# Patient Record
Sex: MALE | Race: BLACK OR AFRICAN AMERICAN | Employment: UNEMPLOYED | URBAN - METROPOLITAN AREA
[De-identification: names, ages, dates, MRNs, and addresses within clinical notes are randomized per-mention and may not be internally consistent; named-entity substitution may affect disease eponyms.]

---

## 2017-01-09 ENCOUNTER — APPOINTMENT (OUTPATIENT)
Dept: GENERAL RADIOLOGY | Age: 57
End: 2017-01-09
Attending: EMERGENCY MEDICINE
Payer: COMMERCIAL

## 2017-01-09 ENCOUNTER — HOSPITAL ENCOUNTER (EMERGENCY)
Age: 57
Discharge: HOME | End: 2017-01-09
Attending: EMERGENCY MEDICINE
Payer: COMMERCIAL

## 2017-01-09 VITALS
RESPIRATION RATE: 15 BRPM | HEART RATE: 91 BPM | HEIGHT: 64 IN | TEMPERATURE: 98.6 F | SYSTOLIC BLOOD PRESSURE: 149 MMHG | WEIGHT: 235 LBS | OXYGEN SATURATION: 95 % | BODY MASS INDEX: 40.12 KG/M2 | DIASTOLIC BLOOD PRESSURE: 93 MMHG

## 2017-01-09 DIAGNOSIS — J06.9 ACUTE UPPER RESPIRATORY INFECTION: ICD-10-CM

## 2017-01-09 DIAGNOSIS — J18.9 COMMUNITY ACQUIRED PNEUMONIA: Primary | ICD-10-CM

## 2017-01-09 LAB
FLUAV AG NPH QL IA: NEGATIVE
FLUBV AG NOSE QL IA: NEGATIVE

## 2017-01-09 PROCEDURE — 71020 XR CHEST PA LAT: CPT

## 2017-01-09 PROCEDURE — 99282 EMERGENCY DEPT VISIT SF MDM: CPT

## 2017-01-09 PROCEDURE — 87804 INFLUENZA ASSAY W/OPTIC: CPT | Performed by: EMERGENCY MEDICINE

## 2017-01-09 RX ORDER — IBUPROFEN 400 MG/1
800 TABLET ORAL
Status: DISCONTINUED | OUTPATIENT
Start: 2017-01-09 | End: 2017-01-09

## 2017-01-09 RX ORDER — ALBUTEROL SULFATE 90 UG/1
2 AEROSOL, METERED RESPIRATORY (INHALATION)
Status: DISCONTINUED | OUTPATIENT
Start: 2017-01-09 | End: 2017-01-09 | Stop reason: HOSPADM

## 2017-01-09 RX ORDER — LEVOFLOXACIN 750 MG/1
750 TABLET ORAL DAILY
Qty: 5 TAB | Refills: 0 | Status: SHIPPED | OUTPATIENT
Start: 2017-01-09 | End: 2017-01-14

## 2017-01-09 NOTE — DISCHARGE INSTRUCTIONS
Upper Respiratory Infection: After Your Visit to the Emergency Room  Your Care Instructions  You were seen in the emergency room for an upper respiratory infection (URI). This is an infection of the nose, sinuses, or throat. Viruses or bacteria can cause URIs. Colds, flu, and sinusitis are examples of URIs. These infections are spread by coughs, sneezes, and close contact with people who have a URI. Your doctor may have given you antibiotics to treat the infection if it was caused by bacteria. But antibiotics will not help a viral infection. You can treat most infections with home care. This may include drinking lots of fluids and taking over-the-counter medicine for your symptoms. Even though you have been released from the emergency room, you still need to watch for any problems. The doctor carefully checked you. But sometimes problems can develop later. If you have new symptoms, or if your symptoms do not get better, return to the emergency room or call your doctor right away. A visit to the emergency room is only one step in your treatment. Even if you feel better, you still need to do what your doctor recommends, such as going to all suggested follow-up appointments and taking medicines exactly as directed. This will help you recover and help prevent future problems. How can you care for yourself at home? · To prevent dehydration, drink plenty of fluids, enough so that your urine is light yellow or clear like water. Choose water and other caffeine-free clear liquids until you feel better. If you have kidney, heart, or liver disease and have to limit fluids, talk with your doctor before you increase the amount of fluids you drink. · Take acetaminophen (Tylenol) or ibuprofen (Advil, Motrin) for fever or pain. Read and follow all instructions on the label. · If your doctor prescribed antibiotics, take them as directed. Do not stop taking them just because you feel better.  You need to take the full course of antibiotics. · Take cough medicine and a decongestant if your doctor suggests it. · Get plenty of rest.  · Use saline (saltwater) nasal washes to help keep your nasal passages open and wash out mucus and bacteria. You can buy saline nose drops at a grocery store or drugstore. Or you can make your own at home by adding 1 teaspoon of salt and 1 teaspoon of baking soda to 2 cups of distilled water. If you make your own, fill a bulb syringe with the solution, insert the tip into your nostril, and squeeze gently. Sharilyn Ply your nose. · Use a vaporizer or humidifier to add moisture to the air in your bedroom. Follow the instructions for cleaning it. · Do not smoke or allow others to smoke around you. If you need help quitting, talk to your doctor about stop-smoking programs and medicines. These can increase your chances of quitting for good. When should you call for help? Call 911 if:  · You have severe trouble breathing. Return to the emergency room now if:  · You have a fever with stiff neck or a severe headache. · You have signs of needing more fluids. You have sunken eyes, a dry mouth, and pass only a little dark urine. · You cannot keep down fluids or medicine. Call your doctor today if:  · You have a deep cough and a lot of mucus. · You are too tired to eat or drink. · You have a new symptom, such as a sore throat, an earache, or a rash. Where can you learn more? Go to [employer]  Enter C545 in the search box to learn more about \"Upper Respiratory Infection: After Your Visit to the Emergency Room. \"   © 9138-3994 [employer]. Care instructions adapted under license by Kay Macias (which disclaims liability or warranty for this information).  This care instruction is for use with your licensed healthcare professional. If you have questions about a medical condition or this instruction, always ask your healthcare professional. [address]  any warranty or liability for your use of this information.   Content Version: 9.3.82409; Last Revised: February 13, 2012

## 2017-01-09 NOTE — ED TRIAGE NOTES
\"I am coughing and aching all over since yesterday. I stayed in bed all day yesterday\". Patient also reports falling this morning on ice but did not injure himself. He complains of chills with unknown fever.

## 2017-01-09 NOTE — ED PROVIDER NOTES
HPI Comments: 1:22 PM Maynor Najera is a 64 y.o. male presenting to the ED with URI sx x 2-3 days. Pt c/o a sore rib cage, productive cough (white mucous) x 1 day, epistaxis, generalized body aches, and feverish feelings. Pt states that he does smoke, but that he hasn't smoking over the last couple of days due to his sx. Pt denies nausea, vomiting, and diarrhea. No other complaints at this time. The history is provided by the patient. Past Medical History:   Diagnosis Date    Alcohol abuse     Arthritis     Atherosclerotic cardiovascular disease     CAD (coronary artery disease)      mild disease of coronaries (cor angio 2006),wife states he has 1 stent    Cardiac cath 01/26/2006     RCA mild. Otherwise patent coronaries. LVEDP 15.  EF 55-60%.  Cardiac echocardiogram 03/27/2009     EF 60%. No cardiac source of embolism. No significant valvular pathology.  Cardiac nuclear imaging test 12/06/2011     Small, mild inferior infarction or possibly artifact. No ischemia. EF 45%. No TID. No reg WMA.  Cardiovascular LLE venous duplex 08/14/2015     Left leg:  No DVT. Dilated lymph node in left groin.  Constipation     Diabetes mellitus type II     Gout     Hepatic steatosis     Hypercholesterolemia     Hypertension     Knee effusion, left     Left knee pain     Morbid obesity (HCC)     Noncompliance     Obesity (BMI 30-39. 9)     S/P colonoscopy 3/8/05     Dr. Brain Montaño; normal; f/u 10 years    Stomach problems     TIA (transient ischemic attack) 3/09    Tobacco abuse        Past Surgical History:   Procedure Laterality Date    Pr abdomen surgery proc unlisted       Hernia repair in 1960's or 1970's.  Hx orthopaedic       Bones spur removed from left & right foot 2013.     Hx urological  8/18/2015     [facility], Dr. Erin Caldera, Cystoscopy, left retrograde, left double-J cath         Family History:   Problem Relation Age of Onset    Diabetes Father     Heart Disease Mother  Diabetes Sister     Hypertension Sister     Arthritis-osteo Sister     Arthritis-osteo Brother     Diabetes Other     Diabetes Other      Uncle, Aunt    Hypertension Other      Uncle; Aunt       Social History     Social History    Marital status:      Spouse name: N/A    Number of children: N/A    Years of education: N/A     Occupational History    disabled      Social History Main Topics    Smoking status: Current Every Day Smoker     Packs/day: 0.25     Types: Cigarettes     Last attempt to quit: 6/28/2016    Smokeless tobacco: Never Used    Alcohol use 0.0 oz/week     0 Standard drinks or equivalent per week      Comment: 1 beers a day.  Drug use: No    Sexual activity: Yes     Other Topics Concern    Not on file     Social History Narrative         ALLERGIES: Shellfish derived    Review of Systems   Constitutional: Positive for fever. HENT: Positive for congestion and nosebleeds. Respiratory: Positive for cough (productive- white sputum). Negative for shortness of breath. Cardiovascular: Negative for chest pain and leg swelling. Gastrointestinal: Negative for abdominal pain, nausea and vomiting. Genitourinary: Negative for dysuria. Musculoskeletal: Negative. Positive- rib cage pain   Neurological: Negative for light-headedness and headaches. All other systems reviewed and are negative. Vitals:    01/09/17 1054   BP: (!) 149/93   Pulse: 91   Resp: 15   Temp: 98.6 °F (37 °C)   SpO2: 95%   Weight: 106.6 kg (235 lb)   Height: 5' 4\" (1.626 m)            Physical Exam   Constitutional: He is oriented to person, place, and time. No distress. HENT:   Head: Atraumatic. Nasal congestion. No ttp over sinuses. Posterior pharynx unremarkable. Eyes: Conjunctivae are normal.   Neck: Normal range of motion. Neck supple. No JVD present. Cardiovascular: Normal rate, regular rhythm and normal heart sounds. Pulmonary/Chest: No respiratory distress.  He has wheezes (scattered wheezes bilaterally). He exhibits no tenderness. Abdominal: Soft. Bowel sounds are normal. He exhibits no distension. There is no tenderness. There is no rebound and no guarding. Musculoskeletal: Normal range of motion. He exhibits no edema or tenderness. Lymphadenopathy:     He has no cervical adenopathy. Neurological: He is alert and oriented to person, place, and time. Skin: Skin is warm and dry. Psychiatric: He has a normal mood and affect. Nursing note and vitals reviewed. MDM  Number of Diagnoses or Management Options  Acute upper respiratory infection:   Community acquired pneumonia:   Diagnosis management comments: Summer Brandon is a 64 y.o. male presenting with URI symptoms and wheezing, productive cough and tobacco use. Flu swab negative but cxr concerning for infiltrate. I have discussed results of work up with patient. Will give inhaler and abx course. I have instructed patient to continue supportive care at home. Pt has follow up with pcp next week. BP elevated but denies any ha, blurry vision, cp or sob. I have instructed him to discuss his BP at that time and Return precautions discussed. Patient stated verbal understanding and agrees with course and plan. ED Course       Procedures    Vitals:  Patient Vitals for the past 12 hrs:   Temp Pulse Resp BP SpO2   01/09/17 1054 98.6 °F (37 °C) 91 15 (!) 149/93 95 %     95 %. Percentage is within normal limits.     Medications ordered:   Medications   ibuprofen (MOTRIN) tablet 800 mg (not administered)   albuterol (PROVENTIL HFA, VENTOLIN HFA, PROAIR HFA) inhaler 2 Puff (not administered)         Lab findings:  Recent Results (from the past 12 hour(s))   INFLUENZA A & B AG (RAPID TEST)    Collection Time: 01/09/17 10:56 AM   Result Value Ref Range    Influenza A Antigen NEGATIVE  NEG      Influenza B Antigen NEGATIVE  NEG         X-Ray, CT or other radiology findings or impressions:  XR CHEST PA LAT IMPRESSION:      Mild to moderate bronchitis. Probable minimal infiltrates in right middle lobe. Mild cardiomegaly without cardiac decompensation. 1:21 PM  Per  Radiology.                    Disposition:  Diagnosis:   1. Community acquired pneumonia    2. Acute upper respiratory infection    3. Elevated blood pressure        Disposition: Discharged home in stable condition        Patient's Medications   Start Taking    LEVOFLOXACIN (LEVAQUIN) 750 MG TABLET    Take 1 Tab by mouth daily for 5 days. Continue Taking    ACETAMINOPHEN (TYLENOL) 650 MG CR TABLET    Take 650 mg by mouth every six (6) hours as needed for Pain. AMLODIPINE (NORVASC) 10 MG TABLET    Take 1 Tab by mouth daily. ATORVASTATIN (LIPITOR) 80 MG TABLET    Take 1 Tab by mouth daily. BLOOD-GLUCOSE METER MONITORING KIT    Check twice daily. CARVEDILOL (COREG) 12.5 MG TABLET    Take 1 Tab by mouth two (2) times daily (with meals). DESONIDE (DESOWEN) 0.05 % TOPICAL OINTMENT    Apply  to affected area two (2) times a day. DOCUSATE SODIUM (COLACE) 100 MG CAPSULE    Take 1 Cap by mouth two (2) times a day. FEBUXOSTAT (ULORIC) 40 MG TAB TABLET    Take 1 Tab by mouth daily. GLUCOSE BLOOD VI TEST STRIPS (ASCENSIA CONTOUR) STRIP    contour test trips. HYDRALAZINE (APRESOLINE) 10 MG TABLET    Take 1 Tab by mouth three (3) times daily. HYDROCODONE-ACETAMINOPHEN (NORCO) 7.5-325 MG PER TABLET    Take 1-2 Tabs by mouth nightly as needed for Pain. Max Daily Amount: 2 Tabs. INSULIN NEEDLES, DISPOSABLE, 29 GAUGE X 5/16\" NDLE    40 Units by Does Not Apply route two (2) times a day. INSULIN NPH/INSULIN REGULAR (NOVOLIN 70/30) 100 UNIT/ML (70-30) INJECTION    60 units qam and 50 units qpm    LANCETS MISC    Check 3 times a day , needs according contour glucometer    OMEPRAZOLE DELAYED RELEASE (PRILOSEC D/R) 20 MG TABLET    Take 1 Tab by mouth daily. TAMSULOSIN (FLOMAX) 0.4 MG CAPSULE    Take 1 Cap by mouth daily.    These Medications have changed    No medications on file   Stop Taking    No medications on file     Scribe Attestation:     I, Drake Ledesma, scribing for and in the presence of Vilma La MD January 09, 2017 at 1:20 PM     Physician Attestation:   I personally performed the services described in this documentation, reviewed and edited the documentation which was dictated to the scribe in my presence, and it accurately records my words and actions.  Vilma La MD  January 09, 2017 at 1:20 PM

## 2017-01-13 LAB
CREATININE, EXTERNAL: 2.06
HBA1C MFR BLD HPLC: 7.3 %
MICROALBUMIN UR TEST STR-MCNC: NORMAL MG/DL

## 2017-01-20 DIAGNOSIS — M10.9 ACUTE GOUT OF RIGHT WRIST, UNSPECIFIED CAUSE: ICD-10-CM

## 2017-01-20 RX ORDER — INDOMETHACIN 50 MG/1
CAPSULE ORAL
Qty: 60 CAP | Refills: 0 | Status: SHIPPED | OUTPATIENT
Start: 2017-01-20 | End: 2017-02-10

## 2017-02-07 ENCOUNTER — APPOINTMENT (OUTPATIENT)
Dept: MRI IMAGING | Age: 57
DRG: 069 | End: 2017-02-07
Attending: INTERNAL MEDICINE
Payer: COMMERCIAL

## 2017-02-07 ENCOUNTER — APPOINTMENT (OUTPATIENT)
Dept: GENERAL RADIOLOGY | Age: 57
DRG: 069 | End: 2017-02-07
Attending: EMERGENCY MEDICINE
Payer: COMMERCIAL

## 2017-02-07 ENCOUNTER — APPOINTMENT (OUTPATIENT)
Dept: GENERAL RADIOLOGY | Age: 57
DRG: 069 | End: 2017-02-07
Attending: INTERNAL MEDICINE
Payer: COMMERCIAL

## 2017-02-07 ENCOUNTER — HOSPITAL ENCOUNTER (INPATIENT)
Age: 57
LOS: 2 days | Discharge: HOME | DRG: 069 | End: 2017-02-10
Attending: EMERGENCY MEDICINE | Admitting: INTERNAL MEDICINE
Payer: COMMERCIAL

## 2017-02-07 ENCOUNTER — APPOINTMENT (OUTPATIENT)
Dept: CT IMAGING | Age: 57
DRG: 069 | End: 2017-02-07
Attending: EMERGENCY MEDICINE
Payer: COMMERCIAL

## 2017-02-07 DIAGNOSIS — E87.6 HYPOKALEMIA: ICD-10-CM

## 2017-02-07 DIAGNOSIS — N17.9 AKI (ACUTE KIDNEY INJURY) (HCC): ICD-10-CM

## 2017-02-07 DIAGNOSIS — R20.0 LEFT SIDED NUMBNESS: Primary | ICD-10-CM

## 2017-02-07 DIAGNOSIS — I16.1 HYPERTENSIVE EMERGENCY: ICD-10-CM

## 2017-02-07 LAB
ANION GAP BLD CALC-SCNC: 9 MMOL/L (ref 3–18)
APPEARANCE UR: CLEAR
APTT PPP: 28.6 SEC (ref 23–36.4)
ATRIAL RATE: 90 BPM
BACTERIA URNS QL MICRO: NEGATIVE /HPF
BASOPHILS # BLD AUTO: 0 K/UL (ref 0–0.06)
BASOPHILS # BLD: 0 % (ref 0–2)
BILIRUB UR QL: NEGATIVE
BNP SERPL-MCNC: 444 PG/ML (ref 0–900)
BUN SERPL-MCNC: 17 MG/DL (ref 7–18)
BUN/CREAT SERPL: 8 (ref 12–20)
CALCIUM SERPL-MCNC: 8.9 MG/DL (ref 8.5–10.1)
CALCULATED P AXIS, ECG09: 50 DEGREES
CALCULATED R AXIS, ECG10: 20 DEGREES
CALCULATED T AXIS, ECG11: 179 DEGREES
CHLORIDE SERPL-SCNC: 103 MMOL/L (ref 100–108)
CK MB CFR SERPL CALC: 1.7 % (ref 0–4)
CK MB SERPL-MCNC: 5 NG/ML (ref 0.5–3.6)
CK SERPL-CCNC: 298 U/L (ref 39–308)
CO2 SERPL-SCNC: 31 MMOL/L (ref 21–32)
COLOR UR: YELLOW
CREAT SERPL-MCNC: 2.16 MG/DL (ref 0.6–1.3)
CREAT UR-MCNC: 67.9 MG/DL (ref 30–125)
CREAT UR-MCNC: 69.2 MG/DL (ref 30–125)
DIAGNOSIS, 93000: NORMAL
DIFFERENTIAL METHOD BLD: ABNORMAL
EOSINOPHIL # BLD: 0.4 K/UL (ref 0–0.4)
EOSINOPHIL NFR BLD: 4 % (ref 0–5)
EPITH CASTS URNS QL MICRO: NORMAL /LPF (ref 0–5)
ERYTHROCYTE [DISTWIDTH] IN BLOOD BY AUTOMATED COUNT: 14.5 % (ref 11.6–14.5)
EST. AVERAGE GLUCOSE BLD GHB EST-MCNC: 174 MG/DL
GLUCOSE BLD STRIP.AUTO-MCNC: 190 MG/DL (ref 70–110)
GLUCOSE BLD STRIP.AUTO-MCNC: 265 MG/DL (ref 70–110)
GLUCOSE BLD STRIP.AUTO-MCNC: 276 MG/DL (ref 70–110)
GLUCOSE SERPL-MCNC: 283 MG/DL (ref 74–99)
GLUCOSE UR STRIP.AUTO-MCNC: 250 MG/DL
HBA1C MFR BLD: 7.7 % (ref 4.2–5.6)
HCT VFR BLD AUTO: 37.7 % (ref 36–48)
HGB BLD-MCNC: 12 G/DL (ref 13–16)
HGB UR QL STRIP: ABNORMAL
INR PPP: 0.9 (ref 0.8–1.2)
KETONES UR QL STRIP.AUTO: NEGATIVE MG/DL
LEUKOCYTE ESTERASE UR QL STRIP.AUTO: NEGATIVE
LYMPHOCYTES # BLD AUTO: 22 % (ref 21–52)
LYMPHOCYTES # BLD: 2.2 K/UL (ref 0.9–3.6)
MCH RBC QN AUTO: 27.6 PG (ref 24–34)
MCHC RBC AUTO-ENTMCNC: 31.8 G/DL (ref 31–37)
MCV RBC AUTO: 86.7 FL (ref 74–97)
MONOCYTES # BLD: 0.6 K/UL (ref 0.05–1.2)
MONOCYTES NFR BLD AUTO: 6 % (ref 3–10)
NEUTS SEG # BLD: 7.1 K/UL (ref 1.8–8)
NEUTS SEG NFR BLD AUTO: 68 % (ref 40–73)
NITRITE UR QL STRIP.AUTO: NEGATIVE
P-R INTERVAL, ECG05: 166 MS
PH UR STRIP: 6.5 [PH] (ref 5–8)
PLATELET # BLD AUTO: 380 K/UL (ref 135–420)
PMV BLD AUTO: 10.3 FL (ref 9.2–11.8)
POTASSIUM SERPL-SCNC: 3.2 MMOL/L (ref 3.5–5.5)
PROT UR STRIP-MCNC: >1000 MG/DL
PROT UR-MCNC: 538 MG/DL
PROT/CREAT UR-RTO: 7.8
PROTHROMBIN TIME: 11.6 SEC (ref 11.5–15.2)
Q-T INTERVAL, ECG07: 382 MS
QRS DURATION, ECG06: 102 MS
QTC CALCULATION (BEZET), ECG08: 467 MS
RBC # BLD AUTO: 4.35 M/UL (ref 4.7–5.5)
RBC #/AREA URNS HPF: NORMAL /HPF (ref 0–5)
SODIUM SERPL-SCNC: 143 MMOL/L (ref 136–145)
SODIUM UR-SCNC: 92 MMOL/L (ref 20–110)
SP GR UR REFRACTOMETRY: 1.01 (ref 1–1.03)
TROPONIN I SERPL-MCNC: 0.03 NG/ML (ref 0–0.04)
UROBILINOGEN UR QL STRIP.AUTO: 0.2 EU/DL (ref 0.2–1)
VENTRICULAR RATE, ECG03: 90 BPM
WBC # BLD AUTO: 10.3 K/UL (ref 4.6–13.2)
WBC URNS QL MICRO: NORMAL /HPF (ref 0–4)

## 2017-02-07 PROCEDURE — 73610 X-RAY EXAM OF ANKLE: CPT

## 2017-02-07 PROCEDURE — 85025 COMPLETE CBC W/AUTO DIFF WBC: CPT | Performed by: EMERGENCY MEDICINE

## 2017-02-07 PROCEDURE — 74011250636 HC RX REV CODE- 250/636: Performed by: EMERGENCY MEDICINE

## 2017-02-07 PROCEDURE — 71020 XR CHEST PA LAT: CPT

## 2017-02-07 PROCEDURE — 85730 THROMBOPLASTIN TIME PARTIAL: CPT | Performed by: EMERGENCY MEDICINE

## 2017-02-07 PROCEDURE — 82570 ASSAY OF URINE CREATININE: CPT | Performed by: INTERNAL MEDICINE

## 2017-02-07 PROCEDURE — 74011636637 HC RX REV CODE- 636/637: Performed by: INTERNAL MEDICINE

## 2017-02-07 PROCEDURE — 83880 ASSAY OF NATRIURETIC PEPTIDE: CPT | Performed by: INTERNAL MEDICINE

## 2017-02-07 PROCEDURE — 96374 THER/PROPH/DIAG INJ IV PUSH: CPT

## 2017-02-07 PROCEDURE — 84156 ASSAY OF PROTEIN URINE: CPT | Performed by: INTERNAL MEDICINE

## 2017-02-07 PROCEDURE — 36415 COLL VENOUS BLD VENIPUNCTURE: CPT | Performed by: INTERNAL MEDICINE

## 2017-02-07 PROCEDURE — 84300 ASSAY OF URINE SODIUM: CPT | Performed by: INTERNAL MEDICINE

## 2017-02-07 PROCEDURE — 99285 EMERGENCY DEPT VISIT HI MDM: CPT

## 2017-02-07 PROCEDURE — 70450 CT HEAD/BRAIN W/O DYE: CPT

## 2017-02-07 PROCEDURE — 85610 PROTHROMBIN TIME: CPT | Performed by: EMERGENCY MEDICINE

## 2017-02-07 PROCEDURE — 99218 HC RM OBSERVATION: CPT

## 2017-02-07 PROCEDURE — 74011250637 HC RX REV CODE- 250/637: Performed by: INTERNAL MEDICINE

## 2017-02-07 PROCEDURE — 83935 ASSAY OF URINE OSMOLALITY: CPT | Performed by: INTERNAL MEDICINE

## 2017-02-07 PROCEDURE — 81001 URINALYSIS AUTO W/SCOPE: CPT | Performed by: INTERNAL MEDICINE

## 2017-02-07 PROCEDURE — 80048 BASIC METABOLIC PNL TOTAL CA: CPT | Performed by: EMERGENCY MEDICINE

## 2017-02-07 PROCEDURE — 83036 HEMOGLOBIN GLYCOSYLATED A1C: CPT | Performed by: INTERNAL MEDICINE

## 2017-02-07 PROCEDURE — 93005 ELECTROCARDIOGRAM TRACING: CPT

## 2017-02-07 PROCEDURE — 74011250636 HC RX REV CODE- 250/636: Performed by: INTERNAL MEDICINE

## 2017-02-07 PROCEDURE — 74011250637 HC RX REV CODE- 250/637: Performed by: EMERGENCY MEDICINE

## 2017-02-07 PROCEDURE — 82550 ASSAY OF CK (CPK): CPT | Performed by: EMERGENCY MEDICINE

## 2017-02-07 PROCEDURE — 82962 GLUCOSE BLOOD TEST: CPT

## 2017-02-07 RX ORDER — TAMSULOSIN HYDROCHLORIDE 0.4 MG/1
0.4 CAPSULE ORAL DAILY
Status: DISCONTINUED | OUTPATIENT
Start: 2017-02-08 | End: 2017-02-10 | Stop reason: HOSPADM

## 2017-02-07 RX ORDER — OXYCODONE AND ACETAMINOPHEN 5; 325 MG/1; MG/1
1 TABLET ORAL
Status: COMPLETED | OUTPATIENT
Start: 2017-02-07 | End: 2017-02-07

## 2017-02-07 RX ORDER — AMLODIPINE BESYLATE 10 MG/1
10 TABLET ORAL DAILY
Status: DISCONTINUED | OUTPATIENT
Start: 2017-02-08 | End: 2017-02-09

## 2017-02-07 RX ORDER — SODIUM CHLORIDE 0.9 % (FLUSH) 0.9 %
5-10 SYRINGE (ML) INJECTION AS NEEDED
Status: DISCONTINUED | OUTPATIENT
Start: 2017-02-07 | End: 2017-02-10 | Stop reason: HOSPADM

## 2017-02-07 RX ORDER — DOCUSATE SODIUM 100 MG/1
100 CAPSULE, LIQUID FILLED ORAL 2 TIMES DAILY
Status: DISCONTINUED | OUTPATIENT
Start: 2017-02-07 | End: 2017-02-10 | Stop reason: HOSPADM

## 2017-02-07 RX ORDER — FEBUXOSTAT 40 MG/1
40 TABLET, FILM COATED ORAL DAILY
Status: DISCONTINUED | OUTPATIENT
Start: 2017-02-08 | End: 2017-02-10 | Stop reason: HOSPADM

## 2017-02-07 RX ORDER — ATORVASTATIN CALCIUM 40 MG/1
80 TABLET, FILM COATED ORAL DAILY
Status: DISCONTINUED | OUTPATIENT
Start: 2017-02-08 | End: 2017-02-10 | Stop reason: HOSPADM

## 2017-02-07 RX ORDER — HYDRALAZINE HYDROCHLORIDE 20 MG/ML
10 INJECTION INTRAMUSCULAR; INTRAVENOUS
Status: DISCONTINUED | OUTPATIENT
Start: 2017-02-07 | End: 2017-02-10 | Stop reason: HOSPADM

## 2017-02-07 RX ORDER — MORPHINE SULFATE 2 MG/ML
1 INJECTION, SOLUTION INTRAMUSCULAR; INTRAVENOUS
Status: DISCONTINUED | OUTPATIENT
Start: 2017-02-07 | End: 2017-02-08

## 2017-02-07 RX ORDER — HYDRALAZINE HYDROCHLORIDE 20 MG/ML
20 INJECTION INTRAMUSCULAR; INTRAVENOUS ONCE
Status: COMPLETED | OUTPATIENT
Start: 2017-02-07 | End: 2017-02-07

## 2017-02-07 RX ORDER — GUAIFENESIN 100 MG/5ML
81 LIQUID (ML) ORAL DAILY
Status: DISCONTINUED | OUTPATIENT
Start: 2017-02-08 | End: 2017-02-10 | Stop reason: HOSPADM

## 2017-02-07 RX ORDER — ENOXAPARIN SODIUM 100 MG/ML
40 INJECTION SUBCUTANEOUS EVERY 24 HOURS
Status: DISCONTINUED | OUTPATIENT
Start: 2017-02-07 | End: 2017-02-10 | Stop reason: HOSPADM

## 2017-02-07 RX ORDER — SODIUM CHLORIDE 0.9 % (FLUSH) 0.9 %
5-10 SYRINGE (ML) INJECTION EVERY 8 HOURS
Status: DISCONTINUED | OUTPATIENT
Start: 2017-02-07 | End: 2017-02-10 | Stop reason: HOSPADM

## 2017-02-07 RX ORDER — INDOMETHACIN 25 MG/1
50 CAPSULE ORAL 2 TIMES DAILY WITH MEALS
Status: DISCONTINUED | OUTPATIENT
Start: 2017-02-07 | End: 2017-02-07

## 2017-02-07 RX ORDER — POTASSIUM CHLORIDE 20 MEQ/1
40 TABLET, EXTENDED RELEASE ORAL
Status: COMPLETED | OUTPATIENT
Start: 2017-02-07 | End: 2017-02-07

## 2017-02-07 RX ORDER — MAGNESIUM SULFATE 100 %
4 CRYSTALS MISCELLANEOUS AS NEEDED
Status: DISCONTINUED | OUTPATIENT
Start: 2017-02-07 | End: 2017-02-10 | Stop reason: HOSPADM

## 2017-02-07 RX ORDER — HYDRALAZINE HYDROCHLORIDE 10 MG/1
10 TABLET, FILM COATED ORAL 3 TIMES DAILY
Status: DISCONTINUED | OUTPATIENT
Start: 2017-02-07 | End: 2017-02-08

## 2017-02-07 RX ORDER — PANTOPRAZOLE SODIUM 40 MG/1
40 TABLET, DELAYED RELEASE ORAL
Status: DISCONTINUED | OUTPATIENT
Start: 2017-02-08 | End: 2017-02-10 | Stop reason: HOSPADM

## 2017-02-07 RX ORDER — BENZONATATE 100 MG/1
100 CAPSULE ORAL
Status: DISCONTINUED | OUTPATIENT
Start: 2017-02-07 | End: 2017-02-10 | Stop reason: HOSPADM

## 2017-02-07 RX ORDER — SODIUM CHLORIDE 9 MG/ML
75 INJECTION, SOLUTION INTRAVENOUS CONTINUOUS
Status: DISCONTINUED | OUTPATIENT
Start: 2017-02-07 | End: 2017-02-08

## 2017-02-07 RX ORDER — CARVEDILOL 12.5 MG/1
12.5 TABLET ORAL 2 TIMES DAILY WITH MEALS
Status: DISCONTINUED | OUTPATIENT
Start: 2017-02-07 | End: 2017-02-08

## 2017-02-07 RX ORDER — OXYCODONE AND ACETAMINOPHEN 5; 325 MG/1; MG/1
1-2 TABLET ORAL
Status: DISCONTINUED | OUTPATIENT
Start: 2017-02-07 | End: 2017-02-10 | Stop reason: HOSPADM

## 2017-02-07 RX ORDER — GUAIFENESIN 100 MG/5ML
324 LIQUID (ML) ORAL
Status: COMPLETED | OUTPATIENT
Start: 2017-02-07 | End: 2017-02-07

## 2017-02-07 RX ORDER — ACETAMINOPHEN 325 MG/1
650 TABLET ORAL
Status: DISCONTINUED | OUTPATIENT
Start: 2017-02-07 | End: 2017-02-10 | Stop reason: HOSPADM

## 2017-02-07 RX ORDER — INSULIN LISPRO 100 [IU]/ML
INJECTION, SOLUTION INTRAVENOUS; SUBCUTANEOUS
Status: DISCONTINUED | OUTPATIENT
Start: 2017-02-07 | End: 2017-02-10 | Stop reason: HOSPADM

## 2017-02-07 RX ORDER — DEXTROSE 50 % IN WATER (D50W) INTRAVENOUS SYRINGE
25-50 AS NEEDED
Status: DISCONTINUED | OUTPATIENT
Start: 2017-02-07 | End: 2017-02-10 | Stop reason: HOSPADM

## 2017-02-07 RX ADMIN — OXYCODONE HYDROCHLORIDE AND ACETAMINOPHEN 2 TABLET: 5; 325 TABLET ORAL at 23:57

## 2017-02-07 RX ADMIN — OXYCODONE HYDROCHLORIDE AND ACETAMINOPHEN 1 TABLET: 5; 325 TABLET ORAL at 12:04

## 2017-02-07 RX ADMIN — DOCUSATE SODIUM 100 MG: 100 CAPSULE, LIQUID FILLED ORAL at 21:57

## 2017-02-07 RX ADMIN — INSULIN HUMAN 40 UNITS: 100 INJECTION, SUSPENSION SUBCUTANEOUS at 22:36

## 2017-02-07 RX ADMIN — BENZONATATE 100 MG: 100 CAPSULE ORAL at 23:57

## 2017-02-07 RX ADMIN — HYDRALAZINE HYDROCHLORIDE 10 MG: 10 TABLET, FILM COATED ORAL at 21:57

## 2017-02-07 RX ADMIN — CARVEDILOL 12.5 MG: 12.5 TABLET, FILM COATED ORAL at 21:56

## 2017-02-07 RX ADMIN — SODIUM CHLORIDE 1000 ML: 900 INJECTION, SOLUTION INTRAVENOUS at 12:43

## 2017-02-07 RX ADMIN — HYDRALAZINE HYDROCHLORIDE 20 MG: 20 INJECTION INTRAMUSCULAR; INTRAVENOUS at 10:56

## 2017-02-07 RX ADMIN — OXYCODONE HYDROCHLORIDE AND ACETAMINOPHEN 1 TABLET: 5; 325 TABLET ORAL at 18:36

## 2017-02-07 RX ADMIN — POTASSIUM CHLORIDE 40 MEQ: 1500 TABLET, EXTENDED RELEASE ORAL at 13:51

## 2017-02-07 RX ADMIN — ASPIRIN 81 MG 324 MG: 81 TABLET ORAL at 11:17

## 2017-02-07 RX ADMIN — ENOXAPARIN SODIUM 40 MG: 40 INJECTION SUBCUTANEOUS at 23:57

## 2017-02-07 NOTE — PROGRESS NOTES
met with patient and some of his family in the Emergency Department and completed the initial Spiritual Assessment of the patient, and offered Pastoral Care, see flow sheets for interventions. Patient said he has known Sr. Chato Aguilar since he was a child. He and family spoke highly of her support to them and several other families. Pastoral support provided. Patient does not have any Sabianist/cultural needs that will affect patients preferences in health care. Family said they would like a visit from Williston Liliam and  called and left her a message giving her patient's name and request for a visit. This family has a strong Faith eduardo. Chart reviewed. Chaplains will continue to follow and will provide pastoral care on an as needed/as requested basis. Waldo Harvey MDiv.   Board Certified [employer] [phone]

## 2017-02-07 NOTE — IP AVS SNAPSHOT
Current Discharge Medication List  
  
Take these medications at their scheduled times Dose & Instructions Dispensing Information Comments Morning Noon Evening Bedtime  
 amLODIPine 5 mg tablet Commonly known as:  Maya Catalcon Your next dose is: Today, Tomorrow Other:  ____________ Dose:  5 mg Take 1 Tab by mouth daily. Quantity:  30 Tab Refills:  0  
     
   
   
   
  
 amoxicillin-clavulanate 500-125 mg per tablet Commonly known as:  AUGMENTIN Your next dose is: Today, Tomorrow Other:  ____________ Dose:  1 Tab Take 1 Tab by mouth every twelve (12) hours for 5 days. Quantity:  10 Tab Refills:  0  
     
   
   
   
  
 aspirin 81 mg chewable tablet Your next dose is: Today, Tomorrow Other:  ____________ Dose:  81 mg Take 1 Tab by mouth daily. Quantity:  30 Tab Refills:  0  
     
   
   
   
  
 atorvastatin 80 mg tablet Commonly known as:  LIPITOR Your next dose is: Today, Tomorrow Other:  ____________ Dose:  80 mg Take 1 Tab by mouth daily. Quantity:  90 Tab Refills:  3  
     
   
   
   
  
 b complex-vitamin c-folic acid 1 mg capsule Commonly known as:  Niels Lock Your next dose is: Today, Tomorrow Other:  ____________ Dose:  1 Cap Take 1 Cap by mouth daily. Quantity:  30 Cap Refills:  0  
     
   
   
   
  
 calcitRIOL 0.25 mcg capsule Commonly known as:  ROCALTROL Your next dose is: Today, Tomorrow Other:  ____________ Dose:  0.25 mcg Take 1 Cap by mouth every Monday, Wednesday, Friday. Quantity:  15 Cap Refills:  0  
     
   
   
   
  
 carvedilol 25 mg tablet Commonly known as:  Ruddy Pintos Your next dose is: Today, Tomorrow Other:  ____________ Dose:  25 mg Take 1 Tab by mouth two (2) times daily (with meals). Quantity:  60 Tab Refills:  0 cloNIDine HCl 0.1 mg tablet Commonly known as:  CATAPRES Your next dose is: Today, Tomorrow Other:  ____________ Dose:  0.1 mg Take 1 Tab by mouth two (2) times a day. Quantity:  60 Tab Refills:  0  
     
   
   
   
  
 docusate sodium 100 mg capsule Commonly known as:  Kathleen Mann Your next dose is: Today, Tomorrow Other:  ____________ Dose:  100 mg Take 1 Cap by mouth two (2) times a day. Quantity:  30 Cap Refills:  0  
     
   
   
   
  
 febuxostat 40 mg Tab tablet Commonly known as:  Ernestene Mally Your next dose is: Today, Tomorrow Other:  ____________ Dose:  40 mg Take 1 Tab by mouth daily. Quantity:  30 Tab Refills:  2  
     
   
   
   
  
 fluticasone 50 mcg/actuation nasal spray Commonly known as:  Lella Solum Your next dose is: Today, Tomorrow Other:  ____________ Dose:  2 Spray 2 Sprays by Both Nostrils route daily for 7 days. Quantity:  1 Bottle Refills:  0  
     
   
   
   
  
 hydrALAZINE 100 mg tablet Commonly known as:  APRESOLINE Your next dose is: Today, Tomorrow Other:  ____________ Dose:  100 mg Take 1 Tab by mouth three (3) times daily. Indications: hypertension Quantity:  90 Tab Refills:  0 Insulin Needles (Disposable) 29 gauge x 5/16\" Ndle Your next dose is: Today, Tomorrow Other:  ____________ Dose:  40 Units 40 Units by Does Not Apply route two (2) times a day. Quantity:  1 Container Refills:  11 Omeprazole delayed release 20 mg tablet Commonly known as:  PRILOSEC D/R Your next dose is: Today, Tomorrow Other:  ____________ Dose:  20 mg Take 1 Tab by mouth daily. Quantity:  30 Tab Refills:  1  
     
   
   
   
  
 potassium chloride 20 mEq tablet Commonly known as:  K-DUR, KLOR-CON  
   
 Your next dose is: Today, Tomorrow Other:  ____________ Dose:  20 mEq Take 1 Tab by mouth daily for 3 doses. Quantity:  3 Tab Refills:  0  
     
   
   
   
  
 tamsulosin 0.4 mg capsule Commonly known as:  FLOMAX Your next dose is: Today, Tomorrow Other:  ____________ Dose:  1 Cap Take 1 Cap by mouth daily. Refills:  3 Take these medications as needed Dose & Instructions Dispensing Information Comments Morning Noon Evening Bedtime  
 acetaminophen 650 mg CR tablet Commonly known as:  TYLENOL Your next dose is: Today, Tomorrow Other:  ____________ Dose:  650 mg Take 650 mg by mouth every six (6) hours as needed for Pain. Refills:  0  
     
   
   
   
  
 albuterol 90 mcg/actuation inhaler Commonly known as:  PROVENTIL HFA, VENTOLIN HFA, PROAIR HFA Your next dose is: Today, Tomorrow Other:  ____________ Dose:  2 Puff Take 2 Puffs by inhalation every four (4) hours as needed for Wheezing. Quantity:  1 Inhaler Refills:  0 Take these medications as directed Dose & Instructions Dispensing Information Comments Morning Noon Evening Bedtime Blood-Glucose Meter monitoring kit Your next dose is: Today, Tomorrow Other:  ____________ Check twice daily. Quantity:  1 kit Refills:  0  
     
   
   
   
  
 glucose blood VI test strips strip Commonly known as:  Ascensia CONTOUR Your next dose is: Today, Tomorrow Other:  ____________  
   
   
 contour test trips. Quantity:  1 Package Refills:  11  
     
   
   
   
  
 insulin NPH/insulin regular 100 unit/mL (70-30) injection Commonly known as:  NovoLIN 70/30 Your next dose is: Today, Tomorrow Other:  ____________  
   
   
 30 UNITS SUBCUTANEOUSLY TWICE DAILY. Do not take it blood sugar is less than 100 Quantity:  10 mL Refills:  1 Lancets Misc Your next dose is: Today, Tomorrow Other:  ____________ Check 3 times a day , needs according contour glucometer Quantity:  1 Package Refills:  11 Where to Get Your Medications Information about where to get these medications is not yet available ! Ask your nurse or doctor about these medications  
  albuterol 90 mcg/actuation inhaler  
 amLODIPine 5 mg tablet  
 amoxicillin-clavulanate 500-125 mg per tablet  
 aspirin 81 mg chewable tablet  
 b complex-vitamin c-folic acid 1 mg capsule  
 calcitRIOL 0.25 mcg capsule  
 carvedilol 25 mg tablet  
 cloNIDine HCl 0.1 mg tablet  
 fluticasone 50 mcg/actuation nasal spray  
 hydrALAZINE 100 mg tablet  
 insulin NPH/insulin regular 100 unit/mL (70-30) injection  
 potassium chloride 20 mEq tablet

## 2017-02-07 NOTE — IP AVS SNAPSHOT
Eriberto Farrar 
 
 
 [address] [address] Deedee Garcia Patient: Lemuel Grant MRN: [id number] :1960 You are allergic to the following Allergen Reactions Shellfish Derived Other (comments) Causes Gout Recent Documentation Height Weight BMI Smoking Status 1.626 m 108.2 kg 40.96 kg/m2 Current Every Day Smoker Unresulted Labs Order Current Status PROTEIN ELECTROPHORESIS + DAMON, UR, 24HR In process SPEP AND DAMON, SERUM In process Emergency Contacts Name Discharge Info Relation Home Work Mobile [address] CAREGIVER [3] Spouse [3] [phone] About your hospitalization You were admitted on:  2017 You last received care in the:  [facility] [address] You were discharged on:  February 10, 2017 Unit phone number:  [phone] Why you were hospitalized Your primary diagnosis was:  Not on File Your diagnoses also included:  Left Sided Numbness, Chronic Kidney Disease, Stage Iii (Moderate), Proteinuria, Secondary Hyperparathyroidism Of Renal Origin (Hcc), Hypoglycemia, Tia (Transient Ischemic Attack) Providers Seen During Your Hospitalizations Provider Role Specialty Primary office phone Miah Lu MD Attending Provider Emergency Medicine [phone] Minal Herrera MD Attending Provider Internal Medicine [phone] Your Primary Care Physician (PCP) Primary Care Physician Office Phone Office Fax [facility], [address] [place] [address] [phone] Follow-up Information Follow up With Details Comments Contact Info Manuel Castillo MD On 2/15/2017 @ 11:45 am [address] 
[phone] Kyleigh Gage MD On 2017 @ 10:30 am [address] [address] 
[phone] Flower Taylor MD On 3/13/2017 @ 1:00 pm [address] [facility] 
QUITA [address] [facility] [id number] [phone] Aly Armstrong MD On 4/21/2017 @ 2:30 pm [address] [place], [place] [id number] [place] [address] 
[phone] Your Appointments Wednesday February 15, 2017 11:45 AM EST  
ROUTINE CARE with Thanh Isaac MD  
[address] [facility] [address]  
[phone] Monday March 13, 2017  1:40 PM EDT Follow Up with Flower Taylor MD  
[facility] [address] [facility] [address] [phone] Current Discharge Medication List  
  
START taking these medications Dose & Instructions Dispensing Information Comments Morning Noon Evening Bedtime  
 albuterol 90 mcg/actuation inhaler Commonly known as:  PROVENTIL HFA, VENTOLIN HFA, PROAIR HFA Your next dose is: Today, Tomorrow Other:  _________ Dose:  2 Puff Take 2 Puffs by inhalation every four (4) hours as needed for Wheezing. Quantity:  1 Inhaler Refills:  0  
     
   
   
   
  
 amoxicillin-clavulanate 500-125 mg per tablet Commonly known as:  AUGMENTIN Your next dose is: Today, Tomorrow Other:  _________ Dose:  1 Tab Take 1 Tab by mouth every twelve (12) hours for 5 days. Quantity:  10 Tab Refills:  0  
     
   
   
   
  
 aspirin 81 mg chewable tablet Your next dose is: Today, Tomorrow Other:  _________ Dose:  81 mg Take 1 Tab by mouth daily. Quantity:  30 Tab Refills:  0  
     
   
   
   
  
 b complex-vitamin c-folic acid 1 mg capsule Commonly known as:  Jonny Aus Your next dose is: Today, Tomorrow Other:  _________ Dose:  1 Cap Take 1 Cap by mouth daily. Quantity:  30 Cap Refills:  0  
     
   
   
   
  
 calcitRIOL 0.25 mcg capsule Commonly known as:  ROCALTROL Your next dose is: Today, Tomorrow Other:  _________ Dose:  0.25 mcg Take 1 Cap by mouth every Monday, Wednesday, Friday. Quantity:  15 Cap Refills:  0  
     
   
   
   
  
 cloNIDine HCl 0.1 mg tablet Commonly known as:  CATAPRES Your next dose is: Today, Tomorrow Other:  _________ Dose:  0.1 mg Take 1 Tab by mouth two (2) times a day. Quantity:  60 Tab Refills:  0  
     
   
   
   
  
 fluticasone 50 mcg/actuation nasal spray Commonly known as:  Michelle Shames Your next dose is: Today, Tomorrow Other:  _________ Dose:  2 Spray 2 Sprays by Both Nostrils route daily for 7 days. Quantity:  1 Bottle Refills:  0  
     
   
   
   
  
 potassium chloride 20 mEq tablet Commonly known as:  K-DUR, KLOR-CON Your next dose is: Today, Tomorrow Other:  _________ Dose:  20 mEq Take 1 Tab by mouth daily for 3 doses. Quantity:  3 Tab Refills:  0 CONTINUE these medications which have CHANGED Dose & Instructions Dispensing Information Comments Morning Noon Evening Bedtime  
 amLODIPine 5 mg tablet Commonly known as:  Tad Abhi What changed:   
- medication strength 
- how much to take Your next dose is: Today, Tomorrow Other:  _________ Dose:  5 mg Take 1 Tab by mouth daily. Quantity:  30 Tab Refills:  0  
     
   
   
   
  
 carvedilol 25 mg tablet Commonly known as:  Sharona Antolin What changed:   
- medication strength 
- how much to take Your next dose is: Today, Tomorrow Other:  _________ Dose:  25 mg Take 1 Tab by mouth two (2) times daily (with meals). Quantity:  60 Tab Refills:  0  
     
   
   
   
  
 hydrALAZINE 100 mg tablet Commonly known as:  APRESOLINE What changed:   
- medication strength 
- how much to take Your next dose is: Today, Tomorrow Other:  _________ Dose:  100 mg Take 1 Tab by mouth three (3) times daily. Indications: hypertension Quantity:  90 Tab Refills:  0  
     
   
   
   
  
 insulin NPH/insulin regular 100 unit/mL (70-30) injection Commonly known as:  NovoLIN 70/30 What changed:  additional instructions Your next dose is: Today, Tomorrow Other:  _________  
   
   
 30 UNITS SUBCUTANEOUSLY TWICE DAILY. Do not take it blood sugar is less than 100 Quantity:  10 mL Refills:  1 CONTINUE these medications which have NOT CHANGED Dose & Instructions Dispensing Information Comments Morning Noon Evening Bedtime  
 acetaminophen 650 mg CR tablet Commonly known as:  TYLENOL Your next dose is: Today, Tomorrow Other:  _________ Dose:  650 mg Take 650 mg by mouth every six (6) hours as needed for Pain. Refills:  0  
     
   
   
   
  
 atorvastatin 80 mg tablet Commonly known as:  LIPITOR Your next dose is: Today, Tomorrow Other:  _________ Dose:  80 mg Take 1 Tab by mouth daily. Quantity:  90 Tab Refills:  3 Blood-Glucose Meter monitoring kit Your next dose is: Today, Tomorrow Other:  _________ Check twice daily. Quantity:  1 kit Refills:  0  
     
   
   
   
  
 docusate sodium 100 mg capsule Commonly known as:  Micki Eisenmenger Your next dose is: Today, Tomorrow Other:  _________ Dose:  100 mg Take 1 Cap by mouth two (2) times a day. Quantity:  30 Cap Refills:  0  
     
   
   
   
  
 febuxostat 40 mg Tab tablet Commonly known as:  Erenesanthony Dimmer Your next dose is: Today, Tomorrow Other:  _________ Dose:  40 mg Take 1 Tab by mouth daily. Quantity:  30 Tab Refills:  2  
     
   
   
   
  
 glucose blood VI test strips strip Commonly known as:  Ascensia CONTOUR  
   
 Your next dose is: Today, Tomorrow Other:  _________  
   
   
 contour test trips. Quantity:  1 Package Refills:  11 Insulin Needles (Disposable) 29 gauge x 5/16\" Ndle Your next dose is: Today, Tomorrow Other:  _________ Dose:  40 Units 40 Units by Does Not Apply route two (2) times a day. Quantity:  1 Container Refills:  11 Lancets Misc Your next dose is: Today, Tomorrow Other:  _________ Check 3 times a day , needs according contour glucometer Quantity:  1 Package Refills:  11 Omeprazole delayed release 20 mg tablet Commonly known as:  PRILOSEC D/R Your next dose is: Today, Tomorrow Other:  _________ Dose:  20 mg Take 1 Tab by mouth daily. Quantity:  30 Tab Refills:  1  
     
   
   
   
  
 tamsulosin 0.4 mg capsule Commonly known as:  FLOMAX Your next dose is: Today, Tomorrow Other:  _________ Dose:  1 Cap Take 1 Cap by mouth daily. Refills:  3 STOP taking these medications   
 desonide 0.05 % topical ointment Commonly known as:  Neil Akash HYDROcodone-acetaminophen 7.5-325 mg per tablet Commonly known as:  NORCO  
   
  
 indomethacin 50 mg capsule Commonly known as:  INDOCIN Where to Get Your Medications Information on where to get these meds will be given to you by the nurse or doctor. ! Ask your nurse or doctor about these medications  
  albuterol 90 mcg/actuation inhaler  
 amLODIPine 5 mg tablet  
 amoxicillin-clavulanate 500-125 mg per tablet  
 aspirin 81 mg chewable tablet  
 b complex-vitamin c-folic acid 1 mg capsule  
 calcitRIOL 0.25 mcg capsule  
 carvedilol 25 mg tablet  
 cloNIDine HCl 0.1 mg tablet  
 fluticasone 50 mcg/actuation nasal spray  
 hydrALAZINE 100 mg tablet insulin NPH/insulin regular 100 unit/mL (70-30) injection  
 potassium chloride 20 mEq tablet Discharge Instructions Patient armband removed and shredded [employer] Activation Thank you for requesting access to [employer]. Please follow the instructions below to securely access and download your online medical record. [employer] allows you to send messages to your doctor, view your test results, renew your prescriptions, schedule appointments, and more. How Do I Sign Up? 1. In your internet browser, go to [email or web address] 
2. Click on the First Time User? Click Here link in the Sign In box. You will be redirect to the New Member Sign Up page. 3. Enter your [employer] Access Code exactly as it appears below. You will not need to use this code after youve completed the sign-up process. If you do not sign up before the expiration date, you must request a new code. [employer] Access Code: Activation code not generated Current [employer] Status: Patient Declined (This is the date your [employer] access code will ) 4. Enter the last four digits of your Social Security Number (xxxx) and Date of Birth (mm/dd/yyyy) as indicated and click Submit. You will be taken to the next sign-up page. 5. Create a [employer] ID. This will be your [employer] login ID and cannot be changed, so think of one that is secure and easy to remember. 6. Create a [employer] password. You can change your password at any time. 7. Enter your Password Reset Question and Answer. This can be used at a later time if you forget your password. 8. Enter your e-mail address. You will receive e-mail notification when new information is available in [address] 9. Click Sign Up. You can now view and download portions of your medical record. 10. Click the Download Summary menu link to download a portable copy of your medical information. Additional Information If you have questions, please visit the Frequently Asked Questions section of the [email or web address] website at [email or web address] Remember, [employer] is NOT to be used for urgent needs. For medical emergencies, dial 911. DISCHARGE SUMMARY from Nurse The following personal items are in your possession at time of discharge: 
 
  
Visual Aid: None PATIENT INSTRUCTIONS: 
 
 
What to do at Home: 
Recommended activity: Activity as tolerated. If you experience any of the following symptoms Shortness of breath,chest pain or discomfort, weakness or numbness, dizziness or headache, please follow up with the nearest Emergency Room. *  Please give a list of your current medications to your Primary Care Provider. *  Please update this list whenever your medications are discontinued, doses are 
    changed, or new medications (including over-the-counter products) are added. *  Please carry medication information at all times in case of emergency situations. These are general instructions for a healthy lifestyle: No smoking/ No tobacco products/ Avoid exposure to second hand smoke Surgeon General's Warning:  Quitting smoking now greatly reduces serious risk to your health. Obesity, smoking, and sedentary lifestyle greatly increases your risk for illness A healthy diet, regular physical exercise & weight monitoring are important for maintaining a healthy lifestyle You may be retaining fluid if you have a history of heart failure or if you experience any of the following symptoms:  Weight gain of 3 pounds or more overnight or 5 pounds in a week, increased swelling in our hands or feet or shortness of breath while lying flat in bed. Please call your doctor as soon as you notice any of these symptoms; do not wait until your next office visit. Recognize signs and symptoms of STROKE: 
 
F-face looks uneven A-arms unable to move or move unevenly S-speech slurred or non-existent T-time-call 911 as soon as signs and symptoms begin-DO NOT go Back to bed or wait to see if you get better-TIME IS BRAIN. Warning Signs of HEART ATTACK Call 911 if you have these symptoms: 
? Chest discomfort. Most heart attacks involve discomfort in the center of the chest that lasts more than a few minutes, or that goes away and comes back. It can feel like uncomfortable pressure, squeezing, fullness, or pain. ? Discomfort in other areas of the upper body. Symptoms can include pain or discomfort in one or both arms, the back, neck, jaw, or stomach. ? Shortness of breath with or without chest discomfort. ? Other signs may include breaking out in a cold sweat, nausea, or lightheadedness. Don't wait more than five minutes to call [address]! Fast action can save your life. Calling 911 is almost always the fastest way to get lifesaving treatment. Emergency Medical Services staff can begin treatment when they arrive  up to an hour sooner than if someone gets to the hospital by car. The discharge information has been reviewed with the patient. The patient verbalized understanding. Discharge medications reviewed with the patient and appropriate educational materials and side effects teaching were provided. Discharge Instructions Attachments/References AMOXICILLIN/CLAVULANATE POTASSIUM (BY MOUTH) (ENGLISH) CALCITRIOL (BY MOUTH) (ENGLISH) VITAMIN B/VITAMIN C (BY MOUTH) (ENGLISH) POTASSIUM CHLORIDE (BY MOUTH) (ENGLISH) HYDRALAZINE (BY MOUTH) (ENGLISH) ASPIRIN (BY MOUTH) (ENGLISH) CLONIDINE (BY MOUTH) (ENGLISH) CARVEDILOL (BY MOUTH) (ENGLISH) AMLODIPINE (BY MOUTH) (ENGLISH) Discharge Orders Procedure Order Date Status Priority Quantity Spec Type Associated Dx DIET DIABETIC CONSISTENT CARB Regular; FR 1200ML; 70-70-70 (House); AHA-LOW-CHOL FAT 02/10/17 1220 Normal Routine 1 Questions: Texture:  Regular Fluid restriction:  FR 1200ML Renal (Protein/Sodium/Potassium):  70-70-70 (House) Cardiac:  AHA-LOW-CHOL FAT METABOLIC PANEL, BASIC 33/29/92 1220 Future Routine 1 Blood Comments:  On 2/14/17. Call report to PCP and dr. Bryan Bender Reason: CKD and hypokalemia MyChart Announcement We are excited to announce that we are making your provider's discharge notes available to you in [employer]. You will see these notes when they are completed and signed by the physician that discharged you from your recent hospital stay. If you have any questions or concerns about any information you see in [employer], please call the Health Information Department where you were seen or reach out to your Primary Care Provider for more information about your plan of care. General Information Please provide this summary of care documentation to your next provider. Patient Signature:  ____________________________________________________________ Date:  ____________________________________________________________  
  
[employer] Provider Signature:  ____________________________________________________________ Date:  ____________________________________________________________ More Information Amoxicillin/Clavulanate Potassium (By mouth) Amoxicillin (y-cuu-e-BRENTON-in), Clavulanate Potassium (BKVU-lm-bc-michaelle ogf-JEQ-ra-um) Treats infections. This medicine is a penicillin antibiotic. Brand Name(s):Augmentin, Augmentin ES-600, Augmentin XR There may be other brand names for this medicine. When This Medicine Should Not Be Used: This medicine is not right for everyone. Do not use it if you had an allergic reaction to amoxicillin, clavulanate, or a similar antibiotic (penicillin or cephalosporin), or if you had liver problems caused by Augmentin®. How to Use This Medicine:  
Liquid, Tablet, Chewable Tablet, Long Acting Tablet · Your doctor will tell you how much medicine to use. Do not use more than directed. · Take this medicine with a snack or at the beginning of a meal to help prevent nausea. · Chewable tablets: Chew the tablet completely before you swallow it. · Measure the oral liquid medicine with a marked measuring spoon, oral syringe, or medicine cup. Shake the medicine well just before you measure each dose. Rinse the spoon or dropper after each use. · Swallow the extended-release tablet whole. Do not crush, break, or chew it. · Take all of the medicine in your prescription to clear up your infection, even if you feel better after the first few doses. · Missed dose: Take a dose as soon as you remember. If it is almost time for your next dose, wait until then and take a regular dose. Do not take extra medicine to make up for a missed dose. · Tablet, extended-release tablet, chewable tablet: Store at room temperature, away from heat, moisture, and direct light. · Oral liquid: Store in the refrigerator. Do not freeze. · Throw away any unused oral liquid after 10 days. Drugs and Foods to Avoid: Ask your doctor or pharmacist before using any other medicine, including over-the-counter medicines, vitamins, and herbal products. · Some medicines can affect how this medicine works. Tell your doctor if you are taking a blood thinner (such as warfarin), allopurinol, or probenecid. Warnings While Using This Medicine: · Tell your doctor if you are pregnant or breastfeeding, or if you have kidney disease, liver disease, or mononucleosis (mono). · Birth control pills may not work as well while you are taking this medicine. Use another form of birth control to prevent pregnancy. · This medicine can cause diarrhea. Call your doctor if the diarrhea becomes severe, does not stop, or is bloody. Do not take any medicine to stop diarrhea until you have talked to your doctor.  Diarrhea can occur 2 months or more after you stop taking this medicine. · Tell any doctor or dentist who treats you that you are using this medicine. This medicine may affect certain medical test results. · Call your doctor if your symptoms do not improve or if they get worse. · The chewable tablet and oral liquid contain phenylalanine. Talk to your doctor before you use this medicine if you have phenylketonuria (PKU). · Keep all medicine out of the reach of children. Never share your medicine with anyone. Possible Side Effects While Using This Medicine:  
Call your doctor right away if you notice any of these side effects: · Allergic reaction: Itching or hives, swelling in your face or hands, swelling or tingling in your mouth or throat, chest tightness, trouble breathing · Blistering, peeling, red skin rash · Change in how much or how often you urinate · Dark urine or pale stools, nausea, vomiting, loss of appetite, stomach pain, yellow eyes or skin · Diarrhea that may contain blood, stomach cramps If you notice these less serious side effects, talk with your doctor: · Diaper rash · Mild diarrhea, nausea, vomiting · Tooth discoloration (in children) If you notice other side effects that you think are caused by this medicine, tell your doctor. Call your doctor for medical advice about side effects. You may report side effects to FDA at 2-643-FDA-4467 © 2016 [address] is for End User's use only and may not be sold, redistributed or otherwise used for commercial purposes. The above information is an  only. It is not intended as medical advice for individual conditions or treatments. Talk to your doctor, nurse or pharmacist before following any medical regimen to see if it is safe and effective for you. Calcitriol (By mouth) Calcitriol (hmn-ks-FEVL-ol) Treats low calcium. This medicine is a form of vitamin D. Brand Name(s):Rocaltrol There may be other brand names for this medicine. When This Medicine Should Not Be Used: This medicine is not right for everyone. Do not use it if you had an allergic reaction to calcitriol or to vitamin D. How to Use This Medicine:  
Liquid Filled Capsule, Liquid · Take your medicine as directed. Your dose may need to be changed several times to find what works best for you. · Measure the oral liquid medicine with a marked measuring spoon, oral syringe, or medicine cup. · Missed dose: Take a dose as soon as you remember. If it is almost time for your next dose, wait until then and take a regular dose. Do not take extra medicine to make up for a missed dose. · Store the medicine in a closed container at room temperature, away from heat, moisture, and direct light. Drugs and Foods to Avoid: Ask your doctor or pharmacist before using any other medicine, including over-the-counter medicines, vitamins, and herbal products. · Do not take vitamins, calcium supplements, or other forms of vitamin D while you are taking calcitriol. If you are on dialysis, do not take antacids that contain magnesium while you are taking calcitriol. · Some medicines can affect how calcitriol works. Tell your doctor if you are taking any of the following: ¨ Cholestyramine ¨ Digitalis ¨ Diuretics (water pills), such as hydrochlorothiazide (HCTZ) ¨ Ketoconazole ¨ Phenytoin or phenobarbital 
¨ Steroids, such as hydrocortisone, methylprednisolone, prednisone Warnings While Using This Medicine: · Tell your doctor if you are pregnant, breastfeeding, or have any medical problems. · Do not use more calcitriol than your doctor ordered. Too much of this medicine may cause a dangerous amount of calcium to build up in your body. · If you are not on dialysis, drink extra liquids to prevent dehydration. Ask your doctor how much liquid to drink each day. · Keep all medicine out of the reach of children.  Never share your medicine with anyone. Possible Side Effects While Using This Medicine:  
Call your doctor right away if you notice any of these side effects: · Allergic reaction: Itching or hives, swelling in your face or hands, swelling or tingling in your mouth or throat, chest tightness, trouble breathing · Bone or muscle pain · Cloudy or foaming urine · Irregular heartbeat · Nausea and vomiting, long-lasting headache, constipation, sleepiness, weakness · Severe stomach pain, metallic taste in your mouth · Seizures If you notice other side effects that you think are caused by this medicine, tell your doctor. Call your doctor for medical advice about side effects. You may report side effects to FDA at 1-353-NDW-9848 © 2016 [address] is for End User's use only and may not be sold, redistributed or otherwise used for commercial purposes. The above information is an  only. It is not intended as medical advice for individual conditions or treatments. Talk to your doctor, nurse or pharmacist before following any medical regimen to see if it is safe and effective for you. Vitamin B/Vitamin C (By mouth) Supplies your body with vitamin B and vitamin C. You might need extra vitamins because of an illness or other medicines that you are using. You might not be getting enough vitamins from the food you eat, or for other reasons. Brand Name(s):Allbee w/C, B-Compleet, Dialyvite, Folbee Plus, Full Spectrum B, Leader Natural B Complex with Vitamin C, Nephro-Werner, Nephro-Werner Rx, Nephro-Werner Vitamin B and C Complex, Nephrocaps, PharmAssure Balanced B Complex, Marci-Werner Rx, Renal Vitamin, [employer] Natural B-Complex with Vitamin C, Super B Complex with C There may be other brand names for this medicine. When This Medicine Should Not Be Used:   
You should not use this medicine if you have had an allergic reaction to vitamin C or any kind of vitamin B.  
 How to Use This Medicine:  
Tablet, Long Acting Tablet, Capsule, Liquid Filled Capsule, Liquid · Your doctor will tell you how much medicine to use. Do not use more than directed. · Carefully follow your doctor's instructions about any special diet. · Follow the instructions on the medicine label if you are using this medicine without a prescription. · Swallow the extended-release tablet or liquid-filled capsule whole. Do not crush, break, or chew the tablet or capsule. · Measure the sublingual liquid with the dropper that comes with the bottle. Squeeze the dropper to place the liquid under your tongue. Hold the liquid under your tongue for about 30 seconds, then swallow it. If a dose is missed: · Missing a dose of a vitamin supplement is usually not a cause for concern. · Take a dose as soon as you remember. If it is almost time for your next dose, wait until then and take a regular dose. Do not take extra medicine to make up for a missed dose. How to Store and Dispose of This Medicine: · Store the medicine in a closed container at room temperature, away from heat, moisture, and direct light. · Ask your pharmacist, doctor, or health caregiver about the best way to dispose of any outdated medicine or medicine no longer needed. · Keep all medicine out of the reach of children. Never share your medicine with anyone. Drugs and Foods to Avoid: Ask your doctor or pharmacist before using any other medicine, including over-the-counter medicines, vitamins, and herbal products. Warnings While Using This Medicine: · Make sure your doctor knows if you are pregnant or breast feeding. Tell your doctor if you have liver disease or kidney disease. Possible Side Effects While Using This Medicine:  
Call your doctor right away if you notice any of these side effects: · Allergic reaction: Itching or hives, swelling in your face or hands, swelling or tingling in your mouth or throat, chest tightness, trouble breathing If you notice these less serious side effects, talk with your doctor: · Nausea, diarrhea, gas. If you notice other side effects that you think are caused by this medicine, tell your doctor. Call your doctor for medical advice about side effects. You may report side effects to FDA at 4-497-FDA-3477 © 2016 [address] is for End User's use only and may not be sold, redistributed or otherwise used for commercial purposes. The above information is an  only. It is not intended as medical advice for individual conditions or treatments. Talk to your doctor, nurse or pharmacist before following any medical regimen to see if it is safe and effective for you. Potassium Chloride (By mouth) Potassium Chloride (rbe-IIT-jm-um KLOR-charlotte) Prevents and treats low potassium levels in the blood. Brand Name(s):K-Sol, K-Tab, Wayna Ou Mur, Klor-Con, Klor-Con 10, Klor-Con 8, Klor-Con M10, Klor-Con M15, Klor-Con M20, Klor-Con Sprinkle There may be other brand names for this medicine. When This Medicine Should Not Be Used: This medicine is not right for everyone. Do not use if you had an allergic reaction to potassium. How to Use This Medicine:  
Tablet, Long Acting Capsule, Powder, Liquid, Long Acting Tablet, Granule · Your doctor will tell you how much medicine to use. Do not use more than directed. · Take this medicine with food or right after eating, to avoid stomach upset. · Powder or oral liquid:  You must mix with at least one-half cup (4 ounces) of water or juice. You could damage your stomach if you take the medicine without mixing it with water or juice. · Tablet or capsule: Do not chew, crush, or break. Swallow it whole with full glass of water. · Extended-release capsule: Swallow whole with a full glass of water.  If you cannot swallow the extended-release capsule, you may open it and pour the medicine into a small amount of soft food such as pudding, yogurt, or applesauce. Stir this mixture well and swallow it without chewing. · Extended-release tablet:  Swallow whole with a full glass of water. If you have trouble swallowing, ask your doctor or pharmacist if you may crush the tablet. Some brands of this medicine must be swallowed whole, but other brands may be crushed. · If you mix the medicine in water or soft food, do not mix until you are ready to take your dose. Do not save mixed medicine for later use. · Carefully follow your doctor's instructions about any special diet. · Missed dose: Take a dose as soon as you remember. If it is almost time for your next dose, wait until then and take a regular dose. Do not take extra medicine to make up for a missed dose. · Store the medicine in a closed container at room temperature, away from heat, moisture, and direct light. Drugs and Foods to Avoid: Ask your doctor or pharmacist before using any other medicine, including over-the-counter medicines, vitamins, and herbal products. · Some foods and medicines can affect how potassium chloride works. Tell you doctor if you are using any of the following: ¨ Potassium-sparing diuretic (water pill) ¨ ACE inhibitor blood pressure medicine ¨ Salt substitute ¨ Digoxin Warnings While Using This Medicine: · Tell your doctor if you are pregnant or breastfeeding or if you have kidney disease, heart disease, or problems with your digestive system. · This medicine may cause the following problems: ¨ Bleeding or ulcers in the digestive system ¨ Potassium levels that are too high · Your doctor will do lab tests at regular visits to check on the effects of this medicine. Keep all appointments. · Keep all medicine out of the reach of children. Never share your medicine with anyone. Possible Side Effects While Using This Medicine:  
Call your doctor right away if you notice any of these side effects: · Allergic reaction: Itching or hives, swelling in your face or hands, swelling or tingling in your mouth or throat, chest tightness, trouble breathing · Bloody or black, tarry stools · Confusion, weakness, uneven heartbeat, trouble breathing, numbness in your hands, feet, or lips · Severe stomach pain or vomiting · Throat pain, feeling as if pill is stuck in the throat If you notice these less serious side effects, talk with your doctor: · Mild nausea, diarrhea, gas If you notice other side effects that you think are caused by this medicine, tell your doctor. Call your doctor for medical advice about side effects. You may report side effects to FDA at 4-549-FDA-0918 © [address] is for End User's use only and may not be sold, redistributed or otherwise used for commercial purposes. The above information is an  only. It is not intended as medical advice for individual conditions or treatments. Talk to your doctor, nurse or pharmacist before following any medical regimen to see if it is safe and effective for you. Hydralazine (By mouth) Hydralazine Hydrochloride (tmz-RQYA-p-zeen cesilia-droe-KLOR-[place]) Treats high blood pressure. Brand Name(s):Apresoline There may be other brand names for this medicine. When This Medicine Should Not Be Used: This medicine is not right for everyone. Do not use it if you had an allergic reaction to hydralazine, or if you have coronary artery disease or rheumatic heart disease. How to Use This Medicine:  
Tablet · Take your medicine as directed. Your dose may need to be changed several times to find what works best for you. · Missed dose: Take a dose as soon as you remember. If it is almost time for your next dose, wait until then and take a regular dose.  Do not take extra medicine to make up for a missed dose. · Store the medicine in a closed container at room temperature, away from heat, moisture, and direct light. Drugs and Foods to Avoid: Ask your doctor or pharmacist before using any other medicine, including over-the-counter medicines, vitamins, and herbal products. · Some foods and medicines can affect how hydralazine works. Tell your doctor if you are using diazoxide or an MAO inhibitor. Warnings While Using This Medicine: · Tell your doctor if you are pregnant or breastfeeding, or if you have kidney disease, heart or blood vessel disease, heart rhythm problems, lupus, or if you had a heart attack or stroke. · This medicine may cause the following problems: 
¨ Lupus-like syndrome ¨ Changes in heart rhythm ¨ Nerve problems · This medicine may lower your blood pressure too much and cause you to feel dizzy. Do not drive or do anything else that could be dangerous until you know how this medicine affects you. · Your doctor will do lab tests at regular visits to check on the effects of this medicine. Keep all appointments. · Keep all medicine out of the reach of children. Never share your medicine with anyone. Possible Side Effects While Using This Medicine:  
Call your doctor right away if you notice any of these side effects: · Allergic reaction: Itching or hives, swelling in your face or hands, swelling or tingling in your mouth or throat, chest tightness, trouble breathing · Change in how much or how often you urinate · Chest pain that may spread to your arms, jaw, back, or neck, trouble breathing, unusual sweating, faintness · Fast, pounding, or uneven heartbeat · Fever, chills, cough, sore throat, and body aches · Lightheadedness, dizziness, or fainting · Numbness, tingling, or burning pain in your hands, arms, legs, or feet · Unusual bleeding, bruising, or weakness If you notice these less serious side effects, talk with your doctor: · Diarrhea, nausea, vomiting, loss of appetite · Headache · Stuffy nose or watery eyes If you notice other side effects that you think are caused by this medicine, tell your doctor. Call your doctor for medical advice about side effects. You may report side effects to FDA at 4-733-TSR-7384 © 2016 [address] is for End User's use only and may not be sold, redistributed or otherwise used for commercial purposes. The above information is an  only. It is not intended as medical advice for individual conditions or treatments. Talk to your doctor, nurse or pharmacist before following any medical regimen to see if it is safe and effective for you. Aspirin (By mouth) Aspirin (AS-pir-in) Treats pain, fever, and inflammation. May lower risk of heart attack and stroke. Brand Name(s):Ascriptin Regular Strength, AsperDrink, Aspergum, Aspir Low, Aspir-Terri, Aspirin Adult Low Dose, Jacinta Aspirin Children's, Jacinta Aspirin Regimen, Jacinta Extra Strength, Jacinta Genuine Aspirin, Bufferin, Bufferin Low Dose, Durlaza, Ecotrin, Ecpirin There may be other brand names for this medicine. When This Medicine Should Not Be Used: This medicine is not right for everyone. Do not use it if you had an allergic reaction to aspirin or other NSAIDs, or if you have a history of asthma with nasal polyps and rhinitis. How to Use This Medicine:  
Delayed Release Capsule, Long Acting Capsule, Gum, Tablet, Chewable Tablet, Fizzy Tablet, Coated Tablet, Long Acting Tablet, 24 Hour Capsule · Your doctor will tell you how much medicine to use. Do not use more than directed. · It is best to take this medicine with food or milk. · Capsule, tablet, or coated tablet: Swallow whole. Do not crush, break, or chew it. · Chewable tablet: You may chew it completely or swallow it whole. · Gum: Chew completely to make sure you get as much medicine as possible. Drink a full glass (8 ounces) of water after chewing the gum. · Swallow the extended-release capsule whole. Do not crush, break, or chew it. Take the capsule with a full glass of water at the same time each day. · Follow the instructions on the medicine label if you are using this medicine without a prescription. · Missed dose: If you miss a dose of Durlaza, skip the missed dose and go back to your regular dosing schedule. Do not take extra medicine to make up for a missed dose. · Store the medicine in a closed container at room temperature, away from heat, moisture, and direct light. Drugs and Foods to Avoid: Ask your doctor or pharmacist before using any other medicine, including over-the-counter medicines, vitamins, and herbal products. · Some foods and medicines can affect how aspirin works. Tell your doctor if you are using any of the following: ¨ Dipyridamole, methotrexate, probenecid, sulfinpyrazone, ticlopidine ¨ Blood thinner (including clopidogrel, prasugrel, ticagrelor, warfarin) ¨ Blood pressure medicine ¨ Medicine to treat seizures (including phenytoin, valproic acid) ¨ NSAID pain or arthritis medicine (including celecoxib, diclofenac, ibuprofen, naproxen) ¨ Steroid medicine (including dexamethasone, hydrocortisone, methylprednisolone, prednisolone, prednisone) · Do not take Durlaza 2 hours before or 1 hour after you drink alcohol or take medicines that contain alcohol. Warnings While Using This Medicine: · Tell your doctor if you are pregnant or breastfeeding. Do not use this medicine during the later part of a pregnancy unless your doctor tells you to. · Tell your doctor if you have kidney disease, liver disease, high blood pressure, heart disease, or a history of stomach bleeding or ulcers. · This medicine may increase your risk for bleeding, including stomach ulcers.  
· Do not give aspirin to a child or teenager who has chickenpox or flu symptoms, unless the doctor says it is okay. Aspirin can cause a life-threatening reaction called Reye syndrome. · Tell any doctor or dentist who treats you that you are using this medicine. This medicine may affect certain medical test results. · Keep all medicine out of the reach of children. Never share your medicine with anyone. Possible Side Effects While Using This Medicine:  
Call your doctor right away if you notice any of these side effects: · Allergic reaction: Itching or hives, swelling in your face or hands, swelling or tingling in your mouth or throat, chest tightness, trouble breathing · Bloody or black stools, bloody vomit or vomit that looks like coffee grounds · Chest tightness, wheezing · Ringing in the ears · Severe stomach pain · Unusual bleeding, bruising, or weakness If you notice other side effects that you think are caused by this medicine, tell your doctor. Call your doctor for medical advice about side effects. You may report side effects to FDA at 8-217-FDA-8601 © 2016 [address] is for End User's use only and may not be sold, redistributed or otherwise used for commercial purposes. The above information is an  only. It is not intended as medical advice for individual conditions or treatments. Talk to your doctor, nurse or pharmacist before following any medical regimen to see if it is safe and effective for you. Clonidine (By mouth) Clonidine (Javi Maldonado) Treats high blood pressure. Also treats ADHD. Brand Name(s):Catapres, Kapvay, Kapvay Dose Pack There may be other brand names for this medicine. When This Medicine Should Not Be Used: This medicine is not right for everyone. Do not use it if you had an allergic reaction to clonidine. How to Use This Medicine:  
Long Acting Suspension, Tablet, Long Acting Tablet · Take your medicine as directed.  Your dose may need to be changed several times to find what works best for you. · Swallow the extended-release tablet whole. Do not crush, break, or chew it. · Clonidine extended-release tablets work differently than clonidine immediate-release tablets. Do not switch from the extended-release tablets to the immediate-release tablets unless your doctor tells you to. · Measure the oral liquid medicine with a marked measuring spoon, oral syringe, or medicine cup. Shake the bottle well before each use. · Read and follow the patient instructions that come with this medicine. Talk to your doctor or pharmacist if you have any questions. · Missed dose: Take a dose as soon as you remember. If it is almost time for your next dose, wait until then and take a regular dose. Do not take extra medicine to make up for a missed dose. If you miss more than 1 dose of clonidine tablets, check with your doctor right away. · Store the medicine in a closed container at room temperature, away from heat, moisture, and direct light. Drugs and Foods to Avoid: Ask your doctor or pharmacist before using any other medicine, including over-the-counter medicines, vitamins, and herbal products. · Do not use this medicine together with other products that contain clonidine. · Some medicines can affect how clonidine works. Tell your doctor if you are taking depression medicine. · Tell your doctor if you use anything else that makes you sleepy. Some examples are allergy medicine, narcotic pain medicine, and alcohol. Warnings While Using This Medicine: · Tell your doctor if you are pregnant, breastfeeding, or have kidney disease, heart or blood vessel disease, heart rhythm problems, stomach or bowel problems, or a history of heart attack or stroke. · This medicine may make you drowsy, dizzy, or lightheaded. Do not drive or do anything that could be dangerous until you know how this medicine affects you. · This medicine may cause dryness of the eyes.  Talk to your doctor about how to treat the dryness. · Do not stop using this medicine suddenly. Your doctor will need to slowly decrease your dose before you stop it completely. · Tell any doctor or dentist who treats you that you are using this medicine. You may need to stop using this medicine several days before you have surgery or medical tests. · Even if you feel well, do not stop using the medicine without asking your doctor first. This medicine will not cure your high blood pressure, but it will help keep it in the normal range. You may have to take blood pressure medicine for the rest of your life. · Your doctor will check your progress and the effects of this medicine at regular visits. Keep all appointments. · Keep all medicine out of the reach of children. Never share your medicine with anyone. Possible Side Effects While Using This Medicine:  
Call your doctor right away if you notice any of these side effects: · Allergic reaction: Itching or hives, swelling in your face or hands, swelling or tingling in your mouth or throat, chest tightness, trouble breathing · Fast, slow, pounding, or uneven heartbeat · Lightheadedness, dizziness, fainting · Swelling in your hands, ankles, or feet · Unusual tiredness or weakness If you notice these less serious side effects, talk with your doctor: · Constipation, stomach pain · Dry eyes, mouth, or throat · Mild skin rash · New or worsening headache If you notice other side effects that you think are caused by this medicine, tell your doctor. Call your doctor for medical advice about side effects. You may report side effects to FDA at 0-067-FDA-3286 © 2016 [address] is for End User's use only and may not be sold, redistributed or otherwise used for commercial purposes. The above information is an  only. It is not intended as medical advice for individual conditions or treatments.  Talk to your doctor, nurse or pharmacist before following any medical regimen to see if it is safe and effective for you. Carvedilol (By mouth) Carvedilol (qpi-YF-nnn-ol) Treats high blood pressure and heart failure. Also reduces the risk of death after a heart attack. This medicine is a beta-blocker. Brand Name(s):Coreg, Coreg CR There may be other brand names for this medicine. When This Medicine Should Not Be Used: This medicine is not right for everyone. Do not use it if you had an allergic reaction to carvedilol, or if you have asthma, severe liver disease, or certain heart problems. Ask your doctor about these heart problems. How to Use This Medicine:  
Long Acting Capsule, Tablet · Take your medicine as directed. Your dose may need to be changed several times to find what works best for you. · It is best to take this medicine with food or milk. · Extended-release capsule instructions: ¨ Take the capsule in the morning with food. ¨ Swallow the capsule whole. Do not crush or chew it. ¨ If you cannot swallow the capsule, you may open it and sprinkle the medicine over a spoonful of applesauce. Swallow the applesauce right away. · Read and follow the patient instructions that come with this medicine. Talk to your doctor or pharmacist if you have any questions. · Store the medicine in a closed container at room temperature, away from heat, moisture, and direct light. · Missed dose: Take a dose as soon as you remember. If it is almost time for your next dose, wait until then and take a regular dose. Do not take extra medicine to make up for a missed dose. Drugs and Foods to Avoid: Ask your doctor or pharmacist before using any other medicine, including over-the-counter medicines, vitamins, and herbal products. · Some medicines can affect how carvedilol works.  Tell your doctor if you are using amiodarone, clonidine, diltiazem, cyclosporine, digoxin, fluconazole, reserpine, rifampin, verapamil, or an MAO inhibitor (MAOI). Warnings While Using This Medicine: · Tell your doctor if you are pregnant or breastfeeding, or if you have kidney disease, liver disease, bradycardia (slow heartbeat), coronary artery disease, circulation problems, edema (fluid retention or swelling), heart or blood vessel problems, low blood pressure, lung problems (such as bronchitis or emphysema), an overactive thyroid, pheochromocytoma, or frequent chest pains. Tell your doctor if you have a history of severe allergic reactions or if you are scheduled to have surgery. · This medicine may cause the following problems:  
¨ Changes to your blood sugar level (if you have diabetes, report any blood sugar level changes to your doctor) ¨ Fewer tears than usual in contact lens wearers ¨ An eye problem called Intraoperative Floppy Iris Syndrome during cataract surgery · This medicine may make you dizzy or drowsy. Do not drive, use machines, or do anything else that could be dangerous if you are not alert. · Do not stop using this medicine suddenly. Your doctor will need to slowly decrease your dose before you stop it completely. · Keep all medicine out of the reach of children. Never share your medicine with anyone. Possible Side Effects While Using This Medicine:  
Call your doctor right away if you notice any of these side effects: · Allergic reaction: Itching or hives, swelling in your face or hands, swelling or tingling in your mouth or throat, chest tightness, trouble breathing · Change in how much or how often you urinate · Chest pain that may spread to your arms, jaw, back, or neck, trouble breathing, nausea, unusual sweating, faintness · Leg pain when you walk, legs and feet that feel cold or numb · Lightheadedness, dizziness, or fainting · Rapid weight gain, swelling in your hands, ankles, or feet · Shaking, trembling, sweating, hunger, confusion · Slow, fast, or uneven heartbeat · Unusual bleeding or bruising · Wheezing or trouble breathing If you notice these less serious side effects, talk with your doctor: · Diarrhea · Trouble having sex · Unusual tiredness or weakness If you notice other side effects that you think are caused by this medicine, tell your doctor. Call your doctor for medical advice about side effects. You may report side effects to FDA at 0-808-YSL-7660 © 2016 [address] is for End User's use only and may not be sold, redistributed or otherwise used for commercial purposes. The above information is an  only. It is not intended as medical advice for individual conditions or treatments. Talk to your doctor, nurse or pharmacist before following any medical regimen to see if it is safe and effective for you. Amlodipine (By mouth) Amlodipine (op-LLH-xh-peen) Treats high blood pressure and angina (chest pain). This medicine is a calcium channel blocker. Brand Name(s):Norvasc There may be other brand names for this medicine. When This Medicine Should Not Be Used: This medicine is not right for everyone. Do not use it if you had an allergic reaction to amlodipine. How to Use This Medicine:  
Tablet, Dissolving Tablet · Take your medicine as directed. Your dose may need to be changed several times to find what works best for you. Take this medicine at the same time each day. · Read and follow the patient instructions that come with this medicine. Talk to your doctor or pharmacist if you have any questions. · Missed dose: Take a dose as soon as you remember. If it has been more than 12 hours since you were supposed to take your dose, skip the missed dose and take your next regular dose at the regular time. · Store the medicine in a closed container at room temperature, away from heat, moisture, and direct light. Drugs and Foods to Avoid: Ask your doctor or pharmacist before using any other medicine, including over-the-counter medicines, vitamins, and herbal products. · Some medicines can affect how amlodipine works. Tell your doctor if you are also using any of the following: ¨ Clarithromycin, cyclosporine, diltiazem, itraconazole, ritonavir, sildenafil, simvastatin, tacrolimus Warnings While Using This Medicine: · Tell your doctor if you are pregnant or breastfeeding, or if you have liver disease, heart disease, coronary artery disease, or aortic stenosis. · This medicine could lower your blood pressure too much, especially when you first use it or if you are dehydrated. Stand or sit up slowly if you feel lightheaded or dizzy. · Your doctor will check your progress and the effects of this medicine at regular visits. Keep all appointments. · Do not stop using this medicine without asking your doctor, even if you feel well. This medicine will not cure high blood pressure, but it will help keep it in normal range. You may have to take blood pressure medicine for the rest of your life. · Keep all medicine out of the reach of children. Never share your medicine with anyone. Possible Side Effects While Using This Medicine:  
Call your doctor right away if you notice any of these side effects: · Allergic reaction: Itching or hives, swelling in your face or hands, swelling or tingling in your mouth or throat, chest tightness, trouble breathing · Lightheadedness, dizziness · New or worsening chest pain · Swelling in your hands, ankles, or legs · Trouble breathing, nausea, unusual sweating, fainting If you notice other side effects that you think are caused by this medicine, tell your doctor. Call your doctor for medical advice about side effects. You may report side effects to FDA at 8-936-FDA-5291 © 2016 [address] is for End User's use only and may not be sold, redistributed or otherwise used for commercial purposes. The above information is an  only. It is not intended as medical advice for individual conditions or treatments. Talk to your doctor, nurse or pharmacist before following any medical regimen to see if it is safe and effective for you.

## 2017-02-07 NOTE — ED TRIAGE NOTES
Pt ambulated into triage alert & oriented times 4. Pt states he has had numbness on left side for \"a couple\" of days. Pt states this morning he fell in the bathroom because his right side went limp. Dr. Martha Downs in triage evaluating pt. Pt did not take his blood pressure medicine today.

## 2017-02-07 NOTE — ED PROVIDER NOTES
HPI Comments: Patient with a history of hypertension, diabetes, gout, obesity, tobacco and alcohol abuse, CAD presents with left-sided numbness and pain. He had the symptoms a few days ago, they were acutely worsened since last night. He woke this morning and had difficulty ambulating so he came for further evaluation. He reports generalized pain over LEFT side of his body but seems more concerned about the numbness. He did not take his blood pressure medication this morning. Patient is a 64 y.o. male presenting with numbness and fall. Numbness   Pertinent negatives include no shortness of breath and no chest pain. Fall   Associated symptoms include numbness. Pertinent negatives include no fever and no abdominal pain. Past Medical History:   Diagnosis Date    Alcohol abuse     Arthritis     Atherosclerotic cardiovascular disease     CAD (coronary artery disease)      mild disease of coronaries (cor angio 2006),wife states he has 1 stent    Cardiac cath 01/26/2006     RCA mild. Otherwise patent coronaries. LVEDP 15.  EF 55-60%.  Cardiac echocardiogram 03/27/2009     EF 60%. No cardiac source of embolism. No significant valvular pathology.  Cardiac nuclear imaging test 12/06/2011     Small, mild inferior infarction or possibly artifact. No ischemia. EF 45%. No TID. No reg WMA.  Cardiovascular LLE venous duplex 08/14/2015     Left leg:  No DVT. Dilated lymph node in left groin.  Constipation     Diabetes mellitus type II     Gout     Hepatic steatosis     Hypercholesterolemia     Hypertension     Knee effusion, left     Left knee pain     Morbid obesity (HCC)     Noncompliance     Obesity (BMI 30-39. 9)     S/P colonoscopy 3/8/05     Dr. Laure Zamarripa; normal; f/u 10 years    Stomach problems     TIA (transient ischemic attack) 3/09    Tobacco abuse        Past Surgical History:   Procedure Laterality Date    Pr abdomen surgery proc unlisted       Hernia repair in 1960's or 1970's.  Hx orthopaedic       Bones spur removed from left & right foot 2013.  Hx urological  8/18/2015     [facility], Dr. Dipak Murdock, Cystoscopy, left retrograde, left double-J cath         Family History:   Problem Relation Age of Onset    Diabetes Father     Heart Disease Mother     Diabetes Sister     Hypertension Sister     Arthritis-osteo Sister     Arthritis-osteo Brother     Diabetes Other     Diabetes Other      Uncle, Aunt    Hypertension Other      Uncle; Aunt       Social History     Social History    Marital status:      Spouse name: N/A    Number of children: N/A    Years of education: N/A     Occupational History    disabled      Social History Main Topics    Smoking status: Current Every Day Smoker     Packs/day: 0.25     Types: Cigarettes     Last attempt to quit: 6/28/2016    Smokeless tobacco: Never Used    Alcohol use 0.0 oz/week     0 Standard drinks or equivalent per week      Comment: 1 beers a day.  Drug use: No    Sexual activity: Yes     Other Topics Concern    Not on file     Social History Narrative         ALLERGIES: Shellfish derived    Review of Systems   Constitutional: Negative for fever. HENT: Negative for nosebleeds. Eyes: Negative for visual disturbance. Respiratory: Negative for shortness of breath. Cardiovascular: Negative for chest pain. Gastrointestinal: Negative for abdominal pain. Genitourinary: Negative for dysuria. Musculoskeletal: Positive for arthralgias. Negative for myalgias. Skin: Negative for rash. Neurological: Positive for numbness. Negative for weakness. All other systems reviewed and are negative. Vitals:    02/07/17 1245 02/07/17 1300 02/07/17 1315 02/07/17 1330   BP:  190/88 (!) 171/91    Pulse: 95      Resp: 21      Temp:       SpO2: 98% 98% 97% 97%   Weight:       Height:                Physical Exam   Constitutional:   General:  Well-developed, well-nourished, no apparent distress.   Slow to follow commands  Head:  Normocephalic atraumatic. Eyes:  Pupils 4 mm extraocular movements intact. No pallor or conjunctival injection. Nose:  No rhinorrhea, inspection grossly normal.    Ears:  Grossly normal to inspection, no discharge. Mouth:  Mucous membranes moist, no appreciable intraoral lesion. Neck:  Trachea midline, no asymmetry. Chest:  Grossly normal inspection, symmetric chest rise. Pulmonary:  Clear to auscultation bilaterally no wheezes rhonchi or rales. Cardiovascular:  S1-S2 no murmurs rubs or gallops. Abdomen: Soft, nontender, nondistended no guarding rebound or peritoneal signs. Extremities:  Grossly normal to inspection, peripheral pulses intact. Neurologic:  Alert and oriented  Able to answer name and date correctly  Performs eye closing and hand squeeze tasks  No dysarthria or aphasia  Symmetric smile, uvula midline, no blunting of nasolabial fold  Facial sensation grossly intact to light touch on RIGHT but decreased subjectively on LEFT  UPPER extremity:  Bilateral arm raised off bed and held against gravity for 5 seconds   strong and equal  No past pointing  Sensation grossly intact to light touch on RIGHT but decreased subjectively on LEFT  LOWER extremity:  Bilateral leg raises off bed and held against gravity for 5 seconds  Limping gait on LEFT  Sensation grossly intact to light touch. on RIGHT but decreased subjectively on LEFT  Psychiatric:  Grossly normal mood and affect. Nursing note reviewed, vital signs reviewed. MDM  Number of Diagnoses or Management Options  CYNDI (acute kidney injury) ([facility] [address]): Hypertensive emergency:   Hypokalemia:   Left sided numbness:   Diagnosis management comments: ED course:  Patient presents with left-sided numbness, suspect CVA, well outside the stroke window since his symptoms started 2 days ago. They're worsened last night but still outside the window.   He is afebrile markedly hypertensive saturation normal on room air. He does have left-sided pain but it is slow onset aching type pain and vascularly intact so unlikely dissection    EKG done at 1049 hrs.: Normal sinus rhythm heart rate 90 diffuse ST-T wave changes that are consistent with LVH repolarization abnormality that were present on prior EKG 8/13/16 and morphology largely unchanged     Consult:  Discussed care with Gabriel Armendariz. . Standard discussion; including history of patients chief complaint, available diagnostic results, and treatment course. Give aspirin, control pressure admit hospitalist for further management    CT head per radiology no acute intracranial process    1056 hrs. Consult:  Discussed care with Dr. Tammy Bustillos. Standard discussion; including history of patients chief complaint, available diagnostic results, and treatment course. Chemistry potassium 3.23 sodium 143 chloride 103 CO2 30 BUN 17 glucose 283 creatinine 2.16      Patient's presentation, history, physical exam and laboratory evaluations were reviewed. I felt the patient would benefit from inpatient management and treatment. Consult:  Discussed care with Dr. Justo Sheppard. Standard discussion; including history of patients chief complaint, available diagnostic results, and treatment course. Patient was accepted to their service. Disposition:    Admitted to medicine service      Portions of this chart were created with Dragon medical speech to text program.   Unrecognized errors may be present.     ED Course       Procedures

## 2017-02-07 NOTE — ED NOTES
Patient stating that he is more numb on his left side, is becoming anxious. MD at bedside to assess patient.

## 2017-02-07 NOTE — ED NOTES
TRANSFER - OUT REPORT:    Verbal report given to Germaine Santos RN(name) on Tommy Peña  being transferred to [employer] room 403(unit) for routine progression of care       Report consisted of patients Situation, Background, Assessment and   Recommendations(SBAR). Information from the following report(s) SBAR, ED Summary and MAR was reviewed with the receiving nurse. Lines:   Peripheral IV 02/07/17 Left Antecubital (Active)   Site Assessment Clean, dry, & intact 2/7/2017 10:13 AM   Phlebitis Assessment 0 2/7/2017 10:13 AM   Infiltration Assessment 0 2/7/2017 10:13 AM   Dressing Status Clean, dry, & intact 2/7/2017 10:13 AM   Dressing Type Transparent 2/7/2017 10:13 AM   Hub Color/Line Status Pink 2/7/2017 10:13 AM        Opportunity for questions and clarification was provided.       Patient transported with:   Monitor  Registered Nurse

## 2017-02-08 ENCOUNTER — APPOINTMENT (OUTPATIENT)
Dept: GENERAL RADIOLOGY | Age: 57
DRG: 069 | End: 2017-02-08
Attending: INTERNAL MEDICINE
Payer: COMMERCIAL

## 2017-02-08 ENCOUNTER — APPOINTMENT (OUTPATIENT)
Dept: ULTRASOUND IMAGING | Age: 57
DRG: 069 | End: 2017-02-08
Attending: INTERNAL MEDICINE
Payer: COMMERCIAL

## 2017-02-08 ENCOUNTER — APPOINTMENT (OUTPATIENT)
Dept: MRI IMAGING | Age: 57
DRG: 069 | End: 2017-02-08
Attending: INTERNAL MEDICINE
Payer: COMMERCIAL

## 2017-02-08 LAB
ANION GAP BLD CALC-SCNC: 8 MMOL/L (ref 3–18)
BUN SERPL-MCNC: 17 MG/DL (ref 7–18)
BUN/CREAT SERPL: 9 (ref 12–20)
CALCIUM SERPL-MCNC: 8.4 MG/DL (ref 8.5–10.1)
CHLORIDE SERPL-SCNC: 105 MMOL/L (ref 100–108)
CHOLEST SERPL-MCNC: 163 MG/DL
CO2 SERPL-SCNC: 30 MMOL/L (ref 21–32)
CREAT SERPL-MCNC: 1.94 MG/DL (ref 0.6–1.3)
EST. AVERAGE GLUCOSE BLD GHB EST-MCNC: 171 MG/DL
GLUCOSE BLD STRIP.AUTO-MCNC: 116 MG/DL (ref 70–110)
GLUCOSE BLD STRIP.AUTO-MCNC: 125 MG/DL (ref 70–110)
GLUCOSE BLD STRIP.AUTO-MCNC: 93 MG/DL (ref 70–110)
GLUCOSE BLD STRIP.AUTO-MCNC: 99 MG/DL (ref 70–110)
GLUCOSE SERPL-MCNC: 117 MG/DL (ref 74–99)
HBA1C MFR BLD: 7.6 % (ref 4.2–5.6)
HDLC SERPL-MCNC: 41 MG/DL (ref 40–60)
HDLC SERPL: 4 {RATIO} (ref 0–5)
LDLC SERPL CALC-MCNC: 64.4 MG/DL (ref 0–100)
LIPID PROFILE,FLP: ABNORMAL
OSMOLALITY UR: 361 MOSM/KG H2O (ref 300–900)
POTASSIUM SERPL-SCNC: 3.3 MMOL/L (ref 3.5–5.5)
SODIUM SERPL-SCNC: 143 MMOL/L (ref 136–145)
TRIGL SERPL-MCNC: 288 MG/DL (ref ?–150)
VLDLC SERPL CALC-MCNC: 57.6 MG/DL

## 2017-02-08 PROCEDURE — 99218 HC RM OBSERVATION: CPT

## 2017-02-08 PROCEDURE — 92611 MOTION FLUOROSCOPY/SWALLOW: CPT

## 2017-02-08 PROCEDURE — 36415 COLL VENOUS BLD VENIPUNCTURE: CPT | Performed by: INTERNAL MEDICINE

## 2017-02-08 PROCEDURE — 80048 BASIC METABOLIC PNL TOTAL CA: CPT | Performed by: INTERNAL MEDICINE

## 2017-02-08 PROCEDURE — 74011250637 HC RX REV CODE- 250/637: Performed by: HOSPITALIST

## 2017-02-08 PROCEDURE — 74011636637 HC RX REV CODE- 636/637: Performed by: INTERNAL MEDICINE

## 2017-02-08 PROCEDURE — 74011000255 HC RX REV CODE- 255: Performed by: INTERNAL MEDICINE

## 2017-02-08 PROCEDURE — 83036 HEMOGLOBIN GLYCOSYLATED A1C: CPT | Performed by: INTERNAL MEDICINE

## 2017-02-08 PROCEDURE — 97110 THERAPEUTIC EXERCISES: CPT

## 2017-02-08 PROCEDURE — 74011250636 HC RX REV CODE- 250/636: Performed by: INTERNAL MEDICINE

## 2017-02-08 PROCEDURE — 97165 OT EVAL LOW COMPLEX 30 MIN: CPT

## 2017-02-08 PROCEDURE — 82962 GLUCOSE BLOOD TEST: CPT

## 2017-02-08 PROCEDURE — 70544 MR ANGIOGRAPHY HEAD W/O DYE: CPT

## 2017-02-08 PROCEDURE — 92610 EVALUATE SWALLOWING FUNCTION: CPT

## 2017-02-08 PROCEDURE — 74230 X-RAY XM SWLNG FUNCJ C+: CPT

## 2017-02-08 PROCEDURE — 97161 PT EVAL LOW COMPLEX 20 MIN: CPT

## 2017-02-08 PROCEDURE — 70547 MR ANGIOGRAPHY NECK W/O DYE: CPT

## 2017-02-08 PROCEDURE — 70551 MRI BRAIN STEM W/O DYE: CPT

## 2017-02-08 PROCEDURE — 80061 LIPID PANEL: CPT | Performed by: INTERNAL MEDICINE

## 2017-02-08 PROCEDURE — 74011250637 HC RX REV CODE- 250/637: Performed by: INTERNAL MEDICINE

## 2017-02-08 PROCEDURE — 74011000250 HC RX REV CODE- 250: Performed by: INTERNAL MEDICINE

## 2017-02-08 PROCEDURE — 76770 US EXAM ABDO BACK WALL COMP: CPT

## 2017-02-08 PROCEDURE — 74011000258 HC RX REV CODE- 258: Performed by: INTERNAL MEDICINE

## 2017-02-08 RX ORDER — CARVEDILOL 25 MG/1
25 TABLET ORAL 2 TIMES DAILY WITH MEALS
Status: DISCONTINUED | OUTPATIENT
Start: 2017-02-09 | End: 2017-02-10 | Stop reason: HOSPADM

## 2017-02-08 RX ORDER — IPRATROPIUM BROMIDE AND ALBUTEROL SULFATE 2.5; .5 MG/3ML; MG/3ML
3 SOLUTION RESPIRATORY (INHALATION)
Status: DISCONTINUED | OUTPATIENT
Start: 2017-02-08 | End: 2017-02-09

## 2017-02-08 RX ORDER — LORAZEPAM 2 MG/ML
1 INJECTION INTRAMUSCULAR ONCE
Status: COMPLETED | OUTPATIENT
Start: 2017-02-08 | End: 2017-02-08

## 2017-02-08 RX ORDER — HYDRALAZINE HYDROCHLORIDE 50 MG/1
50 TABLET, FILM COATED ORAL 3 TIMES DAILY
Status: DISCONTINUED | OUTPATIENT
Start: 2017-02-08 | End: 2017-02-08

## 2017-02-08 RX ADMIN — Medication 1 MG: at 10:17

## 2017-02-08 RX ADMIN — AMLODIPINE BESYLATE 10 MG: 10 TABLET ORAL at 10:20

## 2017-02-08 RX ADMIN — INSULIN HUMAN 40 UNITS: 100 INJECTION, SUSPENSION SUBCUTANEOUS at 18:23

## 2017-02-08 RX ADMIN — INSULIN HUMAN 60 UNITS: 100 INJECTION, SUSPENSION SUBCUTANEOUS at 10:20

## 2017-02-08 RX ADMIN — Medication 10 ML: at 05:06

## 2017-02-08 RX ADMIN — HYDRALAZINE HYDROCHLORIDE 50 MG: 50 TABLET, FILM COATED ORAL at 10:19

## 2017-02-08 RX ADMIN — DOCUSATE SODIUM 100 MG: 100 CAPSULE, LIQUID FILLED ORAL at 18:24

## 2017-02-08 RX ADMIN — ATORVASTATIN CALCIUM 80 MG: 40 TABLET, FILM COATED ORAL at 10:19

## 2017-02-08 RX ADMIN — SODIUM CHLORIDE 75 ML/HR: 900 INJECTION, SOLUTION INTRAVENOUS at 16:11

## 2017-02-08 RX ADMIN — CARVEDILOL 12.5 MG: 12.5 TABLET, FILM COATED ORAL at 16:15

## 2017-02-08 RX ADMIN — HYDRALAZINE HYDROCHLORIDE 75 MG: 50 TABLET, FILM COATED ORAL at 22:48

## 2017-02-08 RX ADMIN — ASPIRIN 81 MG 81 MG: 81 TABLET ORAL at 10:19

## 2017-02-08 RX ADMIN — DOCUSATE SODIUM 100 MG: 100 CAPSULE, LIQUID FILLED ORAL at 10:20

## 2017-02-08 RX ADMIN — CARVEDILOL 12.5 MG: 12.5 TABLET, FILM COATED ORAL at 10:19

## 2017-02-08 RX ADMIN — OXYCODONE HYDROCHLORIDE AND ACETAMINOPHEN 2 TABLET: 5; 325 TABLET ORAL at 18:24

## 2017-02-08 RX ADMIN — BARIUM SULFATE 700 MG: 700 TABLET ORAL at 15:00

## 2017-02-08 RX ADMIN — PANTOPRAZOLE SODIUM 40 MG: 40 TABLET, DELAYED RELEASE ORAL at 10:19

## 2017-02-08 RX ADMIN — Medication 10 ML: at 22:48

## 2017-02-08 RX ADMIN — ENOXAPARIN SODIUM 40 MG: 40 INJECTION SUBCUTANEOUS at 22:48

## 2017-02-08 RX ADMIN — Medication 10 ML: at 14:44

## 2017-02-08 RX ADMIN — HYDRALAZINE HYDROCHLORIDE 50 MG: 50 TABLET, FILM COATED ORAL at 16:14

## 2017-02-08 RX ADMIN — TAMSULOSIN HYDROCHLORIDE 0.4 MG: 0.4 CAPSULE ORAL at 10:19

## 2017-02-08 RX ADMIN — HYDRALAZINE HYDROCHLORIDE 50 MG: 50 TABLET, FILM COATED ORAL at 01:59

## 2017-02-08 RX ADMIN — BENZONATATE 100 MG: 100 CAPSULE ORAL at 18:44

## 2017-02-08 RX ADMIN — LORAZEPAM 1 MG: 2 INJECTION INTRAMUSCULAR; INTRAVENOUS at 19:17

## 2017-02-08 RX ADMIN — SODIUM CHLORIDE 75 ML/HR: 900 INJECTION, SOLUTION INTRAVENOUS at 00:11

## 2017-02-08 RX ADMIN — BARIUM SULFATE 30 ML: 400 PASTE ORAL at 15:00

## 2017-02-08 RX ADMIN — OXYCODONE HYDROCHLORIDE AND ACETAMINOPHEN 2 TABLET: 5; 325 TABLET ORAL at 05:00

## 2017-02-08 RX ADMIN — BENZONATATE 100 MG: 100 CAPSULE ORAL at 10:17

## 2017-02-08 RX ADMIN — Medication 10 ML: at 00:12

## 2017-02-08 RX ADMIN — LIDOCAINE HYDROCHLORIDE: 10 INJECTION, SOLUTION EPIDURAL; INFILTRATION; INTRACAUDAL; PERINEURAL at 22:52

## 2017-02-08 RX ADMIN — BARIUM SULFATE 30 G: 960 POWDER, FOR SUSPENSION ORAL at 15:00

## 2017-02-08 RX ADMIN — FEBUXOSTAT 40 MG: 40 TABLET ORAL at 10:19

## 2017-02-08 NOTE — PROGRESS NOTES
10:05pm  2/07/17   Spoke with transporter [facility] [employer]; Said that pt was unable to leave room due to rapid response and other chaos on the floor including family members in the room. Will try again in about an hour and call the floor to let them know we tried to get him.

## 2017-02-08 NOTE — PROGRESS NOTES
SUBJECTIVE:    Patient is lying in bed in NAD. No chest pain. No SOB but states he has had cough. Left ankle is painful per him. No abdominal pain or nausea or vomiting. OBJECTIVE:    Visit Vitals    BP (!) 199/110 (BP 1 Location: Right arm, BP Patient Position: At rest)    Pulse 92    Temp 98 °F (36.7 °C)    Resp 25    Ht 5' 4\" (1.626 m)    Wt 108 kg (238 lb 1.6 oz)    SpO2 95%    BMI 40.87 kg/m2     HEENT:  No pallor or icterus. Neck: no JVD  RS: no wheezes  CVS: RRR  GI: NT, BS +  Extremities: no pedal edema. No erythema of left ankle  CNS: Moves all extremities well. Good hand   General: NAD, awake    ASSESSMENT:    1. Left sided paresthesia  2. Left ankle pain  3. Dry cough  4. Stage 3 CKD due to DM and HTN  5. DM  6. HTN  7. Dyslipidemia  8.  H/o Medical non-compliance  9. hypokalemia    PLAN:    D/c ivf and monitor renal function  Nephrology input noted  Called dr. Nilo Flores - neurology  Cont insulin and current anti-HTN medications  Nebs and IS  Replete K  Xray ankle is normal.  Check uric acid  Talked to MRI tech - they have 4 stat MRI but will be done before midnight    --> total time greater than 35 minutes    CMP:   Lab Results   Component Value Date/Time     02/08/2017 05:18 AM    K 3.3 (L) 02/08/2017 05:18 AM     02/08/2017 05:18 AM    CO2 30 02/08/2017 05:18 AM    AGAP 8 02/08/2017 05:18 AM     (H) 02/08/2017 05:18 AM    BUN 17 02/08/2017 05:18 AM    CREA 1.94 (H) 02/08/2017 05:18 AM    GFRAA 44 (L) 02/08/2017 05:18 AM    GFRNA 36 (L) 02/08/2017 05:18 AM    CA 8.4 (L) 02/08/2017 05:18 AM     CBC: No results found for: WBC, HGB, HGBEXT, HCT, HCTEXT, PLT, PLTEXT, HGBEXT, HCTEXT, PLTEXT

## 2017-02-08 NOTE — PROGRESS NOTES
Acute Stroke. Hypertension. Uncontrolled type 2 DM. Stage 3 to stage 4 crf. Proteinuria , probably Daibetic Nephropathy, other causes needs to R/O. .  Gout. Possible Sleep apnea. DC : Indocin, avoid nephrotoxins, follow renal function, need SPEP,UPEP. Willfollow.

## 2017-02-08 NOTE — ROUTINE PROCESS
Primary Nurse Rome Campo, RN and Tali LI performed a dual skin assessment on this patient No impairment noted  Christian score is 19

## 2017-02-08 NOTE — PROGRESS NOTES
Problem: Mobility Impaired (Adult and Pediatric)  Goal: *Acute Goals and Plan of Care (Insert Text)  Physical Therapy Goals  Initiated 2/8/2017 and to be accomplished within 7 day(s)  1. Patient will move from supine to sit and sit to supine , scoot up and down and roll side to side in bed with modified independence. 2. Patient will transfer from bed to chair and chair to bed with modified independence using the least restrictive device. 3. Patient will perform sit to stand with modified independence. 4. Patient will ambulate with modified independence for 150 feet with the least restrictive device. 5. Patient will ascend/descend 10 stairs with 1 handrail(s) with modified independence. PHYSICAL THERAPY EVALUATION     Patient: Georgette Soares (54 y.o. male)  Date: 2/8/2017  Primary Diagnosis: Left sided numbness  Left sided numbness        Precautions: None         ASSESSMENT :  Pt is 64yo M admitted to hospital for L sided numbness. Pt reports that he feels pain in his L ankle still, though imaging revealed intact ankle with no acute injury/fx. Pt presents today alert and agreeable to therapy and has NC O2 donned which he does not normally wear at home. SaO2 at rest ranged from 94-98% with deep breathing improving readings. Pt transferred sup to sit with modified independence and sit to stand with supervision and VC's. Pt then amb 60ft in room with RW at supervision assist and reports minimal SOB which abated within 30s of sitting. Pt reports pain remained the same with ambulation as it does at rest and that the walker helps take pressure of his L foot. Pt then sat in bed with supervision and sit to sup with modified independence. Pt was left resting with call bell by his side. Pt demonstrated decreased mobility, and endurance and will benefit from skilled intervention to address the above impairments.   Patients rehabilitation potential is considered to be Fair  Factors which may influence rehabilitation potential include:   [ ]         None noted  [ ]         Mental ability/status  [X]         Medical condition  [X]         Home/family situation and support systems  [X]         Safety awareness  [X]         Pain tolerance/management  [ ]         Other:        PLAN :  Recommendations and Planned Interventions:  [X]           Bed Mobility Training             [X]    Neuromuscular Re-Education  [X]           Transfer Training                   [ ]    Orthotic/Prosthetic Training  [X]           Gait Training                          [ ]    Modalities  [X]           Therapeutic Exercises          [ ]    Edema Management/Control  [X]           Therapeutic Activities            [X]    Patient and Family Training/Education  [ ]           Other (comment):     Frequency/Duration: Patient will be followed by physical therapy 1-2 times per day/4-7 days per week to address goals. Discharge Recommendations: Home Health (pt must navigate 10 steps in order to access bed & bath at home)  Further Equipment Recommendations for Discharge: rolling walker vs quad cane       G-CODES      Mobility  Current  CI= 1-19%   Goal  CI= 1-19%. The severity rating is based on the Level of Assistance required for Functional Mobility and ADLs.            G-CODES      Eval Complexity: History: MEDIUM  Complexity : 1-2 comorbidities / personal factors will impact the outcome/ POC Exam:LOW Complexity : 1-2 Standardized tests and measures addressing body structure, function, activity limitation and / or participation in recreation  Presentation: LOW Complexity : Stable, uncomplicated  Clinical Decision Making:Low Complexity   Overall Complexity:LOW       SUBJECTIVE:   Patient stated I'm doing alright. I need to take a nap though.       OBJECTIVE DATA SUMMARY:       Past Medical History   Diagnosis Date    Alcohol abuse      Arthritis      Atherosclerotic cardiovascular disease      CAD (coronary artery disease)         mild disease of coronaries (cor angio 2006),wife states he has 1 stent    Cardiac cath 01/26/2006       RCA mild. Otherwise patent coronaries. LVEDP 15.  EF 55-60%.  Cardiac echocardiogram 03/27/2009       EF 60%. No cardiac source of embolism. No significant valvular pathology.  Cardiac nuclear imaging test 12/06/2011       Small, mild inferior infarction or possibly artifact. No ischemia. EF 45%. No TID. No reg WMA.  Cardiovascular LLE venous duplex 08/14/2015       Left leg:  No DVT. Dilated lymph node in left groin.  Constipation      Diabetes mellitus type II      Gout      Hepatic steatosis      Hypercholesterolemia      Hypertension      Knee effusion, left      Left knee pain      Morbid obesity (HCC)      Noncompliance      Obesity (BMI 30-39. 9)      S/P colonoscopy 3/8/05       Dr. Mariya Huizar; normal; f/u 10 years    Stomach problems      TIA (transient ischemic attack) 3/09    Tobacco abuse       Past Surgical History   Procedure Laterality Date    Pr abdomen surgery proc unlisted           Hernia repair in 1960's or 1970's.  Hx orthopaedic           Bones spur removed from left & right foot 2013.  Hx urological   8/18/2015       [facility], Dr. Michael Richard, Cystoscopy, left retrograde, left double-J cath     Prior Level of Function/Home Situation: Pt lives with family in 2 story home with several QUITA and bathroom & bedroom on 2nd floor. Pt was ambulating and performing IADL's independently and has SPC at home.    Home Situation  Home Environment: Apartment  One/Two Story Residence: Two story  Living Alone: No  Support Systems: Child(vanesa), Family member(s), Spouse/Significant Other/Partner  Patient Expects to be Discharged to[de-identified] Apartment  Current DME Used/Available at Home: Cane, straight (home glucometer is broken)  Critical Behavior:  Neurologic State: Alert  Orientation Level: Oriented X4  Cognition: Follows commands   Strength:    Strength: Generally decreased, functional (BLE 4/5, DNT L ankle with overpressure 2/2 pain at least3/5)   Tone & Sensation:   Tone: Normal (BLE)   Sensation: Intact (BLE to LT)   Range Of Motion:  AROM: Within functional limits (L ankle limited 2/2 to pain, WFL BLE otherwise)   Functional Mobility:  Bed Mobility:   Supine to Sit: Modified independent  Sit to Supine: Modified independent   Transfers:  Sit to Stand: Supervision  Stand to Sit: Supervision      Balance:   Sitting: Intact  Standing: Intact; With support  Ambulation/Gait Training:  Distance (ft): 60 Feet (ft)   Ambulation - Level of Assistance: Contact guard assistance   Gait Abnormalities: Antalgic;Decreased step clearance;Trunk sway increased   Base of Support: Widened  Stance: Right increased  Speed/Patti: Slow   Interventions: Verbal cues; Visual/Demos     Pain:  Pt reports 7/10 pain or discomfort prior to treatment.  (L ankle)  Pt reports 7/10 pain or discomfort post treatment. Activity Tolerance:   Pt tolerated activity well and was left resting in bed with call bell by his side; no c/o chest pain, pt did endorse minimal SOB with NC O2 donned. Please refer to the flowsheet for vital signs taken during this treatment. After treatment:   [ ]         Patient left in no apparent distress sitting up in chair  [X]         Patient left in no apparent distress in bed  [X]         Call bell left within reach  [ ]         Nursing notified  [ ]         Caregiver present  [ ]         Bed alarm activated      COMMUNICATION/EDUCATION:   [X]         Fall prevention education was provided and the patient/caregiver indicated understanding. [X]         Patient/family have participated as able in goal setting and plan of care. [X]         Patient/family agree to work toward stated goals and plan of care. [ ]         Patient understands intent and goals of therapy, but is neutral about his/her participation. [ ]         Patient is unable to participate in goal setting and plan of care.      Thank you for this referral.  Haylee Barker, PT   Time Calculation: 17 mins

## 2017-02-08 NOTE — PROGRESS NOTES
Problem: Self Care Deficits Care Plan (Adult)  Goal: *Acute Goals and Plan of Care (Insert Text)  Occupational Therapy Goals  Initiated 2/8/2017 within 7 day(s). 1. Patient will perform dressing routine with modified independence using LUE as an efficient independent functioning extremity  2. Patient will perform grooming with modified independence using LUE as an efficient independent functioning extremity  3. Patient will independent complete LUE sensory stim/functional activity cycle in preparation for his efficient use of his LUE during his self care routine  4. Patient will perform commode transfers with modified independence  5. His wife independent assisting patient with his program independently  Outcome: Progressing Towards Goal  OCCUPATIONAL THERAPY EVALUATION     Patient: Jonathan Brar (26 y.o. male)  Date: 2/8/2017  Primary Diagnosis: Left sided numbness  Left sided numbness  Precautions: fall         ASSESSMENT :  Based on the objective data described below, the patient presents with moderately impaired sensation in his LUE. This improves to minimally impaired following sensory stim/functional activity cycle. Following training, he and his wife are independent with this program and they report they will follow through. He is hyper-sensative to touch on his left foot and he is expressing 10/10 pain when donning his sock. This improved to 2/10 pain pain following sensory desensitization and he increased to tolerating sock donning with 2/10 pain. His wife reports an understanding and that she will follow through. Patient will benefit from skilled intervention to address the above impairments.   Patients rehabilitation potential is considered to be Excellent  Factors which may influence rehabilitation potential include:   [ ]             None noted  [ ]             Mental ability/status  [ ]             Medical condition  [ ]             Home/family situation and support systems  [ ] Safety awareness  [X]             Pain tolerance/management  [ ]             Other:        PLAN :  Recommendations and Planned Interventions:  [X]               Self Care Training                  [X]        Therapeutic Activities  [ ]               Functional Mobility Training    [ ]        Cognitive Retraining  [X]               Therapeutic Exercises           [ ]        Endurance Activities  [ ]               Balance Training                   [X]        Neuromuscular Re-Education  [ ]               Visual/Perceptual Training     [ ]   Home Safety Training  [X]               Patient Education                 [X]        Family Training/Education  [ ]               Other (comment):     Frequency/Duration: Patient will be followed by occupational therapy 1-2 times per day/4-7 days per week to address goals. Discharge Recommendations: Inpatient Rehab  Further Equipment Recommendations for Discharge: N/A       PATIENT COMPLEXITY      Eval Complexity: History: LOW Complexity : Brief history review ; Examination: MEDIUM Complexity : 3-5 performance deficits relating to physical, cognitive , or psychosocial skils that result in activity limitations and / or participation restrictions; Decision Making:MEDIUM Complexity : Patient may present with comorbidities that affect occupational performnce. Miniml to moderate modification of tasks or assistance (eg, physical or verbal ) with assesment(s) is necessary to enable patient to complete evaluation  Assessment: LOW Complexity       G-CODES:      Self Care  Current  CK= 40-59%   Goal  CI= 1-19%. The severity rating is based on the Level of Assistance required for Functional Mobility and ADLs. SUBJECTIVE:   Patient stated OK that feels better.  he is referring to sensory stim/functional activity cycle      OBJECTIVE DATA SUMMARY:       Past Medical History   Diagnosis Date    Alcohol abuse      Arthritis      Atherosclerotic cardiovascular disease      CAD (coronary artery disease)         mild disease of coronaries (cor angio 2006),wife states he has 1 stent    Cardiac cath 01/26/2006       RCA mild. Otherwise patent coronaries. LVEDP 15.  EF 55-60%.  Cardiac echocardiogram 03/27/2009       EF 60%. No cardiac source of embolism. No significant valvular pathology.  Cardiac nuclear imaging test 12/06/2011       Small, mild inferior infarction or possibly artifact. No ischemia. EF 45%. No TID. No reg WMA.  Cardiovascular LLE venous duplex 08/14/2015       Left leg:  No DVT. Dilated lymph node in left groin.  Constipation      Diabetes mellitus type II      Gout      Hepatic steatosis      Hypercholesterolemia      Hypertension      Knee effusion, left      Left knee pain      Morbid obesity (HCC)      Noncompliance      Obesity (BMI 30-39. 9)      S/P colonoscopy 3/8/05       Dr. Luis Angel Castorena; normal; f/u 10 years    Stomach problems      TIA (transient ischemic attack) 3/09    Tobacco abuse       Past Surgical History   Procedure Laterality Date    Pr abdomen surgery proc unlisted           Hernia repair in 1960's or 1970's.  Hx orthopaedic           Bones spur removed from left & right foot 2013.     Hx urological   8/18/2015       [facility], Dr. Agustín Vásquez, Cystoscopy, left retrograde, left double-J cath     Prior Level of Function/Home Situation:   Home Situation  Home Environment: Apartment  One/Two Story Residence: Two story  Living Alone: No  Support Systems: Child(vanesa), Family member(s), Spouse/Significant Other/Partner  Patient Expects to be Discharged to[de-identified] Apartment  Current DME Used/Available at Home: Cane, straight (home glucometer is broken)  [X]  Right hand dominant          [ ]  Left hand dominant  Cognitive/Behavioral Status:  Neurologic State: Alert  Oriented X 3 (very distracted by his somatic c/o which limits his orientation to situation)  Skin: no skin integrity issues noted during OT evaluation  Edema: he reports LE edema and his wife reports he has facial edema, no UE edema noted; she is discussing with his nurse  Vision/Perceptual:      tracking is [facility]     Coordination:   RUE: WFL  LUE: mod impaired secondary to decreased sensation    Balance:   he declined secondary to his left foot pain  Strength:  RUE: 4 to 4+/5  LUE: 4/5   Tone & Sensation:  RUE: WFL  LUE: tone is WFL; sensation is mod impaired and improved to minimally impaired following sensory stim/functional activity cycle   Range of Motion:  BUE's AROM is [facility]   Functional Mobility and Transfers for ADLs:  Bed Mobility:   rolling requires mod assist secondary to his left foot pain   Transfers:   unable secondary to his foot pain   ADL Assessment:   self feeding: set up secondary to decreased functional use of his LUE when his vision is occluded  Grooming: set up and min assist (simulated)  UB bathing/dressing: min assist (simulated)  LB bathing/dressing: max assist secondary to his Left foot pain   Therapeutic Exercise:  Sensory stim/inhibition as noted above  Pain:  10/10 pain left foot prior to OT session  2/10 pain left foot following OT session  Activity Tolerance:   No SOB noted; he is limited primarily secondary to pain and anticipation of pain  Please refer to the flowsheet for vital signs taken during this treatment. After treatment:   [ ] Patient left in no apparent distress sitting up in chair  [X] Patient left in no apparent distress in bed  [X] Call bell left within reach  [ ] Nursing notified  [X] wife is present  [ ] Bed alarm activated      COMMUNICATION/EDUCATION:   [ ] Home safety education was provided and the patient/caregiver indicated understanding. [ ] Patient/family have participated as able in goal setting and plan of care. [X] Patient/family agree to work toward stated goals and plan of care. [ ] Patient understands intent and goals of therapy, but is neutral about his/her participation.   [ ] Patient is unable to participate in goal setting and plan of care.      Thank you for this referral.  Angelina Lopez OTR/L  Time Calculation: 23 mins

## 2017-02-08 NOTE — CONSULTS
[facility] [address]    Name:  Lisa Sevilla  MR#:  [id number]  :  1960  Account #:  [de-identified]  Date of Adm:  2017  Date of Consultation:  2017      RENAL CONSULTATION    REQUESTING PHYSICIAN: Dr. Willy Schaeffer. REASON FOR CONSULTATION: Abnormal renal function. HISTORY OF PRESENT ILLNESS: This 63-year-old -American  male with multiple medical problems including long-term history of  hypertension, type 2 diabetes mellitus, hyperlipidemia, obesity,  possible gout as well as snoring during sleep and renal failure, was  admitted through the emergency room yesterday. The patient  complaining that he is feeling some weakness on the left side of the  body, particularly in the left arm and the leg and is not improving for the  last several days and also feeling dizzy. For this reason, he came to  the emergency room for further evaluation and treatment. His  numbness includes left side of the face, left arm all the way to his  fingers and the left leg. Sometimes he feels that he cannot move the  left side of the body at all, but sometimes and feels that he can without  any problem. No history of any trauma. He has history of gout and  some kind of surgery for the gout related problem with podiatrist  and he takes indomethacin on a regular basis that has helped his gout  pain. Beside that he is on Uloric. He claims that he has history of  coronary artery disease and even knowing that his primary care  physician had advised not to take any aspirin and advised to take  Tylenol instead of the aspirin. low. He denies any hematuria, any flank  pain, any urgency, any nocturia. No history of any kidney stones. Does  have history of taking NSAID for gout related problem. REVIEW OF SYSTEMS  NEUROLOGICAL: Having numbness, tingling sensation and weakness  on the left arm and the left leg, also having some slurring of the  speech, mild dizzy spell and no headache.   GI SYMPTOMS: No nausea, no vomiting. No abdominal discomfort. CARDIOVASCULAR: Occasional shortness of breath, without any  chest pain. RESPIRATORY: No coughing, no wheezing. No hemoptysis. GASTROINTESTINAL: No nausea, no vomiting. No abdominal  discomfort. PAST MEDICAL HISTORY: As I mentioned, history of hypertension,  type 2 diabetes mellitus, hyperlipidemia, coronary artery disease,  dyslipidemia, chronic renal failure and gout. PAST SURGICAL HISTORY: Includes cholecystectomy. ALLERGIES: NO KNOWN DRUG ALLERGIES. SOCIAL HISTORY: He reports positive for tobacco use. He also drinks  beer about 2-3 times per week and denies recreational drugs. He is   and not employed. FAMILY HISTORY: Mother is , had heart disease. Father   also, has history of diabetes. Sister with history of diabetes  and hypertension. LIST OF MEDICATIONS:  1. Indomethacin 50 mg 2-3 times daily. 2. Novolin 70/30 six units in the morning and 14 units at night. 3. Prilosec 20 mg daily. 4. Novolin 70/30, 30 units in the morning and 14 units at night. 5. Hydralazine 10 mg 3 times daily. 6. Norvasc 10 mg daily. 7. Coreg 12.5 mg p.o. daily. 9. Uloric 40 mg daily. 10. Colace 100 mg p.o. at bedtime p.r.n. for constipation. 11. Flomax 0.4 mg daily. 12. Lipitor 80 mg p.o. daily. PHYSICAL EXAMINATION  VITAL SIGNS: Temperature 98.3, heart rate varying between 82 to  105/minute, regular rhythm. Blood pressure the last one was 198-110. Before that, it was 160/82. HEENT: Oral mucosa moist.  NECK: Jugular venous pressure not distended. Carotid, no audible  bruit. LUNGS: Good air entry in both sides. HEART: S1, S2, without any gallop or murmur. No S4.  ABDOMEN: Soft, obese, could not appreciate any organomegaly. Bladder not distended. EXTREMITIES: Ankle negative edema. No calf muscle tenderness.     LABORATORY DATA: Relevant labs: As of today, sodium 143,  potassium 3.3, chloride 105, CO2 of 13, glucose of 117, blood urea  nitrogen of 17, creatinine of 1.94, calcium of 8.4. Cholesterol 163,  cholesterol HDL ratio 41 and today is 64.6, LDL is high . Her last  hemoglobin A1c 7.6, uric acid is not detectible. Glucose is 171. Ultrasound of the kidneys were done. Increased renal echogenecity*like picture  suggestive of  Renal medical disease, No hydronephrosis . impression of the renal ultrasound  increased renal echogenicity suggesting medical renal disease. No  cortical masses or hydronephrosis. Bladder was normal.    NEURO EXAMINATION:  Alert, awake, and oriented, but having some  mild slurring of his speech. Memory is quite good. Motor 5/5 all over  the body except 4/5 on the left leg. Also, mild dizziness. CT of the head without contrast was done last night, shows there is no  evidence of any intracranial hemorrhage or any other definitive acute  intracranial process. No abnormality in cerebrum or brain stem. IMPRESSION AND PLAN:  1. Possible new ischemic stroke. 2. Uncontrolled hypertension. 3. Uncontrolled type 2 diabetes mellitus. 4. Proteinuria. 5. Stage III to stage IV chronic renal failure. 6. History of gout. 7. Obesity. PLAN: The patient will be monitored. Will do the appropriate labs  including the CBC, CMP, intact PTH as well as renal biopsy if needed. Reports come normal then will do further workup. In the meanwhile, we  will also do the 24-hour urine collection for multiple myeloma and  workup for the proteinuria.         Jaguar Babcock MD    [facility] / [facility]  D:  02/08/2017   13:50  T:  02/08/2017   17:40  Job #:  [id number]

## 2017-02-08 NOTE — DIABETES MGMT
Glycemic Control Plan of Care    Assessment/Recommendations:  Patient is 64year old with past medical history including type 2 diabetes mellitus, hypertension, gout, obesity, coronary artery disease, dyslipidemia, and tobacco/alcohol abuse - was admitted on 02/07/2017 with c/o numbness on left of his body and pain. Patient also reported that he woke on 02/07/2017 and had difficulty walking. Noted:  Acute stroke vs hypertensive emergency. Pending result of MRA and MRI, noted report that patient claustrophobic therefore unable to do the procedure. Type 2 diabetes mellitus with current A1C of 7.6% (02/08/2017). POC BG range on 02/07/2017: 190-276 mg/dL. POC BG report on 02/08/2017 at time of review: 125 mg/dL. Seen patient this AM for assessment of home diabetes management and education (see separate notes). Patient stated that he is still experiencing numbness tip of fingers on left hand and numbness of left lower extremities. Patient showed that despite numbness that he is able to move left extremities. Recommendatioin: Continue monitoring and current insulin orders. Modified the correctional lispro insulin to very resistant dose. Most recent blood glucose values:    Results for Cloretta Zhen (MRN [id number]) as of 2/8/2017 11:18   Ref. Range 2/7/2017 09:57 2/7/2017 10:57 2/7/2017 20:44   GLUCOSE,FAST - POC Latest Ref Range: 70 - 110 mg/dL 265 (H) 276 (H) 190 (H)     Results for Cloretta Zhne (MRN [id number]) as of 2/8/2017 11:18   Ref. Range 2/8/2017 07:29 2/8/2017 10:58   GLUCOSE,FAST - POC Latest Ref Range: 70 - 110 mg/dL 125 (H) 116 (H)     Current A1C: 7.6% (02/08/2017) is equivalent to average blood glucose of 171 mg/dL during the past 2-3 months. Current hospital diabetes medications:  NPH 40 units daily every evening since 02/07/2017. Correctional lispro insulin 4x daily (before meals and bedtime). Very resistant dose.     Total daily dose insulin requirement previous day: 02/07/2017  NPH: 40 units    Home diabetes medications: As stated by patient on 02/08/2017:  Novolin 70/30 mix insulin:60 units daily every morning before breakfast and 40 units daily before dinner.     Diet: Cardiac regular; 1 nectar; consistent carb 1800kcal.    Goals:  Blood glucose will be within target range of  mg/dL by 02/11/2017     Education:  _X__  Refer to Diabetes Education Record: 02/08/2017             ___  Education not indicated at this time    Nataliya Saleh RN

## 2017-02-08 NOTE — PROGRESS NOTES
PT eval order received and chart reviewed. PT attempted eval at 10:12a and pt just returned to floor from procedure and requesting pain meds prior to eval. PT attempted eval again at 14:00, however pt off floor for XR according to family in room. Will attempt to follow up as pt schedule allows. Thank you for this referral. Ayde Sultana, PT, DPT.

## 2017-02-08 NOTE — PROGRESS NOTES
Occupational Therapy Note: Orders received, chart reviewed and OT evaluation attempted. This patient is in process of being transferred to radiology for MRI. Will f/u as appropriate for this patient.  Ayse Ferrara, OTR/L

## 2017-02-08 NOTE — PROGRESS NOTES
Problem: Dysphagia (Adult)  Goal: *Acute Goals and Plan of Care (Insert Text)  Patient will:  1. Tolerate PO trials with 0 s/s overt distress in 4/5 trials  2. Utilize compensatory swallow strategies/maneuvers (decrease bite/sip, size/rate, alt. liq/sol) with min cues in 4/5 trials  3. Complete an objective swallow study (i.e., MBSS) to assess swallow integrity, r/o aspiration, and determine of safest LRD, min A as indicated/ordered by MD -- met 2/8/17    Recommend:   Soft solids with thin liquids  Meds as tolerated  Aspiration precautions  Oral care post meals   Outcome: Progressing Towards Goal  SPEECH PATHOLOGY MODIFIED BARIUM SWALLOW STUDY     Patient: Angelica Roy (85 y.o. male)  Date: 2/8/2017  Primary Diagnosis: Left sided numbness  Left sided numbness        Precautions: aspiration         ASSESSMENT :  Based on the objective data described below, the patient presents with mild oropharyngeal dysphagia. Pt tolerated reg solid, puree, nectar-thick, thin liquid, and 13 mm Ba pill trials with thin liquid wash without aspiration/penetration events. Mildly decreased tongue base retraction resulted in mild premature spillage was noted across all presentations. Premature spillage did not put pt at risk for airway compromise at this time. Mildly increased mastication and delayed a-p transit appreciated with reg solid consistency. Pt with dry cough throughout all PO trials; however, no stasis noted in airway. Rec soft solid diet with thin liquids, aspiration precautions, oral care TID, and meds as tolerated. ST to f/u x 1-2 more visits to ensure safety of PO. Patient will benefit from skilled intervention to address the above impairments.   Patients rehabilitation potential is considered to be Good  Factors which may influence rehabilitation potential include:   [X]              None noted       PLAN :  Recommendations and Planned Interventions: See above  Frequency/Duration: Patient will be followed by speech-language pathology x 1-2 more visits to address goals. Discharge Recommendations: Home Health, Outpatient and To Be Determined       SUBJECTIVE:   Patient stated It goes down just like a tadpole. OBJECTIVE:       Past Medical History   Diagnosis Date    Alcohol abuse      Arthritis      Atherosclerotic cardiovascular disease      CAD (coronary artery disease)         mild disease of coronaries (cor angio 2006),wife states he has 1 stent    Cardiac cath 01/26/2006       RCA mild. Otherwise patent coronaries. LVEDP 15.  EF 55-60%.  Cardiac echocardiogram 03/27/2009       EF 60%. No cardiac source of embolism. No significant valvular pathology.  Cardiac nuclear imaging test 12/06/2011       Small, mild inferior infarction or possibly artifact. No ischemia. EF 45%. No TID. No reg WMA.  Cardiovascular LLE venous duplex 08/14/2015       Left leg:  No DVT. Dilated lymph node in left groin.  Constipation      Diabetes mellitus type II      Gout      Hepatic steatosis      Hypercholesterolemia      Hypertension      Knee effusion, left      Left knee pain      Morbid obesity (HCC)      Noncompliance      Obesity (BMI 30-39. 9)      S/P colonoscopy 3/8/05       Dr. Pooja Hewitt; normal; f/u 10 years    Stomach problems      TIA (transient ischemic attack) 3/09    Tobacco abuse       Past Surgical History   Procedure Laterality Date    Pr abdomen surgery proc unlisted           Hernia repair in 1960's or 1970's.  Hx orthopaedic           Bones spur removed from left & right foot 2013.     Hx urological   8/18/2015       [facility], Dr. Naty Uribe, Cystoscopy, left retrograde, left double-J cath     Prior Level of Function/Home Situation: apartment  Home Situation  Home Environment: Apartment  One/Two Story Residence: Two story  Living Alone: No  Support Systems: Child(vanesa), Family member(s), Spouse/Significant Other/Partner  Patient Expects to be Discharged to[de-identified] Apartment  Current DME Used/Available at Home: Cane, straight (home glucometer is broken)  Diet prior to admission: reg with thin  Current Diet:  Soft solid with thin   Radiologist:    Film Views: Fluoro;Lateral  Patient Position: 90 in fluoro chair      Trial 1:   Consistency Presented: Thin liquid;Puree; Solid; Nectar thick liquid;Pill/Tablet   How Presented: Self-fed/presented;Cup/sip;Spoon;Straw       Bolus Acceptance: No impairment   Bolus Formation/Control: Impaired: Delayed;Mastication;Premature spillage   Propulsion: No impairment   Oral Residue: None   Initiation of Swallow: Triggered at base of tongue   Timing: No impairment   Penetration: None   Aspiration/Timing: No evidence of aspiration   Pharyngeal Clearance: No residue       Effective Modifications: None   Cues for Modifications: None       Decreased Tongue Base Retraction?: Yes  Laryngeal Elevation: WFL (within functional limits)  Aspiration/Penetration Score: 1 (No penetration or aspiration-Contrast does not enter the airway)  Pharyngeal Symmetry: Not assessed  Pharyngeal-Esophageal Segment: No impairment  Pharyngeal Dysfunction: Decreased tongue base retraction     Oral Phase Severity: Mild  Pharyngeal Phase Severity: Mild     GCODESwallowing:  Swallow Current Status CI= 1-19%   Swallow Goal Status CH= 0%     The severity rating is based on the following outcomes:             8-point Penetration-Aspiration Scale: Score 1  Professional Judgment     PAIN:  Pt reports 0/10 pain or discomfort prior to MBS. Pt reports 0/10 pain or discomfort post MBS. COMMUNICATION/EDUCATION:   [X]  Patient educated regarding MBS results and diet recommendations. [X]  Patient/family have participated as able in goal setting and plan of care.      Thank you for this referral.     Cassy Morgan M.S. CCC-SLP/L  Speech-Language Pathologist

## 2017-02-08 NOTE — H&P
[address] [facility]  ROUTINE H AND PS    Name:  Staci Sosa  MR#:  [id number]  :  1960  Account #:  [de-identified]  Date of Adm:  2017      PRIMARY CARE PHYSICIAN: Nidia Bingham. Ronny Paige MD    CHIEF COMPLAINT: Left-sided numbness. HISTORY OF PRESENT ILLNESS: A 30-year-old   male with multiple medical problems including diabetes, hypertension,  coronary artery disease, dyslipidemia, who comes with the above-  stated complaints. He reports he has had left-sided numbness for  several days now, however, because of nonimprovement  and additional symptoms of falls he attributes to the numbness and  weakness, he came in today. He reports that the numbness includes  the left side of his face, left arm all the way to his fingers and left leg. He also reports that at times his leg feels like he cannot move it. He  also complains of pain now on the left ankle, although he denies  trauma to the left ankle. He has never had similar symptoms in the  past. Even though he has a history of coronary artery disease, he says  he was told to stop taking aspirin and was told to take Tylenol by his  primary care physician. REVIEW OF SYSTEMS: Please see above. Additionally, he reports  that he has been having shortness of breath over the last few days, he  feels like his whole body is swollen. The rest of his 10-point review of  systems checked and negative. PAST MEDICAL HISTORY  1. Diabetes mellitus type 2.  2. Hypertension. 3. Coronary artery disease. 4. Dyslipidemia. PAST SURGICAL HISTORY: Includes cholecystectomy. ALLERGIES: NO KNOWN. DRUG ALLERGIES: NO KNOWN DRUG ALLERGIES. SHELLFISH  DERIVED PRODUCTS ARE LISTED IN HIS CHART. SOCIAL HISTORY: Reports positive current tobacco use. He reports  he drinks beer about 2-3 times per week. Denies recreational drugs. He is  and unemployed. FAMILY HISTORY: Mother , heart disease. Father  , diabetes.  Sister with history of diabetes and hypertension. MEDICATIONS  1. Indocin 50 mg 2-3 times a day. 2. Novolin 70/30, 60 units in the morning, 40 units at night. 3. Prilosec daily. 4. Hydralazine 10 mg 3 times a day. 5. Norvasc 10 mg daily. 6. Coreg 12.5 mg twice a day. 7. Colace. 8. Uloric. 9. Flomax 0.4 mg daily. 9. Lipitor 80 mg daily. PHYSICAL EXAMINATION  VITAL SIGNS: He is afebrile with stable vital signs with the exception  of very elevated blood pressure, systolic highest 756, diastolic highest  600, most current is 170/90. His heart rate has been high at times at  105, respiratory rate as high as 31, most current 20. GENERAL: He is an obese, pleasant male who appears stated age,  resting comfortably, in no acute distress. PSYCHIATRIC: He is alert, awake, oriented. He has appropriate affect. Recent and remote memory appear intact. HEENT: Head is atraumatic, normocephalic. Sclerae anicteric. Oropharynx without lesions. He has moist mucous membranes. NECK: Supple. LYMPHATICS: No neck or axillary lymphadenopathy. CARDIOVASCULAR: Regular rate and rhythm. No murmurs. No  jugular venous distention. No peripheral edema. PULMONARY: Clear to auscultation bilaterally. GASTROINTESTINAL: Abdomen is soft, nontender, nondistended. SKIN: Warm, dry, without lesions. NEUROLOGIC: He has 5/5 strength everywhere. Cranial nerves are  intact. No focal abnormalities noted otherwise. MUSCULOSKELETAL: His left ankle, limited range of motion and  tenderness to palpation. Otherwise, the rest of his musculoskeletal  exam unremarkable. LABORATORY DATA: His potassium is 3.2. His creatinine is 2.16. In  the past, it has run between 1.1 as far back as 05/2016, as high as  1.65 in 08/2016. The last time it was checked was 09/2016, it was  1.32. Unable to get more recent results for his creatinine. His first set  cardiac enzymes are unremarkable.  CBC is remarkable only for  hemoglobin of 12.0, which is not much different from 12.9 in August 2016, normal white count, normal platelet count. No bandemia. IMAGING STUDIES: Include CT of the head without contrast that  shows no evidence of intracranial hemorrhage or any other definable  acute intracranial process. EKG today showed that he has normal sinus rhythm, no clear ST  elevations, rate of 90. He has T-wave abnormalities in the inferior  leads or diffusely. IMPRESSION  1. Acute stroke versus hypertensive emergency/end-organ  involvement in this patient who comes in very hypertensive with  complaints of left-sided numbness. He has been seen by the remote  neurologist with recommendations made to treat his blood pressure  and for further imaging studies as listed below. Plan:  A. Check MRI of the brain, MRA of the brain and neck vasculature. B. Start daily aspirin. C. Continue to check neurological status regularly. 2. Hypertensive emergency, suspected in this patient who comes in  with the above-stated complaints. Plan is to continue his outpatient  medications for now without additional measures, given his blood  pressure has significantly decreased compared to a blood pressure of  106, his systolic last checked is 265. Plan is to therefore continue his  outpatient regimen for now. 3. Complaints of shortness of breath and edema. Suspect that some  type of heart failure based on symptoms. His last documented echo is  from March 2009 and at that time he was noted to have normal  ejection fraction. Plan at this time is to check chest x-ray, BNP as initial  workup for his complaints. 4. Left ankle pain, unclear cause. The patient denies trauma. Plan is to  treat with narcotics. X-ray of the ankle for now. 5. History of type 2 diabetes mellitus with elevated blood sugars in the  200s. Plan is to continue his outpatient regimen of insulin for now and  also cover with corrective insulin. 6. Deep venous thrombosis prophylaxis, Lovenox.   7. Advanced directives discussed with the patient. He would like to  have a FULL CODE STATUS. 8.Kideny disease of unclear acuity, but of relatively recent onset. Pt on Indomethacin. Case discussed with renal, who recommends holding indomethacin and to use steroids for any acute flair.         MD María Trevino  D:  02/07/2017   19:05  T:  02/07/2017   20:19  Job #:  [id number]

## 2017-02-08 NOTE — PROGRESS NOTES
10:54 pm 2-07-17 Pt is too clausterphobic. Spoke with the nurse and mentioned semaj to the pt. Pt said he would be willing to try again with adivan tomorrow, but not tonight do to his chest feeling very tight due to nervousness.

## 2017-02-08 NOTE — ROUTINE PROCESS
[facility] Stroke Education Booklet and Stroke Education provided to patient and the following topics were discussed    1. Patients personal risk factors for stroke are hypertension, smoking, diabetes mellitus, HgA1C, obesity, CHF and prior stroke    2. Warning signs of Stroke:        * Sudden numbness or weakness of the face, arm or leg, especially on one side of          The body            * Sudden confusion, trouble speaking or understanding        * Sudden trouble seeing in one or both eyes        * Sudden trouble walking, dizziness, loss of balance or coordination        * Sudden severe headache with no known cause      3. Importance of activation Emergency Medical Services ( 9-1-1 ) immediately if  you experience any warning signs of stroke. 4. Be sure and schedule a follow-up appointment with your primary care doctor or any specialists as instructed. 5. You must take medicine every day to treat your risk factors for stroke. Be sure to take your medicines exactly as your doctor tells you: no more, no less. Know what your medicines are for , what they do. Anti-thrombotics /anticoagulants can help prevent strokes. You are taking the following medicine(s)  Please see Mar     6. Smoking and second-hand smoke greatly increase your risk of stroke, cardiovascular disease and death.  Smoking history cigarettes, 1 per day

## 2017-02-08 NOTE — ROUTINE PROCESS
Patient: Georgette Soares Age: 64 y.o. Sex: male     Bedside and Verbal shift change report given to Luis Yu RN (oncoming nurse) by Harvey Kuhn RN (offgoing nurse). Report included the following information SBAR, Kardex, MAR and Recent Results. PATIENT GOALS FOR TODAY PATIENT PRIORITIES FOR TODAY   Goals are: stroke protocol  Updated on Whiteboard in Patients Room: no   Patient states his/her priorities are: to get MRI  Updated on Whiteboard in Patients Room: no     SITUATION:   Code Status: Full Code Reason for Admission: Left sided numbness  Left sided numbness   Hospital day: 1 Problem List: Active Problems:    Left sided numbness (2/7/2017)       Attending Provider:   Samra Wade MD Allergies: Allergies   Allergen Reactions    Shellfish Derived Other (comments)     Causes Gout      BACKGROUND:   Past Medical History:   Past Medical History   Diagnosis Date    Alcohol abuse     Arthritis     Atherosclerotic cardiovascular disease     CAD (coronary artery disease)      mild disease of coronaries (cor angio 2006),wife states he has 1 stent    Cardiac cath 01/26/2006     RCA mild. Otherwise patent coronaries. LVEDP 15.  EF 55-60%.  Cardiac echocardiogram 03/27/2009     EF 60%. No cardiac source of embolism. No significant valvular pathology.  Cardiac nuclear imaging test 12/06/2011     Small, mild inferior infarction or possibly artifact. No ischemia. EF 45%. No TID. No reg WMA.  Cardiovascular LLE venous duplex 08/14/2015     Left leg:  No DVT. Dilated lymph node in left groin.  Constipation     Diabetes mellitus type II     Gout     Hepatic steatosis     Hypercholesterolemia     Hypertension     Knee effusion, left     Left knee pain     Morbid obesity (HCC)     Noncompliance     Obesity (BMI 30-39. 9)     S/P colonoscopy 3/8/05     Dr. Liz Kirby; normal; f/u 10 years    Stomach problems     TIA (transient ischemic attack) 3/09    Tobacco abuse ASSESSMENT:   Neuro:  Neurologic State: Alert, Orientation Level: Oriented X4 CV:  Patient on Telemetry: Cardiac/Telemetry Monitor On: Yes    Box Number: [id number]   Cardiac Rhythm: Normal sinus rhythm  Patient has a pace maker:   Endocrine   Recent Glucose Results:   Lab Results   Component Value Date/Time     (H) 02/08/2017 05:18 AM     (H) 02/07/2017 10:09 AM    GLUCPOC 125 (H) 02/08/2017 07:29 AM    GLUCPOC 190 (H) 02/07/2017 08:44 PM    GLUCPOC 276 (H) 02/07/2017 10:57 AM        Respiratory:  O2 Device: Nasal cannula       Supplemental O2 O2 Flow Rate (L/min): 2 l/min       Incentive Spirometery          GI  Current diet: DIET CARDIAC Regular                            Bowel Sounds: Active            Reasons if patient has a souza: none   Patient Safety  Restraints:    Family notified:    Other Alternatives:     Fall Risk   Total Score: 1   Safety Measures: Bed/Chair-Wheels locked, Bed in low position, Call light within reach, Emergency bedside equipment, Fall prevention (comment), Family at bedside, Side rails X 3 VTE Prophylaxis                WOUND (if present)  Wound Type:  none  Dressing present Dressing Present : No  Wound Concerns/Notes: none IV ACCESS     Reasons if patient has a central line: none     PAIN  Pain Assessment  Pain Intensity 1: 0 (02/08/17 0605)  Pain Location 1: Head  Pain Intervention(s) 1: Medication (see MAR)  Patient Stated Pain Goal: 0  Time of last intervention: please see MAR   Reassessment Completed: no Last Vitals:  Vitals:    02/08/17 0000 02/08/17 0200 02/08/17 0400 02/08/17 0640   BP: (!) 194/98 156/87 (!) 172/97    Pulse: 92 87 93    Resp: 21 21 20    Temp: 98.3 °F (36.8 °C) 98 °F (36.7 °C) 98.1 °F (36.7 °C)    SpO2: 97% 93% 97%    Weight:    108 kg (238 lb 1.6 oz)   Height:    5' 4\" (1.626 m)      Last 3 Weights:  Last 3 Recorded Weights in this Encounter    02/07/17 0949 02/08/17 0640   Weight: 108.9 kg (240 lb 1 oz) 108 kg (238 lb 1.6 oz)     Weight change: LAB RESULTS  Recent Labs      02/07/17   1009   WBC  10.3   HGB  12.0*   HCT  37.7   PLT  380     Recent Labs      02/08/17   0518  02/07/17   1009   NA  143  143   K  3.3*  3.2*   GLU  117*  283*   BUN  17 17   CREA  1.94*  2.16*   CA  8.4*  8.9   INR   --   0.9      RECOMMENDATIONS AND DISCHARGE PLANNING   1. Discharge plan for patient // Needs or barriers to disharge: stroke protocol . 2. Estimated Discharge Date: unknown Posted on Whiteboard in Patients Room: no    \"HEALS\" SAFETY CHECK   A safety check occurred in the patient's room between off going nurse and oncoming nurse listed above. The safety check included the below items  Area Items   H  High Alert Meds  - Verify all high alert medication drips (heparin, PCA, etc.)   E  Equipment - Suction is set up for ALL patients (with stefano)  - Red plugs utilized for all equipment (IV pumps, etc.)  - WOWs wiped down at end of shift.  - Room stocked with oxygen, suction, and other unit-specific supplies   A  Alarms - Bed alarm is set for fall risk patients  - Ensure chair alarm is in place and activated if patient is up in a chair   L  Lines - Check IV for any infiltration  - Montaño bag is empty if patient has a Montaño   - Tubing and IV bags are labeled   S  Safety   - Room is clean, patient is clean, and equipment is clean. - Ensure room is clear and free of clutter  - Hallways are clear from equipment besides carts.    - Fall bracelet on for fall risk patients  - Suction is set up for ALL patients (with stefano)  - Isolation precautions followed, supplies available outside room, sign posted   Jacki Sherwood RN

## 2017-02-08 NOTE — PROGRESS NOTES
Problem: Dysphagia (Adult)  Goal: *Acute Goals and Plan of Care (Insert Text)  Patient will:  1. Tolerate PO trials with 0 s/s overt distress in 4/5 trials  2. Utilize compensatory swallow strategies/maneuvers (decrease bite/sip, size/rate, alt. liq/sol) with min cues in 4/5 trials  3. Perform oral-motor/laryngeal exercises to increase oropharyngeal swallow function with min cues  4. Complete an objective swallow study (i.e., MBSS) to assess swallow integrity, r/o aspiration, and determine of safest LRD, min A as indicated/ordered by MD     Recommend:   Soft solids with Nectar- thick liquids  Meds as tolerated  Aspiration precautions  HOB >45 degrees during all intake and for at least 30 min after po   Small bites/sips, slow rate of intake, alternating bites/sips  Oral care post meals   [address] EVALUATION     Patient: [place] (89 y.o. male)  Date: 2/8/2017  Primary Diagnosis: Left sided numbness  Left sided numbness        Precautions: aspiration        ASSESSMENT :  Based on the objective data described below, the patient presents with mild-moderate oropharyngeal dysphagia. Pt was seen at bedside for swallow eval per MD orders. Per RN report, cleared to be seen by SLP. Cursory OME and informal observations revealed oral motor structures adequate for po intake. Pt complains of left sided weakness; however labial/lingual/mandible structures appear to be adequate. Laryngeal elevation palpated revealing decreased strength and rate. Pt presented with thin + straw, NTL +straw and solid consistencies. Pt demo overt s/sx of aspiration during thin liquid trials c/b altered vocal quality, throat clearing and strong cough. Pt tolerating NTL liquids with no s/sx of aspiration. Pt demo increased mastication time and delayed a-p transit during solid consistencies.  Rec pt on soft solids to aid in decreased mastication time, NTL to reduce risk of aspiration/penetration, comp strategies i.e., (alternating solids/liquids, small bites/sips, and HOB ~45 during and post meals by 30 minutes). Pt will benefit from MBS to r/o aspiration/penetrartion across all consistencies. St will continue to follow. Patient will benefit from skilled intervention to address the above impairments. Patients rehabilitation potential is considered to be Good  Factors which may influence rehabilitation potential include:   [X]            Medical condition       PLAN :  Recommendations and Planned Interventions: Soft solids and NTL  Frequency/Duration: Patient will be followed by speech-language pathology 1-2 times per day/4-7 days per week to address goals. Discharge Recommendations: To Be Determined       SUBJECTIVE:   Patient stated Do you cook? . OBJECTIVE:       Past Medical History   Diagnosis Date    Alcohol abuse      Arthritis      Atherosclerotic cardiovascular disease      CAD (coronary artery disease)         mild disease of coronaries (cor angio 2006),wife states he has 1 stent    Cardiac cath 01/26/2006       RCA mild. Otherwise patent coronaries. LVEDP 15.  EF 55-60%.  Cardiac echocardiogram 03/27/2009       EF 60%. No cardiac source of embolism. No significant valvular pathology.  Cardiac nuclear imaging test 12/06/2011       Small, mild inferior infarction or possibly artifact. No ischemia. EF 45%. No TID. No reg WMA.  Cardiovascular LLE venous duplex 08/14/2015       Left leg:  No DVT. Dilated lymph node in left groin.  Constipation      Diabetes mellitus type II      Gout      Hepatic steatosis      Hypercholesterolemia      Hypertension      Knee effusion, left      Left knee pain      Morbid obesity (HCC)      Noncompliance      Obesity (BMI 30-39. 9)      S/P colonoscopy 3/8/05       Dr. Ingris Pickard; normal; f/u 10 years    Stomach problems      TIA (transient ischemic attack) 3/09    Tobacco abuse       Past Surgical History   Procedure Laterality Date    Pr abdomen surgery proc unlisted           Hernia repair in 1960's or 1970's.  Hx orthopaedic           Bones spur removed from left & right foot 2013.  Hx urological   8/18/2015       [facility], Dr. Iliana Phillips, Cystoscopy, left retrograde, left double-J cath     Prior Level of Function/Home Situation: Home   Home Situation  Home Environment: Apartment  One/Two Story Residence: Two story  Living Alone: No  Support Systems: Child(vanesa), Family member(s), Spouse/Significant Other/Partner  Patient Expects to be Discharged to[de-identified] Apartment  Current DME Used/Available at Home: Cane, straight (home glucometer is broken)  Diet prior to admission: regular and thin  Current Diet:  Soft solids and NTL  Cognitive and Communication Status:  Neurologic State: Alert  Orientation Level: Oriented to person, Oriented to place, Oriented to time  Cognition: Appropriate decision making, Follows commands  Oral Assessment:  Oral Assessment  Labial: No impairment  Dentition: Intact  Oral Hygiene: good  Lingual: No impairment  Velum: No impairment  Mandible: Restricted  P.O. Trials:  Patient Position: [facility] at [facility]  Vocal quality prior to P.O.: Hoarse  Consistency Presented: Thin liquid; Solid; Nectar thick liquid  How Presented: SLP-fed/presented;Straw  Bolus Acceptance: No impairment  Bolus Formation/Control: No impairment  Propulsion: Delayed (# of seconds)  Oral Residue: None  Initiation of Swallow: Delayed (# of seconds)  Laryngeal Elevation: Decreased  Aspiration Signs/Symptoms: Change vocal quality;Delayed cough/throat clear;Strong cough  Pharyngeal Phase Characteristics: Altered vocal quality  Effective Modifications: None  Cues for Modifications: Minimal-moderate  Oral Phase Severity: Mild  Pharyngeal Phase Severity : Moderate  GCODESwallowing:  Swallow Current Status CJ= 20-39%   Swallow Goal Status CI= 1-19%     The severity rating is based on the following outcomes:             Clinical Judgment     Pain:  Pt c/o 0/10 pain prior to evaluation. Pt c/o 0/10 pain post evaluation. After treatment:   [ ]            Patient left in no apparent distress sitting up in chair  [X]            Patient left in no apparent distress in bed  [X]            Call bell left within reach  [ ]            Nursing notified  [ ]            Caregiver present  [ ]            Bed alarm activated      COMMUNICATION/EDUCATION:   [X]            SLP educated pt with regard to compensatory swallow strategies and                  aspiration/reflux precautions including: small bites/sips,                  alternate liquids/solids, decrease feeding rate, HOB > 45 with all po, and                 upright in bed at 30 degrees after po for at least 45 minutes. [X]            Patient/family have participated as able in goal setting and plan of care. [ ]            Patient/family agree to work toward stated goals and plan of care. [ ]            Patient understands intent and goals of therapy; neutral about participation. [ ]            Patient is unable to participate in goal setting and plan of care. Thank you for this referral.  Rick Vuong.  Leora Schlatter,  Graduate SLP Student

## 2017-02-08 NOTE — DIABETES MGMT
Diabetes Patient/Family Education Record    Factors That  May Influence Patients Ability  to Learn or  Comply With  Recommendations:    []   Language barrier    []   Cultural needs   []   Motivation    []   Cognitive limitation    []   Physical   [x]   Education    []   Physiological factors   []   Hearing/vision/speaking impairment   []   Jain beliefs    []   Financial factors   []  Other:   []  No factors identified at this time. Person Instructed:   [x]   Patient   []   Family   []  Other     Preference for Learning:   [x]   Verbal   [x]   Written   []  Demonstration     Level of Comprehension & Competence:    [x]  Good                                      [] Fair                                     []  Poor                             []  Needs Reinforcement   [x]  Teachback completed    Education Component:   [x]  Medication management, including how to administer insulin (if appropriate) and potential medication interactions: Patient stated that he is taking Novolin 70/30 mix insulin: 60 units daily every morning before breakfast and 40 units daily before dinner. [x]  Nutritional management: Serving size/portion control of carbs (starches, fruits, dairy), and limit intake of concentrated sweets.    []  Exercise   [x]  Signs, symptoms, and treatment of hyperglycemia and hypoglycemia   [x]  Prevention, recognition and treatment of hyperglycemia and hypoglycemia   [x]  Importance of blood glucose monitoring and how to obtain a blood glucose meter: Patient is using AscGRAVIDI blood glucose monitoring device at home.    []  Instruction on use of blood glucose meter   [x]  Discuss the importance of HbA1C monitoring and provide patient with results:  7.6% (02/08/2017) is equivalent to average blood glucose of 171 mg/dL during the past 2-3 months.   []  Sick day guidelines   []  Proper use and disposal of lancets, needles, syringes or insulin pens (if appropriate)   [x]  Potential long-term complications (retinopathy, kidney disease, neuropathy, heart disease, stroke, vascular disease, foot care)   [x] Provide emergency contact number and contact number for more information    [x]  Goal:  Patient/family will demonstrate understanding of Diabetes Self Management Skills by:  02/15/2017  Plan for post-discharge education or self-management support:    [x] Outpatient class schedule provided            [] Patient Declined    [] Scheduled for outpatient classes (date) _______         Kelsie Flynn RN

## 2017-02-08 NOTE — PROGRESS NOTES
Care Management Interventions  Transition of Care Consult (CM Consult): Discharge Planning  Physical Therapy Consult: Yes  Occupational Therapy Consult: Yes  Speech Therapy Consult: Yes  Current Support Network: Lives with Spouse  Confirm Follow Up Transport: Family  Plan discussed with Pt/Family/Caregiver: Yes     Pt is a 64year old male admitted for left sided numbness. Pt is alert and oriented in room with his spouse Raisa Willis ([phone]). Pt states that he resides at home with spouse. Pt's plan is to return home upon discharge. Family will assist pt with transportation. Pt indicates being independent with self care and ambulation.

## 2017-02-08 NOTE — ROUTINE PROCESS
Patient: Xiao Weiss Age: 64 y.o. Sex: male     Bedside and Verbal shift change report given to GALLO Langford RN (oncoming nurse) by Mitch Brennan RN (offgoing nurse). Report included the following information SBAR, Kardex, MAR and Recent Results. PATIENT GOALS FOR TODAY PATIENT PRIORITIES FOR TODAY   Goals are: To go home  Updated on Whiteboard in [place]: no   Patient states his/her priorities are: to get test results  Updated on Whiteboard in Patients Room: no     SITUATION:   Code Status: Full Code Reason for Admission: Left sided numbness  Left sided numbness   Hospital day: 1 Problem List: Active Problems:    Left sided numbness (2/7/2017)       Attending Provider:   Darrin Gentile MD Allergies: Allergies   Allergen Reactions    Shellfish Derived Other (comments)     Causes Gout      BACKGROUND:   Past Medical History:   Past Medical History   Diagnosis Date    Alcohol abuse     Arthritis     Atherosclerotic cardiovascular disease     CAD (coronary artery disease)      mild disease of coronaries (cor angio 2006),wife states he has 1 stent    Cardiac cath 01/26/2006     RCA mild. Otherwise patent coronaries. LVEDP 15.  EF 55-60%.  Cardiac echocardiogram 03/27/2009     EF 60%. No cardiac source of embolism. No significant valvular pathology.  Cardiac nuclear imaging test 12/06/2011     Small, mild inferior infarction or possibly artifact. No ischemia. EF 45%. No TID. No reg WMA.  Cardiovascular LLE venous duplex 08/14/2015     Left leg:  No DVT. Dilated lymph node in left groin.  Constipation     Diabetes mellitus type II     Gout     Hepatic steatosis     Hypercholesterolemia     Hypertension     Knee effusion, left     Left knee pain     Morbid obesity (HCC)     Noncompliance     Obesity (BMI 30-39. 9)     S/P colonoscopy 3/8/05     Dr. Savage Barrera; normal; f/u 10 years    Stomach problems     TIA (transient ischemic attack) 3/09    Tobacco abuse ASSESSMENT:   Neuro:  Neurologic State: Alert, Orientation Level: Oriented X4 CV:  Patient on Telemetry:          Cardiac Rhythm: Normal sinus rhythm  Patient has a pace maker:   Endocrine   Recent Glucose Results:   Lab Results   Component Value Date/Time     (H) 02/07/2017 10:09 AM    GLUCPOC 276 (H) 02/07/2017 10:57 AM    GLUCPOC 265 (H) 02/07/2017 09:57 AM        Respiratory:  O2 Device: Room air       Supplemental O2         Incentive Spirometery          GI  Current diet: DIET CARDIAC Regular                                        Reasons if patient has a souza: no   Patient Safety  Restraints:    Family notified:    Other Alternatives: Fall Risk   Total Score: 1   Safety Measures: Bed/Chair-Wheels locked, Bed in low position, Call light within reach, Fall prevention (comment), Side rails X 3 VTE Prophylaxis                WOUND (if present)  Wound Type:  no  Dressing present Dressing Present : No  Wound Concerns/Notes: no IV ACCESS     Reasons if patient has a central line: no     PAIN  Pain Assessment  Pain Intensity 1: 0 (02/07/17 1952)  Pain Location 1: Leg  Pain Intervention(s) 1: Medication (see MAR), Repositioned, Rest  Patient Stated Pain Goal: 0  Time of last intervention: 1836   Reassessment Completed: no Last Vitals:  Vitals:    02/07/17 1400 02/07/17 1415 02/07/17 1430 02/07/17 1600   BP: 162/90 (!) 200/96  170/90   Pulse:    95   Resp:    20   Temp:    98.2 °F (36.8 °C)   SpO2: 97% 97% 96% 94%   Weight:       Height:          Last 3 Weights:  Last 3 Recorded Weights in this Encounter    02/07/17 0949   Weight: 108.9 kg (240 lb 1 oz)     Weight change:   LAB RESULTS  Recent Labs      02/07/17   1009   WBC  10.3   HGB  12.0*   HCT  37.7   PLT  380     Recent Labs      02/07/17   1009   NA  143   K  3.2*   GLU  283*   BUN  17   CREA  2.16*   CA  8.9   INR  0.9      RECOMMENDATIONS AND DISCHARGE PLANNING   1. Discharge plan for patient // Needs or barriers to disharge: clinical .  2. Estimated Discharge Date: 2/10/17 Posted on Whiteboard in Patients Room: no    \"HEALS\" SAFETY CHECK   A safety check occurred in the patient's room between off going nurse and oncoming nurse listed above. The safety check included the below items  Area Items   H  High Alert Meds  - Verify all high alert medication drips (heparin, PCA, etc.)   E  Equipment - Suction is set up for ALL patients (with stefano)  - Red plugs utilized for all equipment (IV pumps, etc.)  - WOWs wiped down at end of shift.  - Room stocked with oxygen, suction, and other unit-specific supplies   A  Alarms - Bed alarm is set for fall risk patients  - Ensure chair alarm is in place and activated if patient is up in a chair   L  Lines - Check IV for any infiltration  - Montaño bag is empty if patient has a Montaño   - Tubing and IV bags are labeled   S  Safety   - Room is clean, patient is clean, and equipment is clean. - Ensure room is clear and free of clutter  - Hallways are clear from equipment besides carts.    - Fall bracelet on for fall risk patients  - Suction is set up for ALL patients (with stefano)  - Isolation precautions followed, supplies available outside room, sign posted   Jessee Quiroz RN

## 2017-02-08 NOTE — PROGRESS NOTES
2253 received report from Joy that pt unable to tolerate MRI due to his claustrophobia, pt wants to try again in the morning with medication

## 2017-02-09 ENCOUNTER — APPOINTMENT (OUTPATIENT)
Dept: CT IMAGING | Age: 57
DRG: 069 | End: 2017-02-09
Attending: INTERNAL MEDICINE
Payer: COMMERCIAL

## 2017-02-09 PROBLEM — E16.2 HYPOGLYCEMIA: Status: ACTIVE | Noted: 2017-02-09

## 2017-02-09 PROBLEM — N18.30 CHRONIC KIDNEY DISEASE, STAGE III (MODERATE) (HCC): Status: ACTIVE | Noted: 2017-02-09

## 2017-02-09 PROBLEM — N25.81 SECONDARY HYPERPARATHYROIDISM OF RENAL ORIGIN (HCC): Status: ACTIVE | Noted: 2017-02-09

## 2017-02-09 PROBLEM — R80.9 PROTEINURIA: Status: ACTIVE | Noted: 2017-02-09

## 2017-02-09 LAB
ALBUMIN SERPL BCP-MCNC: 2.1 G/DL (ref 3.4–5)
ALBUMIN/GLOB SERPL: 0.5 {RATIO} (ref 0.8–1.7)
ALP SERPL-CCNC: 153 U/L (ref 45–117)
ALT SERPL-CCNC: 18 U/L (ref 16–61)
ANION GAP BLD CALC-SCNC: 10 MMOL/L (ref 3–18)
AST SERPL W P-5'-P-CCNC: 15 U/L (ref 15–37)
BASOPHILS # BLD AUTO: 0 K/UL (ref 0–0.1)
BASOPHILS # BLD: 0 % (ref 0–2)
BILIRUB SERPL-MCNC: 0.2 MG/DL (ref 0.2–1)
BUN SERPL-MCNC: 16 MG/DL (ref 7–18)
BUN/CREAT SERPL: 8 (ref 12–20)
CALCIUM SERPL-MCNC: 8.3 MG/DL (ref 8.5–10.1)
CALCIUM SERPL-MCNC: 8.4 MG/DL (ref 8.5–10.1)
CHLORIDE SERPL-SCNC: 104 MMOL/L (ref 100–108)
CO2 SERPL-SCNC: 28 MMOL/L (ref 21–32)
CREAT SERPL-MCNC: 2.01 MG/DL (ref 0.6–1.3)
DIFFERENTIAL METHOD BLD: ABNORMAL
EOSINOPHIL # BLD: 0.1 K/UL (ref 0–0.4)
EOSINOPHIL NFR BLD: 1 % (ref 0–5)
ERYTHROCYTE [DISTWIDTH] IN BLOOD BY AUTOMATED COUNT: 15.4 % (ref 11.6–14.5)
GLOBULIN SER CALC-MCNC: 4.3 G/DL (ref 2–4)
GLUCOSE BLD STRIP.AUTO-MCNC: 105 MG/DL (ref 70–110)
GLUCOSE BLD STRIP.AUTO-MCNC: 109 MG/DL (ref 70–110)
GLUCOSE BLD STRIP.AUTO-MCNC: 128 MG/DL (ref 70–110)
GLUCOSE BLD STRIP.AUTO-MCNC: 46 MG/DL (ref 70–110)
GLUCOSE BLD STRIP.AUTO-MCNC: 75 MG/DL (ref 70–110)
GLUCOSE BLD STRIP.AUTO-MCNC: 93 MG/DL (ref 70–110)
GLUCOSE SERPL-MCNC: 99 MG/DL (ref 74–99)
HBA1C MFR BLD: 7.4 % (ref 4.2–5.6)
HCT VFR BLD AUTO: 33.3 % (ref 36–48)
HGB BLD-MCNC: 10.3 G/DL (ref 13–16)
LYMPHOCYTES # BLD AUTO: 22 % (ref 21–52)
LYMPHOCYTES # BLD: 2.2 K/UL (ref 0.9–3.6)
MAGNESIUM SERPL-MCNC: 2 MG/DL (ref 1.8–2.4)
MCH RBC QN AUTO: 27.1 PG (ref 24–34)
MCHC RBC AUTO-ENTMCNC: 30.9 G/DL (ref 31–37)
MCV RBC AUTO: 87.6 FL (ref 74–97)
MONOCYTES # BLD: 0.8 K/UL (ref 0.05–1.2)
MONOCYTES NFR BLD AUTO: 8 % (ref 3–10)
NEUTS SEG # BLD: 6.8 K/UL (ref 1.8–8)
NEUTS SEG NFR BLD AUTO: 69 % (ref 40–73)
PHOSPHATE SERPL-MCNC: 3.2 MG/DL (ref 2.5–4.9)
PLATELET # BLD AUTO: 359 K/UL (ref 135–420)
PMV BLD AUTO: 10.3 FL (ref 9.2–11.8)
POTASSIUM SERPL-SCNC: 3.5 MMOL/L (ref 3.5–5.5)
PROT SERPL-MCNC: 6.4 G/DL (ref 6.4–8.2)
PTH-INTACT SERPL-MCNC: 130.1 PG/ML (ref 14–72)
RBC # BLD AUTO: 3.8 M/UL (ref 4.7–5.5)
SODIUM SERPL-SCNC: 142 MMOL/L (ref 136–145)
T4 FREE SERPL-MCNC: 0.7 NG/DL (ref 0.7–1.5)
TSH SERPL DL<=0.05 MIU/L-ACNC: 0.55 UIU/ML (ref 0.36–3.74)
URATE SERPL-MCNC: 5.4 MG/DL (ref 2.6–7.2)
WBC # BLD AUTO: 9.9 K/UL (ref 4.6–13.2)

## 2017-02-09 PROCEDURE — 83970 ASSAY OF PARATHORMONE: CPT | Performed by: INTERNAL MEDICINE

## 2017-02-09 PROCEDURE — 84550 ASSAY OF BLOOD/URIC ACID: CPT | Performed by: INTERNAL MEDICINE

## 2017-02-09 PROCEDURE — 84443 ASSAY THYROID STIM HORMONE: CPT | Performed by: INTERNAL MEDICINE

## 2017-02-09 PROCEDURE — 86335 IMMUNFIX E-PHORSIS/URINE/CSF: CPT | Performed by: INTERNAL MEDICINE

## 2017-02-09 PROCEDURE — 94640 AIRWAY INHALATION TREATMENT: CPT

## 2017-02-09 PROCEDURE — 84100 ASSAY OF PHOSPHORUS: CPT | Performed by: INTERNAL MEDICINE

## 2017-02-09 PROCEDURE — 99218 HC RM OBSERVATION: CPT

## 2017-02-09 PROCEDURE — 65660000000 HC RM CCU STEPDOWN

## 2017-02-09 PROCEDURE — 74011636637 HC RX REV CODE- 636/637: Performed by: INTERNAL MEDICINE

## 2017-02-09 PROCEDURE — 77010033678 HC OXYGEN DAILY

## 2017-02-09 PROCEDURE — 82784 ASSAY IGA/IGD/IGG/IGM EACH: CPT | Performed by: INTERNAL MEDICINE

## 2017-02-09 PROCEDURE — 83735 ASSAY OF MAGNESIUM: CPT | Performed by: INTERNAL MEDICINE

## 2017-02-09 PROCEDURE — 97116 GAIT TRAINING THERAPY: CPT

## 2017-02-09 PROCEDURE — 83036 HEMOGLOBIN GLYCOSYLATED A1C: CPT | Performed by: INTERNAL MEDICINE

## 2017-02-09 PROCEDURE — 92526 ORAL FUNCTION THERAPY: CPT

## 2017-02-09 PROCEDURE — 85025 COMPLETE CBC W/AUTO DIFF WBC: CPT | Performed by: INTERNAL MEDICINE

## 2017-02-09 PROCEDURE — 84439 ASSAY OF FREE THYROXINE: CPT | Performed by: INTERNAL MEDICINE

## 2017-02-09 PROCEDURE — 74011000250 HC RX REV CODE- 250: Performed by: INTERNAL MEDICINE

## 2017-02-09 PROCEDURE — 97110 THERAPEUTIC EXERCISES: CPT

## 2017-02-09 PROCEDURE — 72125 CT NECK SPINE W/O DYE: CPT

## 2017-02-09 PROCEDURE — 74011250637 HC RX REV CODE- 250/637: Performed by: INTERNAL MEDICINE

## 2017-02-09 PROCEDURE — 74011250636 HC RX REV CODE- 250/636: Performed by: INTERNAL MEDICINE

## 2017-02-09 PROCEDURE — 80053 COMPREHEN METABOLIC PANEL: CPT | Performed by: INTERNAL MEDICINE

## 2017-02-09 PROCEDURE — 93306 TTE W/DOPPLER COMPLETE: CPT

## 2017-02-09 PROCEDURE — 36415 COLL VENOUS BLD VENIPUNCTURE: CPT | Performed by: INTERNAL MEDICINE

## 2017-02-09 PROCEDURE — 82962 GLUCOSE BLOOD TEST: CPT

## 2017-02-09 RX ORDER — IPRATROPIUM BROMIDE AND ALBUTEROL SULFATE 2.5; .5 MG/3ML; MG/3ML
3 SOLUTION RESPIRATORY (INHALATION)
Status: DISCONTINUED | OUTPATIENT
Start: 2017-02-10 | End: 2017-02-10 | Stop reason: HOSPADM

## 2017-02-09 RX ORDER — CLONIDINE HYDROCHLORIDE 0.1 MG/1
0.1 TABLET ORAL 3 TIMES DAILY
Status: DISCONTINUED | OUTPATIENT
Start: 2017-02-09 | End: 2017-02-10 | Stop reason: HOSPADM

## 2017-02-09 RX ORDER — FLUTICASONE PROPIONATE 50 MCG
2 SPRAY, SUSPENSION (ML) NASAL DAILY
Status: DISCONTINUED | OUTPATIENT
Start: 2017-02-09 | End: 2017-02-10 | Stop reason: HOSPADM

## 2017-02-09 RX ORDER — AMOXICILLIN AND CLAVULANATE POTASSIUM 500; 125 MG/1; MG/1
1 TABLET, FILM COATED ORAL EVERY 12 HOURS
Status: DISCONTINUED | OUTPATIENT
Start: 2017-02-09 | End: 2017-02-10 | Stop reason: HOSPADM

## 2017-02-09 RX ORDER — CALCITRIOL 0.25 UG/1
0.25 CAPSULE ORAL
Status: DISCONTINUED | OUTPATIENT
Start: 2017-02-10 | End: 2017-02-10 | Stop reason: HOSPADM

## 2017-02-09 RX ORDER — HYDRALAZINE HYDROCHLORIDE 50 MG/1
100 TABLET, FILM COATED ORAL 3 TIMES DAILY
Status: DISCONTINUED | OUTPATIENT
Start: 2017-02-09 | End: 2017-02-10 | Stop reason: HOSPADM

## 2017-02-09 RX ORDER — POTASSIUM CHLORIDE 20 MEQ/1
20 TABLET, EXTENDED RELEASE ORAL
Status: COMPLETED | OUTPATIENT
Start: 2017-02-09 | End: 2017-02-09

## 2017-02-09 RX ORDER — SODIUM CHLORIDE 9 MG/ML
10 INJECTION INTRAMUSCULAR; INTRAVENOUS; SUBCUTANEOUS
Status: COMPLETED | OUTPATIENT
Start: 2017-02-09 | End: 2017-02-09

## 2017-02-09 RX ORDER — AMLODIPINE BESYLATE 5 MG/1
5 TABLET ORAL DAILY
Status: DISCONTINUED | OUTPATIENT
Start: 2017-02-10 | End: 2017-02-10 | Stop reason: HOSPADM

## 2017-02-09 RX ADMIN — INSULIN HUMAN 40 UNITS: 100 INJECTION, SUSPENSION SUBCUTANEOUS at 17:01

## 2017-02-09 RX ADMIN — BENZONATATE 100 MG: 100 CAPSULE ORAL at 11:57

## 2017-02-09 RX ADMIN — DOCUSATE SODIUM 100 MG: 100 CAPSULE, LIQUID FILLED ORAL at 10:39

## 2017-02-09 RX ADMIN — HYDRALAZINE HYDROCHLORIDE 100 MG: 50 TABLET, FILM COATED ORAL at 23:13

## 2017-02-09 RX ADMIN — Medication 10 ML: at 23:14

## 2017-02-09 RX ADMIN — CARVEDILOL 25 MG: 25 TABLET, FILM COATED ORAL at 16:18

## 2017-02-09 RX ADMIN — IPRATROPIUM BROMIDE AND ALBUTEROL SULFATE 3 ML: .5; 3 SOLUTION RESPIRATORY (INHALATION) at 17:01

## 2017-02-09 RX ADMIN — AMLODIPINE BESYLATE 10 MG: 10 TABLET ORAL at 10:39

## 2017-02-09 RX ADMIN — CLONIDINE HYDROCHLORIDE 0.1 MG: 0.1 TABLET ORAL at 15:00

## 2017-02-09 RX ADMIN — TAMSULOSIN HYDROCHLORIDE 0.4 MG: 0.4 CAPSULE ORAL at 10:39

## 2017-02-09 RX ADMIN — CLONIDINE HYDROCHLORIDE 0.1 MG: 0.1 TABLET ORAL at 23:13

## 2017-02-09 RX ADMIN — PANTOPRAZOLE SODIUM 40 MG: 40 TABLET, DELAYED RELEASE ORAL at 07:43

## 2017-02-09 RX ADMIN — IPRATROPIUM BROMIDE AND ALBUTEROL SULFATE 3 ML: .5; 3 SOLUTION RESPIRATORY (INHALATION) at 07:21

## 2017-02-09 RX ADMIN — OXYCODONE HYDROCHLORIDE AND ACETAMINOPHEN 2 TABLET: 5; 325 TABLET ORAL at 05:21

## 2017-02-09 RX ADMIN — ASPIRIN 81 MG 81 MG: 81 TABLET ORAL at 10:39

## 2017-02-09 RX ADMIN — SODIUM CHLORIDE 10 ML: 9 INJECTION INTRAMUSCULAR; INTRAVENOUS; SUBCUTANEOUS at 09:40

## 2017-02-09 RX ADMIN — HYDRALAZINE HYDROCHLORIDE 10 MG: 20 INJECTION INTRAMUSCULAR; INTRAVENOUS at 12:01

## 2017-02-09 RX ADMIN — AMOXICILLIN AND CLAVULANATE POTASSIUM 1 TABLET: 500; 125 TABLET, FILM COATED ORAL at 20:08

## 2017-02-09 RX ADMIN — FLUTICASONE PROPIONATE 2 SPRAY: 50 SPRAY, METERED NASAL at 20:35

## 2017-02-09 RX ADMIN — POTASSIUM CHLORIDE 20 MEQ: 1500 TABLET, EXTENDED RELEASE ORAL at 16:18

## 2017-02-09 RX ADMIN — CARVEDILOL 25 MG: 25 TABLET, FILM COATED ORAL at 10:39

## 2017-02-09 RX ADMIN — Medication 10 ML: at 05:34

## 2017-02-09 RX ADMIN — Medication 10 ML: at 16:18

## 2017-02-09 RX ADMIN — IPRATROPIUM BROMIDE AND ALBUTEROL SULFATE 3 ML: .5; 3 SOLUTION RESPIRATORY (INHALATION) at 12:25

## 2017-02-09 RX ADMIN — INSULIN HUMAN 60 UNITS: 100 INJECTION, SUSPENSION SUBCUTANEOUS at 10:40

## 2017-02-09 RX ADMIN — ATORVASTATIN CALCIUM 80 MG: 40 TABLET, FILM COATED ORAL at 10:39

## 2017-02-09 RX ADMIN — HYDRALAZINE HYDROCHLORIDE 100 MG: 50 TABLET, FILM COATED ORAL at 16:00

## 2017-02-09 RX ADMIN — ENOXAPARIN SODIUM 40 MG: 40 INJECTION SUBCUTANEOUS at 20:08

## 2017-02-09 RX ADMIN — HYDRALAZINE HYDROCHLORIDE 75 MG: 50 TABLET, FILM COATED ORAL at 10:39

## 2017-02-09 RX ADMIN — OXYCODONE HYDROCHLORIDE AND ACETAMINOPHEN 2 TABLET: 5; 325 TABLET ORAL at 10:39

## 2017-02-09 RX ADMIN — IPRATROPIUM BROMIDE AND ALBUTEROL SULFATE 3 ML: .5; 3 SOLUTION RESPIRATORY (INHALATION) at 04:52

## 2017-02-09 RX ADMIN — BENZONATATE 100 MG: 100 CAPSULE ORAL at 05:21

## 2017-02-09 RX ADMIN — FEBUXOSTAT 40 MG: 40 TABLET ORAL at 10:39

## 2017-02-09 RX ADMIN — DOCUSATE SODIUM 100 MG: 100 CAPSULE, LIQUID FILLED ORAL at 18:10

## 2017-02-09 RX ADMIN — OXYCODONE HYDROCHLORIDE AND ACETAMINOPHEN 2 TABLET: 5; 325 TABLET ORAL at 20:15

## 2017-02-09 RX ADMIN — IPRATROPIUM BROMIDE AND ALBUTEROL SULFATE 3 ML: .5; 3 SOLUTION RESPIRATORY (INHALATION) at 20:49

## 2017-02-09 NOTE — ROUTINE PROCESS
Patient: Summer Brandon Age: 64 y.o. Sex: male     Bedside and Verbal shift change report given to [facility], RN (oncoming nurse) by Brody Mckeon RN (offgoing nurse). Report included the following information SBAR, Kardex, MAR and Recent Results. PATIENT GOALS FOR TODAY PATIENT PRIORITIES FOR TODAY   Goals are: stroke protocol  Updated on Whiteboard in Patients Room: no   Patient states his/her priorities are: to get MRI  Updated on Whiteboard in Patients Room: no     SITUATION:   Code Status: Full Code Reason for Admission: Left sided numbness  Left sided numbness   Hospital day: 1 Problem List: Active Problems:    Left sided numbness (2/7/2017)       Attending Provider:   Chelle Bell MD Allergies: Allergies   Allergen Reactions    Shellfish Derived Other (comments)     Causes Gout      BACKGROUND:   Past Medical History:   Past Medical History   Diagnosis Date    Alcohol abuse     Arthritis     Atherosclerotic cardiovascular disease     CAD (coronary artery disease)      mild disease of coronaries (cor angio 2006),wife states he has 1 stent    Cardiac cath 01/26/2006     RCA mild. Otherwise patent coronaries. LVEDP 15.  EF 55-60%.  Cardiac echocardiogram 03/27/2009     EF 60%. No cardiac source of embolism. No significant valvular pathology.  Cardiac nuclear imaging test 12/06/2011     Small, mild inferior infarction or possibly artifact. No ischemia. EF 45%. No TID. No reg WMA.  Cardiovascular LLE venous duplex 08/14/2015     Left leg:  No DVT. Dilated lymph node in left groin.  Constipation     Diabetes mellitus type II     Gout     Hepatic steatosis     Hypercholesterolemia     Hypertension     Knee effusion, left     Left knee pain     Morbid obesity (HCC)     Noncompliance     Obesity (BMI 30-39. 9)     S/P colonoscopy 3/8/05     Dr. Aubrey Gillette; normal; f/u 10 years    Stomach problems     TIA (transient ischemic attack) 3/09    Tobacco abuse ASSESSMENT:   Neuro:  Neurologic State: Alert, Orientation Level: Oriented X4 CV:  Patient on Telemetry: Cardiac/Telemetry Monitor On: Yes    Box Number: [id number]   Cardiac Rhythm: Sinus arrhythmia  Patient has a pace maker:   Endocrine   Recent Glucose Results:   Lab Results   Component Value Date/Time     (H) 02/08/2017 05:18 AM    GLUCPOC 99 02/08/2017 04:01 PM    GLUCPOC 116 (H) 02/08/2017 10:58 AM    GLUCPOC 125 (H) 02/08/2017 07:29 AM        Respiratory:  O2 Device: Nasal cannula       Supplemental O2 O2 Flow Rate (L/min): 2 l/min       Incentive Spirometery          GI  Current diet: DIET CARDIAC Soft Solids; Consistent Carb 1800kcal                            Bowel Sounds: Active            Reasons if patient has a souza: none   Patient Safety  Restraints:    Family notified:    Other Alternatives:     Fall Risk   Total Score: 2   Safety Measures: Bed/Chair-Wheels locked, Bed in low position, Call light within reach, Emergency bedside equipment, Fall prevention (comment), Family at bedside, Side rails X 3 VTE Prophylaxis                WOUND (if present)  Wound Type:  none  Dressing present Dressing Present : No  Wound Concerns/Notes: none IV ACCESS     Reasons if patient has a central line: none     PAIN  Pain Assessment  Pain Intensity 1: 6 (02/08/17 1920)  Pain Location 1: Foot  Pain Intervention(s) 1: Medication (see MAR)  Patient Stated Pain Goal: 0  Time of last intervention: please see MAR   Reassessment Completed: no Last Vitals:  Vitals:    02/08/17 0640 02/08/17 0800 02/08/17 1200 02/08/17 1600   BP:  162/88 (!) 199/110 157/84   Pulse:  90 92 92   Resp:  25 25 24   Temp:  97.9 °F (36.6 °C) 98 °F (36.7 °C) 98.3 °F (36.8 °C)   SpO2:  96% 95% 91%   Weight: 108 kg (238 lb 1.6 oz)      Height: 5' 4\" (1.626 m)         Last 3 Weights:  Last 3 Recorded Weights in this Encounter    02/07/17 0949 02/08/17 0640   Weight: 108.9 kg (240 lb 1 oz) 108 kg (238 lb 1.6 oz)     Weight change:   LAB RESULTS  Recent Labs      02/07/17   1009   WBC  10.3   HGB  12.0*   HCT  37.7   PLT  380     Recent Labs      02/08/17   0518  02/07/17   1009   NA  143  143   K  3.3*  3.2*   GLU  117*  283*   BUN  17 17   CREA  1.94*  2.16*   CA  8.4*  8.9   INR   --   0.9      RECOMMENDATIONS AND DISCHARGE PLANNING   1. Discharge plan for patient // Needs or barriers to disharge: stroke protocol . 2. Estimated Discharge Date: unknown Posted on Whiteboard in Patients Room: no    \"HEALS\" SAFETY CHECK   A safety check occurred in the patient's room between off going nurse and oncoming nurse listed above. The safety check included the below items  Area Items   H  High Alert Meds  - Verify all high alert medication drips (heparin, PCA, etc.)   E  Equipment - Suction is set up for ALL patients (with stefano)  - Red plugs utilized for all equipment (IV pumps, etc.)  - WOWs wiped down at end of shift.  - Room stocked with oxygen, suction, and other unit-specific supplies   A  Alarms - Bed alarm is set for fall risk patients  - Ensure chair alarm is in place and activated if patient is up in a chair   L  Lines - Check IV for any infiltration  - Montaño bag is empty if patient has a Montaño   - Tubing and IV bags are labeled   S  Safety   - Room is clean, patient is clean, and equipment is clean. - Ensure room is clear and free of clutter  - Hallways are clear from equipment besides carts.    - Fall bracelet on for fall risk patients  - Suction is set up for ALL patients (with stefano)  - Isolation precautions followed, supplies available outside room, sign posted   Maggie Mccauley RN

## 2017-02-09 NOTE — PROGRESS NOTES
SUBJECTIVE:    Feeling dizzy - blood sugar is 46. No chest and abdominal pan. No SOB or cough. No N/V/D. OBJECTIVE:    Visit Vitals    /83 (BP 1 Location: Right arm, BP Patient Position: Supine)    Pulse 98    Temp 98.1 °F (36.7 °C)    Resp 20    Ht 5' 4\" (1.626 m)    Wt 107 kg (236 lb)    SpO2 94%    BMI 40.51 kg/m2     HEENT:  No pallor or icterus. TENDERNESS OVER SINUS present  Neck: no JVD  RS: no wheezes  CVS: RRR  GI: NT, BS +  Extremities: non pitting pedal edema. CNS: Moves all extremities well. General: NAD, awake    ASSESSMENT:    1. Left sided paresthesia  2. Left ankle pain  3. Dry cough  4. Stage 3 CKD due to DM and HTN  5. Labile DM  6. HTN  7. Dyslipidemia  8. H/o Medical non-compliance  9. Hypokalemia  10.  Partial opacification of the right mastoid air cells which can be associated  with otomastoiditis     PLAN:    Add Flonase and Augmentin  Reduce home dose of insulin  Talked to dr. Rere Lozano - reduce Norvasc, increase hydralazine and add clonidine  Talked to neurology - nothing to add on   Discharge tomorrow if remains stable overnight    --> total time greater than 35 minutes    CMP:   Lab Results   Component Value Date/Time     02/09/2017 04:00 AM    K 3.5 02/09/2017 04:00 AM     02/09/2017 04:00 AM    CO2 28 02/09/2017 04:00 AM    AGAP 10 02/09/2017 04:00 AM    GLU 99 02/09/2017 04:00 AM    BUN 16 02/09/2017 04:00 AM    CREA 2.01 (H) 02/09/2017 04:00 AM    GFRAA 42 (L) 02/09/2017 04:00 AM    GFRNA 35 (L) 02/09/2017 04:00 AM    CA 8.3 (L) 02/09/2017 04:00 AM    CA 8.4 (L) 02/09/2017 04:00 AM    MG 2.0 02/09/2017 04:00 AM    PHOS 3.2 02/09/2017 04:00 AM    ALB 2.1 (L) 02/09/2017 04:00 AM    TP 6.4 02/09/2017 04:00 AM    GLOB 4.3 (H) 02/09/2017 04:00 AM    AGRAT 0.5 (L) 02/09/2017 04:00 AM    SGOT 15 02/09/2017 04:00 AM    ALT 18 02/09/2017 04:00 AM     CBC:   Lab Results   Component Value Date/Time    WBC 9.9 02/09/2017 04:00 AM    HGB 10.3 (L) 02/09/2017 04:00 AM    HCT 33.3 (L) 02/09/2017 04:00 AM     02/09/2017 04:00 AM

## 2017-02-09 NOTE — PROGRESS NOTES
Progress Note    Lacey Dow  64 y.o. Admit Date: 2/7/2017  Active Problems:    Left sided numbness (2/7/2017) POA: Unknown      Chronic kidney disease, stage III (moderate) (2/9/2017) POA: Unknown      Proteinuria (2/9/2017) POA: Unknown      Secondary hyperparathyroidism of renal origin ([address] (2/9/2017) POA: Unknown      Hypoglycemia (2/9/2017) POA: No            Subjective:     Patient complains of warm, dizzy, sweaty, drinking ice, checked Blood sugar & expected is low & is 48. A comprehensive review of systems was negative except for that written in the History of Present Illness.     Objective:     Visit Vitals    /83 (BP 1 Location: Right arm, BP Patient Position: Supine)    Pulse 98    Temp 98.1 °F (36.7 °C)    Resp 20    Ht 5' 4\" (1.626 m)    Wt 107 kg (236 lb)    SpO2 94%    BMI 40.51 kg/m2         Intake/Output Summary (Last 24 hours) at 02/09/17 1509  Last data filed at 02/09/17 1300   Gross per 24 hour   Intake              820 ml   Output              350 ml   Net              470 ml       Current Facility-Administered Medications   Medication Dose Route Frequency Provider Last Rate Last Dose    hydrALAZINE (APRESOLINE) tablet 100 mg  100 mg Oral TID Wili Sparrow MD        cloNIDine HCl (CATAPRES) tablet 0.1 mg  0.1 mg Oral TID Wili Sparrow MD        potassium chloride (K-DUR, KLOR-CON) SR tablet 20 mEq  20 mEq Oral NOW Gustavo Napier MD        fluticasone (FLONASE) 50 mcg/actuation nasal spray 2 Spray  2 Spray Both Nostrils DAILY Gustavo Napier MD        amoxicillin-clavulanate (AUGMENTIN) 500-125 mg per tablet 1 Tab  1 Tab Oral Q12H Gustavo Napier MD       [facility] ON 2/10/2017] B complex-vitaminC-folic acid (NEPHROCAP) cap  1 Cap Oral DAILY Farzaneh Oviedo MD        [START ON 2/10/2017] calcitRIOL (ROCALTROL) capsule 0.25 mcg  0.25 mcg Oral Q MON, WED & FRI Farzaneh Oviedo MD        albuterol-ipratropium (DUO-NEB) 2.5 MG-0.5 MG/3 ML  3 mL Nebulization Q4H RT Jorge Good MD   3 mL at 02/09/17 1225    carvedilol (COREG) tablet 25 mg  25 mg Oral BID WITH MEALS Jorge Good MD   25 mg at 02/09/17 1039    oxyCODONE-acetaminophen (PERCOCET) 5-325 mg per tablet 1-2 Tab  1-2 Tab Oral Q4H PRN Lexi Steven MD   2 Tab at 02/09/17 1039    acetaminophen (TYLENOL) tablet 650 mg  650 mg Oral Q6H PRN Lexi Steven MD        atorvastatin (LIPITOR) tablet 80 mg  80 mg Oral DAILY Lexi Steven MD   80 mg at 02/09/17 1039    docusate sodium (COLACE) capsule 100 mg  100 mg Oral BID Lexi Steven MD   100 mg at 02/09/17 1039    febuxostat (ULORIC) tablet 40 mg  40 mg Oral DAILY Lexi Steven MD   40 mg at 02/09/17 1039    insulin NPH/insulin regular (NOVOLIN 70/30, HUMULIN 70/30) injection 60 Units  60 Units SubCUTAneous DAILY Lexi Steven MD   60 Units at 02/09/17 1040    pantoprazole (PROTONIX) tablet 40 mg  40 mg Oral ACB Lexi Steven MD   40 mg at 02/09/17 0743    tamsulosin (FLOMAX) capsule 0.4 mg  0.4 mg Oral DAILY Lexi Steven MD   0.4 mg at 02/09/17 1039    sodium chloride (NS) flush 5-10 mL  5-10 mL IntraVENous Q8H Lexi Steven MD   10 mL at 02/09/17 0534    sodium chloride (NS) flush 5-10 mL  5-10 mL IntraVENous PRN Lexi Steven MD        aspirin chewable tablet 81 mg  81 mg Oral DAILY Lexi Steven MD   81 mg at 02/09/17 1039    enoxaparin (LOVENOX) injection 40 mg  40 mg SubCUTAneous Q24H Lexi Steven MD   40 mg at 02/08/17 2248    insulin lispro (HUMALOG) injection   SubCUTAneous AC&HS Jorge Good MD   Stopped at 02/08/17 1130    glucose chewable tablet 16 g  4 Tab Oral PRN Lexi Steven MD        glucagon (GLUCAGEN) injection 1 mg  1 mg IntraMUSCular PRN Lexi Steven MD        dextrose (D50W) injection syrg 12.5-25 g  25-50 mL IntraVENous PRN Lexi Steven MD        amLODIPine (NORVASC) tablet 10 mg  10 mg Oral DAILY Lexi Steven MD   10 mg at 02/09/17 1039    insulin NPH (NOVOLIN N, HUMULIN N) injection 40 Units  40 Units SubCUTAneous QPM Cami Freeman MD   40 Units at 02/08/17 1823    hydrALAZINE (APRESOLINE) 20 mg/mL injection 10 mg  10 mg IntraVENous Q6H PRN Cami Freeman MD   10 mg at 02/09/17 1201    benzonatate (TESSALON) capsule 100 mg  100 mg Oral TID PRN Cami Freeman MD   100 mg at 02/09/17 1157        Physical Exam:     Physical Exam:   General:  Alert, cooperative, no distress, appears stated age. Neck: Supple, symmetrical, trachea midline, no adenopathy, thyroid: no enlargement/tenderness/nodules, no carotid bruit and no JVD. Lungs:   Clear to auscultation bilaterally. Heart:  Regular rate and rhythm, S1, S2 normal, no murmur, click, rub or gallop. Abdomen:   Soft, non-tender. Bowel sounds normal. No masses,  No organomegaly. Extremities: Extremities normal, atraumatic, no cyanosis or edema   Skin: Skin color, texture, turgor normal. No rashes or lesions         Data Review:    CBC w/Diff    Recent Labs      02/09/17   0400  02/07/17   1009   WBC  9.9  10.3   RBC  3.80*  4.35*   HGB  10.3*  12.0*   HCT  33.3*  37.7   MCV  87.6  86.7   MCH  27.1  27.6   MCHC  30.9*  31.8   RDW  15.4*  14.5    Recent Labs      02/09/17   0400  02/07/17   1009   MONOS  8  6   EOS  1  4   BASOS  0  0   RDW  15.4*  14.5        Comprehensive Metabolic Profile    Recent Labs      02/09/17   0400  02/08/17   0518  02/07/17   1009   NA  142  143  143   K  3.5  3.3*  3.2*   CL  104  105  103   CO2  28  30  31   BUN  16  17  17   CREA  2.01*  1.94*  2.16*    Recent Labs      02/09/17   0400  02/08/17   0518  02/07/17   1009   CA  8.4*  8.3*  8.4*  8.9   PHOS  3.2   --    --    ALB  2.1*   --    --    TP  6.4   --    --    SGOT  15   --    --    TBILI  0.2   --    --       MRI:   IMPRESSION:  1. No acute intracranial process.      2. Diffuse mucosal sinus thickening     3. Partial opacification of the right mastoid air cells which can be associated  with otomastoiditis.                 Impression:       Active Hospital Problems    Diagnosis Date Noted    Chronic kidney disease, stage III (moderate) 02/09/2017    Proteinuria 02/09/2017    Secondary hyperparathyroidism of renal origin ([facility] [address] 02/09/2017    Hypoglycemia 02/09/2017    Left sided numbness 02/07/2017            Plan:     Recommend to decrease Insulin dose further. Given massive Proteinuria avoid Norvasc, like to start Losartan/ARB when his compliance improves, start on Rocaltrol.     Cuca Lockhart MD

## 2017-02-09 NOTE — ROUTINE PROCESS
Accu chek blood sugar 46. ASymptomatic. 2 containers of grape juice given by JEN Stanley. Will recheck in approx 15- 20 mins.

## 2017-02-09 NOTE — PROGRESS NOTES
Problem: Mobility Impaired (Adult and Pediatric)  Goal: *Acute Goals and Plan of Care (Insert Text)  Physical Therapy Goals  Initiated 2/8/2017 and to be accomplished within 7 day(s)  1. Patient will move from supine to sit and sit to supine , scoot up and down and roll side to side in bed with modified independence. 2. Patient will transfer from bed to chair and chair to bed with modified independence using the least restrictive device. 3. Patient will perform sit to stand with modified independence. 4. Patient will ambulate with modified independence for 150 feet with the least restrictive device. 5. Patient will ascend/descend 10 stairs with 1 handrail(s) with modified independence. Time: 200                 Pt is currently eating his lunch and respectfully requests that PT be momentarily postponed. Request obliged and will follow up later in the afternoon.      Anatoly Cesar PTA  2/9/2017

## 2017-02-09 NOTE — ROUTINE PROCESS
Patient: Alyson Martinez Age: 64 y.o. Sex: male     Bedside and Verbal shift change report given to Bautista Michelle RN (oncoming nurse) by Carlitos Rushing RN (offgoing nurse). Report included the following information SBAR, Kardex, MAR and Recent Results. PATIENT GOALS FOR TODAY PATIENT PRIORITIES FOR TODAY   Goals are: stroke protocol, 24 hour urine  Updated on Whiteboard in Patients Room: no   Patient states his/her priorities are: pain management  Updated on Whiteboard in Patients Room: no     SITUATION:   Code Status: Full Code Reason for Admission: Left sided numbness  Left sided numbness   Hospital day: 1 Problem List: Active Problems:    Left sided numbness (2/7/2017)       Attending Provider:   Maynor Samano MD Allergies: Allergies   Allergen Reactions    Shellfish Derived Other (comments)     Causes Gout      BACKGROUND:   Past Medical History:   Past Medical History   Diagnosis Date    Alcohol abuse     Arthritis     Atherosclerotic cardiovascular disease     CAD (coronary artery disease)      mild disease of coronaries (cor angio 2006),wife states he has 1 stent    Cardiac cath 01/26/2006     RCA mild. Otherwise patent coronaries. LVEDP 15.  EF 55-60%.  Cardiac echocardiogram 03/27/2009     EF 60%. No cardiac source of embolism. No significant valvular pathology.  Cardiac nuclear imaging test 12/06/2011     Small, mild inferior infarction or possibly artifact. No ischemia. EF 45%. No TID. No reg WMA.  Cardiovascular LLE venous duplex 08/14/2015     Left leg:  No DVT. Dilated lymph node in left groin.  Constipation     Diabetes mellitus type II     Gout     Hepatic steatosis     Hypercholesterolemia     Hypertension     Knee effusion, left     Left knee pain     Morbid obesity (HCC)     Noncompliance     Obesity (BMI 30-39. 9)     S/P colonoscopy 3/8/05     Dr. Lopez Jeronimo; normal; f/u 10 years    Stomach problems     TIA (transient ischemic attack) 3/09    Tobacco abuse      ASSESSMENT:   Neuro:  Neurologic State: Alert, Orientation Level: Oriented X4 CV:  Patient on Telemetry: Cardiac/Telemetry Monitor On: Yes    Box Number: [id number]   Cardiac Rhythm: Normal sinus rhythm  Patient has a pace maker:   Endocrine   Recent Glucose Results:   Lab Results   Component Value Date/Time    GLU 99 02/09/2017 04:00 AM    GLUCPOC 93 02/09/2017 06:50 AM    GLUCPOC 93 02/08/2017 10:19 PM    GLUCPOC 99 02/08/2017 04:01 PM        Respiratory:  O2 Device: Nasal cannula       Supplemental O2 O2 Flow Rate (L/min): 2 l/min       Incentive Spirometery          GI  Current diet: DIET CARDIAC Soft Solids; Consistent Carb 1800kcal                            Bowel Sounds: Active            Reasons if patient has a souza: none   Patient Safety  Restraints:    Family notified:    Other Alternatives:     Fall Risk   Total Score: 2   Safety Measures: Bed/Chair-Wheels locked, Bed in low position, Call light within reach, Emergency bedside equipment, Fall prevention (comment), Side rails X 3 VTE Prophylaxis                WOUND (if present)  Wound Type:  none  Dressing present Dressing Present : No  Wound Concerns/Notes: none IV ACCESS     Reasons if patient has a central line: none     PAIN  Pain Assessment  Pain Intensity 1: 0 (02/09/17 0637)  Pain Location 1: Head  Pain Intervention(s) 1: Medication (see MAR)  Patient Stated Pain Goal: 0  Time of last intervention: please see MAR   Reassessment Completed: no Last Vitals:  Vitals:    02/09/17 0400 02/09/17 0457 02/09/17 0637 02/09/17 0721   BP: 172/80      Pulse: 82      Resp: 20      Temp: 98.1 °F (36.7 °C)      SpO2: 98% 95%  99%   Weight:   107 kg (236 lb)    Height:   5' 4\" (1.626 m)       Last 3 Weights:  Last 3 Recorded Weights in this Encounter    02/07/17 0949 02/08/17 0640 02/09/17 0637   Weight: 108.9 kg (240 lb 1 oz) 108 kg (238 lb 1.6 oz) 107 kg (236 lb)     Weight change: -1.843 kg (-4 lb 1 oz)  LAB RESULTS  Recent Labs      02/09/17 0400  02/07/17   1009   WBC  9.9  10.3   HGB  10.3*  12.0*   HCT  33.3*  37.7   PLT  359  380     Recent Labs      02/09/17   0400  02/08/17   0518  02/07/17   1009   NA  142  143  143   K  3.5  3.3*  3.2*   GLU  99  117*  283*   BUN  16  17  17   CREA  2.01*  1.94*  2.16*   CA  8.4*  8.3*  8.4*  8.9   MG  2.0   --    --    INR   --    --   0.9      RECOMMENDATIONS AND DISCHARGE PLANNING   1. Discharge plan for patient // Needs or barriers to disharge: stroke protocol . 2. Estimated Discharge Date: unknown Posted on Whiteboard in Patients Room: no    \"HEALS\" SAFETY CHECK   A safety check occurred in the patient's room between off going nurse and oncoming nurse listed above. The safety check included the below items  Area Items   H  High Alert Meds  - Verify all high alert medication drips (heparin, PCA, etc.)   E  Equipment - Suction is set up for ALL patients (with stefano)  - Red plugs utilized for all equipment (IV pumps, etc.)  - WOWs wiped down at end of shift.  - Room stocked with oxygen, suction, and other unit-specific supplies   A  Alarms - Bed alarm is set for fall risk patients  - Ensure chair alarm is in place and activated if patient is up in a chair   L  Lines - Check IV for any infiltration  - Montaño bag is empty if patient has a Montaño   - Tubing and IV bags are labeled   S  Safety   - Room is clean, patient is clean, and equipment is clean. - Ensure room is clear and free of clutter  - Hallways are clear from equipment besides carts.    - Fall bracelet on for fall risk patients  - Suction is set up for ALL patients (with stefano)  - Isolation precautions followed, supplies available outside room, sign posted   Bobby Goodwin RN

## 2017-02-09 NOTE — PROGRESS NOTES
Patient is 64years old with complaints of numbness and paresthesia of left face, arm and leg over the past 1-2 weeks. CT scan of brain, MRI of brain and MRA of head and neck are normal.    Patient is alert and oriented with normal speech and mentation. Visual fields and eye movements are full. Facial movements and sensation are normal. Palatal and pharyngeal movements and reflexes are intact. Tongue is midline. There is no pronator drift. Strength is 5/5 throughout and sensation is intact to all modalities. Gait and coordination are unremarkable. DTRs symmetric. Despite the subjective sensory complaints the neurological exam is unremarkable and neuroimaging, as well.

## 2017-02-09 NOTE — PROGRESS NOTES
Problem: Dysphagia (Adult)  Goal: *Acute Goals and Plan of Care (Insert Text)  Recommend:   Soft solids with thin liquids  Meds as tolerated  Aspiration precautions  Oral care post meals   Outcome: Resolved/Met Date Met:  02/09/17  SPEECH LANGUAGE PATHOLOGY DYSPHAGIA TREATMENT/DISCHARGE  Patient: Surekha Melissa (77 y.o. male)  Date: 2/9/2017  Diagnosis: Left sided numbness  Left sided numbness <principal problem not specified>       Precautions: aspiration        ASSESSMENT:  Pt was seen at bedside for f/u dysphagia management. Pt educated on results of MBS including: \"mild oropharyngeal dysphagia. Pt tolerated reg solid, puree, nectar-thick, thin liquid, and 13 mm Ba pill trials with thin liquid wash without aspiration/penetration events. Mildly decreased tongue base retraction resulted in mild premature spillage was noted across all presentations. Premature spillage did not put pt at risk for airway compromise at this time. Mildly increased mastication and delayed a-p transit appreciated with reg solid consistency. Pt with dry cough throughout all PO trials; however, no stasis noted in airway. \" Pt further educated regarding safe swallowing strategies, aspiration precautions, and oropharyngeal anatomy/physiology with verbalized understanding. Pt refused PO trials at this time stating, \"I am full and I'm just really really tired. \" Rec soft solid diet with thin liquids, aspiration precautions, oral care TID, and meds as tolerated. No further skilled SLP services are indicated at this time. Please re-consult if needed. PLAN:  Recommendations and Planned Interventions: See above  Discharge Recommendations:  Home Health, Outpatient and To Be Determined       SUBJECTIVE:   Patient stated I'm glad we figured that out.       OBJECTIVE:   Cognitive and Communication Status:  Neurologic State: Alert  Orientation Level: Oriented X4  Cognition: Appropriate decision making, Appropriate for age attention/concentration, Appropriate safety awareness, Follows commands           Dysphagia Treatment: see above     PAIN:  Pt reports 0/10 pain or discomfort prior to tx. Pt reports 0/10 pain or discomfort post tx. After treatment:   [ ]              Patient left in no apparent distress sitting up in chair  [X]              Patient left in no apparent distress in bed  [X]              Call bell left within reach  [X]              Nursing notified  [ ]              Caregiver present  [ ]              Bed alarm activated         COMMUNICATION/EDUCATION:   [X]              SLP educated pt with regard to compensatory swallow strategies and                   aspiration/reflux precautions including: small bites/sips,                   alternate liquids/solids, decrease feeding rate, HOB > 45 with all po, and                   upright in bed at 30 degrees after po for at least 45 minutes. Also, see above.      Thank you for this referral.     Charlie Saldaña M.S. CCC-SLP/L  Speech-Language Pathologist

## 2017-02-09 NOTE — ROUTINE PROCESS
Accu chek Blood sugar rechecked. Insulin given instructed to eat food from his dinner tray. Stated he would.   Blood sugar 109

## 2017-02-10 VITALS
SYSTOLIC BLOOD PRESSURE: 128 MMHG | HEIGHT: 64 IN | RESPIRATION RATE: 18 BRPM | OXYGEN SATURATION: 100 % | HEART RATE: 71 BPM | WEIGHT: 238.6 LBS | TEMPERATURE: 98.2 F | DIASTOLIC BLOOD PRESSURE: 82 MMHG | BODY MASS INDEX: 40.74 KG/M2

## 2017-02-10 LAB
ANION GAP BLD CALC-SCNC: 7 MMOL/L (ref 3–18)
BUN SERPL-MCNC: 14 MG/DL (ref 7–18)
BUN/CREAT SERPL: 7 (ref 12–20)
CALCIUM SERPL-MCNC: 8.6 MG/DL (ref 8.5–10.1)
CHLORIDE SERPL-SCNC: 104 MMOL/L (ref 100–108)
CO2 SERPL-SCNC: 31 MMOL/L (ref 21–32)
CREAT SERPL-MCNC: 1.95 MG/DL (ref 0.6–1.3)
GLUCOSE BLD STRIP.AUTO-MCNC: 142 MG/DL (ref 70–110)
GLUCOSE BLD STRIP.AUTO-MCNC: 70 MG/DL (ref 70–110)
GLUCOSE SERPL-MCNC: 58 MG/DL (ref 74–99)
PHOSPHATE SERPL-MCNC: 3.6 MG/DL (ref 2.5–4.9)
POTASSIUM SERPL-SCNC: 3.1 MMOL/L (ref 3.5–5.5)
SODIUM SERPL-SCNC: 142 MMOL/L (ref 136–145)

## 2017-02-10 PROCEDURE — 80048 BASIC METABOLIC PNL TOTAL CA: CPT | Performed by: INTERNAL MEDICINE

## 2017-02-10 PROCEDURE — 36415 COLL VENOUS BLD VENIPUNCTURE: CPT | Performed by: INTERNAL MEDICINE

## 2017-02-10 PROCEDURE — 94640 AIRWAY INHALATION TREATMENT: CPT

## 2017-02-10 PROCEDURE — 74011250637 HC RX REV CODE- 250/637: Performed by: INTERNAL MEDICINE

## 2017-02-10 PROCEDURE — 74011636637 HC RX REV CODE- 636/637: Performed by: INTERNAL MEDICINE

## 2017-02-10 PROCEDURE — 74011000250 HC RX REV CODE- 250: Performed by: INTERNAL MEDICINE

## 2017-02-10 PROCEDURE — 84100 ASSAY OF PHOSPHORUS: CPT | Performed by: INTERNAL MEDICINE

## 2017-02-10 PROCEDURE — 82962 GLUCOSE BLOOD TEST: CPT

## 2017-02-10 RX ORDER — CALCITRIOL 0.25 UG/1
0.25 CAPSULE ORAL
Qty: 15 CAP | Refills: 0 | Status: SHIPPED | OUTPATIENT
Start: 2017-02-10

## 2017-02-10 RX ORDER — FLUTICASONE PROPIONATE 50 MCG
2 SPRAY, SUSPENSION (ML) NASAL DAILY
Qty: 1 BOTTLE | Refills: 0 | Status: SHIPPED | OUTPATIENT
Start: 2017-02-10 | End: 2017-02-17

## 2017-02-10 RX ORDER — AMLODIPINE BESYLATE 5 MG/1
5 TABLET ORAL DAILY
Qty: 30 TAB | Refills: 0 | Status: SHIPPED | OUTPATIENT
Start: 2017-02-10 | End: 2017-02-22 | Stop reason: SINTOL

## 2017-02-10 RX ORDER — ALBUTEROL SULFATE 90 UG/1
2 AEROSOL, METERED RESPIRATORY (INHALATION)
Qty: 1 INHALER | Refills: 0 | Status: SHIPPED | OUTPATIENT
Start: 2017-02-10 | End: 2017-03-21 | Stop reason: SDUPTHER

## 2017-02-10 RX ORDER — GUAIFENESIN 100 MG/5ML
81 LIQUID (ML) ORAL DAILY
Qty: 30 TAB | Refills: 0 | Status: SHIPPED | OUTPATIENT
Start: 2017-02-10 | End: 2017-03-21 | Stop reason: SDUPTHER

## 2017-02-10 RX ORDER — CLONIDINE HYDROCHLORIDE 0.1 MG/1
0.1 TABLET ORAL 2 TIMES DAILY
Qty: 60 TAB | Refills: 0 | Status: SHIPPED | OUTPATIENT
Start: 2017-02-10 | End: 2017-07-17

## 2017-02-10 RX ORDER — POTASSIUM CHLORIDE 20 MEQ/1
40 TABLET, EXTENDED RELEASE ORAL
Status: DISCONTINUED | OUTPATIENT
Start: 2017-02-10 | End: 2017-02-10 | Stop reason: HOSPADM

## 2017-02-10 RX ORDER — HYDRALAZINE HYDROCHLORIDE 100 MG/1
100 TABLET, FILM COATED ORAL 3 TIMES DAILY
Qty: 90 TAB | Refills: 0 | Status: SHIPPED | OUTPATIENT
Start: 2017-02-10 | End: 2017-03-13 | Stop reason: SDUPTHER

## 2017-02-10 RX ORDER — CARVEDILOL 25 MG/1
25 TABLET ORAL 2 TIMES DAILY WITH MEALS
Qty: 60 TAB | Refills: 0 | Status: SHIPPED | OUTPATIENT
Start: 2017-02-10 | End: 2017-03-21 | Stop reason: SDUPTHER

## 2017-02-10 RX ORDER — POTASSIUM CHLORIDE 20 MEQ/1
20 TABLET, EXTENDED RELEASE ORAL DAILY
Qty: 3 TAB | Refills: 0 | Status: SHIPPED | OUTPATIENT
Start: 2017-02-10 | End: 2017-02-13

## 2017-02-10 RX ORDER — AMOXICILLIN AND CLAVULANATE POTASSIUM 500; 125 MG/1; MG/1
1 TABLET, FILM COATED ORAL EVERY 12 HOURS
Qty: 10 TAB | Refills: 0 | Status: SHIPPED | OUTPATIENT
Start: 2017-02-10 | End: 2017-02-15

## 2017-02-10 RX ADMIN — HYDRALAZINE HYDROCHLORIDE 100 MG: 50 TABLET, FILM COATED ORAL at 09:57

## 2017-02-10 RX ADMIN — AMLODIPINE BESYLATE 5 MG: 5 TABLET ORAL at 09:58

## 2017-02-10 RX ADMIN — ATORVASTATIN CALCIUM 80 MG: 40 TABLET, FILM COATED ORAL at 09:57

## 2017-02-10 RX ADMIN — ASPIRIN 81 MG 81 MG: 81 TABLET ORAL at 09:58

## 2017-02-10 RX ADMIN — DOCUSATE SODIUM 100 MG: 100 CAPSULE, LIQUID FILLED ORAL at 09:58

## 2017-02-10 RX ADMIN — CARVEDILOL 25 MG: 25 TABLET, FILM COATED ORAL at 09:58

## 2017-02-10 RX ADMIN — FEBUXOSTAT 40 MG: 40 TABLET ORAL at 09:57

## 2017-02-10 RX ADMIN — AMOXICILLIN AND CLAVULANATE POTASSIUM 1 TABLET: 500; 125 TABLET, FILM COATED ORAL at 09:57

## 2017-02-10 RX ADMIN — IPRATROPIUM BROMIDE AND ALBUTEROL SULFATE 3 ML: .5; 3 SOLUTION RESPIRATORY (INHALATION) at 09:30

## 2017-02-10 RX ADMIN — CLONIDINE HYDROCHLORIDE 0.1 MG: 0.1 TABLET ORAL at 09:57

## 2017-02-10 RX ADMIN — INSULIN HUMAN 40 UNITS: 100 INJECTION, SUSPENSION SUBCUTANEOUS at 11:45

## 2017-02-10 RX ADMIN — ASCORBIC ACID, THIAMINE MONONITRATE,RIBOFLAVIN, NIACINAMIDE, PYRIDOXINE HYDROCHLORIDE, FOLIC ACID, CYANOCOBALAMIN, BIOTIN, CALCIUM PANTOTHENATE, 1 CAPSULE: 100; 1.5; 1.7; 20; 10; 1; 6000; 150000; 5 CAPSULE, LIQUID FILLED ORAL at 09:57

## 2017-02-10 RX ADMIN — FLUTICASONE PROPIONATE 2 SPRAY: 50 SPRAY, METERED NASAL at 08:00

## 2017-02-10 RX ADMIN — PANTOPRAZOLE SODIUM 40 MG: 40 TABLET, DELAYED RELEASE ORAL at 09:57

## 2017-02-10 RX ADMIN — OXYCODONE HYDROCHLORIDE AND ACETAMINOPHEN 2 TABLET: 5; 325 TABLET ORAL at 10:47

## 2017-02-10 NOTE — DISCHARGE INSTRUCTIONS
Patient armband removed and shredded  MyChart Activation    Thank you for requesting access to [employer]. Please follow the instructions below to securely access and download your online medical record. [employer] allows you to send messages to your doctor, view your test results, renew your prescriptions, schedule appointments, and more. How Do I Sign Up? 1. In your internet browser, go to [email or web address]  2. Click on the First Time User? Click Here link in the Sign In box. You will be redirect to the New Member Sign Up page. 3. Enter your [employer] Access Code exactly as it appears below. You will not need to use this code after youve completed the sign-up process. If you do not sign up before the expiration date, you must request a new code. [employer] Access Code: Activation code not generated  Current [employer] Status: Patient Declined (This is the date your [employer] access code will )    4. Enter the last four digits of your Social Security Number (xxxx) and Date of Birth (mm/dd/yyyy) as indicated and click Submit. You will be taken to the next sign-up page. 5. Create a [employer] ID. This will be your [employer] login ID and cannot be changed, so think of one that is secure and easy to remember. 6. Create a [employer] password. You can change your password at any time. 7. Enter your Password Reset Question and Answer. This can be used at a later time if you forget your password. 8. Enter your e-mail address. You will receive e-mail notification when new information is available in [address] 9. Click Sign Up. You can now view and download portions of your medical record. 10. Click the Download Summary menu link to download a portable copy of your medical information. Additional Information    If you have questions, please visit the Frequently Asked Questions section of the [employer] website at [email or web address] Remember, [employer] is NOT to be used for urgent needs.  For medical emergencies, dial 911. DISCHARGE SUMMARY from Nurse    The following personal items are in your possession at time of discharge:       Visual Aid: None                        PATIENT INSTRUCTIONS:      What to do at Home:  Recommended activity: Activity as tolerated. If you experience any of the following symptoms Shortness of breath,chest pain or discomfort, weakness or numbness, dizziness or headache, please follow up with the nearest Emergency Room. *  Please give a list of your current medications to your Primary Care Provider. *  Please update this list whenever your medications are discontinued, doses are      changed, or new medications (including over-the-counter products) are added. *  Please carry medication information at all times in case of emergency situations. These are general instructions for a healthy lifestyle:    No smoking/ No tobacco products/ Avoid exposure to second hand smoke    Surgeon General's Warning:  Quitting smoking now greatly reduces serious risk to your health. Obesity, smoking, and sedentary lifestyle greatly increases your risk for illness    A healthy diet, regular physical exercise & weight monitoring are important for maintaining a healthy lifestyle    You may be retaining fluid if you have a history of heart failure or if you experience any of the following symptoms:  Weight gain of 3 pounds or more overnight or 5 pounds in a week, increased swelling in our hands or feet or shortness of breath while lying flat in bed. Please call your doctor as soon as you notice any of these symptoms; do not wait until your next office visit. Recognize signs and symptoms of STROKE:    F-face looks uneven    A-arms unable to move or move unevenly    S-speech slurred or non-existent    T-time-call 911 as soon as signs and symptoms begin-DO NOT go       Back to bed or wait to see if you get better-TIME IS BRAIN.     Warning Signs of HEART ATTACK     Call 911 if you have these symptoms:   Chest discomfort. Most heart attacks involve discomfort in the center of the chest that lasts more than a few minutes, or that goes away and comes back. It can feel like uncomfortable pressure, squeezing, fullness, or pain.  Discomfort in other areas of the upper body. Symptoms can include pain or discomfort in one or both arms, the back, neck, jaw, or stomach.  Shortness of breath with or without chest discomfort.  Other signs may include breaking out in a cold sweat, nausea, or lightheadedness. Don't wait more than five minutes to call 911 - MINUTES MATTER! Fast action can save your life. Calling 911 is almost always the fastest way to get lifesaving treatment. Emergency Medical Services staff can begin treatment when they arrive -- up to an hour sooner than if someone gets to the hospital by car. The discharge information has been reviewed with the patient. The patient verbalized understanding. Discharge medications reviewed with the patient and appropriate educational materials and side effects teaching were provided.

## 2017-02-10 NOTE — PROGRESS NOTES
SUBJECTIVE:    No chest and abdominal pan. No SOB or cough. No N/V/D. Leg cramps off and on. OBJECTIVE:    Visit Vitals    /82 (BP 1 Location: Right arm, BP Patient Position: At rest)    Pulse 71    Temp 98.2 °F (36.8 °C)    Resp 18    Ht 5' 4\" (1.626 m)    Wt 108.2 kg (238 lb 9.6 oz)    SpO2 100%    BMI 40.96 kg/m2       RS: no wheezes  CVS: RRR  GI: NT, BS +  Extremities: non pitting pedal edema. CNS: Moves all extremities well. General: NAD, awake    ASSESSMENT:    1. Left sided paresthesia  2. Left ankle pain, better  3. Dry cough, resolved  4. Stage 3 CKD due to DM and HTN  5. Labile DM with A1c of 7.4  6. HTN  7. Dyslipidemia  8. H/o Medical non-compliance  9. Hypokalemia  10. Partial opacification of the right mastoid air cells which can be associated  with otomastoiditis     PLAN:    Discharge patient home today  D/c patient and patient's family member - all questions were answered appropriately  Patient sees dr. Royer Jenkins for DM    --> total time greater than 30 minutes    CMP:   Lab Results   Component Value Date/Time     02/10/2017 04:38 AM    K 3.1 (L) 02/10/2017 04:38 AM     02/10/2017 04:38 AM    CO2 31 02/10/2017 04:38 AM    AGAP 7 02/10/2017 04:38 AM    GLU 58 (L) 02/10/2017 04:38 AM    BUN 14 02/10/2017 04:38 AM    CREA 1.95 (H) 02/10/2017 04:38 AM    GFRAA 43 (L) 02/10/2017 04:38 AM    GFRNA 36 (L) 02/10/2017 04:38 AM    CA 8.6 02/10/2017 04:38 AM    PHOS 3.6 02/10/2017 04:38 AM     CBC:   No results found for: WBC, HGB, HGBEXT, HCT, HCTEXT, PLT, PLTEXT, HGBEXT, HCTEXT, PLTEXT      Current Discharge Medication List      START taking these medications    Details   cloNIDine HCl (CATAPRES) 0.1 mg tablet Take 1 Tab by mouth two (2) times a day. Qty: 60 Tab, Refills: 0      fluticasone (FLONASE) 50 mcg/actuation nasal spray 2 Sprays by Both Nostrils route daily for 7 days.   Qty: 1 Bottle, Refills: 0      calcitRIOL (ROCALTROL) 0.25 mcg capsule Take 1 Cap by mouth every Monday, Wednesday, Friday. Qty: 15 Cap, Refills: 0      potassium chloride (K-DUR, KLOR-CON) 20 mEq tablet Take 1 Tab by mouth daily for 3 doses. Qty: 3 Tab, Refills: 0      b complex-vitamin c-folic acid (NEPHROCAPS) 1 mg capsule Take 1 Cap by mouth daily. Qty: 30 Cap, Refills: 0      aspirin 81 mg chewable tablet Take 1 Tab by mouth daily. Qty: 30 Tab, Refills: 0      amoxicillin-clavulanate (AUGMENTIN) 500-125 mg per tablet Take 1 Tab by mouth every twelve (12) hours for 5 days. Qty: 10 Tab, Refills: 0      albuterol (PROVENTIL HFA, VENTOLIN HFA, PROAIR HFA) 90 mcg/actuation inhaler Take 2 Puffs by inhalation every four (4) hours as needed for Wheezing. Qty: 1 Inhaler, Refills: 0         CONTINUE these medications which have CHANGED    Details   amLODIPine (NORVASC) 5 mg tablet Take 1 Tab by mouth daily. Qty: 30 Tab, Refills: 0      carvedilol (COREG) 25 mg tablet Take 1 Tab by mouth two (2) times daily (with meals). Qty: 60 Tab, Refills: 0      hydrALAZINE (APRESOLINE) 100 mg tablet Take 1 Tab by mouth three (3) times daily. Indications: hypertension  Qty: 90 Tab, Refills: 0      insulin NPH/insulin regular (NOVOLIN 70/30) 100 unit/mL (70-30) injection 30 UNITS SUBCUTANEOUSLY TWICE DAILY. Do not take it blood sugar is less than 100  Qty: 10 mL, Refills: 1         CONTINUE these medications which have NOT CHANGED    Details   Omeprazole delayed release (PRILOSEC D/R) 20 mg tablet Take 1 Tab by mouth daily. Qty: 30 Tab, Refills: 1    Associated Diagnoses: Gastroesophageal reflux disease without esophagitis      docusate sodium (COLACE) 100 mg capsule Take 1 Cap by mouth two (2) times a day. Qty: 30 Cap, Refills: 0    Associated Diagnoses: Other constipation      febuxostat (ULORIC) 40 mg tab tablet Take 1 Tab by mouth daily.   Qty: 30 Tab, Refills: 2    Associated Diagnoses: Chronic gout without tophus, unspecified cause, unspecified site      tamsulosin (FLOMAX) 0.4 mg capsule Take 1 Cap by mouth daily. Refills: 3      atorvastatin (LIPITOR) 80 mg tablet Take 1 Tab by mouth daily. Qty: 90 Tab, Refills: 3    Associated Diagnoses: Dyslipidemia      Insulin Needles, Disposable, 29 gauge x 5/16\" ndle 40 Units by Does Not Apply route two (2) times a day. Qty: 1 Container, Refills: 11      acetaminophen (TYLENOL) 650 mg CR tablet Take 650 mg by mouth every six (6) hours as needed for Pain.      glucose blood VI test strips (ASCENSIA CONTOUR) strip contour test trips. Qty: 1 Package, Refills: 11      Lancets misc Check 3 times a day , needs according contour glucometer  Qty: 1 Package, Refills: 11    Associated Diagnoses: Diabetes ([facility])      Blood-Glucose Meter monitoring kit Check twice daily.   Qty: 1 kit, Refills: 0    Associated Diagnoses: Diabetes mellitus, type 2 ([address]         STOP taking these medications       indomethacin (INDOCIN) 50 mg capsule Comments:   Reason for Stopping:         desonide (DESOWEN) 0.05 % topical ointment Comments:   Reason for Stopping:         HYDROcodone-acetaminophen (NORCO) 7.5-325 mg per tablet Comments:   Reason for Stopping:

## 2017-02-10 NOTE — DISCHARGE SUMMARY
[address]  [address] Melissa Miranda SUMMARY    Name:  Mookie Doran  MR#:  [id number]  :  1960  Account #:  [de-identified]  Date of Adm:  2017  Date of Discharge:      DISPOSITION: Discharged to home. DISCHARGE CONDITION: Stable. DISCHARGE DIAGNOSES  1. Left-sided paresthesia, resolved, most likely transient ischemic  attack. 2. Left ankle pain, better. 3. History of gout. 4. Acute on stage 3 chronic kidney disease, now resolved. 5. Stage 3 chronic kidney disease due to diabetes and hypertension. 6. Labile diabetes with A1c of 7.4.  7. Hypertension. 8. Dyslipidemia. 9. History of medical noncompliance. 10. Hypokalemia. 11. Right mastoid sinusitis. DISCHARGE MEDICATIONS  1. Clonidine 0.1 mg b.i.d.  2. Flonase 2 sprays each nostril daily for 7 days. 3. Augmentin 500 mg b.i.d. for 5 days. 4. Rocaltrol 0.25 mcg on Monday, Wednesday, Friday. 5. Potassium chloride 20 mEq p.o. daily for 3 doses. 6. Nephrocaps 1 capsule daily. 7. Aspirin 81 mg daily. 8. Albuterol inhaler 2 puffs q.4 hours p.r.n. for shortness of breath. 9. Norvasc 5 mg daily. 10. Hydralazine 100 mg 3 times a day. 11. Coreg 25 mg b.i.d.  12. Insulin 70/30, 30 units subcutaneous b.i.d. Do not take it if blood  sugar less than 100.  13. Colace 100 mg b.i.d.  14. Uloric 40 mg daily. 15. Prilosec 20 mg daily. 16. Lipitor 80 mg daily. 17. Flomax 0.4 mg daily. IMAGING AND PROCEDURES  1. CT scan of the head was done, reported negative for any  intracranial hemorrhage. 2. X-ray of the left ankle was reported normal.  3. Chest x-ray was reported no acute finding. 4. Bilateral renal ultrasound showed finding consistent with medical  renal disease. 5. MRA of the brain reported no evidence of significant stenosis. 6. MRA of the neck was done and showed no evidence of significant  stenosis in carotid or vertebral artery system.   7. MRI of the brain negative for acute finding, other than some mastoid  sinusitis. 8. Modified barium swallow was done, showed premature spillage with  all tested consistencies. 9. CT scan of the C-spine was done, reported negative for any acute  finding other than mild facet disease. 10. Echocardiogram was done, showed ejection fraction 65% to 70%  with grade 2 diastolic dysfunction. CONSULTANT  1. Neurology with Dr. Wu Barnes. 2. Nephrology with Dr. Hiren Mariee. HOSPITAL COURSE: The patient was admitted to the hospital with a  complaint of left-sided numbness. Please refer to hospital admission H  and P done by Dr. Cosmo Rivers for further details. The patient had a CT  scan of the head which was negative. He was admitted here with a  diagnosis of CVA versus TIA. He also had hypertension which was  uncontrolled. He was on medication outpatient. With regard to left-sided numbness, the patient was seen by the  Neurology service, had an MRI and MRA studies done which were  unremarkable. CT scan of the C-spine was also not showing any  findings suggestive of his left-sided numbness. His numbness got  better. He was, by PT/OT, recommended home health care, which will  be set up for. With regard to his uncontrolled blood pressure, he was restarted back  on his medication. We used Norvasc to minimize the risk of leg  swelling and increased hydralazine as well as Coreg. Clonidine was  also added and his blood pressure was much better. The patient had a  normal uric acid level and will be maintained on Uloric. Initially, the  patient was on Indocin which was stopped because of acute on stage  3 chronic kidney disease. He was seen by Dr. Hiren Mariee, had a renal  ultrasound done which showed the above-mentioned finding. His renal  function is currently at 1.9 and he is making a good amount of pale  urine. He can be followed by Dr. Hiren Mariee as an outpatient. Because MRI  showed the patient has right-sided mastoid sinusitis, he was started on  Flonase and 1 week of antibiotic.  He was tolerating antibiotic very well. The patient has been told to stay compliant with the medication, which  he verbalized understanding. His diabetes was labile, as he had some  hypoglycemia episodes, so his home dose of NPH has been  decreased to 30 units twice a day and has been informed to check his  blood sugar prior to taking it. He verbalized understanding. He told me  he follows with Dr. Lexi Blandon. DISCHARGE INSTRUCTIONS  1. Diet: ADA, cardiac, renal diet. 2. Activity as tolerated. PT, OT to follow at home. 3. Follow with PCP in a week. 4. Follow with Dr. Judit Mendoza in 7-10 days. 5. Follow with Dr. Carlos Kang in 6-8 weeks. 6. BMP after 3-4 days from now. TOTAL TIME: Greater than 35 minutes.         MD ANU Soliman / Luis Judd  D:  02/10/2017   13:35  T:  02/10/2017   14:35  Job #:  [id number]

## 2017-02-10 NOTE — ROUTINE PROCESS
Patient: Citlalli Valderrama Age: 64 y.o. Sex: male     Bedside and Verbal shift change report given to Ulises Phillips (oncoming nurse) by Yariel Villagran RN (offgoing nurse). Report included the following information SBAR, Kardex, MAR and Recent Results. PATIENT GOALS FOR TODAY PATIENT PRIORITIES FOR TODAY   Goals are: htn under control  Updated on Whiteboard in Patients Room: no   Patient states his/her priorities are: to go home  Updated on Whiteboard in Patients Room: no     SITUATION:   Code Status: Full Code Reason for Admission: Left sided numbness  Left sided numbness  TIA (transient ischemic attack)   Hospital day: 2 Problem List: Active Problems:    TIA (transient ischemic attack) ()      Left sided numbness (2/7/2017)      Chronic kidney disease, stage III (moderate) (2/9/2017)      Proteinuria (2/9/2017)      Secondary hyperparathyroidism of renal origin ([facility] (2/9/2017)      Hypoglycemia (2/9/2017)       Attending Provider:   Rio Del Rio MD Allergies: Allergies   Allergen Reactions    Shellfish Derived Other (comments)     Causes Gout      BACKGROUND:   Past Medical History:   Past Medical History   Diagnosis Date    Alcohol abuse     Arthritis     Atherosclerotic cardiovascular disease     CAD (coronary artery disease)      mild disease of coronaries (cor angio 2006),wife states he has 1 stent    Cardiac cath 01/26/2006     RCA mild. Otherwise patent coronaries. LVEDP 15.  EF 55-60%.  Cardiac echocardiogram 03/27/2009     EF 60%. No cardiac source of embolism. No significant valvular pathology.  Cardiac nuclear imaging test 12/06/2011     Small, mild inferior infarction or possibly artifact. No ischemia. EF 45%. No TID. No reg WMA.  Cardiovascular LLE venous duplex 08/14/2015     Left leg:  No DVT. Dilated lymph node in left groin.     Constipation     Diabetes mellitus type II     Gout     Hepatic steatosis     Hypercholesterolemia     Hypertension     Knee effusion, left     Left knee pain     Morbid obesity ([address]     Noncompliance     Obesity (BMI 30-39. 9)     S/P colonoscopy 3/8/05     Dr. José Valdivia; normal; f/u 10 years    Stomach problems     TIA (transient ischemic attack) 3/09    Tobacco abuse      ASSESSMENT:   Neuro:  Neurologic State: Alert, Orientation Level: Oriented X4 CV:  Patient on Telemetry: Cardiac/Telemetry Monitor On: Yes    Box Number: [id number]   Cardiac Rhythm: Normal sinus rhythm  Patient has a pace maker:   Endocrine   Recent Glucose Results:   Lab Results   Component Value Date/Time    GLU 58 (L) 02/10/2017 04:38 AM    GLUCPOC 70 02/10/2017 06:33 AM    GLUCPOC 75 02/09/2017 08:38 PM    GLUCPOC 109 02/09/2017 04:59 PM        Respiratory:  O2 Device: Room air       Supplemental O2 O2 Flow Rate (L/min): 2 l/min       Incentive Spirometery          GI  Current diet: DIET CARDIAC Soft Solids; Consistent Carb 1800kcal                            Bowel Sounds: Active            Reasons if patient has a souza:    Patient Safety  Restraints:    Family notified:    Other Alternatives:     Fall Risk   Total Score: 1   Safety Measures: Bed/Chair alarm on, Bed/Chair-Wheels locked, Bed in low position, Call light within reach VTE Prophylaxis                WOUND (if present)  Wound Type:    Dressing present Dressing Present : No  Wound Concerns/Notes:  IV ACCESS     Reasons if patient has a central line:      PAIN  Pain Assessment  Pain Intensity 1: 0 (02/10/17 0353)  Pain Location 1: Head  Pain Intervention(s) 1: Medication (see MAR)  Patient Stated Pain Goal: 0  Time of last intervention:    Reassessment Completed: no Last Vitals:  Vitals:    02/09/17 2000 02/10/17 0000 02/10/17 0400 02/10/17 0608   BP: 150/84 148/79 (!) 171/97    Pulse: 75 64 87    Resp: 18 16 14    Temp: 98 °F (36.7 °C) 98.4 °F (36.9 °C) 97.8 °F (36.6 °C)    SpO2: 94% 99% 99%    Weight:    108.2 kg (238 lb 9.6 oz)   Height:    5' 4\" (1.626 m)      Last 3 Weights:  Last 3 Recorded Weights in this Encounter    02/08/17 0640 02/09/17 0637 02/10/17 0608   Weight: 108 kg (238 lb 1.6 oz) 107 kg (236 lb) 108.2 kg (238 lb 9.6 oz)     Weight change: 1.179 kg (2 lb 9.6 oz)  LAB RESULTS  Recent Labs      02/09/17   0400  02/07/17   1009   WBC  9.9  10.3   HGB  10.3*  12.0*   HCT  33.3*  37.7   PLT  359  380     Recent Labs      02/10/17   0438  02/09/17   0400  02/08/17   0518  02/07/17   1009   NA  142  142  143  143   K  3.1*  3.5  3.3*  3.2*   GLU  58*  99  117*  283*   BUN  14  16 17 17   CREA  1.95*  2.01*  1.94*  2.16*   CA  8.6  8.4*  8.3*  8.4*  8.9   MG   --   2.0   --    --    INR   --    --    --   0.9      RECOMMENDATIONS AND DISCHARGE PLANNING   1. Discharge plan for patient // Needs or barriers to disharge: home . 2. Estimated Discharge Date: 2-10 Posted on Whiteboard in Patients Room: no    \"HEALS\" SAFETY CHECK   A safety check occurred in the patient's room between off going nurse and oncoming nurse listed above. The safety check included the below items  Area Items   H  High Alert Meds  - Verify all high alert medication drips (heparin, PCA, etc.)   E  Equipment - Suction is set up for ALL patients (with stefano)  - Red plugs utilized for all equipment (IV pumps, etc.)  - WOWs wiped down at end of shift.  - Room stocked with oxygen, suction, and other unit-specific supplies   A  Alarms - Bed alarm is set for fall risk patients  - Ensure chair alarm is in place and activated if patient is up in a chair   L  Lines - Check IV for any infiltration  - Montaño bag is empty if patient has a Montaño   - Tubing and IV bags are labeled   S  Safety   - Room is clean, patient is clean, and equipment is clean. - Ensure room is clear and free of clutter  - Hallways are clear from equipment besides carts.    - Fall bracelet on for fall risk patients  - Suction is set up for ALL patients (with stefano)  - Isolation precautions followed, supplies available outside room, sign posted   Delmy Ding RN

## 2017-02-10 NOTE — PROGRESS NOTES
ADULT PROTOCOL: JET AEROSOL ASSESSMENT    Patient  Ann Quiles     64 y.o.   male     2/9/2017  8:53 PM    Breath Sounds Pre Procedure:  Breath Sounds Bilateral: Diminished                                            Breath Sounds Post Procedure: Breath Sounds Bilateral: Diminished                                               Breathing pattern: Pre procedure  Breathing Pattern: Regular          Post procedure  Breathing Pattern: Regular    Cough: Pre procedure  Cough: Non-productive               Post procedure Cough: Non-productive    Heart Rate: Pre procedure Pulse: 90           Post procedure Pulse: 92    Resp Rate: Pre procedure  Respirations: 16           Post procedure          Nebulizer Therapy: Current medications Aerosolized Medications: DuoNeb       Problem List:   Patient Active Problem List   Diagnosis Code    Allergic rhinitis J30.9    Neuropathy G62.9    Right knee pain M25.561    Dyslipidemia E78.5    Gout M10.9    CAD (coronary artery disease) I25.10    Hypertensive heart disease I11.9    Obesity (BMI 30-39. 9) E66.9    Constipation K59.00    Tobacco abuse Z72.0    TIA (transient ischemic attack) G45.9    Hepatic steatosis K76.0    Noncompliance Z91.19    Testicular/scrotal pain [id number]    UTI (urinary tract infection) N39.0    Contusion of knee S80.00XA    Knee strain S86.919A    Effusion of patella M25.469    Hemoptysis R04.2    Rash R21    Laceration of gum S01.512A    Fractured tooth S02. 5XXA    Renal stones N20.0    Microalbuminuria R80.9    Hypertriglyceridemia E78.1    Type 2 diabetes mellitus without complication (HCC) B43.7    Acute unilateral obstructive uropathy N13.9    ARF (acute renal failure) (HCC) N17.9    Essential hypertension I10    Advance directive discussed with patient Z70.80    Acute chest pain R07.9    Left sided numbness R20.0    Chronic kidney disease, stage III (moderate) N18.3    Proteinuria R80.9    Secondary hyperparathyroidism of renal origin ([facility] N25.81    Hypoglycemia E16.2       Patient alert and cooperative to use MDI: Yes    Home Respiratory Therapy Regimen/Frequency:  NO  Medication   Device  Frequency     SEVERITY INDEX:    ITEM 0 1 2 3 4 Score   Respiratory Pattern and or Rate Regular  10-19 Regular  20-24   24-30    30-34 Severe SOB or   Greater than 35 0   Breath Sounds Clear Occasional Wheeze Mild Wheezing Moderate Wheezing  wheezing/Absent breath sounds 0   Shortness of Breath None Dyspnea on Exertion Dyspnea at Rest Moderate Shortness of Breath at Rest Severe Shortness of Breath - Limited Speech 0       Total Score:  0    * Scoring Guidelines  0-4 pts:  PRN-BID   5-7 pts:  BID, TID, QID  8-9 pts:  TID, QID, Q6  10-12 pts:  Q4-Q6  * - Guidelines used with clinical judgement. PRN Treatments can be ordered to supplement scheduled treatments. Regardless of score, frequency should not be less than normal home regimen.     Recommended Order/Frequency:  TID    Comments:          Respiratory Therapist: Octavio Sorensen RT

## 2017-02-10 NOTE — PROGRESS NOTES
Progress Note    Ray Mercado  64 y.o. Admit Date: 2/7/2017  Active Problems:    TIA (transient ischemic attack) () POA: Unknown      Left sided numbness (2/7/2017) POA: Unknown      Chronic kidney disease, stage III (moderate) (2/9/2017) POA: Unknown      Proteinuria (2/9/2017) POA: Unknown      Secondary hyperparathyroidism of renal origin ([address] (2/9/2017) POA: Unknown      Hypoglycemia (2/9/2017) POA: No            Subjective:     Patient feels good ,no SOB, may go home tomorrow. .       A comprehensive review of systems was negative except for that written in the History of Present Illness.     Objective:     Visit Vitals    /82 (BP 1 Location: Right arm, BP Patient Position: At rest)    Pulse 71    Temp 98.2 °F (36.8 °C)    Resp 18    Ht 5' 4\" (1.626 m)    Wt 108.2 kg (238 lb 9.6 oz)    SpO2 100%    BMI 40.96 kg/m2         Intake/Output Summary (Last 24 hours) at 02/10/17 1417  Last data filed at 02/10/17 0601   Gross per 24 hour   Intake              600 ml   Output             1300 ml   Net             -700 ml       Current Facility-Administered Medications   Medication Dose Route Frequency Provider Last Rate Last Dose    potassium chloride (K-DUR, KLOR-CON) SR tablet 40 mEq  40 mEq Oral NOW Gustavo Helton MD        insulin NPH (NOVOLIN N, HUMULIN N) injection 30 Units  30 Units SubCUTAneous ACB&D Phani Best MD        hydrALAZINE (APRESOLINE) tablet 100 mg  100 mg Oral TID Phani Best MD   100 mg at 02/10/17 0957    cloNIDine HCl (CATAPRES) tablet 0.1 mg  0.1 mg Oral TID Phani Best MD   0.1 mg at 02/10/17 0957    fluticasone (FLONASE) 50 mcg/actuation nasal spray 2 Spray  2 Spray Both Nostrils DAILY Phani Best MD   2 [place] at 02/10/17 0800    amoxicillin-clavulanate (AUGMENTIN) 500-125 mg per tablet 1 Tab  1 Tab Oral Q12H Phani Best MD   1 Tab at 02/10/17 0957    B complex-vitaminC-folic acid (NEPHROCAP) cap  1 Cap Oral DAILY Pete Rios, MD   1 Cap at 02/10/17 0957    calcitRIOL (ROCALTROL) capsule 0.25 mcg  0.25 mcg Oral Q MON, WED & Lida Araya MD        amLODIPine (NORVASC) tablet 5 mg  5 mg Oral DAILY Robbin Figueroa MD   5 mg at 02/10/17 0958    albuterol-ipratropium (DUO-NEB) 2.5 MG-0.5 MG/3 ML  3 mL Nebulization TID RT Genet Isaac MD   3 mL at 02/10/17 0930    carvedilol (COREG) tablet 25 mg  25 mg Oral BID WITH MEALS Robbin Figueroa MD   25 mg at 02/10/17 0958    oxyCODONE-acetaminophen (PERCOCET) 5-325 mg per tablet 1-2 Tab  1-2 Tab Oral Q4H PRN Genet Isaac MD   2 Tab at 02/10/17 1047    acetaminophen (TYLENOL) tablet 650 mg  650 mg Oral Q6H PRN Genet Isaac MD        atorvastatin (LIPITOR) tablet 80 mg  80 mg Oral DAILY Genet Isaac MD   80 mg at 02/10/17 0957    docusate sodium (COLACE) capsule 100 mg  100 mg Oral BID Genet Isaac MD   100 mg at 02/10/17 0958    febuxostat (ULORIC) tablet 40 mg  40 mg Oral DAILY Genet Isaac MD   40 mg at 02/10/17 0957    pantoprazole (PROTONIX) tablet 40 mg  40 mg Oral ACB Genet Isaac MD   40 mg at 02/10/17 0957    tamsulosin (FLOMAX) capsule 0.4 mg  0.4 mg Oral DAILY Genet Isaac MD   0.4 mg at 02/09/17 1039    sodium chloride (NS) flush 5-10 mL  5-10 mL IntraVENous Q8H Genet Isaac MD   10 mL at 02/09/17 2314    sodium chloride (NS) flush 5-10 mL  5-10 mL IntraVENous PRN Genet Isaac MD        aspirin chewable tablet 81 mg  81 mg Oral DAILY Genet Isaac MD   81 mg at 02/10/17 0958    enoxaparin (LOVENOX) injection 40 mg  40 mg SubCUTAneous Q24H Genet Isaac MD   40 mg at 02/09/17 2008    insulin lispro (HUMALOG) injection   SubCUTAneous AC&HS Robbin Figueroa MD   Stopped at 02/08/17 1130    glucose chewable tablet 16 g  4 Tab Oral PRN Genet Isaac MD        glucagon (GLUCAGEN) injection 1 mg  1 mg IntraMUSCular PRN Genet Isaac MD        dextrose (D50W) injection syrg 12.5-25 g  25-50 mL IntraVENous PRN Genet Isaac MD        hydrALAZINE (APRESOLINE) 20 mg/mL injection 10 mg  10 mg IntraVENous Q6H PRN Minal Herrera MD   10 mg at 02/09/17 1201    benzonatate (TESSALON) capsule 100 mg  100 mg Oral TID PRN Minal Herrera MD   100 mg at 02/09/17 1157        Physical Exam:     Physical Exam:   General:  Alert, cooperative, no distress, appears stated age. Neck: Supple, symmetrical, trachea midline, no adenopathy, thyroid: no enlargement/tenderness/nodules, no carotid bruit and no JVD. Lungs:   Clear to auscultation bilaterally. Heart:  Regular rate and rhythm, S1, S2 normal, no murmur, click, rub or gallop. Abdomen:   Soft, non-tender. Bowel sounds normal. No masses,  No organomegaly. Extremities: Extremities normal, atraumatic, no cyanosis or edema. Pulses: 2+ and symmetric all extremities. Skin: Skin color, texture, turgor normal. No rashes or lesions         Data Review:    CBC w/Diff    Recent Labs      02/09/17   0400   WBC  9.9   RBC  3.80*   HGB  10.3*   HCT  33.3*   MCV  87.6   MCH  27.1   MCHC  30.9*   RDW  15.4*    Recent Labs      02/09/17   0400   MONOS  8   EOS  1   BASOS  0   RDW  15.4*        Comprehensive Metabolic Profile    Recent Labs      02/10/17   0438  02/09/17   0400  02/08/17   0518   NA  142  142  143   K  3.1*  3.5  3.3*   CL  104  104  105   CO2  31  28  30   BUN  14  16  17   CREA  1.95*  2.01*  1.94*    Recent Labs      02/10/17   0438  02/09/17   0400  02/08/17   0518   CA  8.6  8.4*  8.3*  8.4*   PHOS  3.6  3.2   --    ALB   --   2.1*   --    TP   --   6.4   --    SGOT   --   15   --    TBILI   --   0.2   --           Results for Magda Courts (MRN [id number]) as of 2/10/2017 14:32   Ref. Range 2/7/2017 19:51   Creatinine, urine Latest Ref Range: 30 - 125 mg/dL 69.20   Protein, urine random Latest Ref Range: <11.9 mg/dL 538 (H)   Protein/Creat. urine Ratio Latest Units:   7.8     SPEP & UPEP results pending.             Impression:       Active Hospital Problems    Diagnosis Date Noted    Chronic kidney disease, stage III (moderate) 02/09/2017    Proteinuria 02/09/2017    Secondary hyperparathyroidism of renal origin ([facility] [address] 02/09/2017    Hypoglycemia 02/09/2017    Left sided numbness 02/07/2017    TIA (transient ischemic attack)             Plan:     O k to DC, no NSAIDS,discssed wit him & family, may start ARB as OP, will see in 2 weeks.       Iram Weber MD

## 2017-02-10 NOTE — INTERDISCIPLINARY ROUNDS
Interdisciplinary STROKE Rounds       Recommendations:     Recommendations are as follows:   1. Possible discharge set for today  2. Discontinue NIHSS. Orders discontinued by Dr. Chele Covington. Discharge Plan: home     Core Measure Review:     Antithrombotic by Day 2:  ASA 81 mg   Statin:  Atorvastatin 80 mg   VTE Prophylaxis:  Lovenox   Anticoagulant for a-fib (if indicated):  n/a   Dysphagia Screen:  done   Therapies:       1. Physical Therapy consult:  No outside therapy recommended        2. Occupational Therapy consult:  Rehab recommended        3. Speech Therapy consult:   Soft solids, regular liquids   Stroke Care Plan: On chart   Stroke Education Book Given: yes   Current NIHSS 1, sensory   Advanced Imaging Results MRI 2/8 negative for acute CVA or any CVA in the past     Situation     Summer Brandon is a 64 y.o. male admitted for evaluation of and treatment of left sided numbness. Hospital day: 2         Background     Past Medical History   Diagnosis Date    Alcohol abuse     Arthritis     Atherosclerotic cardiovascular disease     CAD (coronary artery disease)      mild disease of coronaries (cor angio 2006),wife states he has 1 stent    Cardiac cath 01/26/2006     RCA mild. Otherwise patent coronaries. LVEDP 15.  EF 55-60%.  Cardiac echocardiogram 03/27/2009     EF 60%. No cardiac source of embolism. No significant valvular pathology.  Cardiac nuclear imaging test 12/06/2011     Small, mild inferior infarction or possibly artifact. No ischemia. EF 45%. No TID. No reg WMA.  Cardiovascular LLE venous duplex 08/14/2015     Left leg:  No DVT. Dilated lymph node in left groin.  Constipation     Diabetes mellitus type II     Gout     Hepatic steatosis     Hypercholesterolemia     Hypertension     Knee effusion, left     Left knee pain     Morbid obesity (HCC)     Noncompliance     Obesity (BMI 30-39. 9)     S/P colonoscopy 3/8/05     Dr. Aubrey Gillette; normal; f/u 10 years    Stomach problems     TIA (transient ischemic attack) 3/09    Tobacco abuse        Social History     Social History    Marital status:      Spouse name: N/A    Number of children: N/A    Years of education: N/A     Occupational History    disabled      Social History Main Topics    Smoking status: Current Every Day Smoker     Packs/day: 0.25     Types: Cigarettes     Last attempt to quit: 6/28/2016    Smokeless tobacco: Never Used    Alcohol use 0.0 oz/week     0 Standard drinks or equivalent per week      Comment: 1 beers a day.      Drug use: No    Sexual activity: Yes     Other Topics Concern    Not on file     Social History Narrative       Assessment:     Active Problems:    TIA (transient ischemic attack) ()      Left sided numbness (2/7/2017)      Chronic kidney disease, stage III (moderate) (2/9/2017)      Proteinuria (2/9/2017)      Secondary hyperparathyroidism of renal origin ([facility] [address] (2/9/2017)      Hypoglycemia (2/9/2017)        Patient Vitals for the past 12 hrs:   Temp Pulse Resp BP SpO2   02/10/17 0932 - - - - 93 %   02/10/17 0838 98.1 °F (36.7 °C) 98 20 (!) 174/96 95 %   02/10/17 0400 97.8 °F (36.6 °C) 87 14 (!) 171/97 99 %   02/10/17 0000 98.4 °F (36.9 °C) 64 16 148/79 99 %       Labs  :   Lab Results   Component Value Date/Time    Hemoglobin A1c 7.4 02/09/2017 04:00 AM    Hemoglobin A1c (POC) 6.7 04/16/2015 08:48 AM    Hemoglobin A1c, External 6.5 11/12/2015      Lab Results   Component Value Date/Time    HDL 41 02/08/2017 05:18 AM    HDL 43 10/27/2016 11:35 AM      aPTT   Date Value Ref Range Status   02/07/2017 28.6 23.0 - 36.4 SEC Final     INR   Date Value Ref Range Status   02/07/2017 0.9 0.8 - 1.2   Final     Comment:                INR Therapeutic Ranges         (on stable oral anticoagulant):     INDICATION                INR  DVT/PE/Atrial Fib          2.0-3.0  MI/Mechanical Heart Valve  2.5-3.5       Lab Results   Component Value Date     02/10/2017    CO2 31 02/10/2017 BUN 14 02/10/2017         Discipline Participation:   Interdisciplinary rounds were conducted by:  Dr. Bartolo Sigala, Neuroscience Medical director,   Graciela Mccormick RN, stroke coordinator  Jamin Esparza, ARU cyndie Chatterjee PT, and   Maciel Chavez, the patient's nurses.

## 2017-02-10 NOTE — DISCHARGE SUMMARY
PATIENT DISCHARGE INSTRUCTIONS      PATIENT DISCHARGE INSTRUCTIONS    Tommy Peña / [id number] : 1960    Admitted 2017 Discharged: 2/10/2017     DICTATED # [id number]    · It is important that you take the medication exactly as they are prescribed. · Keep your medication in the bottles provided by the pharmacist and keep a list of the medication names, dosages, and times to be taken in your wallet. · Do not take other medications without consulting your doctor. What to do at Home    Recommended Diet: Cardiac Diet, Diabetic Diet and Renal Diet    Recommended Activity: PT/OT per Home Health    Current Discharge Medication List      START taking these medications    Details   cloNIDine HCl (CATAPRES) 0.1 mg tablet Take 1 Tab by mouth two (2) times a day. Qty: 60 Tab, Refills: 0      fluticasone (FLONASE) 50 mcg/actuation nasal spray 2 Sprays by Both Nostrils route daily for 7 days. Qty: 1 Bottle, Refills: 0      calcitRIOL (ROCALTROL) 0.25 mcg capsule Take 1 Cap by mouth every Monday, Wednesday, Friday. Qty: 15 Cap, Refills: 0      potassium chloride (K-DUR, KLOR-CON) 20 mEq tablet Take 1 Tab by mouth daily for 3 doses. Qty: 3 Tab, Refills: 0      b complex-vitamin c-folic acid (NEPHROCAPS) 1 mg capsule Take 1 Cap by mouth daily. Qty: 30 Cap, Refills: 0      aspirin 81 mg chewable tablet Take 1 Tab by mouth daily. Qty: 30 Tab, Refills: 0      amoxicillin-clavulanate (AUGMENTIN) 500-125 mg per tablet Take 1 Tab by mouth every twelve (12) hours for 5 days. Qty: 10 Tab, Refills: 0      albuterol (PROVENTIL HFA, VENTOLIN HFA, PROAIR HFA) 90 mcg/actuation inhaler Take 2 Puffs by inhalation every four (4) hours as needed for Wheezing. Qty: 1 Inhaler, Refills: 0         CONTINUE these medications which have CHANGED    Details   amLODIPine (NORVASC) 5 mg tablet Take 1 Tab by mouth daily.   Qty: 30 Tab, Refills: 0      carvedilol (COREG) 25 mg tablet Take 1 Tab by mouth two (2) times daily (with meals). Qty: 60 Tab, Refills: 0      hydrALAZINE (APRESOLINE) 100 mg tablet Take 1 Tab by mouth three (3) times daily. Indications: hypertension  Qty: 90 Tab, Refills: 0      insulin NPH/insulin regular (NOVOLIN 70/30) 100 unit/mL (70-30) injection 30 UNITS SUBCUTANEOUSLY TWICE DAILY. Do not take it blood sugar is less than 100  Qty: 10 mL, Refills: 1         CONTINUE these medications which have NOT CHANGED    Details   Omeprazole delayed release (PRILOSEC D/R) 20 mg tablet Take 1 Tab by mouth daily. Qty: 30 Tab, Refills: 1    Associated Diagnoses: Gastroesophageal reflux disease without esophagitis      docusate sodium (COLACE) 100 mg capsule Take 1 Cap by mouth two (2) times a day. Qty: 30 Cap, Refills: 0    Associated Diagnoses: Other constipation      febuxostat (ULORIC) 40 mg tab tablet Take 1 Tab by mouth daily. Qty: 30 Tab, Refills: 2    Associated Diagnoses: Chronic gout without tophus, unspecified cause, unspecified site      tamsulosin (FLOMAX) 0.4 mg capsule Take 1 Cap by mouth daily. Refills: 3      atorvastatin (LIPITOR) 80 mg tablet Take 1 Tab by mouth daily. Qty: 90 Tab, Refills: 3    Associated Diagnoses: Dyslipidemia      Insulin Needles, Disposable, 29 gauge x 5/16\" ndle 40 Units by Does Not Apply route two (2) times a day. Qty: 1 Container, Refills: 11      acetaminophen (TYLENOL) 650 mg CR tablet Take 650 mg by mouth every six (6) hours as needed for Pain.      glucose blood VI test strips (ASCENSIA CONTOUR) strip contour test trips. Qty: 1 Package, Refills: 11      Lancets misc Check 3 times a day , needs according contour glucometer  Qty: 1 Package, Refills: 11    Associated Diagnoses: Diabetes ([facility])      Blood-Glucose Meter monitoring kit Check twice daily.   Qty: 1 kit, Refills: 0    Associated Diagnoses: Diabetes mellitus, type 2 ([address]         STOP taking these medications       indomethacin (INDOCIN) 50 mg capsule Comments:   Reason for Stopping:         desonide (DESOWEN) 0.05 % topical ointment Comments:   Reason for Stopping:         HYDROcodone-acetaminophen (NORCO) 7.5-325 mg per tablet Comments:   Reason for Stopping:                 Signed By: Smitha Manrique MD     February 10, 2017

## 2017-02-10 NOTE — PROGRESS NOTES
Care Management Interventions  Transition of Care Consult (CM Consult): [address]: No  Reason [place]: Patient already serviced by other home care/hospice agency  Physical Therapy Consult: Yes  Occupational Therapy Consult: Yes  Speech Therapy Consult: Yes  Current Support Network: Lives with Spouse  Confirm Follow Up Transport: Family  Plan discussed with Pt/Family/Caregiver: Yes  Freedom of Choice Offered: Yes (For home health)  Discharge Location  Discharge Placement: Home with home health    CM met with pt and his spouse in room to follow up on observation care. CM noted that pt's status had been changed to inpatient. Pt is discharged home with home health. FOC presented and pt chooses [facility] health care. Agency's admission liaison (Lashonda) has been informed. Pt's spouse will be assisting pt with transportation home.

## 2017-02-13 LAB
ALBUMIN 24H MFR UR ELPH: 61.1 %
ALBUMIN SERPL ELPH-MCNC: 2.2 G/DL (ref 2.9–4.4)
ALBUMIN/GLOB SERPL: 0.8 {RATIO} (ref 0.7–1.7)
ALPHA1 GLOB 24H MFR UR ELPH: 7.7 %
ALPHA1 GLOB SERPL ELPH-MCNC: 0.3 G/DL (ref 0–0.4)
ALPHA2 GLOB 24H MFR UR ELPH: 7.5 %
ALPHA2 GLOB SERPL ELPH-MCNC: 1.2 G/DL (ref 0.4–1)
B-GLOBULIN MFR UR ELPH: 12.8 %
B-GLOBULIN SERPL ELPH-MCNC: 1.1 G/DL (ref 0.7–1.3)
COLLECT DURATION TIME UR: 24 HR
GAMMA GLOB 24H MFR UR ELPH: 10.9 %
GAMMA GLOB SERPL ELPH-MCNC: 0.5 G/DL (ref 0.4–1.8)
GLOBULIN SER-MCNC: 3.1 G/DL (ref 2.2–3.9)
IGA SERPL-MCNC: 196 MG/DL (ref 90–386)
IGG SERPL-MCNC: 683 MG/DL (ref 700–1600)
IGM SERPL-MCNC: 45 MG/DL (ref 20–172)
INTERPRETATION SERPL IEP-IMP: ABNORMAL
INTERPRETATION UR IFE-IMP: ABNORMAL
M PROTEIN 24H MFR UR ELPH: ABNORMAL %
M PROTEIN SERPL ELPH-MCNC: ABNORMAL G/DL
NOTE, 149533: ABNORMAL
PROT 24H UR-MRATE: ABNORMAL MG/24 HR (ref 30–150)
PROT SERPL-MCNC: 5.3 G/DL (ref 6–8.5)
PROT UR-MCNC: 627.5 MG/DL
SPECIMEN VOL ?TM UR: 2300 ML

## 2017-02-20 ENCOUNTER — HOSPITAL ENCOUNTER (OUTPATIENT)
Dept: LAB | Age: 57
Discharge: HOME | End: 2017-02-20

## 2017-02-20 PROCEDURE — 99001 SPECIMEN HANDLING PT-LAB: CPT | Performed by: INTERNAL MEDICINE

## 2017-02-22 ENCOUNTER — OFFICE VISIT (OUTPATIENT)
Dept: FAMILY MEDICINE CLINIC | Age: 57
End: 2017-02-22

## 2017-02-22 VITALS
BODY MASS INDEX: 42.37 KG/M2 | OXYGEN SATURATION: 95 % | TEMPERATURE: 97.7 F | SYSTOLIC BLOOD PRESSURE: 130 MMHG | RESPIRATION RATE: 18 BRPM | WEIGHT: 248.2 LBS | HEART RATE: 80 BPM | DIASTOLIC BLOOD PRESSURE: 78 MMHG | HEIGHT: 64 IN

## 2017-02-22 DIAGNOSIS — N28.9 RENAL INSUFFICIENCY: ICD-10-CM

## 2017-02-22 DIAGNOSIS — F17.200 SMOKING: ICD-10-CM

## 2017-02-22 DIAGNOSIS — Z87.39 H/O: GOUT: ICD-10-CM

## 2017-02-22 DIAGNOSIS — G45.9 TRANSIENT CEREBRAL ISCHEMIA, UNSPECIFIED TYPE: Primary | ICD-10-CM

## 2017-02-22 DIAGNOSIS — M79.89 LEG SWELLING: ICD-10-CM

## 2017-02-22 DIAGNOSIS — K59.00 CONSTIPATION, UNSPECIFIED CONSTIPATION TYPE: ICD-10-CM

## 2017-02-22 DIAGNOSIS — R06.02 SOB (SHORTNESS OF BREATH) ON EXERTION: ICD-10-CM

## 2017-02-22 DIAGNOSIS — R06.2 WHEEZING: ICD-10-CM

## 2017-02-22 DIAGNOSIS — G47.30 SLEEP APNEA, UNSPECIFIED TYPE: ICD-10-CM

## 2017-02-22 DIAGNOSIS — I10 ESSENTIAL HYPERTENSION: ICD-10-CM

## 2017-02-22 DIAGNOSIS — E11.9 TYPE 2 DIABETES MELLITUS WITHOUT COMPLICATION, WITHOUT LONG-TERM CURRENT USE OF INSULIN (HCC): ICD-10-CM

## 2017-02-22 RX ORDER — BUDESONIDE AND FORMOTEROL FUMARATE DIHYDRATE 160; 4.5 UG/1; UG/1
2 AEROSOL RESPIRATORY (INHALATION) 2 TIMES DAILY
Qty: 1 INHALER | Refills: 0 | Status: SHIPPED | OUTPATIENT
Start: 2017-02-22

## 2017-02-22 RX ORDER — FUROSEMIDE 20 MG/1
TABLET ORAL
Qty: 30 TAB | Refills: 0 | Status: SHIPPED | OUTPATIENT
Start: 2017-02-22 | End: 2017-03-13 | Stop reason: SDUPTHER

## 2017-02-22 NOTE — PROGRESS NOTES
HISTORY OF PRESENT ILLNESS  Lalita Leo is a 64 y.o. male. HPI: Here for follow up on hospital discharge. Had feeling of numbness over left side. Was diagnosed with TIA. Review hospital discharge summery. Had MRI of brain, neck and MRA of brain. No acute finding. Also had echo done which showed no acute changes as well. Chest x-ray no acute changes. Noted on labs elevated cr. Currently having leg swelling bilaterally and per wife he is having exertional sob. No cough or cold. Said still smoking. Wheezing on and off. Type 2 diabetes and being non compliant with making an appt with endocrinologist. Now showed a paper indicating appt with endocrinologist in April. Currently taking NPH 30 units in am and 30 units in pm. Not much compliant with diet modification. H/o hypertension. On medication. Currently vitals stable. H/o gout. Uric acid wnl. Also was on uloric and was told to hold due to renal insufficiency. Currently no redness or swelling of joint. Lab Results   Component Value Date/Time    Sodium 142 02/10/2017 04:38 AM    Potassium 3.1 02/10/2017 04:38 AM    Chloride 104 02/10/2017 04:38 AM    CO2 31 02/10/2017 04:38 AM    Anion gap 7 02/10/2017 04:38 AM    Glucose 58 02/10/2017 04:38 AM    BUN 14 02/10/2017 04:38 AM    Creatinine 1.95 02/10/2017 04:38 AM    BUN/Creatinine ratio 7 02/10/2017 04:38 AM    GFR est AA 43 02/10/2017 04:38 AM    GFR est non-AA 36 02/10/2017 04:38 AM    Calcium 8.6 02/10/2017 04:38 AM    Bilirubin, total 0.2 02/09/2017 04:00 AM    AST (SGOT) 15 02/09/2017 04:00 AM    Alk.  phosphatase 153 02/09/2017 04:00 AM    Protein, total 6.4 02/09/2017 04:00 AM    Protein, total 5.3 02/09/2017 04:00 AM    Albumin 2.1 02/09/2017 04:00 AM    Globulin 4.3 02/09/2017 04:00 AM    A-G Ratio 0.5 02/09/2017 04:00 AM    ALT (SGPT) 18 02/09/2017 04:00 AM     Lab Results   Component Value Date/Time    Hemoglobin A1c 7.4 02/09/2017 04:00 AM    Hemoglobin A1c (POC) 6.7 04/16/2015 08:48 AM Hemoglobin A1c, External 6.5 11/12/2015     Lab Results   Component Value Date/Time    Cholesterol, total 163 02/08/2017 05:18 AM    HDL Cholesterol 41 02/08/2017 05:18 AM    LDL, calculated 64.4 02/08/2017 05:18 AM    VLDL, calculated 57.6 02/08/2017 05:18 AM    Triglyceride 288 02/08/2017 05:18 AM    CHOL/HDL Ratio 4.0 02/08/2017 05:18 AM     Lab Results   Component Value Date/Time    WBC 9.9 02/09/2017 04:00 AM    Hemoglobin (POC) 13.9 03/26/2014 08:42 AM    HGB 10.3 02/09/2017 04:00 AM    Hematocrit (POC) 41 03/26/2014 08:42 AM    HCT 33.3 02/09/2017 04:00 AM    PLATELET 367 75/57/6556 04:00 AM    MCV 87.6 02/09/2017 04:00 AM     Lab Results   Component Value Date/Time    TSH 0.55 02/09/2017 04:00 AM     Lab Results   Component Value Date/Time    Uric acid 5.4 02/09/2017 04:00 AM       ROS : see HPI   Physical Exam   Constitutional: He is oriented to person, place, and time. No distress. Neck: No thyromegaly present. Cardiovascular: Normal rate, regular rhythm and normal heart sounds. Pulmonary/Chest:   CTA   Abdominal: Soft. Bowel sounds are normal. There is no tenderness. Musculoskeletal: He exhibits edema. Neurological: He is oriented to person, place, and time. Psychiatric: His behavior is normal.       ASSESSMENT and PLAN    ICD-10-CM ICD-9-CM    1. Transient cerebral ischemia, unspecified type/ left side numbness : work up at the hospital negative. On aspirin, statin and antihypertensive. Vitals stable. LDL 64.  G45.9 435.9    2. Essential hypertension; stable. Continue current management. Due to leg swelling will d/c norvasc for now. I10 401.9    3. Renal insufficiency: will repeat labs in 2 wks before next visit. For now starting lasix due to leg swelling and sob. If no improvement symptomatically will consider checking BNP. For now has coming up appt with cardiology on 28th feb. N28.9 593.9    4. H/O: gout: back on uloric.  Will consider rheumatology referral due to recurrent episode on medication. Z87.39 V12.29 REFERRAL TO RHEUMATOLOGY   5. Type 2 diabetes mellitus without complication, without long-term current use of insulin ([address]): elevated HBA1C. Adjusted insulin dose. 32 units in am and 36 units at pm. Also discussed diet modification. E11.9 250.00    6. Smoking: cessation counseling done but not ready to quit. F17.200 305.1 REFERRAL TO PULMONARY DISEASE      budesonide-formoterol (SYMBICORT) 160-4.5 mcg/actuation HFA inhaler      PULMONARY FUNCTION TEST   7. Wheezing: for now adding symbicort. Long term history of smoking. Will consider PFT  R06.2 786.07 REFERRAL TO PULMONARY DISEASE      budesonide-formoterol (SYMBICORT) 160-4.5 mcg/actuation HFA inhaler      PULMONARY FUNCTION TEST   8. SOB (shortness of breath) on exertion: recent chest x-ray negative. Recent echo no changed from prior. Leg swelling. Coming up appt with cardiology. For now will consider pulmonary evaluation. R06.02 786.05 REFERRAL TO PULMONARY DISEASE      budesonide-formoterol (SYMBICORT) 160-4.5 mcg/actuation HFA inhaler      PULMONARY FUNCTION TEST   9. Leg swelling: given lasix and repeat BMP in 2 wks. Discussed with pt that it can affect kidney function but will observe closely. He has coming up appt with nephrologist and will call back with appt details. M79.89 729.81 furosemide (LASIX) 20 mg tablet      METABOLIC PANEL, BASIC   10. Constipation, unspecified constipation type: from pain medication. Will observe with symptomatic treatment. Advised metamucil daily  K59.00 564.00    11. Sleep apnea, unspecified type: sending for evaluation. G47.30 780.57 REFERRAL TO SLEEP STUDIES   Pt understood and agrees with above plan. Review    Follow-up Disposition:  Return in about 2 weeks (around 3/8/2017).

## 2017-02-22 NOTE — PATIENT INSTRUCTIONS
Take lasix one pill daily as needed for leg swelling. Stop amlodipine for now. Give us call back with time an appt date for nephrology/ kidney doctor  Need to keep appt with endocrinologist.   For now take insulin 32 units in am and 36 units at pm.   Keep an appt with cardiologist on 28th with Dr. Nishant Alonso . Bring all your home medication along with you on follow up visit for 2 wks. Shortness of Breath: Care Instructions  Your Care Instructions  Shortness of breath has many causes. Sometimes conditions such as anxiety can lead to shortness of breath. Some people get mild shortness of breath when they exercise. Trouble breathing also can be a symptom of a serious problem, such as asthma, lung disease, emphysema, heart problems, and pneumonia. If your shortness of breath continues, you may need tests and treatment. Watch for any changes in your breathing and other symptoms. Follow-up care is a key part of your treatment and safety. Be sure to make and go to all appointments, and call your doctor if you are having problems. Its also a good idea to know your test results and keep a list of the medicines you take. How can you care for yourself at home? · Do not smoke or allow others to smoke around you. If you need help quitting, talk to your doctor about stop-smoking programs and medicines. These can increase your chances of quitting for good. · Get plenty of rest and sleep. · Take your medicines exactly as prescribed. Call your doctor if you think you are having a problem with your medicine. · Find healthy ways to deal with stress. ¨ Exercise daily. ¨ Get plenty of sleep. ¨ Eat regularly and well. When should you call for help? Call 911 anytime you think you may need emergency care. For example, call if:  · You have severe shortness of breath. · You have symptoms of a heart attack. These may include:  ¨ Chest pain or pressure, or a strange feeling in the chest.  ¨ Sweating.   ¨ Shortness of breath. ¨ Nausea or vomiting. ¨ Pain, pressure, or a strange feeling in the back, neck, jaw, or upper belly or in one or both shoulders or arms. ¨ Lightheadedness or sudden weakness. ¨ A fast or irregular heartbeat. After you call 911, the  may tell you to chew 1 adult-strength or 2 to 4 low-dose aspirin. Wait for an ambulance. Do not try to drive yourself. Call your doctor now or seek immediate medical care if:  · Your shortness of breath gets worse or you start to wheeze. Wheezing is a high-pitched sound when you breathe. · You wake up at night out of breath or have to prop your head up on several pillows to breathe. · You are short of breath after only light activity or while at rest.  Watch closely for changes in your health, and be sure to contact your doctor if:  · You do not get better over the next 1 to 2 days. Where can you learn more? Go to http://leslie-graciela.info/. Enter S780 in the search box to learn more about \"Shortness of Breath: Care Instructions. \"  Current as of: May 23, 2016  Content Version: 11.1  © 5824-0155 [employer]. Care instructions adapted under license by [employer] (which disclaims liability or warranty for this information). If you have questions about a medical condition or this instruction, always ask your healthcare professional. [place] [address] any warranty or liability for your use of this information. Low Sodium Diet (2,000 Milligram): Care Instructions  Your Care Instructions  Too much sodium causes your body to hold on to extra water. This can raise your blood pressure and force your heart and kidneys to work harder. In very serious cases, this could cause you to be put in the hospital. It might even be life-threatening. By limiting sodium, you will feel better and lower your risk of serious problems. The most common source of sodium is salt.  People get most of the salt in their diet from canned, prepared, and packaged foods. Fast food and restaurant meals also are very high in sodium. Your doctor will probably limit your sodium to less than 2,000 milligrams (mg) a day. This limit counts all the sodium in prepared and packaged foods and any salt you add to your food. Follow-up care is a key part of your treatment and safety. Be sure to make and go to all appointments, and call your doctor if you are having problems. It's also a good idea to know your test results and keep a list of the medicines you take. How can you care for yourself at home? Read food labels  · Read labels on cans and food packages. The labels tell you how much sodium is in each serving. Make sure that you look at the serving size. If you eat more than the serving size, you have eaten more sodium. · Food labels also tell you the Percent Daily Value for sodium. Choose products with low Percent Daily Values for sodium. · Be aware that sodium can come in forms other than salt, including monosodium glutamate (MSG), sodium citrate, and sodium bicarbonate (baking soda). MSG is often added to Asian food. When you eat out, you can sometimes ask for food without MSG or added salt. Buy low-sodium foods  · Buy foods that are labeled \"unsalted\" (no salt added), \"sodium-free\" (less than 5 mg of sodium per serving), or \"low-sodium\" (less than 140 mg of sodium per serving). Foods labeled \"reduced-sodium\" and \"light sodium\" may still have too much sodium. Be sure to read the label to see how much sodium you are getting. · Buy fresh vegetables, or frozen vegetables without added sauces. Buy low-sodium versions of canned vegetables, soups, and other canned goods. Prepare low-sodium meals  · Cut back on the amount of salt you use in cooking. This will help you adjust to the taste. Do not add salt after cooking. One teaspoon of salt has about 2,300 mg of sodium. · Take the salt shaker off the table.   · Flavor your food with garlic, lemon juice, onion, vinegar, herbs, and spices. Do not use soy sauce, lite soy sauce, steak sauce, onion salt, garlic salt, celery salt, mustard, or ketchup on your food. · Use low-sodium salad dressings, sauces, and ketchup. Or make your own salad dressings and sauces without adding salt. · Use less salt (or none) when recipes call for it. You can often use half the salt a recipe calls for without losing flavor. Other foods such as rice, pasta, and grains do not need added salt. · Rinse canned vegetables, and cook them in fresh water. This removes some--but not all--of the salt. · Avoid water that is naturally high in sodium or that has been treated with water softeners, which add sodium. Call your local water company to find out the sodium content of your water supply. If you buy bottled water, read the label and choose a sodium-free brand. Avoid high-sodium foods  · Avoid eating:  ¨ Smoked, cured, salted, and canned meat, fish, and poultry. ¨ Ham, day, hot dogs, and luncheon meats. ¨ Regular, hard, and processed cheese and regular peanut butter. ¨ Crackers with salted tops, and other salted snack foods such as pretzels, chips, and salted popcorn. ¨ Frozen prepared meals, unless labeled low-sodium. ¨ Canned and dried soups, broths, and bouillon, unless labeled sodium-free or low-sodium. ¨ Canned vegetables, unless labeled sodium-free or low-sodium. ¨ Western [place] fries, pizza, tacos, and other fast foods. ¨ Pickles, olives, ketchup, and other condiments, especially soy sauce, unless labeled sodium-free or low-sodium. Where can you learn more? Go to http://leslie-graciela.info/. Enter S020 in the search box to learn more about \"Low Sodium Diet (2,000 Milligram): Care Instructions. \"  Current as of: July 26, 2016  Content Version: 11.1  © 0376-7651 [employer].  Care instructions adapted under license by [employer] (which disclaims liability or warranty for this information). If you have questions about a medical condition or this instruction, always ask your healthcare professional. [address] any warranty or liability for your use of this information. Diabetic Nephropathy: Care Instructions  Your Care Instructions  Finding out that your kidneys have been damaged can be very distressing. It may have taken you by surprise, since damage to kidneys usually does not cause symptoms early on. It is normal to feel upset and afraid. Having diabetic nephropathy means that for some time you have had high blood sugar, which damages the kidneys. Healthy kidneys keep protein in your blood, where it belongs. Damaged kidneys do not work the way they should. Your kidneys are letting protein pass into your urine. Sometimes diabetic kidney disease can lead to kidney failure. Your doctor will tell you how you might be able to slow damage to your kidneys. In many cases, prompt and regular treatment can prevent kidney failure. You will need to take medicine and may need to make a number of changes in your normal routines. If you can keep your blood sugar and blood pressure under control and take certain medicines, you may reduce your chance of kidney failure. Follow-up care is a key part of your treatment and safety. Be sure to make and go to all appointments, and call your doctor if you are having problems. It's also a good idea to know your test results and keep a list of the medicines you take. How can you care for yourself at home? · Take your medicines exactly as prescribed. It is very important that you take your insulin or other diabetes medicine as your doctor tells you. Call your doctor if you think you are having a problem with your medicine. · Try to keep blood sugar in your target range. ¨ Eat a variety of foods, with carbohydrate spread out in your meals. Your doctor may restrict your protein. A dietitian can help you plan meals.   ¨ If your doctor recommends it, get more exercise. Walking is a good choice. Bit by bit, increase the amount you walk every day. Try for at least 30 minutes on most days of the week. ¨ Check your blood sugar as often as your doctor recommends. · Take and record your blood pressure at home if your doctor tells you to. Learn the importance of the two measures of blood pressure (such as 130 over 80, or 130/80). To take your blood pressure at home:  ¨ Ask your doctor to check your blood pressure monitor. He or she can make sure that it is accurate and that the cuff fits you. Also ask your doctor to watch you to make sure that you are using it right. ¨ Do not eat, use tobacco products, or use medicine known to raise blood pressure (such as some nasal decongestant sprays) before taking your blood pressure. ¨ Avoid taking your blood pressure if you have just exercised or are nervous or upset. Rest at least 15 minutes before taking a reading. · Eat a low-salt diet to help keep your blood pressure in your target range. · Do not smoke. Smoking raises your risk of many health problems, including diabetic nephropathy. If you need help quitting, talk to your doctor about stop-smoking programs and medicines. These can increase your chances of quitting for good. · Do not take ibuprofen, naproxen, or similar medicines, unless your doctor tells you to. These medicines may make kidney problems worse. When should you call for help? Call 911 anytime you think you may need emergency care. For example, call if:  · You passed out (lost consciousness). Call your doctor now or seek immediate medical care if:  · You have not urinated at all in the last 24 hours. · You have trouble urinating or can urinate only very small amounts. · You are confused or have trouble thinking clearly. · You are very thirsty, lightheaded, or dizzy, or you feel like you may pass out (lose consciousness). · You have a weak, fast heartbeat.   · You have swelling in your hands or feet. · You have blood in your urine. · You are extremely tired or weak. Watch closely for changes in your health, and be sure to contact your doctor if you have any problems. Where can you learn more? Go to http://leslie-graciela.info/. Enter P361 in the search box to learn more about \"Diabetic Nephropathy: Care Instructions. \"  Current as of: April 5, 2016  Content Version: 11.1  © 2006-2016 [employer]. Care instructions adapted under license by [employer] (which disclaims liability or warranty for this information). If you have questions about a medical condition or this instruction, always ask your healthcare professional. [address] any warranty or liability for your use of this information. Kidney Disease and High Blood Pressure: Care Instructions  Your Care Instructions  Long-term (chronic) kidney disease happens when the kidneys cannot remove waste and keep your body's fluids and chemicals in balance. Usually, the kidneys remove waste from the blood through the urine. When the kidneys are not working well, waste can build up so much that it poisons the body. Kidney disease can make you very tired. It also can cause swelling, or edema, in your legs or other areas of your body. High blood pressure is one of the major causes of chronic kidney disease. And kidney disease can also cause high blood pressure. No matter which came first, having high blood pressure damages the tiny blood vessels in the kidneys. If you have high blood pressure, it is important to lower it. There are many things you can do to lower your blood pressure, which may help slow or stop the damage to your kidneys. Follow-up care is a key part of your treatment and safety. Be sure to make and go to all appointments, and call your doctor if you are having problems.  It's also a good idea to know your test results and keep a list of the medicines you take.  How can you care for yourself at home? · Be safe with medicines. Take your medicines exactly as prescribed. Call your doctor if you have any problems with your medicine. You will probably need more than one medicine to lower your blood pressure. You will get more details on the specific medicines your doctor prescribes. · Work with your doctor and a dietitian to plan meals that have the right amount of nutrients for you. You will probably have to limit salt, fluids, and protein. · Stay at a healthy weight. This is very important if you put on weight around the waist. Losing even 10 pounds can help you lower your blood pressure. · Manage other health problems such as diabetes and high cholesterol. You can help lower your risk for heart disease and blood vessel problems with a healthy lifestyle along with medicines. · Do not take ibuprofen (Advil, Motrin) or naproxen (Aleve), or similar medicines, unless your doctor tells you to. They may make chronic kidney disease worse. It is okay to take acetaminophen (Tylenol). · If your doctor recommends it, get more exercise. Walking is a good choice. Bit by bit, increase the amount you walk every day. Try for at least 30 minutes on most days of the week. You also may want to swim, bike, or do other activities. · Limit or avoid alcohol. Talk to your doctor about whether you can drink any alcohol. · Do not smoke or allow others to smoke around you. If you need help quitting, talk to your doctor about stop-smoking programs and medicines. These can increase your chances of quitting for good. When should you call for help? Call 911 anytime you think you may need emergency care. For example, call if:  · You passed out (lost consciousness). · You have symptoms of a heart attack, such as:  ¨ Chest pain or pressure. ¨ Sweating. ¨ Shortness of breath. ¨ Nausea or vomiting.   ¨ Pain that spreads from the chest to the neck, jaw, or one or both shoulders or arms.  ¨ Dizziness or lightheadedness. ¨ A fast or uneven pulse. After calling 911, chew 1 adult-strength aspirin. Wait for an ambulance. Do not try to drive yourself. Call your doctor now or seek immediate medical care if:  · You are confused or are more tired or weak than usual.  · You bleed or have bruises. Watch closely for changes in your health, and be sure to contact your doctor if:  · You do not want to eat. · You have new swelling of your arms or feet, or swelling that you already have gets worse. · You do not get better as expected. Where can you learn more? Go to http://leslieMattscloset.comgraciela.info/. Enter K704 in the search box to learn more about \"Kidney Disease and High Blood Pressure: Care Instructions. \"  Current as of: November 20, 2015  Content Version: 11.1  © 8683-8478 [employer]. Care instructions adapted under license by [employer] (which disclaims liability or warranty for this information). If you have questions about a medical condition or this instruction, always ask your healthcare professional. [address] any warranty or liability for your use of this information. Learning About Diabetes Food Guidelines  Your Care Instructions  Meal planning is important to manage diabetes. It helps keep your blood sugar at a target level (which you set with your doctor). You don't have to eat special foods. You can eat what your family eats, including sweets once in a while. But you do have to pay attention to how often you eat and how much you eat of certain foods. You may want to work with a dietitian or a certified diabetes educator (CDE) to help you plan meals and snacks. A dietitian or CDE can also help you lose weight if that is one of your goals. What should you know about eating carbs? Managing the amount of carbohydrate (carbs) you eat is an important part of healthy meals when you have diabetes.  Carbohydrate is found in many foods. · Learn which foods have carbs. And learn the amounts of carbs in different foods. ¨ Bread, cereal, pasta, and rice have about 15 grams of carbs in a serving. A serving is 1 slice of bread (1 ounce), ½ cup of cooked cereal, or 1/3 cup of cooked pasta or rice. ¨ Fruits have 15 grams of carbs in a serving. A serving is 1 small fresh fruit, such as an apple or orange; ½ of a banana; ½ cup of cooked or canned fruit; ½ cup of fruit juice; 1 cup of melon or raspberries; or 2 tablespoons of dried fruit. ¨ Milk and no-sugar-added yogurt have 15 grams of carbs in a serving. A serving is 1 cup of milk or 2/3 cup of no-sugar-added yogurt. ¨ Starchy vegetables have 15 grams of carbs in a serving. A serving is ½ cup of mashed potatoes or sweet potato; 1 cup winter squash; ½ of a small baked potato; ½ cup of cooked beans; or ½ cup cooked corn or green peas. · Learn how much carbs to eat each day and at each meal. A dietitian or CDE can teach you how to keep track of the amount of carbs you eat. This is called carbohydrate counting. · If you are not sure how to count carbohydrate grams, use the Plate Method to plan meals. It is a good, quick way to make sure that you have a balanced meal. It also helps you spread carbs throughout the day. ¨ Divide your plate by types of foods. Put non-starchy vegetables on half the plate, meat or other protein food on one-quarter of the plate, and a grain or starchy vegetable in the final quarter of the plate. To this you can add a small piece of fruit and 1 cup of milk or yogurt, depending on how many carbs you are supposed to eat at a meal.  · Try to eat about the same amount of carbs at each meal. Do not \"save up\" your daily allowance of carbs to eat at one meal.  · Proteins have very little or no carbs per serving. Examples of proteins are beef, chicken, turkey, fish, eggs, tofu, cheese, cottage cheese, and peanut butter.  A serving size of meat is 3 ounces, which is about the size of a deck of cards. Examples of meat substitute serving sizes (equal to 1 ounce of meat) are 1/4 cup of cottage cheese, 1 egg, 1 tablespoon of peanut butter, and ½ cup of tofu. How can you eat out and still eat healthy? · Learn to estimate the serving sizes of foods that have carbohydrate. If you measure food at home, it will be easier to estimate the amount in a serving of restaurant food. · If the meal you order has too much carbohydrate (such as potatoes, corn, or baked beans), ask to have a low-carbohydrate food instead. Ask for a salad or green vegetables. · If you use insulin, check your blood sugar before and after eating out to help you plan how much to eat in the future. · If you eat more carbohydrate at a meal than you had planned, take a walk or do other exercise. This will help lower your blood sugar. What else should you know? · Limit saturated fat, such as the fat from meat and dairy products. This is a healthy choice because people who have diabetes are at higher risk of heart disease. So choose lean cuts of meat and nonfat or low-fat dairy products. Use olive or canola oil instead of butter or shortening when cooking. · Don't skip meals. Your blood sugar may drop too low if you skip meals and take insulin or certain medicines for diabetes. · Check with your doctor before you drink alcohol. Alcohol can cause your blood sugar to drop too low. Alcohol can also cause a bad reaction if you take certain diabetes medicines. Follow-up care is a key part of your treatment and safety. Be sure to make and go to all appointments, and call your doctor if you are having problems. It's also a good idea to know your test results and keep a list of the medicines you take. Where can you learn more? Go to http://leslie-graciela.info/. Enter E323 in the search box to learn more about \"Learning About Diabetes Food Guidelines. \"  Current as of: May 23, 2016  Content Version: 11.1  © 9462-4902 [employer]. Care instructions adapted under license by [employer] (which disclaims liability or warranty for this information). If you have questions about a medical condition or this instruction, always ask your healthcare professional. [address] any warranty or liability for your use of this information. Chronic Obstructive Pulmonary Disease (COPD): Care Instructions  Your Care Instructions    Chronic obstructive pulmonary disease (COPD) is a general term for a group of lung diseases, including emphysema and chronic bronchitis. People with COPD have decreased airflow in and out of the lungs, which makes it hard to breathe. The airways also can get clogged with thick mucus. Cigarette smoking is a major cause of COPD. Although there is no cure for COPD, you can slow its progress. Following your treatment plan and taking care of yourself can help you feel better and live longer. Follow-up care is a key part of your treatment and safety. Be sure to make and go to all appointments, and call your doctor if you are having problems. It's also a good idea to know your test results and keep a list of the medicines you take. How can you care for yourself at home? Staying healthy  · Do not smoke. This is the most important step you can take to prevent more damage to your lungs. If you need help quitting, talk to your doctor about stop-smoking programs and medicines. These can increase your chances of quitting for good. · Avoid colds and flu. Get a pneumococcal vaccine shot. If you have had one before, ask your doctor whether you need a second dose. Get the flu vaccine every fall. If you must be around people with colds or the flu, wash your hands often. · Avoid secondhand smoke, air pollution, and high altitudes. Also avoid cold, dry air and hot, humid air. Stay at home with your windows closed when air pollution is bad.   Medicines and oxygen therapy  · Take your medicines exactly as prescribed. Call your doctor if you think you are having a problem with your medicine. · You may be taking medicines such as:  ¨ Bronchodilators. These help open your airways and make breathing easier. Bronchodilators are either short-acting (work for 6 to 9 hours) or long-acting (work for 24 hours). You inhale most bronchodilators, so they start to act quickly. Always carry your quick-relief inhaler with you in case you need it while you are away from home. ¨ Corticosteroids (prednisone, budesonide). These reduce airway inflammation. They come in pill or inhaled form. You must take these medicines every day for them to work well. · A spacer may help you get more inhaled medicine to your lungs. Ask your doctor or pharmacist if a spacer is right for you. If it is, ask how to use it properly. · Do not take any vitamins, over-the-counter medicine, or herbal products without talking to your doctor first.  · If your doctor prescribed antibiotics, take them as directed. Do not stop taking them just because you feel better. You need to take the full course of antibiotics. · Oxygen therapy boosts the amount of oxygen in your blood and helps you breathe easier. Use the flow rate your doctor has recommended, and do not change it without talking to your doctor first.  Activity  · Get regular exercise. Walking is an easy way to get exercise. Start out slowly, and walk a little more each day. · Pay attention to your breathing. You are exercising too hard if you cannot talk while you are exercising. · Take short rest breaks when doing household chores and other activities. · Learn breathing methods--such as breathing through pursed lips--to help you become less short of breath. · If your doctor has not set you up with a pulmonary rehabilitation program, talk to him or her about whether rehab is right for you.  Rehab includes exercise programs, education about your disease and how to manage it, help with diet and other changes, and emotional support. Diet  · Eat regular, healthy meals. Use bronchodilators about 1 hour before you eat to make it easier to eat. Eat several small meals instead of three large ones. Drink beverages at the end of the meal. Avoid foods that are hard to chew. · Eat foods that contain protein so that you do not lose muscle mass. Mental health  · Talk to your family, friends, or a therapist about your feelings. It is normal to feel frightened, angry, hopeless, helpless, and even guilty. Talking openly about bad feelings can help you cope. If these feelings last, talk to your doctor. When should you call for help? Call 911 anytime you think you may need emergency care. For example, call if:  · You have severe trouble breathing. Call your doctor now or seek immediate medical care if:  · You have new or worse trouble breathing. · You cough up blood. · You have a fever. Watch closely for changes in your health, and be sure to contact your doctor if:  · You cough more deeply or more often, especially if you notice more mucus or a change in the color of your mucus. · You have new or worse swelling in your legs or belly. · You are not getting better as expected. Where can you learn more? Go to http://leslie-graciela.info/. Raymon Castro in the search box to learn more about \"Chronic Obstructive Pulmonary Disease (COPD): Care Instructions. \"  Current as of: May 23, 2016  Content Version: 11.1  © 8379-3732 [employer]. Care instructions adapted under license by [employer] (which disclaims liability or warranty for this information). If you have questions about a medical condition or this instruction, always ask your healthcare professional. [address] any warranty or liability for your use of this information.

## 2017-02-22 NOTE — MR AVS SNAPSHOT
Visit Information Date & Time Provider Department Dept. Phone Encounter #  
 2/22/2017 10:00 AM Tiera Uriarte, [address] [phone] [id number] Follow-up Instructions Return in about 2 weeks (around 3/8/2017). Your Appointments 2/28/2017  2:45 PM  
ESTABLISHED PATIENT with Zaria Kumar MD  
[facility]) Appt Note: past due [address], [address] [facility] [address]  
[address], [address] [facility] [address] [place] [address] 3/13/2017  1:40 PM  
Follow Up with Shayy Crain MD  
[facility] [address]) Appt Note: [facility] f/u; lower extremity weakness; Dr. Meg Velázquez; notes in cc; new pt pkt mld. ...ywp  
 [address] [id number] [phone]  
  
   
 [facility] [id number] 5/16/2017 10:40 AM  
Follow Up with Anny Uribe DO [facility] ([address]) Appt Note: Return in about 6 months; .  
 [address] [facility] [address] [address] [address] [address] [phone] [address] [place] [address] [address] Upcoming Health Maintenance Date Due COLONOSCOPY 3/9/2015 EYE EXAM RETINAL OR DILATED Q1 3/1/2017 FOOT EXAM Q1 3/8/2017 Pneumococcal 19-64 Highest Risk (2 of 3 - PCV13) 7/12/2017 HEMOGLOBIN A1C Q6M 8/9/2017 MICROALBUMIN Q1 10/27/2017 LIPID PANEL Q1 2/8/2018 DTaP/Tdap/Td series (2 - Td) 7/12/2026 Allergies as of 2/22/2017  Review Complete On: 2/22/2017 By: Jae Eng LPN Severity Noted Reaction Type Reactions Shellfish Derived  05/25/2016    Other (comments) Causes Gout Current Immunizations  Reviewed on 2/7/2017 No immunizations on file. Not reviewed this visit You Were Diagnosed With   
  
 Codes Comments Transient cerebral ischemia, unspecified type    -  Primary ICD-10-CM: G45.9 ICD-9-CM: 435.9 Essential hypertension     ICD-10-CM: I10 
ICD-9-CM: 401.9 Renal insufficiency     ICD-10-CM: N28.9 ICD-9-CM: 593.9 H/O: gout     ICD-10-CM: Z87.39 
ICD-9-CM: V12.29 Type 2 diabetes mellitus without complication, without long-term current use of insulin (HCC)     ICD-10-CM: E11.9 ICD-9-CM: 250.00 Smoking     ICD-10-CM: F17.200 ICD-9-CM: 305.1 Wheezing     ICD-10-CM: R06.2 ICD-9-CM: 786.07   
 SOB (shortness of breath) on exertion     ICD-10-CM: R06.02 
ICD-9-CM: 786.05 Leg swelling     ICD-10-CM: M79.89 ICD-9-CM: 729.81 Constipation, unspecified constipation type     ICD-10-CM: K59.00 ICD-9-CM: 564.00 Sleep apnea, unspecified type     ICD-10-CM: G47.30 ICD-9-CM: 780.57 Vitals BP  
  
  
  
  
  
 130/78 (BP 1 Location: Right arm, BP Patient Position: Sitting) BMI and BSA Data Body Mass Index Body Surface Area  
 42.6 kg/m 2 2.25 m 2 Preferred Pharmacy Pharmacy Name Phone [facility] [address] [place] [phone] Your Updated Medication List  
  
   
This list is accurate as of: 2/22/17 10:49 AM.  Always use your most recent med list.  
  
  
  
  
 acetaminophen 650 mg CR tablet Commonly known as:  TYLENOL Take 650 mg by mouth every six (6) hours as needed for Pain. albuterol 90 mcg/actuation inhaler Commonly known as:  PROVENTIL HFA, VENTOLIN HFA, PROAIR HFA Take 2 Puffs by inhalation every four (4) hours as needed for Wheezing. aspirin 81 mg chewable tablet Take 1 Tab by mouth daily. Blood-Glucose Meter monitoring kit Check twice daily. budesonide-formoterol 160-4.5 mcg/actuation HFA inhaler Commonly known as:  SYMBICORT Take 2 Puffs by inhalation two (2) times a day. calcitRIOL 0.25 mcg capsule Commonly known as:  ROCALTROL Take 1 Cap by mouth every Monday, Wednesday, Friday. carvedilol 25 mg tablet Commonly known as:  Brain Marisa Take 1 Tab by mouth two (2) times daily (with meals). cloNIDine HCl 0.1 mg tablet Commonly known as:  CATAPRES Take 1 Tab by mouth two (2) times a day. febuxostat 40 mg Tab tablet Commonly known as:  [place] Take 1 Tab by mouth daily. furosemide 20 mg tablet Commonly known as:  LASIX  
1 tab daily as needed for leg swelling. glucose blood VI test strips strip Commonly known as:  Ascensia CONTOUR  
contour test trips. hydrALAZINE 100 mg tablet Commonly known as:  APRESOLINE Take 1 Tab by mouth three (3) times daily. Indications: hypertension Insulin Needles (Disposable) 29 gauge x 5/16\" Ndle 40 Units by Does Not Apply route two (2) times a day. insulin NPH/insulin regular 100 unit/mL (70-30) injection Commonly known as:  NovoLIN 70/30  
30 UNITS SUBCUTANEOUSLY TWICE DAILY. Do not take it blood sugar is less than 100 Lancets Misc Check 3 times a day , needs according contour glucometer Prescriptions Sent to Pharmacy Refills  
 furosemide (LASIX) 20 mg tablet 0 Si tab daily as needed for leg swelling. Class: Normal  
 Pharmacy: [facility] [address] [place] [address], [address] [place] [address] Ph #: [phone]  
 budesonide-formoterol (SYMBICORT) 160-4.5 mcg/actuation HFA inhaler 0 Sig: Take 2 Puffs by inhalation two (2) times a day. Class: Normal  
 Pharmacy: [facility] [address] [place], [place] [address]osh [address] [place] [address] Ph #: [phone] Route: Inhalation We Performed the Following REFERRAL TO PULMONARY DISEASE [PNO77 Custom] Comments:  
 Please evaluate patient for sob on exertion REFERRAL TO RHEUMATOLOGY [MIP04 Custom] Comments:  
 Please evaluate patient for gout treatment REFERRAL TO SLEEP STUDIES [REF99 Custom] Comments:  
 Please evaluate patient for sleep apnea evaluation Follow-up Instructions Return in about 2 weeks (around 3/8/2017). To-Do List   
 02/22/2017 Lab:  METABOLIC PANEL, BASIC   
  
 02/22/2017 PFT:  PULMONARY FUNCTION TEST Referral Information Referral ID Referred By Referred To  
  
 [id number] [facility] R Not Available Visits Status Start Date End Date 1 New Request 2/22/17 2/22/18 If your referral has a status of pending review or denied, additional information will be sent to support the outcome of this decision. Referral ID Referred By Referred To  
 [id number] [facility] R Not Available Visits Status Start Date End Date 1 New Request 2/22/17 2/22/18 If your referral has a status of pending review or denied, additional information will be sent to support the outcome of this decision. Referral ID Referred By Referred To  
 [id number] [facility] R Not Available Visits Status Start Date End Date 1 New Request 2/22/17 2/22/18 If your referral has a status of pending review or denied, additional information will be sent to support the outcome of this decision. Patient Instructions Take lasix one pill daily as needed for leg swelling. Stop amlodipine for now. Give us call back with time an appt date for nephrology/ kidney doctor Need to keep appt with endocrinologist.  
For now take insulin 32 units in am and 36 units at pm.  
Keep an appt with cardiologist on 28th with Dr. Susie Paidlla . Bring all your home medication along with you on follow up visit for 2 wks. Shortness of Breath: Care Instructions Your Care Instructions Shortness of breath has many causes. Sometimes conditions such as anxiety can lead to shortness of breath. Some people get mild shortness of breath when they exercise.  Trouble breathing also can be a symptom of a serious problem, such as asthma, lung disease, emphysema, heart problems, and pneumonia. If your shortness of breath continues, you may need tests and treatment. Watch for any changes in your breathing and other symptoms. Follow-up care is a key part of your treatment and safety. Be sure to make and go to all appointments, and call your doctor if you are having problems. Its also a good idea to know your test results and keep a list of the medicines you take. How can you care for yourself at home? · Do not smoke or allow others to smoke around you. If you need help quitting, talk to your doctor about stop-smoking programs and medicines. These can increase your chances of quitting for good. · Get plenty of rest and sleep. · Take your medicines exactly as prescribed. Call your doctor if you think you are having a problem with your medicine. · Find healthy ways to deal with stress. ¨ Exercise daily. ¨ Get plenty of sleep. ¨ Eat regularly and well. When should you call for help? Call 911 anytime you think you may need emergency care. For example, call if: 
· You have severe shortness of breath. · You have symptoms of a heart attack. These may include: ¨ Chest pain or pressure, or a strange feeling in the chest. 
¨ Sweating. ¨ Shortness of breath. ¨ Nausea or vomiting. ¨ Pain, pressure, or a strange feeling in the back, neck, jaw, or upper belly or in one or both shoulders or arms. ¨ Lightheadedness or sudden weakness. ¨ A fast or irregular heartbeat. After you call 911, the  may tell you to chew 1 adult-strength or 2 to 4 low-dose aspirin. Wait for an ambulance. Do not try to drive yourself. Call your doctor now or seek immediate medical care if: 
· Your shortness of breath gets worse or you start to wheeze. Wheezing is a high-pitched sound when you breathe. · You wake up at night out of breath or have to prop your head up on several pillows to breathe. · You are short of breath after only light activity or while at rest. 
Watch closely for changes in your health, and be sure to contact your doctor if: 
· You do not get better over the next 1 to 2 days. Where can you learn more? Go to http://leslie-graciela.info/. Enter S780 in the search box to learn more about \"Shortness of Breath: Care Instructions. \" Current as of: May 23, 2016 Content Version: 11.1 © 8108-3757 [employer]Arc Inc[employer] Care instructions adapted under license by Super[employer] (which disclaims liability or warranty for this information). If you have questions about a medical condition or this instruction, always ask your healthcare professional. [place] [address] any warranty or liability for your use of this information. Low Sodium Diet (2,000 Milligram): Care Instructions Your Care Instructions Too much sodium causes your body to hold on to extra water. This can raise your blood pressure and force your heart and kidneys to work harder. In very serious cases, this could cause you to be put in the hospital. It might even be life-threatening. By limiting sodium, you will feel better and lower your risk of serious problems. The most common source of sodium is salt. People get most of the salt in their diet from canned, prepared, and packaged foods. Fast food and restaurant meals also are very high in sodium. Your doctor will probably limit your sodium to less than 2,000 milligrams (mg) a day. This limit counts all the sodium in prepared and packaged foods and any salt you add to your food. Follow-up care is a key part of your treatment and safety. Be sure to make and go to all appointments, and call your doctor if you are having problems. It's also a good idea to know your test results and keep a list of the medicines you take. How can you care for yourself at home? Read food labels · Read labels on cans and food packages. The labels tell you how much sodium is in each serving. Make sure that you look at the serving size. If you eat more than the serving size, you have eaten more sodium. · Food labels also tell you the Percent Daily Value for sodium. Choose products with low Percent Daily Values for sodium. · Be aware that sodium can come in forms other than salt, including monosodium glutamate (MSG), sodium citrate, and sodium bicarbonate (baking soda). MSG is often added to Asian food. When you eat out, you can sometimes ask for food without MSG or added salt. Buy low-sodium foods · Buy foods that are labeled \"unsalted\" (no salt added), \"sodium-free\" (less than 5 mg of sodium per serving), or \"low-sodium\" (less than 140 mg of sodium per serving). Foods labeled \"reduced-sodium\" and \"light sodium\" may still have too much sodium. Be sure to read the label to see how much sodium you are getting. · Buy fresh vegetables, or frozen vegetables without added sauces. Buy low-sodium versions of canned vegetables, soups, and other canned goods. Prepare low-sodium meals · Cut back on the amount of salt you use in cooking. This will help you adjust to the taste. Do not add salt after cooking. One teaspoon of salt has about 2,300 mg of sodium. · Take the salt shaker off the table. · Flavor your food with garlic, lemon juice, onion, vinegar, herbs, and spices. Do not use soy sauce, lite soy sauce, steak sauce, onion salt, garlic salt, celery salt, mustard, or ketchup on your food. · Use low-sodium salad dressings, sauces, and ketchup. Or make your own salad dressings and sauces without adding salt. · Use less salt (or none) when recipes call for it. You can often use half the salt a recipe calls for without losing flavor. Other foods such as rice, pasta, and grains do not need added salt. · Rinse canned vegetables, and cook them in fresh water. This removes somebut not allof the salt. · Avoid water that is naturally high in sodium or that has been treated with water softeners, which add sodium. Call your local water company to find out the sodium content of your water supply. If you buy bottled water, read the label and choose a sodium-free brand. Avoid high-sodium foods · Avoid eating: ¨ Smoked, cured, salted, and canned meat, fish, and poultry. ¨ Ham, day, hot dogs, and luncheon meats. ¨ Regular, hard, and processed cheese and regular peanut butter. ¨ Crackers with salted tops, and other salted snack foods such as pretzels, chips, and salted popcorn. ¨ Frozen prepared meals, unless labeled low-sodium. ¨ Canned and dried soups, broths, and bouillon, unless labeled sodium-free or low-sodium. ¨ Canned vegetables, unless labeled sodium-free or low-sodium. ¨ Western Clarissa fries, pizza, tacos, and other fast foods. ¨ Pickles, olives, ketchup, and other condiments, especially soy sauce, unless labeled sodium-free or low-sodium. Where can you learn more? Go to http://leslie-graciela.info/. Enter V345 in the search box to learn more about \"Low Sodium Diet (2,000 Milligram): Care Instructions. \" Current as of: July 26, 2016 Content Version: 11.1 © 7078-7463 [employer]. Care instructions adapted under license by DataMotion (which disclaims liability or warranty for this information). If you have questions about a medical condition or this instruction, always ask your healthcare professional. [place] [address] any warranty or liability for your use of this information. Diabetic Nephropathy: Care Instructions Your Care Instructions Finding out that your kidneys have been damaged can be very distressing. It may have taken you by surprise, since damage to kidneys usually does not cause symptoms early on. It is normal to feel upset and afraid.  
Having diabetic nephropathy means that for some time you have had high blood sugar, which damages the kidneys. Healthy kidneys keep protein in your blood, where it belongs. Damaged kidneys do not work the way they should. Your kidneys are letting protein pass into your urine. Sometimes diabetic kidney disease can lead to kidney failure. Your doctor will tell you how you might be able to slow damage to your kidneys. In many cases, prompt and regular treatment can prevent kidney failure. You will need to take medicine and may need to make a number of changes in your normal routines. If you can keep your blood sugar and blood pressure under control and take certain medicines, you may reduce your chance of kidney failure. Follow-up care is a key part of your treatment and safety. Be sure to make and go to all appointments, and call your doctor if you are having problems. It's also a good idea to know your test results and keep a list of the medicines you take. How can you care for yourself at home? · Take your medicines exactly as prescribed. It is very important that you take your insulin or other diabetes medicine as your doctor tells you. Call your doctor if you think you are having a problem with your medicine. · Try to keep blood sugar in your target range. ¨ Eat a variety of foods, with carbohydrate spread out in your meals. Your doctor may restrict your protein. A dietitian can help you plan meals. ¨ If your doctor recommends it, get more exercise. Walking is a good choice. Bit by bit, increase the amount you walk every day. Try for at least 30 minutes on most days of the week. ¨ Check your blood sugar as often as your doctor recommends. · Take and record your blood pressure at home if your doctor tells you to. Learn the importance of the two measures of blood pressure (such as 130 over 80, or 130/80). To take your blood pressure at home: ¨ Ask your doctor to check your blood pressure monitor.  He or she can make sure that it is accurate and that the cuff fits you. Also ask your doctor to watch you to make sure that you are using it right. ¨ Do not eat, use tobacco products, or use medicine known to raise blood pressure (such as some nasal decongestant sprays) before taking your blood pressure. ¨ Avoid taking your blood pressure if you have just exercised or are nervous or upset. Rest at least 15 minutes before taking a reading. · Eat a low-salt diet to help keep your blood pressure in your target range. · Do not smoke. Smoking raises your risk of many health problems, including diabetic nephropathy. If you need help quitting, talk to your doctor about stop-smoking programs and medicines. These can increase your chances of quitting for good. · Do not take ibuprofen, naproxen, or similar medicines, unless your doctor tells you to. These medicines may make kidney problems worse. When should you call for help? Call 911 anytime you think you may need emergency care. For example, call if: 
· You passed out (lost consciousness). Call your doctor now or seek immediate medical care if: 
· You have not urinated at all in the last 24 hours. · You have trouble urinating or can urinate only very small amounts. · You are confused or have trouble thinking clearly. · You are very thirsty, lightheaded, or dizzy, or you feel like you may pass out (lose consciousness). · You have a weak, fast heartbeat. · You have swelling in your hands or feet. · You have blood in your urine. · You are extremely tired or weak. Watch closely for changes in your health, and be sure to contact your doctor if you have any problems. Where can you learn more? Go to http://leslie-graciela.info/. Enter P361 in the search box to learn more about \"Diabetic Nephropathy: Care Instructions. \" Current as of: April 5, 2016 Content Version: 11.1 © 5483-8221 Novatris, Incorporated.  Care instructions adapted under license by [address] (which disclaims liability or warranty for this information). If you have questions about a medical condition or this instruction, always ask your healthcare professional. [address] any warranty or liability for your use of this information. Kidney Disease and High Blood Pressure: Care Instructions Your Care Instructions Long-term (chronic) kidney disease happens when the kidneys cannot remove waste and keep your body's fluids and chemicals in balance. Usually, the kidneys remove waste from the blood through the urine. When the kidneys are not working well, waste can build up so much that it poisons the body. Kidney disease can make you very tired. It also can cause swelling, or edema, in your legs or other areas of your body. High blood pressure is one of the major causes of chronic kidney disease. And kidney disease can also cause high blood pressure. No matter which came first, having high blood pressure damages the tiny blood vessels in the kidneys. If you have high blood pressure, it is important to lower it. There are many things you can do to lower your blood pressure, which may help slow or stop the damage to your kidneys. Follow-up care is a key part of your treatment and safety. Be sure to make and go to all appointments, and call your doctor if you are having problems. It's also a good idea to know your test results and keep a list of the medicines you take. How can you care for yourself at home? · Be safe with medicines. Take your medicines exactly as prescribed. Call your doctor if you have any problems with your medicine. You will probably need more than one medicine to lower your blood pressure. You will get more details on the specific medicines your doctor prescribes. · Work with your doctor and a dietitian to plan meals that have the right amount of nutrients for you. You will probably have to limit salt, fluids, and protein. · Stay at a healthy weight. This is very important if you put on weight around the waist. Losing even 10 pounds can help you lower your blood pressure. · Manage other health problems such as diabetes and high cholesterol. You can help lower your risk for heart disease and blood vessel problems with a healthy lifestyle along with medicines. · Do not take ibuprofen (Advil, Motrin) or naproxen (Aleve), or similar medicines, unless your doctor tells you to. They may make chronic kidney disease worse. It is okay to take acetaminophen (Tylenol). · If your doctor recommends it, get more exercise. Walking is a good choice. Bit by bit, increase the amount you walk every day. Try for at least 30 minutes on most days of the week. You also may want to swim, bike, or do other activities. · Limit or avoid alcohol. Talk to your doctor about whether you can drink any alcohol. · Do not smoke or allow others to smoke around you. If you need help quitting, talk to your doctor about stop-smoking programs and medicines. These can increase your chances of quitting for good. When should you call for help? Call 911 anytime you think you may need emergency care. For example, call if: 
· You passed out (lost consciousness). · You have symptoms of a heart attack, such as: ¨ Chest pain or pressure. ¨ Sweating. ¨ Shortness of breath. ¨ Nausea or vomiting. ¨ Pain that spreads from the chest to the neck, jaw, or one or both shoulders or arms. ¨ Dizziness or lightheadedness. ¨ A fast or uneven pulse. After calling 911, chew 1 adult-strength aspirin. Wait for an ambulance. Do not try to drive yourself. Call your doctor now or seek immediate medical care if: 
· You are confused or are more tired or weak than usual. 
· You bleed or have bruises. Watch closely for changes in your health, and be sure to contact your doctor if: 
· You do not want to eat.  
· You have new swelling of your arms or feet, or swelling that you already have gets worse. · You do not get better as expected. Where can you learn more? Go to http://leslie-graciela.info/. Enter I949 in the search box to learn more about \"Kidney Disease and High Blood Pressure: Care Instructions. \" Current as of: November 20, 2015 Content Version: 11.1 © 3625-4444 Kappa Prime. Care instructions adapted under license by EmergentDetection (which disclaims liability or warranty for this information). If you have questions about a medical condition or this instruction, always ask your healthcare professional. [address] any warranty or liability for your use of this information. Learning About Diabetes Food Guidelines Your Care Instructions Meal planning is important to manage diabetes. It helps keep your blood sugar at a target level (which you set with your doctor). You don't have to eat special foods. You can eat what your family eats, including sweets once in a while. But you do have to pay attention to how often you eat and how much you eat of certain foods. You may want to work with a dietitian or a certified diabetes educator (CDE) to help you plan meals and snacks. A dietitian or CDE can also help you lose weight if that is one of your goals. What should you know about eating carbs? Managing the amount of carbohydrate (carbs) you eat is an important part of healthy meals when you have diabetes. Carbohydrate is found in many foods. · Learn which foods have carbs. And learn the amounts of carbs in different foods. ¨ Bread, cereal, pasta, and rice have about 15 grams of carbs in a serving. A serving is 1 slice of bread (1 ounce), ½ cup of cooked cereal, or 1/3 cup of cooked pasta or rice. ¨ Fruits have 15 grams of carbs in a serving.  A serving is 1 small fresh fruit, such as an apple or orange; ½ of a banana; ½ cup of cooked or canned fruit; ½ cup of fruit juice; 1 cup of melon or raspberries; or 2 tablespoons of dried fruit. ¨ Milk and no-sugar-added yogurt have 15 grams of carbs in a serving. A serving is 1 cup of milk or 2/3 cup of no-sugar-added yogurt. ¨ Starchy vegetables have 15 grams of carbs in a serving. A serving is ½ cup of mashed potatoes or sweet potato; 1 cup winter squash; ½ of a small baked potato; ½ cup of cooked beans; or ½ cup cooked corn or green peas. · Learn how much carbs to eat each day and at each meal. A dietitian or CDE can teach you how to keep track of the amount of carbs you eat. This is called carbohydrate counting. · If you are not sure how to count carbohydrate grams, use the Plate Method to plan meals. It is a good, quick way to make sure that you have a balanced meal. It also helps you spread carbs throughout the day. ¨ Divide your plate by types of foods. Put non-starchy vegetables on half the plate, meat or other protein food on one-quarter of the plate, and a grain or starchy vegetable in the final quarter of the plate. To this you can add a small piece of fruit and 1 cup of milk or yogurt, depending on how many carbs you are supposed to eat at a meal. 
· Try to eat about the same amount of carbs at each meal. Do not \"save up\" your daily allowance of carbs to eat at one meal. 
· Proteins have very little or no carbs per serving. Examples of proteins are beef, chicken, turkey, fish, eggs, tofu, cheese, cottage cheese, and peanut butter. A serving size of meat is 3 ounces, which is about the size of a deck of cards. Examples of meat substitute serving sizes (equal to 1 ounce of meat) are 1/4 cup of cottage cheese, 1 egg, 1 tablespoon of peanut butter, and ½ cup of tofu. How can you eat out and still eat healthy? · Learn to estimate the serving sizes of foods that have carbohydrate. If you measure food at home, it will be easier to estimate the amount in a serving of restaurant food.  
· If the meal you order has too much carbohydrate (such as potatoes, corn, or baked beans), ask to have a low-carbohydrate food instead. Ask for a salad or green vegetables. · If you use insulin, check your blood sugar before and after eating out to help you plan how much to eat in the future. · If you eat more carbohydrate at a meal than you had planned, take a walk or do other exercise. This will help lower your blood sugar. What else should you know? · Limit saturated fat, such as the fat from meat and dairy products. This is a healthy choice because people who have diabetes are at higher risk of heart disease. So choose lean cuts of meat and nonfat or low-fat dairy products. Use olive or canola oil instead of butter or shortening when cooking. · Don't skip meals. Your blood sugar may drop too low if you skip meals and take insulin or certain medicines for diabetes. · Check with your doctor before you drink alcohol. Alcohol can cause your blood sugar to drop too low. Alcohol can also cause a bad reaction if you take certain diabetes medicines. Follow-up care is a key part of your treatment and safety. Be sure to make and go to all appointments, and call your doctor if you are having problems. It's also a good idea to know your test results and keep a list of the medicines you take. Where can you learn more? Go to http://leslie-graciela.info/. Enter U882 in the search box to learn more about \"Learning About Diabetes Food Guidelines. \" Current as of: May 23, 2016 Content Version: 11.1 © 0544-5067 [employer]. Care instructions adapted under license by [employer] (which disclaims liability or warranty for this information). If you have questions about a medical condition or this instruction, always ask your healthcare professional. [place] [address] any warranty or liability for your use of this information. Chronic Obstructive Pulmonary Disease (COPD): Care Instructions Your Care Instructions Chronic obstructive pulmonary disease (COPD) is a general term for a group of lung diseases, including emphysema and chronic bronchitis. People with COPD have decreased airflow in and out of the lungs, which makes it hard to breathe. The airways also can get clogged with thick mucus. Cigarette smoking is a major cause of COPD. Although there is no cure for COPD, you can slow its progress. Following your treatment plan and taking care of yourself can help you feel better and live longer. Follow-up care is a key part of your treatment and safety. Be sure to make and go to all appointments, and call your doctor if you are having problems. It's also a good idea to know your test results and keep a list of the medicines you take. How can you care for yourself at home? Staying healthy · Do not smoke. This is the most important step you can take to prevent more damage to your lungs. If you need help quitting, talk to your doctor about stop-smoking programs and medicines. These can increase your chances of quitting for good. · Avoid colds and flu. Get a pneumococcal vaccine shot. If you have had one before, ask your doctor whether you need a second dose. Get the flu vaccine every fall. If you must be around people with colds or the flu, wash your hands often. · Avoid secondhand smoke, air pollution, and high altitudes. Also avoid cold, dry air and hot, humid air. Stay at home with your windows closed when air pollution is bad. Medicines and oxygen therapy · Take your medicines exactly as prescribed. Call your doctor if you think you are having a problem with your medicine. · You may be taking medicines such as: ¨ Bronchodilators. These help open your airways and make breathing easier. Bronchodilators are either short-acting (work for 6 to 9 hours) or long-acting (work for 24 hours). You inhale most bronchodilators, so they start to act quickly.  Always carry your quick-relief inhaler with you in case you need it while you are away from home. ¨ Corticosteroids (prednisone, budesonide). These reduce airway inflammation. They come in pill or inhaled form. You must take these medicines every day for them to work well. · A spacer may help you get more inhaled medicine to your lungs. Ask your doctor or pharmacist if a spacer is right for you. If it is, ask how to use it properly. · Do not take any vitamins, over-the-counter medicine, or herbal products without talking to your doctor first. 
· If your doctor prescribed antibiotics, take them as directed. Do not stop taking them just because you feel better. You need to take the full course of antibiotics. · Oxygen therapy boosts the amount of oxygen in your blood and helps you breathe easier. Use the flow rate your doctor has recommended, and do not change it without talking to your doctor first. 
Activity · Get regular exercise. Walking is an easy way to get exercise. Start out slowly, and walk a little more each day. · Pay attention to your breathing. You are exercising too hard if you cannot talk while you are exercising. · Take short rest breaks when doing household chores and other activities. · Learn breathing methodssuch as breathing through pursed lipsto help you become less short of breath. · If your doctor has not set you up with a pulmonary rehabilitation program, talk to him or her about whether rehab is right for you. Rehab includes exercise programs, education about your disease and how to manage it, help with diet and other changes, and emotional support. Diet · Eat regular, healthy meals. Use bronchodilators about 1 hour before you eat to make it easier to eat. Eat several small meals instead of three large ones. Drink beverages at the end of the meal. Avoid foods that are hard to chew. · Eat foods that contain protein so that you do not lose muscle mass. Mental health · Talk to your family, friends, or a therapist about your feelings. It is normal to feel frightened, angry, hopeless, helpless, and even guilty. Talking openly about bad feelings can help you cope. If these feelings last, talk to your doctor. When should you call for help? Call 911 anytime you think you may need emergency care. For example, call if: 
· You have severe trouble breathing. Call your doctor now or seek immediate medical care if: 
· You have new or worse trouble breathing. · You cough up blood. · You have a fever. Watch closely for changes in your health, and be sure to contact your doctor if: 
· You cough more deeply or more often, especially if you notice more mucus or a change in the color of your mucus. · You have new or worse swelling in your legs or belly. · You are not getting better as expected. Where can you learn more? Go to http://leslie-graciela.info/. Amy Blank in the search box to learn more about \"Chronic Obstructive Pulmonary Disease (COPD): Care Instructions. \" Current as of: May 23, 2016 Content Version: 11.1 © 4814-7421 [employer]. Care instructions adapted under license by [employer] (which disclaims liability or warranty for this information). If you have questions about a medical condition or this instruction, always ask your healthcare professional. [address] any warranty or liability for your use of this information. Please provide this summary of care documentation to your next provider. Your primary care clinician is listed as Javi Vásquez. If you have any questions after today's visit, please call [phone].

## 2017-02-22 NOTE — PROGRESS NOTES
Chief Complaint   Patient presents with   [facility] Follow Up    Leg Swelling     bilateral    Other     needs something to treat gout cannot take Uloric     Knee Pain     right     Patient states he is here for hospital follow up and for bilateral leg swelling. Patient also stated he cannot take the Uloric for gout as he was told by the provider while he was inpatient.

## 2017-03-13 ENCOUNTER — OFFICE VISIT (OUTPATIENT)
Dept: CARDIOLOGY CLINIC | Age: 57
End: 2017-03-13

## 2017-03-13 VITALS
WEIGHT: 235 LBS | BODY MASS INDEX: 40.12 KG/M2 | HEIGHT: 64 IN | DIASTOLIC BLOOD PRESSURE: 82 MMHG | OXYGEN SATURATION: 95 % | SYSTOLIC BLOOD PRESSURE: 130 MMHG | HEART RATE: 73 BPM

## 2017-03-13 DIAGNOSIS — I10 ESSENTIAL HYPERTENSION: Primary | ICD-10-CM

## 2017-03-13 DIAGNOSIS — M79.89 LEG SWELLING: ICD-10-CM

## 2017-03-13 RX ORDER — HYDRALAZINE HYDROCHLORIDE 100 MG/1
100 TABLET, FILM COATED ORAL 3 TIMES DAILY
Qty: 90 TAB | Refills: 6 | Status: SHIPPED | OUTPATIENT
Start: 2017-03-13 | End: 2017-06-02 | Stop reason: SDUPTHER

## 2017-03-13 RX ORDER — LISINOPRIL 20 MG/1
TABLET ORAL DAILY
COMMUNITY
End: 2017-04-25 | Stop reason: SDUPTHER

## 2017-03-13 RX ORDER — FUROSEMIDE 20 MG/1
20 TABLET ORAL DAILY
Qty: 1 TAB | Refills: 0
Start: 2017-03-13 | End: 2017-03-21 | Stop reason: SDUPTHER

## 2017-03-13 NOTE — PROGRESS NOTES
1. Have you been to the ER, urgent care clinic since your last visit? Hospitalized since your last visit? Yes, at [facility] from 2/7/2017- 2/10/2017 for left side numbness    2. Have you seen or consulted any other health care providers outside of the [address] [place] [address] since your last visit? Include any pap smears or colon screening.  No

## 2017-03-13 NOTE — MR AVS SNAPSHOT
Visit Information Date & Time Provider Department Dept. Phone Encounter #  
 3/13/2017  1:30 PM [facility]ld[facility] Juan, [facility] [address] [id number] Your Appointments 3/17/2017 12:15 PM  
ESTABLISHED PATIENT with Bella Nickerson MD  
[facility]) Appt Note: past due; r/s  
 [address] [employer] [facility], [address] [place] [address]  
[facility], [address] [place] [address] 5/16/2017 10:40 AM  
Follow Up with Adalgisa Duran DO [facility] ([address]) Appt Note: Return in about 6 months; .  
 [address] [facility] [address] [id number] [address] [address] [phone] [address] Upcoming Health Maintenance Date Due COLONOSCOPY 3/9/2015 EYE EXAM RETINAL OR DILATED Q1 3/1/2017 FOOT EXAM Q1 3/8/2017 Pneumococcal 19-64 Highest Risk (2 of 3 - PCV13) 7/12/2017 HEMOGLOBIN A1C Q6M 8/9/2017 MICROALBUMIN Q1 1/13/2018 LIPID PANEL Q1 2/8/2018 DTaP/Tdap/Td series (2 - Td) 7/12/2026 Allergies as of 3/13/2017  Review Complete On: 2/22/2017 By: Eitan Park LPN Severity Noted Reaction Type Reactions Shellfish Derived  05/25/2016    Other (comments) Causes Gout Current Immunizations  Reviewed on 2/7/2017 No immunizations on file. Not reviewed this visit You Were Diagnosed With   
  
 Codes Comments Essential hypertension    -  Primary ICD-10-CM: I10 
ICD-9-CM: 401.9 Vitals BP Pulse Height(growth percentile) Weight(growth percentile) SpO2 BMI  
 130/82 (BP 1 Location: Left arm, BP Patient Position: Sitting) 73 5' 4\" (1.626 m) 235 lb (106.6 kg) 95% 40.34 kg/m2 Smoking Status Current Every Day Smoker Vitals History BMI and BSA Data  Body Mass Index Body Surface Area  
 40.34 kg/m 2 2.19 m 2  
  
 Preferred Pharmacy Pharmacy Name Phone [facility] Ashutosh Duvall 16 214 [facility] [phone] Your Updated Medication List  
  
   
This list is accurate as of: 3/13/17  2:17 PM.  Always use your most recent med list.  
  
  
  
  
 acetaminophen 650 mg CR tablet Commonly known as:  TYLENOL Take 650 mg by mouth every six (6) hours as needed for Pain. albuterol 90 mcg/actuation inhaler Commonly known as:  PROVENTIL HFA, VENTOLIN HFA, PROAIR HFA Take 2 Puffs by inhalation every four (4) hours as needed for Wheezing. aspirin 81 mg chewable tablet Take 1 Tab by mouth daily. B COMPLEX & C NO.20-FOLIC ACID PO Take  by mouth. Blood-Glucose Meter monitoring kit Check twice daily. budesonide-formoterol 160-4.5 mcg/actuation HFA inhaler Commonly known as:  SYMBICORT Take 2 Puffs by inhalation two (2) times a day. calcitRIOL 0.25 mcg capsule Commonly known as:  ROCALTROL Take 1 Cap by mouth every Monday, Wednesday, Friday. carvedilol 25 mg tablet Commonly known as:  Bean Liter Take 1 Tab by mouth two (2) times daily (with meals). cloNIDine HCl 0.1 mg tablet Commonly known as:  CATAPRES Take 1 Tab by mouth two (2) times a day. febuxostat 40 mg Tab tablet Commonly known as:  William Naper Take 1 Tab by mouth daily. furosemide 20 mg tablet Commonly known as:  LASIX  
1 tab daily as needed for leg swelling. glucose blood VI test strips strip Commonly known as:  Ascensia CONTOUR  
contour test trips. hydrALAZINE 100 mg tablet Commonly known as:  APRESOLINE Take 1 Tab by mouth three (3) times daily. Indications: hypertension Insulin Needles (Disposable) 29 gauge x 5/16\" Ndle 40 Units by Does Not Apply route two (2) times a day. insulin NPH/insulin regular 100 unit/mL (70-30) injection Commonly known as:  NovoLIN 70/30 30 UNITS SUBCUTANEOUSLY TWICE DAILY. Do not take it blood sugar is less than 100 Lancets Misc Check 3 times a day , needs according contour glucometer  
  
 lisinopril 20 mg tablet Commonly known as:  Lindsey Mcgregor Take  by mouth daily. POTASSIUM CHLORIDE SR 10 MEQ TAB  
two (2) times a day. We Performed the Following AMB POC EKG ROUTINE W/ 12 LEADS, INTER & REP [59775 CPT(R)] Patient Instructions Take lasix (furosemide) 40mg once a day for 3 days only and then back to 20mg once a day All other medications to remain the same BMP to be done in 1 week Follow-up with Dr. Jorje Paul as scheduled and as needed Weigh daily and record Limit sodium intake to 2000mg per day Limit fluid intake to no more than  6, eight ounce glasses of any type of fluids per day Call if you notice sudden or progressive weight gain (3-5 pounds in 2-3 days), increasing shortness of breath, abdominal bloating, increasing lower extremity edema, inability to lie flat or on your normal number of pillows, having to sit up to catch your breath, fatigue, increased somnolence (sleeping more), poor appetite Heart Failure: Care Instructions Your Care Instructions Heart failure occurs when your heart does not pump as much blood as the body needs. Failure does not mean that the heart has stopped pumping but rather that it is not pumping as well as it should. Over time, this causes fluid buildup in your lungs and other parts of your body. Fluid buildup can cause shortness of breath, fatigue, swollen ankles, and other problems. By taking medicines regularly, reducing sodium (salt) in your diet, checking your weight every day, and making lifestyle changes, you can feel better and live longer. Follow-up care is a key part of your treatment and safety.  Be sure to make and go to all appointments, and call your doctor if you are having problems. It's also a good idea to know your test results and keep a list of the medicines you take. How can you care for yourself at home? Medicines · Be safe with medicines. Take your medicines exactly as prescribed. Call your doctor if you think you are having a problem with your medicine. · Do not take any vitamins, over-the-counter medicine, or herbal products without talking to your doctor first. Eugena Coop not take ibuprofen (Advil or Motrin) and naproxen (Aleve) without talking to your doctor first. They could make your heart failure worse. · You may be taking some of the following medicine. ¨ Beta-blockers can slow heart rate, decrease blood pressure, and improve your condition. Taking a beta-blocker may lower your chance of needing to be hospitalized. ¨ Angiotensin-converting enzyme inhibitors (ACEIs) reduce the heart's workload, lower blood pressure, and reduce swelling. Taking an ACEI may lower your chance of needing to be hospitalized again. ¨ Angiotensin II receptor blockers (ARBs) work like ACEIs. Your doctor may prescribe them instead of ACEIs. ¨ Diuretics, also called water pills, reduce swelling. ¨ Potassium supplements replace this important mineral, which is sometimes lost with diuretics. ¨ Aspirin and other blood thinners prevent blood clots, which can cause a stroke or heart attack. You will get more details on the specific medicines your doctor prescribes. Diet · Your doctor may suggest that you limit sodium to 2,000 milligrams (mg) a day or less. That is less than 1 teaspoon of salt a day, including all the salt you eat in cooking or in packaged foods. People get most of their sodium from processed foods. Fast food and restaurant meals also tend to be very high in sodium. · Ask your doctor how much liquid you can drink each day. You may have to limit liquids. Weight · Weigh yourself without clothing at the same time each day.  Record your weight. Call your doctor if you gain more than 3 pounds in 2 to 3 days. A sudden weight gain may mean that your heart failure is getting worse. Activity level · Start light exercise (if your doctor says it is okay). Even if you can only do a small amount, exercise will help you get stronger, have more energy, and manage your weight and your stress. Walking is an easy way to get exercise. Start out by walking a little more than you did before. Bit by bit, increase the amount you walk. · When you exercise, watch for signs that your heart is working too hard. You are pushing yourself too hard if you cannot talk while you are exercising. If you become short of breath or dizzy or have chest pain, stop, sit down, and rest. 
· If you feel \"wiped out\" the day after you exercise, walk slower or for a shorter distance until you can work up to a better pace. · Get enough rest at night. Sleeping with 1 or 2 pillows under your upper body and head may help you breathe easier. Lifestyle changes · Do not smoke. Smoking can make a heart condition worse. If you need help quitting, talk to your doctor about stop-smoking programs and medicines. These can increase your chances of quitting for good. Quitting smoking may be the most important step you can take to protect your heart. · Limit alcohol to 2 drinks a day for men and 1 drink a day for women. Too much alcohol can cause health problems. · Avoid getting sick from colds and the flu. Get a pneumococcal vaccine shot. If you have had one before, ask your doctor whether you need another dose. Get a flu shot each year. If you must be around people with colds or the flu, wash your hands often. When should you call for help? Call 911 if you have symptoms of sudden heart failure such as: 
· You have severe trouble breathing. · You cough up pink, foamy mucus. · You have a new irregular or rapid heartbeat. Call your doctor now or seek immediate medical care if: · You have new or increased shortness of breath. · You are dizzy or lightheaded, or you feel like you may faint. · You have sudden weight gain, such as 3 pounds or more in 2 to 3 days. · You have increased swelling in your legs, ankles, or feet. · You are suddenly so tired or weak that you cannot do your usual activities. Watch closely for changes in your health, and be sure to contact your doctor if: 
· You develop new symptoms. Where can you learn more? Go to http://leslie-graciela.info/. Enter R487 in the search box to learn more about \"Heart Failure: Care Instructions. \" Current as of: January 27, 2016 Content Version: 11.1 © 2667-1473 [employer]. Care instructions adapted under license by [employer] (which disclaims liability or warranty for this information). If you have questions about a medical condition or this instruction, always ask your healthcare professional. [place] [address] any warranty or liability for your use of this information. Limiting Sodium and Fluids With Heart Failure: Care Instructions Your Care Instructions Sodium causes your body to keep extra water, making it harder for your heart to pump. By limiting sodium, you will feel better and lower your risk of having to go to the hospital. 
People get most of their sodium from processed foods. Fast food and restaurant meals also tend to be very high in sodium. Your doctor may suggest that you limit sodium to 2,000 milligrams (mg) a day or less. That is less than 1 teaspoon of salt a day, including all the salt you eat in cooked or packaged foods. Usually, you have to limit the amount of liquids you drink only if your heart failure is severe. Limiting sodium alone often is enough to help your body get rid of extra fluids. However, your doctor may tell you to limit your fluid intake to a set amount each day. Follow-up care is a key part of your treatment and safety. Be sure to make and go to all appointments, and call your doctor if you are having problems. It's also a good idea to know your test results and keep a list of the medicines you take. How can you care for yourself at home? Read food labels · Read food labels on cans and food packages. The labels tell you how much sodium is in each serving. Make sure that you look at the serving size. If you eat more than the serving size, you have eaten more sodium than is listed for one serving. · Food labels also tell you the Percent Daily Value. If the Percent Daily Value says 50%, it means that you will get at least 50% of all the sodium you need for the entire day in one serving. Choose products with low Percent Daily Values for sodium. · Be aware that sodium can come in forms other than salt, including monosodium glutamate (MSG), sodium citrate, and sodium bicarbonate (baking soda). MSG is often added to Asian food. You can sometimes ask for food without MSG or salt. Buy low-sodium foods · Buy foods that are labeled \"unsalted\" (no salt added), \"sodium-free\" (less than 5 mg of sodium per serving), or \"low-sodium\" (less than 140 mg of sodium per serving). A food labeled \"light sodium\" has less than half of the full-sodium version of that food. Foods labeled \"reduced-sodium\" may still have too much sodium. · Buy fresh vegetables or plain, frozen vegetables. Buy low-sodium versions of canned vegetables, soups, and other canned goods. Prepare low-sodium meals · Use less salt each day when cooking. Reducing salt in this way will help you adjust to the taste. Do not add salt after cooking. Take the salt shaker off the table. · Flavor your food with garlic, lemon juice, onion, vinegar, herbs, and spices instead of salt. Do not use soy sauce, steak sauce, onion salt, garlic salt, mustard, or ketchup on your food. · Make your own salad dressings, sauces, and ketchup without adding salt. · Use less salt (or none) when recipes call for it. You can often use half the salt a recipe calls for without losing flavor. Other dishes like rice, pasta, and grains do not need added salt. · Rinse canned vegetables. This removes somebut not allof the salt. · Avoid water that has a naturally high sodium content or that has been treated with water softeners, which add sodium. Call your local water company to find out the sodium content of your water supply. If you buy bottled water, read the label and choose a sodium-free brand. Avoid high-sodium foods, such as: 
· Smoked, cured, salted, and canned meat, fish, and poultry. · Ham, day, hot dogs, and luncheon meats. · Regular, hard, and processed cheese and regular peanut butter. · Crackers with salted tops. · Frozen prepared meals. · Canned and dried soups, broths, and bouillon, unless labeled sodium-free or low-sodium. · Canned vegetables, unless labeled sodium-free or low-sodium. · Salted snack foods such as chips and pretzels. · Western Clarissa fries, pizza, tacos, and other fast foods. · Pickles, olives, ketchup, and other condiments, especially soy sauce, unless labeled sodium-free or low-sodium. If you cannot cook for yourself · Have family members or friends help you, or have someone cook low-sodium meals. · Check with your local senior nutrition program to find out where meals are served and whether they offer a low-sodium option. You can often find these programs through your local health department or hospital. 
· Have meals delivered to your home. Most [facility] have a Meals on [employer]. These programs provide one hot meal a day for older adults, delivered to their homes. Ask whether these meals are low-sodium. Let them know that you are on a low-sodium diet. Limiting fluid intake · Find a method that works for you.  You might simply write down how much you drink every time you do. Some people keep a container filled with the amount of fluid allowed for that day. If they drink from a source other than the container, then they pour out that amount. · Measure your regular drinking glasses to find out how much fluid each one holds. Once you know this, you will not have to measure every time. · Besides water, milk, juices, and other drinks, some foods have a lot of fluid. Count any foods that will melt (such as ice cream or gelatin dessert) or liquid foods (such as soup) as part of your fluid intake for the day. Where can you learn more? Go to http://leslie-graciela.info/. Enter A166 in the search box to learn more about \"Limiting Sodium and Fluids With Heart Failure: Care Instructions. \" Current as of: January 27, 2016 Content Version: 11.1 © 1553-9103 [employer]. Care instructions adapted under license by [employer] (which disclaims liability or warranty for this information). If you have questions about a medical condition or this instruction, always ask your healthcare professional. [place] [address] any warranty or liability for your use of this information. Low Sodium Diet (2,000 Milligram): Care Instructions Your Care Instructions Too much sodium causes your body to hold on to extra water. This can raise your blood pressure and force your heart and kidneys to work harder. In very serious cases, this could cause you to be put in the hospital. It might even be life-threatening. By limiting sodium, you will feel better and lower your risk of serious problems. The most common source of sodium is salt. People get most of the salt in their diet from canned, prepared, and packaged foods. Fast food and restaurant meals also are very high in sodium. Your doctor will probably limit your sodium to less than 2,000 milligrams (mg) a day.  This limit counts all the sodium in prepared and packaged foods and any salt you add to your food. Follow-up care is a key part of your treatment and safety. Be sure to make and go to all appointments, and call your doctor if you are having problems. It's also a good idea to know your test results and keep a list of the medicines you take. How can you care for yourself at home? Read food labels · Read labels on cans and food packages. The labels tell you how much sodium is in each serving. Make sure that you look at the serving size. If you eat more than the serving size, you have eaten more sodium. · Food labels also tell you the Percent Daily Value for sodium. Choose products with low Percent Daily Values for sodium. · Be aware that sodium can come in forms other than salt, including monosodium glutamate (MSG), sodium citrate, and sodium bicarbonate (baking soda). MSG is often added to Asian food. When you eat out, you can sometimes ask for food without MSG or added salt. Buy low-sodium foods · Buy foods that are labeled \"unsalted\" (no salt added), \"sodium-free\" (less than 5 mg of sodium per serving), or \"low-sodium\" (less than 140 mg of sodium per serving). Foods labeled \"reduced-sodium\" and \"light sodium\" may still have too much sodium. Be sure to read the label to see how much sodium you are getting. · Buy fresh vegetables, or frozen vegetables without added sauces. Buy low-sodium versions of canned vegetables, soups, and other canned goods. Prepare low-sodium meals · Cut back on the amount of salt you use in cooking. This will help you adjust to the taste. Do not add salt after cooking. One teaspoon of salt has about 2,300 mg of sodium. · Take the salt shaker off the table. · Flavor your food with garlic, lemon juice, onion, vinegar, herbs, and spices. Do not use soy sauce, lite soy sauce, steak sauce, onion salt, garlic salt, celery salt, mustard, or ketchup on your food. · Use low-sodium salad dressings, sauces, and ketchup. Or make your own salad dressings and sauces without adding salt. · Use less salt (or none) when recipes call for it. You can often use half the salt a recipe calls for without losing flavor. Other foods such as rice, pasta, and grains do not need added salt. · Rinse canned vegetables, and cook them in fresh water. This removes somebut not allof the salt. · Avoid water that is naturally high in sodium or that has been treated with water softeners, which add sodium. Call your local water company to find out the sodium content of your water supply. If you buy bottled water, read the label and choose a sodium-free brand. Avoid high-sodium foods · Avoid eating: ¨ Smoked, cured, salted, and canned meat, fish, and poultry. ¨ Ham, day, hot dogs, and luncheon meats. ¨ Regular, hard, and processed cheese and regular peanut butter. ¨ Crackers with salted tops, and other salted snack foods such as pretzels, chips, and salted popcorn. ¨ Frozen prepared meals, unless labeled low-sodium. ¨ Canned and dried soups, broths, and bouillon, unless labeled sodium-free or low-sodium. ¨ Canned vegetables, unless labeled sodium-free or low-sodium. ¨ Western Clarissa fries, pizza, tacos, and other fast foods. ¨ Pickles, olives, ketchup, and other condiments, especially soy sauce, unless labeled sodium-free or low-sodium. Where can you learn more? Go to http://leslie-graciela.info/. Enter C978 in the search box to learn more about \"Low Sodium Diet (2,000 Milligram): Care Instructions. \" Current as of: July 26, 2016 Content Version: 11.1 © 7021-6250 "[employer]". Care instructions adapted under license by [employer] (which disclaims liability or warranty for this information).  If you have questions about a medical condition or this instruction, always ask your healthcare professional. [employer] disclaims any warranty or liability for your use of this information. How to Read a Food Label to Limit Sodium: Care Instructions Your Care Instructions Sodium causes your body to hold on to extra water. This can raise your blood pressure and force your heart and kidneys to work harder. In very serious cases, this could cause you to be put in the hospital. It might even be life-threatening. By limiting sodium, you will feel better and lower your risk of serious problems. Processed foods, fast food, and restaurant foods are the major sources of dietary sodium. The most common name for sodium is salt. Try to limit how much sodium you eat to less than 2,300 milligrams (mg) a day. If you limit your sodium to 1,500 mg a day, you can lower your blood pressure even more. This limit counts all the salt that you eat in foods you cook or in packaged foods. Keep a list of everything you eat and drink. Follow-up care is a key part of your treatment and safety. Be sure to make and go to all appointments, and call your doctor if you are having problems. It's also a good idea to know your test results and keep a list of the medicines you take. How can you care for yourself at home? Read ingredient lists on food labels · Read the list of ingredients on food labels to help you find how much sodium is in a food. The label lists the ingredients in a food in descending order (from the most to the least). If salt or sodium is high on the list, there may be a lot of sodium in the food. · Know that sodium has different names. Sodium is also called monosodium glutamate (MSG, common in [facility] food), sodium citrate, sodium alginate, sodium hydroxide, and sodium phosphate. Read Nutrition Facts labels · On most foods, there is a Nutrition Facts label. This will tell you how much sodium is in one serving of food.  Look at both the serving size and the sodium amount. The serving size is located at the top of the label, usually right under the \"Nutrition Facts\" title. The amount of sodium is given in the list under the title. It is given in milligrams (mg). ¨ Check the serving size carefully. A single serving is often very small, and you may eat more than one serving. If this is the case, you will eat more sodium than listed on the label. For example, if the serving size for a canned soup is 1 cup and the sodium amount is 470 mg, if you have 2 cups you will eat 940 mg of sodium. · The nutrition facts for fresh fruits and vegetables are not listed on the food. They may be listed somewhere in the store. These foods usually have no sodium or low sodium. · The Nutrition Facts label also gives you the Percent Daily Value for sodium. This is how much of the recommended amount of sodium a serving contains. The daily value for sodium is less than 2,300 mg. So if the Percent Daily Value says 50%, this means one serving is giving you half of this, or 1,150 mg. Buy low-sodium foods · Look for foods that are made with less sodium. Watch for the following words on the label. ¨ \"Unsalted\" means there is no sodium added to the food. But there may be sodium already in the food naturally. ¨ \"Sodium-free\" means a serving has less than 5 mg of sodium. ¨ \"Very low sodium\" means a serving has 35 mg or less of sodium. ¨ \"Low-sodium\" means a serving has 140 mg or less of sodium. · \"Reduced-sodium\" means that there is 25% less sodium than what the food normally has. This is still usually too much sodium. Try not to buy foods with this on the label. · Buy fresh vegetables, or frozen vegetables without added sauces. Buy low-sodium versions of canned vegetables, soups, and other canned goods. Where can you learn more? Go to http://radha.info/.  
Enter [id number] in the search box to learn more about \"How to Read a Food Label to Limit Sodium: Care Instructions. \" Current as of: July 26, 2016 Content Version: 11.1 © 5420-2246 [employer]. Care instructions adapted under license by [employer] (which disclaims liability or warranty for this information). If you have questions about a medical condition or this instruction, always ask your healthcare professional. [address] any warranty or liability for your use of this information. Please provide this summary of care documentation to your next provider. Your primary care clinician is listed as Theodora Hardy. If you have any questions after today's visit, please call [phone].

## 2017-03-13 NOTE — PROGRESS NOTES
Dima Oliveira presents today for evaluation of complaints of shortness of breath with activity and with bending over to tie his shoes. He was hospitalized on February 10, 2017 for complaints of left-sided numbness and diagnosed with likely a TIA. During his hospitalization, a CT scan of the head, MRI, MRA and CT scan of the spine were all negative. During his recent hospitalization, NT proBNP was checked and it was only 444 (normal range 0-900). He is a 27-year-old -American male with history of hypertension, gout, chronic kidney disease stage III, diabetes mellitus (not well controlled with a hemoglobin A1c of 7.4 during his recent hospitalization), dyslipidemia, tobacco use, and medical noncompliance. He was accompanied by his wife who states that he also has some abdominal bloating and he complains of swelling in his right hand and wrist area which has been occurring for the past 1-2 weeks. He states that he was previously taking medication for gout (Uloric) but he states that he was instructed to discontinue the medication due to his kidney issues. Based on the dictated discharge summary, he was supposed to continue taking the Uloric. He does not remember who told him to stop taking the Uloric. He denies chest pain, tightness, heaviness, and palpitations. He denies shortness of breath at rest, admits to dyspnea on exertion, has 2 pillow orthopnea (normal) and denies PND. He admits to abdominal bloating. He denies lightheadedness, dizziness, and syncope. He admits to lower extremity edema but denies claudication. Denies nausea, vomiting, diarrhea, fever, chills. He states that he has not physically active and does a lot of sitting during the day. He also tells me that he has compression stockings at home which he does not use but he does try to keep his legs elevated on the couch. He continues to smoke and apparently he also drinks alcohol.     While reviewing his dietary habits, he and his wife admit to eating out often and he does not limit his sodium or fluid intake. While reviewing his medications, he states that he is taking Lasix 20mg once a day. He reports that he was recently seen by his primary care physician and that he is supposed to follow-up with her in about 2 weeks. He also states that he ran out of 1 of his antihypertensive medications. PMH:  Past Medical History:   Diagnosis Date    Alcohol abuse     Arthritis     Atherosclerotic cardiovascular disease     CAD (coronary artery disease)     mild disease of coronaries (cor angio 2006),wife states he has 1 stent    Cardiac cath 01/26/2006    RCA mild. Otherwise patent coronaries. LVEDP 15.  EF 55-60%.  Cardiac echocardiogram 03/27/2009    EF 60%. No cardiac source of embolism. No significant valvular pathology.  Cardiac nuclear imaging test 12/06/2011    Small, mild inferior infarction or possibly artifact. No ischemia. EF 45%. No TID. No reg WMA.  Cardiovascular LLE venous duplex 08/14/2015    Left leg:  No DVT. Dilated lymph node in left groin.  Constipation     Diabetes mellitus type II     Gout     Hepatic steatosis     Hypercholesterolemia     Hypertension     Knee effusion, left     Left knee pain     Morbid obesity (HCC)     Noncompliance     Obesity (BMI 30-39. 9)     S/P colonoscopy 3/8/05    Dr. Dereck Alva; normal; f/u 10 years    Stomach problems     TIA (transient ischemic attack) 3/09    Tobacco abuse        PSH:  Past Surgical History:   Procedure Laterality Date    ABDOMEN SURGERY PROC UNLISTED      Hernia repair in 1960's or 1970's.  HX ORTHOPAEDIC      Bones spur removed from left & right foot 2013.  HX UROLOGICAL  8/18/2015    [facility], Dr. Shasha Bill, Cystoscopy, left retrograde, left double-J cath       MEDS:  Current Outpatient Prescriptions   Medication Sig    B COMPLEX W-C NO.20/FOLIC ACID (B COMPLEX & C NO.20-FOLIC ACID PO) Take  by mouth.     lisinopril (PRINIVIL, ZESTRIL) 20 mg tablet Take  by mouth daily.  POTASSIUM CHLORIDE SR 10 MEQ TAB two (2) times a day.  hydrALAZINE (APRESOLINE) 100 mg tablet Take 1 Tab by mouth three (3) times daily. Indications: hypertension    furosemide (LASIX) 20 mg tablet Take 1 Tab by mouth daily.  budesonide-formoterol (SYMBICORT) 160-4.5 mcg/actuation HFA inhaler Take 2 Puffs by inhalation two (2) times a day.  carvedilol (COREG) 25 mg tablet Take 1 Tab by mouth two (2) times daily (with meals).  cloNIDine HCl (CATAPRES) 0.1 mg tablet Take 1 Tab by mouth two (2) times a day.  calcitRIOL (ROCALTROL) 0.25 mcg capsule Take 1 Cap by mouth every Monday, Wednesday, Friday.  aspirin 81 mg chewable tablet Take 1 Tab by mouth daily.  albuterol (PROVENTIL HFA, VENTOLIN HFA, PROAIR HFA) 90 mcg/actuation inhaler Take 2 Puffs by inhalation every four (4) hours as needed for Wheezing.  insulin NPH/insulin regular (NOVOLIN 70/30) 100 unit/mL (70-30) injection 30 UNITS SUBCUTANEOUSLY TWICE DAILY. Do not take it blood sugar is less than 100    Insulin Needles, Disposable, 29 gauge x 5/16\" ndle 40 Units by Does Not Apply route two (2) times a day.  acetaminophen (TYLENOL) 650 mg CR tablet Take 650 mg by mouth every six (6) hours as needed for Pain.  glucose blood VI test strips (ASCENSIA CONTOUR) strip contour test trips.  Lancets misc Check 3 times a day , needs according contour glucometer    Blood-Glucose Meter monitoring kit Check twice daily. No current facility-administered medications for this visit.         Allergies and Sensitivities:  Allergies   Allergen Reactions    Shellfish Derived Other (comments)     Causes Gout       Family History:  Family History   Problem Relation Age of Onset    Diabetes Father     Heart Disease Mother     Diabetes Sister     Hypertension Sister     Arthritis-osteo Sister     Arthritis-osteo Brother     Diabetes Other     Diabetes Other      Uncle, [facility] Hypertension Other      Uncle; Aunt       Social History:  He  reports that he has been smoking Cigarettes. He has been smoking about 0.25 packs per day. He has never used smokeless tobacco.  He  reports that he drinks alcohol. Physical:  Visit Vitals    /82 (BP 1 Location: Left arm, BP Patient Position: Sitting)    Pulse 73    Ht 5' 4\" (1.626 m)    Wt 106.6 kg (235 lb)    SpO2 95%    BMI 40.34 kg/m2       His weight is unchanged since his last visit      Exam:  Neck:  Supple, no JVD, no carotid bruits  CV:  Normal S1 and  S2, no murmurs, rubs, or gallops noted  Lungs:  Clear to ausculation throughout, no wheezes or rales  Abd:  Soft, non-tender, obese, distended with good bowel sounds. No hepatosplenomegaly  Extremities:  2+ pitting lower extremity edema, right greater than left      Data:  EKG:  Normal sinus rhythm, rate 73.  LAE.  Non-specific anterolateral and high lateral ST-T changes. LABS:  Lab Results   Component Value Date/Time    Sodium 142 02/10/2017 04:38 AM    Potassium 3.1 02/10/2017 04:38 AM    Chloride 104 02/10/2017 04:38 AM    CO2 31 02/10/2017 04:38 AM    Glucose 58 02/10/2017 04:38 AM    BUN 14 02/10/2017 04:38 AM    Creatinine 1.95 02/10/2017 04:38 AM     Lab Results   Component Value Date/Time    Cholesterol, total 163 02/08/2017 05:18 AM    HDL Cholesterol 41 02/08/2017 05:18 AM    LDL, calculated 64.4 02/08/2017 05:18 AM    Triglyceride 288 02/08/2017 05:18 AM    CHOL/HDL Ratio 4.0 02/08/2017 05:18 AM     Lab Results   Component Value Date/Time    ALT (SGPT) 18 02/09/2017 04:00 AM       Impression / Plan:  1. Shortness of breath, multifactorial   2. Noncompliance with dietary sodium and fluid restrictions  3. Hypertension, blood pressure stable  4. Diabetes mellitus, recommend Hgb A1c less than 7% from cardiac standpoint  5. Dyslipidemia  6. Lower extremity edema    Mr. Micky Car was seen today for post hospital follow-up and complaints of shortness of breath.   He was recently hospitalized for left-sided paresthesias which resolved and it was believed that he may have had a TIA. During his hospitalization, a CT scan of the head, MRI, MRA and CT scan of the spine were all negative. During his recent hospitalization, NT proBNP was checked and it was only 444 (normal range 0-900). Today, he complains of shortness of breath which has been occurring for some time and it is likely multifactorial (obesity, smoking, noncompliance with sodium limitation, and currently no fluid restriction). Based on his medication list, he is on several metered-dose inhalers but it is unclear whether or not he is using them. He complains of dyspnea on exertion and when trying to bend over to tie his shoes. His wife states that because his abdomen is large, she has to tie his shoes for him. She states that his abdomen is more distended than it normally is. His breath sounds are clear and he does exhibit 2+ lower extremity edema, right greater than left. He tells me that he is taking his furosemide 20 mg daily (instead of as needed based on his most recent visit with his primary care physician). He also tells me that he was previously taking Uloric but that it was discontinued according to his discharge instructions from the hospital, he was supposed to continue taking this medication. He does have history medical noncompliance. He had an echocardiogram done during his recent hospital admission and it showed a ejection fraction of 65-70% and grade 2 diastolic dysfunction. These findings were discussed with him and his wife. He was instructed to take Lasix 40 mg for 3 days only and then back to 20 mg daily. He was given a lab slip to have a BMP done in 1 week. He was also instructed to make sure that he has follow-up with Dr. Leidy Calderon, who is his primary care physician.   According to instructions given during his last visit with her just recently, he is to follow-up with her in 2 weeks but no appointment is noted in the system. While reviewing his diet, he admits that he does not limit his sodium intake nor does he limit his fluid intake. Both he and his wife admit to eating out often. He also continues to smoke and drink alcohol. We had a long discussion regarding the importance of abstaining from smoking and alcohol consumption and also limiting his sodium to 2000 mg per day and limiting his fluid intake. Congestive heart failure teaching done today. Advised to limit sodium intake to no more than 2000mg per day and to also limit his fluid intake to no more than 48 ounces per day. Advised to weigh daily every morning and record weights. Instructed to call our office if progressive weight gain is noted over a 2 to 3 day period of time, shortness of breath increases, or if abdominal bloating, nausea, fatigue, or increased lower extremity edema is noted. Patient education material regarding CHF, low-sodium diet, and sodium and fluid restrictions were attached his after visit summary. Greater than 50% of a 40 minute visit was spent counseling and answering questions. When asked whether or not he sees a nephrologist, he states that he does but he currently does not have an appointment. He was encouraged to schedule an appointment with nephrology for continued follow-up based on his history of chronic kidney disease, stage III. He will follow-up with Dr. Pura Cruz as scheduled and as needed.       Laura Murdock MSN, FNP-BC

## 2017-03-13 NOTE — PATIENT INSTRUCTIONS
Take lasix (furosemide) 40mg once a day for 3 days only and then back to 20mg once a day  All other medications to remain the same  BMP to be done in 1 week  Follow-up with Dr. Bonnita Kussmaul as scheduled and as needed  Weigh daily and record  Limit sodium intake to 2000mg per day  Limit fluid intake to no more than  6, eight ounce glasses of any type of fluids per day  Call if you notice sudden or progressive weight gain (3-5 pounds in 2-3 days), increasing shortness of breath, abdominal bloating, increasing lower extremity edema, inability to lie flat or on your normal number of pillows, having to sit up to catch your breath, fatigue, increased somnolence (sleeping more), poor appetite       Heart Failure: Care Instructions  Your Care Instructions    Heart failure occurs when your heart does not pump as much blood as the body needs. Failure does not mean that the heart has stopped pumping but rather that it is not pumping as well as it should. Over time, this causes fluid buildup in your lungs and other parts of your body. Fluid buildup can cause shortness of breath, fatigue, swollen ankles, and other problems. By taking medicines regularly, reducing sodium (salt) in your diet, checking your weight every day, and making lifestyle changes, you can feel better and live longer. Follow-up care is a key part of your treatment and safety. Be sure to make and go to all appointments, and call your doctor if you are having problems. It's also a good idea to know your test results and keep a list of the medicines you take. How can you care for yourself at home? Medicines  · Be safe with medicines. Take your medicines exactly as prescribed. Call your doctor if you think you are having a problem with your medicine.   · Do not take any vitamins, over-the-counter medicine, or herbal products without talking to your doctor first. Racheal Blanka not take ibuprofen (Advil or Motrin) and naproxen (Aleve) without talking to your doctor first. They could make your heart failure worse. · You may be taking some of the following medicine. ¨ Beta-blockers can slow heart rate, decrease blood pressure, and improve your condition. Taking a beta-blocker may lower your chance of needing to be hospitalized. ¨ Angiotensin-converting enzyme inhibitors (ACEIs) reduce the heart's workload, lower blood pressure, and reduce swelling. Taking an ACEI may lower your chance of needing to be hospitalized again. ¨ Angiotensin II receptor blockers (ARBs) work like ACEIs. Your doctor may prescribe them instead of ACEIs. ¨ Diuretics, also called water pills, reduce swelling. ¨ Potassium supplements replace this important mineral, which is sometimes lost with diuretics. ¨ Aspirin and other blood thinners prevent blood clots, which can cause a stroke or heart attack. You will get more details on the specific medicines your doctor prescribes. Diet  · Your doctor may suggest that you limit sodium to 2,000 milligrams (mg) a day or less. That is less than 1 teaspoon of salt a day, including all the salt you eat in cooking or in packaged foods. People get most of their sodium from processed foods. Fast food and restaurant meals also tend to be very high in sodium. · Ask your doctor how much liquid you can drink each day. You may have to limit liquids. Weight  · Weigh yourself without clothing at the same time each day. Record your weight. Call your doctor if you gain more than 3 pounds in 2 to 3 days. A sudden weight gain may mean that your heart failure is getting worse. Activity level  · Start light exercise (if your doctor says it is okay). Even if you can only do a small amount, exercise will help you get stronger, have more energy, and manage your weight and your stress. Walking is an easy way to get exercise. Start out by walking a little more than you did before. Bit by bit, increase the amount you walk.   · When you exercise, watch for signs that your heart is working too hard. You are pushing yourself too hard if you cannot talk while you are exercising. If you become short of breath or dizzy or have chest pain, stop, sit down, and rest.  · If you feel \"wiped out\" the day after you exercise, walk slower or for a shorter distance until you can work up to a better pace. · Get enough rest at night. Sleeping with 1 or 2 pillows under your upper body and head may help you breathe easier. Lifestyle changes  · Do not smoke. Smoking can make a heart condition worse. If you need help quitting, talk to your doctor about stop-smoking programs and medicines. These can increase your chances of quitting for good. Quitting smoking may be the most important step you can take to protect your heart. · Limit alcohol to 2 drinks a day for men and 1 drink a day for women. Too much alcohol can cause health problems. · Avoid getting sick from colds and the flu. Get a pneumococcal vaccine shot. If you have had one before, ask your doctor whether you need another dose. Get a flu shot each year. If you must be around people with colds or the flu, wash your hands often. When should you call for help? Call 911 if you have symptoms of sudden heart failure such as:  · You have severe trouble breathing. · You cough up pink, foamy mucus. · You have a new irregular or rapid heartbeat. Call your doctor now or seek immediate medical care if:  · You have new or increased shortness of breath. · You are dizzy or lightheaded, or you feel like you may faint. · You have sudden weight gain, such as 3 pounds or more in 2 to 3 days. · You have increased swelling in your legs, ankles, or feet. · You are suddenly so tired or weak that you cannot do your usual activities. Watch closely for changes in your health, and be sure to contact your doctor if:  · You develop new symptoms. Where can you learn more? Go to http://leslie-graciela.info/.   Enter J127 in the search box to learn more about \"Heart Failure: Care Instructions. \"  Current as of: January 27, 2016  Content Version: 11.1  © 7413-2713 [employer]. Care instructions adapted under license by [employer] (which disclaims liability or warranty for this information). If you have questions about a medical condition or this instruction, always ask your healthcare professional. [address] any warranty or liability for your use of this information. Limiting Sodium and Fluids With Heart Failure: Care Instructions  Your Care Instructions  Sodium causes your body to keep extra water, making it harder for your heart to pump. By limiting sodium, you will feel better and lower your risk of having to go to the hospital.  People get most of their sodium from processed foods. Fast food and restaurant meals also tend to be very high in sodium. Your doctor may suggest that you limit sodium to 2,000 milligrams (mg) a day or less. That is less than 1 teaspoon of salt a day, including all the salt you eat in cooked or packaged foods. Usually, you have to limit the amount of liquids you drink only if your heart failure is severe. Limiting sodium alone often is enough to help your body get rid of extra fluids. However, your doctor may tell you to limit your fluid intake to a set amount each day. Follow-up care is a key part of your treatment and safety. Be sure to make and go to all appointments, and call your doctor if you are having problems. It's also a good idea to know your test results and keep a list of the medicines you take. How can you care for yourself at home? Read food labels  · Read food labels on cans and food packages. The labels tell you how much sodium is in each serving. Make sure that you look at the serving size. If you eat more than the serving size, you have eaten more sodium than is listed for one serving. · Food labels also tell you the Percent Daily Value.  If the Percent Daily Value says 50%, it means that you will get at least 50% of all the sodium you need for the entire day in one serving. Choose products with low Percent Daily Values for sodium. · Be aware that sodium can come in forms other than salt, including monosodium glutamate (MSG), sodium citrate, and sodium bicarbonate (baking soda). MSG is often added to Asian food. You can sometimes ask for food without MSG or salt. Buy low-sodium foods  · Buy foods that are labeled \"unsalted\" (no salt added), \"sodium-free\" (less than 5 mg of sodium per serving), or \"low-sodium\" (less than 140 mg of sodium per serving). A food labeled \"light sodium\" has less than half of the full-sodium version of that food. Foods labeled \"reduced-sodium\" may still have too much sodium. · Buy fresh vegetables or plain, frozen vegetables. Buy low-sodium versions of canned vegetables, soups, and other canned goods. Prepare low-sodium meals  · Use less salt each day when cooking. Reducing salt in this way will help you adjust to the taste. Do not add salt after cooking. Take the salt shaker off the table. · Flavor your food with garlic, lemon juice, onion, vinegar, herbs, and spices instead of salt. Do not use soy sauce, steak sauce, onion salt, garlic salt, mustard, or ketchup on your food. · Make your own salad dressings, sauces, and ketchup without adding salt. · Use less salt (or none) when recipes call for it. You can often use half the salt a recipe calls for without losing flavor. Other dishes like rice, pasta, and grains do not need added salt. · Rinse canned vegetables. This removes some--but not all--of the salt. · Avoid water that has a naturally high sodium content or that has been treated with water softeners, which add sodium. Call your local water company to find out the sodium content of your water supply. If you buy bottled water, read the label and choose a sodium-free brand.   Avoid high-sodium foods, such as:  · Smoked, cured, salted, and canned meat, fish, and poultry. · Ham, day, hot dogs, and luncheon meats. · Regular, hard, and processed cheese and regular peanut butter. · Crackers with salted tops. · Frozen prepared meals. · Canned and dried soups, broths, and bouillon, unless labeled sodium-free or low-sodium. · Canned vegetables, unless labeled sodium-free or low-sodium. · Salted snack foods such as chips and pretzels. · Western Clarissa fries, pizza, tacos, and other fast foods. · Pickles, olives, ketchup, and other condiments, especially soy sauce, unless labeled sodium-free or low-sodium. If you cannot cook for yourself  · Have family members or friends help you, or have someone cook low-sodium meals. · Check with your local senior nutrition program to find out where meals are served and whether they offer a low-sodium option. You can often find these programs through your local health department or hospital.  · Have meals delivered to your home. Most [facility] have a [employer]. These programs provide one hot meal a day for older adults, delivered to their homes. Ask whether these meals are low-sodium. Let them know that you are on a low-sodium diet. Limiting fluid intake  · Find a method that works for you. You might simply write down how much you drink every time you do. Some people keep a container filled with the amount of fluid allowed for that day. If they drink from a source other than the container, then they pour out that amount. · Measure your regular drinking glasses to find out how much fluid each one holds. Once you know this, you will not have to measure every time. · Besides water, milk, juices, and other drinks, some foods have a lot of fluid. Count any foods that will melt (such as ice cream or gelatin dessert) or liquid foods (such as soup) as part of your fluid intake for the day. Where can you learn more? Go to http://leslie-graciela.info/.   Enter A166 in the search box to learn more about \"Limiting Sodium and Fluids With Heart Failure: Care Instructions. \"  Current as of: January 27, 2016  Content Version: 11.1  © 3578-2987 [email or web address]. Care instructions adapted under license by [employer] (which disclaims liability or warranty for this information). If you have questions about a medical condition or this instruction, always ask your healthcare professional. [address] any warranty or liability for your use of this information. Low Sodium Diet (2,000 Milligram): Care Instructions  Your Care Instructions  Too much sodium causes your body to hold on to extra water. This can raise your blood pressure and force your heart and kidneys to work harder. In very serious cases, this could cause you to be put in the hospital. It might even be life-threatening. By limiting sodium, you will feel better and lower your risk of serious problems. The most common source of sodium is salt. People get most of the salt in their diet from canned, prepared, and packaged foods. Fast food and restaurant meals also are very high in sodium. Your doctor will probably limit your sodium to less than 2,000 milligrams (mg) a day. This limit counts all the sodium in prepared and packaged foods and any salt you add to your food. Follow-up care is a key part of your treatment and safety. Be sure to make and go to all appointments, and call your doctor if you are having problems. It's also a good idea to know your test results and keep a list of the medicines you take. How can you care for yourself at home? Read food labels  · Read labels on cans and food packages. The labels tell you how much sodium is in each serving. Make sure that you look at the serving size. If you eat more than the serving size, you have eaten more sodium. · Food labels also tell you the Percent Daily Value for sodium. Choose products with low Percent Daily Values for sodium.   · Be aware that sodium can come in forms other than salt, including monosodium glutamate (MSG), sodium citrate, and sodium bicarbonate (baking soda). MSG is often added to Asian food. When you eat out, you can sometimes ask for food without MSG or added salt. Buy low-sodium foods  · Buy foods that are labeled \"unsalted\" (no salt added), \"sodium-free\" (less than 5 mg of sodium per serving), or \"low-sodium\" (less than 140 mg of sodium per serving). Foods labeled \"reduced-sodium\" and \"light sodium\" may still have too much sodium. Be sure to read the label to see how much sodium you are getting. · Buy fresh vegetables, or frozen vegetables without added sauces. Buy low-sodium versions of canned vegetables, soups, and other canned goods. Prepare low-sodium meals  · Cut back on the amount of salt you use in cooking. This will help you adjust to the taste. Do not add salt after cooking. One teaspoon of salt has about 2,300 mg of sodium. · Take the salt shaker off the table. · Flavor your food with garlic, lemon juice, onion, vinegar, herbs, and spices. Do not use soy sauce, lite soy sauce, steak sauce, onion salt, garlic salt, celery salt, mustard, or ketchup on your food. · Use low-sodium salad dressings, sauces, and ketchup. Or make your own salad dressings and sauces without adding salt. · Use less salt (or none) when recipes call for it. You can often use half the salt a recipe calls for without losing flavor. Other foods such as rice, pasta, and grains do not need added salt. · Rinse canned vegetables, and cook them in fresh water. This removes some--but not all--of the salt. · Avoid water that is naturally high in sodium or that has been treated with water softeners, which add sodium. Call your local water company to find out the sodium content of your water supply. If you buy bottled water, read the label and choose a sodium-free brand.   Avoid high-sodium foods  · Avoid eating:  ¨ Smoked, cured, salted, and canned meat, fish, and poultry. ¨ Ham, day, hot dogs, and luncheon meats. ¨ Regular, hard, and processed cheese and regular peanut butter. ¨ Crackers with salted tops, and other salted snack foods such as pretzels, chips, and salted popcorn. ¨ Frozen prepared meals, unless labeled low-sodium. ¨ Canned and dried soups, broths, and bouillon, unless labeled sodium-free or low-sodium. ¨ Canned vegetables, unless labeled sodium-free or low-sodium. ¨ Western Clarissa fries, pizza, tacos, and other fast foods. ¨ Pickles, olives, ketchup, and other condiments, especially soy sauce, unless labeled sodium-free or low-sodium. Where can you learn more? Go to http://leslie-graciela.info/. Enter H806 in the search box to learn more about \"Low Sodium Diet (2,000 Milligram): Care Instructions. \"  Current as of: July 26, 2016  Content Version: 11.1  © 2427-6239 [employer]. Care instructions adapted under license by [employer] (which disclaims liability or warranty for this information). If you have questions about a medical condition or this instruction, always ask your healthcare professional. [place] [address] any warranty or liability for your use of this information. How to Read a Food Label to Limit Sodium: Care Instructions  Your Care Instructions  Sodium causes your body to hold on to extra water. This can raise your blood pressure and force your heart and kidneys to work harder. In very serious cases, this could cause you to be put in the hospital. It might even be life-threatening. By limiting sodium, you will feel better and lower your risk of serious problems. Processed foods, fast food, and restaurant foods are the major sources of dietary sodium. The most common name for sodium is salt. Try to limit how much sodium you eat to less than 2,300 milligrams (mg) a day. If you limit your sodium to 1,500 mg a day, you can lower your blood pressure even more.  This limit counts all the salt that you eat in foods you cook or in packaged foods. Keep a list of everything you eat and drink. Follow-up care is a key part of your treatment and safety. Be sure to make and go to all appointments, and call your doctor if you are having problems. It's also a good idea to know your test results and keep a list of the medicines you take. How can you care for yourself at home? Read ingredient lists on food labels  · Read the list of ingredients on food labels to help you find how much sodium is in a food. The label lists the ingredients in a food in descending order (from the most to the least). If salt or sodium is high on the list, there may be a lot of sodium in the food. · Know that sodium has different names. Sodium is also called monosodium glutamate (MSG, common in [facility] food), sodium citrate, sodium alginate, sodium hydroxide, and sodium phosphate. Read Nutrition Facts labels  · On most foods, there is a Nutrition Facts label. This will tell you how much sodium is in one serving of food. Look at both the serving size and the sodium amount. The serving size is located at the top of the label, usually right under the \"Nutrition Facts\" title. The amount of sodium is given in the list under the title. It is given in milligrams (mg). ¨ Check the serving size carefully. A single serving is often very small, and you may eat more than one serving. If this is the case, you will eat more sodium than listed on the label. For example, if the serving size for a canned soup is 1 cup and the sodium amount is 470 mg, if you have 2 cups you will eat 940 mg of sodium. · The nutrition facts for fresh fruits and vegetables are not listed on the food. They may be listed somewhere in the store. These foods usually have no sodium or low sodium. · The Nutrition Facts label also gives you the Percent Daily Value for sodium. This is how much of the recommended amount of sodium a serving contains.  The daily value for sodium is less than 2,300 mg. So if the Percent Daily Value says 50%, this means one serving is giving you half of this, or 1,150 mg. Buy low-sodium foods  · Look for foods that are made with less sodium. Watch for the following words on the label. ¨ \"Unsalted\" means there is no sodium added to the food. But there may be sodium already in the food naturally. ¨ \"Sodium-free\" means a serving has less than 5 mg of sodium. ¨ \"Very low sodium\" means a serving has 35 mg or less of sodium. ¨ \"Low-sodium\" means a serving has 140 mg or less of sodium. · \"Reduced-sodium\" means that there is 25% less sodium than what the food normally has. This is still usually too much sodium. Try not to buy foods with this on the label. · Buy fresh vegetables, or frozen vegetables without added sauces. Buy low-sodium versions of canned vegetables, soups, and other canned goods. Where can you learn more? Go to http://leslie-graciela.info/. Enter 26 [id number] in the search box to learn more about \"How to Read a Food Label to Limit Sodium: Care Instructions. \"  Current as of: July 26, 2016  Content Version: 11.1  © 0440-0480 [employer]. Care instructions adapted under license by [employer] (which disclaims liability or warranty for this information). If you have questions about a medical condition or this instruction, always ask your healthcare professional. [place] [address] any warranty or liability for your use of this information.

## 2017-03-21 ENCOUNTER — OFFICE VISIT (OUTPATIENT)
Dept: FAMILY MEDICINE CLINIC | Age: 57
End: 2017-03-21

## 2017-03-21 ENCOUNTER — TELEPHONE (OUTPATIENT)
Dept: FAMILY MEDICINE CLINIC | Age: 57
End: 2017-03-21

## 2017-03-21 VITALS
BODY MASS INDEX: 40.56 KG/M2 | OXYGEN SATURATION: 97 % | RESPIRATION RATE: 16 BRPM | SYSTOLIC BLOOD PRESSURE: 110 MMHG | DIASTOLIC BLOOD PRESSURE: 60 MMHG | HEART RATE: 81 BPM | TEMPERATURE: 98.7 F | WEIGHT: 237.6 LBS | HEIGHT: 64 IN

## 2017-03-21 DIAGNOSIS — M79.89 LEG SWELLING: ICD-10-CM

## 2017-03-21 DIAGNOSIS — Z12.11 SCREENING FOR COLON CANCER: ICD-10-CM

## 2017-03-21 DIAGNOSIS — R06.2 WHEEZING: ICD-10-CM

## 2017-03-21 DIAGNOSIS — R06.02 SOB (SHORTNESS OF BREATH) ON EXERTION: Primary | ICD-10-CM

## 2017-03-21 DIAGNOSIS — F17.200 SMOKING: ICD-10-CM

## 2017-03-21 DIAGNOSIS — E11.65 POORLY CONTROLLED DIABETES MELLITUS (HCC): ICD-10-CM

## 2017-03-21 DIAGNOSIS — M25.441 SWELLING OF HAND JOINT, RIGHT: ICD-10-CM

## 2017-03-21 RX ORDER — GUAIFENESIN 100 MG/5ML
81 LIQUID (ML) ORAL DAILY
Qty: 30 TAB | Refills: 0 | Status: ON HOLD | OUTPATIENT
Start: 2017-03-21 | End: 2020-07-06

## 2017-03-21 RX ORDER — ALBUTEROL SULFATE 90 UG/1
2 AEROSOL, METERED RESPIRATORY (INHALATION)
Qty: 1 INHALER | Refills: 0 | Status: SHIPPED | OUTPATIENT
Start: 2017-03-21 | End: 2017-05-10 | Stop reason: SDUPTHER

## 2017-03-21 RX ORDER — CARVEDILOL 25 MG/1
25 TABLET ORAL 2 TIMES DAILY WITH MEALS
Qty: 60 TAB | Refills: 0 | Status: SHIPPED | OUTPATIENT
Start: 2017-03-21 | End: 2017-06-02 | Stop reason: SDUPTHER

## 2017-03-21 RX ORDER — FUROSEMIDE 20 MG/1
20 TABLET ORAL DAILY
Qty: 1 TAB | Refills: 0 | Status: SHIPPED | OUTPATIENT
Start: 2017-03-21 | End: 2017-06-02 | Stop reason: SDUPTHER

## 2017-03-21 RX ORDER — PREDNISONE 20 MG/1
20 TABLET ORAL
Qty: 7 TAB | Refills: 0 | Status: SHIPPED | OUTPATIENT
Start: 2017-03-21 | End: 2017-03-28 | Stop reason: ALTCHOICE

## 2017-03-21 NOTE — MR AVS SNAPSHOT
Visit Information Date & Time Provider Department Dept. Phone Encounter #  
 3/21/2017  1:30 PM Adriane Diane, [address] [id number] Follow-up Instructions Return in about 1 week (around 3/28/2017). Your Appointments 5/16/2017 10:40 AM  
Follow Up with Allan Kiser DO [facility] ([facility]) Appt Note: Return in about 6 months; .  
 [address] [place] [address] [id number] [address] [address] [phone] [address] Upcoming Health Maintenance Date Due COLONOSCOPY 3/9/2015 EYE EXAM RETINAL OR DILATED Q1 3/1/2017 FOOT EXAM Q1 3/8/2017 Pneumococcal 19-64 Highest Risk (2 of 3 - PCV13) 7/12/2017 HEMOGLOBIN A1C Q6M 8/9/2017 MICROALBUMIN Q1 1/13/2018 LIPID PANEL Q1 2/8/2018 DTaP/Tdap/Td series (2 - Td) 7/12/2026 Allergies as of 3/21/2017  Review Complete On: 3/21/2017 By: Adriane Diane MD  
  
 Severity Noted Reaction Type Reactions Shellfish Derived  05/25/2016    Other (comments) Causes Gout Current Immunizations  Reviewed on 2/7/2017 No immunizations on file. Not reviewed this visit You Were Diagnosed With   
  
 Codes Comments SOB (shortness of breath) on exertion    -  Primary ICD-10-CM: R06.02 
ICD-9-CM: 786.05 Leg swelling     ICD-10-CM: M79.89 ICD-9-CM: 729.81 Swelling of hand joint, right     ICD-10-CM: M25.441 ICD-9-CM: 719.04 Poorly controlled diabetes mellitus ([address]     ICD-10-CM: E11.65 ICD-9-CM: 250.00 Wheezing     ICD-10-CM: R06.2 ICD-9-CM: 786.07 Smoking     ICD-10-CM: F17.200 ICD-9-CM: 305.1 Screening for colon cancer     ICD-10-CM: Z12.11 ICD-9-CM: V76.51 Vitals BP Pulse Temp Resp Height(growth percentile) Weight(growth percentile)  110/60 (BP 1 Location: Left arm, BP Patient Position: Sitting) 81 98.7 °F (37.1 °C) (Oral) 16 5' 4\" (1.626 m) 237 lb 9.6 oz (107.8 kg) SpO2 BMI Smoking Status 97% 40.78 kg/m2 Current Every Day Smoker Vitals History BMI and BSA Data Body Mass Index Body Surface Area 40.78 kg/m 2 2.21 m 2 Preferred Pharmacy Pharmacy Name Phone [facility] Ashutosh Duvall [address] [place] [phone] Your Updated Medication List  
  
   
This list is accurate as of: 3/21/17  2:50 PM.  Always use your most recent med list.  
  
  
  
  
 acetaminophen 650 mg CR tablet Commonly known as:  TYLENOL Take 650 mg by mouth every six (6) hours as needed for Pain. albuterol 90 mcg/actuation inhaler Commonly known as:  PROVENTIL HFA, VENTOLIN HFA, PROAIR HFA Take 2 Puffs by inhalation every four (4) hours as needed for Wheezing. aspirin 81 mg chewable tablet Take 1 Tab by mouth daily. B COMPLEX & C NO.20-FOLIC ACID PO Take  by mouth. Blood-Glucose Meter monitoring kit Check twice daily. budesonide-formoterol 160-4.5 mcg/actuation HFA inhaler Commonly known as:  SYMBICORT Take 2 Puffs by inhalation two (2) times a day. calcitRIOL 0.25 mcg capsule Commonly known as:  ROCALTROL Take 1 Cap by mouth every Monday, Wednesday, Friday. carvedilol 25 mg tablet Commonly known as:  Dane Peat Take 1 Tab by mouth two (2) times daily (with meals). cloNIDine HCl 0.1 mg tablet Commonly known as:  CATAPRES Take 1 Tab by mouth two (2) times a day. furosemide 20 mg tablet Commonly known as:  LASIX Take 1 Tab by mouth daily. glucose blood VI test strips strip Commonly known as:  Ascensia CONTOUR  
contour test trips. hydrALAZINE 100 mg tablet Commonly known as:  APRESOLINE Take 1 Tab by mouth three (3) times daily. Indications: hypertension Insulin Needles (Disposable) 29 gauge x 5/16\" Ndle 40 Units by Does Not Apply route two (2) times a day. insulin NPH/insulin regular 100 unit/mL (70-30) injection Commonly known as:  NovoLIN 70/30  
30 UNITS SUBCUTANEOUSLY TWICE DAILY. Do not take it blood sugar is less than 100 Lancets Misc Check 3 times a day , needs according contour glucometer  
  
 lisinopril 20 mg tablet Commonly known as:  Aisha Shames Take  by mouth daily. POTASSIUM CHLORIDE SR 10 MEQ TAB  
two (2) times a day. predniSONE 20 mg tablet Commonly known as:  Nohemi Chill Take 1 Tab by mouth daily (with breakfast) for 7 days. Prescriptions Sent to Pharmacy Refills  
 albuterol (PROVENTIL HFA, VENTOLIN HFA, PROAIR HFA) 90 mcg/actuation inhaler 0 Sig: Take 2 Puffs by inhalation every four (4) hours as needed for Wheezing. Class: Normal  
 Pharmacy: [address] Ph #: [phone] Route: Inhalation  
 furosemide (LASIX) 20 mg tablet 0 Sig: Take 1 Tab by mouth daily. Class: Normal  
 Pharmacy: [address] Ph #: [phone] Route: Oral  
 carvedilol (COREG) 25 mg tablet 0 Sig: Take 1 Tab by mouth two (2) times daily (with meals). Class: Normal  
 Pharmacy: [address] Ph #: [phone] Route: Oral  
 aspirin 81 mg chewable tablet 0 Sig: Take 1 Tab by mouth daily. Class: Normal  
 Pharmacy: [address] Ph #: [phone] Route: Oral  
 predniSONE (DELTASONE) 20 mg tablet 0 Sig: Take 1 Tab by mouth daily (with breakfast) for 7 days. Class: Normal  
 Pharmacy: [address] Ph #: [phone]  Route: Oral  
  
 Follow-up Instructions Return in about 1 week (around 3/28/2017). To-Do List   
 03/21/2017 Lab:  OCCULT BLOOD, IMMUNOASSAY (FIT) Patient Instructions Bring blood sugar log. Do your lab work before coming next week. Low salt diet. Follow low carbohydrate diet. Keep an appt with rheumatologist.  
  
Learning About Diabetes Food Guidelines Your Care Instructions Meal planning is important to manage diabetes. It helps keep your blood sugar at a target level (which you set with your doctor). You don't have to eat special foods. You can eat what your family eats, including sweets once in a while. But you do have to pay attention to how often you eat and how much you eat of certain foods. You may want to work with a dietitian or a certified diabetes educator (CDE) to help you plan meals and snacks. A dietitian or CDE can also help you lose weight if that is one of your goals. What should you know about eating carbs? Managing the amount of carbohydrate (carbs) you eat is an important part of healthy meals when you have diabetes. Carbohydrate is found in many foods. · Learn which foods have carbs. And learn the amounts of carbs in different foods. ¨ Bread, cereal, pasta, and rice have about 15 grams of carbs in a serving. A serving is 1 slice of bread (1 ounce), ½ cup of cooked cereal, or 1/3 cup of cooked pasta or rice. ¨ Fruits have 15 grams of carbs in a serving. A serving is 1 small fresh fruit, such as an apple or orange; ½ of a banana; ½ cup of cooked or canned fruit; ½ cup of fruit juice; 1 cup of melon or raspberries; or 2 tablespoons of dried fruit. ¨ Milk and no-sugar-added yogurt have 15 grams of carbs in a serving. A serving is 1 cup of milk or 2/3 cup of no-sugar-added yogurt. ¨ Starchy vegetables have 15 grams of carbs in a serving.  A serving is ½ cup of mashed potatoes or sweet potato; 1 cup winter squash; ½ of a small baked potato; ½ cup of cooked beans; or ½ cup cooked corn or green peas. · Learn how much carbs to eat each day and at each meal. A dietitian or CDE can teach you how to keep track of the amount of carbs you eat. This is called carbohydrate counting. · If you are not sure how to count carbohydrate grams, use the Plate Method to plan meals. It is a good, quick way to make sure that you have a balanced meal. It also helps you spread carbs throughout the day. ¨ Divide your plate by types of foods. Put non-starchy vegetables on half the plate, meat or other protein food on one-quarter of the plate, and a grain or starchy vegetable in the final quarter of the plate. To this you can add a small piece of fruit and 1 cup of milk or yogurt, depending on how many carbs you are supposed to eat at a meal. 
· Try to eat about the same amount of carbs at each meal. Do not \"save up\" your daily allowance of carbs to eat at one meal. 
· Proteins have very little or no carbs per serving. Examples of proteins are beef, chicken, turkey, fish, eggs, tofu, cheese, cottage cheese, and peanut butter. A serving size of meat is 3 ounces, which is about the size of a deck of cards. Examples of meat substitute serving sizes (equal to 1 ounce of meat) are 1/4 cup of cottage cheese, 1 egg, 1 tablespoon of peanut butter, and ½ cup of tofu. How can you eat out and still eat healthy? · Learn to estimate the serving sizes of foods that have carbohydrate. If you measure food at home, it will be easier to estimate the amount in a serving of restaurant food. · If the meal you order has too much carbohydrate (such as potatoes, corn, or baked beans), ask to have a low-carbohydrate food instead. Ask for a salad or green vegetables. · If you use insulin, check your blood sugar before and after eating out to help you plan how much to eat in the future.  
· If you eat more carbohydrate at a meal than you had planned, take a walk or do other exercise. This will help lower your blood sugar. What else should you know? · Limit saturated fat, such as the fat from meat and dairy products. This is a healthy choice because people who have diabetes are at higher risk of heart disease. So choose lean cuts of meat and nonfat or low-fat dairy products. Use olive or canola oil instead of butter or shortening when cooking. · Don't skip meals. Your blood sugar may drop too low if you skip meals and take insulin or certain medicines for diabetes. · Check with your doctor before you drink alcohol. Alcohol can cause your blood sugar to drop too low. Alcohol can also cause a bad reaction if you take certain diabetes medicines. Follow-up care is a key part of your treatment and safety. Be sure to make and go to all appointments, and call your doctor if you are having problems. It's also a good idea to know your test results and keep a list of the medicines you take. Where can you learn more? Go to http://leslie-graciela.info/. Enter Y034 in the search box to learn more about \"Learning About Diabetes Food Guidelines. \" Current as of: May 23, 2016 Content Version: 11.1 © 4443-6352 [employer]. Care instructions adapted under license by [employer] (which disclaims liability or warranty for this information). If you have questions about a medical condition or this instruction, always ask your healthcare professional. [place] [address] any warranty or liability for your use of this information. Low Sodium Diet (2,000 Milligram): Care Instructions Your Care Instructions Too much sodium causes your body to hold on to extra water. This can raise your blood pressure and force your heart and kidneys to work harder. In very serious cases, this could cause you to be put in the hospital. It might even be life-threatening.  By limiting sodium, you will feel better and lower your risk of serious problems. The most common source of sodium is salt. People get most of the salt in their diet from canned, prepared, and packaged foods. Fast food and restaurant meals also are very high in sodium. Your doctor will probably limit your sodium to less than 2,000 milligrams (mg) a day. This limit counts all the sodium in prepared and packaged foods and any salt you add to your food. Follow-up care is a key part of your treatment and safety. Be sure to make and go to all appointments, and call your doctor if you are having problems. It's also a good idea to know your test results and keep a list of the medicines you take. How can you care for yourself at home? Read food labels · Read labels on cans and food packages. The labels tell you how much sodium is in each serving. Make sure that you look at the serving size. If you eat more than the serving size, you have eaten more sodium. · Food labels also tell you the Percent Daily Value for sodium. Choose products with low Percent Daily Values for sodium. · Be aware that sodium can come in forms other than salt, including monosodium glutamate (MSG), sodium citrate, and sodium bicarbonate (baking soda). MSG is often added to Asian food. When you eat out, you can sometimes ask for food without MSG or added salt. Buy low-sodium foods · Buy foods that are labeled \"unsalted\" (no salt added), \"sodium-free\" (less than 5 mg of sodium per serving), or \"low-sodium\" (less than 140 mg of sodium per serving). Foods labeled \"reduced-sodium\" and \"light sodium\" may still have too much sodium. Be sure to read the label to see how much sodium you are getting. · Buy fresh vegetables, or frozen vegetables without added sauces. Buy low-sodium versions of canned vegetables, soups, and other canned goods. Prepare low-sodium meals · Cut back on the amount of salt you use in cooking.  This will help you adjust to the taste. Do not add salt after cooking. One teaspoon of salt has about 2,300 mg of sodium. · Take the salt shaker off the table. · Flavor your food with garlic, lemon juice, onion, vinegar, herbs, and spices. Do not use soy sauce, lite soy sauce, steak sauce, onion salt, garlic salt, celery salt, mustard, or ketchup on your food. · Use low-sodium salad dressings, sauces, and ketchup. Or make your own salad dressings and sauces without adding salt. · Use less salt (or none) when recipes call for it. You can often use half the salt a recipe calls for without losing flavor. Other foods such as rice, pasta, and grains do not need added salt. · Rinse canned vegetables, and cook them in fresh water. This removes somebut not allof the salt. · Avoid water that is naturally high in sodium or that has been treated with water softeners, which add sodium. Call your local water company to find out the sodium content of your water supply. If you buy bottled water, read the label and choose a sodium-free brand. Avoid high-sodium foods · Avoid eating: ¨ Smoked, cured, salted, and canned meat, fish, and poultry. ¨ Ham, day, hot dogs, and luncheon meats. ¨ Regular, hard, and processed cheese and regular peanut butter. ¨ Crackers with salted tops, and other salted snack foods such as pretzels, chips, and salted popcorn. ¨ Frozen prepared meals, unless labeled low-sodium. ¨ Canned and dried soups, broths, and bouillon, unless labeled sodium-free or low-sodium. ¨ Canned vegetables, unless labeled sodium-free or low-sodium. ¨ Western Clarissa fries, pizza, tacos, and other fast foods. ¨ Pickles, olives, ketchup, and other condiments, especially soy sauce, unless labeled sodium-free or low-sodium. Where can you learn more? Go to http://leslie-graciela.info/. Enter J329 in the search box to learn more about \"Low Sodium Diet (2,000 Milligram): Care Instructions. \" Current as of: July 26, 2016 Content Version: 11.1 © 0302-3892 [employer]. Care instructions adapted under license by [employer] (which disclaims liability or warranty for this information). If you have questions about a medical condition or this instruction, always ask your healthcare professional. [address] any warranty or liability for your use of this information. Gout: Care Instructions Your Care Instructions Gout is a form of arthritis caused by a buildup of uric acid crystals in a joint. It causes sudden attacks of pain, swelling, redness, and stiffness, usually in one joint, especially the big toe. Gout usually comes on without a cause. But it can be brought on by drinking alcohol (especially beer) or eating seafood and red meat. Taking certain medicines, such as diuretics or aspirin, also can bring on an attack of gout. Taking your medicines as prescribed and following up with your doctor regularly can help you avoid gout attacks in the future. Follow-up care is a key part of your treatment and safety. Be sure to make and go to all appointments, and call your doctor if you are having problems. Its also a good idea to know your test results and keep a list of the medicines you take. How can you care for yourself at home? · If the joint is swollen, put ice or a cold pack on the area for 10 to 20 minutes at a time. Put a thin cloth between the ice and your skin. · Prop up the sore limb on a pillow when you ice it or anytime you sit or lie down during the next 3 days. Try to keep it above the level of your heart. This will help reduce swelling. · Rest sore joints. Avoid activities that put weight or strain on the joints for a few days. Take short rest breaks from your regular activities during the day. · Take your medicines exactly as prescribed. Call your doctor if you think you are having a problem with your medicine. · Take pain medicines exactly as directed. ¨ If the doctor gave you a prescription medicine for pain, take it as prescribed. ¨ If you are not taking a prescription pain medicine, ask your doctor if you can take an over-the-counter medicine. · Eat less seafood and red meat. · Check with your doctor before drinking alcohol. · Losing weight, if you are overweight, may help reduce attacks of gout. But do not go on a [employer]. \" Losing a lot of weight in a short amount of time can cause a gout attack. When should you call for help? Call your doctor now or seek immediate medical care if: 
· You have a fever. · The joint is so painful you cannot use it. · You have sudden, unexplained swelling, redness, warmth, or severe pain in one or more joints. Watch closely for changes in your health, and be sure to contact your doctor if: 
· You have joint pain. · Your symptoms get worse or are not improving after 2 or 3 days. Where can you learn more? Go to http://leslie-graciela.info/. Enter L442 in the search box to learn more about \"Gout: Care Instructions. \" Current as of: February 24, 2016 Content Version: 11.1 © 5883-3116 [employer]. Care instructions adapted under license by [employer] (which disclaims liability or warranty for this information). If you have questions about a medical condition or this instruction, always ask your healthcare professional. [address] any warranty or liability for your use of this information. Purine-Restricted Diet: Care Instructions Your Care Instructions Purines are substances that are found in some foods. Your body turns purines into uric acid. High levels of uric acid can cause gout, which is a form of arthritis that causes pain and inflammation in joints. You may be able to help control the amount of uric acid in your body by limiting high-purine foods in your diet. Follow-up care is a key part of your treatment and safety.  Be sure to make and go to all appointments, and call your doctor if you are having problems. It's also a good idea to know your test results and keep a list of the medicines you take. How can you care for yourself at home? · Plan your meals and snacks around foods that are low in purines and are safe for you to eat. These foods include: ¨ Green vegetables and tomatoes. ¨ Fruits. ¨ Whole-grain breads, rice, and cereals. ¨ Eggs, peanut butter, and nuts. ¨ Low-fat milk, cheese, and other milk products. ¨ Popcorn. ¨ Gelatin desserts, chocolate, cocoa, and cakes and sweets, in small amounts. · You can eat certain foods that are medium-high in purines, but eat them only once in a while. These foods include: ¨ Legumes, such as dried beans and dried peas. You can have 1 cup cooked legumes each day. ¨ Asparagus, cauliflower, spinach, mushrooms, and green peas. ¨ Fish and seafood (other than very high-purine seafood). ¨ Oatmeal, wheat bran, and wheat germ. · Limit very high-purine foods, including: ¨ Organ meats, such as liver, kidneys, sweetbreads, and brains. ¨ Meats, including day, beef, pork, and lamb. ¨ Game meats and any other meats in large amounts. ¨ Anchovies, sardines, herring, mackerel, and scallops. ¨ Gravy. ¨ Beer. Where can you learn more? Go to http://leslie-graciela.info/. Enter F448 in the search box to learn more about \"Purine-Restricted Diet: Care Instructions. \" Current as of: July 26, 2016 Content Version: 11.1 © 0867-6649 [employer]. Care instructions adapted under license by [employer] (which disclaims liability or warranty for this information). If you have questions about a medical condition or this instruction, always ask your healthcare professional. [address] any warranty or liability for your use of this information. Please provide this summary of care documentation to your next provider. Your primary care clinician is listed as Ceasar Plush. If you have any questions after today's visit, please call [phone].

## 2017-03-21 NOTE — PATIENT INSTRUCTIONS
Bring blood sugar log. Do your lab work before coming next week. Low salt diet. Follow low carbohydrate diet. Keep an appt with rheumatologist.      Learning About Diabetes Food Guidelines  Your Care Instructions  Meal planning is important to manage diabetes. It helps keep your blood sugar at a target level (which you set with your doctor). You don't have to eat special foods. You can eat what your family eats, including sweets once in a while. But you do have to pay attention to how often you eat and how much you eat of certain foods. You may want to work with a dietitian or a certified diabetes educator (CDE) to help you plan meals and snacks. A dietitian or CDE can also help you lose weight if that is one of your goals. What should you know about eating carbs? Managing the amount of carbohydrate (carbs) you eat is an important part of healthy meals when you have diabetes. Carbohydrate is found in many foods. · Learn which foods have carbs. And learn the amounts of carbs in different foods. ¨ Bread, cereal, pasta, and rice have about 15 grams of carbs in a serving. A serving is 1 slice of bread (1 ounce), ½ cup of cooked cereal, or 1/3 cup of cooked pasta or rice. ¨ Fruits have 15 grams of carbs in a serving. A serving is 1 small fresh fruit, such as an apple or orange; ½ of a banana; ½ cup of cooked or canned fruit; ½ cup of fruit juice; 1 cup of melon or raspberries; or 2 tablespoons of dried fruit. ¨ Milk and no-sugar-added yogurt have 15 grams of carbs in a serving. A serving is 1 cup of milk or 2/3 cup of no-sugar-added yogurt. ¨ Starchy vegetables have 15 grams of carbs in a serving. A serving is ½ cup of mashed potatoes or sweet potato; 1 cup winter squash; ½ of a small baked potato; ½ cup of cooked beans; or ½ cup cooked corn or green peas. · Learn how much carbs to eat each day and at each meal. A dietitian or CDE can teach you how to keep track of the amount of carbs you eat.  This is called carbohydrate counting. · If you are not sure how to count carbohydrate grams, use the Plate Method to plan meals. It is a good, quick way to make sure that you have a balanced meal. It also helps you spread carbs throughout the day. ¨ Divide your plate by types of foods. Put non-starchy vegetables on half the plate, meat or other protein food on one-quarter of the plate, and a grain or starchy vegetable in the final quarter of the plate. To this you can add a small piece of fruit and 1 cup of milk or yogurt, depending on how many carbs you are supposed to eat at a meal.  · Try to eat about the same amount of carbs at each meal. Do not \"save up\" your daily allowance of carbs to eat at one meal.  · Proteins have very little or no carbs per serving. Examples of proteins are beef, chicken, turkey, fish, eggs, tofu, cheese, cottage cheese, and peanut butter. A serving size of meat is 3 ounces, which is about the size of a deck of cards. Examples of meat substitute serving sizes (equal to 1 ounce of meat) are 1/4 cup of cottage cheese, 1 egg, 1 tablespoon of peanut butter, and ½ cup of tofu. How can you eat out and still eat healthy? · Learn to estimate the serving sizes of foods that have carbohydrate. If you measure food at home, it will be easier to estimate the amount in a serving of restaurant food. · If the meal you order has too much carbohydrate (such as potatoes, corn, or baked beans), ask to have a low-carbohydrate food instead. Ask for a salad or green vegetables. · If you use insulin, check your blood sugar before and after eating out to help you plan how much to eat in the future. · If you eat more carbohydrate at a meal than you had planned, take a walk or do other exercise. This will help lower your blood sugar. What else should you know? · Limit saturated fat, such as the fat from meat and dairy products.  This is a healthy choice because people who have diabetes are at higher risk of heart disease. So choose lean cuts of meat and nonfat or low-fat dairy products. Use olive or canola oil instead of butter or shortening when cooking. · Don't skip meals. Your blood sugar may drop too low if you skip meals and take insulin or certain medicines for diabetes. · Check with your doctor before you drink alcohol. Alcohol can cause your blood sugar to drop too low. Alcohol can also cause a bad reaction if you take certain diabetes medicines. Follow-up care is a key part of your treatment and safety. Be sure to make and go to all appointments, and call your doctor if you are having problems. It's also a good idea to know your test results and keep a list of the medicines you take. Where can you learn more? Go to http://leslie-graciela.info/. Enter N114 in the search box to learn more about \"Learning About Diabetes Food Guidelines. \"  Current as of: May 23, 2016  Content Version: 11.1  © 5659-2460 [employer]. Care instructions adapted under license by [employer] (which disclaims liability or warranty for this information). If you have questions about a medical condition or this instruction, always ask your healthcare professional. [place] [address] any warranty or liability for your use of this information. Low Sodium Diet (2,000 Milligram): Care Instructions  Your Care Instructions  Too much sodium causes your body to hold on to extra water. This can raise your blood pressure and force your heart and kidneys to work harder. In very serious cases, this could cause you to be put in the hospital. It might even be life-threatening. By limiting sodium, you will feel better and lower your risk of serious problems. The most common source of sodium is salt. People get most of the salt in their diet from canned, prepared, and packaged foods. Fast food and restaurant meals also are very high in sodium.  Your doctor will probably limit your sodium to less than 2,000 milligrams (mg) a day. This limit counts all the sodium in prepared and packaged foods and any salt you add to your food. Follow-up care is a key part of your treatment and safety. Be sure to make and go to all appointments, and call your doctor if you are having problems. It's also a good idea to know your test results and keep a list of the medicines you take. How can you care for yourself at home? Read food labels  · Read labels on cans and food packages. The labels tell you how much sodium is in each serving. Make sure that you look at the serving size. If you eat more than the serving size, you have eaten more sodium. · Food labels also tell you the Percent Daily Value for sodium. Choose products with low Percent Daily Values for sodium. · Be aware that sodium can come in forms other than salt, including monosodium glutamate (MSG), sodium citrate, and sodium bicarbonate (baking soda). MSG is often added to Asian food. When you eat out, you can sometimes ask for food without MSG or added salt. Buy low-sodium foods  · Buy foods that are labeled \"unsalted\" (no salt added), \"sodium-free\" (less than 5 mg of sodium per serving), or \"low-sodium\" (less than 140 mg of sodium per serving). Foods labeled \"reduced-sodium\" and \"light sodium\" may still have too much sodium. Be sure to read the label to see how much sodium you are getting. · Buy fresh vegetables, or frozen vegetables without added sauces. Buy low-sodium versions of canned vegetables, soups, and other canned goods. Prepare low-sodium meals  · Cut back on the amount of salt you use in cooking. This will help you adjust to the taste. Do not add salt after cooking. One teaspoon of salt has about 2,300 mg of sodium. · Take the salt shaker off the table. · Flavor your food with garlic, lemon juice, onion, vinegar, herbs, and spices.  Do not use soy sauce, lite soy sauce, steak sauce, onion salt, garlic salt, celery salt, mustard, or ketchup on your food. · Use low-sodium salad dressings, sauces, and ketchup. Or make your own salad dressings and sauces without adding salt. · Use less salt (or none) when recipes call for it. You can often use half the salt a recipe calls for without losing flavor. Other foods such as rice, pasta, and grains do not need added salt. · Rinse canned vegetables, and cook them in fresh water. This removes somebut not allof the salt. · Avoid water that is naturally high in sodium or that has been treated with water softeners, which add sodium. Call your local water company to find out the sodium content of your water supply. If you buy bottled water, read the label and choose a sodium-free brand. Avoid high-sodium foods  · Avoid eating:  ¨ Smoked, cured, salted, and canned meat, fish, and poultry. ¨ Ham, day, hot dogs, and luncheon meats. ¨ Regular, hard, and processed cheese and regular peanut butter. ¨ Crackers with salted tops, and other salted snack foods such as pretzels, chips, and salted popcorn. ¨ Frozen prepared meals, unless labeled low-sodium. ¨ Canned and dried soups, broths, and bouillon, unless labeled sodium-free or low-sodium. ¨ Canned vegetables, unless labeled sodium-free or low-sodium. ¨ Western Clarissa fries, pizza, tacos, and other fast foods. ¨ Pickles, olives, ketchup, and other condiments, especially soy sauce, unless labeled sodium-free or low-sodium. Where can you learn more? Go to http://leslie-graciela.info/. Enter J992 in the search box to learn more about \"Low Sodium Diet (2,000 Milligram): Care Instructions. \"  Current as of: July 26, 2016  Content Version: 11.1  © 8109-4877 [employer]. Care instructions adapted under license by [employer] (which disclaims liability or warranty for this information).  If you have questions about a medical condition or this instruction, always ask your healthcare professional. [place] [address] any warranty or liability for your use of this information. Gout: Care Instructions  Your Care Instructions  Gout is a form of arthritis caused by a buildup of uric acid crystals in a joint. It causes sudden attacks of pain, swelling, redness, and stiffness, usually in one joint, especially the big toe. Gout usually comes on without a cause. But it can be brought on by drinking alcohol (especially beer) or eating seafood and red meat. Taking certain medicines, such as diuretics or aspirin, also can bring on an attack of gout. Taking your medicines as prescribed and following up with your doctor regularly can help you avoid gout attacks in the future. Follow-up care is a key part of your treatment and safety. Be sure to make and go to all appointments, and call your doctor if you are having problems. Its also a good idea to know your test results and keep a list of the medicines you take. How can you care for yourself at home? · If the joint is swollen, put ice or a cold pack on the area for 10 to 20 minutes at a time. Put a thin cloth between the ice and your skin. · Prop up the sore limb on a pillow when you ice it or anytime you sit or lie down during the next 3 days. Try to keep it above the level of your heart. This will help reduce swelling. · Rest sore joints. Avoid activities that put weight or strain on the joints for a few days. Take short rest breaks from your regular activities during the day. · Take your medicines exactly as prescribed. Call your doctor if you think you are having a problem with your medicine. · Take pain medicines exactly as directed. ¨ If the doctor gave you a prescription medicine for pain, take it as prescribed. ¨ If you are not taking a prescription pain medicine, ask your doctor if you can take an over-the-counter medicine. · Eat less seafood and red meat. · Check with your doctor before drinking alcohol.   · Losing weight, if you are overweight, may help reduce attacks of gout. But do not go on a [employer]. \" Losing a lot of weight in a short amount of time can cause a gout attack. When should you call for help? Call your doctor now or seek immediate medical care if:  · You have a fever. · The joint is so painful you cannot use it. · You have sudden, unexplained swelling, redness, warmth, or severe pain in one or more joints. Watch closely for changes in your health, and be sure to contact your doctor if:  · You have joint pain. · Your symptoms get worse or are not improving after 2 or 3 days. Where can you learn more? Go to http://leslie-graciela.info/. Enter K103 in the search box to learn more about \"Gout: Care Instructions. \"  Current as of: February 24, 2016  Content Version: 11.1  © 2208-4655 [email or web address]. Care instructions adapted under license by [employer] (which disclaims liability or warranty for this information). If you have questions about a medical condition or this instruction, always ask your healthcare professional. [address] any warranty or liability for your use of this information. Purine-Restricted Diet: Care Instructions  Your Care Instructions  Purines are substances that are found in some foods. Your body turns purines into uric acid. High levels of uric acid can cause gout, which is a form of arthritis that causes pain and inflammation in joints. You may be able to help control the amount of uric acid in your body by limiting high-purine foods in your diet. Follow-up care is a key part of your treatment and safety. Be sure to make and go to all appointments, and call your doctor if you are having problems. It's also a good idea to know your test results and keep a list of the medicines you take. How can you care for yourself at home? · Plan your meals and snacks around foods that are low in purines and are safe for you to eat.  These foods include:  Billye Ort vegetables and tomatoes. ¨ Fruits. ¨ Whole-grain breads, rice, and cereals. ¨ Eggs, peanut butter, and nuts. ¨ Low-fat milk, cheese, and other milk products. ¨ Popcorn. ¨ Gelatin desserts, chocolate, cocoa, and cakes and sweets, in small amounts. · You can eat certain foods that are medium-high in purines, but eat them only once in a while. These foods include:  ¨ Legumes, such as dried beans and dried peas. You can have 1 cup cooked legumes each day. ¨ Asparagus, cauliflower, spinach, mushrooms, and green peas. ¨ Fish and seafood (other than very high-purine seafood). ¨ Oatmeal, wheat bran, and wheat germ. · Limit very high-purine foods, including:  ¨ Organ meats, such as liver, kidneys, sweetbreads, and brains. ¨ Meats, including day, beef, pork, and lamb. ¨ Game meats and any other meats in large amounts. ¨ Anchovies, sardines, herring, mackerel, and scallops. ¨ Gravy. ¨ Beer. Where can you learn more? Go to http://leslie-graciela.info/. Enter F448 in the search box to learn more about \"Purine-Restricted Diet: Care Instructions. \"  Current as of: July 26, 2016  Content Version: 11.1  © 2320-3030 [employer]. Care instructions adapted under license by [employer] (which disclaims liability or warranty for this information). If you have questions about a medical condition or this instruction, always ask your healthcare professional. [place] [address] any warranty or liability for your use of this information.

## 2017-03-21 NOTE — PROGRESS NOTES
1. Have you been to the ER, urgent care clinic since your last visit? Hospitalized since your last visit? No    2. Have you seen or consulted any other health care providers outside of the [facility] since your last visit? Include any pap smears or colon screening. No    Patient has not had dm eye exam or dm foot exam. Also, patient needs refill on test strips.

## 2017-03-21 NOTE — PROGRESS NOTES
HISTORY OF PRESENT ILLNESS  Goldy Greenberg is a 64 y.o. male. HPI: here for follow up on blood pressure and diabetes. Did not bring blood sugar log. Chiki Benson it is fine. Last visit was advised to go up on insulin 32 units in am and 36 in pm. He is taking still 30 units bid. Denies any hypoglycemia. Denies any headache, dizziness, no chest pain or sob. No abdominal pain. Appetite fair. Today c/o rt hand swelling and pain. Has h/o gout. Mild warm on touch. Said started suddenly since few days and it has been not helping. Last visit had leg swelling. Was given lasix and it has helped leg swelling but said not helping hand swelling. Was on uloric but has stopped as was told due to kidney disease he can not take. Has coming up appt with rheumatology. Has recent labs elevated uric acid. Also HBA1C went up compare to prior labs. Lab Results   Component Value Date/Time    Uric acid 5.4 02/09/2017 04:00 AM     Lab Results   Component Value Date/Time    Sodium 142 02/10/2017 04:38 AM    Potassium 3.1 02/10/2017 04:38 AM    Chloride 104 02/10/2017 04:38 AM    CO2 31 02/10/2017 04:38 AM    Anion gap 7 02/10/2017 04:38 AM    Glucose 58 02/10/2017 04:38 AM    BUN 14 02/10/2017 04:38 AM    Creatinine 1.95 02/10/2017 04:38 AM    BUN/Creatinine ratio 7 02/10/2017 04:38 AM    GFR est AA 43 02/10/2017 04:38 AM    GFR est non-AA 36 02/10/2017 04:38 AM    Calcium 8.6 02/10/2017 04:38 AM    Bilirubin, total 0.2 02/09/2017 04:00 AM    AST (SGOT) 15 02/09/2017 04:00 AM    Alk.  phosphatase 153 02/09/2017 04:00 AM    Protein, total 6.4 02/09/2017 04:00 AM    Protein, total 5.3 02/09/2017 04:00 AM    Albumin 2.1 02/09/2017 04:00 AM    Globulin 4.3 02/09/2017 04:00 AM    A-G Ratio 0.5 02/09/2017 04:00 AM    ALT (SGPT) 18 02/09/2017 04:00 AM     Lab Results   Component Value Date/Time    Cholesterol, total 163 02/08/2017 05:18 AM    HDL Cholesterol 41 02/08/2017 05:18 AM    LDL, calculated 64.4 02/08/2017 05:18 AM    VLDL, calculated 57.6 02/08/2017 05:18 AM    Triglyceride 288 02/08/2017 05:18 AM    CHOL/HDL Ratio 4.0 02/08/2017 05:18 AM     Lab Results   Component Value Date/Time    Hemoglobin A1c 7.4 02/09/2017 04:00 AM    Hemoglobin A1c (POC) 6.7 04/16/2015 08:48 AM    Hemoglobin A1c, External 7.3 01/13/2017     Also review that he has seen cardiology recently. Review their notes. He suppose to have BMP done before this visit but has not done yet. Have reminded him. Visit Vitals    /60 (BP 1 Location: Left arm, BP Patient Position: Sitting)    Pulse 81    Temp 98.7 °F (37.1 °C) (Oral)    Resp 16    Ht 5' 4\" (1.626 m)    Wt 237 lb 9.6 oz (107.8 kg)    SpO2 97%    BMI 40.78 kg/m2         ROS: see HPI     Physical Exam   Constitutional: He is oriented to person, place, and time. No distress. Cardiovascular: Normal rate, regular rhythm and normal heart sounds. Pulmonary/Chest:   CTA   Abdominal: Soft. Bowel sounds are normal. There is no tenderness. Musculoskeletal: He exhibits no edema. Rt hand: generalize swelling over rt hand. No point tenderness. Generalize tenderness. Sensations intact over fingers. ROM over wrist wnl but discomfort. Neurological: He is oriented to person, place, and time. Psychiatric: His behavior is normal.       ASSESSMENT and PLAN    ICD-10-CM ICD-9-CM    1. SOB (shortness of breath) on exertion: seen cardiology. For now no new recommendations. Had echo showed diastolic dysfunction. For now will observe. Today said it has been improved since on lasix. Recent labs BNP was wnl. Also recent hospitalization with left side weakness. Diagnosed with TIA. See prior note from last visit. R06.02 786.05    2. Leg swelling: improved with lasix. M79.89 729.81 furosemide (LASIX) 20 mg tablet   3. Swelling of hand joint, right: could be gout flair. For now given prednisone. Advised him to closely monitor glucose and bring blood sugar log next visit.   M25.441 719.04 predniSONE (DELTASONE) 20 mg tablet 4. Poorly controlled diabetes mellitus ([facility] [address]): advised to take insulin 32 units in am and 36 at pm.  E11.65 250.00    5. Wheezing: improved with symptomatic treatment. R06.2 786.07    6. Smoking: still smoking. Hard time getting quit. Will f/u next visit. F17.200 305.1    7. Screening for colon cancer Z12.11 V76.51 OCCULT BLOOD, IMMUNOASSAY (FIT)   Pt understood and agrees with above plan. Review HM    Follow-up Disposition:  Return in about 1 week (around 3/28/2017).

## 2017-03-21 NOTE — TELEPHONE ENCOUNTER
Derrick Chaney from [address] called this afternoon. He needs quantity clarification for patients lasix medication. Please call him back at your earliest convenience.

## 2017-03-21 NOTE — TELEPHONE ENCOUNTER
Called Sebastian (pharmacist) regarding Lasix RX. Per Dr Cope Said patient is to receive 30 tabs and take one tab daily with no refill. Goldy Corbin verbalized understanding.

## 2017-03-27 ENCOUNTER — HOSPITAL ENCOUNTER (OUTPATIENT)
Dept: LAB | Age: 57
Discharge: HOME | End: 2017-03-27

## 2017-03-27 PROCEDURE — 99001 SPECIMEN HANDLING PT-LAB: CPT | Performed by: FAMILY MEDICINE

## 2017-03-28 ENCOUNTER — TELEPHONE (OUTPATIENT)
Dept: FAMILY MEDICINE CLINIC | Age: 57
End: 2017-03-28

## 2017-03-28 ENCOUNTER — OFFICE VISIT (OUTPATIENT)
Dept: FAMILY MEDICINE CLINIC | Age: 57
End: 2017-03-28

## 2017-03-28 VITALS
BODY MASS INDEX: 40.63 KG/M2 | HEIGHT: 64 IN | WEIGHT: 238 LBS | HEART RATE: 82 BPM | DIASTOLIC BLOOD PRESSURE: 80 MMHG | OXYGEN SATURATION: 95 % | SYSTOLIC BLOOD PRESSURE: 140 MMHG | RESPIRATION RATE: 16 BRPM

## 2017-03-28 DIAGNOSIS — I10 ESSENTIAL HYPERTENSION: ICD-10-CM

## 2017-03-28 DIAGNOSIS — N28.9 RENAL INSUFFICIENCY: ICD-10-CM

## 2017-03-28 DIAGNOSIS — E11.65 POORLY CONTROLLED DIABETES MELLITUS (HCC): ICD-10-CM

## 2017-03-28 DIAGNOSIS — M79.89 LEG SWELLING: ICD-10-CM

## 2017-03-28 DIAGNOSIS — M10.9 ACUTE GOUT OF RIGHT WRIST, UNSPECIFIED CAUSE: Primary | ICD-10-CM

## 2017-03-28 LAB
BUN SERPL-MCNC: 19 MG/DL (ref 6–24)
BUN/CREAT SERPL: 10 (ref 9–20)
CALCIUM SERPL-MCNC: 8.8 MG/DL (ref 8.7–10.2)
CHLORIDE SERPL-SCNC: 102 MMOL/L (ref 96–106)
CO2 SERPL-SCNC: 29 MMOL/L (ref 18–29)
CREAT SERPL-MCNC: 1.89 MG/DL (ref 0.76–1.27)
GLUCOSE SERPL-MCNC: 165 MG/DL (ref 65–99)
INTERPRETATION: NORMAL
POTASSIUM SERPL-SCNC: 3.8 MMOL/L (ref 3.5–5.2)
SODIUM SERPL-SCNC: 145 MMOL/L (ref 134–144)

## 2017-03-28 RX ORDER — FEBUXOSTAT 40 MG/1
40 TABLET, FILM COATED ORAL DAILY
Qty: 30 TAB | Refills: 1 | Status: SHIPPED | OUTPATIENT
Start: 2017-03-28 | End: 2017-06-02 | Stop reason: SDUPTHER

## 2017-03-28 NOTE — MR AVS SNAPSHOT
Visit Information Date & Time Provider Department Dept. Phone Encounter #  
 3/28/2017  1:15 PM Etelvina Roman, [address] [id number] Follow-up Instructions Return in about 2 months (around 5/28/2017). Your Appointments 5/16/2017 10:40 AM  
Follow Up with Byron Esparza DO [facility] [employer] ([facility]) Appt Note: Return in about 6 months; .  
 [address] [facility] [address] [phone] [address] [place] [address] Upcoming Health Maintenance Date Due COLONOSCOPY 3/9/2015 EYE EXAM RETINAL OR DILATED Q1 3/1/2017 Pneumococcal 19-64 Highest Risk (2 of 3 - PCV13) 7/12/2017 HEMOGLOBIN A1C Q6M 8/9/2017 MICROALBUMIN Q1 1/13/2018 LIPID PANEL Q1 2/8/2018 FOOT EXAM Q1 3/28/2018 DTaP/Tdap/Td series (2 - Td) 7/12/2026 Allergies as of 3/28/2017  Review Complete On: 3/28/2017 By: Etelvina Roman MD  
  
 Severity Noted Reaction Type Reactions Shellfish Derived  05/25/2016    Other (comments) Causes Gout Current Immunizations  Reviewed on 2/7/2017 No immunizations on file. Not reviewed this visit You Were Diagnosed With   
  
 Codes Comments Acute gout of right wrist, unspecified cause    -  Primary ICD-10-CM: M10.9 ICD-9-CM: 274.01 Poorly controlled diabetes mellitus ([facility]     ICD-10-CM: E11.65 ICD-9-CM: 250.00 Essential hypertension     ICD-10-CM: I10 
ICD-9-CM: 401.9 Vitals BP Pulse Resp Height(growth percentile) Weight(growth percentile) SpO2  
 140/80 (BP 1 Location: Left arm, BP Patient Position: Sitting) 82 16 5' 4\" (1.626 m) 238 lb (108 kg) 95% BMI Smoking Status 40.85 kg/m2 Current Every Day Smoker BMI and BSA Data Body Mass Index Body Surface Area  
 40.85 kg/m 2 2.21 m 2 Preferred Pharmacy Pharmacy Name Phone [facility] Ashutosh Duvall [address] [place] [address] [phone] Your Updated Medication List  
  
   
This list is accurate as of: 3/28/17  1:47 PM.  Always use your most recent med list.  
  
  
  
  
 acetaminophen 650 mg CR tablet Commonly known as:  TYLENOL Take 650 mg by mouth every six (6) hours as needed for Pain. albuterol 90 mcg/actuation inhaler Commonly known as:  PROVENTIL HFA, VENTOLIN HFA, PROAIR HFA Take 2 Puffs by inhalation every four (4) hours as needed for Wheezing. aspirin 81 mg chewable tablet Take 1 Tab by mouth daily. B COMPLEX & C NO.20-FOLIC ACID PO Take  by mouth. Blood-Glucose Meter monitoring kit Check twice daily. budesonide-formoterol 160-4.5 mcg/actuation HFA inhaler Commonly known as:  SYMBICORT Take 2 Puffs by inhalation two (2) times a day. calcitRIOL 0.25 mcg capsule Commonly known as:  ROCALTROL Take 1 Cap by mouth every Monday, Wednesday, Friday. carvedilol 25 mg tablet Commonly known as:  Malorie Dome Take 1 Tab by mouth two (2) times daily (with meals). cloNIDine HCl 0.1 mg tablet Commonly known as:  CATAPRES Take 1 Tab by mouth two (2) times a day. febuxostat 40 mg Tab tablet Commonly known as:  Sheryl Pouch Take 1 Tab by mouth daily. furosemide 20 mg tablet Commonly known as:  LASIX Take 1 Tab by mouth daily. glucose blood VI test strips strip Commonly known as:  FREESTYLE TEST  
by Does Not Apply route See Admin Instructions. Check twice a day  
  
 hydrALAZINE 100 mg tablet Commonly known as:  APRESOLINE Take 1 Tab by mouth three (3) times daily. Indications: hypertension Insulin Needles (Disposable) 29 gauge x 5/16\" Ndle 40 Units by Does Not Apply route two (2) times a day. insulin NPH/insulin regular 100 unit/mL (70-30) injection Commonly known as:  NovoLIN 70/30 30 UNITS SUBCUTANEOUSLY TWICE DAILY. Do not take it blood sugar is less than 100 Lancets Misc Check 3 times a day , needs according contour glucometer  
  
 lisinopril 20 mg tablet Commonly known as:  Oma Saucedo Take  by mouth daily. POTASSIUM CHLORIDE SR 10 MEQ TAB  
two (2) times a day. Prescriptions Sent to Pharmacy Refills  
 glucose blood VI test strips (FREESTYLE TEST) strip 2 Sig: by Does Not Apply route See Admin Instructions. Check twice a day Class: Normal  
 Pharmacy: [address] Ph #: [phone] Route: Does Not Apply  
 febuxostat (ULORIC) 40 mg tab tablet 1 Sig: Take 1 Tab by mouth daily. Class: Normal  
 Pharmacy: [address] Ph #: [phone] Route: Oral  
  
Follow-up Instructions Return in about 2 months (around 5/28/2017). Patient Instructions 32 units in am and 38 units in pm  
  
Learning About Diabetes Food Guidelines Your Care Instructions Meal planning is important to manage diabetes. It helps keep your blood sugar at a target level (which you set with your doctor). You don't have to eat special foods. You can eat what your family eats, including sweets once in a while. But you do have to pay attention to how often you eat and how much you eat of certain foods. You may want to work with a dietitian or a certified diabetes educator (CDE) to help you plan meals and snacks. A dietitian or CDE can also help you lose weight if that is one of your goals. What should you know about eating carbs? Managing the amount of carbohydrate (carbs) you eat is an important part of healthy meals when you have diabetes. Carbohydrate is found in many foods. · Learn which foods have carbs. And learn the amounts of carbs in different foods. ¨ Bread, cereal, pasta, and rice have about 15 grams of carbs in a serving. A serving is 1 slice of bread (1 ounce), ½ cup of cooked cereal, or 1/3 cup of cooked pasta or rice. ¨ Fruits have 15 grams of carbs in a serving. A serving is 1 small fresh fruit, such as an apple or orange; ½ of a banana; ½ cup of cooked or canned fruit; ½ cup of fruit juice; 1 cup of melon or raspberries; or 2 tablespoons of dried fruit. ¨ Milk and no-sugar-added yogurt have 15 grams of carbs in a serving. A serving is 1 cup of milk or 2/3 cup of no-sugar-added yogurt. ¨ Starchy vegetables have 15 grams of carbs in a serving. A serving is ½ cup of mashed potatoes or sweet potato; 1 cup winter squash; ½ of a small baked potato; ½ cup of cooked beans; or ½ cup cooked corn or green peas. · Learn how much carbs to eat each day and at each meal. A dietitian or CDE can teach you how to keep track of the amount of carbs you eat. This is called carbohydrate counting. · If you are not sure how to count carbohydrate grams, use the Plate Method to plan meals. It is a good, quick way to make sure that you have a balanced meal. It also helps you spread carbs throughout the day. ¨ Divide your plate by types of foods. Put non-starchy vegetables on half the plate, meat or other protein food on one-quarter of the plate, and a grain or starchy vegetable in the final quarter of the plate. To this you can add a small piece of fruit and 1 cup of milk or yogurt, depending on how many carbs you are supposed to eat at a meal. 
· Try to eat about the same amount of carbs at each meal. Do not \"save up\" your daily allowance of carbs to eat at one meal. 
· Proteins have very little or no carbs per serving. Examples of proteins are beef, chicken, turkey, fish, eggs, tofu, cheese, cottage cheese, and peanut butter. A serving size of meat is 3 ounces, which is about the size of a deck of cards.  Examples of meat substitute serving sizes (equal to 1 ounce of meat) are 1/4 cup of cottage cheese, 1 egg, 1 tablespoon of peanut butter, and ½ cup of tofu. How can you eat out and still eat healthy? · Learn to estimate the serving sizes of foods that have carbohydrate. If you measure food at home, it will be easier to estimate the amount in a serving of restaurant food. · If the meal you order has too much carbohydrate (such as potatoes, corn, or baked beans), ask to have a low-carbohydrate food instead. Ask for a salad or green vegetables. · If you use insulin, check your blood sugar before and after eating out to help you plan how much to eat in the future. · If you eat more carbohydrate at a meal than you had planned, take a walk or do other exercise. This will help lower your blood sugar. What else should you know? · Limit saturated fat, such as the fat from meat and dairy products. This is a healthy choice because people who have diabetes are at higher risk of heart disease. So choose lean cuts of meat and nonfat or low-fat dairy products. Use olive or canola oil instead of butter or shortening when cooking. · Don't skip meals. Your blood sugar may drop too low if you skip meals and take insulin or certain medicines for diabetes. · Check with your doctor before you drink alcohol. Alcohol can cause your blood sugar to drop too low. Alcohol can also cause a bad reaction if you take certain diabetes medicines. Follow-up care is a key part of your treatment and safety. Be sure to make and go to all appointments, and call your doctor if you are having problems. It's also a good idea to know your test results and keep a list of the medicines you take. Where can you learn more? Go to http://leslie-graciela.info/. Enter Z839 in the search box to learn more about \"Learning About Diabetes Food Guidelines. \" Current as of: May 23, 2016 Content Version: 11.1 © 4809-8545 [employer].  Care instructions adapted under license by [facility] [address] (which disclaims liability or warranty for this information). If you have questions about a medical condition or this instruction, always ask your healthcare professional. [address] any warranty or liability for your use of this information. Purine-Restricted Diet: Care Instructions Your Care Instructions Purines are substances that are found in some foods. Your body turns purines into uric acid. High levels of uric acid can cause gout, which is a form of arthritis that causes pain and inflammation in joints. You may be able to help control the amount of uric acid in your body by limiting high-purine foods in your diet. Follow-up care is a key part of your treatment and safety. Be sure to make and go to all appointments, and call your doctor if you are having problems. It's also a good idea to know your test results and keep a list of the medicines you take. How can you care for yourself at home? · Plan your meals and snacks around foods that are low in purines and are safe for you to eat. These foods include: ¨ Green vegetables and tomatoes. ¨ Fruits. ¨ Whole-grain breads, rice, and cereals. ¨ Eggs, peanut butter, and nuts. ¨ Low-fat milk, cheese, and other milk products. ¨ Popcorn. ¨ Gelatin desserts, chocolate, cocoa, and cakes and sweets, in small amounts. · You can eat certain foods that are medium-high in purines, but eat them only once in a while. These foods include: ¨ Legumes, such as dried beans and dried peas. You can have 1 cup cooked legumes each day. ¨ Asparagus, cauliflower, spinach, mushrooms, and green peas. ¨ Fish and seafood (other than very high-purine seafood). ¨ Oatmeal, wheat bran, and wheat germ. · Limit very high-purine foods, including: ¨ Organ meats, such as liver, kidneys, sweetbreads, and brains. ¨ Meats, including day, beef, pork, and lamb. ¨ Game meats and any other meats in large amounts. ¨ Anchovies, sardines, herring, mackerel, and scallops. ¨ Gravy. ¨ Beer. Where can you learn more? Go to http://leslie-graciela.info/. Enter F448 in the search box to learn more about \"Purine-Restricted Diet: Care Instructions. \" Current as of: July 26, 2016 Content Version: 11.1 © 3283-4530 [employer]. Care instructions adapted under license by [employer] (which disclaims liability or warranty for this information). If you have questions about a medical condition or this instruction, always ask your healthcare professional. [address] any warranty or liability for your use of this information. Please provide this summary of care documentation to your next provider. Your primary care clinician is listed as Ginette Elena. If you have any questions after today's visit, please call [phone].

## 2017-03-28 NOTE — PROGRESS NOTES
Chief Complaint   Patient presents with    Diabetes    Medication Refill    Wrist Pain     right     Patient states he is here for follow up on DM, medication refill and right wrist pain.

## 2017-03-28 NOTE — PROGRESS NOTES
HISTORY OF PRESENT ILLNESS  Warden Wells is a 64 y.o. male. HPI: Here for follow up on rt wrist pain and swelling. Was probably having gout flair. Given prednisone as has h/o renal insufficiency. Improvement in pain and swelling. No redness. Has mild discomfort on and off. He was off uloric as he was told to stop by someone. Not sure what happened during Er discharge or hospital discharge. Now starting back on it. Lately HBA1C gone up. Per him his fasting blood sugars were elevated more than 120. He was instructed to change his insulin dose to 32 units in am and 38 units at pm. He has started change in insulin dose. Again did not bring blood sugar log and i have advised him to bring the log next visit. Today vitals fairly ok. He denies any headache or dizziness. No chest pain or sob. No abdominal pain. No urinary or bowel changes. Foot exam done today. Visit Vitals    /80 (BP 1 Location: Left arm, BP Patient Position: Sitting)    Pulse 82    Resp 16    Ht 5' 4\" (1.626 m)    Wt 238 lb (108 kg)    SpO2 95%    BMI 40.85 kg/m2     Lab Results   Component Value Date/Time    Hemoglobin A1c 7.4 02/09/2017 04:00 AM    Hemoglobin A1c (POC) 6.7 04/16/2015 08:48 AM    Hemoglobin A1c, External 7.3 01/13/2017     Lab Results   Component Value Date/Time    Sodium 145 03/27/2017 12:00 AM    Potassium 3.8 03/27/2017 12:00 AM    Chloride 102 03/27/2017 12:00 AM    CO2 29 03/27/2017 12:00 AM    Anion gap 7 02/10/2017 04:38 AM    Glucose 165 03/27/2017 12:00 AM    BUN 19 03/27/2017 12:00 AM    Creatinine 1.89 03/27/2017 12:00 AM    BUN/Creatinine ratio 10 03/27/2017 12:00 AM    GFR est AA 45 03/27/2017 12:00 AM    GFR est non-AA 39 03/27/2017 12:00 AM    Calcium 8.8 03/27/2017 12:00 AM    Bilirubin, total 0.2 02/09/2017 04:00 AM    AST (SGOT) 15 02/09/2017 04:00 AM    Alk.  phosphatase 153 02/09/2017 04:00 AM    Protein, total 6.4 02/09/2017 04:00 AM    Protein, total 5.3 02/09/2017 04:00 AM    Albumin 2.1 02/09/2017 04:00 AM    Globulin 4.3 02/09/2017 04:00 AM    A-G Ratio 0.5 02/09/2017 04:00 AM    ALT (SGPT) 18 02/09/2017 04:00 AM     Last visit had leg swelling which was improved with lasix. Seen cardiologist for sob and it has been stable at this time. No concern. ROS    Physical Exam   Constitutional: He is oriented to person, place, and time. No distress. Cardiovascular: Normal heart sounds. Pulmonary/Chest: No respiratory distress. He has no wheezes. Abdominal: Soft. There is no tenderness. Musculoskeletal: He exhibits no edema. Foot exam: no callus or open skin area,  Monofilament test normal.  Peripheral pulsations of dorsalis pedis palpable both lower ext. Rt hand: no swelling. No point tenderness. Able to make a fist. ROM over rt wrist wnl      Neurological: He is oriented to person, place, and time. Psychiatric: His behavior is normal.       ASSESSMENT and PLAN    ICD-10-CM ICD-9-CM    1. Acute gout of right wrist, unspecified cause: for now improvement in pain and swelling with prednisone. Will restart uloric. F/u next visit. M10.9 274.01 febuxostat (ULORIC) 40 mg tab tablet   2. Poorly controlled diabetes mellitus ([address]): increased his insulin dose. Advised to bring blood sugar log next visit. Diet modification. Foot exam done today. Remind him to schedule an eye exam. F/u next visit. E11.65 250.00 glucose blood VI test strips (FREESTYLE TEST) strip   3. Essential hypertension: stable. I10 401.9    4. Leg swelling: improved with lasix. BMP stable cr. Will observe. Pt understood and agrees with above plan. Review HM   Advised him to schedule an eye exam.   He was given FIT test and he will return it   Follow-up Disposition:  Return in about 2 months (around 5/28/2017).

## 2017-03-28 NOTE — PATIENT INSTRUCTIONS
32 units in am and 38 units in pm      Learning About Diabetes Food Guidelines  Your Care Instructions  Meal planning is important to manage diabetes. It helps keep your blood sugar at a target level (which you set with your doctor). You don't have to eat special foods. You can eat what your family eats, including sweets once in a while. But you do have to pay attention to how often you eat and how much you eat of certain foods. You may want to work with a dietitian or a certified diabetes educator (CDE) to help you plan meals and snacks. A dietitian or CDE can also help you lose weight if that is one of your goals. What should you know about eating carbs? Managing the amount of carbohydrate (carbs) you eat is an important part of healthy meals when you have diabetes. Carbohydrate is found in many foods. · Learn which foods have carbs. And learn the amounts of carbs in different foods. ¨ Bread, cereal, pasta, and rice have about 15 grams of carbs in a serving. A serving is 1 slice of bread (1 ounce), ½ cup of cooked cereal, or 1/3 cup of cooked pasta or rice. ¨ Fruits have 15 grams of carbs in a serving. A serving is 1 small fresh fruit, such as an apple or orange; ½ of a banana; ½ cup of cooked or canned fruit; ½ cup of fruit juice; 1 cup of melon or raspberries; or 2 tablespoons of dried fruit. ¨ Milk and no-sugar-added yogurt have 15 grams of carbs in a serving. A serving is 1 cup of milk or 2/3 cup of no-sugar-added yogurt. ¨ Starchy vegetables have 15 grams of carbs in a serving. A serving is ½ cup of mashed potatoes or sweet potato; 1 cup winter squash; ½ of a small baked potato; ½ cup of cooked beans; or ½ cup cooked corn or green peas. · Learn how much carbs to eat each day and at each meal. A dietitian or CDE can teach you how to keep track of the amount of carbs you eat. This is called carbohydrate counting.   · If you are not sure how to count carbohydrate grams, use the Plate Method to plan meals. It is a good, quick way to make sure that you have a balanced meal. It also helps you spread carbs throughout the day. ¨ Divide your plate by types of foods. Put non-starchy vegetables on half the plate, meat or other protein food on one-quarter of the plate, and a grain or starchy vegetable in the final quarter of the plate. To this you can add a small piece of fruit and 1 cup of milk or yogurt, depending on how many carbs you are supposed to eat at a meal.  · Try to eat about the same amount of carbs at each meal. Do not \"save up\" your daily allowance of carbs to eat at one meal.  · Proteins have very little or no carbs per serving. Examples of proteins are beef, chicken, turkey, fish, eggs, tofu, cheese, cottage cheese, and peanut butter. A serving size of meat is 3 ounces, which is about the size of a deck of cards. Examples of meat substitute serving sizes (equal to 1 ounce of meat) are 1/4 cup of cottage cheese, 1 egg, 1 tablespoon of peanut butter, and ½ cup of tofu. How can you eat out and still eat healthy? · Learn to estimate the serving sizes of foods that have carbohydrate. If you measure food at home, it will be easier to estimate the amount in a serving of restaurant food. · If the meal you order has too much carbohydrate (such as potatoes, corn, or baked beans), ask to have a low-carbohydrate food instead. Ask for a salad or green vegetables. · If you use insulin, check your blood sugar before and after eating out to help you plan how much to eat in the future. · If you eat more carbohydrate at a meal than you had planned, take a walk or do other exercise. This will help lower your blood sugar. What else should you know? · Limit saturated fat, such as the fat from meat and dairy products. This is a healthy choice because people who have diabetes are at higher risk of heart disease. So choose lean cuts of meat and nonfat or low-fat dairy products.  Use olive or canola oil instead of butter or shortening when cooking. · Don't skip meals. Your blood sugar may drop too low if you skip meals and take insulin or certain medicines for diabetes. · Check with your doctor before you drink alcohol. Alcohol can cause your blood sugar to drop too low. Alcohol can also cause a bad reaction if you take certain diabetes medicines. Follow-up care is a key part of your treatment and safety. Be sure to make and go to all appointments, and call your doctor if you are having problems. It's also a good idea to know your test results and keep a list of the medicines you take. Where can you learn more? Go to http://leslie-graciela.info/. Enter U302 in the search box to learn more about \"Learning About Diabetes Food Guidelines. \"  Current as of: May 23, 2016  Content Version: 11.1  © 6530-2286 [employer]. Care instructions adapted under license by [employer] (which disclaims liability or warranty for this information). If you have questions about a medical condition or this instruction, always ask your healthcare professional. [place] [address] any warranty or liability for your use of this information. Purine-Restricted Diet: Care Instructions  Your Care Instructions  Purines are substances that are found in some foods. Your body turns purines into uric acid. High levels of uric acid can cause gout, which is a form of arthritis that causes pain and inflammation in joints. You may be able to help control the amount of uric acid in your body by limiting high-purine foods in your diet. Follow-up care is a key part of your treatment and safety. Be sure to make and go to all appointments, and call your doctor if you are having problems. It's also a good idea to know your test results and keep a list of the medicines you take. How can you care for yourself at home?   · Plan your meals and snacks around foods that are low in purines and are safe for you to eat. These foods include:  ¨ Green vegetables and tomatoes. ¨ Fruits. ¨ Whole-grain breads, rice, and cereals. ¨ Eggs, peanut butter, and nuts. ¨ Low-fat milk, cheese, and other milk products. ¨ Popcorn. ¨ Gelatin desserts, chocolate, cocoa, and cakes and sweets, in small amounts. · You can eat certain foods that are medium-high in purines, but eat them only once in a while. These foods include:  ¨ Legumes, such as dried beans and dried peas. You can have 1 cup cooked legumes each day. ¨ Asparagus, cauliflower, spinach, mushrooms, and green peas. ¨ Fish and seafood (other than very high-purine seafood). ¨ Oatmeal, wheat bran, and wheat germ. · Limit very high-purine foods, including:  ¨ Organ meats, such as liver, kidneys, sweetbreads, and brains. ¨ Meats, including day, beef, pork, and lamb. ¨ Game meats and any other meats in large amounts. ¨ Anchovies, sardines, herring, mackerel, and scallops. ¨ Gravy. ¨ Beer. Where can you learn more? Go to http://leslie-graciela.info/. Enter F448 in the search box to learn more about \"Purine-Restricted Diet: Care Instructions. \"  Current as of: July 26, 2016  Content Version: 11.1  © 4099-3018 [employer]. Care instructions adapted under license by [employer] (which disclaims liability or warranty for this information). If you have questions about a medical condition or this instruction, always ask your healthcare professional. [place] [address] any warranty or liability for your use of this information.

## 2017-03-28 NOTE — TELEPHONE ENCOUNTER
[facility] is requesting a Prior Auth for Pichardo American . Please go to eigital. [employer]. Sent prior auth request to nurses.

## 2017-04-25 DIAGNOSIS — I10 ESSENTIAL HYPERTENSION: ICD-10-CM

## 2017-04-25 RX ORDER — LISINOPRIL 20 MG/1
20 TABLET ORAL DAILY
Qty: 30 TAB | Refills: 1 | Status: SHIPPED | OUTPATIENT
Start: 2017-04-25 | End: 2017-06-02 | Stop reason: SDUPTHER

## 2017-04-25 NOTE — TELEPHONE ENCOUNTER
This patient contacted office for the following prescriptions to be filled:    Medication requested :   Requested Prescriptions     Pending Prescriptions Disp Refills    lisinopril (PRINIVIL, ZESTRIL) 20 mg tablet       Sig: Take  by mouth daily.      PCP: Zita Kruse or Print: [facility]  Mail order or Local pharmacy [address]     Scheduled appointment if not seen by current providers in office: LOV 3/28/2017 f/u 6/2/2017

## 2017-05-10 RX ORDER — ALBUTEROL SULFATE 90 UG/1
AEROSOL, METERED RESPIRATORY (INHALATION)
Qty: 1 INHALER | Refills: 1 | Status: SHIPPED | COMMUNITY
Start: 2017-05-10 | End: 2017-06-02 | Stop reason: SDUPTHER

## 2017-06-02 ENCOUNTER — PATIENT OUTREACH (OUTPATIENT)
Dept: FAMILY MEDICINE CLINIC | Age: 57
End: 2017-06-02

## 2017-06-02 ENCOUNTER — OFFICE VISIT (OUTPATIENT)
Dept: FAMILY MEDICINE CLINIC | Age: 57
End: 2017-06-02

## 2017-06-02 VITALS
WEIGHT: 226 LBS | TEMPERATURE: 98.2 F | BODY MASS INDEX: 38.58 KG/M2 | HEART RATE: 97 BPM | RESPIRATION RATE: 16 BRPM | DIASTOLIC BLOOD PRESSURE: 92 MMHG | HEIGHT: 64 IN | OXYGEN SATURATION: 96 % | SYSTOLIC BLOOD PRESSURE: 138 MMHG

## 2017-06-02 DIAGNOSIS — E78.5 HYPERLIPIDEMIA, UNSPECIFIED HYPERLIPIDEMIA TYPE: ICD-10-CM

## 2017-06-02 DIAGNOSIS — F17.200 SMOKING: ICD-10-CM

## 2017-06-02 DIAGNOSIS — N18.30 CHRONIC KIDNEY DISEASE, STAGE III (MODERATE) (HCC): ICD-10-CM

## 2017-06-02 DIAGNOSIS — R14.3 EXCESSIVE GAS: ICD-10-CM

## 2017-06-02 DIAGNOSIS — M79.89 LEG SWELLING: ICD-10-CM

## 2017-06-02 DIAGNOSIS — M10.9 ACUTE GOUT OF RIGHT WRIST, UNSPECIFIED CAUSE: ICD-10-CM

## 2017-06-02 DIAGNOSIS — E11.9 WELL CONTROLLED DIABETES MELLITUS (HCC): ICD-10-CM

## 2017-06-02 DIAGNOSIS — R06.83 SNORING: ICD-10-CM

## 2017-06-02 DIAGNOSIS — I10 ESSENTIAL HYPERTENSION: Primary | ICD-10-CM

## 2017-06-02 LAB — HBA1C MFR BLD HPLC: 6.1 %

## 2017-06-02 RX ORDER — CARVEDILOL 25 MG/1
25 TABLET ORAL 2 TIMES DAILY WITH MEALS
Qty: 180 TAB | Refills: 1 | Status: SHIPPED | OUTPATIENT
Start: 2017-06-02 | End: 2017-07-17

## 2017-06-02 RX ORDER — PANTOPRAZOLE SODIUM 20 MG/1
20 TABLET, DELAYED RELEASE ORAL DAILY
Qty: 30 TAB | Refills: 1 | Status: SHIPPED | OUTPATIENT
Start: 2017-06-02 | End: 2017-07-31

## 2017-06-02 RX ORDER — FEBUXOSTAT 40 MG/1
40 TABLET, FILM COATED ORAL DAILY
Qty: 30 TAB | Refills: 1 | Status: SHIPPED | OUTPATIENT
Start: 2017-06-02 | End: 2018-05-12

## 2017-06-02 RX ORDER — LISINOPRIL 20 MG/1
20 TABLET ORAL DAILY
Qty: 90 TAB | Refills: 1 | Status: SHIPPED | OUTPATIENT
Start: 2017-06-02 | End: 2017-07-17

## 2017-06-02 RX ORDER — HYDRALAZINE HYDROCHLORIDE 50 MG/1
50 TABLET, FILM COATED ORAL 3 TIMES DAILY
Qty: 90 TAB | Refills: 2 | Status: SHIPPED | OUTPATIENT
Start: 2017-06-02 | End: 2017-07-17

## 2017-06-02 RX ORDER — FUROSEMIDE 20 MG/1
20 TABLET ORAL
Qty: 30 TAB | Refills: 0 | Status: SHIPPED | OUTPATIENT
Start: 2017-06-02 | End: 2017-07-17

## 2017-06-02 RX ORDER — ALBUTEROL SULFATE 90 UG/1
2 AEROSOL, METERED RESPIRATORY (INHALATION)
Qty: 1 INHALER | Refills: 1 | Status: SHIPPED | OUTPATIENT
Start: 2017-06-02 | End: 2017-08-31

## 2017-06-02 NOTE — MR AVS SNAPSHOT
Visit Information Date & Time Provider Department Dept. Phone Encounter #  
 6/2/2017  1:30 PM Alexandra Mcgee, [address] [place] [address] [phone] [id number] Follow-up Instructions Return in about 2 months (around 8/2/2017). Upcoming Health Maintenance Date Due COLONOSCOPY 3/9/2015 EYE EXAM RETINAL OR DILATED Q1 3/1/2017 Pneumococcal 19-64 Highest Risk (2 of 3 - PCV13) 7/12/2017 INFLUENZA AGE 9 TO ADULT 8/1/2017 HEMOGLOBIN A1C Q6M 8/9/2017 MICROALBUMIN Q1 1/13/2018 LIPID PANEL Q1 2/8/2018 FOOT EXAM Q1 3/28/2018 DTaP/Tdap/Td series (2 - Td) 7/12/2026 Allergies as of 6/2/2017  Review Complete On: 6/2/2017 By: Alexandra Mcgee MD  
  
 Severity Noted Reaction Type Reactions Shellfish Derived  05/25/2016    Other (comments) Causes Gout Current Immunizations  Reviewed on 2/7/2017 No immunizations on file. Not reviewed this visit You Were Diagnosed With   
  
 Codes Comments Essential hypertension    -  Primary ICD-10-CM: I10 
ICD-9-CM: 401.9 Leg swelling     ICD-10-CM: M79.89 ICD-9-CM: 729.81 Acute gout of right wrist, unspecified cause     ICD-10-CM: M10.9 ICD-9-CM: 274.01 Smoking     ICD-10-CM: F17.200 ICD-9-CM: 305.1 Well controlled diabetes mellitus ([facility]     ICD-10-CM: E11.9 ICD-9-CM: 250.00 Chronic kidney disease, stage III (moderate)     ICD-10-CM: N18.3 ICD-9-CM: 474. 3 Hyperlipidemia, unspecified hyperlipidemia type     ICD-10-CM: E78.5 ICD-9-CM: 272.4 Snoring     ICD-10-CM: R06.83 
ICD-9-CM: 786.09 Excessive gas     ICD-10-CM: R14.3 ICD-9-CM: 799. 3 Vitals BP Pulse Temp Resp Height(growth percentile) Weight(growth percentile) (!) 138/92 (BP 1 Location: Left arm, BP Patient Position: Sitting) 97 98.2 °F (36.8 °C) (Oral) 16 5' 4\" (1.626 m) 226 lb (102.5 kg) SpO2 BMI Smoking Status 96% 38.79 kg/m2 Current Every Day Smoker BMI and BSA Data Body Mass Index Body Surface Area 38.79 kg/m 2 2.15 m 2 Preferred Pharmacy Pharmacy Name Phone [facility] Duvall [address] [place] [address] [phone] Your Updated Medication List  
  
   
This list is accurate as of: 6/2/17  2:10 PM.  Always use your most recent med list.  
  
  
  
  
 acetaminophen 650 mg CR tablet Commonly known as:  TYLENOL Take 650 mg by mouth every six (6) hours as needed for Pain. albuterol 90 mcg/actuation inhaler Commonly known as:  VENTOLIN HFA Take 2 Puffs by inhalation every four (4) hours as needed for Wheezing for up to 90 days. aspirin 81 mg chewable tablet Take 1 Tab by mouth daily. B COMPLEX & C NO.20-FOLIC ACID PO Take  by mouth. Blood-Glucose Meter monitoring kit Check twice daily. budesonide-formoterol 160-4.5 mcg/actuation HFA inhaler Commonly known as:  SYMBICORT Take 2 Puffs by inhalation two (2) times a day. calcitRIOL 0.25 mcg capsule Commonly known as:  ROCALTROL Take 1 Cap by mouth every Monday, Wednesday, Friday. carvedilol 25 mg tablet Commonly known as:  Richardson Cocker Take 1 Tab by mouth two (2) times daily (with meals). cloNIDine HCl 0.1 mg tablet Commonly known as:  CATAPRES Take 1 Tab by mouth two (2) times a day. febuxostat 40 mg Tab tablet Commonly known as:  Raymon Bowling Take 1 Tab by mouth daily. furosemide 20 mg tablet Commonly known as:  LASIX Take 1 Tab by mouth daily as needed. glucose blood VI test strips strip Commonly known as:  FREESTYLE TEST  
by Does Not Apply route See Admin Instructions. Check twice a day  
  
 hydrALAZINE 50 mg tablet Commonly known as:  APRESOLINE Take 1 Tab by mouth three (3) times daily. Indications: hypertension Insulin Needles (Disposable) 29 gauge x 5/16\" Ndle 40 Units by Does Not Apply route two (2) times a day. insulin NPH/insulin regular 100 unit/mL (70-30) injection Commonly known as:  NovoLIN 70/30  
30 UNITS SUBCUTANEOUSLY TWICE DAILY. Do not take it blood sugar is less than 100 Lancets Misc Check 3 times a day , needs according contour glucometer  
  
 lisinopril 20 mg tablet Commonly known as:  Suzette Moulder Take 1 Tab by mouth daily. pantoprazole 20 mg tablet Commonly known as:  PROTONIX Take 1 Tab by mouth daily. POTASSIUM CHLORIDE SR 10 MEQ TAB  
two (2) times a day. Prescriptions Sent to Pharmacy Refills  
 lisinopril (PRINIVIL, ZESTRIL) 20 mg tablet 1 Sig: Take 1 Tab by mouth daily. Class: Normal  
 Pharmacy: [address] Ph #: [phone] Route: Oral  
 carvedilol (COREG) 25 mg tablet 1 Sig: Take 1 Tab by mouth two (2) times daily (with meals). Class: Normal  
 Pharmacy: [address] Ph #: [phone] Route: Oral  
 furosemide (LASIX) 20 mg tablet 0 Sig: Take 1 Tab by mouth daily as needed. Class: Normal  
 Pharmacy: [address] Ph #: [phone] Route: Oral  
 albuterol (VENTOLIN HFA) 90 mcg/actuation inhaler 1 Sig: Take 2 Puffs by inhalation every four (4) hours as needed for Wheezing for up to 90 days. Class: Normal  
 Pharmacy: [address] Ph #: [phone] Route: Inhalation  
 febuxostat (ULORIC) 40 mg tab tablet 1 Sig: Take 1 Tab by mouth daily. Class: Normal  
 Pharmacy: [address] Ph #: [phone]  Route: Oral  
 hydrALAZINE (APRESOLINE) 50 mg tablet 2  
 Sig: Take 1 Tab by mouth three (3) times daily. Indications: hypertension Class: Normal  
 [address] [facility] Ph #: [phone] Route: Oral  
 pantoprazole (PROTONIX) 20 mg tablet 1 Sig: Take 1 Tab by mouth daily. Class: Normal  
 [address] [facility] Ph #: [phone] Route: Oral  
  
We Performed the Following AMB POC HEMOGLOBIN A1C [46072 CPT(R)] Follow-up Instructions Return in about 2 months (around 8/2/2017). Patient Instructions Need to schedule an appt for colonoscopy. Need to schedule an appt for endocrinologist. Your diabetes doctor Need to make sure you follow up with your kidney doctor. Please bring written blood sugar log next visit and bring all medication bottle with you. Follow diabetic diet and schedule your eye exam.  
Start taking uloric. Learning About Diabetes Food Guidelines Your Care Instructions Meal planning is important to manage diabetes. It helps keep your blood sugar at a target level (which you set with your doctor). You don't have to eat special foods. You can eat what your family eats, including sweets once in a while. But you do have to pay attention to how often you eat and how much you eat of certain foods. You may want to work with a dietitian or a certified diabetes educator (CDE) to help you plan meals and snacks. A dietitian or CDE can also help you lose weight if that is one of your goals. What should you know about eating carbs? Managing the amount of carbohydrate (carbs) you eat is an important part of healthy meals when you have diabetes. Carbohydrate is found in many foods. · Learn which foods have carbs. And learn the amounts of carbs in different foods.  
¨ Bread, cereal, pasta, and rice have about 15 grams of carbs in a serving. A serving is 1 slice of bread (1 ounce), ½ cup of cooked cereal, or 1/3 cup of cooked pasta or rice. ¨ Fruits have 15 grams of carbs in a serving. A serving is 1 small fresh fruit, such as an apple or orange; ½ of a banana; ½ cup of cooked or canned fruit; ½ cup of fruit juice; 1 cup of melon or raspberries; or 2 tablespoons of dried fruit. ¨ Milk and no-sugar-added yogurt have 15 grams of carbs in a serving. A serving is 1 cup of milk or 2/3 cup of no-sugar-added yogurt. ¨ Starchy vegetables have 15 grams of carbs in a serving. A serving is ½ cup of mashed potatoes or sweet potato; 1 cup winter squash; ½ of a small baked potato; ½ cup of cooked beans; or ½ cup cooked corn or green peas. · Learn how much carbs to eat each day and at each meal. A dietitian or CDE can teach you how to keep track of the amount of carbs you eat. This is called carbohydrate counting. · If you are not sure how to count carbohydrate grams, use the Plate Method to plan meals. It is a good, quick way to make sure that you have a balanced meal. It also helps you spread carbs throughout the day. ¨ Divide your plate by types of foods. Put non-starchy vegetables on half the plate, meat or other protein food on one-quarter of the plate, and a grain or starchy vegetable in the final quarter of the plate. To this you can add a small piece of fruit and 1 cup of milk or yogurt, depending on how many carbs you are supposed to eat at a meal. 
· Try to eat about the same amount of carbs at each meal. Do not \"save up\" your daily allowance of carbs to eat at one meal. 
· Proteins have very little or no carbs per serving. Examples of proteins are beef, chicken, turkey, fish, eggs, tofu, cheese, cottage cheese, and peanut butter. A serving size of meat is 3 ounces, which is about the size of a deck of cards.  Examples of meat substitute serving sizes (equal to 1 ounce of meat) are 1/4 cup of cottage cheese, 1 egg, 1 tablespoon of peanut butter, and ½ cup of tofu. How can you eat out and still eat healthy? · Learn to estimate the serving sizes of foods that have carbohydrate. If you measure food at home, it will be easier to estimate the amount in a serving of restaurant food. · If the meal you order has too much carbohydrate (such as potatoes, corn, or baked beans), ask to have a low-carbohydrate food instead. Ask for a salad or green vegetables. · If you use insulin, check your blood sugar before and after eating out to help you plan how much to eat in the future. · If you eat more carbohydrate at a meal than you had planned, take a walk or do other exercise. This will help lower your blood sugar. What else should you know? · Limit saturated fat, such as the fat from meat and dairy products. This is a healthy choice because people who have diabetes are at higher risk of heart disease. So choose lean cuts of meat and nonfat or low-fat dairy products. Use olive or canola oil instead of butter or shortening when cooking. · Don't skip meals. Your blood sugar may drop too low if you skip meals and take insulin or certain medicines for diabetes. · Check with your doctor before you drink alcohol. Alcohol can cause your blood sugar to drop too low. Alcohol can also cause a bad reaction if you take certain diabetes medicines. Follow-up care is a key part of your treatment and safety. Be sure to make and go to all appointments, and call your doctor if you are having problems. It's also a good idea to know your test results and keep a list of the medicines you take. Where can you learn more? Go to http://leslie-graciela.info/. Enter V593 in the search box to learn more about \"Learning About Diabetes Food Guidelines. \" Current as of: May 23, 2016 Content Version: 11.2 © 3688-8100 [employer].  Care instructions adapted under license by [employer] (which disclaims liability or warranty for this information). If you have questions about a medical condition or this instruction, always ask your healthcare professional. [address] any warranty or liability for your use of this information. Sleep Apnea: Care Instructions Your Care Instructions Sleep apnea means that you frequently stop breathing for 10 seconds or longer during sleep. It can be mild to severe, based on the number of times an hour that you stop breathing or have slowed breathing. Blocked or narrowed airways in your nose, mouth, or throat can cause sleep apnea. Your airway can become blocked when your throat muscles and tongue relax during sleep. You can treat sleep apnea at home by making lifestyle changes. You also can use a CPAP breathing machine that keeps tissues in the throat from blocking your airway. Or your doctor may suggest that you use a breathing device while you sleep. It helps keep your airway open. This could be a device that you put in your mouth. Other examples include strips or disks that you use on your nose. In some cases, surgery may be needed to remove enlarged tissues in the throat. Follow-up care is a key part of your treatment and safety. Be sure to make and go to all appointments, and call your doctor if you are having problems. It's also a good idea to know your test results and keep a list of the medicines you take. How can you care for yourself at home? · Lose weight, if needed. It may reduce the number of times you stop breathing or have slowed breathing. · Sleep on your side. It may stop mild apnea. If you tend to roll onto your back, sew a pocket in the back of your paSeeMore Interactive top. Put a tennis ball into the pocket, and stitch the pocket shut. This will help keep you from sleeping on your back. · Avoid alcohol and medicines such as sleeping pills and sedatives before bed. · Do not smoke. Smoking can make sleep apnea worse.  If you need help quitting, talk to your doctor about stop-smoking programs and medicines. These can increase your chances of quitting for good. · Prop up the head of your bed 4 to 6 inches by putting bricks under the legs of the bed. · Treat breathing problems, such as a stuffy nose, caused by a cold or allergies. · Try a continuous positive airway pressure (CPAP) breathing machine if your doctor recommends it. The machine keeps your airway open when you sleep. · If CPAP does not work for you, ask your doctor if you can try other breathing machines. A bilevel positive airway pressure machine uses one type of air pressure for breathing in and another type for breathing out. Another device raises or lowers air pressure as needed while you breathe. · Talk to your doctor if: 
¨ Your nose feels dry or bleeds when you use one of these machines. You may need to increase moisture in the air. A humidifier may help. ¨ Your nose is runny or stuffy from using a breathing machine. Decongestants or a corticosteroid nasal spray may help. ¨ You are sleepy during the day and it gets in the way of the normal things you do. Do not drive when you are drowsy. When should you call for help? Watch closely for changes in your health, and be sure to contact your doctor if: 
· You still have sleep apnea even though you have made lifestyle changes. · You are thinking of trying a device such as CPAP. · You are having problems using a CPAP or similar machine. Where can you learn more? Go to http://leslie-graciela.info/. Enter G417 in the search box to learn more about \"Sleep Apnea: Care Instructions. \" Current as of: May 23, 2016 Content Version: 11.2 © 0770-0153 [employer]. Care instructions adapted under license by [employer] (which disclaims liability or warranty for this information).  If you have questions about a medical condition or this instruction, always ask your healthcare professional. [address] any warranty or liability for your use of this information. High Blood Pressure: Care Instructions Your Care Instructions If your blood pressure is usually above 140/90, you have high blood pressure, or hypertension. That means the top number is 140 or higher or the bottom number is 90 or higher, or both. Despite what a lot of people think, high blood pressure usually doesn't cause headaches or make you feel dizzy or lightheaded. It usually has no symptoms. But it does increase your risk for heart attack, stroke, and kidney or eye damage. The higher your blood pressure, the more your risk increases. Your doctor will give you a goal for your blood pressure. Your goal will be based on your health and your age. An example of a goal is to keep your blood pressure below 140/90. Lifestyle changes, such as eating healthy and being active, are always important to help lower blood pressure. You might also take medicine to reach your blood pressure goal. 
Follow-up care is a key part of your treatment and safety. Be sure to make and go to all appointments, and call your doctor if you are having problems. It's also a good idea to know your test results and keep a list of the medicines you take. How can you care for yourself at home? Medical treatment · If you stop taking your medicine, your blood pressure will go back up. You may take one or more types of medicine to lower your blood pressure. Be safe with medicines. Take your medicine exactly as prescribed. Call your doctor if you think you are having a problem with your medicine. · Talk to your doctor before you start taking aspirin every day. Aspirin can help certain people lower their risk of a heart attack or stroke. But taking aspirin isn't right for everyone, because it can cause serious bleeding. · See your doctor regularly. You may need to see the doctor more often at first or until your blood pressure comes down. · If you are taking blood pressure medicine, talk to your doctor before you take decongestants or anti-inflammatory medicine, such as ibuprofen. Some of these medicines can raise blood pressure. · Learn how to check your blood pressure at home. Lifestyle changes · Stay at a healthy weight. This is especially important if you put on weight around the waist. Losing even 10 pounds can help you lower your blood pressure. · If your doctor recommends it, get more exercise. Walking is a good choice. Bit by bit, increase the amount you walk every day. Try for at least 30 minutes on most days of the week. You also may want to swim, bike, or do other activities. · Avoid or limit alcohol. Talk to your doctor about whether you can drink any alcohol. · Try to limit how much sodium you eat to less than 2,300 milligrams (mg) a day. Your doctor may ask you to try to eat less than 1,500 mg a day. · Eat plenty of fruits (such as bananas and oranges), vegetables, legumes, whole grains, and low-fat dairy products. · Lower the amount of saturated fat in your diet. Saturated fat is found in animal products such as milk, cheese, and meat. Limiting these foods may help you lose weight and also lower your risk for heart disease. · Do not smoke. Smoking increases your risk for heart attack and stroke. If you need help quitting, talk to your doctor about stop-smoking programs and medicines. These can increase your chances of quitting for good. When should you call for help? Call 911 anytime you think you may need emergency care. This may mean having symptoms that suggest that your blood pressure is causing a serious heart or blood vessel problem. Your blood pressure may be over 180/110. For example, call 911 if: 
· You have symptoms of a heart attack. These may include: ¨ Chest pain or pressure, or a strange feeling in the chest. 
¨ Sweating. ¨ Shortness of breath. ¨ Nausea or vomiting. ¨ Pain, pressure, or a strange feeling in the back, neck, jaw, or upper belly or in one or both shoulders or arms. ¨ Lightheadedness or sudden weakness. ¨ A fast or irregular heartbeat. · You have symptoms of a stroke. These may include: 
¨ Sudden numbness, tingling, weakness, or loss of movement in your face, arm, or leg, especially on only one side of your body. ¨ Sudden vision changes. ¨ Sudden trouble speaking. ¨ Sudden confusion or trouble understanding simple statements. ¨ Sudden problems with walking or balance. ¨ A sudden, severe headache that is different from past headaches. · You have severe back or belly pain. Do not wait until your blood pressure comes down on its own. Get help right away. Call your doctor now or seek immediate care if: 
· Your blood pressure is much higher than normal (such as 180/110 or higher), but you don't have symptoms. · You think high blood pressure is causing symptoms, such as: ¨ Severe headache. ¨ Blurry vision. Watch closely for changes in your health, and be sure to contact your doctor if: 
· Your blood pressure measures 140/90 or higher at least 2 times. That means the top number is 140 or higher or the bottom number is 90 or higher, or both. · You think you may be having side effects from your blood pressure medicine. · Your blood pressure is usually normal, but it goes above normal at least 2 times. Where can you learn more? Go to http://leslie-graciela.info/. Enter C364 in the search box to learn more about \"High Blood Pressure: Care Instructions. \" Current as of: August 8, 2016 Content Version: 11.2 © 6585-5396 [employer]. Care instructions adapted under license by [employer] (which disclaims liability or warranty for this information).  If you have questions about a medical condition or this instruction, always ask your healthcare professional. [address] any warranty or liability for your use of this information. Purine-Restricted Diet: Care Instructions Your Care Instructions Purines are substances that are found in some foods. Your body turns purines into uric acid. High levels of uric acid can cause gout, which is a form of arthritis that causes pain and inflammation in joints. You may be able to help control the amount of uric acid in your body by limiting high-purine foods in your diet. Follow-up care is a key part of your treatment and safety. Be sure to make and go to all appointments, and call your doctor if you are having problems. It's also a good idea to know your test results and keep a list of the medicines you take. How can you care for yourself at home? · Plan your meals and snacks around foods that are low in purines and are safe for you to eat. These foods include: ¨ Green vegetables and tomatoes. ¨ Fruits. ¨ Whole-grain breads, rice, and cereals. ¨ Eggs, peanut butter, and nuts. ¨ Low-fat milk, cheese, and other milk products. ¨ Popcorn. ¨ Gelatin desserts, chocolate, cocoa, and cakes and sweets, in small amounts. · You can eat certain foods that are medium-high in purines, but eat them only once in a while. These foods include: ¨ Legumes, such as dried beans and dried peas. You can have 1 cup cooked legumes each day. ¨ Asparagus, cauliflower, spinach, mushrooms, and green peas. ¨ Fish and seafood (other than very high-purine seafood). ¨ Oatmeal, wheat bran, and wheat germ. · Limit very high-purine foods, including: ¨ Organ meats, such as liver, kidneys, sweetbreads, and brains. ¨ Meats, including day, beef, pork, and lamb. ¨ Game meats and any other meats in large amounts. ¨ Anchovies, sardines, herring, mackerel, and scallops. ¨ Gravy. ¨ Beer. Where can you learn more? Go to http://leslie-graciela.info/. Enter F448 in the search box to learn more about \"Purine-Restricted Diet: Care Instructions. \" Current as of: July 26, 2016 Content Version: 11.2 © 0041-5313 [employer]. Care instructions adapted under license by [employer] (which disclaims liability or warranty for this information). If you have questions about a medical condition or this instruction, always ask your healthcare professional. [address] any warranty or liability for your use of this information. Please provide this summary of care documentation to your next provider. Your primary care clinician is listed as Sindy Cross. If you have any questions after today's visit, please call [phone].

## 2017-06-02 NOTE — PROGRESS NOTES
HISTORY OF PRESENT ILLNESS  Obed Phillips is a 64 y.o. male. HPI: here for routine follow up. Poor historian. Has multiple medical problem. H/o hypertension. Mild elevated blood pressure. Shakira Mu he has ran out of hydralazine and has not taken them since 2-3 days. Taking other medications. No side effects. Denies any headache, dizziness, no chest pain or sob. No abdominal pain. No urinary or bowel complains. Concern with lots of gas in abdomen. No pain. No constipation or diarrhea at this time. Wants something for gas. H/o diabetes. Today poc HBA1C is 6.1. Not sure how much insulin he is on. First said 60 in am and 30 in pm. Then said 30 in am and 60 at PM. i have advised him to continue as he said confirmed 30 in am and 60 at pm. Will also advise him to keep endocrinology follow up appt. Review his glucometer for reading and all sugar was charted for PM time. Highest was around 140. Average around 110-120. Per him it is all day time reading. Advised him to do paper chart. Also h/o gout. Also has h/o renal insufficiency. Was given uloric. Has not started it yet. Advise him to restart it Also, pending rheumatology appt. Hard time getting appt as due to his insurance not much option with specialist in the area. Will help him to find a specialist.   Giving history of snoring and stopping breathing at night. Pending appt with neurology for further evaluation.    Lab Results   Component Value Date/Time    Hemoglobin A1c 7.4 02/09/2017 04:00 AM    Hemoglobin A1c (POC) 6.1 06/02/2017 01:58 PM    Hemoglobin A1c, External 7.3 01/13/2017     Lab Results   Component Value Date/Time    Sodium 145 03/27/2017 12:00 AM    Potassium 3.8 03/27/2017 12:00 AM    Chloride 102 03/27/2017 12:00 AM    CO2 29 03/27/2017 12:00 AM    Anion gap 7 02/10/2017 04:38 AM    Glucose 165 03/27/2017 12:00 AM    BUN 19 03/27/2017 12:00 AM    Creatinine 1.89 03/27/2017 12:00 AM    BUN/Creatinine ratio 10 03/27/2017 12:00 AM    GFR est AA 45 03/27/2017 12:00 AM    GFR est non-AA 39 03/27/2017 12:00 AM    Calcium 8.8 03/27/2017 12:00 AM    Bilirubin, total 0.2 02/09/2017 04:00 AM    AST (SGOT) 15 02/09/2017 04:00 AM    Alk. phosphatase 153 02/09/2017 04:00 AM    Protein, total 6.4 02/09/2017 04:00 AM    Protein, total 5.3 02/09/2017 04:00 AM    Albumin 2.1 02/09/2017 04:00 AM    Globulin 4.3 02/09/2017 04:00 AM    A-G Ratio 0.5 02/09/2017 04:00 AM    ALT (SGPT) 18 02/09/2017 04:00 AM     Lab Results   Component Value Date/Time    WBC 9.9 02/09/2017 04:00 AM    Hemoglobin (POC) 13.9 03/26/2014 08:42 AM    HGB 10.3 02/09/2017 04:00 AM    Hematocrit (POC) 41 03/26/2014 08:42 AM    HCT 33.3 02/09/2017 04:00 AM    PLATELET 420 94/92/8680 04:00 AM    MCV 87.6 02/09/2017 04:00 AM     Lab Results   Component Value Date/Time    Cholesterol, total 163 02/08/2017 05:18 AM    HDL Cholesterol 41 02/08/2017 05:18 AM    LDL, calculated 64.4 02/08/2017 05:18 AM    VLDL, calculated 57.6 02/08/2017 05:18 AM    Triglyceride 288 02/08/2017 05:18 AM    CHOL/HDL Ratio 4.0 02/08/2017 05:18 AM     Lab Results   Component Value Date/Time    TSH 0.55 02/09/2017 04:00 AM     Lab Results   Component Value Date/Time    Microalb/Creat ratio (ug/mg creat.) 5310.0 10/27/2016 11:35 AM    Microalbumin, urine 68788.0 10/27/2016 11:35 AM       ROS: see HPI     Physical Exam   Constitutional: He is oriented to person, place, and time. No distress. Neck: No thyromegaly present. Cardiovascular: Normal rate, regular rhythm and normal heart sounds. Pulmonary/Chest:   CTA   Abdominal: Soft. Bowel sounds are normal. There is no tenderness. Musculoskeletal: He exhibits no edema. Neurological: He is oriented to person, place, and time. Psychiatric: His behavior is normal.       ASSESSMENT and PLAN    ICD-10-CM ICD-9-CM    1. Essential hypertension; fairly stable. Was not taking hydralazine since few days. Will give refill with reduce dose . F/u next visit. Low salt diet.   I10 401.9 lisinopril (PRINIVIL, ZESTRIL) 20 mg tablet      hydrALAZINE (APRESOLINE) 50 mg tablet   2. Leg swelling: stable at this time. For now lasix as needed. M79.89 729.81 furosemide (LASIX) 20 mg tablet   3. Acute gout of right wrist, unspecified cause: has not started uloric at this time. Discuss to start it. Also reassure that it will not affect kidney function. M10.9 274.01 febuxostat (ULORIC) 40 mg tab tablet   4. Smoking: said quit since last 3 wks. Encourage him to stay off of it. Will f/u next visit. F17.200 305.1    5. Well controlled diabetes mellitus ([address]): HBA1C at goal. Confusion from his side about insulin dose. Discuss him to continue 30 in am and 60 at pm as he is doing now. Keep endo appt and also made him an appt with nurse navigator regarding to help him for compliance with medication and keep up with specialist appt. E11.9 250.00 AMB POC HEMOGLOBIN A1C   6. Chronic kidney disease, stage III (moderate): for now following nephrology. Will obtain recent records. N18.3 585.3    7. Hyperlipidemia, unspecified hyperlipidemia type: LDL at goal.  E78.5 272.4    8. Snoring: done neurology referral for further sleep apnea evaluation. R06.83 786.09    9. Excessive gas: given protonix and also advised to take simethicone otc.  R14.3 787.3 pantoprazole (PROTONIX) 20 mg tablet   Pt understood and agrees with above plan. Review   Nurse navigator to help him regarding compliance and chronic case management. He was hospitalized on February 10, 2017 for complaints of left-sided numbness and diagnosed with likely a TIA. During his hospitalization, a CT scan of the head, MRI, MRA and CT scan of the spine were all negative. Currently not weakness of ext. No speech concern. Able to ambulate without support. Follow-up Disposition:  Return in about 2 months (around 8/2/2017).

## 2017-06-02 NOTE — PROGRESS NOTES
Appointment made with patient to meet with Nurse Navigator to set up appointments for  Colonoscopy, Endo, Eye doctor. Patient asked to bring all medications with him to that appointment and patient took medication box with large writing so he could see it. Patient also asked to write down blood sugars and foods he ate for the weekend.

## 2017-06-02 NOTE — PATIENT INSTRUCTIONS
Need to schedule an appt for colonoscopy. Need to schedule an appt for endocrinologist. Your diabetes doctor  Need to make sure you follow up with your kidney doctor. Please bring written blood sugar log next visit and bring all medication bottle with you. Follow diabetic diet and schedule your eye exam.   Start taking uloric. Learning About Diabetes Food Guidelines  Your Care Instructions  Meal planning is important to manage diabetes. It helps keep your blood sugar at a target level (which you set with your doctor). You don't have to eat special foods. You can eat what your family eats, including sweets once in a while. But you do have to pay attention to how often you eat and how much you eat of certain foods. You may want to work with a dietitian or a certified diabetes educator (CDE) to help you plan meals and snacks. A dietitian or CDE can also help you lose weight if that is one of your goals. What should you know about eating carbs? Managing the amount of carbohydrate (carbs) you eat is an important part of healthy meals when you have diabetes. Carbohydrate is found in many foods. · Learn which foods have carbs. And learn the amounts of carbs in different foods. ¨ Bread, cereal, pasta, and rice have about 15 grams of carbs in a serving. A serving is 1 slice of bread (1 ounce), ½ cup of cooked cereal, or 1/3 cup of cooked pasta or rice. ¨ Fruits have 15 grams of carbs in a serving. A serving is 1 small fresh fruit, such as an apple or orange; ½ of a banana; ½ cup of cooked or canned fruit; ½ cup of fruit juice; 1 cup of melon or raspberries; or 2 tablespoons of dried fruit. ¨ Milk and no-sugar-added yogurt have 15 grams of carbs in a serving. A serving is 1 cup of milk or 2/3 cup of no-sugar-added yogurt. ¨ Starchy vegetables have 15 grams of carbs in a serving.  A serving is ½ cup of mashed potatoes or sweet potato; 1 cup winter squash; ½ of a small baked potato; ½ cup of cooked beans; or ½ cup cooked corn or green peas. · Learn how much carbs to eat each day and at each meal. A dietitian or CDE can teach you how to keep track of the amount of carbs you eat. This is called carbohydrate counting. · If you are not sure how to count carbohydrate grams, use the Plate Method to plan meals. It is a good, quick way to make sure that you have a balanced meal. It also helps you spread carbs throughout the day. ¨ Divide your plate by types of foods. Put non-starchy vegetables on half the plate, meat or other protein food on one-quarter of the plate, and a grain or starchy vegetable in the final quarter of the plate. To this you can add a small piece of fruit and 1 cup of milk or yogurt, depending on how many carbs you are supposed to eat at a meal.  · Try to eat about the same amount of carbs at each meal. Do not \"save up\" your daily allowance of carbs to eat at one meal.  · Proteins have very little or no carbs per serving. Examples of proteins are beef, chicken, turkey, fish, eggs, tofu, cheese, cottage cheese, and peanut butter. A serving size of meat is 3 ounces, which is about the size of a deck of cards. Examples of meat substitute serving sizes (equal to 1 ounce of meat) are 1/4 cup of cottage cheese, 1 egg, 1 tablespoon of peanut butter, and ½ cup of tofu. How can you eat out and still eat healthy? · Learn to estimate the serving sizes of foods that have carbohydrate. If you measure food at home, it will be easier to estimate the amount in a serving of restaurant food. · If the meal you order has too much carbohydrate (such as potatoes, corn, or baked beans), ask to have a low-carbohydrate food instead. Ask for a salad or green vegetables. · If you use insulin, check your blood sugar before and after eating out to help you plan how much to eat in the future. · If you eat more carbohydrate at a meal than you had planned, take a walk or do other exercise.  This will help lower your blood sugar.  What else should you know? · Limit saturated fat, such as the fat from meat and dairy products. This is a healthy choice because people who have diabetes are at higher risk of heart disease. So choose lean cuts of meat and nonfat or low-fat dairy products. Use olive or canola oil instead of butter or shortening when cooking. · Don't skip meals. Your blood sugar may drop too low if you skip meals and take insulin or certain medicines for diabetes. · Check with your doctor before you drink alcohol. Alcohol can cause your blood sugar to drop too low. Alcohol can also cause a bad reaction if you take certain diabetes medicines. Follow-up care is a key part of your treatment and safety. Be sure to make and go to all appointments, and call your doctor if you are having problems. It's also a good idea to know your test results and keep a list of the medicines you take. Where can you learn more? Go to http://lselieGoing My Waygraciela.info/. Enter Q785 in the search box to learn more about \"Learning About Diabetes Food Guidelines. \"  Current as of: May 23, 2016  Content Version: 11.2  © 0003-3289 [employer]. Care instructions adapted under license by "[employer] (which disclaims liability or warranty for this information). If you have questions about a medical condition or this instruction, always ask your healthcare professional. [place] [address] any warranty or liability for your use of this information. Sleep Apnea: Care Instructions  Your Care Instructions  Sleep apnea means that you frequently stop breathing for 10 seconds or longer during sleep. It can be mild to severe, based on the number of times an hour that you stop breathing or have slowed breathing. Blocked or narrowed airways in your nose, mouth, or throat can cause sleep apnea. Your airway can become blocked when your throat muscles and tongue relax during sleep.   You can treat sleep apnea at home by making lifestyle changes. You also can use a CPAP breathing machine that keeps tissues in the throat from blocking your airway. Or your doctor may suggest that you use a breathing device while you sleep. It helps keep your airway open. This could be a device that you put in your mouth. Other examples include strips or disks that you use on your nose. In some cases, surgery may be needed to remove enlarged tissues in the throat. Follow-up care is a key part of your treatment and safety. Be sure to make and go to all appointments, and call your doctor if you are having problems. It's also a good idea to know your test results and keep a list of the medicines you take. How can you care for yourself at home? · Lose weight, if needed. It may reduce the number of times you stop breathing or have slowed breathing. · Sleep on your side. It may stop mild apnea. If you tend to roll onto your back, sew a pocket in the back of your pajama top. Put a tennis ball into the pocket, and stitch the pocket shut. This will help keep you from sleeping on your back. · Avoid alcohol and medicines such as sleeping pills and sedatives before bed. · Do not smoke. Smoking can make sleep apnea worse. If you need help quitting, talk to your doctor about stop-smoking programs and medicines. These can increase your chances of quitting for good. · Prop up the head of your bed 4 to 6 inches by putting bricks under the legs of the bed. · Treat breathing problems, such as a stuffy nose, caused by a cold or allergies. · Try a continuous positive airway pressure (CPAP) breathing machine if your doctor recommends it. The machine keeps your airway open when you sleep. · If CPAP does not work for you, ask your doctor if you can try other breathing machines. A bilevel positive airway pressure machine uses one type of air pressure for breathing in and another type for breathing out.  Another device raises or lowers air pressure as needed while you breathe. · Talk to your doctor if:  ¨ Your nose feels dry or bleeds when you use one of these machines. You may need to increase moisture in the air. A humidifier may help. ¨ Your nose is runny or stuffy from using a breathing machine. Decongestants or a corticosteroid nasal spray may help. ¨ You are sleepy during the day and it gets in the way of the normal things you do. Do not drive when you are drowsy. When should you call for help? Watch closely for changes in your health, and be sure to contact your doctor if:  · You still have sleep apnea even though you have made lifestyle changes. · You are thinking of trying a device such as CPAP. · You are having problems using a CPAP or similar machine. Where can you learn more? Go to http://leslie-graciela.info/. Enter I602 in the search box to learn more about \"Sleep Apnea: Care Instructions. \"  Current as of: May 23, 2016  Content Version: 11.2  © 1477-3193 [employer] Care instructions adapted under license by [employer] (which disclaims liability or warranty for this information). If you have questions about a medical condition or this instruction, always ask your healthcare professional. [place] [address] any warranty or liability for your use of this information. High Blood Pressure: Care Instructions  Your Care Instructions  If your blood pressure is usually above 140/90, you have high blood pressure, or hypertension. That means the top number is 140 or higher or the bottom number is 90 or higher, or both. Despite what a lot of people think, high blood pressure usually doesn't cause headaches or make you feel dizzy or lightheaded. It usually has no symptoms. But it does increase your risk for heart attack, stroke, and kidney or eye damage. The higher your blood pressure, the more your risk increases. Your doctor will give you a goal for your blood pressure.  Your goal will be based on your health and your age. An example of a goal is to keep your blood pressure below 140/90. Lifestyle changes, such as eating healthy and being active, are always important to help lower blood pressure. You might also take medicine to reach your blood pressure goal.  Follow-up care is a key part of your treatment and safety. Be sure to make and go to all appointments, and call your doctor if you are having problems. It's also a good idea to know your test results and keep a list of the medicines you take. How can you care for yourself at home? Medical treatment  · If you stop taking your medicine, your blood pressure will go back up. You may take one or more types of medicine to lower your blood pressure. Be safe with medicines. Take your medicine exactly as prescribed. Call your doctor if you think you are having a problem with your medicine. · Talk to your doctor before you start taking aspirin every day. Aspirin can help certain people lower their risk of a heart attack or stroke. But taking aspirin isn't right for everyone, because it can cause serious bleeding. · See your doctor regularly. You may need to see the doctor more often at first or until your blood pressure comes down. · If you are taking blood pressure medicine, talk to your doctor before you take decongestants or anti-inflammatory medicine, such as ibuprofen. Some of these medicines can raise blood pressure. · Learn how to check your blood pressure at home. Lifestyle changes  · Stay at a healthy weight. This is especially important if you put on weight around the waist. Losing even 10 pounds can help you lower your blood pressure. · If your doctor recommends it, get more exercise. Walking is a good choice. Bit by bit, increase the amount you walk every day. Try for at least 30 minutes on most days of the week. You also may want to swim, bike, or do other activities. · Avoid or limit alcohol.  Talk to your doctor about whether you can drink any alcohol. · Try to limit how much sodium you eat to less than 2,300 milligrams (mg) a day. Your doctor may ask you to try to eat less than 1,500 mg a day. · Eat plenty of fruits (such as bananas and oranges), vegetables, legumes, whole grains, and low-fat dairy products. · Lower the amount of saturated fat in your diet. Saturated fat is found in animal products such as milk, cheese, and meat. Limiting these foods may help you lose weight and also lower your risk for heart disease. · Do not smoke. Smoking increases your risk for heart attack and stroke. If you need help quitting, talk to your doctor about stop-smoking programs and medicines. These can increase your chances of quitting for good. When should you call for help? Call 911 anytime you think you may need emergency care. This may mean having symptoms that suggest that your blood pressure is causing a serious heart or blood vessel problem. Your blood pressure may be over 180/110. For example, call 911 if:  · You have symptoms of a heart attack. These may include:  ¨ Chest pain or pressure, or a strange feeling in the chest.  ¨ Sweating. ¨ Shortness of breath. ¨ Nausea or vomiting. ¨ Pain, pressure, or a strange feeling in the back, neck, jaw, or upper belly or in one or both shoulders or arms. ¨ Lightheadedness or sudden weakness. ¨ A fast or irregular heartbeat. · You have symptoms of a stroke. These may include:  ¨ Sudden numbness, tingling, weakness, or loss of movement in your face, arm, or leg, especially on only one side of your body. ¨ Sudden vision changes. ¨ Sudden trouble speaking. ¨ Sudden confusion or trouble understanding simple statements. ¨ Sudden problems with walking or balance. ¨ A sudden, severe headache that is different from past headaches. · You have severe back or belly pain. Do not wait until your blood pressure comes down on its own. Get help right away.   Call your doctor now or seek immediate care if:  · Your blood pressure is much higher than normal (such as 180/110 or higher), but you don't have symptoms. · You think high blood pressure is causing symptoms, such as:  ¨ Severe headache. ¨ Blurry vision. Watch closely for changes in your health, and be sure to contact your doctor if:  · Your blood pressure measures 140/90 or higher at least 2 times. That means the top number is 140 or higher or the bottom number is 90 or higher, or both. · You think you may be having side effects from your blood pressure medicine. · Your blood pressure is usually normal, but it goes above normal at least 2 times. Where can you learn more? Go to http://leslie-graciela.info/. Enter U523 in the search box to learn more about \"High Blood Pressure: Care Instructions. \"  Current as of: August 8, 2016  Content Version: 11.2  © 3067-3810 [employer]. Care instructions adapted under license by [employer] (which disclaims liability or warranty for this information). If you have questions about a medical condition or this instruction, always ask your healthcare professional. [place] [address] any warranty or liability for your use of this information. Purine-Restricted Diet: Care Instructions  Your Care Instructions  Purines are substances that are found in some foods. Your body turns purines into uric acid. High levels of uric acid can cause gout, which is a form of arthritis that causes pain and inflammation in joints. You may be able to help control the amount of uric acid in your body by limiting high-purine foods in your diet. Follow-up care is a key part of your treatment and safety. Be sure to make and go to all appointments, and call your doctor if you are having problems. It's also a good idea to know your test results and keep a list of the medicines you take. How can you care for yourself at home?   · Plan your meals and snacks around foods that are low in purines and are safe for you to eat. These foods include:  ¨ Green vegetables and tomatoes. ¨ Fruits. ¨ Whole-grain breads, rice, and cereals. ¨ Eggs, peanut butter, and nuts. ¨ Low-fat milk, cheese, and other milk products. ¨ Popcorn. ¨ Gelatin desserts, chocolate, cocoa, and cakes and sweets, in small amounts. · You can eat certain foods that are medium-high in purines, but eat them only once in a while. These foods include:  ¨ Legumes, such as dried beans and dried peas. You can have 1 cup cooked legumes each day. ¨ Asparagus, cauliflower, spinach, mushrooms, and green peas. ¨ Fish and seafood (other than very high-purine seafood). ¨ Oatmeal, wheat bran, and wheat germ. · Limit very high-purine foods, including:  ¨ Organ meats, such as liver, kidneys, sweetbreads, and brains. ¨ Meats, including day, beef, pork, and lamb. ¨ Game meats and any other meats in large amounts. ¨ Anchovies, sardines, herring, mackerel, and scallops. ¨ Gravy. ¨ Beer. Where can you learn more? Go to http://leslie-graciela.info/. Enter F448 in the search box to learn more about \"Purine-Restricted Diet: Care Instructions. \"  Current as of: July 26, 2016  Content Version: 11.2  © 0926-2710 [employer]. Care instructions adapted under license by [employer] (which disclaims liability or warranty for this information). If you have questions about a medical condition or this instruction, always ask your healthcare professional. [place] [address] any warranty or liability for your use of this information.

## 2017-06-02 NOTE — PROGRESS NOTES
1. Have you been to the ER, urgent care clinic since your last visit? Hospitalized since your last visit? No    2. Have you seen or consulted any other health care providers outside of the [address] [place] [address] since your last visit? Include any pap smears or colon screening.  No    Patient has not had a dm eye exam.

## 2017-06-08 ENCOUNTER — PATIENT OUTREACH (OUTPATIENT)
Dept: FAMILY MEDICINE CLINIC | Age: 57
End: 2017-06-08

## 2017-06-08 NOTE — PROGRESS NOTES
called patient and rescheduled missed appointment. Nurse Navigator will meet with patient on Monday June 12, 2017 at 1100.

## 2017-06-12 ENCOUNTER — PATIENT OUTREACH (OUTPATIENT)
Dept: FAMILY MEDICINE CLINIC | Age: 57
End: 2017-06-12

## 2017-07-13 ENCOUNTER — APPOINTMENT (OUTPATIENT)
Dept: GENERAL RADIOLOGY | Age: 57
DRG: 304 | End: 2017-07-13
Attending: EMERGENCY MEDICINE
Payer: COMMERCIAL

## 2017-07-13 ENCOUNTER — HOSPITAL ENCOUNTER (INPATIENT)
Age: 57
LOS: 4 days | Discharge: HOME | DRG: 304 | End: 2017-07-17
Attending: EMERGENCY MEDICINE | Admitting: HOSPITALIST
Payer: COMMERCIAL

## 2017-07-13 DIAGNOSIS — N17.9 ACUTE ON CHRONIC RENAL FAILURE (HCC): Primary | ICD-10-CM

## 2017-07-13 DIAGNOSIS — R03.0 ELEVATED BLOOD PRESSURE READING: ICD-10-CM

## 2017-07-13 DIAGNOSIS — R07.9 ACUTE CHEST PAIN: ICD-10-CM

## 2017-07-13 DIAGNOSIS — I50.9 ACUTE CONGESTIVE HEART FAILURE, UNSPECIFIED CONGESTIVE HEART FAILURE TYPE: ICD-10-CM

## 2017-07-13 DIAGNOSIS — N18.9 ACUTE ON CHRONIC RENAL FAILURE (HCC): Primary | ICD-10-CM

## 2017-07-13 LAB
AMPHET UR QL SCN: NEGATIVE
ANION GAP BLD CALC-SCNC: 6 MMOL/L (ref 3–18)
APPEARANCE UR: CLEAR
APTT PPP: 26.4 SEC (ref 23–36.4)
ATRIAL RATE: 85 BPM
BARBITURATES UR QL SCN: NEGATIVE
BASOPHILS # BLD AUTO: 0 K/UL (ref 0–0.06)
BASOPHILS # BLD: 0 % (ref 0–2)
BENZODIAZ UR QL: NEGATIVE
BILIRUB UR QL: NEGATIVE
BNP SERPL-MCNC: 4460 PG/ML (ref 0–900)
BUN SERPL-MCNC: 23 MG/DL (ref 7–18)
BUN/CREAT SERPL: 9 (ref 12–20)
CALCIUM SERPL-MCNC: 8.6 MG/DL (ref 8.5–10.1)
CALCULATED P AXIS, ECG09: 62 DEGREES
CALCULATED R AXIS, ECG10: 29 DEGREES
CALCULATED T AXIS, ECG11: 173 DEGREES
CANNABINOIDS UR QL SCN: NEGATIVE
CHLORIDE SERPL-SCNC: 109 MMOL/L (ref 100–108)
CO2 SERPL-SCNC: 28 MMOL/L (ref 21–32)
COCAINE UR QL SCN: POSITIVE
COLOR UR: YELLOW
CREAT SERPL-MCNC: 2.62 MG/DL (ref 0.6–1.3)
DIAGNOSIS, 93000: NORMAL
DIFFERENTIAL METHOD BLD: ABNORMAL
EOSINOPHIL # BLD: 0 K/UL (ref 0–0.4)
EOSINOPHIL NFR BLD: 0 % (ref 0–5)
EPITH CASTS URNS QL MICRO: NORMAL /LPF (ref 0–5)
ERYTHROCYTE [DISTWIDTH] IN BLOOD BY AUTOMATED COUNT: 15.5 % (ref 11.6–14.5)
ETHANOL SERPL-MCNC: <3 MG/DL (ref 0–3)
GLUCOSE BLD STRIP.AUTO-MCNC: 102 MG/DL (ref 70–110)
GLUCOSE BLD STRIP.AUTO-MCNC: 128 MG/DL (ref 70–110)
GLUCOSE BLD STRIP.AUTO-MCNC: 46 MG/DL (ref 70–110)
GLUCOSE BLD STRIP.AUTO-MCNC: 58 MG/DL (ref 70–110)
GLUCOSE SERPL-MCNC: 98 MG/DL (ref 74–99)
GLUCOSE UR STRIP.AUTO-MCNC: NEGATIVE MG/DL
GRAN CASTS URNS QL MICRO: NORMAL /LPF
HCT VFR BLD AUTO: 38.1 % (ref 36–48)
HDSCOM,HDSCOM: ABNORMAL
HGB BLD-MCNC: 11.9 G/DL (ref 13–16)
HGB UR QL STRIP: NEGATIVE
INR PPP: 0.9 (ref 0.8–1.2)
KETONES UR QL STRIP.AUTO: NEGATIVE MG/DL
LEUKOCYTE ESTERASE UR QL STRIP.AUTO: NEGATIVE
LYMPHOCYTES # BLD AUTO: 29 % (ref 21–52)
LYMPHOCYTES # BLD: 2 K/UL (ref 0.9–3.6)
MAGNESIUM SERPL-MCNC: 2 MG/DL (ref 1.6–2.6)
MCH RBC QN AUTO: 27.5 PG (ref 24–34)
MCHC RBC AUTO-ENTMCNC: 31.2 G/DL (ref 31–37)
MCV RBC AUTO: 88.2 FL (ref 74–97)
METHADONE UR QL: NEGATIVE
MONOCYTES # BLD: 0.5 K/UL (ref 0.05–1.2)
MONOCYTES NFR BLD AUTO: 8 % (ref 3–10)
NEUTS SEG # BLD: 4.3 K/UL (ref 1.8–8)
NEUTS SEG NFR BLD AUTO: 63 % (ref 40–73)
NITRITE UR QL STRIP.AUTO: NEGATIVE
OPIATES UR QL: NEGATIVE
P-R INTERVAL, ECG05: 158 MS
PCP UR QL: NEGATIVE
PH UR STRIP: 5.5 [PH] (ref 5–8)
PLATELET # BLD AUTO: 312 K/UL (ref 135–420)
PMV BLD AUTO: 10.6 FL (ref 9.2–11.8)
POTASSIUM SERPL-SCNC: 3.8 MMOL/L (ref 3.5–5.5)
PROT UR STRIP-MCNC: >1000 MG/DL
PROTHROMBIN TIME: 11.5 SEC (ref 11.5–15.2)
Q-T INTERVAL, ECG07: 386 MS
QRS DURATION, ECG06: 92 MS
QTC CALCULATION (BEZET), ECG08: 459 MS
RBC # BLD AUTO: 4.32 M/UL (ref 4.7–5.5)
RBC #/AREA URNS HPF: NORMAL /HPF (ref 0–5)
SODIUM SERPL-SCNC: 143 MMOL/L (ref 136–145)
SP GR UR REFRACTOMETRY: 1.02 (ref 1–1.03)
TROPONIN I SERPL-MCNC: 0.04 NG/ML (ref 0–0.04)
UROBILINOGEN UR QL STRIP.AUTO: 0.2 EU/DL (ref 0.2–1)
VENTRICULAR RATE, ECG03: 85 BPM
WBC # BLD AUTO: 6.9 K/UL (ref 4.6–13.2)
WBC URNS QL MICRO: NORMAL /HPF (ref 0–4)

## 2017-07-13 PROCEDURE — 74011250636 HC RX REV CODE- 250/636: Performed by: PHYSICIAN ASSISTANT

## 2017-07-13 PROCEDURE — 71010 XR CHEST PORT: CPT

## 2017-07-13 PROCEDURE — 74011000250 HC RX REV CODE- 250: Performed by: EMERGENCY MEDICINE

## 2017-07-13 PROCEDURE — 99285 EMERGENCY DEPT VISIT HI MDM: CPT

## 2017-07-13 PROCEDURE — 80307 DRUG TEST PRSMV CHEM ANLYZR: CPT | Performed by: HOSPITALIST

## 2017-07-13 PROCEDURE — 80048 BASIC METABOLIC PNL TOTAL CA: CPT | Performed by: EMERGENCY MEDICINE

## 2017-07-13 PROCEDURE — 93005 ELECTROCARDIOGRAM TRACING: CPT

## 2017-07-13 PROCEDURE — 74011000250 HC RX REV CODE- 250: Performed by: HOSPITALIST

## 2017-07-13 PROCEDURE — 74011250637 HC RX REV CODE- 250/637: Performed by: EMERGENCY MEDICINE

## 2017-07-13 PROCEDURE — 74011250636 HC RX REV CODE- 250/636: Performed by: INTERNAL MEDICINE

## 2017-07-13 PROCEDURE — 84484 ASSAY OF TROPONIN QUANT: CPT | Performed by: EMERGENCY MEDICINE

## 2017-07-13 PROCEDURE — 82962 GLUCOSE BLOOD TEST: CPT

## 2017-07-13 PROCEDURE — 81001 URINALYSIS AUTO W/SCOPE: CPT | Performed by: EMERGENCY MEDICINE

## 2017-07-13 PROCEDURE — 94640 AIRWAY INHALATION TREATMENT: CPT

## 2017-07-13 PROCEDURE — 74011250636 HC RX REV CODE- 250/636: Performed by: HOSPITALIST

## 2017-07-13 PROCEDURE — 77030013140 HC MSK NEB VYRM -A

## 2017-07-13 PROCEDURE — 80307 DRUG TEST PRSMV CHEM ANLYZR: CPT | Performed by: EMERGENCY MEDICINE

## 2017-07-13 PROCEDURE — 85025 COMPLETE CBC W/AUTO DIFF WBC: CPT | Performed by: EMERGENCY MEDICINE

## 2017-07-13 PROCEDURE — 84156 ASSAY OF PROTEIN URINE: CPT | Performed by: INTERNAL MEDICINE

## 2017-07-13 PROCEDURE — 74011250637 HC RX REV CODE- 250/637: Performed by: HOSPITALIST

## 2017-07-13 PROCEDURE — 65660000000 HC RM CCU STEPDOWN

## 2017-07-13 PROCEDURE — 85730 THROMBOPLASTIN TIME PARTIAL: CPT | Performed by: EMERGENCY MEDICINE

## 2017-07-13 PROCEDURE — 83735 ASSAY OF MAGNESIUM: CPT | Performed by: EMERGENCY MEDICINE

## 2017-07-13 PROCEDURE — 85610 PROTHROMBIN TIME: CPT | Performed by: EMERGENCY MEDICINE

## 2017-07-13 PROCEDURE — C8924 2D TTE W OR W/O FOL W/CON,FU: HCPCS

## 2017-07-13 PROCEDURE — 83880 ASSAY OF NATRIURETIC PEPTIDE: CPT | Performed by: EMERGENCY MEDICINE

## 2017-07-13 RX ORDER — ONDANSETRON 2 MG/ML
4 INJECTION INTRAMUSCULAR; INTRAVENOUS
Status: DISCONTINUED | OUTPATIENT
Start: 2017-07-13 | End: 2017-07-17 | Stop reason: HOSPADM

## 2017-07-13 RX ORDER — NITROGLYCERIN 0.4 MG/1
0.4 TABLET SUBLINGUAL AS NEEDED
Status: DISCONTINUED | OUTPATIENT
Start: 2017-07-13 | End: 2017-07-17 | Stop reason: HOSPADM

## 2017-07-13 RX ORDER — FEBUXOSTAT 40 MG/1
40 TABLET, FILM COATED ORAL DAILY
Status: DISCONTINUED | OUTPATIENT
Start: 2017-07-14 | End: 2017-07-17 | Stop reason: HOSPADM

## 2017-07-13 RX ORDER — DEXTROSE MONOHYDRATE 100 MG/ML
40 INJECTION, SOLUTION INTRAVENOUS CONTINUOUS
Status: DISCONTINUED | OUTPATIENT
Start: 2017-07-13 | End: 2017-07-13

## 2017-07-13 RX ORDER — DOCUSATE SODIUM 100 MG/1
100 CAPSULE, LIQUID FILLED ORAL 2 TIMES DAILY
Status: DISCONTINUED | OUTPATIENT
Start: 2017-07-13 | End: 2017-07-17 | Stop reason: HOSPADM

## 2017-07-13 RX ORDER — BUDESONIDE AND FORMOTEROL FUMARATE DIHYDRATE 160; 4.5 UG/1; UG/1
2 AEROSOL RESPIRATORY (INHALATION)
Status: DISCONTINUED | OUTPATIENT
Start: 2017-07-13 | End: 2017-07-17 | Stop reason: HOSPADM

## 2017-07-13 RX ORDER — NITROGLYCERIN 40 MG/100ML
5 INJECTION INTRAVENOUS CONTINUOUS
Status: DISCONTINUED | OUTPATIENT
Start: 2017-07-13 | End: 2017-07-14

## 2017-07-13 RX ORDER — ACETAMINOPHEN 500 MG
500 TABLET ORAL
Status: DISCONTINUED | OUTPATIENT
Start: 2017-07-13 | End: 2017-07-17 | Stop reason: HOSPADM

## 2017-07-13 RX ORDER — IPRATROPIUM BROMIDE AND ALBUTEROL SULFATE 2.5; .5 MG/3ML; MG/3ML
3 SOLUTION RESPIRATORY (INHALATION)
Status: COMPLETED | OUTPATIENT
Start: 2017-07-13 | End: 2017-07-13

## 2017-07-13 RX ORDER — DEXTROSE 50 % IN WATER (D50W) INTRAVENOUS SYRINGE
25-50 AS NEEDED
Status: DISCONTINUED | OUTPATIENT
Start: 2017-07-13 | End: 2017-07-17 | Stop reason: HOSPADM

## 2017-07-13 RX ORDER — MAGNESIUM SULFATE 100 %
16 CRYSTALS MISCELLANEOUS AS NEEDED
Status: DISCONTINUED | OUTPATIENT
Start: 2017-07-13 | End: 2017-07-17 | Stop reason: HOSPADM

## 2017-07-13 RX ORDER — CALCITRIOL 0.25 UG/1
0.25 CAPSULE ORAL
Status: DISCONTINUED | OUTPATIENT
Start: 2017-07-14 | End: 2017-07-17 | Stop reason: HOSPADM

## 2017-07-13 RX ORDER — INSULIN LISPRO 100 [IU]/ML
INJECTION, SOLUTION INTRAVENOUS; SUBCUTANEOUS
Status: DISCONTINUED | OUTPATIENT
Start: 2017-07-13 | End: 2017-07-17 | Stop reason: HOSPADM

## 2017-07-13 RX ORDER — GUAIFENESIN 100 MG/5ML
324 LIQUID (ML) ORAL
Status: COMPLETED | OUTPATIENT
Start: 2017-07-13 | End: 2017-07-13

## 2017-07-13 RX ORDER — CARVEDILOL 25 MG/1
25 TABLET ORAL
Status: COMPLETED | OUTPATIENT
Start: 2017-07-13 | End: 2017-07-13

## 2017-07-13 RX ORDER — DEXTROSE MONOHYDRATE 50 MG/ML
40 INJECTION, SOLUTION INTRAVENOUS CONTINUOUS
Status: DISCONTINUED | OUTPATIENT
Start: 2017-07-13 | End: 2017-07-14

## 2017-07-13 RX ORDER — HYDRALAZINE HYDROCHLORIDE 20 MG/ML
20 INJECTION INTRAMUSCULAR; INTRAVENOUS
Status: DISCONTINUED | OUTPATIENT
Start: 2017-07-13 | End: 2017-07-14

## 2017-07-13 RX ORDER — LANOLIN ALCOHOL/MO/W.PET/CERES
100 CREAM (GRAM) TOPICAL DAILY
Status: DISCONTINUED | OUTPATIENT
Start: 2017-07-14 | End: 2017-07-17 | Stop reason: HOSPADM

## 2017-07-13 RX ORDER — HEPARIN SODIUM 5000 [USP'U]/ML
5000 INJECTION, SOLUTION INTRAVENOUS; SUBCUTANEOUS EVERY 8 HOURS
Status: DISCONTINUED | OUTPATIENT
Start: 2017-07-13 | End: 2017-07-17 | Stop reason: HOSPADM

## 2017-07-13 RX ORDER — FUROSEMIDE 10 MG/ML
40 INJECTION INTRAMUSCULAR; INTRAVENOUS DAILY
Status: DISCONTINUED | OUTPATIENT
Start: 2017-07-13 | End: 2017-07-14

## 2017-07-13 RX ORDER — GUAIFENESIN 100 MG/5ML
81 LIQUID (ML) ORAL DAILY
Status: DISCONTINUED | OUTPATIENT
Start: 2017-07-14 | End: 2017-07-15

## 2017-07-13 RX ORDER — FUROSEMIDE 10 MG/ML
40 INJECTION INTRAMUSCULAR; INTRAVENOUS ONCE
Status: COMPLETED | OUTPATIENT
Start: 2017-07-13 | End: 2017-07-13

## 2017-07-13 RX ADMIN — HYDRALAZINE HYDROCHLORIDE 20 MG: 20 INJECTION INTRAMUSCULAR; INTRAVENOUS at 22:47

## 2017-07-13 RX ADMIN — CARVEDILOL 25 MG: 25 TABLET, FILM COATED ORAL at 10:55

## 2017-07-13 RX ADMIN — HEPARIN SODIUM 5000 UNITS: 5000 INJECTION, SOLUTION INTRAVENOUS; SUBCUTANEOUS at 15:32

## 2017-07-13 RX ADMIN — ACETAMINOPHEN 500 MG: 500 TABLET ORAL at 19:57

## 2017-07-13 RX ADMIN — PERFLUTREN 2 ML: 6.52 INJECTION, SUSPENSION INTRAVENOUS at 14:18

## 2017-07-13 RX ADMIN — FUROSEMIDE 40 MG: 10 INJECTION, SOLUTION INTRAMUSCULAR; INTRAVENOUS at 15:32

## 2017-07-13 RX ADMIN — HEPARIN SODIUM 5000 UNITS: 5000 INJECTION, SOLUTION INTRAVENOUS; SUBCUTANEOUS at 22:43

## 2017-07-13 RX ADMIN — ASPIRIN 81 MG 324 MG: 81 TABLET ORAL at 09:33

## 2017-07-13 RX ADMIN — DEXTROSE MONOHYDRATE 40 ML/HR: 5 INJECTION, SOLUTION INTRAVENOUS at 18:41

## 2017-07-13 RX ADMIN — IPRATROPIUM BROMIDE AND ALBUTEROL SULFATE 3 ML: .5; 3 SOLUTION RESPIRATORY (INHALATION) at 09:33

## 2017-07-13 RX ADMIN — NITROGLYCERIN 5 MCG/MIN: 40 INJECTION INTRAVENOUS at 15:33

## 2017-07-13 RX ADMIN — NITROGLYCERIN 0.4 MG: 0.4 TABLET SUBLINGUAL at 09:37

## 2017-07-13 NOTE — ED TRIAGE NOTES
Patient C/O shortness of breath X 2 week, bilateral lower extremity edema and pausing between sentences. Patient also reports severe chest pain starting two weeks ago worse at night.

## 2017-07-13 NOTE — IP AVS SNAPSHOT
[address] [place] Patient: Roseann Mckeon MRN: [id number] :1960 You are allergic to the following Allergen Reactions Shellfish Derived Other (comments) Causes Gout Recent Documentation Height Weight BMI Smoking Status 1.626 m 104.8 kg 39.65 kg/m2 Current Every Day Smoker Unresulted Labs Order Current Status ANCA PANEL In process GLOMERULAR BASEMENT MEMBRANE AB In process Emergency Contacts Name Discharge Info Relation Home Work Mobile [address] CAREGIVER [3] Spouse [3] [phone] About your hospitalization You were admitted on:  2017 You last received care in the:  [address] [place] [address] You were discharged on:  2017 Unit phone number:  [phone] Why you were hospitalized Your primary diagnosis was:  Not on File Your diagnoses also included:  Acute Chest Pain, Secondary Hyperparathyroidism Of Renal Origin (Hcc), Hypertensive Heart Disease, Cad (Coronary Artery Disease), Cocaine Abuse, Chronic Renal Disease, Stage Iv (Hcc), Noncompliance, Persistent Proteinuria Providers Seen During Your Hospitalizations Provider Role Specialty Primary office phone William Rowe MD Attending Provider Emergency Medicine [phone] Janessa Haider MD Attending Provider Internal Medicine [phone] Your Primary Care Physician (PCP) Primary Care Physician Office Phone Office Fax 308 [facility], [address] [place] [address] [phone] Follow-up Information Follow up With Details Comments Contact Info Jun Hill MD On 2017 Per office/sahil Waynet. Monday, 2017; @ 12:45pm. [address] 
[phone] Madeleine Andrade MD On 7/31/2017 Per office/Juanita, appt. with Low Maxwell NP; July 31, 2017; 1:00pm. [address] [facility] [address] 
[phone] Charlene Nobles MD On 8/2/2017 Per office/Lashonda, appt. Wednesday , August 2, 2017; @ 2:30pm. Hortencia Wray [address] 
[phone] Hanane Dallas MD On 8/14/2017 Per office/Anahy, appt. August 14, 2017, @ 9:35 am. Dr. Elena Bertrand. 235 [facility] [address] [place] [address] 
[phone] Cynthia Burns MD On 7/17/2017 Per office/Zelda (ENT) , July 19, 2017, @ 1:00pm. Per office , $ 100.00 , chg.for visit ( Off. no not accept [facility]   Randall. [address] 200 [facility] [address] 
[phone] Your Appointments Monday July 24, 2017 12:45 PM EDT  
[facility] with Naomi Clinton MD  
[address] [place] [address] ([facility]) [address] [facility] [address]  
[phone] Monday July 31, 2017  1:00 PM EDT  
POST HOSPITAL with Gary Davis NP [facility] ([facility]) [facility] [address] [address] [address] [phone] Wednesday August 02, 2017 10:30 AM EDT  
COLON SCREEN with TSS HBV NURSE VISIT ABDIEL MOUSTAPHA [facility] ([facility]) [address] [facility] [address]  
[phone] Tuesday August 08, 2017 10:45 AM EDT ROUTINE CARE with Naomi Clinton MD  
[address] [place] [address] ([facility]) [address] [facility] [address]  
[phone] Monday August 14, 2017  9:30 AM EDT Follow Up with Ryan Douglass DO [address] ([facility]) [address] [place] [address], [address] [id number] [phone] Current Discharge Medication List  
  
START taking these medications Dose & Instructions Dispensing Information Comments Morning Noon Evening Bedtime  
 atorvastatin 40 mg tablet Commonly known as:  LIPITOR Your last dose was: Your next dose is:    
   
   
 Dose:  40 mg Take 1 Tab by mouth nightly. Quantity:  30 Tab Refills:  0  
     
   
   
   
  
 docusate sodium 100 mg capsule Commonly known as:  Gwendel Laser Your last dose was: Your next dose is:    
   
   
 Dose:  100 mg Take 1 Cap by mouth two (2) times a day for 90 days. Quantity:  60 Cap Refills:  0  
     
   
   
   
  
 isosorbide mononitrate ER 60 mg CR tablet Commonly known as:  IMDUR Your last dose was: Your next dose is:    
   
   
 Dose:  60 mg Take 1 Tab by mouth daily. Quantity:  30 Tab Refills:  0  
     
   
   
   
  
 losartan 25 mg tablet Commonly known as:  COZAAR Your last dose was: Your next dose is:    
   
   
 Dose:  25 mg Take 1 Tab by mouth daily. Quantity:  30 Tab Refills:  0  
     
   
   
   
  
 nitroglycerin 0.4 mg SL tablet Commonly known as:  NITROSTAT Your last dose was: Your next dose is:    
   
   
 Dose:  0.4 mg  
1 Tab by SubLINGual route as needed for Chest Pain. Quantity:  20 Tab Refills:  0  
     
   
   
   
  
 * OTHER Your last dose was: Your next dose is:    
   
   
 Check CBC, CMP, Mg in 3 days, results to PCP immediately, Diagnosis- CKD4 Quantity:  1 Each Refills:  0  
     
   
   
   
  
 * OTHER Your last dose was: Your next dose is:    
   
   
 Incentive Spirometry- Use as directed Quantity:  1 Each Refills:  0  
     
   
   
   
  
 * OTHER Your last dose was: Your next dose is:    
   
   
 Diet- Cardiac, Renal, Diabetic, No concentrated sweets Quantity:  1 Each Refills:  0  
     
   
   
   
  
 * Notice:   This list has 3 medication(s) that are the same as other medications prescribed for you. Read the directions carefully, and ask your doctor or other care provider to review them with you. CONTINUE these medications which have CHANGED Dose & Instructions Dispensing Information Comments Morning Noon Evening Bedtime  
 furosemide 40 mg tablet Commonly known as:  LASIX What changed:   
- medication strength 
- how much to take - when to take this 
- reasons to take this Your last dose was: Your next dose is:    
   
   
 Dose:  40 mg Take 1 Tab by mouth daily. Quantity:  30 Tab Refills:  0 CONTINUE these medications which have NOT CHANGED Dose & Instructions Dispensing Information Comments Morning Noon Evening Bedtime  
 albuterol 90 mcg/actuation inhaler Commonly known as:  VENTOLIN HFA Your last dose was: Your next dose is:    
   
   
 Dose:  2 Puff Take 2 Puffs by inhalation every four (4) hours as needed for Wheezing for up to 90 days. Quantity:  1 Inhaler Refills:  1  
     
   
   
   
  
 aspirin 81 mg chewable tablet Your last dose was: Your next dose is:    
   
   
 Dose:  81 mg Take 1 Tab by mouth daily. Quantity:  30 Tab Refills:  0  
     
   
   
   
  
 B COMPLEX & C NO.20-FOLIC ACID PO Your last dose was: Your next dose is: Take  by mouth. Refills:  0 Blood-Glucose Meter monitoring kit Your last dose was: Your next dose is:    
   
   
 Check twice daily. Quantity:  1 kit Refills:  0  
     
   
   
   
  
 budesonide-formoterol 160-4.5 mcg/actuation HFA inhaler Commonly known as:  SYMBICORT Your last dose was: Your next dose is:    
   
   
 Dose:  2 Puff Take 2 Puffs by inhalation two (2) times a day. Quantity:  1 Inhaler Refills:  0  
     
   
   
   
  
 calcitRIOL 0.25 mcg capsule Commonly known as:  ROCALTROL Your last dose was: Your next dose is:    
   
   
 Dose:  0.25 mcg Take 1 Cap by mouth every Monday, Wednesday, Friday. Quantity:  15 Cap Refills:  0  
     
   
   
   
  
 carvedilol 25 mg tablet Commonly known as:  Dana Vasquez Your last dose was: Your next dose is:    
   
   
 Dose:  25 mg Take 1 Tab by mouth two (2) times daily (with meals). Quantity:  60 Tab Refills:  0  
     
   
   
   
  
 febuxostat 40 mg Tab tablet Commonly known as:  Delmy Led Your last dose was: Your next dose is:    
   
   
 Dose:  40 mg Take 1 Tab by mouth daily. Quantity:  30 Tab Refills:  1  
     
   
   
   
  
 glucose blood VI test strips strip Commonly known as:  FREESTYLE TEST Your last dose was: Your next dose is:    
   
   
 by Does Not Apply route See Admin Instructions. Check twice a day Quantity:  100 Strip Refills:  2  
     
   
   
   
  
 hydrALAZINE 50 mg tablet Commonly known as:  APRESOLINE Your last dose was: Your next dose is:    
   
   
 Dose:  50 mg Take 1 Tab by mouth three (3) times daily. Quantity:  90 Tab Refills:  0 Lancets Misc Your last dose was: Your next dose is:    
   
   
 Check 3 times a day , needs according contour glucometer Quantity:  1 Package Refills:  11  
     
   
   
   
  
 pantoprazole 20 mg tablet Commonly known as:  PROTONIX Your last dose was: Your next dose is:    
   
   
 Dose:  20 mg Take 1 Tab by mouth daily. Quantity:  30 Tab Refills:  1 STOP taking these medications   
 acetaminophen 650 mg CR tablet Commonly known as:  TYLENOL  
   
  
 cloNIDine HCl 0.1 mg tablet Commonly known as:  CATAPRES  
   
  
 FLONASE 50 mcg/actuation nasal spray Generic drug:  fluticasone  
   
  
 indomethacin 50 mg capsule Commonly known as:  INDOCIN  
   
  
 Insulin Needles (Disposable) 29 gauge x 5/16\" Ndle  
   
  
 insulin NPH/insulin regular 100 unit/mL (70-30) injection Commonly known as:  NovoLIN 70/30  
   
  
 lisinopril 20 mg tablet Commonly known as:  PRINIVIL, ZESTRIL  
   
  
 POTASSIUM CHLORIDE SR 10 MEQ TAB Where to Get Your Medications Information on where to get these meds will be given to you by the nurse or doctor. ! Ask your nurse or doctor about these medications  
  atorvastatin 40 mg tablet  
 carvedilol 25 mg tablet  
 docusate sodium 100 mg capsule  
 furosemide 40 mg tablet  
 hydrALAZINE 50 mg tablet  
 isosorbide mononitrate ER 60 mg CR tablet  
 losartan 25 mg tablet  
 nitroglycerin 0.4 mg SL tablet OTHER  
 OTHER  
 OTHER Discharge Instructions DISCHARGE SUMMARY from Nurse The following personal items are in your possession at time of discharge: 
 
Dental Appliances: None Visual Aid: None Home Medications: None Jewelry: None Clothing: Footwear, Pants, Shirt, Socks, Undergarments Other Valuables: Cell Phone PATIENT INSTRUCTIONS: 
 
 
F-face looks uneven A-arms unable to move or move unevenly S-speech slurred or non-existent T-time-call 911 as soon as signs and symptoms begin-DO NOT go Back to bed or wait to see if you get better-TIME IS BRAIN. Warning Signs of HEART ATTACK Call 911 if you have these symptoms: 
? Chest discomfort.  Most heart attacks involve discomfort in the center of the chest that lasts more than a few minutes, or that goes away and comes back. It can feel like uncomfortable pressure, squeezing, fullness, or pain. ? Discomfort in other areas of the upper body. Symptoms can include pain or discomfort in one or both arms, the back, neck, jaw, or stomach. ? Shortness of breath with or without chest discomfort. ? Other signs may include breaking out in a cold sweat, nausea, or lightheadedness. Don't wait more than five minutes to call [address]! Fast action can save your life. Calling 911 is almost always the fastest way to get lifesaving treatment. Emergency Medical Services staff can begin treatment when they arrive  up to an hour sooner than if someone gets to the hospital by car. The discharge information has been reviewed with the patient. The patient verbalized understanding. Discharge medications reviewed with the patient and appropriate educational materials and side effects teaching were provided. [employer] Activation Thank you for requesting access to [employer]. Please follow the instructions below to securely access and download your online medical record. [employer] allows you to send messages to your doctor, view your test results, renew your prescriptions, schedule appointments, and more. How Do I Sign Up? 1. In your internet browser, go to [email or web address]. 2. Click on the First Time User? Click Here link in the Sign In box. You will see the New Member Sign Up page. 3. Enter your [employer] Access Code exactly as it appears below. You will not need to use this code after youve completed the sign-up process. If you do not sign up before the expiration date, you must request a new code. [employer] Access Code: Activation code not generated Current [employer] Status: Patient Declined (This is the date your [employer] access code will ) 4. Enter the last four digits of your Social Security Number (xxxx) and Date of Birth (mm/dd/yyyy) as indicated and click Submit.  You will be taken to the next sign-up page. 5. Create a Seakeepert ID. This will be your [employer] login ID and cannot be changed, so think of one that is secure and easy to remember. 6. Create a [employer] password. You can change your password at any time. 7. Enter your Password Reset Question and Answer. This can be used at a later time if you forget your password. 8. Enter your e-mail address. You will receive e-mail notification when new information is available in [address] 9. Click Sign Up. You can now view and download portions of your medical record. 10. Click the Download Summary menu link to download a portable copy of your medical information. Additional Information If you have questions, please visit the Frequently Asked Questions section of the [employer] website at [email or web address] Remember, [employer] is NOT to be used for urgent needs. For medical emergencies, dial 911. Patient armband removed and shredded Angina: Care Instructions Your Care Instructions You have a problem called angina. Angina happens when there is not enough blood flow to your heart muscle. Angina is a sign of coronary artery disease (CAD). CAD occurs when blood vessels that supply the heart become narrowed. Having CAD increases your risk of a heart attack. Chest pain or pressure is the most common symptom of angina. But some people have other symptoms, like: 
· Pain, pressure, or a strange feeling in the back, neck, jaw, or upper belly, or in one or both shoulders or arms. · Shortness of breath. · Nausea or vomiting. · Lightheadedness or sudden weakness. · Fast or irregular heartbeat. Women are somewhat more likely than men to have angina symptoms like shortness of breath, nausea, and back or jaw pain. Angina can be dangerous. That's why it is important to pay attention to your symptoms.  Know what is typical for you, learn how to control your symptoms, and understand when you need to get treatment. A change in your usual pattern of symptoms is an emergency. It may mean that you are having a heart attack. The doctor has checked you carefully, but problems can develop later. If you notice any problems or new symptoms, get medical treatment right away. Follow-up care is a key part of your treatment and safety. Be sure to make and go to all appointments, and call your doctor if you are having problems. It's also a good idea to know your test results and keep a list of the medicines you take. How can you care for yourself at home? Medicines · If your doctor has given you nitroglycerin for angina symptoms, keep it with you at all times. If you have symptoms, sit down and rest, and take the first dose of nitroglycerin as directed. If your symptoms get worse or are not getting better within 5 minutes, call 911 right away. Stay on the phone. The emergency  will give you further instructions. · If your doctor advises it, take 1 low-dose aspirin a day to prevent heart attack. · Be safe with medicines. Take your medicines exactly as prescribed. Call your doctor if you think you are having a problem with your medicine. You will get more details on the specific medicines your doctor prescribes. Lifestyle changes · Do not smoke. If you need help quitting, talk to your doctor about stop-smoking programs and medicines. These can increase your chances of quitting for good. · Eat a heart-healthy diet that is low in saturated fat and salt, and is high in fiber. Talk to your doctor or a dietitian about healthy eating. · Stay at a healthy weight. Or lose weight if you need to. Activity · Talk to your doctor about a level of activity that is safe for you. · If an activity causes angina symptoms, stop and rest. 
When should you call for help? Call 911 anytime you think you may need emergency care. For example, call if: · You passed out (lost consciousness). · You have symptoms of a heart attack. These may include: ¨ Chest pain or pressure, or a strange feeling in the chest. 
¨ Sweating. ¨ Shortness of breath. ¨ Nausea or vomiting. ¨ Pain, pressure, or a strange feeling in the back, neck, jaw, or upper belly or in one or both shoulders or arms. ¨ Lightheadedness or sudden weakness. ¨ A fast or irregular heartbeat. After you call 911, the  may tell you to chew 1 adult-strength or 2 to 4 low-dose aspirin. Wait for an ambulance. Do not try to drive yourself. · You have angina symptoms that do not go away with rest or are not getting better within 5 minutes after you take a dose of nitroglycerin. Call your doctor now or seek immediate medical care if: 
· You are having angina symptoms more often than usual, or they are different or worse than usual. 
· You feel dizzy or lightheaded, or you feel like you may faint. Watch closely for changes in your health, and be sure to contact your doctor if you have any problems. Where can you learn more? Go to http://leslieiCreate Softwaregraciela.info/. Enter H129 in the search box to learn more about \"Angina: Care Instructions. \" Current as of: April 3, 2017 Content Version: 11.3 © 2158-7413 [employer]. Care instructions adapted under license by [employer] (which disclaims liability or warranty for this information). If you have questions about a medical condition or this instruction, always ask your healthcare professional. [place] [address] any warranty or liability for your use of this information. Discharge Instructions Attachments/References HEART FAILURE (ENGLISH) HEART FAILURE ZONES: GENERAL INFO (ENGLISH) HEART FAILURE: AVOIDING TRIGGERS (ENGLISH) HEART FAILURE: LIMITING SODIUM AND FLUIDS (ENGLISH) Discharge Orders None General Information Please provide this summary of care documentation to your next provider. Patient Signature:  ____________________________________________________________ Date:  ____________________________________________________________  
  
Rahel Bright Provider Signature:  ____________________________________________________________ Date:  ____________________________________________________________ More Information Heart Failure: Care Instructions Your Care Instructions Heart failure occurs when your heart does not pump as much blood as the body needs. Failure does not mean that the heart has stopped pumping but rather that it is not pumping as well as it should. Over time, this causes fluid buildup in your lungs and other parts of your body. Fluid buildup can cause shortness of breath, fatigue, swollen ankles, and other problems. By taking medicines regularly, reducing sodium (salt) in your diet, checking your weight every day, and making lifestyle changes, you can feel better and live longer. Follow-up care is a key part of your treatment and safety. Be sure to make and go to all appointments, and call your doctor if you are having problems. It's also a good idea to know your test results and keep a list of the medicines you take. How can you care for yourself at home? Medicines · Be safe with medicines. Take your medicines exactly as prescribed. Call your doctor if you think you are having a problem with your medicine. · Do not take any vitamins, over-the-counter medicine, or herbal products without talking to your doctor first. Dory Susan not take ibuprofen (Advil or Motrin) and naproxen (Aleve) without talking to your doctor first. They could make your heart failure worse. · You may be taking some of the following medicine. ¨ Beta-blockers can slow heart rate, decrease blood pressure, and improve your condition.  Taking a beta-blocker may lower your chance of needing to be hospitalized. ¨ Angiotensin-converting enzyme inhibitors (ACEIs) reduce the heart's workload, lower blood pressure, and reduce swelling. Taking an ACEI may lower your chance of needing to be hospitalized again. ¨ Angiotensin II receptor blockers (ARBs) work like ACEIs. Your doctor may prescribe them instead of ACEIs. ¨ Diuretics, also called water pills, reduce swelling. ¨ Potassium supplements replace this important mineral, which is sometimes lost with diuretics. ¨ Aspirin and other blood thinners prevent blood clots, which can cause a stroke or heart attack. You will get more details on the specific medicines your doctor prescribes. Diet · Your doctor may suggest that you limit sodium to 2,000 milligrams (mg) a day or less. That is less than 1 teaspoon of salt a day, including all the salt you eat in cooking or in packaged foods. People get most of their sodium from processed foods. Fast food and restaurant meals also tend to be very high in sodium. · Ask your doctor how much liquid you can drink each day. You may have to limit liquids. Weight · Weigh yourself without clothing at the same time each day. Record your weight. Call your doctor if you have a sudden weight gain, such as more than 2 to 3 pounds in a day or 5 pounds in a week. (Your doctor may suggest a different range of weight gain.) A sudden weight gain may mean that your heart failure is getting worse. Activity level · Start light exercise (if your doctor says it is okay). Even if you can only do a small amount, exercise will help you get stronger, have more energy, and manage your weight and your stress. Walking is an easy way to get exercise. Start out by walking a little more than you did before. Bit by bit, increase the amount you walk. · When you exercise, watch for signs that your heart is working too hard.  You are pushing yourself too hard if you cannot talk while you are exercising. If you become short of breath or dizzy or have chest pain, stop, sit down, and rest. 
· If you feel \"wiped out\" the day after you exercise, walk slower or for a shorter distance until you can work up to a better pace. · Get enough rest at night. Sleeping with 1 or 2 pillows under your upper body and head may help you breathe easier. Lifestyle changes · Do not smoke. Smoking can make a heart condition worse. If you need help quitting, talk to your doctor about stop-smoking programs and medicines. These can increase your chances of quitting for good. Quitting smoking may be the most important step you can take to protect your heart. · Limit alcohol to 2 drinks a day for men and 1 drink a day for women. Too much alcohol can cause health problems. · Avoid getting sick from colds and the flu. Get a pneumococcal vaccine shot. If you have had one before, ask your doctor whether you need another dose. Get a flu shot each year. If you must be around people with colds or the flu, wash your hands often. When should you call for help? Call 911 if you have symptoms of sudden heart failure such as: 
· You have severe trouble breathing. · You cough up pink, foamy mucus. · You have a new irregular or rapid heartbeat. Call your doctor now or seek immediate medical care if: 
· You have new or increased shortness of breath. · You are dizzy or lightheaded, or you feel like you may faint. · You have sudden weight gain, such as more than 2 to 3 pounds in a day or 5 pounds in a week. (Your doctor may suggest a different range of weight gain.) · You have increased swelling in your legs, ankles, or feet. · You are suddenly so tired or weak that you cannot do your usual activities. Watch closely for changes in your health, and be sure to contact your doctor if: 
· You develop new symptoms. Where can you learn more? Go to http://leslie-graciela.info/. Enter M646 in the search box to learn more about \"Heart Failure: Care Instructions. \" Current as of: April 3, 2017 Content Version: 11.3 © 2006-2017 [employer]. Care instructions adapted under license by [employer] (which disclaims liability or warranty for this information). If you have questions about a medical condition or this instruction, always ask your healthcare professional. [address] any warranty or liability for your use of this information. Learning About Heart Failure Zones What are heart failure zones? Heart failure zones give you an easy way to see changes in your heart failure symptoms. They also tell you when you need to get help. Check every day to see which zone you are in. Green zone. You are doing well. This is where you want to be. · Your weight is stable. This means it is not going up or down. · You breathe easily. · You are sleeping well. You are able to lie flat without shortness of breath. · You can do your usual activities. Yellow zone. Be careful. Your symptoms are changing. Call your doctor. · You have new or increased shortness of breath. · You are dizzy or lightheaded, or you feel like you may faint. · You have sudden weight gain, such as more than 2 to 3 pounds in a day or 5 pounds in a week. (Your doctor may suggest a different range of weight gain.) · You have increased swelling in your legs, ankles, or feet. · You are so tired or weak that you cannot do your usual activities. · You are not sleeping well. Shortness of breath wakes you up at night. You need extra pillows. Your doctor's name: ____________________________________________________________ Your doctor's contact information: _________________________________________________ Red zone. This is an emergency. Call 911. You have symptoms of sudden heart failure, such as: 
· You have severe trouble breathing. · You cough up pink, foamy mucus. · You have a new irregular or fast heartbeat. You have symptoms of a heart attack. These may include: · Chest pain or pressure, or a strange feeling in the chest. 
· Sweating. · Shortness of breath. · Nausea or vomiting. · Pain, pressure, or a strange feeling in the back, neck, jaw, or upper belly or in one or both shoulders or arms. · Lightheadedness or sudden weakness. · A fast or irregular heartbeat. If you have symptoms of a heart attack: After you call 911, the  may tell you to chew 1 adult-strength or 2 to 4 low-dose aspirin. Wait for an ambulance. Do not try to drive yourself. Follow-up care is a key part of your treatment and safety. Be sure to make and go to all appointments, and call your doctor if you are having problems. It's also a good idea to know your test results and keep a list of the medicines you take. Where can you learn more? Go to http://leslie-graciela.info/. Enter T174 in the search box to learn more about \"Learning About Heart Failure Zones. \" Current as of: February 23, 2017 Content Version: 11.3 © 1645-1273 [employer]. Care instructions adapted under license by [employer] (which disclaims liability or warranty for this information). If you have questions about a medical condition or this instruction, always ask your healthcare professional. [place] [address] any warranty or liability for your use of this information. Avoiding Triggers With Heart Failure: Care Instructions Your Care Instructions Triggers are anything that make your heart failure flare up. A flare-up is also called \"sudden heart failure\" or \"acute heart failure. \" When you have a flare-up, fluid builds up in your lungs, and you have problems breathing. You might need to go to the hospital. By watching for changes in your condition and avoiding triggers, you can prevent heart failure flare-ups. Follow-up care is a key part of your treatment and safety. Be sure to make and go to all appointments, and call your doctor if you are having problems. It's also a good idea to know your test results and keep a list of the medicines you take. How can you care for yourself at home? Watch for changes in your weight and condition · Weigh yourself without clothing at the same time each day. Record your weight. Call your doctor if you have sudden weight gain, such as more than 2 to 3 pounds in a day or 5 pounds in a week. (Your doctor may suggest a different range of weight gain.) A sudden weight gain may mean that your heart failure is getting worse. · Keep a daily record of your symptoms. Write down any changes in how you feel, such as new shortness of breath, cough, or problems eating. Also record if your ankles are more swollen than usual and if you feel more tired than usual. Note anything that you ate or did that could have triggered these changes. Limit sodium Sodium causes your body to hold on to extra water. This may cause your heart failure symptoms to get worse. People get most of their sodium from processed foods. Fast food and restaurant meals also tend to be very high in sodium. · Your doctor may suggest that you limit sodium to 2,000 milligrams (mg) a day or less. That is less than 1 teaspoon of salt a day, including all the salt you eat in cooking or in packaged foods. · Read food labels on cans and food packages. They tell you how much sodium you get in one serving. Check the serving size. If you eat more than one serving, you are getting more sodium. · Be aware that sodium can come in forms other than salt, including monosodium glutamate (MSG), sodium citrate, and sodium bicarbonate (baking soda). MSG is often added to Asian food. You can sometimes ask for food without MSG or salt. · Slowly reducing salt will help you adjust to the taste. Take the salt shaker off the table. · Flavor your food with garlic, lemon juice, onion, vinegar, herbs, and spices instead of salt. Do not use soy sauce, steak sauce, onion salt, garlic salt, mustard, or ketchup on your food, unless it is labeled \"low-sodium\" or \"low-salt. \" 
· Make your own salad dressings, sauces, and ketchup without adding salt. · Use fresh or frozen ingredients, instead of canned ones, whenever you can. Choose low-sodium canned goods. · Eat less processed food and food from restaurants, including fast food. Exercise as directed Moderate, regular exercise is very good for your heart. It improves your blood flow and helps control your weight. But too much exercise can stress your heart and cause a heart failure flare-up. · Check with your doctor before you start an exercise program. 
· Walking is an easy way to get exercise. Start out slowly. Gradually increase the length and pace of your walk. Swimming, riding a bike, and using a treadmill are also good forms of exercise. · When you exercise, watch for signs that your heart is working too hard. You are pushing yourself too hard if you cannot talk while you are exercising. If you become short of breath or dizzy or have chest pain, stop, sit down, and rest. 
· Do not exercise when you do not feel well. Take medicines correctly · Take your medicines exactly as prescribed. Call your doctor if you think you are having a problem with your medicine. · Make a list of all the medicines you take. Include those prescribed to you by other doctors and any over-the-counter medicines, vitamins, or supplements you take. Take this list with you when you go to any doctor. · Take your medicines at the same time every day. It may help you to post a list of all the medicines you take every day and what time of day you take them. · Make taking your medicine as simple as you can.  Plan times to take your medicines when you are doing other things, such as eating a meal or getting ready for bed. This will make it easier to remember to take your medicines. · Get organized. Use helpful tools, such as daily or weekly pill containers. When should you call for help? Call 911 if you have symptoms of sudden heart failure such as: 
· You have severe trouble breathing. · You cough up pink, foamy mucus. · You have a new irregular or rapid heartbeat. Call your doctor now or seek immediate medical care if: 
· You have new or increased shortness of breath. · You are dizzy or lightheaded, or you feel like you may faint. · You have sudden weight gain, such as more than 2 to 3 pounds in a day or 5 pounds in a week. (Your doctor may suggest a different range of weight gain.) · You have increased swelling in your legs, ankles, or feet. · You are suddenly so tired or weak that you cannot do your usual activities. Watch closely for changes in your health, and be sure to contact your doctor if you develop new symptoms. Where can you learn more? Go to http://leslie-graciela.info/. Enter J170 in the search box to learn more about \"Avoiding Triggers With Heart Failure: Care Instructions. \" Current as of: February 23, 2017 Content Version: 11.3 © 0305-8801 [employer]. Care instructions adapted under license by [employer] (which disclaims liability or warranty for this information). If you have questions about a medical condition or this instruction, always ask your healthcare professional. [place] [address] any warranty or liability for your use of this information. Limiting Sodium and Fluids With Heart Failure: Care Instructions Your Care Instructions Sodium causes your body to hold on to extra water. This may cause your heart failure symptoms to get worse. Limiting sodium may help you feel better and lower your risk of having to go to the hospital. 
People get most of their sodium from processed foods.  Fast food and restaurant meals also tend to be very high in sodium. Your doctor may suggest that you limit sodium to 2,000 milligrams (mg) a day or less. That is less than 1 teaspoon of salt a day, including all the salt you eat in cooked or packaged foods. Usually, you have to limit the amount of liquids you drink only if your heart failure is severe. Limiting sodium alone often is enough to help your body get rid of extra fluids. However, your doctor may tell you to limit your fluid intake to a set amount each day. Follow-up care is a key part of your treatment and safety. Be sure to make and go to all appointments, and call your doctor if you are having problems. It's also a good idea to know your test results and keep a list of the medicines you take. How can you care for yourself at home? Read food labels · Read food labels on cans and food packages. The labels tell you how much sodium is in each serving. Make sure that you look at the serving size. If you eat more than the serving size, you have eaten more sodium than is listed for one serving. · Food labels also tell you the Percent Daily Value. If the Percent Daily Value says 50%, it means that you will get at least 50% of all the sodium you need for the entire day in one serving. Choose products with low Percent Daily Values for sodium. · Be aware that sodium can come in forms other than salt, including monosodium glutamate (MSG), sodium citrate, and sodium bicarbonate (baking soda). MSG is often added to Asian food. You can sometimes ask for food without MSG or salt. Buy low-sodium foods · Buy foods that are labeled \"unsalted\" (no salt added), \"sodium-free\" (less than 5 mg of sodium per serving), or \"low-sodium\" (less than 140 mg of sodium per serving). A food labeled \"light sodium\" has less than half of the full-sodium version of that food. Foods labeled \"reduced-sodium\" may still have too much sodium. · Buy fresh vegetables or plain, frozen vegetables. Buy low-sodium versions of canned vegetables, soups, and other canned goods. Prepare low-sodium meals · Use less salt each day when cooking. Reducing salt in this way will help you adjust to the taste. Do not add salt after cooking. Take the salt shaker off the table. · Flavor your food with garlic, lemon juice, onion, vinegar, herbs, and spices instead of salt. Do not use soy sauce, steak sauce, onion salt, garlic salt, mustard, or ketchup on your food. · Make your own salad dressings, sauces, and ketchup without adding salt. · Use less salt (or none) when recipes call for it. You can often use half the salt a recipe calls for without losing flavor. Other dishes like rice, pasta, and grains do not need added salt. · Rinse canned vegetables. This removes somebut not allof the salt. · Avoid water that has a naturally high sodium content or that has been treated with water softeners, which add sodium. Call your local water company to find out the sodium content of your water supply. If you buy bottled water, read the label and choose a sodium-free brand. Avoid high-sodium foods, such as: 
· Smoked, cured, salted, and canned meat, fish, and poultry. · Ham, day, hot dogs, and luncheon meats. · Regular, hard, and processed cheese and regular peanut butter. · Crackers with salted tops. · Frozen prepared meals. · Canned and dried soups, broths, and bouillon, unless labeled sodium-free or low-sodium. · Canned vegetables, unless labeled sodium-free or low-sodium. · Salted snack foods such as chips and pretzels. · Western Clarissa fries, pizza, tacos, and other fast foods. · Pickles, olives, ketchup, and other condiments, especially soy sauce, unless labeled sodium-free or low-sodium. If you cannot cook for yourself · Have family members or friends help you, or have someone cook low-sodium meals. · Check with your local senior nutrition program to find out where meals are served and whether they offer a low-sodium option. You can often find these programs through your local health department or hospital. 
· Have meals delivered to your home. Most [facility] have a Meals on GALL[employer] Denise. These programs provide one hot meal a day for older adults, delivered to their homes. Ask whether these meals are low-sodium. Let them know that you are on a low-sodium diet. Limiting fluid intake · Find a method that works for you. You might simply write down how much you drink every time you do. Some people keep a container filled with the amount of fluid allowed for that day. If they drink from a source other than the container, then they pour out that amount. · Measure your regular drinking glasses to find out how much fluid each one holds. Once you know this, you will not have to measure every time. · Besides water, milk, juices, and other drinks, some foods have a lot of fluid. Count any foods that will melt (such as ice cream or gelatin dessert) or liquid foods (such as soup) as part of your fluid intake for the day. Where can you learn more? Go to http://leslie-graciela.info/. Enter A166 in the search box to learn more about \"Limiting Sodium and Fluids With Heart Failure: Care Instructions. \" Current as of: November 15, 2016 Content Version: 11.3 © 8144-5358 [employer] Care instructions adapted under license by [employer] (which disclaims liability or warranty for this information). If you have questions about a medical condition or this instruction, always ask your healthcare professional. [place] [address] any warranty or liability for your use of this information.

## 2017-07-13 NOTE — ED NOTES
Pt transported to floor and left in care of receiving nurse who was at bedside. Pt was awake, alert and in NAD.

## 2017-07-13 NOTE — IP AVS SNAPSHOT
[address] [place] [address] [place] Patient: Smitha Fishman MRN: [id number] :1960 Current Discharge Medication List  
  
START taking these medications Dose & Instructions Dispensing Information Comments Morning Noon Evening Bedtime  
 atorvastatin 40 mg tablet Commonly known as:  LIPITOR Your last dose was: Your next dose is:    
   
   
 Dose:  40 mg Take 1 Tab by mouth nightly. Quantity:  30 Tab Refills:  0  
     
   
   
   
  
 docusate sodium 100 mg capsule Commonly known as:  Dennys Jimenez Your last dose was: Your next dose is:    
   
   
 Dose:  100 mg Take 1 Cap by mouth two (2) times a day for 90 days. Quantity:  60 Cap Refills:  0  
     
   
   
   
  
 isosorbide mononitrate ER 60 mg CR tablet Commonly known as:  IMDUR Your last dose was: Your next dose is:    
   
   
 Dose:  60 mg Take 1 Tab by mouth daily. Quantity:  30 Tab Refills:  0  
     
   
   
   
  
 losartan 25 mg tablet Commonly known as:  COZAAR Your last dose was: Your next dose is:    
   
   
 Dose:  25 mg Take 1 Tab by mouth daily. Quantity:  30 Tab Refills:  0  
     
   
   
   
  
 nitroglycerin 0.4 mg SL tablet Commonly known as:  NITROSTAT Your last dose was: Your next dose is:    
   
   
 Dose:  0.4 mg  
1 Tab by SubLINGual route as needed for Chest Pain. Quantity:  20 Tab Refills:  0  
     
   
   
   
  
 * OTHER Your last dose was: Your next dose is:    
   
   
 Check CBC, CMP, Mg in 3 days, results to PCP immediately, Diagnosis- CKD4 Quantity:  1 Each Refills:  0  
     
   
   
   
  
 * OTHER Your last dose was: Your next dose is:    
   
   
 Incentive Spirometry- Use as directed Quantity:  1 Each Refills:  0  
     
   
   
   
  
 * OTHER Your last dose was: Your next dose is:    
   
   
 Diet- Cardiac, Renal, Diabetic, No concentrated sweets Quantity:  1 Each Refills:  0  
     
   
   
   
  
 * Notice: This list has 3 medication(s) that are the same as other medications prescribed for you. Read the directions carefully, and ask your doctor or other care provider to review them with you. CONTINUE these medications which have CHANGED Dose & Instructions Dispensing Information Comments Morning Noon Evening Bedtime  
 furosemide 40 mg tablet Commonly known as:  LASIX What changed:   
- medication strength 
- how much to take - when to take this 
- reasons to take this Your last dose was: Your next dose is:    
   
   
 Dose:  40 mg Take 1 Tab by mouth daily. Quantity:  30 Tab Refills:  0 CONTINUE these medications which have NOT CHANGED Dose & Instructions Dispensing Information Comments Morning Noon Evening Bedtime  
 albuterol 90 mcg/actuation inhaler Commonly known as:  VENTOLIN HFA Your last dose was: Your next dose is:    
   
   
 Dose:  2 Puff Take 2 Puffs by inhalation every four (4) hours as needed for Wheezing for up to 90 days. Quantity:  1 Inhaler Refills:  1  
     
   
   
   
  
 aspirin 81 mg chewable tablet Your last dose was: Your next dose is:    
   
   
 Dose:  81 mg Take 1 Tab by mouth daily. Quantity:  30 Tab Refills:  0  
     
   
   
   
  
 B COMPLEX & C NO.20-FOLIC ACID PO Your last dose was: Your next dose is: Take  by mouth. Refills:  0 Blood-Glucose Meter monitoring kit Your last dose was: Your next dose is:    
   
   
 Check twice daily. Quantity:  1 kit Refills:  0  
     
   
   
   
  
 budesonide-formoterol 160-4.5 mcg/actuation HFA inhaler Commonly known as:  SYMBICORT Your last dose was: Your next dose is: Dose:  2 Puff Take 2 Puffs by inhalation two (2) times a day. Quantity:  1 Inhaler Refills:  0  
     
   
   
   
  
 calcitRIOL 0.25 mcg capsule Commonly known as:  ROCALTROL Your last dose was: Your next dose is:    
   
   
 Dose:  0.25 mcg Take 1 Cap by mouth every Monday, Wednesday, Friday. Quantity:  15 Cap Refills:  0  
     
   
   
   
  
 carvedilol 25 mg tablet Commonly known as:  Maybelle Pallas Your last dose was: Your next dose is:    
   
   
 Dose:  25 mg Take 1 Tab by mouth two (2) times daily (with meals). Quantity:  60 Tab Refills:  0  
     
   
   
   
  
 febuxostat 40 mg Tab tablet Commonly known as:  IMedExchange Your last dose was: Your next dose is:    
   
   
 Dose:  40 mg Take 1 Tab by mouth daily. Quantity:  30 Tab Refills:  1  
     
   
   
   
  
 glucose blood VI test strips strip Commonly known as:  FREESTYLE TEST Your last dose was: Your next dose is:    
   
   
 by Does Not Apply route See Admin Instructions. Check twice a day Quantity:  100 Strip Refills:  2  
     
   
   
   
  
 hydrALAZINE 50 mg tablet Commonly known as:  APRESOLINE Your last dose was: Your next dose is:    
   
   
 Dose:  50 mg Take 1 Tab by mouth three (3) times daily. Quantity:  90 Tab Refills:  0 Lancets Misc Your last dose was: Your next dose is:    
   
   
 Check 3 times a day , needs according contour glucometer Quantity:  1 Package Refills:  11  
     
   
   
   
  
 pantoprazole 20 mg tablet Commonly known as:  PROTONIX Your last dose was: Your next dose is:    
   
   
 Dose:  20 mg Take 1 Tab by mouth daily. Quantity:  30 Tab Refills:  1 STOP taking these medications   
 acetaminophen 650 mg CR tablet Commonly known as:  TYLENOL  
   
  
 cloNIDine HCl 0.1 mg tablet Commonly known as:  CATAPRES  
   
  
 FLONASE 50 mcg/actuation nasal spray Generic drug:  fluticasone  
   
  
 indomethacin 50 mg capsule Commonly known as:  INDOCIN Insulin Needles (Disposable) 29 gauge x 5/16\" Ndle  
   
  
 insulin NPH/insulin regular 100 unit/mL (70-30) injection Commonly known as:  NovoLIN 70/30  
   
  
 lisinopril 20 mg tablet Commonly known as:  PRINIVIL, ZESTRIL  
   
  
 POTASSIUM CHLORIDE SR 10 MEQ TAB Where to Get Your Medications Information on where to get these meds will be given to you by the nurse or doctor. ! Ask your nurse or doctor about these medications  
  atorvastatin 40 mg tablet  
 carvedilol 25 mg tablet  
 docusate sodium 100 mg capsule  
 furosemide 40 mg tablet  
 hydrALAZINE 50 mg tablet  
 isosorbide mononitrate ER 60 mg CR tablet  
 losartan 25 mg tablet  
 nitroglycerin 0.4 mg SL tablet  OTHER  
 OTHER  
 OTHER

## 2017-07-13 NOTE — ED NOTES
Pt resting comfortably in bed, hooked up to the monitor. Right side rail is raised and call button is within reach. No questions at this time. Pt requested a blanket.

## 2017-07-13 NOTE — PROGRESS NOTES
Acute on CRF , stage 3. Hypertensive Emergency. Volume overload. Diabetic Nephropathy. Secondary hyperparathyroism. Hypoglycemia. .   Plan : careful D10W. IV Lasix. .   Follows renal function, If renal function  does not worse then needs to start  ACEI.

## 2017-07-13 NOTE — ED NOTES
TRANSFER - OUT REPORT:    Verbal report given to JEN Roe(name) on Robert Haque  being transferred to [address] [place] [address](unit) for routine progression of care       Report consisted of patients Situation, Background, Assessment and   Recommendations(SBAR). Information from the following report(s) SBAR, ED Summary, [facility] and Recent Results was reviewed with the receiving nurse. Lines:   Peripheral IV 07/13/17 Right Antecubital (Active)        Opportunity for questions and clarification was provided.       Patient transported with:   Registered Nurse

## 2017-07-13 NOTE — PROGRESS NOTES
conducted an initial consultation and Spiritual Assessment for Patito Adler, who is a 64 y.o.,male. Patients Primary Language is: [place]. According to the patients EMR Uatsdin Affiliation is: Scientologist.     The reason the Patient came to the hospital is:   Patient Active Problem List    Diagnosis Date Noted    Chronic kidney disease, stage III (moderate) 02/09/2017    Proteinuria 02/09/2017    Secondary hyperparathyroidism of renal origin ([facility] 02/09/2017    Hypoglycemia 02/09/2017    Left sided numbness 02/07/2017    Acute chest pain 08/13/2016    Advance directive discussed with patient 03/11/2016    Essential hypertension 12/11/2015    Acute unilateral obstructive uropathy 08/17/2015    ARF (acute renal failure) ([facility] 08/17/2015    Type 2 diabetes mellitus without complication (HCC) 12/77/5767    Hypertriglyceridemia 12/12/2014    Microalbuminuria 12/10/2014    Renal stones 11/24/2014    Laceration of gum 02/23/2014    Fractured tooth 02/23/2014    Hemoptysis 02/21/2014    Rash 02/21/2014    Testicular/scrotal pain 07/14/2013    UTI (urinary tract infection) 07/14/2013    Contusion of knee 07/14/2013    Knee strain 07/14/2013    Effusion of patella 07/14/2013    Noncompliance     Constipation 05/25/2012    Tobacco abuse     TIA (transient ischemic attack)     Hepatic steatosis     Dyslipidemia     Gout     CAD (coronary artery disease)     Hypertensive heart disease     Obesity (BMI 30-39. 9)     Right knee pain     Allergic rhinitis 04/28/2010    Neuropathy ([facility] 04/28/2010        The  provided the following Interventions:  Initiated a relationship of care and support. Explored issues of eduardo, belief, spirituality and Worship/ritual needs while hospitalized. Provided information about Spiritual Care Services. Offered prayer and assurance on patient's behalf.     The following outcomes where achieved:  Patient shared limited information about their spiritual journey/beliefs.  confirmed Patient's Druze Affiliation. Patient expressed gratitude for 's visit. Assessment:  Patient does not have any Hoahaoism/cultural needs that will affect patients preferences in health care. There are no spiritual or Hoahaoism issues which require intervention at this time. Plan:  Chaplains will continue to follow and will provide pastoral care on an as needed/requested basis.  recommends bedside caregivers page  on duty if patient shows signs of acute spiritual or emotional distress. Willeen Skiff.  [facility] [phone]

## 2017-07-13 NOTE — ED PROVIDER NOTES
HPI Comments: 8:53 AM Shakira Chung is a 64 y.o. male with a history of HTN, DM, CAD, TIA, CAD, and Alcohol Abuse who presents to ED for the evaluation of SOB which began 1 week ago. Pt also complains of chest pain, described as cramps, worse with exertion. He also mentions fever, leg swelling, and decreased hearing in his right ear. Pt denies abdominal pain, nausea, vomiting, and diarrhea. He explains that he takes his blood pressure medications as prescribed, but ran out and hasn't had them today. He denies a history of PE or DVT. Pt admits to smoking daily and EtOH. No other complaints, associated symptoms or modifying factors at this time. PCP: Roxana Flower MD    The history is provided by the patient. Past Medical History:   Diagnosis Date    Alcohol abuse     Arthritis     Atherosclerotic cardiovascular disease     CAD (coronary artery disease)     mild disease of coronaries (cor angio 2006),wife states he has 1 stent    Cardiac cath 01/26/2006    RCA mild. Otherwise patent coronaries. LVEDP 15.  EF 55-60%.  Cardiac echocardiogram 03/27/2009    EF 60%. No cardiac source of embolism. No significant valvular pathology.  Cardiac nuclear imaging test 12/06/2011    Small, mild inferior infarction or possibly artifact. No ischemia. EF 45%. No TID. No reg WMA.  Cardiovascular LLE venous duplex 08/14/2015    Left leg:  No DVT. Dilated lymph node in left groin.  Constipation     Diabetes mellitus type II     Gout     Hepatic steatosis     Hypercholesterolemia     Hypertension     Knee effusion, left     Left knee pain     Morbid obesity (HCC)     Noncompliance     Obesity (BMI 30-39. 9)     S/P colonoscopy 3/8/05    Dr. Huma Cesar; normal; f/u 10 years    Stomach problems     TIA (transient ischemic attack) 3/09    Tobacco abuse        Past Surgical History:   Procedure Laterality Date    ABDOMEN SURGERY PROC UNLISTED      Hernia repair in 1960's or 1970's.     HX ORTHOPAEDIC      Bones spur removed from left & right foot 2013.  HX UROLOGICAL  8/18/2015    [facility], Dr. Huy Goodrich, Cystoscopy, left retrograde, left double-J cath         Family History:   Problem Relation Age of Onset    Diabetes Father     Heart Disease Mother     Diabetes Sister     Hypertension Sister     Arthritis-osteo Sister     Arthritis-osteo Brother     Diabetes Other     Diabetes Other      Uncle, Aunt    Hypertension Other      Uncle; Aunt       Social History     Social History    Marital status:      Spouse name: N/A    Number of children: N/A    Years of education: N/A     Occupational History    disabled      Social History Main Topics    Smoking status: Current Every Day Smoker     Packs/day: 0.25     Types: Cigarettes     Last attempt to quit: 6/28/2016    Smokeless tobacco: Never Used    Alcohol use 0.0 oz/week     0 Standard drinks or equivalent per week      Comment: 1 beers a day.  Drug use: No    Sexual activity: Yes     Other Topics Concern    Not on file     Social History Narrative         ALLERGIES: Shellfish derived    Review of Systems    Vitals:    07/13/17 0846 07/13/17 0922   BP: (!) 222/121    Pulse: 91    Resp: 15    Temp: 98.5 °F (36.9 °C)    SpO2: 96% 98%   Weight: 99.8 kg (220 lb)             Physical Exam   Constitutional:   General:  Well-developed, well-nourished, not warm to touch nondiaphoretic. Head:  Normocephalic atraumatic. Eyes:  Pupils midrange extraocular movements intact. No pallor or conjunctival injection. Nose:  No rhinorrhea, inspection grossly normal.    Ears:  Grossly normal to inspection, no discharge. Mouth:  Mucous membranes moist, no appreciable intraoral lesion. Neck/Back:  Trachea midline, no asymmetry. Chest:  Grossly normal inspection, symmetric chest rise. Pulmonary: Scant scattered wheeze bilaterally. Cardiovascular:  S1-S2 no murmurs rubs or gallops.     Abdomen: Soft, nontender, nondistended no guarding rebound or peritoneal signs. Extremities:  Grossly normal to inspection, peripheral pulses intact  2+ pitting edema symmetric no overlying skin changes, easily palpable dorsalis pedis pulses  Neurologic:  Alert and oriented no appreciable focal neurologic deficit. Skin:  Warm and dry  Psychiatric:  Grossly normal mood and affect. Nursing note reviewed, vital signs reviewed. MDM  Number of Diagnoses or Management Options  Acute chest pain:   Acute congestive heart failure, unspecified congestive heart failure type [facility]):   Acute on chronic renal failure [facility]):   Elevated blood pressure reading:   Diagnosis management comments: ED course:  Patient presents with shortness of breath chest cramping no current cramping, primary concern for ACS decompensated heart failure, asthma/COPD exacerbation. He is markedly hypertensive, did not take his blood pressure medication this morning, we'll continue to monitor    EKG done at duration 3 hours: Normal sinus rhythm heart rate 85 intervals within normal limits, there are T wave inversions in the high lateral leads as well as 6, ST elevations V2 V3 V4 V5 seem to be more high J point rather than ischemia, will repeat in 15 minutes given his lack of chest pain or chest cramping. Morphology is largely unchanged compared to prior EKG done on 3/13/2017    Lab: Creatinine elevated, currently 2.62, previously 1.89 this was proximally 4 months ago, BNP elevated at 4460 troponin negative. Chest x-ray per radiology cardiomegaly    Given his worsening creatinine, chronic kidney disease, and uncontrolled blood pressure, feel patient would benefit from inpatient management he is willing to be admitted     Consult:  Discussed care with Dr. Virgil Mackenzie. Standard discussion; including history of patient's chief complaint, available diagnostic results, and treatment course.     Last cardiac catheter done 1/2006 mild luminal changes about 3 coronary arteries, RIGHT coronary dominant    Last echo with preserved ejection fraction, grade 2 diastolic dysfunction      Patient's presentation, history, physical exam and laboratory evaluations were reviewed. I felt the patient would benefit from inpatient management and treatment. 1000 Consult:  Discussed care with Dr. Carlo Mike. Standard discussion; including history of patient's chief complaint, available diagnostic results, and treatment course. Patient was accepted to their service. 1006 Consult:  Discussed care with AUBREY Jacobsen. Standard discussion; including history of patient's chief complaint, available diagnostic results, and treatment course. Disposition:    Admitted to medicine service      Portions of this chart were created with Dragon medical speech to text program.   Unrecognized errors may be present. ED Course       Procedures      Scribe Attestation:   Susanna Villegas acting as a scribe for and in the presence of Cecilia Ricardo MD July 13, 2017 at 8:57 AM     Signed by: Shyam Vance, July 13, 2017 at 8:57 AM     Provider Attestation:   I personally performed the services described in the documentation, reviewed the documentation, as recorded by the scribe in my presence, and it accurately and completely records my words and actions.      Reviewed and signed by:  Cecilia Ricardo MD

## 2017-07-13 NOTE — PROGRESS NOTES
Completed limited echocardiogram with [facility]. Report to follow. Patient to be transported back to room.     UGO Estrada, RDCS

## 2017-07-14 PROBLEM — F14.10 COCAINE ABUSE (HCC): Status: ACTIVE | Noted: 2017-07-14

## 2017-07-14 LAB
ALBUMIN SERPL BCP-MCNC: 2.2 G/DL (ref 3.4–5)
ALBUMIN/GLOB SERPL: 0.6 {RATIO} (ref 0.8–1.7)
ALP SERPL-CCNC: 60 U/L (ref 45–117)
ALT SERPL-CCNC: 21 U/L (ref 16–61)
ANION GAP BLD CALC-SCNC: 7 MMOL/L (ref 3–18)
AST SERPL W P-5'-P-CCNC: 16 U/L (ref 15–37)
BASOPHILS # BLD AUTO: 0 K/UL (ref 0–0.1)
BASOPHILS # BLD: 0 % (ref 0–2)
BILIRUB SERPL-MCNC: 0.2 MG/DL (ref 0.2–1)
BUN SERPL-MCNC: 25 MG/DL (ref 7–18)
BUN/CREAT SERPL: 8 (ref 12–20)
CALCIUM SERPL-MCNC: 8.2 MG/DL (ref 8.5–10.1)
CALCIUM SERPL-MCNC: 8.5 MG/DL (ref 8.5–10.1)
CHLORIDE SERPL-SCNC: 107 MMOL/L (ref 100–108)
CHOLEST SERPL-MCNC: 294 MG/DL
CO2 SERPL-SCNC: 30 MMOL/L (ref 21–32)
CREAT SERPL-MCNC: 3.02 MG/DL (ref 0.6–1.3)
DIFFERENTIAL METHOD BLD: ABNORMAL
EOSINOPHIL # BLD: 0.2 K/UL (ref 0–0.4)
EOSINOPHIL NFR BLD: 2 % (ref 0–5)
ERYTHROCYTE [DISTWIDTH] IN BLOOD BY AUTOMATED COUNT: 15.7 % (ref 11.6–14.5)
EST. AVERAGE GLUCOSE BLD GHB EST-MCNC: 128 MG/DL
GLOBULIN SER CALC-MCNC: 3.8 G/DL (ref 2–4)
GLUCOSE BLD STRIP.AUTO-MCNC: 102 MG/DL (ref 70–110)
GLUCOSE BLD STRIP.AUTO-MCNC: 135 MG/DL (ref 70–110)
GLUCOSE BLD STRIP.AUTO-MCNC: 142 MG/DL (ref 70–110)
GLUCOSE BLD STRIP.AUTO-MCNC: 150 MG/DL (ref 70–110)
GLUCOSE SERPL-MCNC: 88 MG/DL (ref 74–99)
HBA1C MFR BLD: 6.1 % (ref 4.2–5.6)
HCT VFR BLD AUTO: 35.5 % (ref 36–48)
HDLC SERPL-MCNC: 47 MG/DL (ref 40–60)
HDLC SERPL: 6.3 {RATIO} (ref 0–5)
HGB BLD-MCNC: 11.3 G/DL (ref 13–16)
LDLC SERPL CALC-MCNC: 196.4 MG/DL (ref 0–100)
LIPID PROFILE,FLP: ABNORMAL
LYMPHOCYTES # BLD AUTO: 31 % (ref 21–52)
LYMPHOCYTES # BLD: 2.2 K/UL (ref 0.9–3.6)
MAGNESIUM SERPL-MCNC: 1.8 MG/DL (ref 1.6–2.6)
MCH RBC QN AUTO: 28.1 PG (ref 24–34)
MCHC RBC AUTO-ENTMCNC: 31.8 G/DL (ref 31–37)
MCV RBC AUTO: 88.3 FL (ref 74–97)
MONOCYTES # BLD: 0.6 K/UL (ref 0.05–1.2)
MONOCYTES NFR BLD AUTO: 8 % (ref 3–10)
NEUTS SEG # BLD: 4.2 K/UL (ref 1.8–8)
NEUTS SEG NFR BLD AUTO: 59 % (ref 40–73)
PHOSPHATE SERPL-MCNC: 3.9 MG/DL (ref 2.5–4.9)
PLATELET # BLD AUTO: 343 K/UL (ref 135–420)
PMV BLD AUTO: 11.3 FL (ref 9.2–11.8)
POTASSIUM SERPL-SCNC: 3.6 MMOL/L (ref 3.5–5.5)
PROT SERPL-MCNC: 6 G/DL (ref 6.4–8.2)
PROT UR-MCNC: 1061 MG/DL
PTH-INTACT SERPL-MCNC: 229.6 PG/ML (ref 14–72)
RBC # BLD AUTO: 4.02 M/UL (ref 4.7–5.5)
SODIUM SERPL-SCNC: 144 MMOL/L (ref 136–145)
TRIGL SERPL-MCNC: 253 MG/DL (ref ?–150)
VLDLC SERPL CALC-MCNC: 50.6 MG/DL
WBC # BLD AUTO: 7.1 K/UL (ref 4.6–13.2)

## 2017-07-14 PROCEDURE — 84100 ASSAY OF PHOSPHORUS: CPT | Performed by: INTERNAL MEDICINE

## 2017-07-14 PROCEDURE — 65660000000 HC RM CCU STEPDOWN

## 2017-07-14 PROCEDURE — 83735 ASSAY OF MAGNESIUM: CPT | Performed by: INTERNAL MEDICINE

## 2017-07-14 PROCEDURE — 80061 LIPID PANEL: CPT | Performed by: INTERNAL MEDICINE

## 2017-07-14 PROCEDURE — 85025 COMPLETE CBC W/AUTO DIFF WBC: CPT | Performed by: INTERNAL MEDICINE

## 2017-07-14 PROCEDURE — 74011250637 HC RX REV CODE- 250/637: Performed by: HOSPITALIST

## 2017-07-14 PROCEDURE — 94640 AIRWAY INHALATION TREATMENT: CPT

## 2017-07-14 PROCEDURE — 74011250637 HC RX REV CODE- 250/637: Performed by: PHYSICIAN ASSISTANT

## 2017-07-14 PROCEDURE — 36415 COLL VENOUS BLD VENIPUNCTURE: CPT | Performed by: INTERNAL MEDICINE

## 2017-07-14 PROCEDURE — 74011250636 HC RX REV CODE- 250/636: Performed by: INTERNAL MEDICINE

## 2017-07-14 PROCEDURE — 83036 HEMOGLOBIN GLYCOSYLATED A1C: CPT | Performed by: HOSPITALIST

## 2017-07-14 PROCEDURE — 80053 COMPREHEN METABOLIC PANEL: CPT | Performed by: INTERNAL MEDICINE

## 2017-07-14 PROCEDURE — 74011250637 HC RX REV CODE- 250/637: Performed by: INTERNAL MEDICINE

## 2017-07-14 PROCEDURE — 83970 ASSAY OF PARATHORMONE: CPT | Performed by: INTERNAL MEDICINE

## 2017-07-14 PROCEDURE — 82962 GLUCOSE BLOOD TEST: CPT

## 2017-07-14 PROCEDURE — 74011636637 HC RX REV CODE- 636/637: Performed by: HOSPITALIST

## 2017-07-14 PROCEDURE — 74011250636 HC RX REV CODE- 250/636: Performed by: HOSPITALIST

## 2017-07-14 RX ORDER — CLONIDINE HYDROCHLORIDE 0.1 MG/1
0.1 TABLET ORAL 2 TIMES DAILY
Status: DISCONTINUED | OUTPATIENT
Start: 2017-07-14 | End: 2017-07-14

## 2017-07-14 RX ORDER — ATORVASTATIN CALCIUM 40 MG/1
40 TABLET, FILM COATED ORAL
Status: DISCONTINUED | OUTPATIENT
Start: 2017-07-14 | End: 2017-07-17 | Stop reason: HOSPADM

## 2017-07-14 RX ORDER — FUROSEMIDE 40 MG/1
80 TABLET ORAL DAILY
Status: DISCONTINUED | OUTPATIENT
Start: 2017-07-15 | End: 2017-07-16

## 2017-07-14 RX ORDER — HYDRALAZINE HYDROCHLORIDE 50 MG/1
50 TABLET, FILM COATED ORAL 3 TIMES DAILY
Status: DISCONTINUED | OUTPATIENT
Start: 2017-07-14 | End: 2017-07-17 | Stop reason: HOSPADM

## 2017-07-14 RX ORDER — ISOSORBIDE MONONITRATE 60 MG/1
60 TABLET, EXTENDED RELEASE ORAL DAILY
Status: DISCONTINUED | OUTPATIENT
Start: 2017-07-14 | End: 2017-07-17 | Stop reason: HOSPADM

## 2017-07-14 RX ORDER — CARVEDILOL 12.5 MG/1
12.5 TABLET ORAL 2 TIMES DAILY WITH MEALS
Status: DISCONTINUED | OUTPATIENT
Start: 2017-07-14 | End: 2017-07-16

## 2017-07-14 RX ADMIN — CALCITRIOL 0.25 MCG: 0.25 CAPSULE, LIQUID FILLED ORAL at 16:55

## 2017-07-14 RX ADMIN — HEPARIN SODIUM 5000 UNITS: 5000 INJECTION, SOLUTION INTRAVENOUS; SUBCUTANEOUS at 21:25

## 2017-07-14 RX ADMIN — HYDRALAZINE HYDROCHLORIDE 50 MG: 50 TABLET, FILM COATED ORAL at 21:25

## 2017-07-14 RX ADMIN — HYDRALAZINE HYDROCHLORIDE 50 MG: 50 TABLET, FILM COATED ORAL at 16:55

## 2017-07-14 RX ADMIN — CLONIDINE HYDROCHLORIDE 0.1 MG: 0.1 TABLET ORAL at 12:16

## 2017-07-14 RX ADMIN — Medication 100 MG: at 08:54

## 2017-07-14 RX ADMIN — FEBUXOSTAT 40 MG: 40 TABLET ORAL at 08:54

## 2017-07-14 RX ADMIN — ATORVASTATIN CALCIUM 40 MG: 40 TABLET, FILM COATED ORAL at 21:25

## 2017-07-14 RX ADMIN — DOCUSATE SODIUM 100 MG: 100 CAPSULE, LIQUID FILLED ORAL at 08:54

## 2017-07-14 RX ADMIN — NEPHROCAP 1 CAPSULE: 1 CAP ORAL at 08:54

## 2017-07-14 RX ADMIN — HEPARIN SODIUM 5000 UNITS: 5000 INJECTION, SOLUTION INTRAVENOUS; SUBCUTANEOUS at 05:43

## 2017-07-14 RX ADMIN — ASPIRIN 81 MG 81 MG: 81 TABLET ORAL at 08:54

## 2017-07-14 RX ADMIN — ACETAMINOPHEN 500 MG: 500 TABLET ORAL at 08:54

## 2017-07-14 RX ADMIN — ACETAMINOPHEN 500 MG: 500 TABLET ORAL at 05:43

## 2017-07-14 RX ADMIN — BUDESONIDE AND FORMOTEROL FUMARATE DIHYDRATE 2 PUFF: 160; 4.5 AEROSOL RESPIRATORY (INHALATION) at 19:57

## 2017-07-14 RX ADMIN — DOCUSATE SODIUM 100 MG: 100 CAPSULE, LIQUID FILLED ORAL at 16:58

## 2017-07-14 RX ADMIN — INSULIN LISPRO 2 UNITS: 100 INJECTION, SOLUTION INTRAVENOUS; SUBCUTANEOUS at 21:26

## 2017-07-14 RX ADMIN — CARVEDILOL 12.5 MG: 12.5 TABLET, FILM COATED ORAL at 16:55

## 2017-07-14 RX ADMIN — ISOSORBIDE MONONITRATE 60 MG: 60 TABLET, EXTENDED RELEASE ORAL at 13:15

## 2017-07-14 RX ADMIN — FUROSEMIDE 40 MG: 10 INJECTION, SOLUTION INTRAMUSCULAR; INTRAVENOUS at 08:54

## 2017-07-14 RX ADMIN — HEPARIN SODIUM 5000 UNITS: 5000 INJECTION, SOLUTION INTRAVENOUS; SUBCUTANEOUS at 13:15

## 2017-07-14 RX ADMIN — HYDRALAZINE HYDROCHLORIDE 50 MG: 50 TABLET, FILM COATED ORAL at 12:16

## 2017-07-14 NOTE — PROGRESS NOTES
07/14/17 1251   Vital Signs   BP (!) 157/93   BP rechecked with significant decrease. Will continue to monitor.

## 2017-07-14 NOTE — PROGRESS NOTES
[facility]  -  Progress Note      Patient: Luzma Ratliff MRN: [id number]  SSN: [id number]    YOB: 1960  Age: 64 y.o. Sex: male      Admit Date: 7/13/2017    Hospital Problem List:     Hospital Problems  Date Reviewed: 7/14/2017          Codes Class Noted POA    Cocaine abuse ICD-10-CM: F14.10  ICD-9-CM: 305.60  7/14/2017 Unknown        Chronic kidney disease, stage III (moderate) ICD-10-CM: N18.3  ICD-9-CM: 585.3  2/9/2017 Yes        Secondary hyperparathyroidism of renal origin [facility]) ICD-10-CM: N25.81  ICD-9-CM: 588.81  2/9/2017 Yes        Acute chest pain ICD-10-CM: R07.9  ICD-9-CM: 786.50  8/13/2016 Unknown        CAD (coronary artery disease) ICD-10-CM: I25.10  ICD-9-CM: 414.00  Unknown Yes    Overview Signed 2/23/2012 12:19 AM by Blaise Flores V     mild disease of coronaries (cor angio 2006)             Hypertensive heart disease ICD-10-CM: I11.9  ICD-9-CM: 402.90  Unknown Yes            -Volume overload. Although there may be a component of diastolic heart failure concerned about worsening renal function with possible nephrotic syndrome. EF 65-70% with grade 2 DD 2/2017. EF now 50-55% with possible hypokinesis of inferolateral wall. -Hypertension out of control. Reports taking his medications until running out yesterday, reports trying to decrease salt intake. Started on nitro gtt yesterday. BP improving.   -Chronic kidney disease with worsening this admission.  -Diabetes mellitus. Reports frequent hypoglycemic episodes. Hgb A1c 6.1.  -Dyslipidemia. -H/o TIA. -Reactive airway disease on inhalers.  -Tobacco abuse, advised cessation.  -Cocaine use, positive drug screen. -H/o ETOH abuse. -H/o noncompliance, advised compliance. Assessment & Plan:     -Resuming home BP meds: catapres, hydralazine. Holding ACEi d/t renal dysfunction.   -Wean nitro gtt as hemodynamics continue to improve.  OK to stop when SBP < 130.   -Continue to avoid BB considering positive for cocaine on tox screen.   -Continue aspirin 81mg daily.   -Lipids elevated. Will start atorva 40mg daily.    -Considering echo with possible wall motion abnormalities and change in EF, as well as risk factors, pt may benefit from nuclear stress test sometime next week. This can be done in the outpatient setting as well.  -Encouraged lifestyle changes including low salt/low fat diet and adequate exercise. Subjective:     Feeling better. No issues with urination. Feels breathing is improving.      Objective:      Patient Vitals for the past 8 hrs:   Temp Pulse Resp BP SpO2   07/14/17 0800 98.1 °F (36.7 °C) 87 29 149/86 95 %   07/14/17 0500 98.6 °F (37 °C) 84 20 154/86 91 %         Patient Vitals for the past 96 hrs:   Weight   07/14/17 0529 111.2 kg (245 lb 1.6 oz)   07/13/17 0846 99.8 kg (220 lb)         Intake/Output Summary (Last 24 hours) at 07/14/17 1203  Last data filed at 07/14/17 0859   Gross per 24 hour   Intake             1119 ml   Output             1950 ml   Net             -831 ml       Physical Exam:  General: awake, alert, oriented x 3, family at bedside  Neck:  Supple, no jvd  Lungs:  Clear to auscultation bilaterally, on room air with normal effort  Heart: reg rate and rhythm, no murmur  Abdomen: obese, soft  Extremities:  No edema, atraumatic, no cyanosis    Data Review:     Labs: Results:       Chemistry Recent Labs      07/14/17 0211 07/13/17   0910   GLU  88  98   NA  144  143   K  3.6  3.8   CL  107  109*   CO2  30  28   BUN  25*  23*   CREA  3.02*  2.62*   CA  8.2*  8.5  8.6   MG  1.8  2.0   PHOS  3.9   --    AGAP  7  6   BUCR  8*  9*   AP  60   --    TP  6.0*   --    ALB  2.2*   --    GLOB  3.8   --    AGRAT  0.6*   --       CBC w/Diff Recent Labs      07/14/17 0211 07/13/17   0910   WBC  7.1  6.9   RBC  4.02*  4.32*   HGB  11.3*  11.9*   HCT  35.5*  38.1   PLT  343  312   GRANS  59  63   LYMPH  31  29   EOS  2  0      Cardiac Enzymes No results found for: CPK, CKMMB, CKMB, RCK3, CKMBT, CKNDX, CKND1, SID, TROPT, TROIQ, REGINALDO, TROPT, TNIPOC, BNP, BNPP   Coagulation Recent Labs      07/13/17   0910   PTP  11.5   INR  0.9   APTT  26.4       Lipid Panel Lab Results   Component Value Date/Time    Cholesterol, total 294 07/14/2017 02:11 AM    HDL Cholesterol 47 07/14/2017 02:11 AM    LDL, calculated 196.4 07/14/2017 02:11 AM    VLDL, calculated 50.6 07/14/2017 02:11 AM    Triglyceride 253 07/14/2017 02:11 AM    CHOL/HDL Ratio 6.3 07/14/2017 02:11 AM      BNP No results found for: BNP, BNPP, XBNPT   Liver Enzymes Recent Labs      07/14/17   0211   TP  6.0*   ALB  2.2*   AP  60   SGOT  16      Digoxin    Thyroid Studies Lab Results   Component Value Date/Time    TSH 0.55 02/09/2017 04:00 AM            Signed By: AUBREY Madison     July 14, 2017

## 2017-07-14 NOTE — ROUTINE PROCESS
Patient blood pressure remained elevated at the beginning of the shift. MD notified and hydralazine prn ordered and given. BP trending down tremendously. Please see flow sheet. Continuous Nitro drip ongoing. Tylenol given for complain of headache. Assisted patient with ADL as needed. No other concerns noted. Will continue with plan of care.

## 2017-07-14 NOTE — PROGRESS NOTES
[facility] Hospitalist Group  Progress Note    Patient: Robert Haque Age: 64 y.o. : 1960 MR#: [id number] SSN: [id number]  Date: 2017     Subjective:     Patient lying in bed in NAD, awake, follows commands, denies CP, sob.  Wife at bedside    Assessment/Plan:     1- Acute diastolic chf exacerbation  2- Hypertensive urgency at admission  3- CYNDI on CKD3  4- DM2  5- dyslipidemia  6- Polysubstance abuse    PLAN  Lasix, I/O, on asa  Monitor BB, on coreg, hydralazine, imdur  Monitor sugars  Counseled compliance  Counseled cessation of illicit drug use  Counseled smoking cessation    Case discussed with:  []Patient  []Family  []Nursing  []Case Management  DVT Prophylaxis:  []Lovenox  []Hep SQ  []SCDs  []Coumadin   []On Heparin gtt    Objective:   VS:   Visit Vitals    BP (!) 157/93    Pulse 81    Temp 97.8 °F (36.6 °C)    Resp 20    Ht 5' 4\" (1.626 m)    Wt 111.2 kg (245 lb 1.6 oz)    SpO2 99%    BMI 42.07 kg/m2      Tmax/24hrs: Temp (24hrs), Av.1 °F (36.7 °C), Min:97.5 °F (36.4 °C), Max:98.7 °F (37.1 °C)    Intake/Output Summary (Last 24 hours) at 17 1305  Last data filed at 17 1251   Gross per 24 hour   Intake             1119 ml   Output             2600 ml   Net            -1481 ml       General:  Awake, alert  Cardiovascular:  S1S2+, RRR  Pulmonary:  Decreased BS b/l Bases  GI:  Soft, BS+, NT, ND  Extremities:  trace edema      Labs:    Recent Results (from the past 24 hour(s))   GLUCOSE, POC    Collection Time: 17  4:40 PM   Result Value Ref Range    Glucose (POC) 128 (H) 70 - 110 mg/dL   GLUCOSE, POC    Collection Time: 17  9:24 PM   Result Value Ref Range    Glucose (POC) 102 70 - 110 mg/dL   LIPID PANEL    Collection Time: 17  2:11 AM   Result Value Ref Range    LIPID PROFILE          Cholesterol, total 294 (H) <200 MG/DL    Triglyceride 253 (H) <150 MG/DL    HDL Cholesterol 47 40 - 60 MG/DL    LDL, calculated 196.4 (H) 0 - 100 MG/DL VLDL, calculated 50.6 MG/DL    CHOL/HDL Ratio 6.3 (H) 0 - 5.0     HEMOGLOBIN A1C WITH EAG    Collection Time: 07/14/17  2:11 AM   Result Value Ref Range    Hemoglobin A1c 6.1 (H) 4.2 - 5.6 %    Est. average glucose 128 mg/dL   CBC WITH AUTOMATED DIFF    Collection Time: 07/14/17  2:11 AM   Result Value Ref Range    WBC 7.1 4.6 - 13.2 K/uL    RBC 4.02 (L) 4.70 - 5.50 M/uL    HGB 11.3 (L) 13.0 - 16.0 g/dL    HCT 35.5 (L) 36.0 - 48.0 %    MCV 88.3 74.0 - 97.0 FL    MCH 28.1 24.0 - 34.0 PG    MCHC 31.8 31.0 - 37.0 g/dL    RDW 15.7 (H) 11.6 - 14.5 %    PLATELET 475 091 - 988 K/uL    MPV 11.3 9.2 - 11.8 FL    NEUTROPHILS 59 40 - 73 %    LYMPHOCYTES 31 21 - 52 %    MONOCYTES 8 3 - 10 %    EOSINOPHILS 2 0 - 5 %    BASOPHILS 0 0 - 2 %    ABS. NEUTROPHILS 4.2 1.8 - 8.0 K/UL    ABS. LYMPHOCYTES 2.2 0.9 - 3.6 K/UL    ABS. MONOCYTES 0.6 0.05 - 1.2 K/UL    ABS. EOSINOPHILS 0.2 0.0 - 0.4 K/UL    ABS. BASOPHILS 0.0 0.0 - 0.1 K/UL    DF AUTOMATED     MAGNESIUM    Collection Time: 07/14/17  2:11 AM   Result Value Ref Range    Magnesium 1.8 1.6 - 2.6 mg/dL   PHOSPHORUS    Collection Time: 07/14/17  2:11 AM   Result Value Ref Range    Phosphorus 3.9 2.5 - 4.9 MG/DL   METABOLIC PANEL, COMPREHENSIVE    Collection Time: 07/14/17  2:11 AM   Result Value Ref Range    Sodium 144 136 - 145 mmol/L    Potassium 3.6 3.5 - 5.5 mmol/L    Chloride 107 100 - 108 mmol/L    CO2 30 21 - 32 mmol/L    Anion gap 7 3.0 - 18 mmol/L    Glucose 88 74 - 99 mg/dL    BUN 25 (H) 7.0 - 18 MG/DL    Creatinine 3.02 (H) 0.6 - 1.3 MG/DL    BUN/Creatinine ratio 8 (L) 12 - 20      GFR est AA 26 (L) >60 ml/min/1.73m2    GFR est non-AA 22 (L) >60 ml/min/1.73m2    Calcium 8.2 (L) 8.5 - 10.1 MG/DL    Bilirubin, total 0.2 0.2 - 1.0 MG/DL    ALT (SGPT) 21 16 - 61 U/L    AST (SGOT) 16 15 - 37 U/L    Alk.  phosphatase 60 45 - 117 U/L    Protein, total 6.0 (L) 6.4 - 8.2 g/dL    Albumin 2.2 (L) 3.4 - 5.0 g/dL    Globulin 3.8 2.0 - 4.0 g/dL    A-G Ratio 0.6 (L) 0.8 - 1.7     PTH INTACT    Collection Time: 07/14/17  2:11 AM   Result Value Ref Range    Calcium 8.5 8.5 - 10.1 MG/DL    PTH, Intact 229.6 (H) 14.0 - 72.0 pg/mL   GLUCOSE, POC    Collection Time: 07/14/17  7:55 AM   Result Value Ref Range    Glucose (POC) 102 70 - 110 mg/dL   GLUCOSE, POC    Collection Time: 07/14/17 11:38 AM   Result Value Ref Range    Glucose (POC) 142 (H) 70 - 110 mg/dL       Signed By: Griffin Balderas MD     July 14, 2017 1:05 PM

## 2017-07-14 NOTE — CONSULTS
[address]    Name:  Bert Castellon  MR#:  [id number]  :  1960  Account #:  [de-identified]  Date of Adm:  2017  Date of Consultation:  2017      RENAL CONSULTATION    REQUESTING PHYSICIAN: Emergency room physician, Dr. Sirena Vogel. REASON FOR CONSULTATION: Worsening renal failure with high  blood pressure and signs of volume overload. HISTORY OF PRESENT ILLNESS: This is a 30-year-old [facility]  American male and noncompliant patient who presented to the  emergency room with shortness of breath and atypical chest pain. This  patient has a long-term history of hypertension, type 2 diabetes  mellitus, diabetic nephropathy, coronary artery disease, TIA, alcohol  abuse, as well as obesity and also had a history of substance abuse. He does not follow, recently seen in the hospital, occasionally comes to  the office. He is a noncompliant patient. When he came this time, he  had the high blood pressure and the shortness of breath and chest  pain, he was also found to be hypoglycemic. He follows with a family  doctor in [place], Dr. Gaudencio Paul. When he is seen by her is not  clear. PAST MEDICAL HISTORY: As I mentioned, alcohol abuse,  arthritis, arteriosclerotic heart disease and known coronary artery  disease with diastolic dysfunction, ejection fraction around 55% to  60%. Hyperlipidemia, osteoarthritis and TIA-like symptoms. PAST SURGICAL HISTORY: Colonoscopy, bone spur surgery on the  left and the right foot and a cystoscope was done by Dr. Maria Victoria Garcia. Left retrograde and left double J catheter placement in the  past.    FAMILY HISTORY: Significant for hypertension, diabetes. Diabetes in  father, heart disease in mother, diabetes in sister, hypertension in sister,  arthritis in sister and other siblings. No definite history of kidney  disease. SOCIAL HISTORY: .  Smoking history: Smokes every day at  least a 1/4 pack per day since a long period of time. Tried to quit in  June of last year. Denies any drinking, but once in a while one beer per  day, which is his extent of drinking, and also has history of substance  abuse including cocaine. ALLERGIES: SHELLFISH. LIST OF MEDICATIONS  1. Tylenol on as needed basis. 2. Ventolin HFA 2 puffs every 6 hours. 3. B-complex daily. 4. Symbicort 2 puffs 2 times daily. 5. Rocaltrol 0.25 mcg Monday, Wednesday, Friday. 6. Coreg 25 mg p.o. daily. 7.   8. Clonidine patch 0.1 mg q  week. 9. Uloric 40 mg p.o. daily. 10. Lasix 20 mg p.o. daily. 11. Hydralazine 25  mg 3 times daily. 12. NPH insulin 70/30, 30 units subcu twice daily. 13. Lisinopril 20 mg p.o. daily. Not sure whether he is taking or not. 14. Protonix 20 mg daily. 15. Potassium chloride 10 mEq p.o. twice daily. PHYSICAL EXAMINATION ON ARRIVAL  VITAL SIGNS: Temperature of 98.5, heart rate 91, blood pressure of  221/121, respirations of 15, oxygen saturation 96% at room air. Weight  is 96.8. HEENT: Oral mucosa is moist.  NECK: Jugular venous distention is not distended. LUNGS: Decreased breath sounds with very faint crackle. HEART: S1, S2 without any S3 or S4. ABDOMEN: Obese, could not appreciate any organomegaly. EXTREMITIES: Ankle 1-2+ edema. RELEVANT LABORATORIES: Labs are being done are as  follows: WBC of 6.9 with a hemoglobin of 11.9, hematocrit of 38.1,  platelets of 487,348. Chemistry: Sodium 143, potassium 3.8, chloride  109, CO2 28, glucose of 98. On arrival, blood sugar was only 40. Blood  urea nitrogen of 23, creatinine of 2.62. In the past when he was in  the hospital, his serum creatinine was 1.95. Calcium of 8.6 today. egfr 31  mL per minute. Troponin 0.04. BNP of 4460. Last hemoglobin A1c in  June was 6.21. Chest x-ray: Cardiomegaly, signs of volume overload. EKG was done today shows normal sinus rhythm, possible left atrial  enlargement, left ventricular hypertrophy.  Urine for drug screen today is  positive for cocaine. IMPRESSION AND PLAN  1. Stage III chronic renal failure. 2. Advanced renal failure with diabetic nephropathy, massive proteinuria  in the urine, more than 100 mg by dipstick. 3. Hypertensive emergency. 4. Volume overload. 5. Hypoglycemia. 6. Substance abuse. 7. Secondary hyperparathyroidism. PLAN: The patient with the low sugar, will have to give carefully D10 to  bring the blood sugar to a safe level. The cause of his low sugar I think  high dose of insulin and uncontrolled worsening renal function. The  patient also will be monitored for his renal function. I am going to give  IV Lasix to bring some volume out and that may improve  his blood pressure  also , continue  blood pressure medications, but not the lisinopril. If the renal  function does not get any worsening, then possibly restart  ARB  or isinopril  because he has massive proteinuria of the urine. Will also check his  PTH and depending on that, will give Rocaltrol. Certainly with the use  of cocaine, Coreg or the beta blockers will be discontinued and if the  renal functions worsen, it will be difficult to control the blood pressure  because we will not be able to give any beta blocker. We have to rely  on the alpha blocker as well as hydralazine and some other blood  pressure medication. He has to improve his compliance, fall  precautions will be implemented and further recommendations will be  given based on the hospital course.         Zena Acuña MD    [address] / [facility]  D:  07/13/2017   17:17  T:  07/13/2017   19:04  Job #:  [id number]

## 2017-07-14 NOTE — PROGRESS NOTES
Progress Note    Oly Bruce  64 y.o. Admit Date: 7/13/2017  Active Problems:    CAD (coronary artery disease) () POA: Yes      Overview: mild disease of coronaries (cor angio 2006)      Hypertensive heart disease () POA: Yes      Acute chest pain (8/13/2016) POA: Unknown      Chronic kidney disease, stage III (moderate) (2/9/2017) POA: Yes      Secondary hyperparathyroidism of renal origin ([address] (2/9/2017) POA: Yes      Cocaine abuse (7/14/2017) POA: Unknown    Hypoglycemia. Subjective:     Patient feels good, still D!0. A comprehensive review of systems was negative except for that written in the History of Present Illness.     Objective:     Visit Vitals    /86 (BP 1 Location: Left arm, BP Patient Position: At rest)    Pulse 87    Temp 98.1 °F (36.7 °C)    Resp 29    Ht 5' 4\" (1.626 m)    Wt 111.2 kg (245 lb 1.6 oz)    SpO2 95%    BMI 42.07 kg/m2         Intake/Output Summary (Last 24 hours) at 07/14/17 1152  Last data filed at 07/14/17 0859   Gross per 24 hour   Intake             1119 ml   Output             1950 ml   Net             -831 ml       Current Facility-Administered Medications   Medication Dose Route Frequency Provider Last Rate Last Dose    nitroglycerin (NITROSTAT) tablet 0.4 mg  0.4 mg SubLINGual PRN Anastasia Osuna MD   0.4 mg at 07/13/17 0937    nitroglycerin (TRIDIL) 400 mcg/ml infusion  5 mcg/min IntraVENous CONTINUOUS Asif Berry MD 0.8 mL/hr at 07/14/17 0737 5 mcg/min at 07/14/17 0737    aspirin chewable tablet 81 mg  81 mg Oral DAILY Asif Berry MD   81 mg at 07/14/17 0854    B complex-vitaminC-folic acid (NEPHROCAP) cap  1 Cap Oral DAILY Asif Berry MD   1 Cap at 07/14/17 0854    budesonide-formoterol (SYMBICORT) 160-4.5 mcg/actuation HFA inhaler 2 Puff  2 Puff Inhalation BID RT Asif Berry MD   Stopped at 07/13/17 2000    calcitRIOL (ROCALTROL) capsule 0.25 mcg  0.25 mcg Oral Q MON, WED & FRI Asif Berry MD        febuxostat (ULORIC) tablet 40 mg  40 mg Oral DAILY Baljit Morton MD   40 mg at 07/14/17 0854    acetaminophen (TYLENOL) tablet 500 mg  500 mg Oral Q6H PRN Baljit Morton MD   500 mg at 07/14/17 0854    ondansetron (ZOFRAN) injection 4 mg  4 mg IntraVENous Q8H PRN Baljit Morton MD        heparin (porcine) injection 5,000 Units  5,000 Units SubCUTAneous Puneet Fountain MD   5,000 Units at 07/14/17 0543    docusate sodium (COLACE) capsule 100 mg  100 mg Oral BID Baljit Morton MD   100 mg at 07/14/17 0854    insulin lispro (HUMALOG) injection   SubCUTAneous AC&HS Baljit Morton MD   Stopped at 07/13/17 1630    glucose chewable tablet 16 g  16 g Oral PRN Baljit Morton MD        glucagon (GLUCAGEN) injection 1 mg  1 mg IntraMUSCular PRN Baljit Morton MD        dextrose (D50W) injection syrg 12.5-25 g  25-50 mL IntraVENous PRN Baljit Morton MD        thiamine (B-1) tablet 100 mg  100 mg Oral DAILY Baljit Morton MD   100 mg at 07/14/17 0854    furosemide (LASIX) injection 40 mg  40 mg IntraVENous DAILY Susan Valdez MD   40 mg at 07/14/17 0854    dextrose 5% infusion  40 mL/hr IntraVENous CONTINUOUS Baljit Morton MD 40 mL/hr at 07/13/17 1841 40 mL/hr at 07/13/17 1841    hydrALAZINE (APRESOLINE) 20 mg/mL injection 20 mg  20 mg IntraVENous Q6H PRN AUBREY Reyna   20 mg at 07/13/17 2247        Physical Exam:     Physical Exam:   General:  Alert, cooperative, no distress, appears stated age. Lungs:   Clear to auscultation bilaterally. Heart:  Regular rate and rhythm, S1, S2 normal, no murmur, click, rub or gallop. Abdomen:   Soft, non-tender. Bowel sounds normal. No masses,  No organomegaly. Extremities: Extremities normal, atraumatic, no cyanosis or edema.          Data Review:    CBC w/Diff    Recent Labs      07/14/17   0211  07/13/17   0910   WBC  7.1  6.9   RBC  4.02*  4.32*   HGB  11.3*  11.9*   HCT  35.5*  38.1   MCV  88.3  88.2   MCH  28.1  27.5   MCHC  31.8  31.2   RDW  15.7*  15.5* Recent Labs      07/14/17 0211 07/13/17   0910   MONOS  8  8   EOS  2  0   BASOS  0  0   RDW  15.7*  15.5*        Comprehensive Metabolic Profile    Recent Labs      07/14/17 0211 07/13/17   0910   NA  144  143   K  3.6  3.8   CL  107  109*   CO2  30  28   BUN  25*  23*   CREA  3.02*  2.62*    Recent Labs      07/14/17 0211 07/13/17   0910   CA  8.2*  8.5  8.6   PHOS  3.9   --    ALB  2.2*   --    TP  6.0*   --    SGOT  16   --    TBILI  0.2   --                           Impression:       Active Hospital Problems    Diagnosis Date Noted    Cocaine abuse 07/14/2017    Secondary hyperparathyroidism of renal origin ([facility] [address] 02/09/2017    Chronic kidney disease, stage III (moderate) 02/09/2017    Acute chest pain 08/13/2016    Hypertensive heart disease     CAD (coronary artery disease)      mild disease of coronaries (cor angio 2006)        Hypoglycemia      Plan:     Adjust Insulin dose to avoid Hypoglycemia, change to oral  Diuretics, will not restart ACEI yet despite Proteinuria, follow renal function, DC  D10      Alexandr Bojorquez MD

## 2017-07-14 NOTE — PROGRESS NOTES
07/14/17 1206   Vital Signs   BP (!) 211/130  (nurse notified )   Patient blood pressure elevated. MD ordered new scheduled medication for clonidine and hydralazine and medication  administered. Will recheck BP in thirty minutes.

## 2017-07-14 NOTE — PROGRESS NOTES
Care Management Interventions  PCP Verified by CM: Yes  Mode of Transport at Discharge: Self  Transition of Care Consult (CM Consult): Discharge Planning  MyChart Signup: No  Discharge Durable Medical Equipment: No  Physical Therapy Consult: No  Occupational Therapy Consult: No  Speech Therapy Consult: No  Current Support Network: Lives with Spouse  Confirm Follow Up Transport: Family  Plan discussed with Pt/Family/Caregiver: Yes  Discharge Location  Discharge Placement: Home    64 y.o admitted for Acute Chest Pain, AAOX4 lives with spouse Lemuel Lima [phone] in a 2 story duplex. He has no DME, plans to return home at d/c.

## 2017-07-14 NOTE — H&P
[address] [facility]  ROUTINE H AND PS    Name:  Sebastián Monterroso  MR#:  [id number]  :  1960  Account #:  [de-identified]  Date of Adm:  2017      CHIEF COMPLAINT: Chest pain. PRIMARY CARE PHYSICIAN: Yamilka Sandy. Ramana Giang MD    HISTORY OF PRESENT ILLNESS: This is a 80-year-old [facility]  American gentleman with a past medical history of chronic kidney  disease, stage III, under the care of Dr. Azael Grullon; coronary artery  disease status post MI and stent in ; diabetes type 2, under the  care of Dr. Arnel Taylor; tobacco abuse; alcohol abuse; he had a TIA in  . He has been experiencing progressive shortness of breath over  the past 2 weeks. He ran out of his blood pressure medications  including his fluid pill yesterday. He states it was too late to call Dr. Shashank Montenegro office as they were already closed. He woke up this morning with  chest pain, he states it was a 10/10, located to the left side of his  chest, nonradiating. He describes it as a \"sharp\" pain. It was  associated with shortness of breath and diaphoresis, but no nausea or  vomiting. He states that the pain was aggravated with activity and  alleviated somewhat with medication he received in the ED. Associated  with this, he had a sensation of presyncope and also he has had some  low blood sugars. His sugar was as low as 46, he was given orange  juice and it subsequently came up to 58. In the ED, he is noted have a  troponin of 0.04 and a BNP of 4460. He had an EKG which was normal  sinus rhythm with a T-wave abnormality, consider inferolateral  ischemia. His chest x-ray revealed no focal infiltrate or consolidation. In  the ED, he was started on a nitroglycerin drip, per Cardiology. He also  received Nitrostat this morning, and he received DuoNeb, as well as  324 mg of aspirin, and Coreg 25 mg. He denies any recent fevers at  home. No nausea, no vomiting. No constipation, although he states  that his stool is sometimes hard to pass.  No diarrhea. He denies any  burning with urination, no hematuria, no hematochezia, no focal  weakness. He does have an occasional rash on his back which is non-  pruritic. He denies any blurred vision or double vision. PAST MEDICAL HISTORY  1. Chronic kidney disease, stage III, under the care of Dr. Kelton Rodriguez. 2. Coronary artery disease, status post MI and stent in 2002. 3. Dyslipidemia. 4. Diabetes type 2, under the care of Dr. Gregory Page. 5. Tobacco abuse. 6. Alcohol abuse. 7. TIA in 2009. 8. Fracture to his Lisfranc joints in April 2011. 9. Hepatic steatosis. ALLERGIES: NO KNOWN DRUG ALLERGIES. HOME MEDICATIONS  1. Tylenol  mg p.o. q.6 hours p.r.n.  2. Albuterol 90 mcg 2 puffs inhaled q.4 hours p.r.n.  3. Aspirin 81 mg p.o. daily. 4. E complex and folic acid, renal vitamin 1 cap p.o. daily. 5. Blood glucose monitoring kit. 6. Symbicort 160/4.5 mcg per actuation 2 puffs inhaled twice daily. 7. Rocaltrol 0.25 mcg p.o. every Monday, Wednesday, Friday. 8. Coreg 25 mg p.o. twice daily with meals. 9. Catapres 0.1 mg p.o. twice daily. 10. Febuxostat 40 mg p.o. daily. 11. Lasix 20 mg p.o. daily p.r.n.  12. FreeStyle test strips. 13. Hydralazine 50 mg p.o. t.i.d.  14. Potassium chloride 10 mEq p.o. twice daily. 15. NovoLog 70/30 with 30 units subcutaneous twice daily. 16. Lantus. 17. Lisinopril 20 mg p.o. daily. 18. Protonix 20 mg p.o. daily. SURGICAL HISTORY  1. Hand surgery, sounds like fibroma removal.  2. Cardiac catheterization with stents in 2002. 3. Bilateral foot surgery, again sounds like fibroma removal.  4. Hernia repair in the 1960's or 1970's. 5. Cystoscopy, left retrograde, left double J catheter in 2015, Dr. Paolo Ruelas. SOCIAL HISTORY: He has smoked half a pack per day for about 15  years. He drinks about 1-2 beers daily, he states his last drink was 2  weeks ago. His urine drug screen is positive for cocaine.  He resides  with his wife and is disabled from multiple medical issues. FAMILY HISTORY: There is no family history of premature coronary  artery disease. There is a family history of diabetes in his father and a  sister. Sister with hypertension and his mom has heart disease. PHYSICAL EXAM  VITAL SIGNS: Temperature 97.7, pulse 78, blood pressure 198/119,  respiratory rate 16, saturating 99% on room air  GENERAL: He is awake, alert and oriented x4, no acute distress. HEENT: Normocephalic, atraumatic. Pupils equally round and reactive  to light. He wears glasses. Poor dentition with several missing teeth. Moist mucous membranes. Oropharynx is clear. NECK: Supple. Neck veins flat. No cervical lymphadenopathy. No  carotid bruits. CARDIOVASCULAR: S1, S2. Regular rate and rhythm. No murmur. LUNGS: Clear to auscultation bilaterally. ABDOMEN: Obese, soft, nontender, nondistended. Normoactive bowel  sounds. EXTREMITIES: Trace ankle edema bilaterally. Dorsalis pedis pulses  2+ bilaterally. NEUROLOGIC: Cranial nerves 2 through 12 grossly intact. Moving all  4 extremities. SKIN: There is a maculopapular rash to his lower back, no surrounding  erythema, no drainage. LABS: WBC 6.9, hemoglobin and hematocrit 12/38.1, platelets 911,  neutrophils 63%, no bands. Urinalysis: Nitrite negative, leukocyte  esterase negative, WBC 8 to 12. PT 11.5, PTT 26.4, INR 0.9. Sodium  143, potassium 3.8, chloride 109, CO2 is 28, BUN 23, creatinine 2.62. Glucose 98, subsequently 46. Calcium 8.6, magnesium 2.0. Troponin  0.04. BNP 4460. Urine drug screen positive for cocaine. IMAGING: Chest x-ray: No focal infiltrate or consolidation; enlarged  cardiac silhouette. EKG: Normal sinus rhythm, possible left atrial enlargement, T-wave  abnormality, consider inferolateral ischemia. Echocardiogram is pending at the time of this dictation. ASSESSMENT AND PLAN  1. Shortness of breath and chest pain. He is on a nitroglycerin drip. Diuretics after discussion with Cardiology.  Initial troponin is reassuring,  we will trend these. We will check another EKG in the morning. Cocaine  cessation was emphasized. Monitor on telemetry. 2. Medical noncompliance. This was discussed at the bedside in the  presence of his wife. 3. Anemia, normocytic, normochromic. 4. Acute renal failure superimposed on chronic kidney disease, stage  III. Dr. Serjio Giles is to consult. Will hold his Lasix for now. 5. Urine drug screen positive for cocaine. Avoid beta-blockade. 6. Coronary artery disease, history of myocardial infarction and stent. Continue him on aspirin and we will check his fasting lipid profile. Again,  avoid beta-blockade. 7. Diabetes type 2 with hypoglycemia. He is under the care of Dr. Augustus Hurst. Will hold his 70/30. Give him very gentle D10 infusion, as  his blood sugar did not respond to orange juice and he is being  diuresed. 8. Tobacco abuse. Smoking cessation education. 9. Alcohol abuse. Multivitamin, thiamin and folate. 10. History of transient ischemic attack in 2009. He is on aspirin. Please  follow his fasting lipid profile. 11. Constipation. Bowel regimen. 12. History of dyslipidemia. Please follow his fasting lipid profile. 13. Deep venous thrombosis prophylaxis. 14. Disposition. He is a FULL CODE. Admit to telemetry. Time spent 70 minutes.         Philip Maier M.D.    Mily Aponte MP  D:  07/13/2017   14:25  T:  07/13/2017   19:34  Job #:  [id number]

## 2017-07-15 ENCOUNTER — APPOINTMENT (OUTPATIENT)
Dept: CT IMAGING | Age: 57
DRG: 304 | End: 2017-07-15
Attending: EMERGENCY MEDICINE
Payer: COMMERCIAL

## 2017-07-15 PROBLEM — R80.1 PERSISTENT PROTEINURIA: Status: ACTIVE | Noted: 2017-02-09

## 2017-07-15 LAB
ABO + RH BLD: NORMAL
ANION GAP BLD CALC-SCNC: 4 MMOL/L (ref 3–18)
BASOPHILS # BLD AUTO: 0 K/UL (ref 0–0.1)
BASOPHILS # BLD: 0 % (ref 0–2)
BLOOD GROUP ANTIBODIES SERPL: NORMAL
BUN SERPL-MCNC: 23 MG/DL (ref 7–18)
BUN/CREAT SERPL: 8 (ref 12–20)
CALCIUM SERPL-MCNC: 8.2 MG/DL (ref 8.5–10.1)
CHLORIDE SERPL-SCNC: 104 MMOL/L (ref 100–108)
CO2 SERPL-SCNC: 32 MMOL/L (ref 21–32)
CREAT SERPL-MCNC: 2.92 MG/DL (ref 0.6–1.3)
DIFFERENTIAL METHOD BLD: ABNORMAL
EOSINOPHIL # BLD: 0.2 K/UL (ref 0–0.4)
EOSINOPHIL NFR BLD: 3 % (ref 0–5)
ERYTHROCYTE [DISTWIDTH] IN BLOOD BY AUTOMATED COUNT: 15.6 % (ref 11.6–14.5)
GLUCOSE BLD STRIP.AUTO-MCNC: 101 MG/DL (ref 70–110)
GLUCOSE BLD STRIP.AUTO-MCNC: 129 MG/DL (ref 70–110)
GLUCOSE BLD STRIP.AUTO-MCNC: 129 MG/DL (ref 70–110)
GLUCOSE BLD STRIP.AUTO-MCNC: 155 MG/DL (ref 70–110)
GLUCOSE SERPL-MCNC: 119 MG/DL (ref 74–99)
HCT VFR BLD AUTO: 33.5 % (ref 36–48)
HCT VFR BLD AUTO: 35.5 % (ref 36–48)
HCT VFR BLD AUTO: 37.6 % (ref 36–48)
HGB BLD-MCNC: 10.6 G/DL (ref 13–16)
HGB BLD-MCNC: 11.2 G/DL (ref 13–16)
HGB BLD-MCNC: 11.9 G/DL (ref 13–16)
LYMPHOCYTES # BLD AUTO: 31 % (ref 21–52)
LYMPHOCYTES # BLD: 2 K/UL (ref 0.9–3.6)
MCH RBC QN AUTO: 28 PG (ref 24–34)
MCHC RBC AUTO-ENTMCNC: 31.6 G/DL (ref 31–37)
MCV RBC AUTO: 88.6 FL (ref 74–97)
MONOCYTES # BLD: 0.6 K/UL (ref 0.05–1.2)
MONOCYTES NFR BLD AUTO: 9 % (ref 3–10)
NEUTS SEG # BLD: 3.7 K/UL (ref 1.8–8)
NEUTS SEG NFR BLD AUTO: 57 % (ref 40–73)
PHOSPHATE SERPL-MCNC: 3.8 MG/DL (ref 2.5–4.9)
PLATELET # BLD AUTO: 314 K/UL (ref 135–420)
PMV BLD AUTO: 10.9 FL (ref 9.2–11.8)
POTASSIUM SERPL-SCNC: 3.6 MMOL/L (ref 3.5–5.5)
RBC # BLD AUTO: 3.78 M/UL (ref 4.7–5.5)
SODIUM SERPL-SCNC: 140 MMOL/L (ref 136–145)
SPECIMEN EXP DATE BLD: NORMAL
WBC # BLD AUTO: 6.5 K/UL (ref 4.6–13.2)

## 2017-07-15 PROCEDURE — 85025 COMPLETE CBC W/AUTO DIFF WBC: CPT | Performed by: INTERNAL MEDICINE

## 2017-07-15 PROCEDURE — 74011000258 HC RX REV CODE- 258: Performed by: FAMILY MEDICINE

## 2017-07-15 PROCEDURE — C9113 INJ PANTOPRAZOLE SODIUM, VIA: HCPCS | Performed by: FAMILY MEDICINE

## 2017-07-15 PROCEDURE — 74011250636 HC RX REV CODE- 250/636: Performed by: HOSPITALIST

## 2017-07-15 PROCEDURE — 74011250637 HC RX REV CODE- 250/637: Performed by: INTERNAL MEDICINE

## 2017-07-15 PROCEDURE — 74011250636 HC RX REV CODE- 250/636: Performed by: FAMILY MEDICINE

## 2017-07-15 PROCEDURE — 65660000000 HC RM CCU STEPDOWN

## 2017-07-15 PROCEDURE — 84100 ASSAY OF PHOSPHORUS: CPT | Performed by: INTERNAL MEDICINE

## 2017-07-15 PROCEDURE — 71250 CT THORAX DX C-: CPT

## 2017-07-15 PROCEDURE — 74011250637 HC RX REV CODE- 250/637: Performed by: HOSPITALIST

## 2017-07-15 PROCEDURE — 80048 BASIC METABOLIC PNL TOTAL CA: CPT | Performed by: INTERNAL MEDICINE

## 2017-07-15 PROCEDURE — 74011250637 HC RX REV CODE- 250/637: Performed by: PHYSICIAN ASSISTANT

## 2017-07-15 PROCEDURE — 74011636637 HC RX REV CODE- 636/637: Performed by: HOSPITALIST

## 2017-07-15 PROCEDURE — 86900 BLOOD TYPING SEROLOGIC ABO: CPT | Performed by: FAMILY MEDICINE

## 2017-07-15 PROCEDURE — 36415 COLL VENOUS BLD VENIPUNCTURE: CPT | Performed by: INTERNAL MEDICINE

## 2017-07-15 PROCEDURE — 94640 AIRWAY INHALATION TREATMENT: CPT

## 2017-07-15 PROCEDURE — 85018 HEMOGLOBIN: CPT | Performed by: FAMILY MEDICINE

## 2017-07-15 PROCEDURE — 82962 GLUCOSE BLOOD TEST: CPT

## 2017-07-15 PROCEDURE — 93005 ELECTROCARDIOGRAM TRACING: CPT

## 2017-07-15 RX ORDER — PANTOPRAZOLE SODIUM 40 MG/10ML
40 INJECTION, POWDER, LYOPHILIZED, FOR SOLUTION INTRAVENOUS ONCE
Status: COMPLETED | OUTPATIENT
Start: 2017-07-15 | End: 2017-07-15

## 2017-07-15 RX ORDER — LOSARTAN POTASSIUM 25 MG/1
25 TABLET ORAL DAILY
Status: DISCONTINUED | OUTPATIENT
Start: 2017-07-15 | End: 2017-07-17 | Stop reason: HOSPADM

## 2017-07-15 RX ORDER — HYDRALAZINE HYDROCHLORIDE 20 MG/ML
10 INJECTION INTRAMUSCULAR; INTRAVENOUS
Status: DISCONTINUED | OUTPATIENT
Start: 2017-07-15 | End: 2017-07-17 | Stop reason: HOSPADM

## 2017-07-15 RX ADMIN — INSULIN LISPRO 2 UNITS: 100 INJECTION, SOLUTION INTRAVENOUS; SUBCUTANEOUS at 21:40

## 2017-07-15 RX ADMIN — SODIUM CHLORIDE 8 MG/HR: 900 INJECTION, SOLUTION INTRAVENOUS at 20:47

## 2017-07-15 RX ADMIN — ISOSORBIDE MONONITRATE 60 MG: 60 TABLET, EXTENDED RELEASE ORAL at 09:19

## 2017-07-15 RX ADMIN — CARVEDILOL 12.5 MG: 12.5 TABLET, FILM COATED ORAL at 09:19

## 2017-07-15 RX ADMIN — CARVEDILOL 12.5 MG: 12.5 TABLET, FILM COATED ORAL at 17:14

## 2017-07-15 RX ADMIN — NEPHROCAP 1 CAPSULE: 1 CAP ORAL at 09:19

## 2017-07-15 RX ADMIN — HEPARIN SODIUM 5000 UNITS: 5000 INJECTION, SOLUTION INTRAVENOUS; SUBCUTANEOUS at 06:01

## 2017-07-15 RX ADMIN — HYDRALAZINE HYDROCHLORIDE 50 MG: 50 TABLET, FILM COATED ORAL at 17:14

## 2017-07-15 RX ADMIN — HYDRALAZINE HYDROCHLORIDE 50 MG: 50 TABLET, FILM COATED ORAL at 09:19

## 2017-07-15 RX ADMIN — BUDESONIDE AND FORMOTEROL FUMARATE DIHYDRATE 2 PUFF: 160; 4.5 AEROSOL RESPIRATORY (INHALATION) at 07:54

## 2017-07-15 RX ADMIN — FUROSEMIDE 80 MG: 40 TABLET ORAL at 09:19

## 2017-07-15 RX ADMIN — ASPIRIN 81 MG 81 MG: 81 TABLET ORAL at 09:19

## 2017-07-15 RX ADMIN — ACETAMINOPHEN 500 MG: 500 TABLET ORAL at 06:01

## 2017-07-15 RX ADMIN — SODIUM CHLORIDE 8 MG/HR: 900 INJECTION, SOLUTION INTRAVENOUS at 12:56

## 2017-07-15 RX ADMIN — ACETAMINOPHEN 500 MG: 500 TABLET ORAL at 15:07

## 2017-07-15 RX ADMIN — DOCUSATE SODIUM 100 MG: 100 CAPSULE, LIQUID FILLED ORAL at 17:14

## 2017-07-15 RX ADMIN — FEBUXOSTAT 40 MG: 40 TABLET ORAL at 09:19

## 2017-07-15 RX ADMIN — HEPARIN SODIUM 5000 UNITS: 5000 INJECTION, SOLUTION INTRAVENOUS; SUBCUTANEOUS at 21:40

## 2017-07-15 RX ADMIN — BUDESONIDE AND FORMOTEROL FUMARATE DIHYDRATE 2 PUFF: 160; 4.5 AEROSOL RESPIRATORY (INHALATION) at 20:22

## 2017-07-15 RX ADMIN — HYDRALAZINE HYDROCHLORIDE 50 MG: 50 TABLET, FILM COATED ORAL at 21:41

## 2017-07-15 RX ADMIN — DOCUSATE SODIUM 100 MG: 100 CAPSULE, LIQUID FILLED ORAL at 09:19

## 2017-07-15 RX ADMIN — PANTOPRAZOLE SODIUM 40 MG: 40 INJECTION, POWDER, FOR SOLUTION INTRAVENOUS at 12:15

## 2017-07-15 RX ADMIN — ATORVASTATIN CALCIUM 40 MG: 40 TABLET, FILM COATED ORAL at 21:40

## 2017-07-15 RX ADMIN — LOSARTAN POTASSIUM 25 MG: 25 TABLET ORAL at 12:14

## 2017-07-15 RX ADMIN — Medication 100 MG: at 09:19

## 2017-07-15 NOTE — PROGRESS NOTES
Progress Note    Mathew Vela  64 y.o. Admit Date: 7/13/2017  Active Problems:    CAD (coronary artery disease) () POA: Yes      Overview: mild disease of coronaries (cor angio 2006)      Hypertensive heart disease () POA: Yes      Acute chest pain (8/13/2016) POA: Unknown      Chronic kidney disease, stage III (moderate) (2/9/2017) POA: Yes      Secondary hyperparathyroidism of renal origin ([address] (2/9/2017) POA: Yes      Cocaine abuse (7/14/2017) POA: Unknown            Subjective:     Patient feels better but complains had blood stained cough. This morning. A comprehensive review of systems was negative except for that written in the History of Present Illness.     Objective:     Visit Vitals    BP (!) 174/108    Pulse 84    Temp 98 °F (36.7 °C)    Resp 20    Ht 5' 4\" (1.626 m)    Wt 111.1 kg (244 lb 14.9 oz)    SpO2 92%    BMI 42.04 kg/m2         Intake/Output Summary (Last 24 hours) at 07/15/17 1120  Last data filed at 07/15/17 0920   Gross per 24 hour   Intake              970 ml   Output             3300 ml   Net            -2330 ml       Current Facility-Administered Medications   Medication Dose Route Frequency Provider Last Rate Last Dose    pantoprazole (PROTONIX) injection 40 mg  40 mg IntraVENous ONCE Brandee Grubbs MD        pantoprazole (PROTONIX) 80 mg in 0.9% sodium chloride 100 mL infusion  8 mg/hr IntraVENous CONTINUOUS Brandee Grubbs MD        hydrALAZINE (APRESOLINE) 20 mg/mL injection 10 mg  10 mg IntraVENous Q6H PRN Brandee Grubbs MD        losartan (COZAAR) tablet 25 mg  25 mg Oral DAILY Demi Brown MD        hydrALAZINE (APRESOLINE) tablet 50 mg  50 mg Oral TID Merline Pam, PA   50 mg at 07/15/17 0919    atorvastatin (LIPITOR) tablet 40 mg  40 mg Oral  [place], [place]   40 mg at 07/14/17 2125    furosemide (LASIX) tablet 80 mg  80 mg Oral DAILY Demi Brown MD   80 mg at 07/15/17 0919    isosorbide mononitrate ER (IMDUR) tablet 60 mg  60 mg Oral DAILY Marine Finley MD   60 mg at 07/15/17 0919    carvedilol (COREG) tablet 12.5 mg  12.5 mg Oral BID WITH MEALS Marine Finley MD   12.5 mg at 07/15/17 0919    nitroglycerin (NITROSTAT) tablet 0.4 mg  0.4 mg SubLINGual PRN Danica Benson MD   0.4 mg at 07/13/17 1032    B complex-vitaminC-folic acid (NEPHROCAP) cap  1 Cap Oral DAILY Jacqueline Rowley MD   1 Cap at 07/15/17 0919    budesonide-formoterol (SYMBICORT) 160-4.5 mcg/actuation HFA inhaler 2 Puff  2 Puff Inhalation BID RT Jacqueline Rowley MD   2 Puff at 07/15/17 0754    calcitRIOL (ROCALTROL) capsule 0.25 mcg  0.25 mcg Oral Q MON, WED & Royal Barksdale MD   0.25 mcg at 07/14/17 1655    febuxostat (ULORIC) tablet 40 mg  40 mg Oral DAILY Jacqueline Rowley MD   40 mg at 07/15/17 0919    acetaminophen (TYLENOL) tablet 500 mg  500 mg Oral Q6H PRN Jacqueline Rowley MD   500 mg at 07/15/17 0601    ondansetron (ZOFRAN) injection 4 mg  4 mg IntraVENous Q8H PRN Jacqueline Rowley MD        heparin (porcine) injection 5,000 Units  5,000 Units SubCUTAneous Leona Nava MD   5,000 Units at 07/15/17 0601    docusate sodium (COLACE) capsule 100 mg  100 mg Oral BID Jacqueline Rowley MD   100 mg at 07/15/17 0919    insulin lispro (HUMALOG) injection   SubCUTAneous AC&HS Jacqueline Rowley MD   Stopped at 07/15/17 0730    glucose chewable tablet 16 g  16 g Oral PRN Jacqueline Rowley MD        glucagon (GLUCAGEN) injection 1 mg  1 mg IntraMUSCular PRN Jacqueline Rowley MD        dextrose (D50W) injection syrg 12.5-25 g  25-50 mL IntraVENous PRN Jacqueline Rowley MD        thiamine (B-1) tablet 100 mg  100 mg Oral DAILY Jacqueline Rowley MD   100 mg at 07/15/17 0919        Physical Exam:     Physical Exam:   General:  Alert, cooperative, no distress, appears stated age. Neck: Supple, symmetrical, trachea midline, no adenopathy, thyroid: no enlargement/tenderness/nodules, no carotid bruit and no JVD. Lungs:   Clear to auscultation bilaterally.    Heart:  Regular rate and rhythm, S1, S2 normal, no murmur, click, rub or gallop. Abdomen:   Soft, non-tender. Bowel sounds normal. No masses,  No organomegaly. Extremities: Extremities normal, atraumatic, no cyanosis , has 1 edema. Skin: Skin color, texture, turgor normal. No rashes or lesions         Data Review:    CBC w/Diff    Recent Labs      07/15/17   1055  07/15/17   0136 07/14/17 0211 07/13/17   0910   WBC   --   6.5  7.1  6.9   RBC   --   3.78*  4.02*  4.32*   HGB  11.2*  10.6*  11.3*  11.9*   HCT  35.5*  33.5*  35.5*  38.1   MCV   --   88.6  88.3  88.2   MCH   --   28.0  28.1  27.5   MCHC   --   31.6  31.8  31.2   RDW   --   15.6*  15.7*  15.5*    Recent Labs      07/15/17   0136 07/14/17 0211 07/13/17   0910   MONOS  9  8  8   EOS  3  2  0   BASOS  0  0  0   RDW  15.6*  15.7*  15.5*        Comprehensive Metabolic Profile    Recent Labs      07/15/17   0136 07/14/17   0211 07/13/17   0910   NA  140  144  143   K  3.6  3.6  3.8   CL  104  107  109*   CO2  32  30  28   BUN  23*  25*  23*   CREA  2.92*  3.02*  2.62*    Recent Labs      07/15/17   0136  07/14/17   0211  07/13/17   0910   CA  8.2*  8.2*  8.5  8.6   PHOS  3.8  3.9   --    ALB   --   2.2*   --    TP   --   6.0*   --    SGOT   --   16   --    TBILI   --   0.2   --                         Impression:       Active Hospital Problems    Diagnosis Date Noted    Cocaine abuse 07/14/2017    Secondary hyperparathyroidism of renal origin ([facility] [address] 02/09/2017    Chronic kidney disease, stage III (moderate) 02/09/2017    Acute chest pain 08/13/2016    Hypertensive heart disease     CAD (coronary artery disease)      mild disease of coronaries (cor angio 2006)        Nephrotic range Massive Proteinuria. Renal function  Little better, BP not controlled. Plan:     Continue Current dose of Lasix, add Losartan 25 mg po daily for for massive Proteinuria & BP control, watch Renal function & K closely. Watch for any blood stained cough.       Costa Pierce MD

## 2017-07-15 NOTE — PROGRESS NOTES
[facility]  -  Progress Note      Patient: Cherry Kang MRN: [id number]  SSN: [id number]    YOB: 1960  Age: 64 y.o. Sex: male      Admit Date: 7/13/2017     The patient was seen, examined, and independently evaluated and I agree with the below assessment and plan by [facility] MARIMAR Denney PA-C with the following comments. This gentleman had an episode of hematemesis after Ms. Garciable's evaluation this AM outlined below this morning, although he tells me that he just coughed up some blood. He has had GI bleed in the past, so is going to be worked up in the hospital.    He had an abnormal EKG with new EKG changes of T wave inversions to suggest anterolateral wall ischemia on admissioni, an echocardiogram with new wall motion abnormality suggesting hypokinesia in the entire inferior wall, and only one troponin which was minimally elevated at 0.04 without a repeat. He does have stage 4 CKD making consideration for any interventional evaluation difficult and history of substance abuse. In view of the above EKG and echo abnormalities I woul consider a nuclear myocardial perfusion study prior to discharge which we can do on Monday. I will get another EKG to see if any further ST-T changes.     Hospital Problem List:     Hospital Problems  Date Reviewed: 7/15/2017          Codes Class Noted POA    Cocaine abuse ICD-10-CM: F14.10  ICD-9-CM: 305.60  7/14/2017 Unknown        Chronic kidney disease, stage III (moderate) ICD-10-CM: N18.3  ICD-9-CM: 585.3  2/9/2017 Yes        Secondary hyperparathyroidism of renal origin ([facility] [address] ICD-10-CM: N25.81  ICD-9-CM: 588.81  2/9/2017 Yes        Acute chest pain ICD-10-CM: R07.9  ICD-9-CM: 786.50  8/13/2016 Unknown        CAD (coronary artery disease) ICD-10-CM: I25.10  ICD-9-CM: 414.00  Unknown Yes    Overview Signed 2/23/2012 12:19 AM by Eagle Flores V     mild disease of coronaries (cor angio 2006)             Hypertensive heart disease ICD-10-CM: I11.9  ICD-9-CM: 402.90  Unknown Yes            -Volume overload. Although there may be a component of diastolic heart failure concerned about worsening renal function with possible nephrotic syndrome. EF 65-70% with grade 2 DD 2/2017. EF now 50-55% with possible hypokinesis of inferolateral wall. -Hypertension out of control. Reports taking his medications until running out yesterday, reports trying to decrease salt intake. Started on nitro gtt yesterday. BP improving.   -Chronic kidney disease with worsening this admission.  -Diabetes mellitus. Reports frequent hypoglycemic episodes. Hgb A1c 6.1.  -Dyslipidemia. -H/o TIA. -Reactive airway disease on inhalers.  -Tobacco abuse, advised cessation.  -Cocaine use, positive drug screen. -H/o ETOH abuse. -H/o noncompliance, advised compliance. Plan:     -Hemodynamics remain stable on PO anti-HTNs. HR is tolerating Coreg. Continue.   -Can further uptitrate hydral/nitrate in outpatient setting.  -Discussed importance of compliance with diet, exercise and abstinence from substances. Pt agrees stating he is going to try.   -Discussed follow up and compliance with medications. Pt agrees to try.   -Pt will need completion of nuclear stress test at some point. Subjective:     Feeling much better. Anxious to go home.      Objective:      Patient Vitals for the past 8 hrs:   Temp Pulse Resp BP SpO2   07/15/17 1054 - 84 20 (!) 174/108 92 %   07/15/17 1048 98 °F (36.7 °C) 86 20 (!) 186/121 93 %   07/15/17 1047 - 86 22 (!) 186/121 94 %   07/15/17 1029 - 84 18 157/84 94 %   07/15/17 0741 97.6 °F (36.4 °C) 87 18 173/88 90 %         Patient Vitals for the past 96 hrs:   Weight   07/15/17 0616 111.1 kg (244 lb 14.9 oz)   07/14/17 0529 111.2 kg (245 lb 1.6 oz)   07/13/17 0846 99.8 kg (220 lb)         Intake/Output Summary (Last 24 hours) at 07/15/17 1237  Last data filed at 07/15/17 0920   Gross per 24 hour   Intake              970 ml   Output             3300 ml   Net -2330 ml       Physical Exam:  General: resting in bed, awake, alert, oriented x 3  Neck:  supple  Lungs:  Diminished bases, lung fields without crackles, wheezing or rales, on room air with normal effort  Heart:  Reg rate and rhythm, no murmur/rub/gallop  Abdomen:  Obese, non-tender, bs +  Extremities:  No edema, atraumatic, peripheral pulses palpable    Data Review:     Labs: Results:       Chemistry Recent Labs      07/15/17   0136  07/14/17   0211  07/13/17   0910   GLU  119*  88  98   NA  140  144  143   K  3.6  3.6  3.8   CL  104  107  109*   CO2  32  30  28   BUN  23*  25*  23*   CREA  2.92*  3.02*  2.62*   CA  8.2*  8.2*  8.5  8.6   MG   --   1.8  2.0   PHOS  3.8  3.9   --    AGAP  4  7  6   BUCR  8*  8*  9*   AP   --   60   --    TP   --   6.0*   --    ALB   --   2.2*   --    GLOB   --   3.8   --    AGRAT   --   0.6*   --       CBC w/Diff Recent Labs      07/15/17   1055  07/15/17   0136  07/14/17   0211  07/13/17   0910   WBC   --   6.5  7.1  6.9   RBC   --   3.78*  4.02*  4.32*   HGB  11.2*  10.6*  11.3*  11.9*   HCT  35.5*  33.5*  35.5*  38.1   PLT   --   314  343  312   GRANS   --   57  59  63   LYMPH   --   31  31  29   EOS   --   3  2  0      Cardiac Enzymes No results found for: CPK, CKMMB, CKMB, RCK3, CKMBT, CKNDX, CKND1, SID, TROPT, TROIQ, REGINALDO, TROPT, TNIPOC, BNP, BNPP   Coagulation Recent Labs      07/13/17   0910   PTP  11.5   INR  0.9   APTT  26.4       Lipid Panel Lab Results   Component Value Date/Time    Cholesterol, total 294 07/14/2017 02:11 AM    HDL Cholesterol 47 07/14/2017 02:11 AM    LDL, calculated 196.4 07/14/2017 02:11 AM    VLDL, calculated 50.6 07/14/2017 02:11 AM    Triglyceride 253 07/14/2017 02:11 AM    CHOL/HDL Ratio 6.3 07/14/2017 02:11 AM      BNP No results found for: BNP, BNPP, XBNPT   Liver Enzymes Recent Labs      07/14/17   0211   TP  6.0*   ALB  2.2*   AP  60   SGOT  16      Digoxin    Thyroid Studies Lab Results   Component Value Date/Time    TSH 0.55 02/09/2017 04:00 AM            Signed By: Wisam Campoverde DO     July 15, 2017

## 2017-07-15 NOTE — PROGRESS NOTES
Rapid Response Note  [facility]    Patient: Yasmine Low 64 y.o. male  [id number]  1960      Admit Date: 7/13/2017   Admission Diagnosis: Acute chest pain    RAPID RESPONSE     Rapid response called for hematemesis. On arrival to the room the patient was sitting up and appeared to be resting comfortably. He was complaining of a mild HA. Per nursing he had had 2 episodes of hematemesis with alona blood in the toilet. He denies any previous history of GI bleeding. Review of records showed patient is on ASA daily, no other NSAIDs. He was alert and oriented x 3. Vitals appeared stable with the exception of elevated BP     Medications Reviewed. Patient was admitted to HTN emergency. He has no PRN medications ordered for BP control     OBJECTIVE     Patient Vitals for the past 12 hrs:   Temp Pulse Resp BP SpO2   07/15/17 1054 - 84 20 (!) 174/108 92 %   07/15/17 1048 98 °F (36.7 °C) 86 20 (!) 186/121 93 %   07/15/17 1047 - 86 22 (!) 186/121 94 %   07/15/17 1029 - 84 18 157/84 94 %   07/15/17 0741 97.6 °F (36.4 °C) 87 18 173/88 90 %   07/15/17 0359 97.3 °F (36.3 °C) 83 20 157/85 94 %   07/15/17 0042 98 °F (36.7 °C) 81 20 153/89 97 %         PHYSICAL:  General:  Alert and Responsive and in no acute distress. CV:  RRR, no murmurs, rubs, or gallops. RESP:  Unlabored breathing. CTAB. ABD:  Soft, nontender. Obese  Normoactive bowel sounds. Neuro:  5/5 strength bilateral upper extremities and lower extremities. CN II-XII intact. Sensation grossly intact. A+O3      ASSESSMENT, PLAN & DISPOSITION   Yasmine Low is a 64y.o. year old male admitted for Acute chest pain. Rapid response called for hematemesis. Patient condition currently: stable    1.  Hematemesis  - NPO except meds  - IV bolus protonix and protonix drip  - H/H stat now and every 8 hours  - Type and screen  - D/C ASA   - Close monitoring of vitals appears stable for now  - Discussed with Dr. Lauren Westbrook who agrees with plan and states he will call GI     2. HTN   - PRN hydralazine ordered   - watch BP     Medications Administered: protonix IV bolus and drip initiated     Labs ordered/Pending:     Disposition: Remain on [facility]    Attending Dr. Adrien Little notified of rapid response. In agreement with plan. Primary team resuming care.      Linh Staples MD   [facility] Hossein Prater PGY-1  10:57 AM 07/15/17

## 2017-07-15 NOTE — CONSULTS
[email or web address]  [phone]    GASTROENTEROLOGY CONSULT      Impression:   1. Possible Hemoptysis-pt categorically denies hematemesis  2. Cocaine abuse    Plan:     1. Monitor clinically  2. No plans for EGD given questionable history and recent cocaine use-if there is a change in his story or if there is any further evidence of hematemesis, then will get EGD done pending anesthesia clearance. 3. Can stop PPI, ok to resume ASA from my standpoint. Chief Complaint: Hemoptysis      HPI:  Gino Sharma is a 64 y.o. male who I am being asked to see in consultation for an opinion regarding above. It appears that there is some discrepancy in the history-pt categorically denies \"throwing up blood\"-states he coughed it up. Denies any other GI symptoms. PMH:   Past Medical History:   Diagnosis Date    Alcohol abuse     Arthritis     Atherosclerotic cardiovascular disease     CAD (coronary artery disease)     mild disease of coronaries (cor angio 2006),wife states he has 1 stent    Cardiac cath 01/26/2006    RCA mild. Otherwise patent coronaries. LVEDP 15.  EF 55-60%.  Cardiac echocardiogram 03/27/2009    EF 60%. No cardiac source of embolism. No significant valvular pathology.  Cardiac nuclear imaging test 12/06/2011    Small, mild inferior infarction or possibly artifact. No ischemia. EF 45%. No TID. No reg WMA.  Cardiovascular LLE venous duplex 08/14/2015    Left leg:  No DVT. Dilated lymph node in left groin.  Constipation     Diabetes mellitus type II     Gout     Hepatic steatosis     Hypercholesterolemia     Hypertension     Knee effusion, left     Left knee pain     Morbid obesity (HCC)     Noncompliance     Obesity (BMI 30-39. 9)     S/P colonoscopy 3/8/05    Dr. Edd Nicole; normal; f/u 10 years    Stomach problems     TIA (transient ischemic attack) 3/09    Tobacco abuse        PSH:   Past Surgical History:   Procedure Laterality Date    ABDOMEN SURGERY PROC UNLISTED Hernia repair in 1960's or 1970's.  HX ORTHOPAEDIC      Bones spur removed from left & right foot 2013.  HX UROLOGICAL  8/18/2015    [facility], Dr. Nay Dawson, Cystoscopy, left retrograde, left double-J cath       Social HX:   Social History     Social History    Marital status:      Spouse name: N/A    Number of children: N/A    Years of education: N/A     Occupational History    disabled      Social History Main Topics    Smoking status: Current Every Day Smoker     Packs/day: 0.25     Types: Cigarettes     Last attempt to quit: 6/28/2016    Smokeless tobacco: Never Used    Alcohol use 0.0 oz/week     0 Standard drinks or equivalent per week      Comment: 1 beers a day.  Drug use: No    Sexual activity: Yes     Other Topics Concern    Not on file     Social History Narrative       FHX:   Family History   Problem Relation Age of Onset    Diabetes Father     Heart Disease Mother     Diabetes Sister     Hypertension Sister     Arthritis-osteo Sister     Arthritis-osteo Brother     Diabetes Other     Diabetes Other      Uncle, Aunt    Hypertension Other      Uncle; Aunt       Allergy:   Allergies   Allergen Reactions    Shellfish Derived Other (comments)     Causes Gout       Patient Active Problem List   Diagnosis Code    Allergic rhinitis J30.9    Neuropathy ([address] G62.9    Right knee pain M25.561    Dyslipidemia E78.5    Gout M10.9    CAD (coronary artery disease) I25.10    Hypertensive heart disease I11.9    Obesity (BMI 30-39. 9) E66.9    Constipation K59.00    Tobacco abuse Z72.0    TIA (transient ischemic attack) G45.9    Hepatic steatosis K76.0    Noncompliance Z91.19    Testicular/scrotal pain [id number]    UTI (urinary tract infection) N39.0    Contusion of knee S80.00XA    Knee strain S86.919A    Effusion of patella M25.469    Hemoptysis R04.2    Rash R21    Laceration of gum S01.512A    Fractured tooth S02. 5XXA    Renal stones N20.0    Microalbuminuria R80.9    Hypertriglyceridemia E78.1    Type 2 diabetes mellitus without complication (HCC) V10.1    Acute unilateral obstructive uropathy N13.9    ARF (acute renal failure) (HCC) N17.9    Essential hypertension I10    Advance directive discussed with patient Z70.80    Acute chest pain R07.9    Left sided numbness R20.0    Chronic kidney disease, stage III (moderate) N18.3    Persistent proteinuria R80.1    Secondary hyperparathyroidism of renal origin ([facility] [address] N25.81    Hypoglycemia E16.2    Cocaine abuse F14.10       Home Medications:     Prescriptions Prior to Admission   Medication Sig    lisinopril (PRINIVIL, ZESTRIL) 20 mg tablet Take 1 Tab by mouth daily.  carvedilol (COREG) 25 mg tablet Take 1 Tab by mouth two (2) times daily (with meals).  furosemide (LASIX) 20 mg tablet Take 1 Tab by mouth daily as needed.  albuterol (VENTOLIN HFA) 90 mcg/actuation inhaler Take 2 Puffs by inhalation every four (4) hours as needed for Wheezing for up to 90 days.  febuxostat (ULORIC) 40 mg tab tablet Take 1 Tab by mouth daily.  hydrALAZINE (APRESOLINE) 50 mg tablet Take 1 Tab by mouth three (3) times daily. Indications: hypertension    pantoprazole (PROTONIX) 20 mg tablet Take 1 Tab by mouth daily.  glucose blood VI test strips (FREESTYLE TEST) strip by Does Not Apply route See Admin Instructions. Check twice a day    aspirin 81 mg chewable tablet Take 1 Tab by mouth daily.  B COMPLEX W-C NO.20/FOLIC ACID (B COMPLEX & C NO.20-FOLIC ACID PO) Take  by mouth.  POTASSIUM CHLORIDE SR 10 MEQ TAB two (2) times a day.  budesonide-formoterol (SYMBICORT) 160-4.5 mcg/actuation HFA inhaler Take 2 Puffs by inhalation two (2) times a day.  cloNIDine HCl (CATAPRES) 0.1 mg tablet Take 1 Tab by mouth two (2) times a day.  calcitRIOL (ROCALTROL) 0.25 mcg capsule Take 1 Cap by mouth every Monday, Wednesday, Friday.     insulin NPH/insulin regular (NOVOLIN 70/30) 100 unit/mL (70-30) injection 30 UNITS SUBCUTANEOUSLY TWICE DAILY. Do not take it blood sugar is less than 100    Insulin Needles, Disposable, 29 gauge x 5/16\" ndle 40 Units by Does Not Apply route two (2) times a day.  acetaminophen (TYLENOL) 650 mg CR tablet Take 650 mg by mouth every six (6) hours as needed for Pain.  Lancets misc Check 3 times a day , needs according contour glucometer    Blood-Glucose Meter monitoring kit Check twice daily. Review of Systems:     Constitutional: No fevers, chills, weight loss, fatigue. Skin: No rashes, pruritis, jaundice, ulcerations, erythema. HENT: No headaches, nosebleeds, sinus pressure, rhinorrhea, sore throat. Eyes: No visual changes, blurred vision, eye pain, photophobia, jaundice. Cardiovascular: No chest pain, heart palpitations. Respiratory: No cough, SOB, wheezing, chest discomfort, orthopnea. Gastrointestinal:    Genitourinary: No dysuria, bleeding, discharge, pyuria. Musculoskeletal: No weakness, arthralgias, wasting. Endo: No sweats. Heme: No bruising, easy bleeding. Allergies: As noted. Neurological: Cranial nerves intact. Alert and oriented. Gait not assessed. Psychiatric:  No anxiety, depression, hallucinations. Visit Vitals    BP (!) 159/111 (BP 1 Location: Right arm, BP Patient Position: At rest)    Pulse 82    Temp 97.9 °F (36.6 °C)    Resp 20    Ht 5' 4\" (1.626 m)    Wt 111.1 kg (244 lb 14.9 oz)    SpO2 92%    BMI 42.04 kg/m2       Physical Assessment:     constitutional: appearance: well developed, well nourished, normal habitus, no deformities, in no acute distress. skin: inspection: no rashes, ulcers, icterus or other lesions; no clubbing or telangiectasias. palpation: no induration or subcutaneos nodules. eyes: inspection: normal conjunctivae and lids; no jaundice pupils: normal  ENMT: mouth: normal oral mucosa,lips and gums; good dentition.  oropharynx: normal tongue, hard and soft palate; posterior pharynx without erithema, exudate or lesions. neck: thyroid: normal size, consistency and position; no masses or tenderness. respiratory: effort: normal chest excursion; no intercostal retraction or accessory muscle use. cardiovascular: abdominal aorta: normal size and position; no bruits. palpation: PMI of normal size and position; normal rhythm; no thrill or murmurs. abdominal: abdomen: normal consistency; no tenderness or masses. hernias: no hernias appreciated. liver: normal size and consistency. spleen: not palpable. rectal: hemoccult/guaiac: not performed. musculoskeletal: digits and nails: no clubbing, cyanosis, petechiae or other inflammatory conditions. gait: normal gait and station head and neck: normal range of motion; no pain, crepitation or contracture. spine/ribs/pelvis: normal range of motion; no pain, deformity or contracture. neurologic: cranial nerves: II-XII normal.   psychiatric: judgement/insight: within normal limits. memory: within normal limits for recent and remote events. mood and affect: no evidence of depression, anxiety or agitation. orientation: oriented to time, space and person.         Basic Metabolic Profile   Recent Labs      07/15/17   0136 07/14/17 0211   NA  140  144   K  3.6  3.6   CL  104  107   CO2  32  30   BUN  23*  25*   GLU  119*  88   CA  8.2*  8.2*  8.5   MG   --   1.8   PHOS  3.8  3.9         CBC w/Diff    Recent Labs      07/15/17   1055  07/15/17   0136   WBC   --   6.5   RBC   --   3.78*   HGB  11.2*  10.6*   HCT  35.5*  33.5*   MCV   --   88.6   MCH   --   28.0   MCHC   --   31.6   RDW   --   15.6*   PLT   --   314    Recent Labs      07/15/17   0136   GRANS  57   LYMPH  31   EOS  3        Hepatic Function   Recent Labs      07/14/17 0211   ALB  2.2*   TP  6.0*   TBILI  0.2   SGOT  16   AP  60        Coags   Recent Labs      07/13/17   0910   PTP  11.5   INR  0.9   APTT  26.4           Rubio Pineda MD.   [facility], [address]  [phone]  Cell: [phone]  Direct pager: [phone]  [email or web address]  [email or web address]

## 2017-07-15 NOTE — PROGRESS NOTES
[facility] Hospitalist Group  Progress Note    Patient: Hema Malik Age: 64 y.o. : 1960 MR#: [id number] SSN: [id number]  Date: 7/15/2017     Subjective:     Patient sitting in bed in NAD, awake, follows commands, denies any vomiting of blood. States that he coughed up some dry blood 2/2 oxygen he was on. Denies abdominal pain or nausea.  Wife at bedside    Assessment/Plan:     1- Acute diastolic chf exacerbation  2- Hypertensive urgency at admission  3- CYNDI on CKD3  4- DM2  5- dyslipidemia  6- Polysubstance abuse  7- Hemoptysis/Hematemesis    PLAN  Lasix, I/O, hold asa  On BB, on coreg, hydralazine, imdur  Monitor renal function  Monitor sugars  GI on case, PPI, resume diet  CT Chest noncontrast  Pulm consult in am    Case discussed with:  [x]Patient  [x]Family  []Nursing  []Case Management  DVT Prophylaxis:  []Lovenox  []Hep SQ  []SCDs  []Coumadin   []On Heparin gtt    Objective:   VS:   Visit Vitals    BP (!) 159/111 (BP 1 Location: Right arm, BP Patient Position: At rest)    Pulse 82    Temp 97.9 °F (36.6 °C)    Resp 20    Ht 5' 4\" (1.626 m)    Wt 111.1 kg (244 lb 14.9 oz)    SpO2 92%    BMI 42.04 kg/m2      Tmax/24hrs: Temp (24hrs), Av.7 °F (36.5 °C), Min:97.3 °F (36.3 °C), Max:98 °F (36.7 °C)      Intake/Output Summary (Last 24 hours) at 07/15/17 1646  Last data filed at 07/15/17 1518   Gross per 24 hour   Intake              970 ml   Output             4200 ml   Net            -3230 ml       General:  Awake, alert  Cardiovascular:  S1S2+, RRR  Pulmonary:  CTA b/l  GI:  Soft, BS+, NT, ND  Extremities:  trace edema        Labs:    Recent Results (from the past 24 hour(s))   GLUCOSE, POC    Collection Time: 17  9:24 PM   Result Value Ref Range    Glucose (POC) 150 (H) 70 - 110 mg/dL   CBC WITH AUTOMATED DIFF    Collection Time: 07/15/17  1:36 AM   Result Value Ref Range    WBC 6.5 4.6 - 13.2 K/uL    RBC 3.78 (L) 4.70 - 5.50 M/uL    HGB 10.6 (L) 13.0 - 16.0 g/dL HCT 33.5 (L) 36.0 - 48.0 %    MCV 88.6 74.0 - 97.0 FL    MCH 28.0 24.0 - 34.0 PG    MCHC 31.6 31.0 - 37.0 g/dL    RDW 15.6 (H) 11.6 - 14.5 %    PLATELET 082 726 - 264 K/uL    MPV 10.9 9.2 - 11.8 FL    NEUTROPHILS 57 40 - 73 %    LYMPHOCYTES 31 21 - 52 %    MONOCYTES 9 3 - 10 %    EOSINOPHILS 3 0 - 5 %    BASOPHILS 0 0 - 2 %    ABS. NEUTROPHILS 3.7 1.8 - 8.0 K/UL    ABS. LYMPHOCYTES 2.0 0.9 - 3.6 K/UL    ABS. MONOCYTES 0.6 0.05 - 1.2 K/UL    ABS. EOSINOPHILS 0.2 0.0 - 0.4 K/UL    ABS.  BASOPHILS 0.0 0.0 - 0.1 K/UL    DF AUTOMATED     METABOLIC PANEL, BASIC    Collection Time: 07/15/17  1:36 AM   Result Value Ref Range    Sodium 140 136 - 145 mmol/L    Potassium 3.6 3.5 - 5.5 mmol/L    Chloride 104 100 - 108 mmol/L    CO2 32 21 - 32 mmol/L    Anion gap 4 3.0 - 18 mmol/L    Glucose 119 (H) 74 - 99 mg/dL    BUN 23 (H) 7.0 - 18 MG/DL    Creatinine 2.92 (H) 0.6 - 1.3 MG/DL    BUN/Creatinine ratio 8 (L) 12 - 20      GFR est AA 27 (L) >60 ml/min/1.73m2    GFR est non-AA 22 (L) >60 ml/min/1.73m2    Calcium 8.2 (L) 8.5 - 10.1 MG/DL   PHOSPHORUS    Collection Time: 07/15/17  1:36 AM   Result Value Ref Range    Phosphorus 3.8 2.5 - 4.9 MG/DL   GLUCOSE, POC    Collection Time: 07/15/17  7:46 AM   Result Value Ref Range    Glucose (POC) 129 (H) 70 - 110 mg/dL   GLUCOSE, POC    Collection Time: 07/15/17 10:42 AM   Result Value Ref Range    Glucose (POC) 129 (H) 70 - 110 mg/dL   HGB & HCT    Collection Time: 07/15/17 10:55 AM   Result Value Ref Range    HGB 11.2 (L) 13.0 - 16.0 g/dL    HCT 35.5 (L) 36.0 - 48.0 %   TYPE & SCREEN    Collection Time: 07/15/17 11:00 AM   Result Value Ref Range    Crossmatch Expiration 07/18/2017     ABO/Rh(D) B POSITIVE     Antibody screen NEG    EKG, 12 LEAD, SUBSEQUENT    Collection Time: 07/15/17  1:14 PM   Result Value Ref Range    Ventricular Rate 71 BPM    Atrial Rate 71 BPM    P-R Interval 170 ms    QRS Duration 92 ms    Q-T Interval 412 ms    QTC Calculation (Bezet) 447 ms    Calculated P Axis 73 degrees    Calculated R Axis 37 degrees    Calculated T Axis 155 degrees    Diagnosis       Poor data quality, interpretation may be adversely affected  Normal sinus rhythm  ST & T wave abnormality, consider lateral ischemia  Abnormal ECG  When compared with ECG of 13-JUL-2017 08:53,  No significant change was found         Signed By: Cathy Moreno MD     July 15, 2017 1:05 PM

## 2017-07-15 NOTE — PROGRESS NOTES
Patient remained safe this shift. Assisted with activities as needed. Vital signs remained stable. Please see flow sheet. Denied of pain when asked. Will continue with plan of care.

## 2017-07-16 PROBLEM — N18.4 CHRONIC RENAL DISEASE, STAGE IV (HCC): Status: ACTIVE | Noted: 2017-07-16

## 2017-07-16 LAB
ANION GAP BLD CALC-SCNC: 5 MMOL/L (ref 3–18)
ATRIAL RATE: 71 BPM
BASOPHILS # BLD AUTO: 0 K/UL (ref 0–0.1)
BASOPHILS # BLD: 0 % (ref 0–2)
BUN SERPL-MCNC: 24 MG/DL (ref 7–18)
BUN/CREAT SERPL: 8 (ref 12–20)
CALCIUM SERPL-MCNC: 9 MG/DL (ref 8.5–10.1)
CALCULATED P AXIS, ECG09: 73 DEGREES
CALCULATED R AXIS, ECG10: 37 DEGREES
CALCULATED T AXIS, ECG11: 155 DEGREES
CHLORIDE SERPL-SCNC: 102 MMOL/L (ref 100–108)
CK MB CFR SERPL CALC: 2.3 % (ref 0–4)
CK MB CFR SERPL CALC: 2.4 % (ref 0–4)
CK MB CFR SERPL CALC: 2.5 % (ref 0–4)
CK MB SERPL-MCNC: 2.5 NG/ML (ref 5–25)
CK MB SERPL-MCNC: 3 NG/ML (ref 5–25)
CK MB SERPL-MCNC: 3.4 NG/ML (ref 5–25)
CK SERPL-CCNC: 110 U/L (ref 39–308)
CK SERPL-CCNC: 127 U/L (ref 39–308)
CK SERPL-CCNC: 134 U/L (ref 39–308)
CO2 SERPL-SCNC: 34 MMOL/L (ref 21–32)
CREAT SERPL-MCNC: 3.03 MG/DL (ref 0.6–1.3)
DIAGNOSIS, 93000: NORMAL
DIFFERENTIAL METHOD BLD: ABNORMAL
EOSINOPHIL # BLD: 0.2 K/UL (ref 0–0.4)
EOSINOPHIL NFR BLD: 3 % (ref 0–5)
ERYTHROCYTE [DISTWIDTH] IN BLOOD BY AUTOMATED COUNT: 15.2 % (ref 11.6–14.5)
GLUCOSE BLD STRIP.AUTO-MCNC: 121 MG/DL (ref 70–110)
GLUCOSE BLD STRIP.AUTO-MCNC: 161 MG/DL (ref 70–110)
GLUCOSE BLD STRIP.AUTO-MCNC: 188 MG/DL (ref 70–110)
GLUCOSE SERPL-MCNC: 115 MG/DL (ref 74–99)
HCT VFR BLD AUTO: 36.1 % (ref 36–48)
HCT VFR BLD AUTO: 36.2 % (ref 36–48)
HCT VFR BLD AUTO: 37.1 % (ref 36–48)
HGB BLD-MCNC: 11.4 G/DL (ref 13–16)
HGB BLD-MCNC: 11.4 G/DL (ref 13–16)
HGB BLD-MCNC: 11.9 G/DL (ref 13–16)
LYMPHOCYTES # BLD AUTO: 24 % (ref 21–52)
LYMPHOCYTES # BLD: 2 K/UL (ref 0.9–3.6)
MAGNESIUM SERPL-MCNC: 1.9 MG/DL (ref 1.6–2.6)
MCH RBC QN AUTO: 27.7 PG (ref 24–34)
MCHC RBC AUTO-ENTMCNC: 31.6 G/DL (ref 31–37)
MCV RBC AUTO: 87.8 FL (ref 74–97)
MONOCYTES # BLD: 1 K/UL (ref 0.05–1.2)
MONOCYTES NFR BLD AUTO: 12 % (ref 3–10)
NEUTS SEG # BLD: 5 K/UL (ref 1.8–8)
NEUTS SEG NFR BLD AUTO: 61 % (ref 40–73)
P-R INTERVAL, ECG05: 170 MS
PHOSPHATE SERPL-MCNC: 3.7 MG/DL (ref 2.5–4.9)
PLATELET # BLD AUTO: 346 K/UL (ref 135–420)
PMV BLD AUTO: 10.8 FL (ref 9.2–11.8)
POTASSIUM SERPL-SCNC: 3.4 MMOL/L (ref 3.5–5.5)
Q-T INTERVAL, ECG07: 412 MS
QRS DURATION, ECG06: 92 MS
QTC CALCULATION (BEZET), ECG08: 447 MS
RBC # BLD AUTO: 4.11 M/UL (ref 4.7–5.5)
SODIUM SERPL-SCNC: 141 MMOL/L (ref 136–145)
TROPONIN I SERPL-MCNC: 0.2 NG/ML (ref 0–0.04)
TROPONIN I SERPL-MCNC: 0.21 NG/ML (ref 0–0.04)
TROPONIN I SERPL-MCNC: 0.23 NG/ML (ref 0–0.04)
VENTRICULAR RATE, ECG03: 71 BPM
WBC # BLD AUTO: 8.2 K/UL (ref 4.6–13.2)

## 2017-07-16 PROCEDURE — 74011250636 HC RX REV CODE- 250/636: Performed by: HOSPITALIST

## 2017-07-16 PROCEDURE — 82962 GLUCOSE BLOOD TEST: CPT

## 2017-07-16 PROCEDURE — 74011250637 HC RX REV CODE- 250/637: Performed by: PHYSICIAN ASSISTANT

## 2017-07-16 PROCEDURE — C9113 INJ PANTOPRAZOLE SODIUM, VIA: HCPCS | Performed by: FAMILY MEDICINE

## 2017-07-16 PROCEDURE — 85018 HEMOGLOBIN: CPT | Performed by: FAMILY MEDICINE

## 2017-07-16 PROCEDURE — 74011636637 HC RX REV CODE- 636/637: Performed by: HOSPITALIST

## 2017-07-16 PROCEDURE — 82550 ASSAY OF CK (CPK): CPT | Performed by: EMERGENCY MEDICINE

## 2017-07-16 PROCEDURE — 80048 BASIC METABOLIC PNL TOTAL CA: CPT | Performed by: EMERGENCY MEDICINE

## 2017-07-16 PROCEDURE — 74011250636 HC RX REV CODE- 250/636: Performed by: FAMILY MEDICINE

## 2017-07-16 PROCEDURE — 74011250637 HC RX REV CODE- 250/637: Performed by: INTERNAL MEDICINE

## 2017-07-16 PROCEDURE — 65660000000 HC RM CCU STEPDOWN

## 2017-07-16 PROCEDURE — 85025 COMPLETE CBC W/AUTO DIFF WBC: CPT | Performed by: EMERGENCY MEDICINE

## 2017-07-16 PROCEDURE — 36415 COLL VENOUS BLD VENIPUNCTURE: CPT | Performed by: EMERGENCY MEDICINE

## 2017-07-16 PROCEDURE — 94640 AIRWAY INHALATION TREATMENT: CPT

## 2017-07-16 PROCEDURE — 84100 ASSAY OF PHOSPHORUS: CPT | Performed by: EMERGENCY MEDICINE

## 2017-07-16 PROCEDURE — 74011000258 HC RX REV CODE- 258: Performed by: FAMILY MEDICINE

## 2017-07-16 PROCEDURE — 74011250637 HC RX REV CODE- 250/637: Performed by: HOSPITALIST

## 2017-07-16 PROCEDURE — 83735 ASSAY OF MAGNESIUM: CPT | Performed by: EMERGENCY MEDICINE

## 2017-07-16 RX ORDER — CARVEDILOL 25 MG/1
25 TABLET ORAL 2 TIMES DAILY WITH MEALS
Status: DISCONTINUED | OUTPATIENT
Start: 2017-07-16 | End: 2017-07-17 | Stop reason: HOSPADM

## 2017-07-16 RX ORDER — FUROSEMIDE 40 MG/1
40 TABLET ORAL DAILY
Status: DISCONTINUED | OUTPATIENT
Start: 2017-07-17 | End: 2017-07-17 | Stop reason: HOSPADM

## 2017-07-16 RX ADMIN — HYDRALAZINE HYDROCHLORIDE 50 MG: 50 TABLET, FILM COATED ORAL at 21:53

## 2017-07-16 RX ADMIN — HYDRALAZINE HYDROCHLORIDE 50 MG: 50 TABLET, FILM COATED ORAL at 17:17

## 2017-07-16 RX ADMIN — ATORVASTATIN CALCIUM 40 MG: 40 TABLET, FILM COATED ORAL at 21:53

## 2017-07-16 RX ADMIN — NEPHROCAP 1 CAPSULE: 1 CAP ORAL at 08:06

## 2017-07-16 RX ADMIN — ISOSORBIDE MONONITRATE 60 MG: 60 TABLET, EXTENDED RELEASE ORAL at 08:06

## 2017-07-16 RX ADMIN — SODIUM CHLORIDE 8 MG/HR: 900 INJECTION, SOLUTION INTRAVENOUS at 05:02

## 2017-07-16 RX ADMIN — CARVEDILOL 12.5 MG: 12.5 TABLET, FILM COATED ORAL at 08:06

## 2017-07-16 RX ADMIN — FEBUXOSTAT 40 MG: 40 TABLET ORAL at 08:06

## 2017-07-16 RX ADMIN — DOCUSATE SODIUM 100 MG: 100 CAPSULE, LIQUID FILLED ORAL at 08:06

## 2017-07-16 RX ADMIN — ACETAMINOPHEN 500 MG: 500 TABLET ORAL at 08:49

## 2017-07-16 RX ADMIN — Medication 100 MG: at 08:06

## 2017-07-16 RX ADMIN — BUDESONIDE AND FORMOTEROL FUMARATE DIHYDRATE 2 PUFF: 160; 4.5 AEROSOL RESPIRATORY (INHALATION) at 09:20

## 2017-07-16 RX ADMIN — CARVEDILOL 25 MG: 25 TABLET, FILM COATED ORAL at 17:17

## 2017-07-16 RX ADMIN — HEPARIN SODIUM 5000 UNITS: 5000 INJECTION, SOLUTION INTRAVENOUS; SUBCUTANEOUS at 14:45

## 2017-07-16 RX ADMIN — HEPARIN SODIUM 5000 UNITS: 5000 INJECTION, SOLUTION INTRAVENOUS; SUBCUTANEOUS at 05:03

## 2017-07-16 RX ADMIN — INSULIN LISPRO 2 UNITS: 100 INJECTION, SOLUTION INTRAVENOUS; SUBCUTANEOUS at 21:53

## 2017-07-16 RX ADMIN — HEPARIN SODIUM 5000 UNITS: 5000 INJECTION, SOLUTION INTRAVENOUS; SUBCUTANEOUS at 21:53

## 2017-07-16 RX ADMIN — HYDRALAZINE HYDROCHLORIDE 50 MG: 50 TABLET, FILM COATED ORAL at 08:06

## 2017-07-16 RX ADMIN — INSULIN LISPRO 2 UNITS: 100 INJECTION, SOLUTION INTRAVENOUS; SUBCUTANEOUS at 17:17

## 2017-07-16 RX ADMIN — FUROSEMIDE 80 MG: 40 TABLET ORAL at 08:06

## 2017-07-16 RX ADMIN — LOSARTAN POTASSIUM 25 MG: 25 TABLET ORAL at 08:06

## 2017-07-16 RX ADMIN — DOCUSATE SODIUM 100 MG: 100 CAPSULE, LIQUID FILLED ORAL at 17:17

## 2017-07-16 NOTE — CONSULTS
[facility]  Pulmonary, Critical Care, and Sleep Medicine      Pulmonary Initial Patient Consult      Consulting physician: Dr. Michael Ojeda    Reason for consult: Hemoptysis    HPI: The patient is a 56/M with CAD, CKD and multiple vascular risk factors and problems who presented with chest pain. Pt. Was admitted with hypertensive urgency and is being treated for the same. Pt. Complained of blood tinged sputum x 1 yesterday hence we were consulted. Per patient, he was on oxygen via nasal cannula which made his nose dry, he felt something in the throat and when coughed he saw blood tinged sputum. No history of hemoptysis in the past. Not coagulopathic. Denies any fever, chills or rigors. Denies any weight loss. History of smoking for 15 years, 1/2 pack per day. He did not have any hemoptysis since yesterday. Past Medical History:   Diagnosis Date    Alcohol abuse     Arthritis     Atherosclerotic cardiovascular disease     CAD (coronary artery disease)     mild disease of coronaries (cor angio 2006),wife states he has 1 stent    Cardiac cath 01/26/2006    RCA mild. Otherwise patent coronaries. LVEDP 15.  EF 55-60%.  Cardiac echocardiogram 03/27/2009    EF 60%. No cardiac source of embolism. No significant valvular pathology.  Cardiac nuclear imaging test 12/06/2011    Small, mild inferior infarction or possibly artifact. No ischemia. EF 45%. No TID. No reg WMA.  Cardiovascular LLE venous duplex 08/14/2015    Left leg:  No DVT. Dilated lymph node in left groin.  Constipation     Diabetes mellitus type II     Gout     Hepatic steatosis     Hypercholesterolemia     Hypertension     Knee effusion, left     Left knee pain     Morbid obesity (HCC)     Noncompliance     Obesity (BMI 30-39. 9)     S/P colonoscopy 3/8/05    Dr. Orly Morrison; normal; f/u 10 years    Stomach problems     TIA (transient ischemic attack) 3/09    Tobacco abuse       Past Surgical History: Procedure Laterality Date    ABDOMEN SURGERY PROC UNLISTED      Hernia repair in 1960's or 1970's.  HX ORTHOPAEDIC      Bones spur removed from left & right foot 2013.  HX UROLOGICAL  8/18/2015    [facility], Dr. Anderson Blackburn, Cystoscopy, left retrograde, left double-J cath      Prior to Admission medications    Medication Sig Start Date End Date Taking? Authorizing Provider   lisinopril (PRINIVIL, ZESTRIL) 20 mg tablet Take 1 Tab by mouth daily. 6/2/17   Jun Hill MD   carvedilol (COREG) 25 mg tablet Take 1 Tab by mouth two (2) times daily (with meals). 6/2/17   Jun Hill MD   furosemide (LASIX) 20 mg tablet Take 1 Tab by mouth daily as needed. 6/2/17   Jun Hill MD   albuterol (VENTOLIN HFA) 90 mcg/actuation inhaler Take 2 Puffs by inhalation every four (4) hours as needed for Wheezing for up to 90 days. 6/2/17 8/31/17  Jun Hill MD   febuxostat (ULORIC) 40 mg tab tablet Take 1 Tab by mouth daily. 6/2/17   Jun Hill MD   hydrALAZINE (APRESOLINE) 50 mg tablet Take 1 Tab by mouth three (3) times daily. Indications: hypertension 6/2/17   Jun Hill MD   pantoprazole (PROTONIX) 20 mg tablet Take 1 Tab by mouth daily. 6/2/17   Jun Hill MD   glucose blood VI test strips (FREESTYLE TEST) strip by Does Not Apply route See Admin Instructions. Check twice a day 3/28/17   Jun Hill MD   aspirin 81 mg chewable tablet Take 1 Tab by mouth daily. 3/21/17   Jun Hill MD   B COMPLEX W-C NO.20/FOLIC ACID (B COMPLEX & C NO.20-FOLIC ACID PO) Take  by mouth. Historical Provider   POTASSIUM CHLORIDE SR 10 MEQ TAB two (2) times a day. Historical Provider   budesonide-formoterol (SYMBICORT) 160-4.5 mcg/actuation HFA inhaler Take 2 Puffs by inhalation two (2) times a day. 2/22/17   Jun Hill MD   cloNIDine HCl (CATAPRES) 0.1 mg tablet Take 1 Tab by mouth two (2) times a day.  2/10/17   Love Blackmon MD   calcitRIOL (ROCALTROL) 0.25 mcg capsule Take 1 Cap by mouth every Monday, Wednesday, Friday. 2/10/17   Love Blackmon MD   insulin NPH/insulin regular (NOVOLIN 70/30) 100 unit/mL (70-30) injection 30 UNITS SUBCUTANEOUSLY TWICE DAILY. Do not take it blood sugar is less than 100 2/10/17   Love Blackmon MD   Insulin Needles, Disposable, 29 gauge x 5/16\" ndle 40 Units by Does Not Apply route two (2) times a day. 8/22/16   Jun Hill MD   acetaminophen (TYLENOL) 650 mg CR tablet Take 650 mg by mouth every six (6) hours as needed for Pain. Historical Provider   Lancets misc Check 3 times a day , needs according contour glucometer 1/20/15   Jun Hill MD   Blood-Glucose Meter monitoring kit Check twice daily.  9/23/14   Jun Hill MD     Current Facility-Administered Medications   Medication Dose Route Frequency    pantoprazole (PROTONIX) 80 mg in 0.9% sodium chloride 100 mL infusion  8 mg/hr IntraVENous CONTINUOUS    losartan (COZAAR) tablet 25 mg  25 mg Oral DAILY    hydrALAZINE (APRESOLINE) tablet 50 mg  50 mg Oral TID    atorvastatin (LIPITOR) tablet 40 mg  40 mg Oral QHS    furosemide (LASIX) tablet 80 mg  80 mg Oral DAILY    isosorbide mononitrate ER (IMDUR) tablet 60 mg  60 mg Oral DAILY    carvedilol (COREG) tablet 12.5 mg  12.5 mg Oral BID WITH MEALS    B complex-vitaminC-folic acid (NEPHROCAP) cap  1 Cap Oral DAILY    budesonide-formoterol (SYMBICORT) 160-4.5 mcg/actuation HFA inhaler 2 Puff  2 Puff Inhalation BID RT    calcitRIOL (ROCALTROL) capsule 0.25 mcg  0.25 mcg Oral Q MON, WED & FRI    febuxostat (ULORIC) tablet 40 mg  40 mg Oral DAILY    heparin (porcine) injection 5,000 Units  5,000 Units SubCUTAneous Q8H    docusate sodium (COLACE) capsule 100 mg  100 mg Oral BID    insulin lispro (HUMALOG) injection   SubCUTAneous AC&HS    thiamine (B-1) tablet 100 mg  100 mg Oral DAILY     Allergies   Allergen Reactions    Shellfish Derived Other (comments)     Causes Gout      Social History   Substance Use Topics    Smoking status: Current Every Day Smoker     Packs/day: 0.25     Types: Cigarettes     Last attempt to quit: 2016    Smokeless tobacco: Never Used    Alcohol use 0.0 oz/week     0 Standard drinks or equivalent per week      Comment: 1 beers a day. Family History   Problem Relation Age of Onset    Diabetes Father     Heart Disease Mother     Diabetes Sister     Hypertension Sister     Arthritis-osteo Sister     Arthritis-osteo Brother     Diabetes Other     Diabetes Other      Uncle, Aunt    Hypertension Other      Uncle; Aunt        Review of Systems:  A comprehensive review of systems was negative except for that written in the HPI. Objective:   Vital Signs:    Visit Vitals    /84 (BP 1 Location: Left arm, BP Patient Position: At rest)    Pulse 88    Temp 98 °F (36.7 °C)    Resp 18    Ht 5' 4\" (1.626 m)    Wt 105 kg (231 lb 7.7 oz)    SpO2 94%    BMI 39.73 kg/m2       O2 Device: Room air   O2 Flow Rate (L/min): 2 l/min   Temp (24hrs), Av.9 °F (36.6 °C), Min:97.1 °F (36.2 °C), Max:98.5 °F (36.9 °C)       Intake/Output:   Last shift:       07 -  190  In: -   Out: 300 [Urine:300]  Last 3 shifts: 1901 -  0700  In: 1210 [P.O.:1210]  Out: 4600 [Urine:4600]    Intake/Output Summary (Last 24 hours) at 17 1204  Last data filed at 17 0758   Gross per 24 hour   Intake              240 ml   Output             2600 ml   Net            -2360 ml         Ventilator Settings:  Mode Rate Tidal Volume Pressure FiO2 PEEP            21 %       Peak airway pressure:      Minute ventilation:            Physical Exam:          General: Alert, awake and oriented.  Not in acute distress  HEENT:  Anicteric sclerae; pink palpebral conjunctivae; mucosa moist  Resp:  Symmetrical chest expansion, no accessory muscle use; good airway entry; no rales/ wheezing/ rhonchi noted  CV:  S1, S2 present; regular rate and rhythm  GI:  Abdomen soft, non-tender; (+) active bowel sounds  Extremities:  +2 pulses on all extremities; no edema/ cyanosis/ clubbing noted  Skin:  Warm; no rashes/ lesions noted  Neurologic:  Non-focal                                                                 Data:     Recent Results (from the past 24 hour(s))   EKG, 12 LEAD, SUBSEQUENT    Collection Time: 07/15/17  1:14 PM   Result Value Ref Range    Ventricular Rate 71 BPM    Atrial Rate 71 BPM    P-R Interval 170 ms    QRS Duration 92 ms    Q-T Interval 412 ms    QTC Calculation (Bezet) 447 ms    Calculated P Axis 73 degrees    Calculated R Axis 37 degrees    Calculated T Axis 155 degrees    Diagnosis       Normal sinus rhythm  ST & T wave abnormality, consider lateral ischemia  Abnormal ECG  When compared with ECG of 13-JUL-2017 08:53,  No significant change was found  Confirmed by Rene Sibley ([id number]) on 7/16/2017 8:36:13 AM     GLUCOSE, POC    Collection Time: 07/15/17  4:54 PM   Result Value Ref Range    Glucose (POC) 101 70 - 110 mg/dL   HGB & HCT    Collection Time: 07/15/17  6:50 PM   Result Value Ref Range    HGB 11.9 (L) 13.0 - 16.0 g/dL    HCT 37.6 36.0 - 48.0 %   GLUCOSE, POC    Collection Time: 07/15/17  8:05 PM   Result Value Ref Range    Glucose (POC) 155 (H) 70 - 505 mg/dL   METABOLIC PANEL, BASIC    Collection Time: 07/16/17  3:00 AM   Result Value Ref Range    Sodium 141 136 - 145 mmol/L    Potassium 3.4 (L) 3.5 - 5.5 mmol/L    Chloride 102 100 - 108 mmol/L    CO2 34 (H) 21 - 32 mmol/L    Anion gap 5 3.0 - 18 mmol/L    Glucose 115 (H) 74 - 99 mg/dL    BUN 24 (H) 7.0 - 18 MG/DL    Creatinine 3.03 (H) 0.6 - 1.3 MG/DL    BUN/Creatinine ratio 8 (L) 12 - 20      GFR est AA 26 (L) >60 ml/min/1.73m2    GFR est non-AA 22 (L) >60 ml/min/1.73m2    Calcium 9.0 8.5 - 10.1 MG/DL   PHOSPHORUS    Collection Time: 07/16/17  3:00 AM   Result Value Ref Range    Phosphorus 3.7 2.5 - 4.9 MG/DL   CBC WITH AUTOMATED DIFF    Collection Time: 07/16/17  3:00 AM   Result Value Ref Range    WBC 8.2 4.6 - 13.2 K/uL RBC 4.11 (L) 4.70 - 5.50 M/uL    HGB 11.4 (L) 13.0 - 16.0 g/dL    HCT 36.1 36.0 - 48.0 %    MCV 87.8 74.0 - 97.0 FL    MCH 27.7 24.0 - 34.0 PG    MCHC 31.6 31.0 - 37.0 g/dL    RDW 15.2 (H) 11.6 - 14.5 %    PLATELET 502 744 - 124 K/uL    MPV 10.8 9.2 - 11.8 FL    NEUTROPHILS 61 40 - 73 %    LYMPHOCYTES 24 21 - 52 %    MONOCYTES 12 (H) 3 - 10 %    EOSINOPHILS 3 0 - 5 %    BASOPHILS 0 0 - 2 %    ABS. NEUTROPHILS 5.0 1.8 - 8.0 K/UL    ABS. LYMPHOCYTES 2.0 0.9 - 3.6 K/UL    ABS. MONOCYTES 1.0 0.05 - 1.2 K/UL    ABS. EOSINOPHILS 0.2 0.0 - 0.4 K/UL    ABS. BASOPHILS 0.0 0.0 - 0.1 K/UL    DF AUTOMATED     MAGNESIUM    Collection Time: 07/16/17  3:00 AM   Result Value Ref Range    Magnesium 1.9 1.6 - 2.6 mg/dL   CARDIAC PANEL,(CK, CKMB & TROPONIN)    Collection Time: 07/16/17 10:29 AM   Result Value Ref Range     39 - 308 U/L    CK - MB 2.5 <3.6 ng/ml    CK-MB Index 2.3 0.0 - 4.0 %    Troponin-I, Qt. 0.23 (H) 0.0 - 0.045 NG/ML   HGB & HCT    Collection Time: 07/16/17 10:58 AM   Result Value Ref Range    HGB 11.4 (L) 13.0 - 16.0 g/dL    HCT 36.2 36.0 - 48.0 %           No results for input(s): FIO2I, IFO2, HCO3I, IHCO3, HCOPOC, PCO2I, PCOPOC, IPHI, PHI, PHPOC, PO2I, PO2POC in the last 72 hours. No lab exists for component: IPOC2      Imaging:    Assessment and plan: The patient is a 56/M with CAD, CKD and multiple vascular risk factors and problems who presented with chest pain. Pt. Was admitted with hypertensive urgency and is being treated for the same. Pt. Complained of blood tinged sputum x 1 yesterday hence we were consulted. Hemoptysis: Resolved, none since yesterday. He had only one episode of blood tinged sputum. Seems it was an upper airway issue (dry nasal cavity from oxygen). CT chest did not show any airway or parenchymal lesion. Would recommend ENT evaluation.     Татьяна Kramer MD

## 2017-07-16 NOTE — PROGRESS NOTES
-no further hemoptysis. -pt again re-iterated that there was no hematemesis. -ok to stop PPI from my perspective. Will sign off-Thank you for this consultation and the opportunity to participate in the care of this patient. Please do not hesitate to call with any questions or concerns, or should event occur that may necessitate additional GI evaluation. Discussed w Dr. Alexandro Pond MD  [facility].  [email or web address]  Phone: [phone]  Pager: [phone]  Cell: [phone]. [email or web address]

## 2017-07-16 NOTE — PROGRESS NOTES
[facility]  -  Progress Note      Patient: Hali Mccollum MRN: [id number]  SSN: [id number]    YOB: 1960  Age: 64 y.o. Sex: male      Admit Date: 7/13/2017     The patient was seen, examined, and independently evaluated and I agree with the below assessment and plan by [facility] MARIMAR Denney PA-C with the following comments. Stable from CV vantage, but with potentially ischemic EKG changes which are new on this admission as compared to the EKG of 3/17/2017 and wall motion abnormalities on echocardiogram which are new as compared to the echocardiogram of February 9, 2017 would proceed with myocardial perfusion study in AM to rule out significant myocardium at ischemic risk. His blood pressure remains significantly elevated in 941 to 448 systolic range today in the setting of heart rates in the 80's and 90's so would consider increasing Coreg which we will hold in the AM prior to myocardial perfusion study and ultimately if blood pressure remains elevated consider adding Norvasc. The patient's weight is down 14 pounds in the past 2 days and output excellent, so will decrease Lasix in the setting of stage IV CKD. He is on Cozaar, but had been on lisinopril on admission and creatinine was 2.62 on admission and is only up to 3.03 today without change in BUN so will leave on Cozaar for now.     Hospital Problem List:     Hospital Problems  Date Reviewed: 7/15/2017          Codes Class Noted POA    Cocaine abuse ICD-10-CM: F14.10  ICD-9-CM: 305.60  7/14/2017 Unknown        Chronic kidney disease, stage III (moderate) ICD-10-CM: N18.3  ICD-9-CM: 585.3  2/9/2017 Yes        Secondary hyperparathyroidism of renal origin ([facility] [address] ICD-10-CM: N25.81  ICD-9-CM: 588.81  2/9/2017 Yes        Acute chest pain ICD-10-CM: R07.9  ICD-9-CM: 786.50  8/13/2016 Unknown        CAD (coronary artery disease) ICD-10-CM: I25.10  ICD-9-CM: 414.00  Unknown Yes    Overview Signed 2/23/2012 12:19 AM by Penelope Flores V mild disease of coronaries (cor angio 2006)             Hypertensive heart disease ICD-10-CM: I11.9  ICD-9-CM: 402.90  Unknown Yes              Volume overload. Although there may be a component of diastolic heart failure concerned about worsening renal function with possible nephrotic syndrome. EF 65-70% with grade 2 DD 2/2017. EF now 50-55% with possible hypokinesis of inferolateral wall. -EKG changes with inferolateral wall motion abnormalities on echocardiogram. Nuclear stress Monday morning to r/o ischemia.  -Hypertension out of control. Reports taking his medications until running out yesterday, reports trying to decrease salt intake. Started on nitro gtt yesterday. BP improving.   -Chronic kidney disease with worsening this admission.  -Diabetes mellitus. Reports frequent hypoglycemic episodes. Hgb A1c 6.1.  -Dyslipidemia. -H/o TIA. -Reactive airway disease on inhalers.  -Tobacco abuse, advised cessation.  -Cocaine use, positive drug screen. -H/o ETOH abuse. -H/o noncompliance, advised compliance. Assessment & Plan:     -Given progressive EKG changes and abn echo will plan to stress patient tomorrow. -NPO after midnight (ordered)  -Continue current cardiac regimen. Hemodynamics are stable. -Appreciate GI input.   -Resume daily aspirin 81mg.   -Monitor lytes. Subjective: Without complaint. Anxious to go home. Denies chest pain. Denies shortness of breath at rest or with ambulating. No dizziness.     Objective:      Patient Vitals for the past 8 hrs:   Temp Pulse Resp BP SpO2   07/16/17 1205 98 °F (36.7 °C) 78 19 143/82 94 %   07/16/17 0930 98 °F (36.7 °C) 88 18 149/84 94 %   07/16/17 0921 - - - - 93 %   07/16/17 0732 98.5 °F (36.9 °C) 89 19 (!) 205/97 94 %         Patient Vitals for the past 96 hrs:   Weight   07/16/17 0400 105 kg (231 lb 7.7 oz)   07/15/17 0616 111.1 kg (244 lb 14.9 oz)   07/14/17 0529 111.2 kg (245 lb 1.6 oz)   07/13/17 0846 99.8 kg (220 lb) Intake/Output Summary (Last 24 hours) at 07/16/17 1217  Last data filed at 07/16/17 0758   Gross per 24 hour   Intake              240 ml   Output             2600 ml   Net            -2360 ml       Physical Exam:  General:  Awake, alert, oriented x 3  Neck:  Supple, no jvd  Lungs: On room air with normal effort, clear to auscultation bilaterally   Heart:  Reg rate and rhythm without murmur  Abdomen:  Obese, soft, non-tender  Extremities:  Atraumatic, no edema    Data Review:     Labs: Results:       Chemistry Recent Labs      07/16/17   0300  07/15/17   0136  07/14/17   0211   GLU  115*  119*  88   NA  141  140  144   K  3.4*  3.6  3.6   CL  102  104  107   CO2  34*  32  30   BUN  24*  23*  25*   CREA  3.03*  2.92*  3.02*   CA  9.0  8.2*  8.2*  8.5   MG  1.9   --   1.8   PHOS  3.7  3.8  3.9   AGAP  5  4  7   BUCR  8*  8*  8*   AP   --    --   60   TP   --    --   6.0*   ALB   --    --   2.2*   GLOB   --    --   3.8   AGRAT   --    --   0.6*      CBC w/Diff Recent Labs      07/16/17   1058  07/16/17   0300  07/15/17   1850   07/15/17   0136  07/14/17   0211   WBC   --   8.2   --    --   6.5  7.1   RBC   --   4.11*   --    --   3.78*  4.02*   HGB  11.4*  11.4*  11.9*   < >  10.6*  11.3*   HCT  36.2  36.1  37.6   < >  33.5*  35.5*   PLT   --   346   --    --   314  343   GRANS   --   61   --    --   57  59   LYMPH   --   24   --    --   31  31   EOS   --   3   --    --   3  2    < > = values in this interval not displayed. Cardiac Enzymes Lab Results   Component Value Date/Time     07/16/2017 10:29 AM    CKMB 2.5 07/16/2017 10:29 AM    CKND1 2.3 07/16/2017 10:29 AM    TROIQ 0.23 (H) 07/16/2017 10:29 AM      Coagulation No results for input(s): PTP, INR, APTT in the last 72 hours.     No lab exists for component: INREXT, INREXT    Lipid Panel Lab Results   Component Value Date/Time    Cholesterol, total 294 07/14/2017 02:11 AM    HDL Cholesterol 47 07/14/2017 02:11 AM    LDL, calculated 196.4 07/14/2017 02:11 AM    VLDL, calculated 50.6 07/14/2017 02:11 AM    Triglyceride 253 07/14/2017 02:11 AM    CHOL/HDL Ratio 6.3 07/14/2017 02:11 AM      BNP No results found for: BNP, BNPP, XBNPT   Liver Enzymes Recent Labs      07/14/17   0211   TP  6.0*   ALB  2.2*   AP  60   SGOT  16      Digoxin    Thyroid Studies Lab Results   Component Value Date/Time    TSH 0.55 02/09/2017 04:00 AM            Signed By: Som Goetz DO     July 16, 2017

## 2017-07-16 NOTE — PROGRESS NOTES
[facility]  Progress Note    Patient: Mily Andrew Age: 64 y.o. : 1960 MR#: [id number] SSN: [id number]  Date: 2017     Subjective:     Patient sitting in bed in NAD, awake, follows commands, wife at bedside    Assessment/Plan:     1- Acute diastolic chf exacerbation  2- Hypertensive urgency at admission  3- CYNDI on CKD3  4- DM2  5- dyslipidemia  6- Polysubstance abuse  7- Hemoptysis/Hematemesis    PLAN  Lasix, I/O,   On BB, on coreg, hydralazine, imdur  ST per cardio tomorrow  Monitor renal function  Monitor sugars  GI, pulm input noted  OP f/u with ENT- d/w patient    Case discussed with:  [x]Patient  [x]Family  []Nursing  []Case Management  DVT Prophylaxis:  []Lovenox  []Hep SQ  []SCDs  []Coumadin   []On Heparin gtt    Objective:   VS:   Visit Vitals    /82 (BP 1 Location: Left arm, BP Patient Position: At rest)    Pulse 78    Temp 98 °F (36.7 °C)    Resp 19    Ht 5' 4\" (1.626 m)    Wt 105 kg (231 lb 7.7 oz)    SpO2 94%    BMI 39.73 kg/m2      Tmax/24hrs: Temp (24hrs), Av.9 °F (36.6 °C), Min:97.1 °F (36.2 °C), Max:98.5 °F (36.9 °C)      Intake/Output Summary (Last 24 hours) at 17 1640  Last data filed at 17 0758   Gross per 24 hour   Intake              240 ml   Output             1200 ml   Net             -960 ml       General:  Awake, alert  Cardiovascular:  S1S2+, RRR  Pulmonary:  CTA b/l  GI:  Soft, BS+, NT, ND  Extremities:  trace edema          Labs:    Recent Results (from the past 24 hour(s))   GLUCOSE, POC    Collection Time: 07/15/17  4:54 PM   Result Value Ref Range    Glucose (POC) 101 70 - 110 mg/dL   HGB & HCT    Collection Time: 07/15/17  6:50 PM   Result Value Ref Range    HGB 11.9 (L) 13.0 - 16.0 g/dL    HCT 37.6 36.0 - 48.0 %   GLUCOSE, POC    Collection Time: 07/15/17  8:05 PM   Result Value Ref Range    Glucose (POC) 155 (H) 70 - 049 mg/dL   METABOLIC PANEL, BASIC    Collection Time: 17  3:00 AM   Result Value Ref Range    Sodium 141 136 - 145 mmol/L    Potassium 3.4 (L) 3.5 - 5.5 mmol/L    Chloride 102 100 - 108 mmol/L    CO2 34 (H) 21 - 32 mmol/L    Anion gap 5 3.0 - 18 mmol/L    Glucose 115 (H) 74 - 99 mg/dL    BUN 24 (H) 7.0 - 18 MG/DL    Creatinine 3.03 (H) 0.6 - 1.3 MG/DL    BUN/Creatinine ratio 8 (L) 12 - 20      GFR est AA 26 (L) >60 ml/min/1.73m2    GFR est non-AA 22 (L) >60 ml/min/1.73m2    Calcium 9.0 8.5 - 10.1 MG/DL   PHOSPHORUS    Collection Time: 07/16/17  3:00 AM   Result Value Ref Range    Phosphorus 3.7 2.5 - 4.9 MG/DL   CBC WITH AUTOMATED DIFF    Collection Time: 07/16/17  3:00 AM   Result Value Ref Range    WBC 8.2 4.6 - 13.2 K/uL    RBC 4.11 (L) 4.70 - 5.50 M/uL    HGB 11.4 (L) 13.0 - 16.0 g/dL    HCT 36.1 36.0 - 48.0 %    MCV 87.8 74.0 - 97.0 FL    MCH 27.7 24.0 - 34.0 PG    MCHC 31.6 31.0 - 37.0 g/dL    RDW 15.2 (H) 11.6 - 14.5 %    PLATELET 235 632 - 988 K/uL    MPV 10.8 9.2 - 11.8 FL    NEUTROPHILS 61 40 - 73 %    LYMPHOCYTES 24 21 - 52 %    MONOCYTES 12 (H) 3 - 10 %    EOSINOPHILS 3 0 - 5 %    BASOPHILS 0 0 - 2 %    ABS. NEUTROPHILS 5.0 1.8 - 8.0 K/UL    ABS. LYMPHOCYTES 2.0 0.9 - 3.6 K/UL    ABS. MONOCYTES 1.0 0.05 - 1.2 K/UL    ABS. EOSINOPHILS 0.2 0.0 - 0.4 K/UL    ABS.  BASOPHILS 0.0 0.0 - 0.1 K/UL    DF AUTOMATED     MAGNESIUM    Collection Time: 07/16/17  3:00 AM   Result Value Ref Range    Magnesium 1.9 1.6 - 2.6 mg/dL   CARDIAC PANEL,(CK, CKMB & TROPONIN)    Collection Time: 07/16/17 10:29 AM   Result Value Ref Range     39 - 308 U/L    CK - MB 2.5 <3.6 ng/ml    CK-MB Index 2.3 0.0 - 4.0 %    Troponin-I, Qt. 0.23 (H) 0.0 - 0.045 NG/ML   HGB & HCT    Collection Time: 07/16/17 10:58 AM   Result Value Ref Range    HGB 11.4 (L) 13.0 - 16.0 g/dL    HCT 36.2 36.0 - 48.0 %   GLUCOSE, POC    Collection Time: 07/16/17 12:08 PM   Result Value Ref Range    Glucose (POC) 121 (H) 70 - 110 mg/dL       Signed By: Cranston Schlatter, MD     July 16, 2017

## 2017-07-16 NOTE — PROGRESS NOTES
Progress Note    Elder Tc  64 y.o. Admit Date: 7/13/2017  Active Problems:    CAD (coronary artery disease) () POA: Yes      Overview: mild disease of coronaries (cor angio 2006)      Hypertensive heart disease () POA: Yes      Noncompliance () POA: Yes      Hemoptysis (2/21/2014) POA: Yes      Acute chest pain (8/13/2016) POA: Unknown      Persistent proteinuria (2/9/2017) POA: Yes      Secondary hyperparathyroidism of renal origin ([facility] (2/9/2017) POA: Yes      Cocaine abuse (7/14/2017) POA: Unknown      Chronic renal disease, stage IV ([facility] (7/16/2017) POA: Clinically Undetermined            Subjective:     Patient feels good, no barba hemoptysis, no SOB. No more Hypoglycemia      A comprehensive review of systems was negative except for that written in the History of Present Illness.     Objective:     Visit Vitals    /82 (BP 1 Location: Left arm, BP Patient Position: At rest)    Pulse 78    Temp 98 °F (36.7 °C)    Resp 19    Ht 5' 4\" (1.626 m)    Wt 105 kg (231 lb 7.7 oz)    SpO2 94%    BMI 39.73 kg/m2         Intake/Output Summary (Last 24 hours) at 07/16/17 1417  Last data filed at 07/16/17 0758   Gross per 24 hour   Intake              240 ml   Output             2400 ml   Net            -2160 ml       Current Facility-Administered Medications   Medication Dose Route Frequency Provider Last Rate Last Dose    [START ON 7/17/2017] furosemide (LASIX) tablet 40 mg  40 mg Oral DAILY Merlin Diamonds [place], DO        carvedilol (COREG) tablet 25 mg  25 mg Oral BID WITH MEALS [place] Cranblanca, DO        hydrALAZINE (APRESOLINE) 20 mg/mL injection 10 mg  10 mg IntraVENous Q6H PRN Gely Whitt MD        losartan (COZAAR) tablet 25 mg  25 mg Oral DAILY Enrrique Rick MD   25 mg at 07/16/17 0806    hydrALAZINE (APRESOLINE) tablet 50 mg  50 mg Oral TID AUBREY [place]   50 mg at 07/16/17 0806    atorvastatin (LIPITOR) tablet 40 mg  40 mg Oral  [place], [place]   40 mg at 07/15/17 3494    isosorbide mononitrate ER (IMDUR) tablet 60 mg  60 mg Oral DAILY Shira Small MD   60 mg at 07/16/17 0806    nitroglycerin (NITROSTAT) tablet 0.4 mg  0.4 mg SubLINGual PRN Page Hannah MD   0.4 mg at 07/13/17 1779    B complex-vitaminC-folic acid (NEPHROCAP) cap  1 Cap Oral DAILY Mary Casarez MD   1 Cap at 07/16/17 0806    budesonide-formoterol (SYMBICORT) 160-4.5 mcg/actuation HFA inhaler 2 Puff  2 Puff Inhalation BID RT Mary Casarez MD   2 Puff at 07/16/17 0920    calcitRIOL (ROCALTROL) capsule 0.25 mcg  0.25 mcg Oral Q MON, WED & Genesis Hawk MD   0.25 mcg at 07/14/17 1655    febuxostat (ULORIC) tablet 40 mg  40 mg Oral DAILY Mary Casarez MD   40 mg at 07/16/17 0806    acetaminophen (TYLENOL) tablet 500 mg  500 mg Oral Q6H PRN Mary Casarez MD   500 mg at 07/16/17 0849    ondansetron (ZOFRAN) injection 4 mg  4 mg IntraVENous Q8H PRN Mary Casarez MD        heparin (porcine) injection 5,000 Units  5,000 Units SubCUTAneous Joshua Lehman MD   5,000 Units at 07/16/17 0503    docusate sodium (COLACE) capsule 100 mg  100 mg Oral BID Mary Casarez MD   100 mg at 07/16/17 0806    insulin lispro (HUMALOG) injection   SubCUTAneous AC&HS Mary Casarez MD   Stopped at 07/16/17 0730    glucose chewable tablet 16 g  16 g Oral PRN Mary Casarez MD        glucagon (GLUCAGEN) injection 1 mg  1 mg IntraMUSCular PRN Mary Casarez MD        dextrose (D50W) injection syrg 12.5-25 g  25-50 mL IntraVENous PRN Mary Casarez MD        thiamine (B-1) tablet 100 mg  100 mg Oral DAILY Mary Casarez MD   100 mg at 07/16/17 5480        Physical Exam:     Physical Exam:   General:  Alert, cooperative, no distress, appears stated age. Neck: Supple, symmetrical, trachea midline, no adenopathy, thyroid: no enlargement/tenderness/nodules, no carotid bruit and no JVD. Lungs:   Clear to auscultation bilaterally.    Heart:  Regular rate and rhythm, S1, S2 normal, no murmur, click, rub or gallop. Abdomen:   Soft, non-tender. Bowel sounds normal. No masses,  No organomegaly. Extremities: Extremities normal, atraumatic, no cyanosis or edema. Skin: Skin color, texture, turgor normal. No rashes or lesions         Data Review:    CBC w/Diff    Recent Labs      07/16/17   1058  07/16/17   0300  07/15/17   1850   07/15/17   0136  07/14/17   0211   WBC   --   8.2   --    --   6.5  7.1   RBC   --   4.11*   --    --   3.78*  4.02*   HGB  11.4*  11.4*  11.9*   < >  10.6*  11.3*   HCT  36.2  36.1  37.6   < >  33.5*  35.5*   MCV   --   87.8   --    --   88.6  88.3   MCH   --   27.7   --    --   28.0  28.1   MCHC   --   31.6   --    --   31.6  31.8   RDW   --   15.2*   --    --   15.6*  15.7*    < > = values in this interval not displayed. Recent Labs      07/16/17   0300  07/15/17   0136  07/14/17   0211   MONOS  12*  9  8   EOS  3  3  2   BASOS  0  0  0   RDW  15.2*  15.6*  15.7*        Comprehensive Metabolic Profile    Recent Labs      07/16/17   0300  07/15/17   0136 07/14/17   0211   NA  141  140  144   K  3.4*  3.6  3.6   CL  102  104  107   CO2  34*  32  30   BUN  24*  23*  25*   CREA  3.03*  2.92*  3.02*    Recent Labs      07/16/17   0300  07/15/17   0136  07/14/17   0211   CA  9.0  8.2*  8.2*  8.5   PHOS  3.7  3.8  3.9   ALB   --    --   2.2*   TP   --    --   6.0*   SGOT   --    --   16   TBILI   --    --   0.2            Results for Ramone Haider (MRN [id number]) as of 7/16/2017 14:13   Ref. Range 10/27/2016 11:35 2/7/2017 19:51 2/7/2017 19:51 2/9/2017 04:00 7/13/2017 10:15   Creatinine, urine Latest Ref Range: 30 - 125 mg/dL 192.9 67.90 69.20     Protein, urine random Latest Ref Range: <11.9 mg/dL   538 (H) 627.5 1061 (H)   Protein/Creat.  urine Ratio Latest Units:     7.8     Microalbumin, urine Latest Ref Range: Not Estab. ug/mL 10662.0       Sodium urine, random Latest Ref Range: 20 - 110 MMOL/L  92      Osmolality,urine Latest Ref Range: 300 - 900 MOSM/kg H2O  361 Impression:       Active Hospital Problems    Diagnosis Date Noted    Chronic renal disease, stage IV ([facility] 07/16/2017    Cocaine abuse 07/14/2017    [facility] 02/09/2017    Persistent proteinuria 02/09/2017    Acute chest pain 08/13/2016    Hemoptysis 02/21/2014    Noncompliance     Hypertensive heart disease     CAD (coronary artery disease)      mild disease of coronaries (cor angio 2006)              Plan:     Hemoptysis in a patient with  With known Renal Failure  With massive Proteinuria , Bronchitis on Chest  CT , needs to check ANCA & anti GBM antibody (doubt very much any vasculitis).       Susan Valdez MD

## 2017-07-16 NOTE — ROUTINE PROCESS
Bedside and Verbal shift change report given to [facility] RN (oncoming nurse) by Carmina Ferrer RN (offgoing nurse). Report included the following information SBAR, Kardex and MAR.

## 2017-07-17 VITALS
OXYGEN SATURATION: 91 % | HEIGHT: 64 IN | HEART RATE: 79 BPM | RESPIRATION RATE: 20 BRPM | SYSTOLIC BLOOD PRESSURE: 129 MMHG | TEMPERATURE: 98.1 F | DIASTOLIC BLOOD PRESSURE: 77 MMHG | WEIGHT: 231 LBS | BODY MASS INDEX: 39.44 KG/M2

## 2017-07-17 DIAGNOSIS — R06.83 SNORING: Primary | ICD-10-CM

## 2017-07-17 LAB
ANION GAP BLD CALC-SCNC: 8 MMOL/L (ref 3–18)
ATTENDING PHYSICIAN, CST07: NORMAL
BASOPHILS # BLD AUTO: 0 K/UL (ref 0–0.1)
BASOPHILS # BLD: 0 % (ref 0–2)
BUN SERPL-MCNC: 25 MG/DL (ref 7–18)
BUN/CREAT SERPL: 8 (ref 12–20)
CALCIUM SERPL-MCNC: 8.9 MG/DL (ref 8.5–10.1)
CHLORIDE SERPL-SCNC: 101 MMOL/L (ref 100–108)
CO2 SERPL-SCNC: 32 MMOL/L (ref 21–32)
CREAT SERPL-MCNC: 3.13 MG/DL (ref 0.6–1.3)
DIAGNOSIS, 93000: NORMAL
DIFFERENTIAL METHOD BLD: ABNORMAL
DUKE TM SCORE RESULT, CST14: NORMAL
DUKE TREADMILL SCORE, CST13: NORMAL
ECG INTERP BEFORE EX, CST11: NORMAL
ECG INTERP DURING EX, CST12: NORMAL
EOSINOPHIL # BLD: 0.2 K/UL (ref 0–0.4)
EOSINOPHIL NFR BLD: 3 % (ref 0–5)
ERYTHROCYTE [DISTWIDTH] IN BLOOD BY AUTOMATED COUNT: 15 % (ref 11.6–14.5)
FUNCTIONAL CAPACITY, CST17: NORMAL
GLUCOSE BLD STRIP.AUTO-MCNC: 116 MG/DL (ref 70–110)
GLUCOSE BLD STRIP.AUTO-MCNC: 183 MG/DL (ref 70–110)
GLUCOSE SERPL-MCNC: 113 MG/DL (ref 74–99)
HCT VFR BLD AUTO: 36.4 % (ref 36–48)
HCT VFR BLD AUTO: 37.6 % (ref 36–48)
HGB BLD-MCNC: 11.5 G/DL (ref 13–16)
HGB BLD-MCNC: 11.9 G/DL (ref 13–16)
KNOWN CARDIAC CONDITION, CST08: NORMAL
LYMPHOCYTES # BLD AUTO: 29 % (ref 21–52)
LYMPHOCYTES # BLD: 2.2 K/UL (ref 0.9–3.6)
MAX. DIASTOLIC BP, CST04: 100 MMHG
MAX. HEART RATE, CST05: 98 BPM
MAX. SYSTOLIC BP, CST03: 181 MMHG
MCH RBC QN AUTO: 27.6 PG (ref 24–34)
MCHC RBC AUTO-ENTMCNC: 31.6 G/DL (ref 31–37)
MCV RBC AUTO: 87.2 FL (ref 74–97)
MONOCYTES # BLD: 0.7 K/UL (ref 0.05–1.2)
MONOCYTES NFR BLD AUTO: 10 % (ref 3–10)
NEUTS SEG # BLD: 4.3 K/UL (ref 1.8–8)
NEUTS SEG NFR BLD AUTO: 58 % (ref 40–73)
OVERALL BP RESPONSE TO EXERCISE, CST16: NORMAL
OVERALL HR RESPONSE TO EXERCISE, CST15: NORMAL
PEAK EX METS, CST10: 1 METS
PHOSPHATE SERPL-MCNC: 3.9 MG/DL (ref 2.5–4.9)
PLATELET # BLD AUTO: 354 K/UL (ref 135–420)
PMV BLD AUTO: 10.8 FL (ref 9.2–11.8)
POTASSIUM SERPL-SCNC: 3.4 MMOL/L (ref 3.5–5.5)
PROTOCOL NAME, CST01: NORMAL
RBC # BLD AUTO: 4.31 M/UL (ref 4.7–5.5)
SODIUM SERPL-SCNC: 141 MMOL/L (ref 136–145)
TEST INDICATION, CST09: NORMAL
WBC # BLD AUTO: 7.5 K/UL (ref 4.6–13.2)

## 2017-07-17 PROCEDURE — 74011250636 HC RX REV CODE- 250/636: Performed by: HOSPITALIST

## 2017-07-17 PROCEDURE — 74011250637 HC RX REV CODE- 250/637: Performed by: INTERNAL MEDICINE

## 2017-07-17 PROCEDURE — 74011250637 HC RX REV CODE- 250/637: Performed by: EMERGENCY MEDICINE

## 2017-07-17 PROCEDURE — 85025 COMPLETE CBC W/AUTO DIFF WBC: CPT | Performed by: INTERNAL MEDICINE

## 2017-07-17 PROCEDURE — 74011250637 HC RX REV CODE- 250/637: Performed by: PHYSICIAN ASSISTANT

## 2017-07-17 PROCEDURE — 82962 GLUCOSE BLOOD TEST: CPT

## 2017-07-17 PROCEDURE — A9500 TC99M SESTAMIBI: HCPCS

## 2017-07-17 PROCEDURE — 80048 BASIC METABOLIC PNL TOTAL CA: CPT | Performed by: INTERNAL MEDICINE

## 2017-07-17 PROCEDURE — 85018 HEMOGLOBIN: CPT | Performed by: FAMILY MEDICINE

## 2017-07-17 PROCEDURE — 83516 IMMUNOASSAY NONANTIBODY: CPT | Performed by: INTERNAL MEDICINE

## 2017-07-17 PROCEDURE — 74011250637 HC RX REV CODE- 250/637: Performed by: HOSPITALIST

## 2017-07-17 PROCEDURE — 93017 CV STRESS TEST TRACING ONLY: CPT | Performed by: PHYSICIAN ASSISTANT

## 2017-07-17 PROCEDURE — 84100 ASSAY OF PHOSPHORUS: CPT | Performed by: INTERNAL MEDICINE

## 2017-07-17 PROCEDURE — 74011636637 HC RX REV CODE- 636/637: Performed by: HOSPITALIST

## 2017-07-17 PROCEDURE — 36415 COLL VENOUS BLD VENIPUNCTURE: CPT | Performed by: INTERNAL MEDICINE

## 2017-07-17 PROCEDURE — 83520 IMMUNOASSAY QUANT NOS NONAB: CPT | Performed by: INTERNAL MEDICINE

## 2017-07-17 PROCEDURE — 74011250636 HC RX REV CODE- 250/636: Performed by: INTERNAL MEDICINE

## 2017-07-17 PROCEDURE — 78452 HT MUSCLE IMAGE SPECT MULT: CPT | Performed by: PHYSICIAN ASSISTANT

## 2017-07-17 PROCEDURE — 74011250636 HC RX REV CODE- 250/636: Performed by: FAMILY MEDICINE

## 2017-07-17 RX ORDER — LOSARTAN POTASSIUM 25 MG/1
25 TABLET ORAL DAILY
Qty: 30 TAB | Refills: 0 | Status: SHIPPED | OUTPATIENT
Start: 2017-07-17 | End: 2017-08-14 | Stop reason: SDUPTHER

## 2017-07-17 RX ORDER — POTASSIUM CHLORIDE 1.5 G/1.77G
20 POWDER, FOR SOLUTION ORAL
Status: COMPLETED | OUTPATIENT
Start: 2017-07-17 | End: 2017-07-17

## 2017-07-17 RX ORDER — INDOMETHACIN 50 MG/1
CAPSULE ORAL 3 TIMES DAILY
COMMUNITY
End: 2017-07-17

## 2017-07-17 RX ORDER — FLUTICASONE PROPIONATE 50 MCG
SPRAY, SUSPENSION (ML) NASAL DAILY
COMMUNITY
End: 2017-07-17

## 2017-07-17 RX ORDER — NITROGLYCERIN 0.4 MG/1
0.4 TABLET SUBLINGUAL AS NEEDED
Qty: 20 TAB | Refills: 0 | Status: SHIPPED | OUTPATIENT
Start: 2017-07-17

## 2017-07-17 RX ORDER — HYDRALAZINE HYDROCHLORIDE 50 MG/1
50 TABLET, FILM COATED ORAL 3 TIMES DAILY
Qty: 90 TAB | Refills: 0 | Status: SHIPPED | OUTPATIENT
Start: 2017-07-17 | End: 2017-08-14 | Stop reason: SDUPTHER

## 2017-07-17 RX ORDER — CARVEDILOL 25 MG/1
25 TABLET ORAL 2 TIMES DAILY WITH MEALS
Qty: 60 TAB | Refills: 0 | Status: SHIPPED | OUTPATIENT
Start: 2017-07-17 | End: 2017-07-31

## 2017-07-17 RX ORDER — FUROSEMIDE 40 MG/1
40 TABLET ORAL DAILY
Qty: 30 TAB | Refills: 0 | Status: SHIPPED | OUTPATIENT
Start: 2017-07-17 | End: 2017-07-31 | Stop reason: SDUPTHER

## 2017-07-17 RX ORDER — ISOSORBIDE MONONITRATE 60 MG/1
60 TABLET, EXTENDED RELEASE ORAL DAILY
Qty: 30 TAB | Refills: 0 | Status: SHIPPED | OUTPATIENT
Start: 2017-07-17 | End: 2017-08-14 | Stop reason: SDUPTHER

## 2017-07-17 RX ORDER — DOCUSATE SODIUM 100 MG/1
100 CAPSULE, LIQUID FILLED ORAL 2 TIMES DAILY
Qty: 60 CAP | Refills: 0 | Status: SHIPPED | OUTPATIENT
Start: 2017-07-17 | End: 2017-10-15

## 2017-07-17 RX ORDER — ATORVASTATIN CALCIUM 40 MG/1
40 TABLET, FILM COATED ORAL
Qty: 30 TAB | Refills: 0 | Status: SHIPPED | OUTPATIENT
Start: 2017-07-17 | End: 2017-08-14 | Stop reason: SDUPTHER

## 2017-07-17 RX ADMIN — FEBUXOSTAT 40 MG: 40 TABLET ORAL at 11:11

## 2017-07-17 RX ADMIN — HYDRALAZINE HYDROCHLORIDE 10 MG: 20 INJECTION INTRAMUSCULAR; INTRAVENOUS at 06:21

## 2017-07-17 RX ADMIN — HYDRALAZINE HYDROCHLORIDE 50 MG: 50 TABLET, FILM COATED ORAL at 09:12

## 2017-07-17 RX ADMIN — NEPHROCAP 1 CAPSULE: 1 CAP ORAL at 09:12

## 2017-07-17 RX ADMIN — ACETAMINOPHEN 500 MG: 500 TABLET ORAL at 09:12

## 2017-07-17 RX ADMIN — LOSARTAN POTASSIUM 25 MG: 25 TABLET ORAL at 09:12

## 2017-07-17 RX ADMIN — HEPARIN SODIUM 5000 UNITS: 5000 INJECTION, SOLUTION INTRAVENOUS; SUBCUTANEOUS at 06:06

## 2017-07-17 RX ADMIN — REGADENOSON 0.4 MG: 0.08 INJECTION, SOLUTION INTRAVENOUS at 08:10

## 2017-07-17 RX ADMIN — ISOSORBIDE MONONITRATE 60 MG: 60 TABLET, EXTENDED RELEASE ORAL at 09:12

## 2017-07-17 RX ADMIN — POTASSIUM CHLORIDE 20 MEQ: 1.5 POWDER, FOR SOLUTION ORAL at 11:10

## 2017-07-17 RX ADMIN — FUROSEMIDE 40 MG: 40 TABLET ORAL at 09:12

## 2017-07-17 RX ADMIN — CARVEDILOL 25 MG: 25 TABLET, FILM COATED ORAL at 09:12

## 2017-07-17 RX ADMIN — DOCUSATE SODIUM 100 MG: 100 CAPSULE, LIQUID FILLED ORAL at 09:12

## 2017-07-17 RX ADMIN — HEPARIN SODIUM 5000 UNITS: 5000 INJECTION, SOLUTION INTRAVENOUS; SUBCUTANEOUS at 13:06

## 2017-07-17 RX ADMIN — Medication 100 MG: at 09:12

## 2017-07-17 RX ADMIN — INSULIN LISPRO 2 UNITS: 100 INJECTION, SOLUTION INTRAVENOUS; SUBCUTANEOUS at 13:06

## 2017-07-17 NOTE — DISCHARGE INSTRUCTIONS
DISCHARGE SUMMARY from Nurse    The following personal items are in your possession at time of discharge:    Dental Appliances: None  Visual Aid: None     Home Medications: None  Jewelry: None  Clothing: Footwear, Pants, Shirt, Socks, Undergarments  Other Valuables: Cell Phone             PATIENT INSTRUCTIONS:    After general anesthesia or intravenous sedation, for 24 hours or while taking prescription Narcotics:  · Limit your activities  · Do not drive and operate hazardous machinery  · Do not make important personal or business decisions  · Do  not drink alcoholic beverages  · If you have not urinated within 8 hours after discharge, please contact your surgeon on call. Report the following to your surgeon:  · Excessive pain, swelling, redness or odor of or around the surgical area  · Temperature over 100.5  · Nausea and vomiting lasting longer than 4 hours or if unable to take medications  · Any signs of decreased circulation or nerve impairment to extremity: change in color, persistent  numbness, tingling, coldness or increase pain  · Any questions        What to do at Home:  Recommended activity: Activity as tolerated    If you experience any of the following symptoms chest pain, palpitations shortness of breath, nausea, vomiting, temperature > 100.5, increased swelling, weight gain 3lb in 1 day or 5 lb in 1 week, please follow up with Dr or Emergency Department. *  Please give a list of your current medications to your Primary Care Provider. *  Please update this list whenever your medications are discontinued, doses are      changed, or new medications (including over-the-counter products) are added. *  Please carry medication information at all times in case of emergency situations.           These are general instructions for a healthy lifestyle:    No smoking/ No tobacco products/ Avoid exposure to second hand smoke    Surgeon General's Warning:  Quitting smoking now greatly reduces serious risk to your health. Obesity, smoking, and sedentary lifestyle greatly increases your risk for illness    A healthy diet, regular physical exercise & weight monitoring are important for maintaining a healthy lifestyle    You may be retaining fluid if you have a history of heart failure or if you experience any of the following symptoms:  Weight gain of 3 pounds or more overnight or 5 pounds in a week, increased swelling in our hands or feet or shortness of breath while lying flat in bed. Please call your doctor as soon as you notice any of these symptoms; do not wait until your next office visit. Recognize signs and symptoms of STROKE:    F-face looks uneven    A-arms unable to move or move unevenly    S-speech slurred or non-existent    T-time-call 911 as soon as signs and symptoms begin-DO NOT go       Back to bed or wait to see if you get better-TIME IS BRAIN. Warning Signs of HEART ATTACK     Call 911 if you have these symptoms:   Chest discomfort. Most heart attacks involve discomfort in the center of the chest that lasts more than a few minutes, or that goes away and comes back. It can feel like uncomfortable pressure, squeezing, fullness, or pain.  Discomfort in other areas of the upper body. Symptoms can include pain or discomfort in one or both arms, the back, neck, jaw, or stomach.  Shortness of breath with or without chest discomfort.  Other signs may include breaking out in a cold sweat, nausea, or lightheadedness. Don't wait more than five minutes to call 911 - MINUTES MATTER! Fast action can save your life. Calling 911 is almost always the fastest way to get lifesaving treatment. Emergency Medical Services staff can begin treatment when they arrive -- up to an hour sooner than if someone gets to the hospital by car. The discharge information has been reviewed with the patient. The patient verbalized understanding.     Discharge medications reviewed with the patient and appropriate educational materials and side effects teaching were provided. [employer] Activation    Thank you for requesting access to [employer]. Please follow the instructions below to securely access and download your online medical record. [employer] allows you to send messages to your doctor, view your test results, renew your prescriptions, schedule appointments, and more. How Do I Sign Up? 1. In your internet browser, go to [email or web address]. 2. Click on the First Time User? Click Here link in the Sign In box. You will see the New Member Sign Up page. 3. Enter your [employer] Access Code exactly as it appears below. You will not need to use this code after youve completed the sign-up process. If you do not sign up before the expiration date, you must request a new code. [employer] Access Code: Activation code not generated  Current [employer] Status: Patient Declined (This is the date your [employer] access code will )    4. Enter the last four digits of your Social Security Number (xxxx) and Date of Birth (mm/dd/yyyy) as indicated and click Submit. You will be taken to the next sign-up page. 5. Create a [employer] ID. This will be your [employer] login ID and cannot be changed, so think of one that is secure and easy to remember. 6. Create a [employer] password. You can change your password at any time. 7. Enter your Password Reset Question and Answer. This can be used at a later time if you forget your password. 8. Enter your e-mail address. You will receive e-mail notification when new information is available in [address]. 9. Click Sign Up. You can now view and download portions of your medical record. 10. Click the Download Summary menu link to download a portable copy of your medical information. Additional Information    If you have questions, please visit the Frequently Asked Questions section of the [employer] website at [email or web address] Remember, [employer] is NOT to be used for urgent needs. For medical emergencies, dial 911. Patient armband removed and shredded       Angina: Care Instructions  Your Care Instructions    You have a problem called angina. Angina happens when there is not enough blood flow to your heart muscle. Angina is a sign of coronary artery disease (CAD). CAD occurs when blood vessels that supply the heart become narrowed. Having CAD increases your risk of a heart attack. Chest pain or pressure is the most common symptom of angina. But some people have other symptoms, like:  · Pain, pressure, or a strange feeling in the back, neck, jaw, or upper belly, or in one or both shoulders or arms. · Shortness of breath. · Nausea or vomiting. · Lightheadedness or sudden weakness. · Fast or irregular heartbeat. Women are somewhat more likely than men to have angina symptoms like shortness of breath, nausea, and back or jaw pain. Angina can be dangerous. That's why it is important to pay attention to your symptoms. Know what is typical for you, learn how to control your symptoms, and understand when you need to get treatment. A change in your usual pattern of symptoms is an emergency. It may mean that you are having a heart attack. The doctor has checked you carefully, but problems can develop later. If you notice any problems or new symptoms, get medical treatment right away. Follow-up care is a key part of your treatment and safety. Be sure to make and go to all appointments, and call your doctor if you are having problems. It's also a good idea to know your test results and keep a list of the medicines you take. How can you care for yourself at home? Medicines  · If your doctor has given you nitroglycerin for angina symptoms, keep it with you at all times. If you have symptoms, sit down and rest, and take the first dose of nitroglycerin as directed. If your symptoms get worse or are not getting better within 5 minutes, call 911 right away. Stay on the phone. The emergency  will give you further instructions. · If your doctor advises it, take 1 low-dose aspirin a day to prevent heart attack. · Be safe with medicines. Take your medicines exactly as prescribed. Call your doctor if you think you are having a problem with your medicine. You will get more details on the specific medicines your doctor prescribes. Lifestyle changes  · Do not smoke. If you need help quitting, talk to your doctor about stop-smoking programs and medicines. These can increase your chances of quitting for good. · Eat a heart-healthy diet that is low in saturated fat and salt, and is high in fiber. Talk to your doctor or a dietitian about healthy eating. · Stay at a healthy weight. Or lose weight if you need to. Activity  · Talk to your doctor about a level of activity that is safe for you. · If an activity causes angina symptoms, stop and rest.  When should you call for help? Call 911 anytime you think you may need emergency care. For example, call if:  · You passed out (lost consciousness). · You have symptoms of a heart attack. These may include:  ¨ Chest pain or pressure, or a strange feeling in the chest.  ¨ Sweating. ¨ Shortness of breath. ¨ Nausea or vomiting. ¨ Pain, pressure, or a strange feeling in the back, neck, jaw, or upper belly or in one or both shoulders or arms. ¨ Lightheadedness or sudden weakness. ¨ A fast or irregular heartbeat. After you call 911, the  may tell you to chew 1 adult-strength or 2 to 4 low-dose aspirin. Wait for an ambulance. Do not try to drive yourself. · You have angina symptoms that do not go away with rest or are not getting better within 5 minutes after you take a dose of nitroglycerin.   Call your doctor now or seek immediate medical care if:  · You are having angina symptoms more often than usual, or they are different or worse than usual.  · You feel dizzy or lightheaded, or you feel like you may faint. Watch closely for changes in your health, and be sure to contact your doctor if you have any problems. Where can you learn more? Go to http://leslie-graciela.info/. Enter H129 in the search box to learn more about \"Angina: Care Instructions. \"  Current as of: April 3, 2017  Content Version: 11.3  © 2176-4556 [employer]. Care instructions adapted under license by [employer] (which disclaims liability or warranty for this information). If you have questions about a medical condition or this instruction, always ask your healthcare professional. [address] any warranty or liability for your use of this information.

## 2017-07-17 NOTE — PROGRESS NOTES
Progress Note    Patito Adler  64 y.o. Admit Date: 7/13/2017  Active Problems:    CAD (coronary artery disease) () POA: Yes      Overview: mild disease of coronaries (cor angio 2006)      Hypertensive heart disease () POA: Yes      Noncompliance () POA: Yes      Hemoptysis (2/21/2014) POA: Yes      Acute chest pain (8/13/2016) POA: Unknown      Persistent proteinuria (2/9/2017) POA: Yes      Secondary hyperparathyroidism of renal origin ([facility] (2/9/2017) POA: Yes      Cocaine abuse (7/14/2017) POA: Unknown      Chronic renal disease, stage IV ([facility] (7/16/2017) POA: Clinically Undetermined            Subjective:     Patient feels good, wants to go home, undergoing  Cardiac testing today. A comprehensive review of systems was negative except for that written in the History of Present Illness.     Objective:     Visit Vitals    /77 (BP 1 Location: Left arm, BP Patient Position: At rest)    Pulse 79    Temp 98.1 °F (36.7 °C)    Resp 20    Ht 5' 4\" (1.626 m)    Wt 104.8 kg (231 lb)    SpO2 91%    BMI 39.65 kg/m2         Intake/Output Summary (Last 24 hours) at 07/17/17 1321  Last data filed at 07/17/17 1320   Gross per 24 hour   Intake             1048 ml   Output             1650 ml   Net             -602 ml       Current Facility-Administered Medications   Medication Dose Route Frequency Provider Last Rate Last Dose    furosemide (LASIX) tablet 40 mg  40 mg Oral DAILY Osmel Olvera, DO   40 mg at 07/17/17 0912    carvedilol (COREG) tablet 25 mg  25 mg Oral BID WITH MEALS Osmel Olvera, DO   25 mg at 07/17/17 0912    hydrALAZINE (APRESOLINE) 20 mg/mL injection 10 mg  10 mg IntraVENous Q6H PRN Gonzalo Quiroga MD   10 mg at 07/17/17 6743    losartan (COZAAR) tablet 25 mg  25 mg Oral DAILY Marleen Vale MD   25 mg at 07/17/17 0912    hydrALAZINE (APRESOLINE) tablet 50 mg  50 mg Oral TID AUBREY Arora   50 mg at 07/17/17 0912    atorvastatin (LIPITOR) tablet 40 mg  40 mg Oral QHS Lenny aCrlson AUBREY Salgado   40 mg at 07/16/17 2153    isosorbide mononitrate ER (IMDUR) tablet 60 mg  60 mg Oral DAILY Dom Hester MD   60 mg at 07/17/17 0912    nitroglycerin (NITROSTAT) tablet 0.4 mg  0.4 mg SubLINGual PRN Alicia Cramer MD   0.4 mg at 07/13/17 3398    B complex-vitaminC-folic acid (NEPHROCAP) cap  1 Cap Oral DAILY Danielle Deal MD   1 Cap at 07/17/17 0912    budesonide-formoterol (SYMBICORT) 160-4.5 mcg/actuation HFA inhaler 2 Puff  2 Puff Inhalation BID RT Danielle Deal MD   Stopped at 07/17/17 0800    calcitRIOL (ROCALTROL) capsule 0.25 mcg  0.25 mcg Oral Q MON, WED & Fanta Ureña MD   0.25 mcg at 07/14/17 1655    febuxostat (ULORIC) tablet 40 mg  40 mg Oral DAILY Danielle Deal MD   40 mg at 07/17/17 1111    acetaminophen (TYLENOL) tablet 500 mg  500 mg Oral Q6H PRN Danielle Deal MD   500 mg at 07/17/17 0912    ondansetron (ZOFRAN) injection 4 mg  4 mg IntraVENous Q8H PRN Danielle Deal MD        heparin (porcine) injection 5,000 Units  5,000 Units SubCUTAneous Viky Zhang MD   5,000 Units at 07/17/17 1306    docusate sodium (COLACE) capsule 100 mg  100 mg Oral BID Danielle Deal MD   100 mg at 07/17/17 0912    insulin lispro (HUMALOG) injection   SubCUTAneous AC&HS Danielle Deal MD   2 Units at 07/17/17 1306    glucose chewable tablet 16 g  16 g Oral PRN Danielle Deal MD        glucagon (GLUCAGEN) injection 1 mg  1 mg IntraMUSCular PRN Danielle Deal MD        dextrose (D50W) injection syrg 12.5-25 g  25-50 mL IntraVENous PRN Danielle Deal MD        thiamine (B-1) tablet 100 mg  100 mg Oral DAILY Danielle Deal MD   100 mg at 07/17/17 5010        Physical Exam:     Physical Exam:   General:  Alert, cooperative, no distress, appears stated age. Neck: Supple, symmetrical, trachea midline, no adenopathy, thyroid: no enlargement/tenderness/nodules, no carotid bruit and no JVD. Lungs:   Clear to auscultation bilaterally.    Heart:  Regular rate and rhythm, S1, S2 normal, no murmur, click, rub or gallop. Abdomen:   Soft, non-tender. Bowel sounds normal. No masses,  No organomegaly. Extremities: Extremities normal, atraumatic, no cyanosis or edema. Pulses: 2+ and symmetric all extremities. Data Review:    CBC w/Diff    Recent Labs      07/17/17   1130  07/17/17   0305  07/16/17   1911   07/16/17   0300   07/15/17   0136   WBC   --   7.5   --    --   8.2   --   6.5   RBC   --   4.31*   --    --   4.11*   --   3.78*   HGB  11.5*  11.9*  11.9*   < >  11.4*   < >  10.6*   HCT  36.4  37.6  37.1   < >  36.1   < >  33.5*   MCV   --   87.2   --    --   87.8   --   88.6   MCH   --   27.6   --    --   27.7   --   28.0   MCHC   --   31.6   --    --   31.6   --   31.6   RDW   --   15.0*   --    --   15.2*   --   15.6*    < > = values in this interval not displayed. Recent Labs      07/17/17 0305 07/16/17   0300  07/15/17   0136   MONOS  10  12*  9   EOS  3  3  3   BASOS  0  0  0   RDW  15.0*  15.2*  15.6*        Comprehensive Metabolic Profile    Recent Labs      07/17/17 0305 07/16/17   0300  07/15/17   0136   NA  141  141  140   K  3.4*  3.4*  3.6   CL  101  102  104   CO2  32  34*  32   BUN  25*  24*  23*   CREA  3.13*  3.03*  2.92*    Recent Labs      07/17/17 0305 07/16/17   0300  07/15/17   0136   CA  8.9  9.0  8.2*   PHOS  3.9  3.7  3.8                      Impression:       Active Hospital Problems    Diagnosis Date Noted    Chronic renal disease, stage IV ([facility] 07/16/2017    Cocaine abuse 07/14/2017    Secondary hyperparathyroidism of renal origin ([facility] 02/09/2017    Persistent proteinuria 02/09/2017    Acute chest pain 08/13/2016    Hemoptysis 02/21/2014    Noncompliance     Hypertensive heart disease     CAD (coronary artery disease)      mild disease of coronaries (cor angio 2006)              Plan:      K is  Ok , will continue current care. If he is discharged  Will follow him in Office in 1 month.       Jacob Brown MD

## 2017-07-17 NOTE — CARDIO/PULMONARY
Cardiac rehab in to see patient. He was in bed and his wife was at his side. We reviewed the heart failure educational folder. We talked about weighing daily. Patient stated that he will begin to do that. We discussed the importance of taking meds daily. He stated that he will not forget those. We discussed the importance of daily activity. Patient stated that he does not do much. Encouraged him to do at least 3 days per week for 30 minutes. We discussed starting slowly and gradually building to 30 minutes. We then discussed the signs and symptoms of heart failure in relation to the green, yellow, and red zones. We then discussed following a low fat/low sodium diet. He stated that he knows that he needs to make changes. Encouraged patient to choose fresh as much as possible and fresh frozen next best. Advised that he bake, grill, broil, steam and roast his food choices. Also advised patient to look at what he is seasoning his items with to ensure he is not getting extra sodium. Patient and wife given the opportunity to ask questions. Will continue to follow throughout hospitalization.

## 2017-07-17 NOTE — PROGRESS NOTES
[facility]  -  Progress Note      Patient: Aleah Alvarado MRN: [id number]  SSN: [id number]    YOB: 1960  Age: 64 y.o. Sex: male      Admit Date: 7/13/2017    Hospital Problem List:     Hospital Problems  Date Reviewed: 7/16/2017          Codes Class Noted POA    Chronic renal disease, stage IV ([facility] [address] ICD-10-CM: N18.4  ICD-9-CM: 585.4  7/16/2017 Clinically Undetermined        Cocaine abuse ICD-10-CM: F14.10  ICD-9-CM: 305.60  7/14/2017 Unknown        Persistent proteinuria ICD-10-CM: R80.1  ICD-9-CM: 791.0  2/9/2017 Yes        Secondary hyperparathyroidism of renal origin [facility]) ICD-10-CM: N25.81  ICD-9-CM: 588.81  2/9/2017 Yes        Acute chest pain ICD-10-CM: R07.9  ICD-9-CM: 786.50  8/13/2016 Unknown        Hemoptysis ICD-10-CM: R04.2  ICD-9-CM: 786.30  2/21/2014 Yes        Noncompliance ICD-10-CM: Z91.19  ICD-9-CM: V15.81  Unknown Yes        CAD (coronary artery disease) ICD-10-CM: I25.10  ICD-9-CM: 414.00  Unknown Yes    Overview Signed 2/23/2012 12:19 AM by Vaughn Flores V     mild disease of coronaries (cor angio 2006)             Hypertensive heart disease ICD-10-CM: I11.9  ICD-9-CM: 402.90  Unknown Yes            -Diastolic heart failure - EF 65-70% with grade 2 DD 2/2017. EF now 50-55% with possible hypokinesis of inferolateral wall. Volume status now stable on PO diuretics. -EKG changes with inferolateral wall motion abnormalities on echocardiogram. Nuclear stress Monday morning to r/o ischemia.  -Hypertension out of control. Reports taking his medications until running out yesterday, reports trying to decrease salt intake. Started on nitro gtt yesterday. BP improving.   -Chronic kidney disease with worsening this admission.  -Diabetes mellitus. Reports frequent hypoglycemic episodes. Hgb A1c 6.1.  -Dyslipidemia. -H/o TIA. -Reactive airway disease on inhalers.  -Tobacco abuse, advised cessation.  -Cocaine use, positive drug screen. -H/o ETOH abuse.   -H/o noncompliance, advised compliance. Plan:     -Nuclear stress test completed this morning. Will review results.   -Continue current BP meds. -Good urine output. Continue diuretics. Subjective:     No chest pain, sob, orthopnea or le swelling. Objective:      Patient Vitals for the past 8 hrs:   Temp Pulse Resp BP SpO2   07/17/17 0400 97.9 °F (36.6 °C) 80 18 (!) 173/94 92 %         Patient Vitals for the past 96 hrs:   Weight   07/17/17 0400 104.8 kg (231 lb)   07/16/17 0400 105 kg (231 lb 7.7 oz)   07/15/17 0616 111.1 kg (244 lb 14.9 oz)   07/14/17 0529 111.2 kg (245 lb 1.6 oz)   07/13/17 0846 99.8 kg (220 lb)         Intake/Output Summary (Last 24 hours) at 07/17/17 0807  Last data filed at 07/17/17 3004   Gross per 24 hour   Intake              328 ml   Output             1450 ml   Net            -1122 ml       Physical Exam:  General:  Awake, alert, oriented x 3  Neck:  Supple, no jvd  Lungs: clear to auscultation bilaterally on room air  Heart:  Reg rate and rhythm without murmur  Abdomen:  Obese, soft, without tenderness  Extremities:  No edema, atraumatic    Data Review:     Labs: Results:       Chemistry Recent Labs      07/17/17   0305  07/16/17   0300  07/15/17   0136   GLU  113*  115*  119*   NA  141  141  140   K  3.4*  3.4*  3.6   CL  101  102  104   CO2  32  34*  32   BUN  25*  24*  23*   CREA  3.13*  3.03*  2.92*   CA  8.9  9.0  8.2*   MG   --   1.9   --    PHOS  3.9  3.7  3.8   AGAP  8  5  4   BUCR  8*  8*  8*      CBC w/Diff Recent Labs      07/17/17   0305  07/16/17   1911  07/16/17   1058  07/16/17   0300   07/15/17   0136   WBC  7.5   --    --   8.2   --   6.5   RBC  4.31*   --    --   4.11*   --   3.78*   HGB  11.9*  11.9*  11.4*  11.4*   < >  10.6*   HCT  37.6  37.1  36.2  36.1   < >  33.5*   PLT  354   --    --   346   --   314   GRANS  58   --    --   61   --   57   LYMPH  29   --    --   24   --   31   EOS  3   --    --   3   --   3    < > = values in this interval not displayed. Cardiac Enzymes Lab Results   Component Value Date/Time     07/16/2017 09:54 PM     07/16/2017 04:16 PM     07/16/2017 10:29 AM    CKMB 3.0 07/16/2017 09:54 PM    CKMB 3.4 07/16/2017 04:16 PM    CKMB 2.5 07/16/2017 10:29 AM    CKND1 2.4 07/16/2017 09:54 PM    CKND1 2.5 07/16/2017 04:16 PM    CKND1 2.3 07/16/2017 10:29 AM    TROIQ 0.21 (H) 07/16/2017 09:54 PM    TROIQ 0.20 (H) 07/16/2017 04:16 PM    TROIQ 0.23 (H) 07/16/2017 10:29 AM      Coagulation No results for input(s): PTP, INR, APTT in the last 72 hours. No lab exists for component: INREXT    Lipid Panel Lab Results   Component Value Date/Time    Cholesterol, total 294 07/14/2017 02:11 AM    HDL Cholesterol 47 07/14/2017 02:11 AM    LDL, calculated 196.4 07/14/2017 02:11 AM    VLDL, calculated 50.6 07/14/2017 02:11 AM    Triglyceride 253 07/14/2017 02:11 AM    CHOL/HDL Ratio 6.3 07/14/2017 02:11 AM      BNP No results found for: BNP, BNPP, XBNPT   Liver Enzymes No results for input(s): TP, ALB, TBIL, AP, SGOT, GPT in the last 72 hours.     No lab exists for component: DBIL   Digoxin    Thyroid Studies Lab Results   Component Value Date/Time    TSH 0.55 02/09/2017 04:00 AM            Signed By: AUBREY Alarcon     July 17, 2017

## 2017-07-17 NOTE — PROGRESS NOTES
Patient has dc order, no hhc order. Saw the patient in room with his wife at bedside. Patient dressed and ready to be released, he says, his wife will take him home. CM offered bedside rx, he refused the service stating he will use his regular Rx.

## 2017-07-17 NOTE — PROGRESS NOTES
Patient was given 10.7 mCi of Sestamibi for the Resting pictures. Patient received 0.4 mg of Lexiscan for the exercise portion of the Stress test. Patient was then given 33 mCi of Sestamibi for the Stress pictures. Patient went back to room with armband still on.

## 2017-07-17 NOTE — PROGRESS NOTES
Patient being discharged home. Note Demetrio Gan from Cardiac Rehab had been in to speak with patient & Education folder given. Reviewed Green/Yellow /& red zones. What Signs & symptoms to watch for Discussed weight monitoring. Reinforced importance of taking medication as prescribed.

## 2017-07-17 NOTE — CONSULTS
[facility]  Pulmonary, Critical Care, and Sleep Medicine      Pulmonary Initial Patient Consult      Consulting physician: Dr. Douglas Estes    Reason for consult: Hemoptysis    HPI: The patient is a 56/M with CAD, CKD and multiple vascular risk factors and problems who presented with chest pain. Pt. Was admitted with hypertensive urgency and is being treated for the same. Pt. Complained of blood tinged sputum x 1 yesterday hence we were consulted. Per patient, he was on oxygen via nasal cannula which made his nose dry, he felt something in the throat and when coughed he saw blood tinged sputum. No history of hemoptysis in the past. Not coagulopathic. Denies any fever, chills or rigors. Denies any weight loss. History of smoking for 15 years, 1/2 pack per day. 7/17/17:  He did not have any hemoptysis since yesterday- none since reported episode. Patient resting comfortably in bangura bed, eating breakfast. Not on oxygen. No other complaints. Patient is obese, + smoker, + snoring and report of witnessed apneas. States a sleeps study was previously ordered but it was in [place] and he did not have transportation. States that he could get to [facility] for sleep study      Past Medical History:   Diagnosis Date    Alcohol abuse     Arthritis     Atherosclerotic cardiovascular disease     CAD (coronary artery disease)     mild disease of coronaries (cor angio 2006),wife states he has 1 stent    Cardiac cath 01/26/2006    RCA mild. Otherwise patent coronaries. LVEDP 15.  EF 55-60%.  Cardiac echocardiogram 03/27/2009    EF 60%. No cardiac source of embolism. No significant valvular pathology.  Cardiac nuclear imaging test 12/06/2011    Small, mild inferior infarction or possibly artifact. No ischemia. EF 45%. No TID. No reg WMA.  Cardiovascular LLE venous duplex 08/14/2015    Left leg:  No DVT. Dilated lymph node in left groin.     Constipation     Diabetes mellitus type II     Gout     Hepatic steatosis     Hypercholesterolemia     Hypertension     Knee effusion, left     Left knee pain     Morbid obesity (HCC)     Noncompliance     Obesity (BMI 30-39. 9)     S/P colonoscopy 3/8/05    Dr. Mj Ansari; normal; f/u 10 years    Stomach problems     TIA (transient ischemic attack) 3/09    Tobacco abuse       Past Surgical History:   Procedure Laterality Date    ABDOMEN SURGERY PROC UNLISTED      Hernia repair in 1960's or 1970's.  HX ORTHOPAEDIC      Bones spur removed from left & right foot 2013.  HX UROLOGICAL  8/18/2015    [facility], Dr. Manpreet Cancino, Cystoscopy, left retrograde, left double-J cath      Prior to Admission medications    Medication Sig Start Date End Date Taking? Authorizing Provider   lisinopril (PRINIVIL, ZESTRIL) 20 mg tablet Take 1 Tab by mouth daily. 6/2/17   Justo Francisco MD   carvedilol (COREG) 25 mg tablet Take 1 Tab by mouth two (2) times daily (with meals). 6/2/17   Justo Francisco MD   furosemide (LASIX) 20 mg tablet Take 1 Tab by mouth daily as needed. 6/2/17   Justo Francisco MD   albuterol (VENTOLIN HFA) 90 mcg/actuation inhaler Take 2 Puffs by inhalation every four (4) hours as needed for Wheezing for up to 90 days. 6/2/17 8/31/17  Justo Francisco MD   febuxostat (ULORIC) 40 mg tab tablet Take 1 Tab by mouth daily. 6/2/17   Justo Francisco MD   hydrALAZINE (APRESOLINE) 50 mg tablet Take 1 Tab by mouth three (3) times daily. Indications: hypertension 6/2/17   Justo Francisco MD   pantoprazole (PROTONIX) 20 mg tablet Take 1 Tab by mouth daily. 6/2/17   Justo Francisco MD   glucose blood VI test strips (FREESTYLE TEST) strip by Does Not Apply route See Admin Instructions. Check twice a day 3/28/17   Justo Francisco MD   aspirin 81 mg chewable tablet Take 1 Tab by mouth daily. 3/21/17   Justo Francisco MD   B COMPLEX W-C NO.20/FOLIC ACID (B COMPLEX & C NO.20-FOLIC ACID PO) Take  by mouth.     Historical Provider   POTASSIUM CHLORIDE SR 10 MEQ TAB two (2) times a day. Historical Provider   budesonide-formoterol (SYMBICORT) 160-4.5 mcg/actuation HFA inhaler Take 2 Puffs by inhalation two (2) times a day. 2/22/17   Roxana Flower MD   cloNIDine HCl (CATAPRES) 0.1 mg tablet Take 1 Tab by mouth two (2) times a day. 2/10/17   Wilma Washington MD   calcitRIOL (ROCALTROL) 0.25 mcg capsule Take 1 Cap by mouth every Monday, Wednesday, Friday. 2/10/17   Wilma Washington MD   insulin NPH/insulin regular (NOVOLIN 70/30) 100 unit/mL (70-30) injection 30 UNITS SUBCUTANEOUSLY TWICE DAILY. Do not take it blood sugar is less than 100 2/10/17   Wilma Washington MD   Insulin Needles, Disposable, 29 gauge x 5/16\" ndle 40 Units by Does Not Apply route two (2) times a day. 8/22/16   Roxana Flower MD   acetaminophen (TYLENOL) 650 mg CR tablet Take 650 mg by mouth every six (6) hours as needed for Pain. Historical Provider   Lancets misc Check 3 times a day , needs according contour glucometer 1/20/15   Roxana Flower MD   Blood-Glucose Meter monitoring kit Check twice daily.  9/23/14   Roxana Flower MD     Current Facility-Administered Medications   Medication Dose Route Frequency    furosemide (LASIX) tablet 40 mg  40 mg Oral DAILY    carvedilol (COREG) tablet 25 mg  25 mg Oral BID WITH MEALS    losartan (COZAAR) tablet 25 mg  25 mg Oral DAILY    hydrALAZINE (APRESOLINE) tablet 50 mg  50 mg Oral TID    atorvastatin (LIPITOR) tablet 40 mg  40 mg Oral QHS    isosorbide mononitrate ER (IMDUR) tablet 60 mg  60 mg Oral DAILY    B complex-vitaminC-folic acid (NEPHROCAP) cap  1 Cap Oral DAILY    budesonide-formoterol (SYMBICORT) 160-4.5 mcg/actuation HFA inhaler 2 Puff  2 Puff Inhalation BID RT    calcitRIOL (ROCALTROL) capsule 0.25 mcg  0.25 mcg Oral Q MON, WED & FRI    febuxostat (ULORIC) tablet 40 mg  40 mg Oral DAILY    heparin (porcine) injection 5,000 Units  5,000 Units SubCUTAneous Q8H    docusate sodium (COLACE) capsule 100 mg  100 mg Oral BID    insulin lispro (HUMALOG) injection   SubCUTAneous AC&HS    thiamine (B-1) tablet 100 mg  100 mg Oral DAILY     Allergies   Allergen Reactions    Shellfish Derived Other (comments)     Causes Gout      Social History   Substance Use Topics    Smoking status: Current Every Day Smoker     Packs/day: 0.25     Types: Cigarettes     Last attempt to quit: 2016    Smokeless tobacco: Never Used    Alcohol use 0.0 oz/week     0 Standard drinks or equivalent per week      Comment: 1 beers a day. Family History   Problem Relation Age of Onset    Diabetes Father     Heart Disease Mother     Diabetes Sister     Hypertension Sister     Arthritis-osteo Sister     Arthritis-osteo Brother     Diabetes Other     Diabetes Other      Uncle, Aunt    Hypertension Other      Uncle; Aunt        Review of Systems:  A comprehensive review of systems was negative except for that written in the HPI. Objective:   Vital Signs:    Visit Vitals    BP (!) 173/94 (BP 1 Location: Left arm, BP Patient Position: At rest)    Pulse 80    Temp 97.9 °F (36.6 °C)    Resp 18    Ht 5' 4\" (1.626 m)    Wt 104.8 kg (231 lb)    SpO2 92%    BMI 39.65 kg/m2       O2 Device: Room air   O2 Flow Rate (L/min): 2 l/min   Temp (24hrs), Av.9 °F (36.6 °C), Min:97.1 °F (36.2 °C), Max:98.2 °F (36.8 °C)       Intake/Output:   Last shift:         Last 3 shifts: 07/15 1901 -  0700  In: 568 [P.O.:560; I.V.:8]  Out: 2650 [Urine:2650]    Intake/Output Summary (Last 24 hours) at 17 1020  Last data filed at 17 0224   Gross per 24 hour   Intake              328 ml   Output             1450 ml   Net            -1122 ml         Ventilator Settings:  Mode Rate Tidal Volume Pressure FiO2 PEEP            21 %       Peak airway pressure:      Minute ventilation:            Physical Exam:          General: Alert, awake and oriented.  Not in acute distress, obese body habitus, On room air  HEENT:  Anicteric sclerae; pink palpebral conjunctivae; mucosa moist, Mallampati: 4, tongue: large- midline, uvula midline  Resp:  Symmetrical chest expansion, no accessory muscle use; good airway entry; no rales/ wheezing/ rhonchi noted  CV:  S1, S2 present; regular rate and rhythm  GI:  Abdomen soft, non-tender; (+) active bowel sounds  Extremities:  +2 pulses on all extremities; no edema/ cyanosis/ clubbing noted  Skin:  Warm; no rashes/ lesions noted  Neurologic:  Non-focal, pleasant, alert an cooperative                                                                 Data:     Recent Results (from the past 24 hour(s))   CARDIAC PANEL,(CK, CKMB & TROPONIN)    Collection Time: 07/16/17 10:29 AM   Result Value Ref Range     39 - 308 U/L    CK - MB 2.5 <3.6 ng/ml    CK-MB Index 2.3 0.0 - 4.0 %    Troponin-I, Qt. 0.23 (H) 0.0 - 0.045 NG/ML   HGB & HCT    Collection Time: 07/16/17 10:58 AM   Result Value Ref Range    HGB 11.4 (L) 13.0 - 16.0 g/dL    HCT 36.2 36.0 - 48.0 %   GLUCOSE, POC    Collection Time: 07/16/17 12:08 PM   Result Value Ref Range    Glucose (POC) 121 (H) 70 - 110 mg/dL   CARDIAC PANEL,(CK, CKMB & TROPONIN)    Collection Time: 07/16/17  4:16 PM   Result Value Ref Range     39 - 308 U/L    CK - MB 3.4 <3.6 ng/ml    CK-MB Index 2.5 0.0 - 4.0 %    Troponin-I, Qt. 0.20 (H) 0.0 - 0.045 NG/ML   GLUCOSE, POC    Collection Time: 07/16/17  4:52 PM   Result Value Ref Range    Glucose (POC) 188 (H) 70 - 110 mg/dL   HGB & HCT    Collection Time: 07/16/17  7:11 PM   Result Value Ref Range    HGB 11.9 (L) 13.0 - 16.0 g/dL    HCT 37.1 36.0 - 48.0 %   GLUCOSE, POC    Collection Time: 07/16/17  9:19 PM   Result Value Ref Range    Glucose (POC) 161 (H) 70 - 110 mg/dL   CARDIAC PANEL,(CK, CKMB & TROPONIN)    Collection Time: 07/16/17  9:54 PM   Result Value Ref Range     39 - 308 U/L    CK - MB 3.0 <3.6 ng/ml    CK-MB Index 2.4 0.0 - 4.0 %    Troponin-I, Qt. 0.21 (H) 0.0 - 0.045 NG/ML   CBC WITH AUTOMATED DIFF    Collection Time: 07/17/17 3:05 AM   Result Value Ref Range    WBC 7.5 4.6 - 13.2 K/uL    RBC 4.31 (L) 4.70 - 5.50 M/uL    HGB 11.9 (L) 13.0 - 16.0 g/dL    HCT 37.6 36.0 - 48.0 %    MCV 87.2 74.0 - 97.0 FL    MCH 27.6 24.0 - 34.0 PG    MCHC 31.6 31.0 - 37.0 g/dL    RDW 15.0 (H) 11.6 - 14.5 %    PLATELET 852 013 - 554 K/uL    MPV 10.8 9.2 - 11.8 FL    NEUTROPHILS 58 40 - 73 %    LYMPHOCYTES 29 21 - 52 %    MONOCYTES 10 3 - 10 %    EOSINOPHILS 3 0 - 5 %    BASOPHILS 0 0 - 2 %    ABS. NEUTROPHILS 4.3 1.8 - 8.0 K/UL    ABS. LYMPHOCYTES 2.2 0.9 - 3.6 K/UL    ABS. MONOCYTES 0.7 0.05 - 1.2 K/UL    ABS. EOSINOPHILS 0.2 0.0 - 0.4 K/UL    ABS. BASOPHILS 0.0 0.0 - 0.1 K/UL    DF AUTOMATED     METABOLIC PANEL, BASIC    Collection Time: 07/17/17  3:05 AM   Result Value Ref Range    Sodium 141 136 - 145 mmol/L    Potassium 3.4 (L) 3.5 - 5.5 mmol/L    Chloride 101 100 - 108 mmol/L    CO2 32 21 - 32 mmol/L    Anion gap 8 3.0 - 18 mmol/L    Glucose 113 (H) 74 - 99 mg/dL    BUN 25 (H) 7.0 - 18 MG/DL    Creatinine 3.13 (H) 0.6 - 1.3 MG/DL    BUN/Creatinine ratio 8 (L) 12 - 20      GFR est AA 25 (L) >60 ml/min/1.73m2    GFR est non-AA 21 (L) >60 ml/min/1.73m2    Calcium 8.9 8.5 - 10.1 MG/DL   PHOSPHORUS    Collection Time: 07/17/17  3:05 AM   Result Value Ref Range    Phosphorus 3.9 2.5 - 4.9 MG/DL   GLUCOSE, POC    Collection Time: 07/17/17  9:35 AM   Result Value Ref Range    Glucose (POC) 116 (H) 70 - 110 mg/dL           No results for input(s): FIO2I, IFO2, HCO3I, IHCO3, HCOPOC, PCO2I, PCOPOC, IPHI, PHI, PHPOC, PO2I, PO2POC in the last 72 hours. No lab exists for component: IPOC2      Imaging:  CT chest 7/15/17:  IMPRESSION:      Acute or chronic bronchitis.   Right pleural effusion and pericardial effusion; broad differential     .     All CT scans at this facility are performed using dose optimization technique as  appropriate to the performed exam, to include automated exposure control,  adjustment of the mA and/or kV according to patient's size (Including  appropriate matching for site-specific examinations), or use of iterative  reconstruction technique. Assessment: The patient is a 56/M with CAD, CKD and multiple vascular risk factors and problems who presented with chest pain. Pt. Was admitted with hypertensive urgency and is being treated for the same. Pt. Complained of blood tinged sputum x 1 yesterday hence we were consulted  · Coughing up blood- most likely isolated upper airway source. Has not had any more events  · Probably [place]  · Pleural effusion  · Pericardial effusion on CT- insignificant on Echo  · Hypertension- BP elevated today  · Renal failure- chronic    Plan:  · ENT evaluation if patient coughs up blood again  · OP study at [facility]- I have ordered.   Patient to follow up with me as OP regarding sleep study results  · Weight loss recommended  · No drowsy or inattentive driving  · Hyptertension and CAD per cardiology and primary team  · Remaining care issues per primary team and other consultants  · Will sign off for now      Shelley Harris, [address] [facility]  Pulmonary, Sleep and [address] and time spent coordinating care: 35 min

## 2017-07-17 NOTE — ROUTINE PROCESS
Hydralazine prn given this morning for elevated BP. Npo maintained after midnight for stress test. Patient denied of pain when asked. No other concerns voiced. Will continue with plan of care.

## 2017-07-18 ENCOUNTER — TELEPHONE (OUTPATIENT)
Dept: CARDIAC REHAB | Age: 57
End: 2017-07-18

## 2017-07-18 NOTE — PROCEDURES
[address] STRESS    Name:  Tal Ludwig  MR#:  [id number]  :  1960  Account #:  [de-identified]  Date of Adm:  2017  Date of Service:  2017      PROCEDURE PERFORMED: Pharmacologic nuclear scan. BASELINE ELECTROCARDIOGRAM: Shows sinus rhythm with  voltage criteria for LVH with repolarization changes. The patient has an IV in the right antecubital space. He received  Lexiscan per protocol. He tolerated the procedure well. No chest pain  was noted. He received resting dose of sestamibi 10.7 mCi at 6:50  a.m. He received stress dose of sestamibi 33.0 mCi at 8:10 a.m. TREADMILL FINDINGS  1. ST-segment changes: None. 2. Chest pain: None. 3. Dysrhythmia: None. 4. Both heart rate and blood pressure response are normal.    TREADMILL CONCLUSION: This is a nondiagnostic pharmacologic  stress test from an electrocardiographic standpoint due to underlying  LVH and repolarization changes. MYOCARDIAL NUCLEAR PERFUSION STUDY FINDINGS  1. Perfusion imaging shows no evidence of significant ongoing  ischemia or prior infarction. 2. Gated SPECT imaging shows dilated left ventricle with mildly  diminished left ventricle function with estimated ejection fraction of  45%. There is global hypokinesis noted with perhaps more noticeable  apical hypokinesis. CONCLUSIONS  1. Nondiagnostic pharmacologic stress test from an  electrocardiographic standpoint. 2. Equivocal perfusion study findings. 3. Perfusion imaging shows no evidence of significant ongoing  ischemia or prior infarction. 4. Gated SPECT imaging shows mildly dilated left ventricle with mildly  diminished left ventricle function with estimated ejection fraction of  45%. There is mild global hypokinesis with perhaps more noticeable  apical hypokinesis. 5. In comparison to prior study of , ejection fraction is unchanged  at 45%. The perfusion imaging is without significant changes.   6. This a low to intermediate risk finding.         MD Praveena Jurado / Pb Yoder  D:  07/17/2017   12:00  T:  07/18/2017   10:32  Job #:  [id number]

## 2017-07-19 ENCOUNTER — PATIENT OUTREACH (OUTPATIENT)
Dept: FAMILY MEDICINE CLINIC | Age: 57
End: 2017-07-19

## 2017-07-19 LAB
C-ANCA TITR SER IF: NORMAL TITER
GBM IGG SER-ACNC: 4 UNITS (ref 0–20)
MYELOPEROXIDASE AB SER IA-ACNC: <9 U/ML (ref 0–9)
P-ANCA ATYPICAL TITR SER IF: NORMAL TITER
P-ANCA TITR SER IF: NORMAL TITER
PROTEINASE3 AB SER IA-ACNC: <3.5 U/ML (ref 0–3.5)

## 2017-07-19 NOTE — PROGRESS NOTES
Nurse Navigator attempted to contact patient post discharge from [facility]; 7/13/17 to 7/17/17 for c/o Acute chest pain. Message left introducing myself, the purpose of the call and giving my contact information.   Requested that patient call back at his/her earliest convenience

## 2017-07-20 ENCOUNTER — PATIENT OUTREACH (OUTPATIENT)
Dept: FAMILY MEDICINE CLINIC | Age: 57
End: 2017-07-20

## 2017-07-26 NOTE — DISCHARGE SUMMARY
[address]  [address] Melissa Miranda SUMMARY    Name:  Conchita Blake  MR#:  [id number]  :  1960  Account #:  [de-identified]  Date of Adm:  2017  Date of Discharge:  2017      DATE OF DISCHARGE: 2017    DISCHARGE DIAGNOSES:  1. Acute diastolic congestive heart failure exacerbation. 2. Hypertensive urgency at admission. 3. Acute kidney injury on top of chronic kidney disease, stage III. 4. Type 2 diabetes. 5. Dyslipidemia. 6. Polysubstance abuse. 7. Hemoptysis/hematemesis. HOSPITAL COURSE: This is a 35-year-old male who presented to the  ER with complaints of chest pain. The patient was evaluated and was  admitted. The patient was initially noted to have very high blood  pressures. The patient was placed on nitroglycerin drip. The patient  was admitted. Cardiac biomarkers were monitored. Cardiology was  consulted. The patient's symptoms improved. the patient had a history  of noncompliance. The patient was counseled regarding compliance. Cardiology continued to follow the patient. The patient was also noted  to have some acute kidney injury on top of chronic kidney disease  stage III. Nephrology was consulted. I's and O's were monitored. Renal  function was monitored. Blood pressure was slowly optimized. The  patient started to feel better. A drug screen was positive for cocaine. This was discussed with the patient. The patient was counseled to quit  smoking. The patient was counseled to quit using any illicit drug use. The patient counseled to quit drinking. The patient verbalized  understanding. The patient was also counseled regarding compliance  with medications. The patient was subsequently noted to have an  episode which was initially described as an episode of vomiting of  blood, for which gastroenterology was consulted but then  subsequently, the patient stated that he never vomited, but he had  coughed up some blood because of the dry oxygen that he was on. No  further episodes were noted. Pulmonary also saw the patient. Outpatient followup with ENT was recommended. This was discussed  with the patient. Renal function remained stable. The patient  underwent a stress test, which was reported by cardiology as low risk. The patient felt better. Discharge plans were discussed with the patient. The patient wished to go home. The patient was hemodynamically  stable and was discharged to home. INSTRUCTIONS: The patient was advised to followup with PCP in 1  week, pulmonary in 2 weeks, ENT in 1-2 weeks, cardiology in 2 weeks,  and nephrology in 2 weeks. DISCHARGE PLANS: Were discussed with the patient and he  verbalized understanding and agreed. TOTAL TIME FOR DISCHARGE: More than 35 minutes. DISCHARGE MEDICATIONS:  For the patient included:  1. Lipitor 40 mg p.o. daily. 2. Colace 100 mg p.o. twice daily. 3. Imdur 60 mg p.o. daily. 4. Cozaar 25 mg p.o. daily. 5. Sublingual nitroglycerin p.r.n. chest pain. 6. Check a CBC, CMP, magnesium in 3 days, results to PCP  immediately. 7. Incentive spirometry, use as directed. DIET: Cardiac, renal, diabetic. No concentrated sweets. DISCHARGE PLAN:  1. Lasix 40 mg p.o. daily. The patient was also noted to have some  acute-on-diastolic congestive heart failure during the admission, for  which he was discharged on Lasix. 2. Albuterol 2 puffs inhaled every 4 hours p.r.n.  3. Aspirin 81 mg p.o. daily. 4. Vitamin B complex p.o. daily. 5. Symbicort 2 puffs inhaled twice daily. 6. Rocaltrol 0.25 mcg p.o. every Monday, Wednesday, Friday. 7. Coreg 25 mg p.o. twice daily. 8. Uloric 40 mg p.o. daily. 9. Hydralazine 50 mg p.o. 3 times daily. 10. Protonix 20 mg p.o. daily. The patient was advised to check his fingerstick blood sugars 3 times  daily. Discharge plan was discussed with the patient. TOTAL TIME FOR THE DISCHARGE: More than 35 minutes.         Kashmir La MD    [place] / [address]  D:  07/25/2017   22:30  T: 07/26/2017   08:54  Job #:  [id number]

## 2017-07-31 ENCOUNTER — OFFICE VISIT (OUTPATIENT)
Dept: CARDIOLOGY CLINIC | Age: 57
End: 2017-07-31

## 2017-07-31 VITALS
HEART RATE: 97 BPM | WEIGHT: 234 LBS | BODY MASS INDEX: 39.95 KG/M2 | HEIGHT: 64 IN | SYSTOLIC BLOOD PRESSURE: 118 MMHG | DIASTOLIC BLOOD PRESSURE: 80 MMHG | OXYGEN SATURATION: 95 %

## 2017-07-31 DIAGNOSIS — I10 ESSENTIAL HYPERTENSION: Primary | ICD-10-CM

## 2017-07-31 DIAGNOSIS — E78.1 HYPERTRIGLYCERIDEMIA: ICD-10-CM

## 2017-07-31 DIAGNOSIS — I25.10 CORONARY ARTERY DISEASE INVOLVING NATIVE CORONARY ARTERY OF NATIVE HEART WITHOUT ANGINA PECTORIS: ICD-10-CM

## 2017-07-31 DIAGNOSIS — Z91.199 HISTORY OF NONCOMPLIANCE WITH MEDICAL TREATMENT, PRESENTING HAZARDS TO HEALTH: ICD-10-CM

## 2017-07-31 RX ORDER — FLUTICASONE PROPIONATE 50 MCG
2 SPRAY, SUSPENSION (ML) NASAL DAILY
COMMUNITY

## 2017-07-31 RX ORDER — CARVEDILOL 6.25 MG/1
6.25 TABLET ORAL 2 TIMES DAILY WITH MEALS
Qty: 60 TAB | Refills: 6 | Status: SHIPPED | OUTPATIENT
Start: 2017-07-31

## 2017-07-31 RX ORDER — FUROSEMIDE 40 MG/1
40 TABLET ORAL DAILY
Qty: 30 TAB | Refills: 6 | Status: SHIPPED | OUTPATIENT
Start: 2017-07-31 | End: 2017-08-14 | Stop reason: SDUPTHER

## 2017-07-31 NOTE — PROGRESS NOTES
Hali Mccollum presents today for a post-hospital follow-up. He was hospitalized from 7/13/17 through 7/17/17. He presented to the hospital with complaints of chest pain. He was noted to have elevated blood pressures for which he was placed on a nitroglycerin drip. During his recent hospitalization, his NT proBNP was 4460. He was treated for acute on chronic diastolic heart failure. He tells me that he felt better \"after the fluid was removed. \"  His troponins were 0.23, 0.20, and 0.21 and he ruled out for MI. He had an echocardiogram done during his recent hospital admission and it showed a ejection fraction of 50-55% and possible hypokinesis of the entire inferior, basal mid inferior, basal mid anterolateral and apical lateral walls. A pharmacologic nuclear stress test was also done and it was considered low/intermediate risk. No evidence of significant ongoing ischemia or infarct. He was seen by nephrology for acute on chronic kidney disease. He is a 68-year-old -American male with history of hypertension, gout, chronic kidney disease stage III, obesity, diabetes mellitus (hemoglobin A1c of 6.1 during his recent hospitalization), dyslipidemia, tobacco and alcohol use, and medical noncompliance. A drug screen done during the hospitalization was positive for cocaine use. Since being discharged home, he states that he has been feeling much better. He reports compliance with his medications. He also tells me that he has not smoked, drank alcohol, or used any cocaine since being discharged home. He denies chest pain, tightness, heaviness, and palpitations. He denies shortness of breath at rest, admits to dyspnea on exertion, has 2 pillow orthopnea (normal) and denies PND. He denies any unusual abdominal bloating. He denies lightheadedness, dizziness, and syncope. He denies lower extremity edema and claudication. Denies nausea, vomiting, diarrhea, fever, chills.   He states that he has not physically active and does a lot of sitting during the day. A review of his medications (brought bottles with him today) shows that he did not have carvedilol. When asked about it, he states that the pharmacy told him that they did not get a prescription and that he was supposed to call the doctor's office but he states that he did not. PMH:  Past Medical History:   Diagnosis Date    Alcohol abuse     Arthritis     Atherosclerotic cardiovascular disease     CAD (coronary artery disease)     mild disease of coronaries (cor angio 2006),wife states he has 1 stent    Cardiac cath 01/26/2006    RCA mild. Otherwise patent coronaries. LVEDP 15.  EF 55-60%.  Cardiac echocardiogram 03/27/2009    EF 60%. No cardiac source of embolism. No significant valvular pathology.  Cardiac nuclear imaging test 12/06/2011    Small, mild inferior infarction or possibly artifact. No ischemia. EF 45%. No TID. No reg WMA.  Cardiovascular LLE venous duplex 08/14/2015    Left leg:  No DVT. Dilated lymph node in left groin.  Constipation     Diabetes mellitus type II     Gout     Hepatic steatosis     Hypercholesterolemia     Hypertension     Knee effusion, left     Left knee pain     Morbid obesity (HCC)     Noncompliance     Obesity (BMI 30-39. 9)     S/P colonoscopy 3/8/05    Dr. Marta Elam; normal; f/u 10 years    Stomach problems     TIA (transient ischemic attack) 3/09    Tobacco abuse        PSH:  Past Surgical History:   Procedure Laterality Date    ABDOMEN SURGERY PROC UNLISTED      Hernia repair in 1960's or 1970's.  HX ORTHOPAEDIC      Bones spur removed from left & right foot 2013.  HX UROLOGICAL  8/18/2015    [facility], Dr. Jose Elias Garcia, Cystoscopy, left retrograde, left double-J cath       MEDS:  Current Outpatient Prescriptions   Medication Sig    fluticasone (FLONASE) 50 mcg/actuation nasal spray 2 Sprays by Both Nostrils route daily.     furosemide (LASIX) 40 mg tablet Take 1 Tab by mouth daily.  carvedilol (COREG) 6.25 mg tablet Take 1 Tab by mouth two (2) times daily (with meals).  atorvastatin (LIPITOR) 40 mg tablet Take 1 Tab by mouth nightly.  docusate sodium (COLACE) 100 mg capsule Take 1 Cap by mouth two (2) times a day for 90 days.  hydrALAZINE (APRESOLINE) 50 mg tablet Take 1 Tab by mouth three (3) times daily.  isosorbide mononitrate ER (IMDUR) 60 mg CR tablet Take 1 Tab by mouth daily.  losartan (COZAAR) 25 mg tablet Take 1 Tab by mouth daily.  nitroglycerin (NITROSTAT) 0.4 mg SL tablet 1 Tab by SubLINGual route as needed for Chest Pain.  albuterol (VENTOLIN HFA) 90 mcg/actuation inhaler Take 2 Puffs by inhalation every four (4) hours as needed for Wheezing for up to 90 days.  febuxostat (ULORIC) 40 mg tab tablet Take 1 Tab by mouth daily.  glucose blood VI test strips (FREESTYLE TEST) strip by Does Not Apply route See Admin Instructions. Check twice a day    aspirin 81 mg chewable tablet Take 1 Tab by mouth daily.  B COMPLEX W-C NO.20/FOLIC ACID (B COMPLEX & C NO.20-FOLIC ACID PO) Take  by mouth.  budesonide-formoterol (SYMBICORT) 160-4.5 mcg/actuation HFA inhaler Take 2 Puffs by inhalation two (2) times a day.  calcitRIOL (ROCALTROL) 0.25 mcg capsule Take 1 Cap by mouth every Monday, Wednesday, Friday.  Lancets misc Check 3 times a day , needs according contour glucometer    Blood-Glucose Meter monitoring kit Check twice daily. No current facility-administered medications for this visit. Allergies and Sensitivities:  Allergies   Allergen Reactions    Shellfish Derived Other (comments)     Causes Gout       Family History:  Family History   Problem Relation Age of Onset    Diabetes Father     Heart Disease Mother     Diabetes Sister     Hypertension Sister     Arthritis-osteo Sister     Arthritis-osteo Brother     Diabetes Other     Diabetes Other      Uncle, Aunt    Hypertension Other      Uncle;  Aunt Social History:  He  reports that he has been smoking Cigarettes. He has been smoking about 0.25 packs per day. He has never used smokeless tobacco.  He  reports that he drinks alcohol. Physical:  Visit Vitals    /80    Pulse 97    Ht 5' 4\" (1.626 m)    Wt 106.1 kg (234 lb)    SpO2 95%    BMI 40.17 kg/m2         His weight is down 1 pound since his last visit      Exam:  Neck:  Supple, no JVD, no carotid bruits  CV:  Normal S1 and  S2, no murmurs, rubs, or gallops noted  Lungs:  Clear to ausculation throughout, no wheezes or rales  Abd:  Soft, non-tender, obese, with good bowel sounds. No hepatosplenomegaly  Extremities:  nolower extremity edema      Data:  EKG:   Normal sinus rhythm, rate 97.  LAE.  T-wave inversions in anterolateral leads, consider lateral ischemia      LABS:  Lab Results   Component Value Date/Time    Sodium 141 07/17/2017 03:05 AM    Potassium 3.4 07/17/2017 03:05 AM    Chloride 101 07/17/2017 03:05 AM    CO2 32 07/17/2017 03:05 AM    Glucose 113 07/17/2017 03:05 AM    BUN 25 07/17/2017 03:05 AM    Creatinine 3.13 07/17/2017 03:05 AM     Lab Results   Component Value Date/Time    Cholesterol, total 294 07/14/2017 02:11 AM    HDL Cholesterol 47 07/14/2017 02:11 AM    LDL, calculated 196.4 07/14/2017 02:11 AM    Triglyceride 253 07/14/2017 02:11 AM    CHOL/HDL Ratio 6.3 07/14/2017 02:11 AM     Lab Results   Component Value Date/Time    ALT (SGPT) 21 07/14/2017 02:11 AM       Impression / Plan:  1. Hypertension, blood pressure stable  2. Diabetes mellitus, recommend Hgb A1c less than 7% from cardiac standpoint  3. Dyslipidemia, on atorvastatin 40mg  4. Chronic kidney disease, stage 3  5. Diabetes mellitus, recommend Hgb A1c less than 7% from cardiac standpoint  6. Polysubstance abuse (tobacco, alcohol, cocaine)    Mr. Seth Romero was seen today for post hospital follow-up.   He was recently hospitalized for acute diastolic heart failure exacerbation and hypertensive urgency. He also had acute on chronic kidney injury, stage 3. He does have history medical noncompliance. Today, he states that he has been feeling much better since being discharged home. He brought his medications with him today and it appears that he has been compliant with taking them. The only medication that was missing was carvedilol 25mg. He states that he did not get a prescription for it. He will be started on carvedilol but at a lower dose of 6.25mg BID since his blood pressure was fairly well controlled. The prescription was sent to his pharmacy of choice. I asked that he return in 2 weeks for follow-up and possible titration of Coreg. He offers no cardiac complaints. He tells me that he has not had any chest pain or unusual shortness of breath. His breath sounds are clear and he does not exhibit any signs of volume overload at this time. He also reports that he has not used any substances of abuse since discharge. He states that he has not smoked a cigarette for about 2 weeks. Positive reinforcement given and potential health issues associated with use of tobacco, alcohol, and cocaine discussed at length with him. He had an echocardiogram done during his recent hospital admission and it showed a ejection fraction of 50-55% and possible hypokinesis of the entire inferior, basal mid inferior, basal mid anterolateral and apical lateral walls. A pharmacologic nuclear stress test was also done and it was considered low/intermediate risk. No evidence of significant ongoing ischemia or infarct. Congestive heart failure teaching reinforced today. Advised to limit sodium intake to no more than 2000mg per day and to also limit his fluid intake to no more than 48 ounces per day. Advised to weigh daily every morning and record weights.   Instructed to call our office if progressive weight gain is noted over a 2 to 3 day period of time, shortness of breath increases, or if abdominal bloating, nausea, fatigue, or increased lower extremity edema is noted. Patient education material regarding CHF, sodium and fluid restrictions, and smoking cessation were attached his after visit summary. Smoking cessation discussed and highly encouraged. He was also advised about alcohol and recreational drug use cessation. Importance of compliance with diet and lifestyle modification, taking his medications, and follow-up discussed at length. Greater than 50% of a 40 minute visit was spent counseling and answering questions. He still had about 2 weeks worth of medications and he did not want refills at this time as he states \"it will be too confusing with all of those bottles. \"  Will refill his medications when he returns for follow-up in 2 weeks but if he is close to running out of medications prior to the appointment date, he was instructed to call so that we can send refills in for him. He will follow-up with Dr. Myrna Blackburn as scheduled and as needed. Eric Pena MSN, FNP-BC    Please note:  Portions of this chart were created with Dragon medical speech to text program.  Unrecognized errors may be present.

## 2017-07-31 NOTE — PROGRESS NOTES
1. Have you been to the ER, urgent care clinic since your last visit? Hospitalized since your last visit? [facility] 7/13/2017 High BP    2. Have you seen or consulted any other health care providers outside of the [address] [place] [address] since your last visit? Include any pap smears or colon screening.

## 2017-07-31 NOTE — MR AVS SNAPSHOT
Visit Information Date & Time Provider Department Dept. Phone Encounter #  
 7/31/2017  1:00 PM Tiana Ocampo, [facility] [address] [id number] Your Appointments 8/8/2017 10:45 AM  
ROUTINE CARE with Mamta Welch MD  
[address] [facility] ([facility]) Appt Note: 2 month followup [address] [facility]ly [facility] [address] [facility] [address] [facility] [address]  
  
    
 8/14/2017  9:30 AM  
Follow Up with Zoey Shukla DO [address] ([facility]) Appt Note: [address] [facility], [address] [address]  
[phone]  
  
   
 [address] [place] [address], [address] [facility] [address] [address]  
  
    
 8/14/2017  1:30 PM  
Follow Up with Tiana Ocampo NP [facility] [address] ([facility]) Appt Note: 2 week f/u after Coreg restarted [place] [address] [address] [address] [phone] [facility] [phone]  
  
    
 8/23/2017  2:30 PM  
COLON SCREEN with [facility]SS [facility] NURSE VISIT [facility] Appt Note: CN screen / Dr Angela Knight referring; r/s from 6/21; CN screen / Dr Angela Knight referring r/s from 6/21; CN screen / Dr Angela Knight referring r/s from 6/21  
 [address] [place] [address] [address] Formerly [facility] [address] [place] [address] [address]  
  
    
 10/4/2017  2:20 PM  
POST HOSPITAL with Farnaz Gtz DO [facility] [address] [address] ([facility]) Appt Note: [facility] f/u; [facility] saw on 7/31/17 [place] [address] [address] [id number] [phone] 23[address] [facility] [address] Upcoming Health Maintenance Date Due COLONOSCOPY 3/9/2015 EYE EXAM RETINAL OR DILATED Q1 3/1/2017 Pneumococcal 19-64 Highest Risk (2 of 3 - PCV13) 7/12/2017 INFLUENZA AGE 9 TO ADULT 8/1/2017 MICROALBUMIN Q1 1/13/2018 HEMOGLOBIN A1C Q6M 1/14/2018 FOOT EXAM Q1 3/28/2018 LIPID PANEL Q1 7/14/2018 DTaP/Tdap/Td series (2 - Td) 7/12/2026 Allergies as of 7/31/2017  Review Complete On: 7/31/2017 By: Oliver Bowers NP Severity Noted Reaction Type Reactions Shellfish Derived  05/25/2016    Other (comments) Causes Gout Current Immunizations  Reviewed on 2/7/2017 No immunizations on file. Not reviewed this visit You Were Diagnosed With   
  
 Codes Comments Essential hypertension    -  Primary ICD-10-CM: I10 
ICD-9-CM: 401.9 Hypertriglyceridemia     ICD-10-CM: E78.1 ICD-9-CM: 272.1 Coronary artery disease involving native coronary artery of native heart without angina pectoris     ICD-10-CM: I25.10 ICD-9-CM: 414.01 History of noncompliance with medical treatment, presenting hazards to health     ICD-10-CM: Z91.19 ICD-9-CM: V15.81 Vitals BP Pulse Height(growth percentile) Weight(growth percentile) SpO2 BMI  
 118/80 97 5' 4\" (1.626 m) 234 lb (106.1 kg) 95% 40.17 kg/m2 Smoking Status Current Every Day Smoker Vitals History BMI and BSA Data Body Mass Index Body Surface Area  
 40.17 kg/m 2 2.19 m 2 Preferred Pharmacy Pharmacy Name Phone [facility] [address] [place] [address] [place] [address] [place] [phone] Your Updated Medication List  
  
   
This list is accurate as of: 7/31/17  1:46 PM.  Always use your most recent med list.  
  
  
  
  
 albuterol 90 mcg/actuation inhaler Commonly known as:  VENTOLIN HFA Take 2 Puffs by inhalation every four (4) hours as needed for Wheezing for up to 90 days. aspirin 81 mg chewable tablet Take 1 Tab by mouth daily. atorvastatin 40 mg tablet Commonly known as:  LIPITOR Take 1 Tab by mouth nightly. B COMPLEX & C NO.20-FOLIC ACID PO Take  by mouth. Blood-Glucose Meter monitoring kit Check twice daily. budesonide-formoterol 160-4.5 mcg/actuation HFA inhaler Commonly known as:  SYMBICORT Take 2 Puffs by inhalation two (2) times a day. calcitRIOL 0.25 mcg capsule Commonly known as:  ROCALTROL Take 1 Cap by mouth every Monday, Wednesday, Friday. docusate sodium 100 mg capsule Commonly known as:  Nehal Silk Take 1 Cap by mouth two (2) times a day for 90 days. febuxostat 40 mg Tab tablet Commonly known as:  Ardeen Grills Take 1 Tab by mouth daily. FLONASE 50 mcg/actuation nasal spray Generic drug:  fluticasone 2 Sprays by Both Nostrils route daily. furosemide 40 mg tablet Commonly known as:  LASIX Take 1 Tab by mouth daily. glucose blood VI test strips strip Commonly known as:  FREESTYLE TEST  
by Does Not Apply route See Admin Instructions. Check twice a day  
  
 hydrALAZINE 50 mg tablet Commonly known as:  APRESOLINE Take 1 Tab by mouth three (3) times daily. isosorbide mononitrate ER 60 mg CR tablet Commonly known as:  IMDUR Take 1 Tab by mouth daily. Lancets Misc Check 3 times a day , needs according contour glucometer  
  
 losartan 25 mg tablet Commonly known as:  COZAAR Take 1 Tab by mouth daily. nitroglycerin 0.4 mg SL tablet Commonly known as:  NITROSTAT  
1 Tab by SubLINGual route as needed for Chest Pain. We Performed the Following AMB POC EKG ROUTINE W/ 12 LEADS, INTER & REP [51625 CPT(R)] Patient Instructions Restart Coreg (carvedilol) 6.25mg twice a day Follow-up with Cristofer Andrade in 2 weeks All other medications to remain the same Follow-up with Dr. Adriana Arzola as scheduled and as needed Weigh daily and record Limit sodium intake to 2000mg per day Limit fluid intake to no more than  6, eight ounce glasses of any type of fluids per day (48 ounces per day) Call if you notice sudden or progressive weight gain (3-5 pounds in 2-3 days), increasing shortness of breath, abdominal bloating, increasing lower extremity edema, inability to lie flat or on your normal number of pillows, having to sit up to catch your breath, fatigue, increased somnolence (sleeping more), poor appetite Heart Failure: Care Instructions Your Care Instructions Heart failure occurs when your heart does not pump as much blood as the body needs. Failure does not mean that the heart has stopped pumping but rather that it is not pumping as well as it should. Over time, this causes fluid buildup in your lungs and other parts of your body. Fluid buildup can cause shortness of breath, fatigue, swollen ankles, and other problems. By taking medicines regularly, reducing sodium (salt) in your diet, checking your weight every day, and making lifestyle changes, you can feel better and live longer. Follow-up care is a key part of your treatment and safety. Be sure to make and go to all appointments, and call your doctor if you are having problems. It's also a good idea to know your test results and keep a list of the medicines you take. How can you care for yourself at home? Medicines · Be safe with medicines. Take your medicines exactly as prescribed. Call your doctor if you think you are having a problem with your medicine. · Do not take any vitamins, over-the-counter medicine, or herbal products without talking to your doctor first. Kaci Furlong not take ibuprofen (Advil or Motrin) and naproxen (Aleve) without talking to your doctor first. They could make your heart failure worse. · You may be taking some of the following medicine. ¨ Beta-blockers can slow heart rate, decrease blood pressure, and improve your condition. Taking a beta-blocker may lower your chance of needing to be hospitalized. ¨ Angiotensin-converting enzyme inhibitors (ACEIs) reduce the heart's workload, lower blood pressure, and reduce swelling. Taking an ACEI may lower your chance of needing to be hospitalized again. ¨ Angiotensin II receptor blockers (ARBs) work like ACEIs. Your doctor may prescribe them instead of ACEIs. ¨ Diuretics, also called water pills, reduce swelling. ¨ Potassium supplements replace this important mineral, which is sometimes lost with diuretics. ¨ Aspirin and other blood thinners prevent blood clots, which can cause a stroke or heart attack. You will get more details on the specific medicines your doctor prescribes. Diet · Your doctor may suggest that you limit sodium to 2,000 milligrams (mg) a day or less. That is less than 1 teaspoon of salt a day, including all the salt you eat in cooking or in packaged foods. People get most of their sodium from processed foods. Fast food and restaurant meals also tend to be very high in sodium. · Ask your doctor how much liquid you can drink each day. You may have to limit liquids. Weight · Weigh yourself without clothing at the same time each day. Record your weight. Call your doctor if you have a sudden weight gain, such as more than 2 to 3 pounds in a day or 5 pounds in a week. (Your doctor may suggest a different range of weight gain.) A sudden weight gain may mean that your heart failure is getting worse. Activity level · Start light exercise (if your doctor says it is okay). Even if you can only do a small amount, exercise will help you get stronger, have more energy, and manage your weight and your stress. Walking is an easy way to get exercise. Start out by walking a little more than you did before. Bit by bit, increase the amount you walk. · When you exercise, watch for signs that your heart is working too hard. You are pushing yourself too hard if you cannot talk while you are exercising.  If you become short of breath or dizzy or have chest pain, stop, sit down, and rest. 
· If you feel \"wiped out\" the day after you exercise, walk slower or for a shorter distance until you can work up to a better pace. · Get enough rest at night. Sleeping with 1 or 2 pillows under your upper body and head may help you breathe easier. Lifestyle changes · Do not smoke. Smoking can make a heart condition worse. If you need help quitting, talk to your doctor about stop-smoking programs and medicines. These can increase your chances of quitting for good. Quitting smoking may be the most important step you can take to protect your heart. · Limit alcohol to 2 drinks a day for men and 1 drink a day for women. Too much alcohol can cause health problems. · Avoid getting sick from colds and the flu. Get a pneumococcal vaccine shot. If you have had one before, ask your doctor whether you need another dose. Get a flu shot each year. If you must be around people with colds or the flu, wash your hands often. When should you call for help? Call 911 if you have symptoms of sudden heart failure such as: 
· You have severe trouble breathing. · You cough up pink, foamy mucus. · You have a new irregular or rapid heartbeat. Call your doctor now or seek immediate medical care if: 
· You have new or increased shortness of breath. · You are dizzy or lightheaded, or you feel like you may faint. · You have sudden weight gain, such as more than 2 to 3 pounds in a day or 5 pounds in a week. (Your doctor may suggest a different range of weight gain.) · You have increased swelling in your legs, ankles, or feet. · You are suddenly so tired or weak that you cannot do your usual activities. Watch closely for changes in your health, and be sure to contact your doctor if: 
· You develop new symptoms. Where can you learn more? Go to http://leslie-graciela.info/. Enter U369 in the search box to learn more about \"Heart Failure: Care Instructions. \" Current as of: April 3, 2017 Content Version: 11.3 © 9598-6031 [employer]. Care instructions adapted under license by [employer] (which disclaims liability or warranty for this information). If you have questions about a medical condition or this instruction, always ask your healthcare professional. [address] any warranty or liability for your use of this information. Limiting Sodium and Fluids With Heart Failure: Care Instructions Your Care Instructions Sodium causes your body to hold on to extra water. This may cause your heart failure symptoms to get worse. Limiting sodium may help you feel better and lower your risk of having to go to the hospital. 
People get most of their sodium from processed foods. Fast food and restaurant meals also tend to be very high in sodium. Your doctor may suggest that you limit sodium to 2,000 milligrams (mg) a day or less. That is less than 1 teaspoon of salt a day, including all the salt you eat in cooked or packaged foods. Usually, you have to limit the amount of liquids you drink only if your heart failure is severe. Limiting sodium alone often is enough to help your body get rid of extra fluids. However, your doctor may tell you to limit your fluid intake to a set amount each day. Follow-up care is a key part of your treatment and safety. Be sure to make and go to all appointments, and call your doctor if you are having problems. It's also a good idea to know your test results and keep a list of the medicines you take. How can you care for yourself at home? Read food labels · Read food labels on cans and food packages. The labels tell you how much sodium is in each serving. Make sure that you look at the serving size. If you eat more than the serving size, you have eaten more sodium than is listed for one serving. · Food labels also tell you the Percent Daily Value.  If the Percent Daily Value says 50%, it means that you will get at least 50% of all the sodium you need for the entire day in one serving. Choose products with low Percent Daily Values for sodium. · Be aware that sodium can come in forms other than salt, including monosodium glutamate (MSG), sodium citrate, and sodium bicarbonate (baking soda). MSG is often added to Asian food. You can sometimes ask for food without MSG or salt. Buy low-sodium foods · Buy foods that are labeled \"unsalted\" (no salt added), \"sodium-free\" (less than 5 mg of sodium per serving), or \"low-sodium\" (less than 140 mg of sodium per serving). A food labeled \"light sodium\" has less than half of the full-sodium version of that food. Foods labeled \"reduced-sodium\" may still have too much sodium. · Buy fresh vegetables or plain, frozen vegetables. Buy low-sodium versions of canned vegetables, soups, and other canned goods. Prepare low-sodium meals · Use less salt each day when cooking. Reducing salt in this way will help you adjust to the taste. Do not add salt after cooking. Take the salt shaker off the table. · Flavor your food with garlic, lemon juice, onion, vinegar, herbs, and spices instead of salt. Do not use soy sauce, steak sauce, onion salt, garlic salt, mustard, or ketchup on your food. · Make your own salad dressings, sauces, and ketchup without adding salt. · Use less salt (or none) when recipes call for it. You can often use half the salt a recipe calls for without losing flavor. Other dishes like rice, pasta, and grains do not need added salt. · Rinse canned vegetables. This removes somebut not allof the salt. · Avoid water that has a naturally high sodium content or that has been treated with water softeners, which add sodium. Call your local water company to find out the sodium content of your water supply. If you buy bottled water, read the label and choose a sodium-free brand. Avoid high-sodium foods, such as: · Smoked, cured, salted, and canned meat, fish, and poultry. · Ham, day, hot dogs, and luncheon meats. · Regular, hard, and processed cheese and regular peanut butter. · Crackers with salted tops. · Frozen prepared meals. · Canned and dried soups, broths, and bouillon, unless labeled sodium-free or low-sodium. · Canned vegetables, unless labeled sodium-free or low-sodium. · Salted snack foods such as chips and pretzels. · Western Clarissa fries, pizza, tacos, and other fast foods. · Pickles, olives, ketchup, and other condiments, especially soy sauce, unless labeled sodium-free or low-sodium. If you cannot cook for yourself · Have family members or friends help you, or have someone cook low-sodium meals. · Check with your local senior nutrition program to find out where meals are served and whether they offer a low-sodium option. You can often find these programs through your local health department or hospital. 
· Have meals delivered to your home. Most [facility] have a imageloop on [place]. These programs provide one hot meal a day for older adults, delivered to their homes. Ask whether these meals are low-sodium. Let them know that you are on a low-sodium diet. Limiting fluid intake · Find a method that works for you. You might simply write down how much you drink every time you do. Some people keep a container filled with the amount of fluid allowed for that day. If they drink from a source other than the container, then they pour out that amount. · Measure your regular drinking glasses to find out how much fluid each one holds. Once you know this, you will not have to measure every time. · Besides water, milk, juices, and other drinks, some foods have a lot of fluid. Count any foods that will melt (such as ice cream or gelatin dessert) or liquid foods (such as soup) as part of your fluid intake for the day. Where can you learn more? Go to http://radha.info/. Enter A166 in the search box to learn more about \"Limiting Sodium and Fluids With Heart Failure: Care Instructions. \" Current as of: November 15, 2016 Content Version: 11.3 © 2675-4949 [employer]. Care instructions adapted under license by Flared[employer]D (which disclaims liability or warranty for this information). If you have questions about a medical condition or this instruction, always ask your healthcare professional. [place] [address] any warranty or liability for your use of this information. Stopping Smoking: Care Instructions Your Care Instructions Cigarette smokers crave the nicotine in cigarettes. Giving it up is much harder than simply changing a habit. Your body has to stop craving the nicotine. It is hard to quit, but you can do it. There are many tools that people use to quit smoking. You may find that combining tools works best for you. There are several steps to quitting. First you get ready to quit. Then you get support to help you. After that, you learn new skills and behaviors to become a nonsmoker. For many people, a necessary step is getting and using medicine. Your doctor will help you set up the plan that best meets your needs. You may want to attend a smoking cessation program to help you quit smoking. When you choose a program, look for one that has proven success. Ask your doctor for ideas. You will greatly increase your chances of success if you take medicine as well as get counseling or join a cessation program. 
Some of the changes you feel when you first quit tobacco are uncomfortable. Your body will miss the nicotine at first, and you may feel short-tempered and grumpy. You may have trouble sleeping or concentrating. Medicine can help you deal with these symptoms. You may struggle with changing your smoking habits and rituals. The last step is the tricky one: Be prepared for the smoking urge to continue for a time.  This is a lot to deal with, but keep at it. You will feel better. Follow-up care is a key part of your treatment and safety. Be sure to make and go to all appointments, and call your doctor if you are having problems. Its also a good idea to know your test results and keep a list of the medicines you take. How can you care for yourself at home? · Ask your family, friends, and coworkers for support. You have a better chance of quitting if you have help and support. · Join a support group, such as Nicotine Anonymous, for people who are trying to quit smoking. · Consider signing up for a smoking cessation program, such as the American Lung Association's Freedom from Smoking program. 
· Set a quit date. Pick your date carefully so that it is not right in the middle of a big deadline or stressful time. Once you quit, do not even take a puff. Get rid of all ashtrays and lighters after your last cigarette. Clean your house and your clothes so that they do not smell of smoke. · Learn how to be a nonsmoker. Think about ways you can avoid those things that make you reach for a cigarette. ¨ Avoid situations that put you at greatest risk for smoking. For some people, it is hard to have a drink with friends without smoking. For others, they might skip a coffee break with coworkers who smoke. ¨ Change your daily routine. Take a different route to work or eat a meal in a different place. · Cut down on stress. Calm yourself or release tension by doing an activity you enjoy, such as reading a book, taking a hot bath, or gardening. · Talk to your doctor or pharmacist about nicotine replacement therapy, which replaces the nicotine in your body. You still get nicotine but you do not use tobacco. Nicotine replacement products help you slowly reduce the amount of nicotine you need. These products come in several forms, many of them available over-the-counter: ¨ Nicotine patches ¨ Nicotine gum and lozenges ¨ Nicotine inhaler · Ask your doctor about bupropion (Wellbutrin) or varenicline (Chantix), which are prescription medicines. They do not contain nicotine. They help you by reducing withdrawal symptoms, such as stress and anxiety. · Some people find hypnosis, acupuncture, and massage helpful for ending the smoking habit. · Eat a healthy diet and get regular exercise. Having healthy habits will help your body move past its craving for nicotine. · Be prepared to keep trying. Most people are not successful the first few times they try to quit. Do not get mad at yourself if you smoke again. Make a list of things you learned and think about when you want to try again, such as next week, next month, or next year. Where can you learn more? Go to http://leslieSocialMedia.comgraciela.info/. Enter O000 in the search box to learn more about \"Stopping Smoking: Care Instructions. \" Current as of: March 20, 2017 Content Version: 11.3 © 8777-4668 [employer]. Care instructions adapted under license by [employer] (which disclaims liability or warranty for this information). If you have questions about a medical condition or this instruction, always ask your healthcare professional. [address] any warranty or liability for your use of this information. Learning About Alcohol Misuse What is alcohol misuse? Alcohol misuse means drinking so much that it causes problems for you or others. Early problems with alcohol can start at home. You may argue with loved ones about how much you're drinking. Your job may be affected because of drinking. You may drink when it's dangerous or illegal, such as when you drive. Drinking too much for a long time can lead to health conditions like high blood pressure and liver problems. What are the symptoms? Symptoms of alcohol misuse may include: · Drinking much more than you planned. · Drinking even though it's causing problems for you or others. · Putting yourself in situations where you might get hurt. · Wanting to cut down or stop drinking, but not being able to. · Feeling guilty about how much you're drinking. How is alcohol misuse treated? Getting help for problems with alcohol is up to you. But you don't have to do it alone. There are many people and kinds of treatments to help with alcohol problems. Talking to your doctor is the first step. When you get a doctor's help, treatment for alcohol problems can be safer and quicker. Treatment options can include: · Treatment programs. Examples are group therapy, one or more types of counseling, and alcohol education. · Medicines. A doctor or counselor can help you know what kinds of medicines might help with cravings. · Free social support groups. These groups include AA (Alcoholics Anonymous) and SMART (Self-Management and Recovery Training). Your doctor can help you decide which type of program is best for you. Follow-up care is a key part of your treatment and safety. Be sure to make and go to all appointments, and call your doctor if you are having problems. It's also a good idea to know your test results and keep a list of the medicines you take. Where can you learn more? Go to http://leslie-graciela.info/. Enter [id number] in the search box to learn more about \"Learning About Alcohol Misuse. \" Current as of: January 3, 2017 Content Version: 11.3 © 5508-2618 [employer]. Care instructions adapted under license by [employer] (which disclaims liability or warranty for this information). If you have questions about a medical condition or this instruction, always ask your healthcare professional. [address] any warranty or liability for your use of this information. Please provide this summary of care documentation to your next provider. Your primary care clinician is listed as Parker Wilkerson.  If you have any questions after today's visit, please call [phone].

## 2017-07-31 NOTE — PATIENT INSTRUCTIONS
Restart Coreg (carvedilol) 6.25mg twice a day  Follow-up with Edwina Flores in 2 weeks  All other medications to remain the same  Follow-up with Dr. Fazal Spear as scheduled and as needed  Weigh daily and record  Limit sodium intake to 2000mg per day  Limit fluid intake to no more than  6, eight ounce glasses of any type of fluids per day (48 ounces per day)  Call if you notice sudden or progressive weight gain (3-5 pounds in 2-3 days), increasing shortness of breath, abdominal bloating, increasing lower extremity edema, inability to lie flat or on your normal number of pillows, having to sit up to catch your breath, fatigue, increased somnolence (sleeping more), poor appetite       Heart Failure: Care Instructions  Your Care Instructions    Heart failure occurs when your heart does not pump as much blood as the body needs. Failure does not mean that the heart has stopped pumping but rather that it is not pumping as well as it should. Over time, this causes fluid buildup in your lungs and other parts of your body. Fluid buildup can cause shortness of breath, fatigue, swollen ankles, and other problems. By taking medicines regularly, reducing sodium (salt) in your diet, checking your weight every day, and making lifestyle changes, you can feel better and live longer. Follow-up care is a key part of your treatment and safety. Be sure to make and go to all appointments, and call your doctor if you are having problems. It's also a good idea to know your test results and keep a list of the medicines you take. How can you care for yourself at home? Medicines  · Be safe with medicines. Take your medicines exactly as prescribed. Call your doctor if you think you are having a problem with your medicine.   · Do not take any vitamins, over-the-counter medicine, or herbal products without talking to your doctor first. Pablo Junk not take ibuprofen (Advil or Motrin) and naproxen (Aleve) without talking to your doctor first. They could make your heart failure worse. · You may be taking some of the following medicine. ¨ Beta-blockers can slow heart rate, decrease blood pressure, and improve your condition. Taking a beta-blocker may lower your chance of needing to be hospitalized. ¨ Angiotensin-converting enzyme inhibitors (ACEIs) reduce the heart's workload, lower blood pressure, and reduce swelling. Taking an ACEI may lower your chance of needing to be hospitalized again. ¨ Angiotensin II receptor blockers (ARBs) work like ACEIs. Your doctor may prescribe them instead of ACEIs. ¨ Diuretics, also called water pills, reduce swelling. ¨ Potassium supplements replace this important mineral, which is sometimes lost with diuretics. ¨ Aspirin and other blood thinners prevent blood clots, which can cause a stroke or heart attack. You will get more details on the specific medicines your doctor prescribes. Diet  · Your doctor may suggest that you limit sodium to 2,000 milligrams (mg) a day or less. That is less than 1 teaspoon of salt a day, including all the salt you eat in cooking or in packaged foods. People get most of their sodium from processed foods. Fast food and restaurant meals also tend to be very high in sodium. · Ask your doctor how much liquid you can drink each day. You may have to limit liquids. Weight  · Weigh yourself without clothing at the same time each day. Record your weight. Call your doctor if you have a sudden weight gain, such as more than 2 to 3 pounds in a day or 5 pounds in a week. (Your doctor may suggest a different range of weight gain.) A sudden weight gain may mean that your heart failure is getting worse. Activity level  · Start light exercise (if your doctor says it is okay). Even if you can only do a small amount, exercise will help you get stronger, have more energy, and manage your weight and your stress. Walking is an easy way to get exercise. Start out by walking a little more than you did before.  Bit by bit, increase the amount you walk. · When you exercise, watch for signs that your heart is working too hard. You are pushing yourself too hard if you cannot talk while you are exercising. If you become short of breath or dizzy or have chest pain, stop, sit down, and rest.  · If you feel \"wiped out\" the day after you exercise, walk slower or for a shorter distance until you can work up to a better pace. · Get enough rest at night. Sleeping with 1 or 2 pillows under your upper body and head may help you breathe easier. Lifestyle changes  · Do not smoke. Smoking can make a heart condition worse. If you need help quitting, talk to your doctor about stop-smoking programs and medicines. These can increase your chances of quitting for good. Quitting smoking may be the most important step you can take to protect your heart. · Limit alcohol to 2 drinks a day for men and 1 drink a day for women. Too much alcohol can cause health problems. · Avoid getting sick from colds and the flu. Get a pneumococcal vaccine shot. If you have had one before, ask your doctor whether you need another dose. Get a flu shot each year. If you must be around people with colds or the flu, wash your hands often. When should you call for help? Call 911 if you have symptoms of sudden heart failure such as:  · You have severe trouble breathing. · You cough up pink, foamy mucus. · You have a new irregular or rapid heartbeat. Call your doctor now or seek immediate medical care if:  · You have new or increased shortness of breath. · You are dizzy or lightheaded, or you feel like you may faint. · You have sudden weight gain, such as more than 2 to 3 pounds in a day or 5 pounds in a week. (Your doctor may suggest a different range of weight gain.)  · You have increased swelling in your legs, ankles, or feet. · You are suddenly so tired or weak that you cannot do your usual activities.   Watch closely for changes in your health, and be sure to contact your doctor if:  · You develop new symptoms. Where can you learn more? Go to http://leslie-graciela.info/. Enter J748 in the search box to learn more about \"Heart Failure: Care Instructions. \"  Current as of: April 3, 2017  Content Version: 11.3  © 9515-2784 [employer]. Care instructions adapted under license by [employer] (which disclaims liability or warranty for this information). If you have questions about a medical condition or this instruction, always ask your healthcare professional. [address] any warranty or liability for your use of this information. Limiting Sodium and Fluids With Heart Failure: Care Instructions  Your Care Instructions  Sodium causes your body to hold on to extra water. This may cause your heart failure symptoms to get worse. Limiting sodium may help you feel better and lower your risk of having to go to the hospital.  People get most of their sodium from processed foods. Fast food and restaurant meals also tend to be very high in sodium. Your doctor may suggest that you limit sodium to 2,000 milligrams (mg) a day or less. That is less than 1 teaspoon of salt a day, including all the salt you eat in cooked or packaged foods. Usually, you have to limit the amount of liquids you drink only if your heart failure is severe. Limiting sodium alone often is enough to help your body get rid of extra fluids. However, your doctor may tell you to limit your fluid intake to a set amount each day. Follow-up care is a key part of your treatment and safety. Be sure to make and go to all appointments, and call your doctor if you are having problems. It's also a good idea to know your test results and keep a list of the medicines you take. How can you care for yourself at home? Read food labels  · Read food labels on cans and food packages. The labels tell you how much sodium is in each serving.  Make sure that you look at the serving size. If you eat more than the serving size, you have eaten more sodium than is listed for one serving. · Food labels also tell you the Percent Daily Value. If the Percent Daily Value says 50%, it means that you will get at least 50% of all the sodium you need for the entire day in one serving. Choose products with low Percent Daily Values for sodium. · Be aware that sodium can come in forms other than salt, including monosodium glutamate (MSG), sodium citrate, and sodium bicarbonate (baking soda). MSG is often added to Asian food. You can sometimes ask for food without MSG or salt. Buy low-sodium foods  · Buy foods that are labeled \"unsalted\" (no salt added), \"sodium-free\" (less than 5 mg of sodium per serving), or \"low-sodium\" (less than 140 mg of sodium per serving). A food labeled \"light sodium\" has less than half of the full-sodium version of that food. Foods labeled \"reduced-sodium\" may still have too much sodium. · Buy fresh vegetables or plain, frozen vegetables. Buy low-sodium versions of canned vegetables, soups, and other canned goods. Prepare low-sodium meals  · Use less salt each day when cooking. Reducing salt in this way will help you adjust to the taste. Do not add salt after cooking. Take the salt shaker off the table. · Flavor your food with garlic, lemon juice, onion, vinegar, herbs, and spices instead of salt. Do not use soy sauce, steak sauce, onion salt, garlic salt, mustard, or ketchup on your food. · Make your own salad dressings, sauces, and ketchup without adding salt. · Use less salt (or none) when recipes call for it. You can often use half the salt a recipe calls for without losing flavor. Other dishes like rice, pasta, and grains do not need added salt. · Rinse canned vegetables. This removes some--but not all--of the salt. · Avoid water that has a naturally high sodium content or that has been treated with water softeners, which add sodium.  Call your local water company to find out the sodium content of your water supply. If you buy bottled water, read the label and choose a sodium-free brand. Avoid high-sodium foods, such as:  · Smoked, cured, salted, and canned meat, fish, and poultry. · Ham, day, hot dogs, and luncheon meats. · Regular, hard, and processed cheese and regular peanut butter. · Crackers with salted tops. · Frozen prepared meals. · Canned and dried soups, broths, and bouillon, unless labeled sodium-free or low-sodium. · Canned vegetables, unless labeled sodium-free or low-sodium. · Salted snack foods such as chips and pretzels. · Western Clarissa fries, pizza, tacos, and other fast foods. · Pickles, olives, ketchup, and other condiments, especially soy sauce, unless labeled sodium-free or low-sodium. If you cannot cook for yourself  · Have family members or friends help you, or have someone cook low-sodium meals. · Check with your local senior nutrition program to find out where meals are served and whether they offer a low-sodium option. You can often find these programs through your local health department or hospital.  · Have meals delivered to your home. Most [facility] have a Meals on [employer]. These programs provide one hot meal a day for older adults, delivered to their homes. Ask whether these meals are low-sodium. Let them know that you are on a low-sodium diet. Limiting fluid intake  · Find a method that works for you. You might simply write down how much you drink every time you do. Some people keep a container filled with the amount of fluid allowed for that day. If they drink from a source other than the container, then they pour out that amount. · Measure your regular drinking glasses to find out how much fluid each one holds. Once you know this, you will not have to measure every time. · Besides water, milk, juices, and other drinks, some foods have a lot of fluid.  Count any foods that will melt (such as ice cream or gelatin dessert) or liquid foods (such as soup) as part of your fluid intake for the day. Where can you learn more? Go to http://leslie-graciela.info/. Enter A166 in the search box to learn more about \"Limiting Sodium and Fluids With Heart Failure: Care Instructions. \"  Current as of: November 15, 2016  Content Version: 11.3  © 8246-1961 [employer]. Care instructions adapted under license by [employer] (which disclaims liability or warranty for this information). If you have questions about a medical condition or this instruction, always ask your healthcare professional. [place] [address] any warranty or liability for your use of this information. Stopping Smoking: Care Instructions  Your Care Instructions  Cigarette smokers crave the nicotine in cigarettes. Giving it up is much harder than simply changing a habit. Your body has to stop craving the nicotine. It is hard to quit, but you can do it. There are many tools that people use to quit smoking. You may find that combining tools works best for you. There are several steps to quitting. First you get ready to quit. Then you get support to help you. After that, you learn new skills and behaviors to become a nonsmoker. For many people, a necessary step is getting and using medicine. Your doctor will help you set up the plan that best meets your needs. You may want to attend a smoking cessation program to help you quit smoking. When you choose a program, look for one that has proven success. Ask your doctor for ideas. You will greatly increase your chances of success if you take medicine as well as get counseling or join a cessation program.  Some of the changes you feel when you first quit tobacco are uncomfortable. Your body will miss the nicotine at first, and you may feel short-tempered and grumpy. You may have trouble sleeping or concentrating. Medicine can help you deal with these symptoms.  You may struggle with changing your smoking habits and rituals. The last step is the tricky one: Be prepared for the smoking urge to continue for a time. This is a lot to deal with, but keep at it. You will feel better. Follow-up care is a key part of your treatment and safety. Be sure to make and go to all appointments, and call your doctor if you are having problems. Its also a good idea to know your test results and keep a list of the medicines you take. How can you care for yourself at home? · Ask your family, friends, and coworkers for support. You have a better chance of quitting if you have help and support. · Join a support group, such as Nicotine Anonymous, for people who are trying to quit smoking. · Consider signing up for a smoking cessation program, such as the American Lung Association's Freedom from Smoking program.  · Set a quit date. Pick your date carefully so that it is not right in the middle of a big deadline or stressful time. Once you quit, do not even take a puff. Get rid of all ashtrays and lighters after your last cigarette. Clean your house and your clothes so that they do not smell of smoke. · Learn how to be a nonsmoker. Think about ways you can avoid those things that make you reach for a cigarette. ¨ Avoid situations that put you at greatest risk for smoking. For some people, it is hard to have a drink with friends without smoking. For others, they might skip a coffee break with coworkers who smoke. ¨ Change your daily routine. Take a different route to work or eat a meal in a different place. · Cut down on stress. Calm yourself or release tension by doing an activity you enjoy, such as reading a book, taking a hot bath, or gardening. · Talk to your doctor or pharmacist about nicotine replacement therapy, which replaces the nicotine in your body. You still get nicotine but you do not use tobacco. Nicotine replacement products help you slowly reduce the amount of nicotine you need.  These products come in several forms, many of them available over-the-counter:  ¨ Nicotine patches  ¨ Nicotine gum and lozenges  ¨ Nicotine inhaler  · Ask your doctor about bupropion (Wellbutrin) or varenicline (Chantix), which are prescription medicines. They do not contain nicotine. They help you by reducing withdrawal symptoms, such as stress and anxiety. · Some people find hypnosis, acupuncture, and massage helpful for ending the smoking habit. · Eat a healthy diet and get regular exercise. Having healthy habits will help your body move past its craving for nicotine. · Be prepared to keep trying. Most people are not successful the first few times they try to quit. Do not get mad at yourself if you smoke again. Make a list of things you learned and think about when you want to try again, such as next week, next month, or next year. Where can you learn more? Go to http://leslieEnswersgraciela.info/. Enter L870 in the search box to learn more about \"Stopping Smoking: Care Instructions. \"  Current as of: March 20, 2017  Content Version: 11.3  © 4475-4155 [employer]. Care instructions adapted under license by [employer] (which disclaims liability or warranty for this information). If you have questions about a medical condition or this instruction, always ask your healthcare professional. [address] any warranty or liability for your use of this information. Learning About Alcohol Misuse  What is alcohol misuse? Alcohol misuse means drinking so much that it causes problems for you or others. Early problems with alcohol can start at home. You may argue with loved ones about how much you're drinking. Your job may be affected because of drinking. You may drink when it's dangerous or illegal, such as when you drive. Drinking too much for a long time can lead to health conditions like high blood pressure and liver problems.   What are the symptoms? Symptoms of alcohol misuse may include:  · Drinking much more than you planned. · Drinking even though it's causing problems for you or others. · Putting yourself in situations where you might get hurt. · Wanting to cut down or stop drinking, but not being able to. · Feeling guilty about how much you're drinking. How is alcohol misuse treated? Getting help for problems with alcohol is up to you. But you don't have to do it alone. There are many people and kinds of treatments to help with alcohol problems. Talking to your doctor is the first step. When you get a doctor's help, treatment for alcohol problems can be safer and quicker. Treatment options can include:  · Treatment programs. Examples are group therapy, one or more types of counseling, and alcohol education. · Medicines. A doctor or counselor can help you know what kinds of medicines might help with cravings. · Free social support groups. These groups include AA (Alcoholics Anonymous) and SMART (Self-Management and Recovery Training). Your doctor can help you decide which type of program is best for you. Follow-up care is a key part of your treatment and safety. Be sure to make and go to all appointments, and call your doctor if you are having problems. It's also a good idea to know your test results and keep a list of the medicines you take. Where can you learn more? Go to http://leslie-graciela.info/. Enter [id number] in the search box to learn more about \"Learning About Alcohol Misuse. \"  Current as of: January 3, 2017  Content Version: 11.3  © 8695-5448 [employer]. Care instructions adapted under license by [employer] (which disclaims liability or warranty for this information). If you have questions about a medical condition or this instruction, always ask your healthcare professional. [address] any warranty or liability for your use of this information.

## 2017-07-31 NOTE — PROGRESS NOTES
1. Have you been to the ER, urgent care clinic since your last visit? Hospitalized since your last visit? Yes, [facility] on July 13,2017 with complaints of chest pain. 2. Have you seen or consulted any other health care providers outside of the [address] [place] [address] since your last visit? Include any pap smears or colon screening.  No    Verbal order and read back per Consuelo Rodriguez NP

## 2017-08-02 ENCOUNTER — TELEPHONE (OUTPATIENT)
Dept: CARDIAC REHAB | Age: 57
End: 2017-08-02

## 2017-08-14 ENCOUNTER — HOSPITAL ENCOUNTER (OUTPATIENT)
Dept: LAB | Age: 57
Discharge: HOME | End: 2017-08-14

## 2017-08-14 ENCOUNTER — OFFICE VISIT (OUTPATIENT)
Dept: CARDIOLOGY CLINIC | Age: 57
End: 2017-08-14

## 2017-08-14 VITALS
SYSTOLIC BLOOD PRESSURE: 116 MMHG | BODY MASS INDEX: 40.29 KG/M2 | WEIGHT: 236 LBS | OXYGEN SATURATION: 95 % | HEIGHT: 64 IN | DIASTOLIC BLOOD PRESSURE: 54 MMHG | HEART RATE: 94 BPM

## 2017-08-14 DIAGNOSIS — N18.30 CHRONIC KIDNEY DISEASE, STAGE 3 (MODERATE): ICD-10-CM

## 2017-08-14 DIAGNOSIS — I10 ESSENTIAL HYPERTENSION: Primary | ICD-10-CM

## 2017-08-14 DIAGNOSIS — I25.10 CORONARY ARTERY DISEASE INVOLVING NATIVE CORONARY ARTERY OF NATIVE HEART WITHOUT ANGINA PECTORIS: ICD-10-CM

## 2017-08-14 DIAGNOSIS — E78.5 DYSLIPIDEMIA: ICD-10-CM

## 2017-08-14 DIAGNOSIS — Z91.199 HISTORY OF NONCOMPLIANCE WITH MEDICAL TREATMENT, PRESENTING HAZARDS TO HEALTH: ICD-10-CM

## 2017-08-14 PROCEDURE — 99001 SPECIMEN HANDLING PT-LAB: CPT | Performed by: NURSE PRACTITIONER

## 2017-08-14 RX ORDER — LOSARTAN POTASSIUM 25 MG/1
25 TABLET ORAL DAILY
Qty: 30 TAB | Refills: 6 | Status: SHIPPED | OUTPATIENT
Start: 2017-08-14 | End: 2018-03-23 | Stop reason: SDUPTHER

## 2017-08-14 RX ORDER — ATORVASTATIN CALCIUM 40 MG/1
40 TABLET, FILM COATED ORAL
Qty: 30 TAB | Refills: 6 | Status: SHIPPED | OUTPATIENT
Start: 2017-08-14

## 2017-08-14 RX ORDER — FUROSEMIDE 40 MG/1
40 TABLET ORAL DAILY
Qty: 30 TAB | Refills: 6 | Status: SHIPPED | OUTPATIENT
Start: 2017-08-14

## 2017-08-14 RX ORDER — HUMAN INSULIN 100 [IU]/ML
INJECTION, SUSPENSION SUBCUTANEOUS
Refills: 4 | Status: ON HOLD | COMMUNITY
Start: 2017-07-26 | End: 2017-08-21

## 2017-08-14 RX ORDER — HYDRALAZINE HYDROCHLORIDE 50 MG/1
50 TABLET, FILM COATED ORAL DAILY
Qty: 1 TAB | Refills: 0
Start: 2017-08-14 | End: 2018-02-13

## 2017-08-14 RX ORDER — ISOSORBIDE MONONITRATE 60 MG/1
60 TABLET, EXTENDED RELEASE ORAL DAILY
Qty: 30 TAB | Refills: 6 | Status: SHIPPED | OUTPATIENT
Start: 2017-08-14

## 2017-08-14 NOTE — MR AVS SNAPSHOT
Visit Information Date & Time Provider Department Dept. Phone Encounter #  
 8/14/2017  1:30 PM Zheng Almonte NP [facility] [address] [id number] Your Appointments 8/14/2017  1:30 PM  
Follow Up with Zheng Almonte NP [facility] [address] ([facility]) Appt Note: 2 week f/u after Coreg restarted; 8/11 confirmed. ..sb  
 [place] [address] [address] [id number] [phone] [facility] [phone]  
  
    
 8/23/2017  2:30 PM  
COLON SCREEN with TSS HBV NURSE VISIT [address] ([facility]) Appt Note: CN screen / Dr Lei Smoker referring; r/s from 6/21; CN screen / Dr Lei Smoker referring r/s from 6/21; CN screen / Dr Lei Smoker referring r/s from 6/21  
 [address] [address] [address] [facility] 9/11/2017  2:30 PM  
ROUTINE CARE with Joel Horta MD  
[address] [place] [address] ([facility]) Appt Note: [address] [address] [address] [facility]  
  
    
 10/4/2017  2:20 PM  
[address] with Anahy Mena DO [facility] [place] [address] ([facility]) Appt Note: [address] 30 f/u; Azam Rodriguez saw on 7/31/17 [facility] [address] [address] [phone]  
[phone] [address] [facility] [address] Upcoming Health Maintenance Date Due COLONOSCOPY 3/9/2015 EYE EXAM RETINAL OR DILATED Q1 3/1/2017 Pneumococcal 19-64 Highest Risk (2 of 3 - PCV13) 7/12/2017 INFLUENZA AGE 9 TO ADULT 8/1/2017 MICROALBUMIN Q1 1/13/2018 HEMOGLOBIN A1C Q6M 1/14/2018 FOOT EXAM Q1 3/28/2018 LIPID PANEL Q1 7/14/2018 DTaP/Tdap/Td series (2 - Td) 7/12/2026 Allergies as of 8/14/2017  Review Complete On: 8/14/2017 By: Zheng Almonte NP Severity Noted Reaction Type Reactions Shellfish Derived  05/25/2016    Other (comments) Causes Gout Current Immunizations  Reviewed on 2/7/2017 No immunizations on file. Not reviewed this visit You Were Diagnosed With   
  
 Codes Comments Essential hypertension    -  Primary ICD-10-CM: I10 
ICD-9-CM: 401.9 Coronary artery disease involving native coronary artery of native heart without angina pectoris     ICD-10-CM: I25.10 ICD-9-CM: 414.01 History of noncompliance with medical treatment, presenting hazards to health     ICD-10-CM: Z91.19 ICD-9-CM: V15.81 Dyslipidemia     ICD-10-CM: E78.5 ICD-9-CM: 272.4 Chronic kidney disease, stage 3 (moderate)     ICD-10-CM: N18.3 ICD-9-CM: 815. 3 Vitals BP Pulse Height(growth percentile) Weight(growth percentile) SpO2 BMI  
 116/54 94 5' 4\" (1.626 m) 236 lb (107 kg) 95% 40.51 kg/m2 Smoking Status Current Every Day Smoker Vitals History BMI and BSA Data Body Mass Index Body Surface Area 40.51 kg/m 2 2.2 m 2 Preferred Pharmacy Pharmacy Name [facility] [address] [place] [address] [phone] Your Updated Medication List  
  
   
This list is accurate as of: 8/14/17  1:21 PM.  Always use your most recent med list.  
  
  
  
  
 albuterol 90 mcg/actuation inhaler Commonly known as:  VENTOLIN HFA Take 2 Puffs by inhalation every four (4) hours as needed for Wheezing for up to 90 days. aspirin 81 mg chewable tablet Take 1 Tab by mouth daily. atorvastatin 40 mg tablet Commonly known as:  LIPITOR Take 1 Tab by mouth nightly. B COMPLEX & C NO.20-FOLIC ACID PO Take  by mouth. Blood-Glucose Meter monitoring kit Check twice daily. budesonide-formoterol 160-4.5 mcg/actuation HFA inhaler Commonly known as:  SYMBICORT Take 2 Puffs by inhalation two (2) times a day. calcitRIOL 0.25 mcg capsule Commonly known as:  ROCALTROL Take 1 Cap by mouth every Monday, Wednesday, Friday. carvedilol 6.25 mg tablet Commonly known as:  Coco Salts Take 1 Tab by mouth two (2) times daily (with meals). docusate sodium 100 mg capsule Commonly known as:  Eilleen Schneiders Take 1 Cap by mouth two (2) times a day for 90 days. febuxostat 40 mg Tab tablet Commonly known as:  Evalee [place] Take 1 Tab by mouth daily. FLONASE 50 mcg/actuation nasal spray Generic drug:  fluticasone 2 Sprays by Both Nostrils route daily. furosemide 40 mg tablet Commonly known as:  LASIX Take 1 Tab by mouth daily. glucose blood VI test strips strip Commonly known as:  FREESTYLE TEST  
by Does Not Apply route See Admin Instructions. Check twice a day  
  
 hydrALAZINE 50 mg tablet Commonly known as:  APRESOLINE Take 1 Tab by mouth daily. isosorbide mononitrate ER 60 mg CR tablet Commonly known as:  IMDUR Take 1 Tab by mouth daily. Lancets Misc Check 3 times a day , needs according contour glucometer  
  
 losartan 25 mg tablet Commonly known as:  COZAAR Take 1 Tab by mouth daily. nitroglycerin 0.4 mg SL tablet Commonly known as:  NITROSTAT  
1 Tab by SubLINGual route as needed for Chest Pain. NovoLIN 70/30 100 unit/mL (70-30) injection Generic drug:  insulin NPH/insulin regular INJECT 60 UNITS SC IN THE MORNING AND 40 UNITS SC IN THE EVENING BEFORE SUPPER To-Do List   
 Around 08/14/2017 Lab:  METABOLIC PANEL, BASIC Patient Instructions Please take hydralazine 50mg twice a day (you are currently only taking it once a day) All other medications to remain the same BMP to be done Follow-up with Dr. Marquez Drummond as scheduled and as needed Weigh daily and record Limit sodium intake to 2000mg per day Limit fluid intake to no more than 6, eight ounce glasses of any type of fluids per day Call if you notice sudden or progressive weight gain (3-5 pounds in 2-3 days), increasing shortness of breath, abdominal bloating, increasing lower extremity edema, inability to lie flat or on your normal number of pillows, having to sit up to catch your breath, fatigue, increased somnolence (sleeping more), poor appetite Please provide this summary of care documentation to your next provider. Your primary care clinician is listed as Charmayne Pons. If you have any questions after today's visit, please call [phone].

## 2017-08-14 NOTE — PROGRESS NOTES
Luzma Ratliff presents today for follow-up after his coreg 6.25mg BID was restarted. When seen on 7/31/17, for a post-hospital follow-up, it was noted that he was not taking Carvedilol and it was listed as a discharge medication. Apparently, he did not call his PCP's office for a prescription after he was discharged home. He was hospitalized from 7/13/17 through 7/17/17. He presented to the hospital with complaints of chest pain. He was noted to have elevated blood pressures for which he was placed on a nitroglycerin drip. During his recent hospitalization, his NT proBNP was 4460. He was treated for acute on chronic diastolic heart failure. He tells me that he felt better \"after the fluid was removed. \"  His troponins were 0.23, 0.20, and 0.21 and he ruled out for MI. He had an echocardiogram done during his recent hospital admission and it showed a ejection fraction of 50-55% and possible hypokinesis of the entire inferior, basal mid inferior, basal mid anterolateral and apical lateral walls. A pharmacologic nuclear stress test was also done and it was considered low/intermediate risk. No evidence of significant ongoing ischemia or infarct. He was seen by nephrology for acute on chronic kidney disease. He is a 63-year-old -American male with history of hypertension, gout, chronic kidney disease stage III, obesity, diabetes mellitus (hemoglobin A1c of 6.1 during his recent hospitalization), dyslipidemia, tobacco and alcohol use, and medical noncompliance. A drug screen done during the hospitalization was positive for cocaine use. He states that he has been feeling well. He reports compliance with his medications. He also tells me that he has not smoked, drank alcohol, or used any cocaine since being discharged home. He denies chest pain, tightness, heaviness, and palpitations.   He denies shortness of breath at rest, admits to dyspnea on exertion, has 2 pillow orthopnea (normal) and denies PND. He denies any unusual abdominal bloating. He admits to mild occasional lightheadedness, dizziness, and denies syncope. He denies lower extremity edema and claudication. Denies nausea, vomiting, diarrhea, fever, chills. He states that he has not physically active and does a lot of sitting during the day. A review of his medications showed that he is only taking hydralazine once a day instead of 3 times a day as prescribed. He is almost out of furosemide, atorvastatin, isosorbide and losartan (has one or less weeks left in his current bottles). PMH:  Past Medical History:   Diagnosis Date    Alcohol abuse     Arthritis     Atherosclerotic cardiovascular disease     CAD (coronary artery disease)     mild disease of coronaries (cor angio 2006),wife states he has 1 stent    Cardiac cath 01/26/2006    RCA mild. Otherwise patent coronaries. LVEDP 15.  EF 55-60%.  Cardiac echocardiogram 03/27/2009    EF 60%. No cardiac source of embolism. No significant valvular pathology.  Cardiac nuclear imaging test 12/06/2011    Small, mild inferior infarction or possibly artifact. No ischemia. EF 45%. No TID. No reg WMA.  Cardiovascular LLE venous duplex 08/14/2015    Left leg:  No DVT. Dilated lymph node in left groin.  Constipation     Diabetes mellitus type II     Gout     Hepatic steatosis     Hypercholesterolemia     Hypertension     Knee effusion, left     Left knee pain     Morbid obesity (HCC)     Noncompliance     Obesity (BMI 30-39. 9)     S/P colonoscopy 3/8/05    Dr. Calixto Garner; normal; f/u 10 years    Stomach problems     TIA (transient ischemic attack) 3/09    Tobacco abuse        PSH:  Past Surgical History:   Procedure Laterality Date    ABDOMEN SURGERY PROC UNLISTED      Hernia repair in 1960's or 1970's.  HX ORTHOPAEDIC      Bones spur removed from left & right foot 2013.     HX UROLOGICAL  8/18/2015    [facility], Dr. Malu Oneill, Cystoscopy, left retrograde, left double-J cath       MEDS:  Current Outpatient Prescriptions   Medication Sig    NOVOLIN 70/30 100 unit/mL (70-30) injection INJECT 60 UNITS SC IN THE MORNING AND 40 UNITS SC IN THE EVENING BEFORE SUPPER    hydrALAZINE (APRESOLINE) 50 mg tablet Take 1 Tab by mouth daily.  isosorbide mononitrate ER (IMDUR) 60 mg CR tablet Take 1 Tab by mouth daily.  losartan (COZAAR) 25 mg tablet Take 1 Tab by mouth daily.  atorvastatin (LIPITOR) 40 mg tablet Take 1 Tab by mouth nightly.  furosemide (LASIX) 40 mg tablet Take 1 Tab by mouth daily.  fluticasone (FLONASE) 50 mcg/actuation nasal spray 2 Sprays by Both Nostrils route daily.  carvedilol (COREG) 6.25 mg tablet Take 1 Tab by mouth two (2) times daily (with meals).  docusate sodium (COLACE) 100 mg capsule Take 1 Cap by mouth two (2) times a day for 90 days.  nitroglycerin (NITROSTAT) 0.4 mg SL tablet 1 Tab by SubLINGual route as needed for Chest Pain.  albuterol (VENTOLIN HFA) 90 mcg/actuation inhaler Take 2 Puffs by inhalation every four (4) hours as needed for Wheezing for up to 90 days.  febuxostat (ULORIC) 40 mg tab tablet Take 1 Tab by mouth daily.  glucose blood VI test strips (FREESTYLE TEST) strip by Does Not Apply route See Admin Instructions. Check twice a day    aspirin 81 mg chewable tablet Take 1 Tab by mouth daily.  budesonide-formoterol (SYMBICORT) 160-4.5 mcg/actuation HFA inhaler Take 2 Puffs by inhalation two (2) times a day.  calcitRIOL (ROCALTROL) 0.25 mcg capsule Take 1 Cap by mouth every Monday, Wednesday, Friday.  Lancets misc Check 3 times a day , needs according contour glucometer    Blood-Glucose Meter monitoring kit Check twice daily.  B COMPLEX W-C NO.20/FOLIC ACID (B COMPLEX & C NO.20-FOLIC ACID PO) Take  by mouth. No current facility-administered medications for this visit.         Allergies and Sensitivities:  Allergies   Allergen Reactions    Shellfish Derived Other (comments) Causes Gout       Family History:  Family History   Problem Relation Age of Onset    Diabetes Father     Heart Disease Mother     Diabetes Sister     Hypertension Sister     Arthritis-osteo Sister     Arthritis-osteo Brother     Diabetes Other     Diabetes Other      Uncle, Aunt    Hypertension Other      Uncle; Aunt       Social History:  He  reports that he has been smoking Cigarettes. He has been smoking about 0.25 packs per day. He has never used smokeless tobacco.  He  reports that he drinks alcohol. Physical:  Visit Vitals    /54    Pulse 94    Ht 5' 4\" (1.626 m)    Wt 107 kg (236 lb)    SpO2 95%    BMI 40.51 kg/m2         His weight is up 2 poundd since his last visit      Exam:  Neck:  Supple, no JVD, no carotid bruits  CV:  Normal S1 and  S2, no murmurs, rubs, or gallops noted  Lungs:  Clear to ausculation throughout, no wheezes or rales  Abd:  Soft, non-tender, obese, with good bowel sounds. No hepatosplenomegaly  Extremities:  nolower extremity edema      Data:  EKG:   Not done today      LABS:  Lab Results   Component Value Date/Time    Sodium 141 07/17/2017 03:05 AM    Potassium 3.4 07/17/2017 03:05 AM    Chloride 101 07/17/2017 03:05 AM    CO2 32 07/17/2017 03:05 AM    Glucose 113 07/17/2017 03:05 AM    BUN 25 07/17/2017 03:05 AM    Creatinine 3.13 07/17/2017 03:05 AM     Lab Results   Component Value Date/Time    Cholesterol, total 294 07/14/2017 02:11 AM    HDL Cholesterol 47 07/14/2017 02:11 AM    LDL, calculated 196.4 07/14/2017 02:11 AM    Triglyceride 253 07/14/2017 02:11 AM    CHOL/HDL Ratio 6.3 07/14/2017 02:11 AM     Lab Results   Component Value Date/Time    ALT (SGPT) 21 07/14/2017 02:11 AM       Impression / Plan:  1. Hypertension, blood pressure stable  2. Diabetes mellitus, recommend Hgb A1c less than 7% from cardiac standpoint  3. Dyslipidemia, on atorvastatin 40mg  4. Chronic kidney disease, stage 3  5.   Diabetes mellitus, recommend Hgb A1c less than 7% from cardiac standpoint  6. Polysubstance abuse (tobacco, alcohol, cocaine)    Mr. Benton Brady was seen today for follow-up after being restarted on carvedilol at 6.25mg BID. When seen 2 weeks ago, he was not taking the medication and he stated that his pharmacy told him to call for a prescription but he never did. He was restarted on the medication that day. Since that time, he states that he has been compliant with his medications except he was only taking his hydralazine once a day. He was instructed to take it at least twice a day and the remainder of his medications will be unchaged. He offers no cardiac complaints. His blood pressure and heart rate are stable. Lungs are clear and he does not exhibit any signs of volume overload. He tells me that he has not had any chest pain or unusual shortness of breath. His breath sounds are clear and he does not exhibit any signs of volume overload at this time. He also reports that he has not used any substances of abuse since discharge. He was reminded of the importance of abstaining from recreational drugs, cigarettes, and alcohol. He had an echocardiogram done during his recent hospital admission and it showed a ejection fraction of 50-55% and possible hypokinesis of the entire inferior, basal mid inferior, basal mid anterolateral and apical lateral walls. A pharmacologic nuclear stress test was also done and it was considered low/intermediate risk. No evidence of significant ongoing ischemia or infarct. Congestive heart failure teaching reinforced today. Advised to limit sodium intake to no more than 2000mg per day and to also limit his fluid intake to no more than 48 ounces per day. Advised to weigh daily every morning and record weights.   Instructed to call our office if progressive weight gain is noted over a 2 to 3 day period of time, shortness of breath increases, or if abdominal bloating, nausea, fatigue, or increased lower extremity edema is noted. He will follow-up with Dr. Kelsey Marie as scheduled and as needed. Hammad Morel MSN, FNP-BC    Please note:  Portions of this chart were created with Dragon medical speech to text program.  Unrecognized errors may be present.

## 2017-08-14 NOTE — PROGRESS NOTES
1. Have you been to the ER, urgent care clinic since your last visit? Hospitalized since your last visit? No     2. Have you seen or consulted any other health care providers outside of the [address] [place] [address] since your last visit? Include any pap smears or colon screening.  No

## 2017-08-14 NOTE — PATIENT INSTRUCTIONS
Please take hydralazine 50mg twice a day (you are currently only taking it once a day)  All other medications to remain the same  BMP to be done  Follow-up with Dr. Deep Rogers as scheduled and as needed  Weigh daily and record  Limit sodium intake to 2000mg per day  Limit fluid intake to no more than 6, eight ounce glasses of any type of fluids per day  Call if you notice sudden or progressive weight gain (3-5 pounds in 2-3 days), increasing shortness of breath, abdominal bloating, increasing lower extremity edema, inability to lie flat or on your normal number of pillows, having to sit up to catch your breath, fatigue, increased somnolence (sleeping more), poor appetite

## 2017-08-15 LAB
BUN SERPL-MCNC: 33 MG/DL (ref 6–24)
BUN/CREAT SERPL: 11 (ref 9–20)
CALCIUM SERPL-MCNC: 9 MG/DL (ref 8.7–10.2)
CHLORIDE SERPL-SCNC: 104 MMOL/L (ref 96–106)
CO2 SERPL-SCNC: 24 MMOL/L (ref 18–29)
CREAT SERPL-MCNC: 3.02 MG/DL (ref 0.76–1.27)
GLUCOSE SERPL-MCNC: 114 MG/DL (ref 65–99)
POTASSIUM SERPL-SCNC: 4.5 MMOL/L (ref 3.5–5.2)
SODIUM SERPL-SCNC: 144 MMOL/L (ref 134–144)

## 2017-08-20 ENCOUNTER — HOSPITAL ENCOUNTER (INPATIENT)
Age: 57
LOS: 1 days | Discharge: HOME HEALTH | DRG: 637 | End: 2017-08-21
Attending: EMERGENCY MEDICINE | Admitting: INTERNAL MEDICINE
Payer: COMMERCIAL

## 2017-08-20 ENCOUNTER — APPOINTMENT (OUTPATIENT)
Dept: CT IMAGING | Age: 57
DRG: 637 | End: 2017-08-20
Attending: EMERGENCY MEDICINE
Payer: COMMERCIAL

## 2017-08-20 ENCOUNTER — APPOINTMENT (OUTPATIENT)
Dept: GENERAL RADIOLOGY | Age: 57
DRG: 637 | End: 2017-08-20
Attending: EMERGENCY MEDICINE
Payer: COMMERCIAL

## 2017-08-20 DIAGNOSIS — R03.0 ELEVATED BLOOD PRESSURE READING: ICD-10-CM

## 2017-08-20 DIAGNOSIS — N17.9 ACUTE RENAL FAILURE, UNSPECIFIED ACUTE RENAL FAILURE TYPE (HCC): ICD-10-CM

## 2017-08-20 DIAGNOSIS — R25.1 TREMOR: ICD-10-CM

## 2017-08-20 DIAGNOSIS — E16.2 HYPOGLYCEMIA: ICD-10-CM

## 2017-08-20 DIAGNOSIS — R07.9 ACUTE CHEST PAIN: Primary | ICD-10-CM

## 2017-08-20 LAB
ALBUMIN SERPL-MCNC: 3.2 G/DL (ref 3.4–5)
ALBUMIN/GLOB SERPL: 0.7 {RATIO} (ref 0.8–1.7)
ALP SERPL-CCNC: 72 U/L (ref 45–117)
ALT SERPL-CCNC: 32 U/L (ref 16–61)
ANION GAP SERPL CALC-SCNC: 6 MMOL/L (ref 3–18)
APPEARANCE UR: CLEAR
APTT PPP: 25.9 SEC (ref 23–36.4)
AST SERPL-CCNC: 36 U/L (ref 15–37)
ATRIAL RATE: 68 BPM
BACTERIA URNS QL MICRO: NEGATIVE /HPF
BASOPHILS # BLD: 0 K/UL (ref 0–0.1)
BASOPHILS NFR BLD: 0 % (ref 0–2)
BILIRUB SERPL-MCNC: 0.2 MG/DL (ref 0.2–1)
BILIRUB UR QL: NEGATIVE
BUN SERPL-MCNC: 37 MG/DL (ref 7–18)
BUN/CREAT SERPL: 11 (ref 12–20)
CALCIUM SERPL-MCNC: 8.6 MG/DL (ref 8.5–10.1)
CALCULATED P AXIS, ECG09: 47 DEGREES
CALCULATED R AXIS, ECG10: 18 DEGREES
CALCULATED T AXIS, ECG11: 179 DEGREES
CHLORIDE SERPL-SCNC: 107 MMOL/L (ref 100–108)
CK MB CFR SERPL CALC: 2.3 % (ref 0–4)
CK MB CFR SERPL CALC: 2.4 % (ref 0–4)
CK MB SERPL-MCNC: 5.7 NG/ML (ref 5–25)
CK MB SERPL-MCNC: 6.1 NG/ML (ref 5–25)
CK SERPL-CCNC: 237 U/L (ref 39–308)
CK SERPL-CCNC: 271 U/L (ref 39–308)
CO2 SERPL-SCNC: 25 MMOL/L (ref 21–32)
COLOR UR: YELLOW
CREAT SERPL-MCNC: 3.29 MG/DL (ref 0.6–1.3)
DIAGNOSIS, 93000: NORMAL
DIFFERENTIAL METHOD BLD: ABNORMAL
EOSINOPHIL # BLD: 0.2 K/UL (ref 0–0.4)
EOSINOPHIL NFR BLD: 2 % (ref 0–5)
EPITH CASTS URNS QL MICRO: NEGATIVE /LPF (ref 0–5)
ERYTHROCYTE [DISTWIDTH] IN BLOOD BY AUTOMATED COUNT: 14.5 % (ref 11.6–14.5)
GLOBULIN SER CALC-MCNC: 4.8 G/DL (ref 2–4)
GLUCOSE BLD STRIP.AUTO-MCNC: 101 MG/DL (ref 70–110)
GLUCOSE BLD STRIP.AUTO-MCNC: 124 MG/DL (ref 70–110)
GLUCOSE BLD STRIP.AUTO-MCNC: 146 MG/DL (ref 70–110)
GLUCOSE BLD STRIP.AUTO-MCNC: 35 MG/DL (ref 70–110)
GLUCOSE BLD STRIP.AUTO-MCNC: 68 MG/DL (ref 70–110)
GLUCOSE SERPL-MCNC: 40 MG/DL (ref 74–99)
GLUCOSE UR STRIP.AUTO-MCNC: NEGATIVE MG/DL
HCT VFR BLD AUTO: 40.4 % (ref 36–48)
HGB BLD-MCNC: 13.1 G/DL (ref 13–16)
HGB UR QL STRIP: NEGATIVE
HYALINE CASTS URNS QL MICRO: ABNORMAL /LPF (ref 0–2)
INR PPP: 0.8 (ref 0.8–1.2)
KETONES UR QL STRIP.AUTO: NEGATIVE MG/DL
LEUKOCYTE ESTERASE UR QL STRIP.AUTO: NEGATIVE
LIPASE SERPL-CCNC: 555 U/L (ref 73–393)
LYMPHOCYTES # BLD: 1.6 K/UL (ref 0.9–3.6)
LYMPHOCYTES NFR BLD: 19 % (ref 21–52)
MCH RBC QN AUTO: 28.2 PG (ref 24–34)
MCHC RBC AUTO-ENTMCNC: 32.4 G/DL (ref 31–37)
MCV RBC AUTO: 86.9 FL (ref 74–97)
MONOCYTES # BLD: 0.4 K/UL (ref 0.05–1.2)
MONOCYTES NFR BLD: 5 % (ref 3–10)
MUCOUS THREADS URNS QL MICRO: ABNORMAL /LPF
NEUTS SEG # BLD: 6 K/UL (ref 1.8–8)
NEUTS SEG NFR BLD: 74 % (ref 40–73)
NITRITE UR QL STRIP.AUTO: NEGATIVE
P-R INTERVAL, ECG05: 154 MS
PH UR STRIP: 5 [PH] (ref 5–8)
PLATELET # BLD AUTO: 312 K/UL (ref 135–420)
PMV BLD AUTO: 11.1 FL (ref 9.2–11.8)
POTASSIUM SERPL-SCNC: 3.9 MMOL/L (ref 3.5–5.5)
PROT SERPL-MCNC: 8 G/DL (ref 6.4–8.2)
PROT UR STRIP-MCNC: 300 MG/DL
PROTHROMBIN TIME: 11.1 SEC (ref 11.5–15.2)
Q-T INTERVAL, ECG07: 458 MS
QRS DURATION, ECG06: 106 MS
QTC CALCULATION (BEZET), ECG08: 487 MS
RBC # BLD AUTO: 4.65 M/UL (ref 4.7–5.5)
RBC #/AREA URNS HPF: NEGATIVE /HPF (ref 0–5)
SODIUM SERPL-SCNC: 138 MMOL/L (ref 136–145)
SP GR UR REFRACTOMETRY: 1.02 (ref 1–1.03)
TROPONIN I SERPL-MCNC: 0.02 NG/ML (ref 0–0.04)
TROPONIN I SERPL-MCNC: 0.02 NG/ML (ref 0–0.04)
UROBILINOGEN UR QL STRIP.AUTO: 0.2 EU/DL (ref 0.2–1)
VENTRICULAR RATE, ECG03: 68 BPM
WBC # BLD AUTO: 8.2 K/UL (ref 4.6–13.2)
WBC URNS QL MICRO: ABNORMAL /HPF (ref 0–4)

## 2017-08-20 PROCEDURE — 85610 PROTHROMBIN TIME: CPT | Performed by: EMERGENCY MEDICINE

## 2017-08-20 PROCEDURE — 71010 XR CHEST SNGL V: CPT

## 2017-08-20 PROCEDURE — 81001 URINALYSIS AUTO W/SCOPE: CPT | Performed by: EMERGENCY MEDICINE

## 2017-08-20 PROCEDURE — 82962 GLUCOSE BLOOD TEST: CPT

## 2017-08-20 PROCEDURE — 85730 THROMBOPLASTIN TIME PARTIAL: CPT | Performed by: EMERGENCY MEDICINE

## 2017-08-20 PROCEDURE — 99285 EMERGENCY DEPT VISIT HI MDM: CPT

## 2017-08-20 PROCEDURE — 65660000000 HC RM CCU STEPDOWN

## 2017-08-20 PROCEDURE — 85025 COMPLETE CBC W/AUTO DIFF WBC: CPT | Performed by: EMERGENCY MEDICINE

## 2017-08-20 PROCEDURE — 83690 ASSAY OF LIPASE: CPT | Performed by: EMERGENCY MEDICINE

## 2017-08-20 PROCEDURE — 74011000258 HC RX REV CODE- 258: Performed by: EMERGENCY MEDICINE

## 2017-08-20 PROCEDURE — 74011000250 HC RX REV CODE- 250

## 2017-08-20 PROCEDURE — 70450 CT HEAD/BRAIN W/O DYE: CPT

## 2017-08-20 PROCEDURE — 96365 THER/PROPH/DIAG IV INF INIT: CPT

## 2017-08-20 PROCEDURE — 93005 ELECTROCARDIOGRAM TRACING: CPT

## 2017-08-20 PROCEDURE — 65270000029 HC RM PRIVATE

## 2017-08-20 PROCEDURE — 82550 ASSAY OF CK (CPK): CPT | Performed by: EMERGENCY MEDICINE

## 2017-08-20 PROCEDURE — 80053 COMPREHEN METABOLIC PANEL: CPT | Performed by: EMERGENCY MEDICINE

## 2017-08-20 RX ORDER — DEXTROSE 50 % IN WATER (D50W) INTRAVENOUS SYRINGE
25-50 AS NEEDED
Status: DISCONTINUED | OUTPATIENT
Start: 2017-08-20 | End: 2017-08-21 | Stop reason: HOSPADM

## 2017-08-20 RX ORDER — DEXTROSE MONOHYDRATE 100 MG/ML
250 INJECTION, SOLUTION INTRAVENOUS ONCE
Status: COMPLETED | OUTPATIENT
Start: 2017-08-20 | End: 2017-08-20

## 2017-08-20 RX ORDER — CALCITRIOL 0.25 UG/1
0.25 CAPSULE ORAL
Status: DISCONTINUED | OUTPATIENT
Start: 2017-08-21 | End: 2017-08-21 | Stop reason: HOSPADM

## 2017-08-20 RX ORDER — ISOSORBIDE MONONITRATE 30 MG/1
60 TABLET, EXTENDED RELEASE ORAL DAILY
Status: DISCONTINUED | OUTPATIENT
Start: 2017-08-21 | End: 2017-08-21 | Stop reason: HOSPADM

## 2017-08-20 RX ORDER — FUROSEMIDE 40 MG/1
40 TABLET ORAL DAILY
Status: DISCONTINUED | OUTPATIENT
Start: 2017-08-21 | End: 2017-08-21 | Stop reason: HOSPADM

## 2017-08-20 RX ORDER — HEPARIN SODIUM 5000 [USP'U]/ML
5000 INJECTION, SOLUTION INTRAVENOUS; SUBCUTANEOUS EVERY 8 HOURS
Status: DISCONTINUED | OUTPATIENT
Start: 2017-08-20 | End: 2017-08-21 | Stop reason: HOSPADM

## 2017-08-20 RX ORDER — DEXTROSE 50 % IN WATER (D50W) INTRAVENOUS SYRINGE
Status: COMPLETED
Start: 2017-08-20 | End: 2017-08-20

## 2017-08-20 RX ORDER — HYDROCODONE BITARTRATE AND ACETAMINOPHEN 5; 325 MG/1; MG/1
1 TABLET ORAL
Status: DISCONTINUED | OUTPATIENT
Start: 2017-08-20 | End: 2017-08-21 | Stop reason: HOSPADM

## 2017-08-20 RX ORDER — HYDRALAZINE HYDROCHLORIDE 50 MG/1
50 TABLET, FILM COATED ORAL DAILY
Status: DISCONTINUED | OUTPATIENT
Start: 2017-08-21 | End: 2017-08-21 | Stop reason: HOSPADM

## 2017-08-20 RX ORDER — ONDANSETRON 2 MG/ML
4 INJECTION INTRAMUSCULAR; INTRAVENOUS
Status: DISCONTINUED | OUTPATIENT
Start: 2017-08-20 | End: 2017-08-21 | Stop reason: HOSPADM

## 2017-08-20 RX ORDER — ACETAMINOPHEN 325 MG/1
650 TABLET ORAL
Status: DISCONTINUED | OUTPATIENT
Start: 2017-08-20 | End: 2017-08-21 | Stop reason: HOSPADM

## 2017-08-20 RX ORDER — GUAIFENESIN 100 MG/5ML
81 LIQUID (ML) ORAL DAILY
Status: DISCONTINUED | OUTPATIENT
Start: 2017-08-21 | End: 2017-08-21 | Stop reason: HOSPADM

## 2017-08-20 RX ORDER — CARVEDILOL 6.25 MG/1
6.25 TABLET ORAL 2 TIMES DAILY WITH MEALS
Status: DISCONTINUED | OUTPATIENT
Start: 2017-08-21 | End: 2017-08-21 | Stop reason: HOSPADM

## 2017-08-20 RX ORDER — DEXTROSE MONOHYDRATE 100 MG/ML
50 INJECTION, SOLUTION INTRAVENOUS ONCE
Status: COMPLETED | OUTPATIENT
Start: 2017-08-20 | End: 2017-08-20

## 2017-08-20 RX ORDER — BUDESONIDE AND FORMOTEROL FUMARATE DIHYDRATE 160; 4.5 UG/1; UG/1
2 AEROSOL RESPIRATORY (INHALATION)
Status: DISCONTINUED | OUTPATIENT
Start: 2017-08-21 | End: 2017-08-21 | Stop reason: HOSPADM

## 2017-08-20 RX ORDER — MAGNESIUM SULFATE 100 %
4 CRYSTALS MISCELLANEOUS AS NEEDED
Status: DISCONTINUED | OUTPATIENT
Start: 2017-08-20 | End: 2017-08-21 | Stop reason: HOSPADM

## 2017-08-20 RX ORDER — FEBUXOSTAT 40 MG/1
40 TABLET, FILM COATED ORAL DAILY
Status: DISCONTINUED | OUTPATIENT
Start: 2017-08-21 | End: 2017-08-21 | Stop reason: HOSPADM

## 2017-08-20 RX ORDER — SODIUM CHLORIDE 9 MG/ML
75 INJECTION, SOLUTION INTRAVENOUS CONTINUOUS
Status: DISCONTINUED | OUTPATIENT
Start: 2017-08-20 | End: 2017-08-21 | Stop reason: HOSPADM

## 2017-08-20 RX ORDER — ALBUTEROL SULFATE 90 UG/1
2 AEROSOL, METERED RESPIRATORY (INHALATION)
Status: DISCONTINUED | OUTPATIENT
Start: 2017-08-20 | End: 2017-08-21 | Stop reason: CLARIF

## 2017-08-20 RX ORDER — ATORVASTATIN CALCIUM 40 MG/1
40 TABLET, FILM COATED ORAL
Status: DISCONTINUED | OUTPATIENT
Start: 2017-08-20 | End: 2017-08-21 | Stop reason: HOSPADM

## 2017-08-20 RX ORDER — LOSARTAN POTASSIUM 25 MG/1
25 TABLET ORAL DAILY
Status: DISCONTINUED | OUTPATIENT
Start: 2017-08-21 | End: 2017-08-21 | Stop reason: HOSPADM

## 2017-08-20 RX ORDER — INSULIN LISPRO 100 [IU]/ML
INJECTION, SOLUTION INTRAVENOUS; SUBCUTANEOUS
Status: DISCONTINUED | OUTPATIENT
Start: 2017-08-20 | End: 2017-08-21 | Stop reason: HOSPADM

## 2017-08-20 RX ADMIN — DEXTROSE MONOHYDRATE 50 ML/HR: 10 INJECTION, SOLUTION INTRAVENOUS at 16:15

## 2017-08-20 RX ADMIN — DEXTROSE MONOHYDRATE 250 ML: 10 INJECTION, SOLUTION INTRAVENOUS at 15:28

## 2017-08-20 RX ADMIN — DEXTROSE MONOHYDRATE 25 G: 25 INJECTION, SOLUTION INTRAVENOUS at 15:04

## 2017-08-20 NOTE — ED PROVIDER NOTES
HPI Comments: 3:05 PM Mathew Vela is a 64 y.o. male with a hx of DM, CAD who presents to the ED via EMS c/o hypoglycemia and associated dizziness. Per RN, EMS measured blood sugar at 33 originally and 127 after 1 amp. Blood sugar was measured at 68 upon arrival and is now 38. Pt states that he gave himself insulin this morning, but has not checked his blood sugar in a few days. He his confident that his dosage this morning was correct and has not recently changed medication. Family members found him on the floor a few hours ago talking to  family members. He had a similar previous episode about 1 month ago. His relatives note that he has complained of intermittent CP x 2-3 days and R knee pain. PCP:  Shanthi Begum MD        The history is provided by the patient, a relative and the EMS personnel. No  was used. Past Medical History:   Diagnosis Date    Alcohol abuse     Arthritis     Atherosclerotic cardiovascular disease     CAD (coronary artery disease)     mild disease of coronaries (cor angio ),wife states he has 1 stent    Cardiac cath 2006    RCA mild. Otherwise patent coronaries. LVEDP 15.  EF 55-60%.  Cardiac echocardiogram 2009    EF 60%. No cardiac source of embolism. No significant valvular pathology.  Cardiac nuclear imaging test 2011    Small, mild inferior infarction or possibly artifact. No ischemia. EF 45%. No TID. No reg WMA.  Cardiovascular LLE venous duplex 2015    Left leg:  No DVT. Dilated lymph node in left groin.  Constipation     Diabetes mellitus type II     Gout     Hepatic steatosis     Hypercholesterolemia     Hypertension     Knee effusion, left     Left knee pain     Morbid obesity (HCC)     Noncompliance     Obesity (BMI 30-39. 9)     S/P colonoscopy 3/8/05    Dr. Alicea Ranks; normal; f/u 10 years    Stomach problems     TIA (transient ischemic attack) 3/09    Tobacco abuse Past Surgical History:   Procedure Laterality Date    ABDOMEN SURGERY PROC UNLISTED      Hernia repair in 1960's or 1970's.  HX ORTHOPAEDIC      Bones spur removed from left & right foot 2013.  HX UROLOGICAL  8/18/2015    [facility], Dr. Maria Victoria Garcia, Cystoscopy, left retrograde, left double-J cath         Family History:   Problem Relation Age of Onset    Diabetes Father     Heart Disease Mother     Diabetes Sister     Hypertension Sister     Arthritis-osteo Sister     Arthritis-osteo Brother     Diabetes Other     Diabetes Other      Uncle, Aunt    Hypertension Other      Uncle; Aunt       Social History     Social History    Marital status:      Spouse name: N/A    Number of children: N/A    Years of education: N/A     Occupational History    disabled      Social History Main Topics    Smoking status: Current Every Day Smoker     Packs/day: 0.25     Types: Cigarettes     Last attempt to quit: 6/28/2016    Smokeless tobacco: Never Used    Alcohol use 0.0 oz/week     0 Standard drinks or equivalent per week      Comment: 1 beers a day.  Drug use: No    Sexual activity: Yes     Other Topics Concern    Not on file     Social History Narrative         ALLERGIES: Shellfish derived    Review of Systems   Constitutional: Negative for activity change, fatigue and fever. HENT: Negative for congestion and rhinorrhea. Eyes: Negative for visual disturbance. Respiratory: Negative for shortness of breath. Cardiovascular: Positive for chest pain. Negative for palpitations. Gastrointestinal: Negative for abdominal pain, diarrhea, nausea and vomiting. Genitourinary: Negative for dysuria and hematuria. Musculoskeletal: Positive for arthralgias. Negative for back pain. Skin: Negative for rash. Neurological: Positive for dizziness. Negative for weakness and light-headedness. Psychiatric/Behavioral: Positive for hallucinations.        Vitals:    08/20/17 1615 08/20/17 1630 08/20/17 1815 08/20/17 2007   BP: 166/72 159/81 176/90    Pulse: 70 78 83    Resp: 17 20 17    Temp:    96.5 °F (35.8 °C)   SpO2: 97% 97% 98%    Weight:       Height:                Physical Exam   Constitutional: He is oriented to person, place, and time. He appears well-developed and well-nourished. No distress. Mild facial swelling    HENT:   Head: Normocephalic and atraumatic. Right Ear: External ear normal.   Left Ear: External ear normal.   Nose: Nose normal.   Mouth/Throat: Oropharynx is clear and moist.   Eyes: Conjunctivae and EOM are normal. Pupils are equal, round, and reactive to light. No scleral icterus. Neck: Normal range of motion. Neck supple. No JVD present. No tracheal deviation present. No thyromegaly present. Cardiovascular: Normal rate, regular rhythm, normal heart sounds and intact distal pulses. Exam reveals no gallop and no friction rub. No murmur heard. Pulmonary/Chest: Effort normal and breath sounds normal. He exhibits no tenderness. Abdominal: Soft. Bowel sounds are normal. He exhibits no distension. There is no tenderness. There is no rebound and no guarding. Musculoskeletal: Normal range of motion. He exhibits tenderness. He exhibits no edema. Mild R knee pain, FROM, distal pulses are equal    Lymphadenopathy:     He has no cervical adenopathy. Neurological: He is alert and oriented to person, place, and time. No cranial nerve deficit. Coordination normal.   No sensory loss, Gait normal, Motor 5/5   Skin: Skin is warm and dry. Psychiatric:   Supportive family at the bedside    Nursing note and vitals reviewed.        MDM  Number of Diagnoses or Management Options  Acute chest pain:   Acute renal failure, unspecified acute renal failure type [facility]):   Elevated blood pressure reading:   Hypoglycemia:   Tremor:   Diagnosis management comments: Pt is a 62yo male with a hx of advancing renal failure, DM on 70/30 BID, labile HTN, CAD, diastolic heart failure, polysubtance abuse presents after being found altered at home. He has not been checking his glucose and taking insulin. Based on his discharge instructions I am unsure if he was supposed to continue it after his last admission. Pt glucose has been slow to respond and will start D10 gtt. Will trend his renal function, CT head, cardiac markers then reevaluate.  Polina Valles DO 3:27 PM      Critical Care  Total time providing critical care: 30-74 minutes (60 min)    ED Course       Procedures    Vitals:  Patient Vitals for the past 12 hrs:   Temp Pulse Resp BP SpO2   08/20/17 2007 96.5 °F (35.8 °C) - - - -   08/20/17 1815 - 83 17 176/90 98 %   08/20/17 1630 - 78 20 159/81 97 %   08/20/17 1615 - 70 17 166/72 97 %   08/20/17 1515 - 70 20 (!) 158/99 99 %   08/20/17 1500 97.5 °F (36.4 °C) 67 25 150/86 100 %       Medications ordered:   Medications   dextrose (D50W) 50% injection syrg (25 g  Given 8/20/17 1504)   dextrose 10% infusion 250 mL (0 mL IntraVENous IV Completed 8/20/17 1615)   dextrose 10% infusion (50 mL/hr IntraVENous Given 8/20/17 1615)         Lab findings:  Recent Results (from the past 12 hour(s))   GLUCOSE, POC    Collection Time: 08/20/17  2:42 PM   Result Value Ref Range    Glucose (POC) 68 (L) 70 - 110 mg/dL   EKG, 12 LEAD, INITIAL    Collection Time: 08/20/17  2:54 PM   Result Value Ref Range    Ventricular Rate 68 BPM    Atrial Rate 68 BPM    P-R Interval 154 ms    QRS Duration 106 ms    Q-T Interval 458 ms    QTC Calculation (Bezet) 487 ms    Calculated P Axis 47 degrees    Calculated R Axis 18 degrees    Calculated T Axis 179 degrees    Diagnosis       Normal sinus rhythm  Left ventricular hypertrophy with early repolarization    ST & T wave abnormality, consider inferolateral ischemia  Prolonged QT  Abnormal ECG  When compared with ECG of 17-JUL-2017 07:51,    T wave inversion less evident in Inferior leads  Confirmed by Jose Grayson ([id number]) on 8/20/2017 4:10:41 PM     GLUCOSE, POC Collection Time: 08/20/17  2:59 PM   Result Value Ref Range    Glucose (POC) 35 (LL) 70 - 110 mg/dL   CBC WITH AUTOMATED DIFF    Collection Time: 08/20/17  3:00 PM   Result Value Ref Range    WBC 8.2 4.6 - 13.2 K/uL    RBC 4.65 (L) 4.70 - 5.50 M/uL    HGB 13.1 13.0 - 16.0 g/dL    HCT 40.4 36.0 - 48.0 %    MCV 86.9 74.0 - 97.0 FL    MCH 28.2 24.0 - 34.0 PG    MCHC 32.4 31.0 - 37.0 g/dL    RDW 14.5 11.6 - 14.5 %    PLATELET 535 734 - 304 K/uL    MPV 11.1 9.2 - 11.8 FL    NEUTROPHILS 74 (H) 40 - 73 %    LYMPHOCYTES 19 (L) 21 - 52 %    MONOCYTES 5 3 - 10 %    EOSINOPHILS 2 0 - 5 %    BASOPHILS 0 0 - 2 %    ABS. NEUTROPHILS 6.0 1.8 - 8.0 K/UL    ABS. LYMPHOCYTES 1.6 0.9 - 3.6 K/UL    ABS. MONOCYTES 0.4 0.05 - 1.2 K/UL    ABS. EOSINOPHILS 0.2 0.0 - 0.4 K/UL    ABS. BASOPHILS 0.0 0.0 - 0.1 K/UL    DF AUTOMATED     METABOLIC PANEL, COMPREHENSIVE    Collection Time: 08/20/17  3:00 PM   Result Value Ref Range    Sodium 138 136 - 145 mmol/L    Potassium 3.9 3.5 - 5.5 mmol/L    Chloride 107 100 - 108 mmol/L    CO2 25 21 - 32 mmol/L    Anion gap 6 3.0 - 18 mmol/L    Glucose 40 (LL) 74 - 99 mg/dL    BUN 37 (H) 7.0 - 18 MG/DL    Creatinine 3.29 (H) 0.6 - 1.3 MG/DL    BUN/Creatinine ratio 11 (L) 12 - 20      GFR est AA 24 (L) >60 ml/min/1.73m2    GFR est non-AA 20 (L) >60 ml/min/1.73m2    Calcium 8.6 8.5 - 10.1 MG/DL    Bilirubin, total 0.2 0.2 - 1.0 MG/DL    ALT (SGPT) 32 16 - 61 U/L    AST (SGOT) 36 15 - 37 U/L    Alk.  phosphatase 72 45 - 117 U/L    Protein, total 8.0 6.4 - 8.2 g/dL    Albumin 3.2 (L) 3.4 - 5.0 g/dL    Globulin 4.8 (H) 2.0 - 4.0 g/dL    A-G Ratio 0.7 (L) 0.8 - 1.7     LIPASE    Collection Time: 08/20/17  3:00 PM   Result Value Ref Range    Lipase 555 (H) 73 - 393 U/L   CARDIAC PANEL,(CK, CKMB & TROPONIN)    Collection Time: 08/20/17  3:00 PM   Result Value Ref Range     39 - 308 U/L    CK - MB 6.1 (H) <3.6 ng/ml    CK-MB Index 2.3 0.0 - 4.0 %    Troponin-I, Qt. 0.02 0.0 - 0.045 NG/ML   PROTHROMBIN TIME + INR    Collection Time: 08/20/17  3:00 PM   Result Value Ref Range    Prothrombin time 11.1 (L) 11.5 - 15.2 sec    INR 0.8 0.8 - 1.2     PTT    Collection Time: 08/20/17  3:00 PM   Result Value Ref Range    aPTT 25.9 23.0 - 36.4 SEC   GLUCOSE, POC    Collection Time: 08/20/17  3:30 PM   Result Value Ref Range    Glucose (POC) 177 (H) 70 - 110 mg/dL   URINALYSIS W/ RFLX MICROSCOPIC    Collection Time: 08/20/17  3:34 PM   Result Value Ref Range    Color YELLOW      Appearance CLEAR      Specific gravity 1.021 1.005 - 1.030      pH (UA) 5.0 5.0 - 8.0      Protein 300 (A) NEG mg/dL    Glucose NEGATIVE  NEG mg/dL    Ketone NEGATIVE  NEG mg/dL    Bilirubin NEGATIVE  NEG      Blood NEGATIVE  NEG      Urobilinogen 0.2 0.2 - 1.0 EU/dL    Nitrites NEGATIVE  NEG      Leukocyte Esterase NEGATIVE  NEG     URINE MICROSCOPIC ONLY    Collection Time: 08/20/17  3:34 PM   Result Value Ref Range    WBC 3 to 5 0 - 4 /hpf    RBC NEGATIVE  0 - 5 /hpf    Epithelial cells NEGATIVE  0 - 5 /lpf    Bacteria NEGATIVE  NEG /hpf    Mucus FEW (A) NEG /lpf    Hyaline cast 1 to 3 0 - 2 /lpf   GLUCOSE, POC    Collection Time: 08/20/17  4:18 PM   Result Value Ref Range    Glucose (POC) 146 (H) 70 - 110 mg/dL   CARDIAC PANEL,(CK, CKMB & TROPONIN)    Collection Time: 08/20/17  6:30 PM   Result Value Ref Range     39 - 308 U/L    CK - MB 5.7 (H) <3.6 ng/ml    CK-MB Index 2.4 0.0 - 4.0 %    Troponin-I, Qt. 0.02 0.0 - 0.045 NG/ML   GLUCOSE, POC    Collection Time: 08/20/17  7:16 PM   Result Value Ref Range    Glucose (POC) 122 (H) 70 - 110 mg/dL   GLUCOSE, POC    Collection Time: 08/20/17  7:18 PM   Result Value Ref Range    Glucose (POC) 124 (H) 70 - 110 mg/dL       EKG interpretation by ED Physician:  14:58  NSR @ 68 bpm. LVH with strain. T wave inversions laterally. No STEMI. X-Ray, CT or other radiology findings or impressions:  XR CHEST SNGL V      IMPRESSION:     Decreased enlargement of the cardiomediastinal silhouette.   As interpreted by RAD. CT HEAD WO CONT      Impression:     1. No acute intracranial pathology. 2. Partial fluid opacification of the right mastoid air cells is slightly  progressed from prior comparison. Correlate clinically for mastoiditis. As interpreted by RAD.            Progress notes, Consult notes or additional Procedure notes:   Consult:  Discussed care with Dr. Ian Reynolds, Nephrology. Standard discussion; including history of patients chief complaint, available diagnostic results, and treatment course. Recommends decreasing insulin by 25% and will see the patient. 8:00 PM, 8/20/2017     Pt reevaluated and his glucose is improving. His creatinine is increasing and pt is now tremolous with intermittent CP. At this point patient still feels poorly and given his long acting insulin, worsening renal function, CP, and difficulty correcting his glucose. Will proceed with admission. Courtney Pfeiffer,  8:34 PM    Discussed the case with Dr. Linda Wolf and will admit to tele. Courtney Pfeiffer,  8:35 PM      Reevaluation of patient:   I have reassessed the patient. Patient is feeling tremulous and weak. Disposition:  Diagnosis:   1. Acute chest pain    2. Acute renal failure, unspecified acute renal failure type ([address]    3. Hypoglycemia    4. Tremor    5. Elevated blood pressure reading        Disposition: Admit     Follow-up Information     None           Patient's Medications   Start Taking    No medications on file   Continue Taking    ALBUTEROL (VENTOLIN HFA) 90 MCG/ACTUATION INHALER    Take 2 Puffs by inhalation every four (4) hours as needed for Wheezing for up to 90 days. ASPIRIN 81 MG CHEWABLE TABLET    Take 1 Tab by mouth daily. ATORVASTATIN (LIPITOR) 40 MG TABLET    Take 1 Tab by mouth nightly. B COMPLEX W-C NO.20/FOLIC ACID (B COMPLEX & C NO.20-FOLIC ACID PO)    Take  by mouth. BLOOD-GLUCOSE METER MONITORING KIT    Check twice daily.     BUDESONIDE-FORMOTEROL (SYMBICORT) 160-4.5 MCG/ACTUATION HFA INHALER    Take 2 Puffs by inhalation two (2) times a day. CALCITRIOL (ROCALTROL) 0.25 MCG CAPSULE    Take 1 Cap by mouth every Monday, Wednesday, Friday. CARVEDILOL (COREG) 6.25 MG TABLET    Take 1 Tab by mouth two (2) times daily (with meals). DOCUSATE SODIUM (COLACE) 100 MG CAPSULE    Take 1 Cap by mouth two (2) times a day for 90 days. FEBUXOSTAT (ULORIC) 40 MG TAB TABLET    Take 1 Tab by mouth daily. FLUTICASONE (FLONASE) 50 MCG/ACTUATION NASAL SPRAY    2 Sprays by Both Nostrils route daily. FUROSEMIDE (LASIX) 40 MG TABLET    Take 1 Tab by mouth daily. GLUCOSE BLOOD VI TEST STRIPS (FREESTYLE TEST) STRIP    by Does Not Apply route See Admin Instructions. Check twice a day    HYDRALAZINE (APRESOLINE) 50 MG TABLET    Take 1 Tab by mouth daily. ISOSORBIDE MONONITRATE ER (IMDUR) 60 MG CR TABLET    Take 1 Tab by mouth daily. LANCETS MISC    Check 3 times a day , needs according contour glucometer    LOSARTAN (COZAAR) 25 MG TABLET    Take 1 Tab by mouth daily. NITROGLYCERIN (NITROSTAT) 0.4 MG SL TABLET    1 Tab by SubLINGual route as needed for Chest Pain. NOVOLIN 70/30 100 UNIT/ML (70-30) INJECTION    INJECT 60 UNITS SC IN THE MORNING AND 40 UNITS SC IN THE EVENING BEFORE SUPPER   These Medications have changed    No medications on file   Stop Taking    No medications on file         Scribe [address] [place] [address] acting as a scribe for and in the presence of Bala Farley DO      August 20, 2017 at 3:20 PM       Provider Attestation:      I personally performed the services described in the documentation, reviewed the documentation, as recorded by the scribe in my presence, and it accurately and completely records my words and actions.  August 20, 2017 at 3:20 PM - Bala Farley DO

## 2017-08-20 NOTE — ED NOTES
BGL retested, result 35. Dr. Cat Carrillo at bedside. Pt slightly confused, talked about his sister who has passed away. Pt given 1 amp D50.

## 2017-08-20 NOTE — ED NOTES
Bedside shift change report given to Oh Dalton RN (oncoming nurse) by Vic Hercules RN (offgoing nurse). Report included the following information SBAR, ED Summary, Procedure Summary, Intake/Output, MAR and Recent Results.

## 2017-08-20 NOTE — ED NOTES
Pt alert and oriented at this time. Per protocol, 15gm carbs given given apple juice. Pt drank all juice. Cardiac monitor in place.

## 2017-08-20 NOTE — ED TRIAGE NOTES
Triage: pt was found on the floor by family members. EMS reports BGL of 33. 1 amp D50 given in route, repeat sugar by EMS was 157.

## 2017-08-20 NOTE — IP AVS SNAPSHOT
[address] [place] [address] Patient: Alex Quiroz MRN: [id number] :1960 You are allergic to the following Allergen Reactions Shellfish Derived Other (comments) Causes Gout Recent Documentation Height Weight BMI Smoking Status 1.778 m 107 kg 33.86 kg/m2 Current Every Day Smoker Emergency Contacts Name Discharge Info Relation Home Work Mobile [address] CAREGIVER [3] Spouse [3] [phone] About your hospitalization You were admitted on:  2017 You last received care in the:  [facility] 1980 You were discharged on:  2017 Unit phone number:  [phone] Why you were hospitalized Your primary diagnosis was:  Not on File Your diagnoses also included:  Hypoglycemia, Acute Chest Pain, Acute Renal Failure (Arf) (Hcc) Providers Seen During Your Hospitalizations Provider Role Specialty Primary office phone Abdoul Gordon MD Attending Provider Emergency Medicine [phone] Domingo Nesbitt MD Attending Provider Internal Medicine [phone] Your Primary Care Physician (PCP) Primary Care Physician Office Phone Office Fax [facility], [address] [place] [address] [phone] Follow-up Information Follow up With Details Comments Contact Info Sonya Galarza MD On 2017 Appointment at 11:15am [address] 200 [facility] 
[phone] Your Appointments 2017  2:30 PM EDT  
COLON SCREEN with TSS [facility] [address] [place] [address] Drive Daryl [address] 200 [facility]  
[phone]  2017 11:15 AM EDT  
POST HOSPITAL with Sonya Galarza MD  
 [address] [facility] ([address] [place] [address]) [address] 200 [facility] [address]  
[phone] Monday September 11, 2017  2:30 PM EDT ROUTINE CARE with Tay Marcelino MD  
[address] [facility] ([address] [place] [address]) [address] 200 [facility]  
[phone] Wednesday October 04, 2017  2:20 PM EDT  
POST HOSPITAL with Dianne Frost DO [facility] ([address] [place] [address]) Sarah Ansari [id number] [phone] Current Discharge Medication List  
  
START taking these medications Dose & Instructions Dispensing Information Comments Morning Noon Evening Bedtime  
 bisacodyl 5 mg EC tablet Commonly known as:  DULCOLAX (BISACODYL) Your last dose was: Your next dose is:    
   
   
 Dose:  5 mg Take 1 Tab by mouth daily as needed for Constipation. Quantity:  30 Tab Refills:  0 CONTINUE these medications which have CHANGED Dose & Instructions Dispensing Information Comments Morning Noon Evening Bedtime NovoLIN 70/30 100 unit/mL (70-30) injection Generic drug:  insulin NPH/insulin regular What changed:  See the new instructions. Your last dose was: Your next dose is: INJECT 30 UNITS SC IN THE MORNING AND 20 UNITS SC IN THE EVENING BEFORE SUPPER Quantity:  10 mL Refills:  4 CONTINUE these medications which have NOT CHANGED Dose & Instructions Dispensing Information Comments Morning Noon Evening Bedtime  
 albuterol 90 mcg/actuation inhaler Commonly known as:  VENTOLIN HFA Your last dose was: Your next dose is:    
   
   
 Dose:  2 Puff Take 2 Puffs by inhalation every four (4) hours as needed for Wheezing for up to 90 days. Quantity:  1 Inhaler Refills:  1 aspirin 81 mg chewable tablet Your last dose was: Your next dose is:    
   
   
 Dose:  81 mg Take 1 Tab by mouth daily. Quantity:  30 Tab Refills:  0  
     
   
   
   
  
 atorvastatin 40 mg tablet Commonly known as:  LIPITOR Your last dose was: Your next dose is:    
   
   
 Dose:  40 mg Take 1 Tab by mouth nightly. Quantity:  30 Tab Refills:  6 B COMPLEX & C NO.20-FOLIC ACID PO Your last dose was: Your next dose is: Take  by mouth. Refills:  0 Blood-Glucose Meter monitoring kit Your last dose was: Your next dose is:    
   
   
 Check twice daily. Quantity:  1 kit Refills:  0  
     
   
   
   
  
 budesonide-formoterol 160-4.5 mcg/actuation HFA inhaler Commonly known as:  SYMBICORT Your last dose was: Your next dose is:    
   
   
 Dose:  2 Puff Take 2 Puffs by inhalation two (2) times a day. Quantity:  1 Inhaler Refills:  0  
     
   
   
   
  
 calcitRIOL 0.25 mcg capsule Commonly known as:  ROCALTROL Your last dose was: Your next dose is:    
   
   
 Dose:  0.25 mcg Take 1 Cap by mouth every Monday, Wednesday, Friday. Quantity:  15 Cap Refills:  0  
     
   
   
   
  
 carvedilol 6.25 mg tablet Commonly known as:  Andrés Fitch Your last dose was: Your next dose is:    
   
   
 Dose:  6.25 mg Take 1 Tab by mouth two (2) times daily (with meals). Quantity:  60 Tab Refills:  6  
     
   
   
   
  
 docusate sodium 100 mg capsule Commonly known as:  Octavio Bergeron Your last dose was: Your next dose is:    
   
   
 Dose:  100 mg Take 1 Cap by mouth two (2) times a day for 90 days. Quantity:  60 Cap Refills:  0  
     
   
   
   
  
 febuxostat 40 mg Tab tablet Commonly known as:  Randa Trevizo Your last dose was:     
   
Your next dose is:    
   
   
 Dose:  40 mg  
 Take 1 Tab by mouth daily. Quantity:  30 Tab Refills:  1 FLONASE 50 mcg/actuation nasal spray Generic drug:  fluticasone Your last dose was: Your next dose is:    
   
   
 Dose:  2 Spray 2 Sprays by Both Nostrils route daily. Refills:  0  
     
   
   
   
  
 furosemide 40 mg tablet Commonly known as:  LASIX Your last dose was: Your next dose is:    
   
   
 Dose:  40 mg Take 1 Tab by mouth daily. Quantity:  30 Tab Refills:  6  
     
   
   
   
  
 glucose blood VI test strips strip Commonly known as:  FREESTYLE TEST Your last dose was: Your next dose is:    
   
   
 by Does Not Apply route See Admin Instructions. Check twice a day Quantity:  100 Strip Refills:  2  
     
   
   
   
  
 hydrALAZINE 50 mg tablet Commonly known as:  APRESOLINE Your last dose was: Your next dose is:    
   
   
 Dose:  50 mg Take 1 Tab by mouth daily. Quantity:  1 Tab Refills:  0  
     
   
   
   
  
 isosorbide mononitrate ER 60 mg CR tablet Commonly known as:  IMDUR Your last dose was: Your next dose is:    
   
   
 Dose:  60 mg Take 1 Tab by mouth daily. Quantity:  30 Tab Refills:  6 Lancets Misc Your last dose was: Your next dose is:    
   
   
 Check 3 times a day , needs according contour glucometer Quantity:  1 Package Refills:  11  
     
   
   
   
  
 losartan 25 mg tablet Commonly known as:  COZAAR Your last dose was: Your next dose is:    
   
   
 Dose:  25 mg Take 1 Tab by mouth daily. Quantity:  30 Tab Refills:  6  
     
   
   
   
  
 nitroglycerin 0.4 mg SL tablet Commonly known as:  NITROSTAT Your last dose was: Your next dose is:    
   
   
 Dose:  0.4 mg  
1 Tab by SubLINGual route as needed for Chest Pain. Quantity:  20 Tab Refills:  0 Where to Get Your Medications These medications were sent to [address] [facility] [place] [place]  [address] Hours:  24-hours Phone:  [phone] NovoLIN 70/30 100 unit/mL (70-30) injection Information on where to get these meds will be given to you by the nurse or doctor. ! Ask your nurse or doctor about these medications  
  bisacodyl 5 mg EC tablet Discharge Instructions DISCHARGE SUMMARY from Nurse The following personal items are in your possession at time of discharge: 
 
Dental Appliances: None Visual Aid: None Home Medications: None Jewelry: None Clothing: At bedside Other Valuables: None PATIENT INSTRUCTIONS: 
 
After general anesthesia or intravenous sedation, for 24 hours or while taking prescription Narcotics: · Limit your activities · Do not drive and operate hazardous machinery · Do not make important personal or business decisions · Do  not drink alcoholic beverages · If you have not urinated within 8 hours after discharge, please contact your surgeon on call. Report the following to your surgeon: 
· Excessive pain, swelling, redness or odor of or around the surgical area · Temperature over 100.5 · Nausea and vomiting lasting longer than 4 hours or if unable to take medications · Any signs of decreased circulation or nerve impairment to extremity: change in color, persistent  numbness, tingling, coldness or increase pain · Any questions What to do at Home: 
Recommended activity: Activity as tolerated *  Please give a list of your current medications to your Primary Care Provider. *  Please update this list whenever your medications are discontinued, doses are 
    changed, or new medications (including over-the-counter products) are added.  
 
*  Please carry medication information at all times in case of emergency situations. These are general instructions for a healthy lifestyle: No smoking/ No tobacco products/ Avoid exposure to second hand smoke Surgeon General's Warning:  Quitting smoking now greatly reduces serious risk to your health. Obesity, smoking, and sedentary lifestyle greatly increases your risk for illness A healthy diet, regular physical exercise & weight monitoring are important for maintaining a healthy lifestyle You may be retaining fluid if you have a history of heart failure or if you experience any of the following symptoms:  Weight gain of 3 pounds or more overnight or 5 pounds in a week, increased swelling in our hands or feet or shortness of breath while lying flat in bed. Please call your doctor as soon as you notice any of these symptoms; do not wait until your next office visit. Recognize signs and symptoms of STROKE: 
 
F-face looks uneven A-arms unable to move or move unevenly S-speech slurred or non-existent T-time-call 911 as soon as signs and symptoms begin-DO NOT go Back to bed or wait to see if you get better-TIME IS BRAIN. Warning Signs of HEART ATTACK Call 911 if you have these symptoms: 
? Chest discomfort. Most heart attacks involve discomfort in the center of the chest that lasts more than a few minutes, or that goes away and comes back. It can feel like uncomfortable pressure, squeezing, fullness, or pain. ? Discomfort in other areas of the upper body. Symptoms can include pain or discomfort in one or both arms, the back, neck, jaw, or stomach. ? Shortness of breath with or without chest discomfort. ? Other signs may include breaking out in a cold sweat, nausea, or lightheadedness. Don't wait more than five minutes to call [address]! Fast action can save your life. Calling 911 is almost always the fastest way to get lifesaving treatment.  Emergency Medical Services staff can begin treatment when they arrive  up to an hour sooner than if someone gets to the hospital by car. The discharge information has been reviewed with the patient. The patient verbalized understanding. Discharge medications reviewed with the patient and appropriate educational materials and side effects teaching were provided. Patient armband removed and shredded MyChart Activation Thank you for requesting access to [employer]. Please follow the instructions below to securely access and download your online medical record. [employer] allows you to send messages to your doctor, view your test results, renew your prescriptions, schedule appointments, and more. How Do I Sign Up? 1. In your internet browser, go to [email or web address] 
2. Click on the First Time User? Click Here link in the Sign In box. You will be redirect to the New Member Sign Up page. 3. Enter your [employer] Access Code exactly as it appears below. You will not need to use this code after youve completed the sign-up process. If you do not sign up before the expiration date, you must request a new code. [employer] Access Code: Activation code not generated Current [employer] Status: Patient Declined (This is the date your [employer] access code will ) 4. Enter the last four digits of your Social Security Number (xxxx) and Date of Birth (mm/dd/yyyy) as indicated and click Submit. You will be taken to the next sign-up page. 5. Create a [employer] ID. This will be your [employer] login ID and cannot be changed, so think of one that is secure and easy to remember. 6. Create a [employer] password. You can change your password at any time. 7. Enter your Password Reset Question and Answer. This can be used at a later time if you forget your password. 8. Enter your e-mail address. You will receive e-mail notification when new information is available in [address] 9. Click Sign Up. You can now view and download portions of your medical record. 10. Click the Download Summary menu link to download a portable copy of your medical information. Additional Information If you have questions, please visit the Frequently Asked Questions section of the [employer] website at [email or web address] Remember, [employer] is NOT to be used for urgent needs. For medical emergencies, dial 911. Discharge Orders None MyChart Announcement We are excited to announce that we are making your provider's discharge notes available to you in [employer]. You will see these notes when they are completed and signed by the physician that discharged you from your recent hospital stay. If you have any questions or concerns about any information you see in [employer], please call the Health Information Department where you were seen or reach out to your Primary Care Provider for more information about your plan of care. General Information Please provide this summary of care documentation to your next provider. Patient Signature:  ____________________________________________________________ Date:  ____________________________________________________________  
  
Maida Aviles Provider Signature:  ____________________________________________________________ Date:  ____________________________________________________________

## 2017-08-20 NOTE — IP AVS SNAPSHOT
[address] Patient: Raf Alva MRN: [id number] :1960 Current Discharge Medication List  
  
START taking these medications Dose & Instructions Dispensing Information Comments Morning Noon Evening Bedtime  
 bisacodyl 5 mg EC tablet Commonly known as:  DULCOLAX (BISACODYL) Your last dose was: Your next dose is:    
   
   
 Dose:  5 mg Take 1 Tab by mouth daily as needed for Constipation. Quantity:  30 Tab Refills:  0 CONTINUE these medications which have CHANGED Dose & Instructions Dispensing Information Comments Morning Noon Evening Bedtime NovoLIN 70/30 100 unit/mL (70-30) injection Generic drug:  insulin NPH/insulin regular What changed:  See the new instructions. Your last dose was: Your next dose is: INJECT 30 UNITS SC IN THE MORNING AND 20 UNITS SC IN THE EVENING BEFORE SUPPER Quantity:  10 mL Refills:  4 CONTINUE these medications which have NOT CHANGED Dose & Instructions Dispensing Information Comments Morning Noon Evening Bedtime  
 albuterol 90 mcg/actuation inhaler Commonly known as:  VENTOLIN HFA Your last dose was: Your next dose is:    
   
   
 Dose:  2 Puff Take 2 Puffs by inhalation every four (4) hours as needed for Wheezing for up to 90 days. Quantity:  1 Inhaler Refills:  1  
     
   
   
   
  
 aspirin 81 mg chewable tablet Your last dose was: Your next dose is:    
   
   
 Dose:  81 mg Take 1 Tab by mouth daily. Quantity:  30 Tab Refills:  0  
     
   
   
   
  
 atorvastatin 40 mg tablet Commonly known as:  LIPITOR Your last dose was: Your next dose is:    
   
   
 Dose:  40 mg Take 1 Tab by mouth nightly. Quantity:  30 Tab Refills:  6 B COMPLEX & C NO.20-FOLIC ACID PO Your last dose was: Your next dose is: Take  by mouth. Refills:  0 Blood-Glucose Meter monitoring kit Your last dose was: Your next dose is:    
   
   
 Check twice daily. Quantity:  1 kit Refills:  0  
     
   
   
   
  
 budesonide-formoterol 160-4.5 mcg/actuation HFA inhaler Commonly known as:  SYMBICORT Your last dose was: Your next dose is:    
   
   
 Dose:  2 Puff Take 2 Puffs by inhalation two (2) times a day. Quantity:  1 Inhaler Refills:  0  
     
   
   
   
  
 calcitRIOL 0.25 mcg capsule Commonly known as:  ROCALTROL Your last dose was: Your next dose is:    
   
   
 Dose:  0.25 mcg Take 1 Cap by mouth every Monday, Wednesday, Friday. Quantity:  15 Cap Refills:  0  
     
   
   
   
  
 carvedilol 6.25 mg tablet Commonly known as:  Raynette Hy Your last dose was: Your next dose is:    
   
   
 Dose:  6.25 mg Take 1 Tab by mouth two (2) times daily (with meals). Quantity:  60 Tab Refills:  6  
     
   
   
   
  
 docusate sodium 100 mg capsule Commonly known as:  Saad Pichardo Your last dose was: Your next dose is:    
   
   
 Dose:  100 mg Take 1 Cap by mouth two (2) times a day for 90 days. Quantity:  60 Cap Refills:  0  
     
   
   
   
  
 febuxostat 40 mg Tab tablet Commonly known as:  Delman Libman Your last dose was: Your next dose is:    
   
   
 Dose:  40 mg Take 1 Tab by mouth daily. Quantity:  30 Tab Refills:  1 FLONASE 50 mcg/actuation nasal spray Generic drug:  fluticasone Your last dose was: Your next dose is:    
   
   
 Dose:  2 Spray 2 Sprays by Both Nostrils route daily. Refills:  0  
     
   
   
   
  
 furosemide 40 mg tablet Commonly known as:  LASIX Your last dose was: Your next dose is: Dose:  40 mg Take 1 Tab by mouth daily. Quantity:  30 Tab Refills:  6  
     
   
   
   
  
 glucose blood VI test strips strip Commonly known as:  FREESTYLE TEST Your last dose was: Your next dose is:    
   
   
 by Does Not Apply route See Admin Instructions. Check twice a day Quantity:  100 Strip Refills:  2  
     
   
   
   
  
 hydrALAZINE 50 mg tablet Commonly known as:  APRESOLINE Your last dose was: Your next dose is:    
   
   
 Dose:  50 mg Take 1 Tab by mouth daily. Quantity:  1 Tab Refills:  0  
     
   
   
   
  
 isosorbide mononitrate ER 60 mg CR tablet Commonly known as:  IMDUR Your last dose was: Your next dose is:    
   
   
 Dose:  60 mg Take 1 Tab by mouth daily. Quantity:  30 Tab Refills:  6 Lancets Misc Your last dose was: Your next dose is:    
   
   
 Check 3 times a day , needs according contour glucometer Quantity:  1 Package Refills:  11  
     
   
   
   
  
 losartan 25 mg tablet Commonly known as:  COZAAR Your last dose was: Your next dose is:    
   
   
 Dose:  25 mg Take 1 Tab by mouth daily. Quantity:  30 Tab Refills:  6  
     
   
   
   
  
 nitroglycerin 0.4 mg SL tablet Commonly known as:  NITROSTAT Your last dose was: Your next dose is:    
   
   
 Dose:  0.4 mg  
1 Tab by SubLINGual route as needed for Chest Pain. Quantity:  20 Tab Refills:  0 Where to Get Your Medications These medications were sent to [address] [place] [id number] Hours:  24-hours Phone:  [phone] NovoLIN 70/30 100 unit/mL (70-30) injection Information on where to get these meds will be given to you by the nurse or doctor. ! Ask your nurse or doctor about these medications bisacodyl 5 mg EC tablet

## 2017-08-20 NOTE — Clinical Note
Status[de-identified] Inpatient [101] Type of Bed: Telemetry [19] Inpatient Hospitalization Certified Necessary for the Following Reasons: 3. Patient receiving treatment that can only be provided in an inpatient setting (further clarification in H&P documentation) Admitting Diagnosis: Hypoglycemia [653983] Admitting Diagnosis: Acute chest pain [1315640] Admitting Diagnosis: [facility] ([facility] [4795615] Admitting Physician: Mirella Diop Attending Physician: Mirella Diop Estimated Length of Stay: 2 Midnights Discharge Plan[de-identified] Home with Office Follow-up

## 2017-08-21 ENCOUNTER — APPOINTMENT (OUTPATIENT)
Dept: ULTRASOUND IMAGING | Age: 57
DRG: 637 | End: 2017-08-21
Attending: INTERNAL MEDICINE
Payer: COMMERCIAL

## 2017-08-21 ENCOUNTER — HOME HEALTH ADMISSION (OUTPATIENT)
Dept: HOME HEALTH SERVICES | Facility: HOME HEALTH | Age: 57
End: 2017-08-21
Payer: COMMERCIAL

## 2017-08-21 VITALS
RESPIRATION RATE: 20 BRPM | HEIGHT: 70 IN | HEART RATE: 83 BPM | OXYGEN SATURATION: 98 % | SYSTOLIC BLOOD PRESSURE: 155 MMHG | WEIGHT: 236 LBS | DIASTOLIC BLOOD PRESSURE: 89 MMHG | BODY MASS INDEX: 33.79 KG/M2 | TEMPERATURE: 97.8 F

## 2017-08-21 LAB
ANION GAP SERPL CALC-SCNC: 7 MMOL/L (ref 3–18)
BUN SERPL-MCNC: 38 MG/DL (ref 7–18)
BUN/CREAT SERPL: 13 (ref 12–20)
CALCIUM SERPL-MCNC: 8.7 MG/DL (ref 8.5–10.1)
CHLORIDE SERPL-SCNC: 107 MMOL/L (ref 100–108)
CO2 SERPL-SCNC: 24 MMOL/L (ref 21–32)
CREAT SERPL-MCNC: 2.91 MG/DL (ref 0.6–1.3)
EST. AVERAGE GLUCOSE BLD GHB EST-MCNC: 137 MG/DL
GLUCOSE BLD STRIP.AUTO-MCNC: 122 MG/DL (ref 70–110)
GLUCOSE BLD STRIP.AUTO-MCNC: 126 MG/DL (ref 70–110)
GLUCOSE BLD STRIP.AUTO-MCNC: 142 MG/DL (ref 70–110)
GLUCOSE BLD STRIP.AUTO-MCNC: 177 MG/DL (ref 70–110)
GLUCOSE SERPL-MCNC: 200 MG/DL (ref 74–99)
HBA1C MFR BLD: 6.4 % (ref 4.2–5.6)
POTASSIUM SERPL-SCNC: 4.3 MMOL/L (ref 3.5–5.5)
SODIUM SERPL-SCNC: 138 MMOL/L (ref 136–145)
TROPONIN I SERPL-MCNC: 0.03 NG/ML (ref 0–0.04)

## 2017-08-21 PROCEDURE — 83036 HEMOGLOBIN GLYCOSYLATED A1C: CPT | Performed by: INTERNAL MEDICINE

## 2017-08-21 PROCEDURE — 74011250636 HC RX REV CODE- 250/636: Performed by: INTERNAL MEDICINE

## 2017-08-21 PROCEDURE — 94640 AIRWAY INHALATION TREATMENT: CPT

## 2017-08-21 PROCEDURE — 74011000250 HC RX REV CODE- 250: Performed by: INTERNAL MEDICINE

## 2017-08-21 PROCEDURE — 82962 GLUCOSE BLOOD TEST: CPT

## 2017-08-21 PROCEDURE — C9113 INJ PANTOPRAZOLE SODIUM, VIA: HCPCS | Performed by: INTERNAL MEDICINE

## 2017-08-21 PROCEDURE — 80048 BASIC METABOLIC PNL TOTAL CA: CPT | Performed by: INTERNAL MEDICINE

## 2017-08-21 PROCEDURE — 84484 ASSAY OF TROPONIN QUANT: CPT | Performed by: INTERNAL MEDICINE

## 2017-08-21 PROCEDURE — 76770 US EXAM ABDO BACK WALL COMP: CPT

## 2017-08-21 PROCEDURE — 36415 COLL VENOUS BLD VENIPUNCTURE: CPT | Performed by: INTERNAL MEDICINE

## 2017-08-21 PROCEDURE — 74011250637 HC RX REV CODE- 250/637: Performed by: INTERNAL MEDICINE

## 2017-08-21 RX ORDER — ALBUTEROL SULFATE 0.83 MG/ML
2.5 SOLUTION RESPIRATORY (INHALATION)
Status: DISCONTINUED | OUTPATIENT
Start: 2017-08-21 | End: 2017-08-21 | Stop reason: HOSPADM

## 2017-08-21 RX ORDER — HUMAN INSULIN 100 [IU]/ML
INJECTION, SUSPENSION SUBCUTANEOUS
Qty: 10 ML | Refills: 4 | Status: ON HOLD | OUTPATIENT
Start: 2017-08-21 | End: 2020-07-06

## 2017-08-21 RX ORDER — BISACODYL 5 MG
5 TABLET, DELAYED RELEASE (ENTERIC COATED) ORAL
Qty: 30 TAB | Refills: 0 | Status: SHIPPED | OUTPATIENT
Start: 2017-08-21

## 2017-08-21 RX ADMIN — SODIUM CHLORIDE 40 MG: 9 INJECTION INTRAMUSCULAR; INTRAVENOUS; SUBCUTANEOUS at 08:31

## 2017-08-21 RX ADMIN — FEBUXOSTAT 40 MG: 40 TABLET ORAL at 08:32

## 2017-08-21 RX ADMIN — ASPIRIN 81 MG 81 MG: 81 TABLET ORAL at 08:32

## 2017-08-21 RX ADMIN — SODIUM CHLORIDE 75 ML/HR: 900 INJECTION, SOLUTION INTRAVENOUS at 00:22

## 2017-08-21 RX ADMIN — HEPARIN SODIUM 5000 UNITS: 5000 INJECTION, SOLUTION INTRAVENOUS; SUBCUTANEOUS at 08:33

## 2017-08-21 RX ADMIN — ISOSORBIDE MONONITRATE 60 MG: 30 TABLET, EXTENDED RELEASE ORAL at 08:31

## 2017-08-21 RX ADMIN — ATORVASTATIN CALCIUM 40 MG: 40 TABLET, FILM COATED ORAL at 00:23

## 2017-08-21 RX ADMIN — ACETAMINOPHEN 650 MG: 325 TABLET ORAL at 08:32

## 2017-08-21 RX ADMIN — HEPARIN SODIUM 5000 UNITS: 5000 INJECTION, SOLUTION INTRAVENOUS; SUBCUTANEOUS at 00:23

## 2017-08-21 RX ADMIN — FUROSEMIDE 40 MG: 40 TABLET ORAL at 08:32

## 2017-08-21 RX ADMIN — LOSARTAN POTASSIUM 25 MG: 25 TABLET ORAL at 08:32

## 2017-08-21 RX ADMIN — BUDESONIDE AND FORMOTEROL FUMARATE DIHYDRATE 2 PUFF: 160; 4.5 AEROSOL RESPIRATORY (INHALATION) at 07:58

## 2017-08-21 RX ADMIN — HYDRALAZINE HYDROCHLORIDE 50 MG: 50 TABLET, FILM COATED ORAL at 08:32

## 2017-08-21 RX ADMIN — ACETAMINOPHEN 650 MG: 325 TABLET ORAL at 13:04

## 2017-08-21 RX ADMIN — CARVEDILOL 6.25 MG: 6.25 TABLET, FILM COATED ORAL at 08:33

## 2017-08-21 NOTE — INTERDISCIPLINARY ROUNDS
Interdisciplinary Round Note   Patient Information: Patient Information: Gino Section                                      208/60   Reason for Admission: Hypoglycemia  Acute chest pain  Acute renal failure (ARF) ([facility]  Hypoglycemia   Attending Provider:   Leron Leventhal. Boyd Abraham MD  Primary Care Physician:       Huy Zhou MD       [phone]   Past Medical History:   Past Medical History:   Diagnosis Date    Alcohol abuse     Arthritis     Atherosclerotic cardiovascular disease     CAD (coronary artery disease)     mild disease of coronaries (cor angio 2006),wife states he has 1 stent    Cardiac cath 01/26/2006    RCA mild. Otherwise patent coronaries. LVEDP 15.  EF 55-60%.  Cardiac echocardiogram 03/27/2009    EF 60%. No cardiac source of embolism. No significant valvular pathology.  Cardiac nuclear imaging test 12/06/2011    Small, mild inferior infarction or possibly artifact. No ischemia. EF 45%. No TID. No reg WMA.  Cardiovascular LLE venous duplex 08/14/2015    Left leg:  No DVT. Dilated lymph node in left groin.  Constipation     Diabetes mellitus type II     Gout     Hepatic steatosis     Hypercholesterolemia     Hypertension     Knee effusion, left     Left knee pain     Morbid obesity (HCC)     Noncompliance     Obesity (BMI 30-39. 9)     S/P colonoscopy 3/8/05    Dr. Edd Nicole; normal; f/u 10 years    Stomach problems     TIA (transient ischemic attack) 3/09    Tobacco abuse       Hospital day: 1  Estimated discharge date: 8/22  RRAT Score: High Risk            25       Total Score        3 Has Seen PCP in Last 6 Months (Yes=3, No=0)    4 IP Visits Last 12 Months (1-3=4, 4=9, >4=11)    5 Pt. Coverage (Medicare=5 , Medicaid, or Self-Pay=4)    13 Charlson Comorbidity Score (Age + Comorbid Conditions)        Criteria that do not apply:    . Living with Significant Other. Assisted Living. LTAC. SNF.  or   Rehab    Patient Length of Stay (>5 days = 3)       Goals for Today: pain control      Overnight Events:n/a     VITAL SIGNS  Vitals:    08/21/17 0758 08/21/17 0831 08/21/17 0832 08/21/17 0833   BP:  (!) 167/93 (!) 167/93 (!) 167/93   Pulse:  92 92 92   Resp:       Temp:       SpO2: 97%      Weight:       Height:              Lines, Drains, & Airways    Peripheral IV 08/21/17 Right Antecubital-Site Assessment: Clean, dry, & intact       VTE Prophylaxis               Sequential Compression Device: Bilateral                   Intake and Output:   08/19 1901 - 08/21 0700  In: 213.8 [P.O.:90; I.V.:123.8]  Out: 700 [Urine:700]  08/21 0701 - 08/21 1900  In: -   Out: 610 [Urine:610] Current Diet: DIET DIABETIC CONSISTENT CARB Regular          GI Prophylaxis: yes        Type: zofran        Recent Glucose Results:   Lab Results   Component Value Date/Time    GLU 40 (LL) 08/20/2017 03:00 PM    GLUCPOC 126 (H) 08/21/2017 06:02 AM    GLUCPOC 101 08/20/2017 09:39 PM    GLUCPOC 124 (H) 08/20/2017 07:18 PM          IV Antibiotics? n/a       When started: n/a   Activity Level: Activity Level: Up ad lázaro  Needs assistance with ADLs: no  PT Consult Status: n/a Current Immunizations: There is no immunization history for the selected administration types on file for this patient.        Recommendations:   Discharge Disposition: Home Independent    Needs for Discharge: n/a Recommendations from IDR team:n/a    Other Notes n/a

## 2017-08-21 NOTE — ROUTINE PROCESS
TRANSFER - OUT REPORT:    Verbal report given to JEN Norton (name) on Kelli Dorantes  being transferred to [facility] (unit) for routine progression of care       Report consisted of patients Situation, Background, Assessment and   Recommendations(SBAR). Information from the following report(s) SBAR, Kardex, ED Summary, [facility] and Recent Results was reviewed with the receiving nurse. Lines:       Opportunity for questions and clarification was provided.       Patient transported with:   Monitor  Registered Nurse

## 2017-08-21 NOTE — PROGRESS NOTES
Care Management Interventions  PCP Verified by CM: Yes  Mode of Transport at Discharge:  (family)  Transition of Care Consult (CM Consult): [address]: Yes  Physical Therapy Consult: No  Occupational Therapy Consult: No  Current Support Network: Lives Alone  Confirm Follow Up Transport: Family  Plan discussed with Pt/Family/Caregiver: Yes  Freedom of Choice Offered: Yes (home health)  Discharge Location  Discharge Placement: Home with home health    Pt is a 64year old admitted for hypoglycemia, acute chest pain, acute renal failure. Pt is alert and oriented and his sister Gregoria Irving [phone] at bedside. Pt reports that he lives alone and that prior to admission he was independent in his ADLs and that he has no DME at home. Pt reports that he plans to return home on discharge and that he has transportation home with family. Pt chose [address]. FOC signed. Referral sent. Spoke to Randall with [address].

## 2017-08-21 NOTE — DIABETES MGMT
Diabetes Patient/Family Education Record    Factors That  May Influence Patients Ability  to Learn or  Comply With  Recommendations:    []   Language barrier    []   Cultural needs   []   Motivation    []   Cognitive limitation    []   Physical   [x]   Education    []   Physiological factors   []   Hearing/vision/speaking impairment   []   Voodoo beliefs    []   Financial factors   []  Other:   []  No factors identified at this time.      Person Instructed:   [x]   Patient   []   Family   []  Other     Preference for Learning:   [x]   Verbal   [x]   Written   []  Demonstration     Level of Comprehension & Competence:    []  Good                                      [x] Fair                                     []  Poor                             [x]  Needs Reinforcement   [x]  Teachback completed    Education Component:   [x]  Medication management,     [x]  Nutritional management including the role of carbohydrate intake   []  Exercise   [x]  Signs, symptoms, and treatment of hyperglycemia and hypoglycemia   [x] Treatment of hyperglycemia and hypoglycemia   []  Importance of blood glucose monitoring and how to obtain a blood glucose meter    []  Instruction on use of blood glucose meter   [x]  Discuss the importance of HbA1C monitoring and provide patient with  results   []  Sick day guidelines   []  Proper use and disposal of lancets, needles, syringes or insulin pens (if appropriate)   []  Potential long-term complications (retinopathy, kidney disease, neuropathy, heart disease, stroke, vascular disease, foot care)   [x] Provide emergency contact number and contact number for more information    [x]  Goal:  Patient/family will demonstrate understanding of Diabetes Self Management Skills by: (date) 8/28/17_______  Plan for post-discharge education or self-management support:    [x] Outpatient class schedule provided            [] Patient Declined    [] Scheduled for outpatient classes (date) _______     Shana Arnett Lara Grimaldo, [address] [place] [address], CDE  Pager: [phone]

## 2017-08-21 NOTE — ROUTINE PROCESS
Patient ate breakfast this morning therefore unable to perform his mesenteric study. Informed nurse and requested patient be made NPO past midnight tonight for study in the morning.

## 2017-08-21 NOTE — DISCHARGE SUMMARY
Discharge Summary     Patient ID:  Vera Azar  [id number]  64 y.o.  1960  Body mass index is 33.86 kg/(m^2). PCP on record: Parker Wilkerson MD    Admit date: 8/20/2017  Discharge date and time: 8/21/2017    Discharge Diagnoses:                                           1 Hypoglycemia   2 Acute encephalopathy from #1   3 DM2   4 Morbid obesity   5 Constipation   6 CKD 3       Consults: None          Hospital Course by problems:  Admitted with Hypoglycemia and syncope , recovered with glucose , patient also c/o atypical chest pain ( trop trended Flat ) . Patient was instructed in detail regarding glucose control , S/S of hypoglycemia , compliance to diet and meds     Patient also had constipation - Dulcolax prn , regular use of Stool softener    Weight reduction d/w patient     Patient is on 70 /30 insulin  60 units  AM /40units   PM - change to 30units   AM and 20units   PM -  Then depending upon his numbers patient in consultation with his PCP will increase dose of insulin         Patient seen and examined by me on discharge day. Pertinent Findings:  Alert awake oriented , C/o constipation , otherwise no complaints   Vitals stable   RS - clear   CVS- regular   Abd - benign     Stable for discharge         Pertinent Lab Data:  Recent Labs      08/20/17   1500   WBC  8.2   HGB  13.1   HCT  40.4   PLT  312     Recent Labs      08/21/17   1309  08/20/17   1500   NA  138  138   K  4.3  3.9   CL  107  107   CO2  24  25   GLU  200*  40*   BUN  38*  37*   CREA  2.91*  3.29*   CA  8.7  8.6   ALB   --   3.2*   SGOT   --   36   ALT   --   32   INR   --   0.8       DISCHARGE MEDICATIONS:   @  Current Discharge Medication List      START taking these medications    Details   bisacodyl (DULCOLAX, BISACODYL,) 5 mg EC tablet Take 1 Tab by mouth daily as needed for Constipation.   Qty: 30 Tab, Refills: 0         CONTINUE these medications which have CHANGED    Details   NOVOLIN 70/30 100 unit/mL (70-30) injection INJECT 30 UNITS SC IN THE MORNING AND 20 UNITS SC IN THE EVENING BEFORE SUPPER  Qty: 10 mL, Refills: 4         CONTINUE these medications which have NOT CHANGED    Details   hydrALAZINE (APRESOLINE) 50 mg tablet Take 1 Tab by mouth daily. Qty: 1 Tab, Refills: 0      isosorbide mononitrate ER (IMDUR) 60 mg CR tablet Take 1 Tab by mouth daily. Qty: 30 Tab, Refills: 6      losartan (COZAAR) 25 mg tablet Take 1 Tab by mouth daily. Qty: 30 Tab, Refills: 6      atorvastatin (LIPITOR) 40 mg tablet Take 1 Tab by mouth nightly. Qty: 30 Tab, Refills: 6      furosemide (LASIX) 40 mg tablet Take 1 Tab by mouth daily. Qty: 30 Tab, Refills: 6      fluticasone (FLONASE) 50 mcg/actuation nasal spray 2 Sprays by Both Nostrils route daily. carvedilol (COREG) 6.25 mg tablet Take 1 Tab by mouth two (2) times daily (with meals). Qty: 60 Tab, Refills: 6      docusate sodium (COLACE) 100 mg capsule Take 1 Cap by mouth two (2) times a day for 90 days. Qty: 60 Cap, Refills: 0      nitroglycerin (NITROSTAT) 0.4 mg SL tablet 1 Tab by SubLINGual route as needed for Chest Pain. Qty: 20 Tab, Refills: 0      albuterol (VENTOLIN HFA) 90 mcg/actuation inhaler Take 2 Puffs by inhalation every four (4) hours as needed for Wheezing for up to 90 days. Qty: 1 Inhaler, Refills: 1      febuxostat (ULORIC) 40 mg tab tablet Take 1 Tab by mouth daily. Qty: 30 Tab, Refills: 1    Associated Diagnoses: Acute gout of right wrist, unspecified cause      glucose blood VI test strips (FREESTYLE TEST) strip by Does Not Apply route See Admin Instructions. Check twice a day  Qty: 100 Strip, Refills: 2    Associated Diagnoses: Poorly controlled diabetes mellitus (HCC)      aspirin 81 mg chewable tablet Take 1 Tab by mouth daily. Qty: 30 Tab, Refills: 0      B COMPLEX W-C NO.20/FOLIC ACID (B COMPLEX & C NO.20-FOLIC ACID PO) Take  by mouth.     Associated Diagnoses: Essential hypertension      budesonide-formoterol (SYMBICORT) 160-4.5 mcg/actuation HFA inhaler Take 2 Puffs by inhalation two (2) times a day. Qty: 1 Inhaler, Refills: 0    Associated Diagnoses: Smoking; Wheezing; SOB (shortness of breath) on exertion      calcitRIOL (ROCALTROL) 0.25 mcg capsule Take 1 Cap by mouth every Monday, Wednesday, Friday. Qty: 15 Cap, Refills: 0      Lancets misc Check 3 times a day , needs according contour glucometer  Qty: 1 Package, Refills: 11    Associated Diagnoses: Diabetes ([address]      Blood-Glucose Meter monitoring kit Check twice daily. Qty: 1 kit, Refills: 0    Associated Diagnoses: Diabetes mellitus, type 2 ([address]               My Recommended Diet, Activity, Wound Care, and follow-up labs are listed in the patient's Discharge Insturctions which I have personally completed and reviewed. Disposition:     [x] Home with family     [] [facility] PT/RN   [] SNF/NH   [] Inpatient Rehab/[facility]  Condition at Discharge:  Stable    Follow up with:   PCP : Mc Mcnamara MD      Please follow-up tests/labs that are still pendin.  None  2.    >30 minutes spent coordinating this discharge (review instructions/follow-up, prescriptions, preparing report for sign off)    Signed:  Vianey Marrufo MD  2017  2:19 PM

## 2017-08-21 NOTE — DIABETES MGMT
GLYCEMIC CONTROL PLAN OF CARE    Assessment:  Pt is 64 yr old male admitted on 8/20/17 with hypoglycemia. Pt with past medical history significant for T2DM, ARF, Gout, UTI, TIA, Tobacco abuse, cocaine abuse, neuropathy, allergies, CAD. Recommendations:  Diabetic education. Pt BG within target range. Consider holding levemir and continuing with correctional lispro ACHS. Will continue inpatient monitoring and intervention. Most recent blood glucose values:  Results for Kaci Novak (MRN [id number]) as of 8/21/2017 10:37   Ref.  Range 8/20/2017 19:16 8/20/2017 19:18 8/20/2017 21:39 8/21/2017 06:02   GLUCOSE,FAST - POC Latest Ref Range: 70 - 110 mg/dL 122 (H) 124 (H) 101 126 (H)     Current A1C:  6.1%- 7/14/17 estimated average blood glucose of 128 mg/dl over the past 2-3 months    Current hospital diabetes medications:  32 units levemir  Correctional lispro ACHS- normal insulin sensitivity scale    Diet:   Diabetic consistent carbohydrate    Home diabetes medications:  Pt reports taking 70/30 60 units with breakfast and 30 units with dinner daily    Goals:  Pt BG will be within target range by 8/24/17_____    Education:  _x__  Refer to Diabetes Education Record             ___  Education not indicated at this time    Jim Hickman [address], [address] [place] [address], [facility]  Pager: [phone]

## 2017-08-21 NOTE — DISCHARGE INSTRUCTIONS
DISCHARGE SUMMARY from Nurse    The following personal items are in your possession at time of discharge:    Dental Appliances: None  Visual Aid: None     Home Medications: None  Jewelry: None  Clothing: At bedside  Other Valuables: None             PATIENT INSTRUCTIONS:    After general anesthesia or intravenous sedation, for 24 hours or while taking prescription Narcotics:  · Limit your activities  · Do not drive and operate hazardous machinery  · Do not make important personal or business decisions  · Do  not drink alcoholic beverages  · If you have not urinated within 8 hours after discharge, please contact your surgeon on call. Report the following to your surgeon:  · Excessive pain, swelling, redness or odor of or around the surgical area  · Temperature over 100.5  · Nausea and vomiting lasting longer than 4 hours or if unable to take medications  · Any signs of decreased circulation or nerve impairment to extremity: change in color, persistent  numbness, tingling, coldness or increase pain  · Any questions        What to do at Home:  Recommended activity: Activity as tolerated    *  Please give a list of your current medications to your Primary Care Provider. *  Please update this list whenever your medications are discontinued, doses are      changed, or new medications (including over-the-counter products) are added. *  Please carry medication information at all times in case of emergency situations. These are general instructions for a healthy lifestyle:    No smoking/ No tobacco products/ Avoid exposure to second hand smoke    Surgeon General's Warning:  Quitting smoking now greatly reduces serious risk to your health.     Obesity, smoking, and sedentary lifestyle greatly increases your risk for illness    A healthy diet, regular physical exercise & weight monitoring are important for maintaining a healthy lifestyle    You may be retaining fluid if you have a history of heart failure or if you experience any of the following symptoms:  Weight gain of 3 pounds or more overnight or 5 pounds in a week, increased swelling in our hands or feet or shortness of breath while lying flat in bed. Please call your doctor as soon as you notice any of these symptoms; do not wait until your next office visit. Recognize signs and symptoms of STROKE:    F-face looks uneven    A-arms unable to move or move unevenly    S-speech slurred or non-existent    T-time-call 911 as soon as signs and symptoms begin-DO NOT go       Back to bed or wait to see if you get better-TIME IS BRAIN. Warning Signs of HEART ATTACK     Call 911 if you have these symptoms:   Chest discomfort. Most heart attacks involve discomfort in the center of the chest that lasts more than a few minutes, or that goes away and comes back. It can feel like uncomfortable pressure, squeezing, fullness, or pain.  Discomfort in other areas of the upper body. Symptoms can include pain or discomfort in one or both arms, the back, neck, jaw, or stomach.  Shortness of breath with or without chest discomfort.  Other signs may include breaking out in a cold sweat, nausea, or lightheadedness. Don't wait more than five minutes to call 911 - MINUTES MATTER! Fast action can save your life. Calling 911 is almost always the fastest way to get lifesaving treatment. Emergency Medical Services staff can begin treatment when they arrive -- up to an hour sooner than if someone gets to the hospital by car. The discharge information has been reviewed with the patient. The patient verbalized understanding. Discharge medications reviewed with the patient and appropriate educational materials and side effects teaching were provided. Patient armband removed and shredded  MyChart Activation    Thank you for requesting access to [employer]. Please follow the instructions below to securely access and download your online medical record.  [employer] allows you to send messages to your doctor, view your test results, renew your prescriptions, schedule appointments, and more. How Do I Sign Up? 1. In your internet browser, go to [email or web address]  2. Click on the First Time User? Click Here link in the Sign In box. You will be redirect to the New Member Sign Up page. 3. Enter your [employer] Access Code exactly as it appears below. You will not need to use this code after youve completed the sign-up process. If you do not sign up before the expiration date, you must request a new code. MyChart Access Code: Activation code not generated  Current [employer] Status: Patient Declined (This is the date your [facility] access code will )    4. Enter the last four digits of your Social Security Number (xxxx) and Date of Birth (mm/dd/yyyy) as indicated and click Submit. You will be taken to the next sign-up page. 5. Create a [employer] ID. This will be your [employer] login ID and cannot be changed, so think of one that is secure and easy to remember. 6. Create a [employer] password. You can change your password at any time. 7. Enter your Password Reset Question and Answer. This can be used at a later time if you forget your password. 8. Enter your e-mail address. You will receive e-mail notification when new information is available in [address] 9. Click Sign Up. You can now view and download portions of your medical record. 10. Click the Download Summary menu link to download a portable copy of your medical information. Additional Information    If you have questions, please visit the Frequently Asked Questions section of the [employer] website at [email or web address] Remember, [employer] is NOT to be used for urgent needs. For medical emergencies, dial 911.

## 2017-08-21 NOTE — ED NOTES
TRANSFER - OUT REPORT:    Verbal report given to JEN Norton (name) on Rozann Scales  being transferred to [facility] (unit) for routine progression of care       Report consisted of patients Situation, Background, Assessment and   Recommendations(SBAR). Information from the following report(s) SBAR, Kardex, ED Summary, Intake/Output, MAR and Recent Results was reviewed with the receiving nurse. Lines:       Opportunity for questions and clarification was provided.       Patient transported with:   Monitor  Registered Nurse

## 2017-08-21 NOTE — PROGRESS NOTES
Patient is discharge in stable condition, discharge instructions were reviewed and given to patient, appropriate education, such as hypoglycemia, stroke and heart attack and more was given to patient. Patient verbalized understanding, opportunity to ask questions was given, addressed all questions and concerns to patient satisfaction. Patient left the unit at 1622.

## 2017-08-21 NOTE — HOME CARE
[facility] received referral and patient discharged before this nurse could visit the room to verify information. This nurse called and spoke with patient's sister, Tana Jhaveri, who will communicate scheduling of visits to patient - he lives alone - SN for medication management and disease education explained to the sister and referral processed for [facility] within 48 hours - LYUBOV Varghese LPN

## 2017-08-21 NOTE — PROGRESS NOTES
Patient is alert and oriented x 4, lungs clear bilaterally, bowel sound active in all quadrants. Patient is voiding without difficulties, tolerating regular food well, no episode of nausea or vomiting noted. Patient denies any acute distress, SOB and chest pain. Patient is stable, viatal WNL, no apparent distress noted, no neurological deficit noted, placed call bell within reach, will continue to monitor.

## 2017-08-21 NOTE — H&P
[facility]  ROUTINE H AND PS    Name:  Sebastián Monterroso  MR#:  [id number]  :  1960  Account #:  [de-identified]  Date of Adm:  2017      CHIEF COMPLAINT: Hypoglycemia. HISTORY OF PRESENT ILLNESS: The patient is a 59-year-old black  male with a history of type 2 diabetes mellitus, which goes back  several years. He takes a fairly large doses of Novolin insulin twice a  day and apparently this morning had a significant hypoglycemic  reaction in which he was found to be confused and disoriented. Blood  sugar was done and it was 33. He subsequently was brought to the  emergency room by paramedics. This was corrected with IV glucose  and glucagon. The patient, as mentioned, is an insulin-dependent  diabetic and has been that way for years. Medications that are used include Novolin insulin 70/30 sixty units in  the a.m. and 40 units in the p.m. He does not follow a diabetic diet  particularly closely. He does not check his blood sugars on a regular  basis. I have no idea if his medical followup is appropriate. He has  never had an insulin reaction like this in the past. He has had some  chest pain associated with this as well. There was also noted some  worsening of his renal function with a climb in his creatinine from 3 to  3.26. He also was noted to have an elevated lipase in excess of 400. He denied any abdominal pain. He has had some nausea and  vomiting. REVIEW OF SYSTEMS  CONSTITUTIONAL: His weight has been stable for the past several  months. He has had no fevers. HEENT: No problems with vision, visual field cuts, tinnitus, difficulty  with pain in his face, postnasal drip. RESPIRATORY: He has had occasional cough and wheezing. CARDIOVASCULAR: No chest pain, orthopnea, paroxysmal nocturnal  dyspnea, or pedal edema. GASTROINTESTINAL: As mentioned above. GENITOURINARY: As mentioned above. ENDOCRINOLOGIC: As mentioned above.   HEMATOLOGIC: No coagulopathy or thromboembolic phenomenon. ORTHOPEDIC: No bone pain, pain or swelling involving any joints, or  myopathies. SKIN: No history of easy bruisability or pruritus. NEUROLOGIC: No seizures, tremors, changes in mental status, etc.    PAST MEDICAL HISTORY: As mentioned above. FAMILY HISTORY: Positive for congestive failure, hypertension,  diabetes. SOCIAL HISTORY: He is a pack-a-day smoker, continues to smoke. He denied excessive alcohol consumption. Denied drug use. MEDICATIONS: His medications of use have been mentioned in his  admission documentation. ALLERGIES: HE IS ALLERGIC TO SHELLFISH, ALTHOUGH I HAVE  NO IDEA WHAT KIND OF REACTION HE HAS HAD TO SAME. PHYSICAL EXAMINATION  GENERAL: When I saw him, he was a pleasant, well-developed, well-  nourished, black male. At the time I saw him he was eating a Big Mac. VITAL SIGNS: His blood pressure was 176/90, pulse was 83 and  regular, respiratory rate was 17. HEENT: Unremarkable. Pharynx was clear. NECK: Supple. He had no carotid bruits. No enlargement of thyroid  gland. No enlargement of cervical nodes. Trachea midline. CHEST: Revealed good airway movement bilaterally. He had no  wheezes, rales, or rhonchi. CARDIAC: Revealed PMI to be in the 5th intercostal space on  midclavicular line. S1 and S2 were normal. He had no S3, S4, or  murmur. ABDOMEN: Soft. He had no palpable organs or masses. He had  normal bowel sounds. GENITALIA: Normal.  RECTAL: Not done. EXTREMITIES: Reveal trace to +1 pedal edema bilaterally. He had  good pulses all around. NEUROLOGIC: Examination when I saw him was grossly intact. LABORATORY DATA: Has been reviewed and will not be repeated  here. IMPRESSION  1. Hypoglycemia. 2. Type 2 diabetes mellitus, poorly controlled. 3. Atherosclerotic cardiovascular disease characterized by coronary  artery disease. 4. Acute kidney injury. 5. Chronic obstructive pulmonary disease. 6. Pancreatitis. 7. Hyperlipidemia.     PLAN: The patient is to be admitted to the hospital, he will be volume  resuscitated. His Novolin insulin will be discontinued. He will be started  on basal bolus insulin therapy. We will start him on a diabetic diet and  he will undergo diabetic teaching, with a goal of keeping his blood  sugars between 160 and 100. With regard to his chest pain, it is not  likely this is coronary artery disease, but his EKGs and his troponins  will be trended. With regard to his acute kidney injury, we will get a  sonogram of retroperitoneum and follow his renal function tests. These  may improve with rehydration. With regard to his COPD, we will  continue his home meds to include long and short acting beta agonists. With regard to his elevated lipase, suggesting pancreatitis, we will  obtain a sonogram of his abdomen to evaluate pancreatic bed. He will  be started on a PPI. We will trend his lipase. We cannot do a CT of his  abdomen with contrast in view of his elevated creatinine. With regard  to his hyperlipidemia, he is on statins. Usual housekeeping procedures  to include a PPI for stress ulcer prophylaxis and heparin compound for  DVT prophylaxis.         MD DALIA Hawkins / MATT  D:  08/20/2017   21:48  T:  08/21/2017   05:14  Job #:  [id number]

## 2017-08-21 NOTE — PROGRESS NOTES
80- Patient arrived on floor. Stable condition. 0403- No nausea or vomiting. Pt denies any complaint of pain at this time. Voiding in urinal at bedside.

## 2017-08-23 ENCOUNTER — HOME CARE VISIT (OUTPATIENT)
Dept: HOME HEALTH SERVICES | Facility: HOME HEALTH | Age: 57
End: 2017-08-23

## 2017-08-23 ENCOUNTER — PATIENT OUTREACH (OUTPATIENT)
Dept: FAMILY MEDICINE CLINIC | Age: 57
End: 2017-08-23

## 2017-08-23 DIAGNOSIS — E11.65 POORLY CONTROLLED DIABETES MELLITUS (HCC): ICD-10-CM

## 2017-08-23 NOTE — PROGRESS NOTES
Nurse Navigator attempted to contact patient at only number listed in chart. Patient was hospitalized at [facility] on 8/20/17 to 8/21/17 for hypoglycemia and atypical chest pain. Message left introducing myself, the purpose of the call and giving my contact information.   Requested that patient call back at his/her earliest convenience

## 2017-08-23 NOTE — TELEPHONE ENCOUNTER
This patient contacted office for the following prescriptions to be filled:    Medication requested :   Requested Prescriptions     Pending Prescriptions Disp Refills    glucose blood VI test strips (FREESTYLE TEST) strip 100 Strip 2     Sig: by Does Not Apply route See Admin Instructions. Check twice a day     [facility]: [facility] or Print: [facility]   Mail order or Local pharmacy [address]     Scheduled appointment if not seen by current providers in office: [address] [place] [address] 6/02/2017 f/u 8/29/2017 pt is completely out. Please call the pt when RX has been sent.

## 2017-08-24 ENCOUNTER — PATIENT OUTREACH (OUTPATIENT)
Dept: FAMILY MEDICINE CLINIC | Age: 57
End: 2017-08-24

## 2017-08-24 ENCOUNTER — HOME CARE VISIT (OUTPATIENT)
Dept: SCHEDULING | Facility: HOME HEALTH | Age: 57
End: 2017-08-24
Payer: COMMERCIAL

## 2017-08-24 PROCEDURE — G0299 HHS/HOSPICE OF RN EA 15 MIN: HCPCS

## 2017-08-24 PROCEDURE — 400013 HH SOC

## 2017-08-24 PROCEDURE — 3331090001 HH PPS REVENUE CREDIT

## 2017-08-24 PROCEDURE — 3331090002 HH PPS REVENUE DEBIT

## 2017-08-24 NOTE — PROGRESS NOTES
NNTOCIP  Patient admitted to [facility] on 8/20/17 to 8/22/17 for hypoglycemia. Course of hospitalization per discharge summary:  Admitted with Hypoglycemia and syncope , recovered with glucose , patient also c/o atypical chest pain ( trop trended [place] ) .    Patient was instructed in detail regarding glucose control , S/S of hypoglycemia , compliance to diet and meds      Patient also had constipation - Dulcolax prn , regular use of Stool softener     Weight reduction d/w patient      Patient is on 70 /30 insulin  60 units  AM /40units   PM - change to 30units   AM and 20units   PM -  Then depending upon his numbers patient in consultation with his PCP will increase dose of insulin     Patient seen and examined by me on discharge day. Pertinent Findings:  Alert awake oriented , C/o constipation , otherwise no complaints   Vitals stable   RS - clear   CVS- regular   Abd - benign      Stable for discharge   Pertinent labs:  Results for Sampson Gentile (MRN [id number]) as of 8/24/2017 13:46   Ref. Range 8/21/2017 11:40 8/21/2017 13:09   GLUCOSE,FAST - POC Latest Ref Range: 70 - 110 mg/dL 142 (H)    Sodium Latest Ref Range: 136 - 145 mmol/L  138   Potassium Latest Ref Range: 3.5 - 5.5 mmol/L  4.3   Chloride Latest Ref Range: 100 - 108 mmol/L  107   CO2 Latest Ref Range: 21 - 32 mmol/L  24   Anion gap Latest Ref Range: 3.0 - 18 mmol/L  7   Glucose Latest Ref Range: 74 - 99 mg/dL  200 (H)   BUN Latest Ref Range: 7.0 - 18 MG/DL  38 (H)   Creatinine Latest Ref Range: 0.6 - 1.3 MG/DL  2.91 (H)   BUN/Creatinine ratio Latest Ref Range: 12 - 20    13   Calcium Latest Ref Range: 8.5 - 10.1 MG/DL  8.7   GFR est non-AA Latest Ref Range: >60 ml/min/1.73m2  23 (L)   GFR est AA Latest Ref Range: >60 ml/min/1.73m2  27 (L)   Troponin-I, Qt.  Latest Ref Range: 0.0 - 0.045 NG/ML  0.03   Hemoglobin A1c, (calculated) Latest Ref Range: 4.2 - 5.6 %  6.4 (H)   Est. average glucose Latest Units: mg/dL  137     Inpatient Consults per discharge summary:  none  Discharge diagnoses per discharge summary :   1 Hypoglycemia   2 Acute encephalopathy from #1   3 DM2   4 Morbid obesity   5 Constipation   RRAT Score: 25  CCI: 5  Hospital admissions in past year: 2  ED admissions in past year: 2    Contacted patient for hospital follow up. Introduced self, role and reason for call. Verified 2 patient identifiers. Patient reports:  Home health nurse had just come and gotten all of his medications all straight. He was feeling fine now since he got out of the hospital.  Patient denies:  Any more low blood sugars light headedness or any other problems. ADL's:  Preforms all ADL's  by self with out difficulty     DME:   none  Resources/Support:   Patient has adequate support at home and has no detectable resource needs at this time. AMD:   none  Reviewed allergies but patient did not wish to go over medications because the home health nurse had just gone over them. Instructed to bring all medications with him to next appointment. Educated patient to monitor and report the following Red flags:   Shakiness  Nervousness or anxiety  Sweating, chills and clamminess  Irritability or impatience  Confusion, including delirium  Rapid/fast heartbeat  Lightheadedness or dizziness  Hunger and nausea  Sleepiness  Blurred/impaired vision  Tingling or numbness in the lips or tongue  Headaches  Weakness or fatigue  Anger, stubbornness, or sadness  Lack of coordination  Nightmares or crying out during sleep  Seizures  Unconsciousness or any new or concerning symptoms. Patient verbalized understanding of information discussed and is aware of  when to seek medical attention from PCP, urgent care or ED. Opportunity to ask questions was provided. Contact information was provided for future reference or further questions. Appointments:  8/29/2017 11:15 AM Clifton Hightower MD [facility]   Patient will also meet with Nurse Navigator    Patient aware of appointments.  He will provide transportation. Potential Barriers to care  No apparent barriers to care identified at this time. Adherence to previous treatment and likelihood for follow-up:  Patient verbalized understanding of discharge instructions and need for follow up. Plan of Care/Goals:  Goals      Attends follow-up appointments as directed.  Establish PCP relationships and regularly scheduled appointments.  Patient verbalizes understanding of self -management goals of living with Diabetes. A1C  6.4        To decrease or maintain patient's biometrics:  HgA1c ?7 mg/dL, /80 or less. LDL of less than 100 and/or less than 70 in a patient with CAD. A1C 6.4       Understand Diabetic action plan. (Ie. when to seek medical care,  what to do with high and low blood glucose, how and when to adjust diabetes treatment. This represents Transitions of Care. Nurse Navigator spoke with patient within 1-2 business day(s) of discharge. Patient's transition of care follow up appointment is scheduled with Fior Huff MD on 8/29/17 at [phone] which is within 7-14 days of discharge.

## 2017-08-25 ENCOUNTER — HOME CARE VISIT (OUTPATIENT)
Dept: HOME HEALTH SERVICES | Facility: HOME HEALTH | Age: 57
End: 2017-08-25
Payer: COMMERCIAL

## 2017-08-25 PROCEDURE — 3331090001 HH PPS REVENUE CREDIT

## 2017-08-25 PROCEDURE — 3331090002 HH PPS REVENUE DEBIT

## 2017-08-26 PROCEDURE — 3331090001 HH PPS REVENUE CREDIT

## 2017-08-26 PROCEDURE — 3331090002 HH PPS REVENUE DEBIT

## 2017-08-27 VITALS
RESPIRATION RATE: 20 BRPM | OXYGEN SATURATION: 98 % | TEMPERATURE: 96 F | DIASTOLIC BLOOD PRESSURE: 86 MMHG | HEART RATE: 98 BPM | SYSTOLIC BLOOD PRESSURE: 140 MMHG

## 2017-08-27 PROCEDURE — 3331090001 HH PPS REVENUE CREDIT

## 2017-08-27 PROCEDURE — 3331090002 HH PPS REVENUE DEBIT

## 2017-08-28 PROCEDURE — 3331090001 HH PPS REVENUE CREDIT

## 2017-08-28 PROCEDURE — 3331090002 HH PPS REVENUE DEBIT

## 2017-08-29 ENCOUNTER — HOME CARE VISIT (OUTPATIENT)
Dept: SCHEDULING | Facility: HOME HEALTH | Age: 57
End: 2017-08-29
Payer: COMMERCIAL

## 2017-08-29 VITALS
TEMPERATURE: 97 F | SYSTOLIC BLOOD PRESSURE: 140 MMHG | DIASTOLIC BLOOD PRESSURE: 86 MMHG | RESPIRATION RATE: 20 BRPM | HEART RATE: 78 BPM | OXYGEN SATURATION: 98 %

## 2017-08-29 PROCEDURE — 3331090001 HH PPS REVENUE CREDIT

## 2017-08-29 PROCEDURE — G0299 HHS/HOSPICE OF RN EA 15 MIN: HCPCS

## 2017-08-29 PROCEDURE — 3331090002 HH PPS REVENUE DEBIT

## 2017-08-30 PROCEDURE — 3331090001 HH PPS REVENUE CREDIT

## 2017-08-30 PROCEDURE — 3331090002 HH PPS REVENUE DEBIT

## 2017-08-31 ENCOUNTER — PATIENT OUTREACH (OUTPATIENT)
Dept: FAMILY MEDICINE CLINIC | Age: 57
End: 2017-08-31

## 2017-08-31 ENCOUNTER — HOME CARE VISIT (OUTPATIENT)
Dept: HOME HEALTH SERVICES | Facility: HOME HEALTH | Age: 57
End: 2017-08-31
Payer: COMMERCIAL

## 2017-08-31 PROCEDURE — 3331090001 HH PPS REVENUE CREDIT

## 2017-08-31 PROCEDURE — 3331090002 HH PPS REVENUE DEBIT

## 2017-08-31 NOTE — PROGRESS NOTES
Call placed to patient at home number. No answer and there was no way to leave a message because mailbox is full.

## 2017-09-01 PROCEDURE — 3331090001 HH PPS REVENUE CREDIT

## 2017-09-01 PROCEDURE — 3331090002 HH PPS REVENUE DEBIT

## 2017-09-02 PROCEDURE — 3331090001 HH PPS REVENUE CREDIT

## 2017-09-02 PROCEDURE — 3331090002 HH PPS REVENUE DEBIT

## 2017-09-03 PROCEDURE — 3331090002 HH PPS REVENUE DEBIT

## 2017-09-03 PROCEDURE — 3331090001 HH PPS REVENUE CREDIT

## 2017-09-04 PROCEDURE — 3331090002 HH PPS REVENUE DEBIT

## 2017-09-04 PROCEDURE — 3331090001 HH PPS REVENUE CREDIT

## 2017-09-05 ENCOUNTER — HOME CARE VISIT (OUTPATIENT)
Dept: SCHEDULING | Facility: HOME HEALTH | Age: 57
End: 2017-09-05
Payer: COMMERCIAL

## 2017-09-05 VITALS
SYSTOLIC BLOOD PRESSURE: 120 MMHG | RESPIRATION RATE: 20 BRPM | DIASTOLIC BLOOD PRESSURE: 88 MMHG | OXYGEN SATURATION: 95 % | TEMPERATURE: 96 F | HEART RATE: 89 BPM

## 2017-09-05 PROCEDURE — 3331090002 HH PPS REVENUE DEBIT

## 2017-09-05 PROCEDURE — 3331090001 HH PPS REVENUE CREDIT

## 2017-09-05 PROCEDURE — G0299 HHS/HOSPICE OF RN EA 15 MIN: HCPCS

## 2017-09-06 PROCEDURE — 3331090001 HH PPS REVENUE CREDIT

## 2017-09-06 PROCEDURE — 3331090002 HH PPS REVENUE DEBIT

## 2017-09-07 ENCOUNTER — PATIENT OUTREACH (OUTPATIENT)
Dept: FAMILY MEDICINE CLINIC | Age: 57
End: 2017-09-07

## 2017-09-07 PROCEDURE — 3331090002 HH PPS REVENUE DEBIT

## 2017-09-07 PROCEDURE — 3331090001 HH PPS REVENUE CREDIT

## 2017-09-07 NOTE — LETTER
9/7/2017 12:59 PM 
 
Mr. Emma Cruz [address] [place] [phone] Dear Emma Cruz, 
I am a Nurse Navigator working with Dr. Charmayne Pons, MD. Part of my job is to follow up with patients who have been in the hospital to see how they are feeling, answer any questions they may have about their visit. I have been unable to reach you for a follow up call. I can also provide resources to patients that have other needs. Please call me if you need assistance with affording food, medications, or if you need transportation to physician appointments. I can be reached Monday thru Friday at the telephone number listed above. Thank you for allowing us to participate in your care! Sincerely, Severo Eaton Your, RN

## 2017-09-07 NOTE — PROGRESS NOTES
Call placed to patient at only available number. No answer and there was no way to leave a message phone number is not able to accept calls.  Letter sent to patient to request he call for follow up

## 2017-09-08 PROCEDURE — 3331090001 HH PPS REVENUE CREDIT

## 2017-09-08 PROCEDURE — 3331090002 HH PPS REVENUE DEBIT

## 2017-09-09 PROCEDURE — 3331090001 HH PPS REVENUE CREDIT

## 2017-09-09 PROCEDURE — 3331090002 HH PPS REVENUE DEBIT

## 2017-09-10 PROCEDURE — 3331090001 HH PPS REVENUE CREDIT

## 2017-09-10 PROCEDURE — 3331090002 HH PPS REVENUE DEBIT

## 2017-09-11 PROCEDURE — 3331090002 HH PPS REVENUE DEBIT

## 2017-09-11 PROCEDURE — 3331090001 HH PPS REVENUE CREDIT

## 2017-09-12 PROCEDURE — 3331090001 HH PPS REVENUE CREDIT

## 2017-09-12 PROCEDURE — 3331090002 HH PPS REVENUE DEBIT

## 2017-09-13 PROCEDURE — 3331090002 HH PPS REVENUE DEBIT

## 2017-09-13 PROCEDURE — 3331090001 HH PPS REVENUE CREDIT

## 2017-09-14 ENCOUNTER — HOME CARE VISIT (OUTPATIENT)
Dept: SCHEDULING | Facility: HOME HEALTH | Age: 57
End: 2017-09-14
Payer: COMMERCIAL

## 2017-09-14 ENCOUNTER — TELEPHONE (OUTPATIENT)
Dept: CARDIAC REHAB | Age: 57
End: 2017-09-14

## 2017-09-14 VITALS
OXYGEN SATURATION: 98 % | RESPIRATION RATE: 20 BRPM | SYSTOLIC BLOOD PRESSURE: 140 MMHG | DIASTOLIC BLOOD PRESSURE: 86 MMHG | TEMPERATURE: 97 F | HEART RATE: 78 BPM

## 2017-09-14 PROCEDURE — G0299 HHS/HOSPICE OF RN EA 15 MIN: HCPCS

## 2017-09-14 PROCEDURE — 3331090003 HH PPS REVENUE ADJ

## 2017-09-14 PROCEDURE — 3331090001 HH PPS REVENUE CREDIT

## 2017-09-14 PROCEDURE — 3331090002 HH PPS REVENUE DEBIT

## 2017-09-14 NOTE — TELEPHONE ENCOUNTER
Cardiac Rehab called patient and spoke to him about the program. He may be interested but wants to see what his insurance will cover first. Will follow up with insurance benefits.     Thank you,  Chanda Wilcox

## 2017-09-15 PROCEDURE — 3331090002 HH PPS REVENUE DEBIT

## 2017-09-15 PROCEDURE — 3331090001 HH PPS REVENUE CREDIT

## 2017-09-16 PROCEDURE — 3331090001 HH PPS REVENUE CREDIT

## 2017-09-16 PROCEDURE — 3331090002 HH PPS REVENUE DEBIT

## 2017-09-17 PROCEDURE — 3331090001 HH PPS REVENUE CREDIT

## 2017-09-17 PROCEDURE — 3331090002 HH PPS REVENUE DEBIT

## 2017-09-18 PROCEDURE — 3331090002 HH PPS REVENUE DEBIT

## 2017-09-18 PROCEDURE — 3331090001 HH PPS REVENUE CREDIT

## 2017-09-19 PROCEDURE — 3331090002 HH PPS REVENUE DEBIT

## 2017-09-19 PROCEDURE — 3331090001 HH PPS REVENUE CREDIT

## 2017-09-20 ENCOUNTER — TELEPHONE (OUTPATIENT)
Dept: CARDIAC REHAB | Age: 57
End: 2017-09-20

## 2017-09-20 PROCEDURE — 3331090001 HH PPS REVENUE CREDIT

## 2017-09-20 PROCEDURE — 3331090002 HH PPS REVENUE DEBIT

## 2017-09-20 NOTE — TELEPHONE ENCOUNTER
Cardiac Rehab called patient and left a message to explain his insurance coverage. Will follow up.     Thank you,  Alfred Horowitz

## 2017-09-21 PROCEDURE — 3331090001 HH PPS REVENUE CREDIT

## 2017-09-21 PROCEDURE — 3331090002 HH PPS REVENUE DEBIT

## 2017-09-22 PROCEDURE — 3331090001 HH PPS REVENUE CREDIT

## 2017-09-22 PROCEDURE — 3331090002 HH PPS REVENUE DEBIT

## 2017-09-23 PROCEDURE — 3331090002 HH PPS REVENUE DEBIT

## 2017-09-23 PROCEDURE — 3331090001 HH PPS REVENUE CREDIT

## 2017-09-24 PROCEDURE — 3331090002 HH PPS REVENUE DEBIT

## 2017-09-24 PROCEDURE — 3331090001 HH PPS REVENUE CREDIT

## 2017-10-04 ENCOUNTER — APPOINTMENT (OUTPATIENT)
Dept: GENERAL RADIOLOGY | Age: 57
End: 2017-10-04
Attending: EMERGENCY MEDICINE
Payer: COMMERCIAL

## 2017-10-04 ENCOUNTER — HOSPITAL ENCOUNTER (EMERGENCY)
Age: 57
Discharge: HOME | End: 2017-10-04
Attending: EMERGENCY MEDICINE
Payer: COMMERCIAL

## 2017-10-04 VITALS
DIASTOLIC BLOOD PRESSURE: 76 MMHG | RESPIRATION RATE: 27 BRPM | TEMPERATURE: 97.8 F | OXYGEN SATURATION: 96 % | SYSTOLIC BLOOD PRESSURE: 130 MMHG | HEART RATE: 85 BPM

## 2017-10-04 DIAGNOSIS — R07.9 CHEST PAIN, UNSPECIFIED TYPE: Primary | ICD-10-CM

## 2017-10-04 LAB
ALBUMIN SERPL-MCNC: 2.7 G/DL (ref 3.4–5)
ALBUMIN/GLOB SERPL: 0.7 {RATIO} (ref 0.8–1.7)
ALP SERPL-CCNC: 57 U/L (ref 45–117)
ALT SERPL-CCNC: 22 U/L (ref 16–61)
ANION GAP SERPL CALC-SCNC: 6 MMOL/L (ref 3–18)
AST SERPL-CCNC: 19 U/L (ref 15–37)
ATRIAL RATE: 84 BPM
BASOPHILS # BLD: 0 K/UL (ref 0–0.06)
BASOPHILS NFR BLD: 1 % (ref 0–2)
BILIRUB SERPL-MCNC: 0.2 MG/DL (ref 0.2–1)
BNP SERPL-MCNC: 421 PG/ML (ref 0–900)
BUN SERPL-MCNC: 40 MG/DL (ref 7–18)
BUN/CREAT SERPL: 13 (ref 12–20)
CALCIUM SERPL-MCNC: 8.5 MG/DL (ref 8.5–10.1)
CALCULATED P AXIS, ECG09: 52 DEGREES
CALCULATED R AXIS, ECG10: 19 DEGREES
CALCULATED T AXIS, ECG11: 160 DEGREES
CHLORIDE SERPL-SCNC: 108 MMOL/L (ref 100–108)
CHOLEST SERPL-MCNC: 179 MG/DL
CK MB CFR SERPL CALC: 1.9 % (ref 0–4)
CK MB SERPL-MCNC: 4.5 NG/ML (ref 5–25)
CK SERPL-CCNC: 241 U/L (ref 39–308)
CO2 SERPL-SCNC: 26 MMOL/L (ref 21–32)
CREAT SERPL-MCNC: 3.15 MG/DL (ref 0.6–1.3)
DIAGNOSIS, 93000: NORMAL
DIFFERENTIAL METHOD BLD: ABNORMAL
EOSINOPHIL # BLD: 0.5 K/UL (ref 0–0.4)
EOSINOPHIL NFR BLD: 9 % (ref 0–5)
ERYTHROCYTE [DISTWIDTH] IN BLOOD BY AUTOMATED COUNT: 14.7 % (ref 11.6–14.5)
GLOBULIN SER CALC-MCNC: 4 G/DL (ref 2–4)
GLUCOSE SERPL-MCNC: 73 MG/DL (ref 74–99)
HCT VFR BLD AUTO: 33 % (ref 36–48)
HDLC SERPL-MCNC: 40 MG/DL (ref 40–60)
HDLC SERPL: 4.5 {RATIO} (ref 0–5)
HGB BLD-MCNC: 10.4 G/DL (ref 13–16)
LDLC SERPL CALC-MCNC: 77.2 MG/DL (ref 0–100)
LIPASE SERPL-CCNC: 364 U/L (ref 73–393)
LIPID PROFILE,FLP: ABNORMAL
LYMPHOCYTES # BLD: 2.2 K/UL (ref 0.9–3.6)
LYMPHOCYTES NFR BLD: 35 % (ref 21–52)
MAGNESIUM SERPL-MCNC: 2 MG/DL (ref 1.6–2.6)
MCH RBC QN AUTO: 27.4 PG (ref 24–34)
MCHC RBC AUTO-ENTMCNC: 31.5 G/DL (ref 31–37)
MCV RBC AUTO: 86.8 FL (ref 74–97)
MONOCYTES # BLD: 0.5 K/UL (ref 0.05–1.2)
MONOCYTES NFR BLD: 8 % (ref 3–10)
NEUTS SEG # BLD: 3 K/UL (ref 1.8–8)
NEUTS SEG NFR BLD: 47 % (ref 40–73)
P-R INTERVAL, ECG05: 170 MS
PLATELET # BLD AUTO: 337 K/UL (ref 135–420)
PMV BLD AUTO: 9.7 FL (ref 9.2–11.8)
POTASSIUM SERPL-SCNC: 4.5 MMOL/L (ref 3.5–5.5)
PROT SERPL-MCNC: 6.7 G/DL (ref 6.4–8.2)
Q-T INTERVAL, ECG07: 380 MS
QRS DURATION, ECG06: 96 MS
QTC CALCULATION (BEZET), ECG08: 449 MS
RBC # BLD AUTO: 3.8 M/UL (ref 4.7–5.5)
SODIUM SERPL-SCNC: 140 MMOL/L (ref 136–145)
TRIGL SERPL-MCNC: 309 MG/DL (ref ?–150)
TROPONIN I SERPL-MCNC: 0.02 NG/ML (ref 0–0.04)
VENTRICULAR RATE, ECG03: 84 BPM
VLDLC SERPL CALC-MCNC: 61.8 MG/DL
WBC # BLD AUTO: 6.2 K/UL (ref 4.6–13.2)

## 2017-10-04 PROCEDURE — 83735 ASSAY OF MAGNESIUM: CPT | Performed by: EMERGENCY MEDICINE

## 2017-10-04 PROCEDURE — 82550 ASSAY OF CK (CPK): CPT | Performed by: EMERGENCY MEDICINE

## 2017-10-04 PROCEDURE — 83880 ASSAY OF NATRIURETIC PEPTIDE: CPT | Performed by: EMERGENCY MEDICINE

## 2017-10-04 PROCEDURE — 83690 ASSAY OF LIPASE: CPT | Performed by: EMERGENCY MEDICINE

## 2017-10-04 PROCEDURE — 93005 ELECTROCARDIOGRAM TRACING: CPT

## 2017-10-04 PROCEDURE — 80053 COMPREHEN METABOLIC PANEL: CPT | Performed by: EMERGENCY MEDICINE

## 2017-10-04 PROCEDURE — 99285 EMERGENCY DEPT VISIT HI MDM: CPT

## 2017-10-04 PROCEDURE — 80061 LIPID PANEL: CPT | Performed by: EMERGENCY MEDICINE

## 2017-10-04 PROCEDURE — 85025 COMPLETE CBC W/AUTO DIFF WBC: CPT | Performed by: EMERGENCY MEDICINE

## 2017-10-04 PROCEDURE — 71010 XR CHEST PORT: CPT

## 2017-10-04 NOTE — DISCHARGE INSTRUCTIONS
Chest Pain: Care Instructions  Your Care Instructions  There are many things that can cause chest pain. Some are not serious and will get better on their own in a few days. But some kinds of chest pain need more testing and treatment. Your doctor may have recommended a follow-up visit in the next 8 to 12 hours. If you are not getting better, you may need more tests or treatment. Even though your doctor has released you, you still need to watch for any problems. The doctor carefully checked you, but sometimes problems can develop later. If you have new symptoms or if your symptoms do not get better, get medical care right away. If you have worse or different chest pain or pressure that lasts more than 5 minutes or you passed out (lost consciousness), call 911 or seek other emergency help right away. A medical visit is only one step in your treatment. Even if you feel better, you still need to do what your doctor recommends, such as going to all suggested follow-up appointments and taking medicines exactly as directed. This will help you recover and help prevent future problems. How can you care for yourself at home? · Rest until you feel better. · Take your medicine exactly as prescribed. Call your doctor if you think you are having a problem with your medicine. · Do not drive after taking a prescription pain medicine. When should you call for help? Call 911 if:  · You passed out (lost consciousness). · You have severe difficulty breathing. · You have symptoms of a heart attack. These may include:  ¨ Chest pain or pressure, or a strange feeling in your chest.  ¨ Sweating. ¨ Shortness of breath. ¨ Nausea or vomiting. ¨ Pain, pressure, or a strange feeling in your back, neck, jaw, or upper belly or in one or both shoulders or arms. ¨ Lightheadedness or sudden weakness. ¨ A fast or irregular heartbeat.   After you call 911, the  may tell you to chew 1 adult-strength or 2 to 4 low-dose aspirin. Wait for an ambulance. Do not try to drive yourself. Call your doctor today if:  · You have any trouble breathing. · Your chest pain gets worse. · You are dizzy or lightheaded, or you feel like you may faint. · You are not getting better as expected. · You are having new or different chest pain. Where can you learn more? Go to http://leslie-graciela.info/. Enter A120 in the search box to learn more about \"Chest Pain: Care Instructions. \"  Current as of: March 20, 2017  Content Version: 11.3  © 6523-4222 [employer]. Care instructions adapted under license by [employer] (which disclaims liability or warranty for this information). If you have questions about a medical condition or this instruction, always ask your healthcare professional. [address] any warranty or liability for your use of this information.

## 2017-10-04 NOTE — ED TRIAGE NOTES
PT reports to ED via [facility] for CP starting at 1200 today. Pt. Has hx of of MI x2 with stents.  Hypotensive in route now 148/87 with fluids running

## 2017-10-04 NOTE — ED NOTES
Pt. Wishes to leave AMA stating that he \"feels fine now, and will come back if chest pain gets worse\" Pt is A/Ox4.  Dr. Kami Marr at bedside

## 2017-10-04 NOTE — ED PROVIDER NOTES
HPI Comments: Michael Santiago is a 64 y.o. Male with PMHx of DM, obesity, HTN and cardiac catheterization who presents to the ED with c/o \"squeezing\" CP that radiated to L breast, began 1 hour PTA. Patient reports he was sitting in the waiting room at his podiatrist's office when the sx began. Reports hx of 2x MI's in past, admits symptoms are not similar to past MI, \"it feels like gas. \" Associated symptoms include nausea, lightheadedness and diaphoresis. Denies CP currently, claims his sx resolved when he was getting onto the ambulance. Medic notes patient was administered ASA and fluid en route. Patient denies fever, chills, abdominal pain, diarrhea, leg pain or swelling. He is followed by cardiologist Dr. Aubrey Valderrama and Dr. Fern Schirmer for primary care. No other symptoms or concerns were expressed. The history is provided by the patient and the EMS personnel. Past Medical History:   Diagnosis Date    Alcohol abuse     Arthritis     Atherosclerotic cardiovascular disease     CAD (coronary artery disease)     mild disease of coronaries (cor angio 2006),wife states he has 1 stent    Cardiac cath 01/26/2006    RCA mild. Otherwise patent coronaries. LVEDP 15.  EF 55-60%.  Cardiac echocardiogram 03/27/2009    EF 60%. No cardiac source of embolism. No significant valvular pathology.  Cardiac nuclear imaging test 12/06/2011    Small, mild inferior infarction or possibly artifact. No ischemia. EF 45%. No TID. No reg WMA.  Cardiovascular LLE venous duplex 08/14/2015    Left leg:  No DVT. Dilated lymph node in left groin.  Constipation     Diabetes mellitus type II     Gout     Hepatic steatosis     Hypercholesterolemia     Hypertension     Knee effusion, left     Left knee pain     Morbid obesity (HCC)     Noncompliance     Obesity (BMI 30-39. 9)     S/P colonoscopy 3/8/05    Dr. Martha Moore; normal; f/u 10 years    Stomach problems     TIA (transient ischemic attack) 3/09    Tobacco abuse Past Surgical History:   Procedure Laterality Date    ABDOMEN SURGERY PROC UNLISTED      Hernia repair in 1960's or 1970's.  HX ORTHOPAEDIC      Bones spur removed from left & right foot 2013.  HX UROLOGICAL  8/18/2015    [facility], Dr. Nancy Bill, Cystoscopy, left retrograde, left double-J cath         Family History:   Problem Relation Age of Onset    Diabetes Father     Heart Disease Mother     Diabetes Sister     Hypertension Sister     Arthritis-osteo Sister     Arthritis-osteo Brother     Diabetes Other     Diabetes Other      Uncle, Aunt    Hypertension Other      Uncle; Aunt       Social History     Social History    Marital status:      Spouse name: N/A    Number of children: N/A    Years of education: N/A     Occupational History    disabled      Social History Main Topics    Smoking status: Current Every Day Smoker     Packs/day: 0.25     Types: Cigarettes     Last attempt to quit: 6/28/2016    Smokeless tobacco: Never Used    Alcohol use 0.0 oz/week     0 Standard drinks or equivalent per week      Comment: 1 beers a day.  Drug use: No    Sexual activity: Yes     Other Topics Concern    Not on file     Social History Narrative         ALLERGIES: Shellfish derived    Review of Systems   Constitutional: Positive for diaphoresis. Negative for chills and fever. Cardiovascular: Positive for chest pain. Negative for leg swelling. Gastrointestinal: Positive for nausea. Negative for abdominal pain and diarrhea. Neurological: Positive for light-headedness. All other systems reviewed and are negative. Vitals:    10/04/17 1245 10/04/17 1300 10/04/17 1315 10/04/17 1330   BP: 113/77 153/62 133/65 130/76   Pulse: 81 82 83 85   Resp: 18 20 22 27   Temp:       SpO2: 96% 97% 97% 96%            Physical Exam   Constitutional: He is oriented to person, place, and time. He appears well-developed. HENT:   Head: Normocephalic and atraumatic.    Eyes: Conjunctivae and EOM are normal.   Neck: Normal range of motion. Cardiovascular: Normal heart sounds. Exam reveals no gallop and no friction rub. No murmur heard. Pulmonary/Chest: Effort normal and breath sounds normal. No stridor. Abdominal: Soft. There is no tenderness. Musculoskeletal: Normal range of motion. He exhibits no tenderness. Neurological: He is alert and oriented to person, place, and time. Skin: Skin is warm and dry. He is not diaphoretic. Psychiatric: He has a normal mood and affect. His behavior is normal.   Nursing note and vitals reviewed. MDM  Number of Diagnoses or Management Options  Diagnosis management comments: 64 y.o. Male with prior MI, medical non-compliance. Will workup chest pain for ACS, very atypical compared to prior. Does not appear fluid overloaded, given heart score. Will consult cardiology.     ED Course       Procedures    Vitals:  Patient Vitals for the past 12 hrs:   Temp Pulse Resp BP SpO2   10/04/17 1330 - 85 27 130/76 96 %   10/04/17 1315 - 83 22 133/65 97 %   10/04/17 1300 - 82 20 153/62 97 %   10/04/17 1245 - 81 18 113/77 96 %   10/04/17 1242 - 83 - 134/87 -   10/04/17 1234 97.8 °F (36.6 °C) 87 22 148/87 98 %       Medications ordered:   Medications - No data to display      Lab findings:  Recent Results (from the past 12 hour(s))   EKG, 12 LEAD, INITIAL    Collection Time: 10/04/17 12:39 PM   Result Value Ref Range    Ventricular Rate 84 BPM    Atrial Rate 84 BPM    P-R Interval 170 ms    QRS Duration 96 ms    Q-T Interval 380 ms    QTC Calculation (Bezet) 449 ms    Calculated P Axis 52 degrees    Calculated R Axis 19 degrees    Calculated T Axis 160 degrees    Diagnosis       Normal sinus rhythm  Left ventricular hypertrophy with repolarization abnormality  Abnormal ECG  When compared with ECG of 20-AUG-2017 14:54,  ST no longer elevated in Lateral leads  Confirmed by Levon Rodriguez MD, Heron Padilla ([id number]) on 10/4/2017 3:15:48 PM     LIPID PANEL    Collection Time: 10/04/17 12:47 PM Result Value Ref Range    LIPID PROFILE          Cholesterol, total 179 <200 MG/DL    Triglyceride 309 (H) <150 MG/DL    HDL Cholesterol 40 40 - 60 MG/DL    LDL, calculated 77.2 0 - 100 MG/DL    VLDL, calculated 61.8 MG/DL    CHOL/HDL Ratio 4.5 0 - 5.0     CARDIAC PANEL,(CK, CKMB & TROPONIN)    Collection Time: 10/04/17 12:47 PM   Result Value Ref Range     39 - 308 U/L    CK - MB 4.5 (H) <3.6 ng/ml    CK-MB Index 1.9 0.0 - 4.0 %    Troponin-I, Qt. 0.02 0.0 - 1.989 NG/ML   METABOLIC PANEL, COMPREHENSIVE    Collection Time: 10/04/17 12:47 PM   Result Value Ref Range    Sodium 140 136 - 145 mmol/L    Potassium 4.5 3.5 - 5.5 mmol/L    Chloride 108 100 - 108 mmol/L    CO2 26 21 - 32 mmol/L    Anion gap 6 3.0 - 18 mmol/L    Glucose 73 (L) 74 - 99 mg/dL    BUN 40 (H) 7.0 - 18 MG/DL    Creatinine 3.15 (H) 0.6 - 1.3 MG/DL    BUN/Creatinine ratio 13 12 - 20      GFR est AA 25 (L) >60 ml/min/1.73m2    GFR est non-AA 21 (L) >60 ml/min/1.73m2    Calcium 8.5 8.5 - 10.1 MG/DL    Bilirubin, total 0.2 0.2 - 1.0 MG/DL    ALT (SGPT) 22 16 - 61 U/L    AST (SGOT) 19 15 - 37 U/L    Alk. phosphatase 57 45 - 117 U/L    Protein, total 6.7 6.4 - 8.2 g/dL    Albumin 2.7 (L) 3.4 - 5.0 g/dL    Globulin 4.0 2.0 - 4.0 g/dL    A-G Ratio 0.7 (L) 0.8 - 1.7     CBC WITH AUTOMATED DIFF    Collection Time: 10/04/17 12:47 PM   Result Value Ref Range    WBC 6.2 4.6 - 13.2 K/uL    RBC 3.80 (L) 4.70 - 5.50 M/uL    HGB 10.4 (L) 13.0 - 16.0 g/dL    HCT 33.0 (L) 36.0 - 48.0 %    MCV 86.8 74.0 - 97.0 FL    MCH 27.4 24.0 - 34.0 PG    MCHC 31.5 31.0 - 37.0 g/dL    RDW 14.7 (H) 11.6 - 14.5 %    PLATELET 137 311 - 110 K/uL    MPV 9.7 9.2 - 11.8 FL    NEUTROPHILS 47 40 - 73 %    LYMPHOCYTES 35 21 - 52 %    MONOCYTES 8 3 - 10 %    EOSINOPHILS 9 (H) 0 - 5 %    BASOPHILS 1 0 - 2 %    ABS. NEUTROPHILS 3.0 1.8 - 8.0 K/UL    ABS. LYMPHOCYTES 2.2 0.9 - 3.6 K/UL    ABS. MONOCYTES 0.5 0.05 - 1.2 K/UL    ABS. EOSINOPHILS 0.5 (H) 0.0 - 0.4 K/UL    ABS.  BASOPHILS 0.0 0.0 - 0.06 K/UL    DF AUTOMATED     LIPASE    Collection Time: 10/04/17 12:47 PM   Result Value Ref Range    Lipase 364 73 - 393 U/L   MAGNESIUM    Collection Time: 10/04/17 12:47 PM   Result Value Ref Range    Magnesium 2.0 1.6 - 2.6 mg/dL   NT-PRO BNP    Collection Time: 10/04/17 12:47 PM   Result Value Ref Range    NT pro- 0 - 900 PG/ML       EKG interpretation by ED Physician:  NSR @ 84 bpm. LVH. T wave inversion, unchanged from prior. X-Ray, CT or other radiology findings or impressions:  XR CHEST PORT   Final Result      CXR:  Bilateral medial lower lung zone airspace opacities and pulmonary  vascular congestion. Progress notes, Consult notes or additional Procedure notes:   2:33 PM: Patient requests to leave [place]. I reviewed the reassuring work up so far, and given HEART score of 4 would like to admit for further risk stratification given prior hx and age. He was completely asymptomatic at time of request for discharge, and states he think it was just gas but understands that it could be related to his CAD. The patient is choosing to leave against medical advice. Preliminary diagnosis was reviewed and the patient stated understanding. The proposed treatment was reviewed and the patient stated understanding. Alternative treatment was discussed and the patient stated understanding. I have personally explained to him that choosing to do so may result in permanent bodily harm or death. We discussed at great length that without further evaluation and monitoring there may be unforeseen circumstances and/or deterioration causing permanent bodily harm or death as a result of his choice. He verbalized these risks back to the physician in laymans terms. He is alert, oriented, and shows the mental capacity to make clear decisions regarding his health care at this time. He continues to wish to leave against medical advice.        In light of his decision to leave [place], follow-up has been arranged and he is aware of the importance of following up as instructed. He has been advised that he should return to the ED immediately if he changes his mind at any time, or if his condition begins to change or worsen in any way. Disposition:  Diagnosis:   1. Chest pain, unspecified type        Disposition: AMA. Follow-up Information     Follow up With Details Comments [address], DO Schedule an appointment as soon as possible for a visit  1205 [facility] [address]      Venus Omalley MD In 2 days  [address] [place] [facility] EMERGENCY DEPT  As needed, If symptoms worsen [address] [facility] [address] [facility] [address]  [phone]           Discharge Medication List as of 10/4/2017  2:39 PM      CONTINUE these medications which have NOT CHANGED    Details   glucose blood VI test strips (FREESTYLE TEST) strip by Does Not Apply route See Admin Instructions. Check twice a day, Normal, Disp-100 Strip, R-2      bisacodyl (DULCOLAX, BISACODYL,) 5 mg EC tablet Take 1 Tab by mouth daily as needed for Constipation. , Print, Disp-30 Tab, R-0      NOVOLIN 70/30 100 unit/mL (70-30) injection INJECT 30 UNITS SC IN THE MORNING AND 20 UNITS SC IN THE EVENING BEFORE SUPPER, Normal, Disp-10 mL, R-4, MITRA      hydrALAZINE (APRESOLINE) 50 mg tablet Take 1 Tab by mouth daily. , No Print, Disp-1 Tab, R-0      isosorbide mononitrate ER (IMDUR) 60 mg CR tablet Take 1 Tab by mouth daily. , Normal, Disp-30 Tab, R-6      losartan (COZAAR) 25 mg tablet Take 1 Tab by mouth daily. , Normal, Disp-30 Tab, R-6      atorvastatin (LIPITOR) 40 mg tablet Take 1 Tab by mouth nightly., Normal, Disp-30 Tab, R-6      furosemide (LASIX) 40 mg tablet Take 1 Tab by mouth daily. , Normal, Disp-30 Tab, R-6      fluticasone (FLONASE) 50 mcg/actuation nasal spray 2 Sprays by Both Nostrils route daily. , Historical Med      carvedilol (COREG) 6.25 mg tablet Take 1 Tab by mouth two (2) times daily (with meals). , Normal, Disp-60 Tab, R-6      docusate sodium (COLACE) 100 mg capsule Take 1 Cap by mouth two (2) times a day for 90 days. , Print, Disp-60 Cap, R-0      nitroglycerin (NITROSTAT) 0.4 mg SL tablet 1 Tab by SubLINGual route as needed for Chest Pain., Print, Disp-20 Tab, R-0      febuxostat (ULORIC) 40 mg tab tablet Take 1 Tab by mouth daily. , Normal, Disp-30 Tab, R-1      aspirin 81 mg chewable tablet Take 1 Tab by mouth daily. , Normal, Disp-30 Tab, R-0      B COMPLEX W-C NO.20/FOLIC ACID (B COMPLEX & C NO.20-FOLIC ACID PO) Take  by mouth., Historical Med      budesonide-formoterol (SYMBICORT) 160-4.5 mcg/actuation HFA inhaler Take 2 Puffs by inhalation two (2) times a day., Normal, Disp-1 Inhaler, R-0      calcitRIOL (ROCALTROL) 0.25 mcg capsule Take 1 Cap by mouth every Monday, Wednesday, Friday. , Print, Disp-15 Cap, R-0      Lancets misc Check 3 times a day , needs according contour glucometer, Normal, Disp-1 Package, R-11      Blood-Glucose Meter monitoring kit Check twice daily. , Normal, Disp-1 kit, R-0               Scribe Attestation      Micheal acting as a scribe for and in the presence of Jerrod León MD      October 04, 2017 at 12:50 PM       Provider Attestation:      I personally performed the services described in the documentation, reviewed the documentation, as recorded by the scribe in my presence, and it accurately and completely records my words and actions.  October 04, 2017 at 12:50 PM - Jerrod León MD

## 2017-10-24 ENCOUNTER — TELEPHONE (OUTPATIENT)
Dept: CARDIAC REHAB | Age: 57
End: 2017-10-24

## 2017-10-24 NOTE — TELEPHONE ENCOUNTER
Cardiac Rehab called patient and spoke to him briefly. He stated that he currently has a lot going on and would like a call next month sometime. Will follow up.     Thank you,  Bradley Taylor

## 2017-12-14 ENCOUNTER — APPOINTMENT (OUTPATIENT)
Dept: CT IMAGING | Age: 57
End: 2017-12-14
Attending: EMERGENCY MEDICINE
Payer: COMMERCIAL

## 2017-12-14 ENCOUNTER — APPOINTMENT (OUTPATIENT)
Dept: GENERAL RADIOLOGY | Age: 57
End: 2017-12-14
Attending: EMERGENCY MEDICINE
Payer: COMMERCIAL

## 2017-12-14 ENCOUNTER — HOSPITAL ENCOUNTER (EMERGENCY)
Age: 57
Discharge: HOME | End: 2017-12-14
Attending: EMERGENCY MEDICINE
Payer: COMMERCIAL

## 2017-12-14 VITALS
TEMPERATURE: 97.7 F | SYSTOLIC BLOOD PRESSURE: 141 MMHG | BODY MASS INDEX: 38.41 KG/M2 | HEIGHT: 64 IN | WEIGHT: 225 LBS | RESPIRATION RATE: 20 BRPM | DIASTOLIC BLOOD PRESSURE: 84 MMHG | OXYGEN SATURATION: 97 % | HEART RATE: 81 BPM

## 2017-12-14 DIAGNOSIS — K59.00 CONSTIPATION, UNSPECIFIED CONSTIPATION TYPE: ICD-10-CM

## 2017-12-14 DIAGNOSIS — N20.1 LEFT URETERAL STONE: Primary | ICD-10-CM

## 2017-12-14 LAB
ALBUMIN SERPL-MCNC: 2.7 G/DL (ref 3.4–5)
ALBUMIN/GLOB SERPL: 0.6 {RATIO} (ref 0.8–1.7)
ALP SERPL-CCNC: 57 U/L (ref 45–117)
ALT SERPL-CCNC: 25 U/L (ref 16–61)
ANION GAP SERPL CALC-SCNC: 8 MMOL/L (ref 3–18)
APPEARANCE UR: ABNORMAL
AST SERPL-CCNC: 32 U/L (ref 15–37)
BACTERIA URNS QL MICRO: ABNORMAL /HPF
BASOPHILS # BLD: 0 K/UL (ref 0–0.06)
BASOPHILS NFR BLD: 0 % (ref 0–2)
BILIRUB SERPL-MCNC: 0.4 MG/DL (ref 0.2–1)
BILIRUB UR QL: NEGATIVE
BUN SERPL-MCNC: 48 MG/DL (ref 7–18)
BUN/CREAT SERPL: 12 (ref 12–20)
CALCIUM SERPL-MCNC: 8.4 MG/DL (ref 8.5–10.1)
CHLORIDE SERPL-SCNC: 104 MMOL/L (ref 100–108)
CO2 SERPL-SCNC: 27 MMOL/L (ref 21–32)
COLOR UR: YELLOW
CREAT SERPL-MCNC: 3.99 MG/DL (ref 0.6–1.3)
DIFFERENTIAL METHOD BLD: ABNORMAL
EOSINOPHIL # BLD: 0.2 K/UL (ref 0–0.4)
EOSINOPHIL NFR BLD: 3 % (ref 0–5)
EPITH CASTS URNS QL MICRO: ABNORMAL /LPF (ref 0–5)
ERYTHROCYTE [DISTWIDTH] IN BLOOD BY AUTOMATED COUNT: 15 % (ref 11.6–14.5)
FLUAV AG NPH QL IA: NEGATIVE
FLUBV AG NOSE QL IA: NEGATIVE
GLOBULIN SER CALC-MCNC: 4.5 G/DL (ref 2–4)
GLUCOSE SERPL-MCNC: 210 MG/DL (ref 74–99)
GLUCOSE UR STRIP.AUTO-MCNC: 250 MG/DL
HCT VFR BLD AUTO: 34.4 % (ref 36–48)
HGB BLD-MCNC: 11 G/DL (ref 13–16)
HGB UR QL STRIP: ABNORMAL
KETONES UR QL STRIP.AUTO: NEGATIVE MG/DL
LEUKOCYTE ESTERASE UR QL STRIP.AUTO: NEGATIVE
LIPASE SERPL-CCNC: 439 U/L (ref 73–393)
LYMPHOCYTES # BLD: 1.9 K/UL (ref 0.9–3.6)
LYMPHOCYTES NFR BLD: 21 % (ref 21–52)
MCH RBC QN AUTO: 28 PG (ref 24–34)
MCHC RBC AUTO-ENTMCNC: 32 G/DL (ref 31–37)
MCV RBC AUTO: 87.5 FL (ref 74–97)
MONOCYTES # BLD: 0.6 K/UL (ref 0.05–1.2)
MONOCYTES NFR BLD: 6 % (ref 3–10)
NEUTS SEG # BLD: 6.3 K/UL (ref 1.8–8)
NEUTS SEG NFR BLD: 70 % (ref 40–73)
NITRITE UR QL STRIP.AUTO: NEGATIVE
PH UR STRIP: 6 [PH] (ref 5–8)
PLATELET # BLD AUTO: 262 K/UL (ref 135–420)
PMV BLD AUTO: 10.9 FL (ref 9.2–11.8)
POTASSIUM SERPL-SCNC: 4.7 MMOL/L (ref 3.5–5.5)
PROT SERPL-MCNC: 7.2 G/DL (ref 6.4–8.2)
PROT UR STRIP-MCNC: >1000 MG/DL
RBC # BLD AUTO: 3.93 M/UL (ref 4.7–5.5)
SODIUM SERPL-SCNC: 139 MMOL/L (ref 136–145)
SP GR UR REFRACTOMETRY: 1.02 (ref 1–1.03)
UROBILINOGEN UR QL STRIP.AUTO: 0.2 EU/DL (ref 0.2–1)
WBC # BLD AUTO: 9 K/UL (ref 4.6–13.2)
WBC URNS QL MICRO: ABNORMAL /HPF (ref 0–4)

## 2017-12-14 PROCEDURE — 81001 URINALYSIS AUTO W/SCOPE: CPT | Performed by: PHYSICIAN ASSISTANT

## 2017-12-14 PROCEDURE — 74011250636 HC RX REV CODE- 250/636: Performed by: EMERGENCY MEDICINE

## 2017-12-14 PROCEDURE — 85025 COMPLETE CBC W/AUTO DIFF WBC: CPT | Performed by: PHYSICIAN ASSISTANT

## 2017-12-14 PROCEDURE — 87086 URINE CULTURE/COLONY COUNT: CPT | Performed by: EMERGENCY MEDICINE

## 2017-12-14 PROCEDURE — 74176 CT ABD & PELVIS W/O CONTRAST: CPT

## 2017-12-14 PROCEDURE — 80053 COMPREHEN METABOLIC PANEL: CPT | Performed by: PHYSICIAN ASSISTANT

## 2017-12-14 PROCEDURE — 99283 EMERGENCY DEPT VISIT LOW MDM: CPT

## 2017-12-14 PROCEDURE — 87804 INFLUENZA ASSAY W/OPTIC: CPT | Performed by: EMERGENCY MEDICINE

## 2017-12-14 PROCEDURE — 74011250637 HC RX REV CODE- 250/637: Performed by: EMERGENCY MEDICINE

## 2017-12-14 PROCEDURE — 71010 XR CHEST PORT: CPT

## 2017-12-14 PROCEDURE — 96361 HYDRATE IV INFUSION ADD-ON: CPT

## 2017-12-14 PROCEDURE — 96375 TX/PRO/DX INJ NEW DRUG ADDON: CPT

## 2017-12-14 PROCEDURE — 96374 THER/PROPH/DIAG INJ IV PUSH: CPT

## 2017-12-14 PROCEDURE — 83690 ASSAY OF LIPASE: CPT | Performed by: PHYSICIAN ASSISTANT

## 2017-12-14 RX ORDER — MAGNESIUM CITRATE
296 SOLUTION, ORAL ORAL
Status: COMPLETED | OUTPATIENT
Start: 2017-12-14 | End: 2017-12-14

## 2017-12-14 RX ORDER — MORPHINE SULFATE 2 MG/ML
4 INJECTION, SOLUTION INTRAMUSCULAR; INTRAVENOUS
Status: COMPLETED | OUTPATIENT
Start: 2017-12-14 | End: 2017-12-14

## 2017-12-14 RX ORDER — HYDROMORPHONE HYDROCHLORIDE 2 MG/ML
1 INJECTION, SOLUTION INTRAMUSCULAR; INTRAVENOUS; SUBCUTANEOUS ONCE
Status: COMPLETED | OUTPATIENT
Start: 2017-12-14 | End: 2017-12-14

## 2017-12-14 RX ORDER — TAMSULOSIN HYDROCHLORIDE 0.4 MG/1
0.4 CAPSULE ORAL
Status: COMPLETED | OUTPATIENT
Start: 2017-12-14 | End: 2017-12-14

## 2017-12-14 RX ORDER — ONDANSETRON 2 MG/ML
4 INJECTION INTRAMUSCULAR; INTRAVENOUS
Status: COMPLETED | OUTPATIENT
Start: 2017-12-14 | End: 2017-12-14

## 2017-12-14 RX ORDER — OXYCODONE AND ACETAMINOPHEN 5; 325 MG/1; MG/1
1 TABLET ORAL
Status: COMPLETED | OUTPATIENT
Start: 2017-12-14 | End: 2017-12-14

## 2017-12-14 RX ORDER — TAMSULOSIN HYDROCHLORIDE 0.4 MG/1
0.4 CAPSULE ORAL DAILY
Qty: 6 CAP | Refills: 0 | Status: SHIPPED | OUTPATIENT
Start: 2017-12-15

## 2017-12-14 RX ORDER — ONDANSETRON 4 MG/1
4 TABLET, FILM COATED ORAL
Qty: 10 TAB | Refills: 0 | Status: SHIPPED | OUTPATIENT
Start: 2017-12-14 | End: 2019-07-26

## 2017-12-14 RX ORDER — OXYCODONE AND ACETAMINOPHEN 5; 325 MG/1; MG/1
TABLET ORAL
Qty: 16 TAB | Refills: 0 | Status: SHIPPED | OUTPATIENT
Start: 2017-12-14 | End: 2018-05-12 | Stop reason: ALTCHOICE

## 2017-12-14 RX ADMIN — MAGNESIUM CITRATE 296 ML: 1.75 LIQUID ORAL at 15:00

## 2017-12-14 RX ADMIN — Medication 4 MG: at 12:28

## 2017-12-14 RX ADMIN — HYDROMORPHONE HYDROCHLORIDE 1 MG: 2 INJECTION, SOLUTION INTRAMUSCULAR; INTRAVENOUS; SUBCUTANEOUS at 15:34

## 2017-12-14 RX ADMIN — OXYCODONE HYDROCHLORIDE AND ACETAMINOPHEN 1 TABLET: 5; 325 TABLET ORAL at 16:40

## 2017-12-14 RX ADMIN — TAMSULOSIN HYDROCHLORIDE 0.4 MG: 0.4 CAPSULE ORAL at 15:34

## 2017-12-14 RX ADMIN — SODIUM CHLORIDE 1000 ML: 900 INJECTION, SOLUTION INTRAVENOUS at 12:29

## 2017-12-14 RX ADMIN — ONDANSETRON 4 MG: 2 INJECTION INTRAMUSCULAR; INTRAVENOUS at 12:28

## 2017-12-14 NOTE — ED PROVIDER NOTES
EMERGENCY DEPARTMENT HISTORY AND PHYSICAL EXAM    11:30 AM      Date: 12/14/2017  Patient Name: Josette Huertas    History of Presenting Illness     Chief Complaint   Patient presents with    Abdominal Pain    Back Pain         History Provided By: Patient and Patient's Wife    Chief Complaint: Abdominal pain  Duration: 2 Days  Timing:  Acute and Worsening  Location: LLQ  Quality: N/a  Severity: Moderate  Modifying Factors: None  Associated Symptoms: Left flank pain, SOB, constipation, and cough      Additional History (Context): Josette Huertas is a 62 y.o. male with Diabetes, HTN, and CAD who presents with acute onset of moderate abdominal pain in his lower left quadrant that began a couple days ago but worsening today. Pt denies chest pain, diarrhea, dysuria, and hematuria, and states that his last normal BM was yesterday. Pt noted that he has also developed left flank pain and SOB shortly after his abdominal pain started. Pt stated he has been constipated prior to his last BM yesterday. Pt stated that his cough started this morning and his PCP is Dr. Oanh Andres at [facility], however he is going to be changing to a new PCP at [facility]. Pt's wife noticed that his stomach was slightly asymmetric and protrudes out slightly further on the left side. No other modifying factors or symptoms were noted by the pt at this time. PCP: Ml Reed MD      Past History     Past Medical History:  Past Medical History:   Diagnosis Date    Alcohol abuse     Arthritis     Atherosclerotic cardiovascular disease     CAD (coronary artery disease)     mild disease of coronaries (cor angio 2006),wife states he has 1 stent    Cardiac cath 01/26/2006    RCA mild. Otherwise patent coronaries. LVEDP 15.  EF 55-60%.  Cardiac echocardiogram 03/27/2009    EF 60%. No cardiac source of embolism. No significant valvular pathology.  Cardiac nuclear imaging test 12/06/2011    Small, mild inferior infarction or possibly artifact. No ischemia. EF 45%. No TID. No reg WMA.  Cardiovascular LLE venous duplex 08/14/2015    Left leg:  No DVT. Dilated lymph node in left groin.  Constipation     Diabetes mellitus type II     Gout     Hepatic steatosis     Hypercholesterolemia     Hypertension     Knee effusion, left     Left knee pain     Morbid obesity (HCC)     Noncompliance     Obesity (BMI 30-39. 9)     S/P colonoscopy 3/8/05    Dr. Aubrey Gillette; normal; f/u 10 years    Stomach problems     TIA (transient ischemic attack) 3/09    Tobacco abuse        Past Surgical History:  Past Surgical History:   Procedure Laterality Date    ABDOMEN SURGERY PROC UNLISTED      Hernia repair in 1960's or 1970's.  HX ORTHOPAEDIC      Bones spur removed from left & right foot 2013.  HX UROLOGICAL  8/18/2015    [facility], Dr. Blue Vital, Cystoscopy, left retrograde, left double-J cath       Family History:  Family History   Problem Relation Age of Onset    Diabetes Father     Heart Disease Mother     Diabetes Sister     Hypertension Sister     Arthritis-osteo Sister     Arthritis-osteo Brother     Diabetes Other     Diabetes Other      Uncle, Aunt    Hypertension Other      Uncle; Aunt       Social History:  Social History   Substance Use Topics    Smoking status: Current Every Day Smoker     Packs/day: 0.25     Types: Cigarettes     Last attempt to quit: 6/28/2016    Smokeless tobacco: Never Used    Alcohol use 0.0 oz/week     0 Standard drinks or equivalent per week      Comment: 1 beers a day. Allergies: Allergies   Allergen Reactions    Shellfish Derived Other (comments)     Causes Gout         Review of Systems     Review of Systems   Constitutional: Negative for fever. HENT: Negative for sore throat. Eyes: Negative for redness and visual disturbance. Respiratory: Positive for shortness of breath. Negative for wheezing. Cardiovascular: Negative for chest pain.    Gastrointestinal: Positive for constipation (last BM yesterday. No BM today). Negative for abdominal pain and nausea. Endocrine: Negative for polyuria. Genitourinary: Negative for dysuria. Musculoskeletal: Positive for back pain (left flank pain). Negative for arthralgias and neck stiffness. Skin: Negative for rash. Neurological: Negative for dizziness and headaches. All other systems reviewed and are negative. Physical Exam     Visit Vitals    /84 (BP 1 Location: Left arm, BP Patient Position: At rest)    Pulse 81    Temp 97.7 °F (36.5 °C)    Resp 20    Ht 5' 4\" (1.626 m)    Wt 102.1 kg (225 lb)    SpO2 97%    BMI 38.62 kg/m2       Physical Exam   Constitutional: He is oriented to person, place, and time. He appears well-developed and well-nourished. No distress. HENT:   Head: Normocephalic and atraumatic. Mouth/Throat: Oropharynx is clear and moist.   Eyes: Conjunctivae are normal. Pupils are equal, round, and reactive to light. No scleral icterus. Neck: Normal range of motion. Neck supple. Cardiovascular: Intact distal pulses. Capillary refill < 3 seconds   Pulmonary/Chest: Effort normal and breath sounds normal. No respiratory distress. He has no wheezes. Abdominal: Soft. Bowel sounds are normal. He exhibits no distension. There is tenderness. There is guarding. No palpable pulse or mass  Epigastric tenderness   Musculoskeletal: Normal range of motion. He exhibits tenderness (left flank). He exhibits no edema. No edema or calf tenderness   Lymphadenopathy:     He has no cervical adenopathy. Neurological: He is alert and oriented to person, place, and time. No cranial nerve deficit. Skin: Skin is warm and dry. No rash noted. He is not diaphoretic. Psychiatric: His behavior is normal.   Nursing note and vitals reviewed.         Diagnostic Study Results     Labs -  Recent Results (from the past 12 hour(s))   METABOLIC PANEL, COMPREHENSIVE    Collection Time: 12/14/17 11:58 AM   Result Value Ref Range Sodium 139 136 - 145 mmol/L    Potassium 4.7 3.5 - 5.5 mmol/L    Chloride 104 100 - 108 mmol/L    CO2 27 21 - 32 mmol/L    Anion gap 8 3.0 - 18 mmol/L    Glucose 210 (H) 74 - 99 mg/dL    BUN 48 (H) 7.0 - 18 MG/DL    Creatinine 3.99 (H) 0.6 - 1.3 MG/DL    BUN/Creatinine ratio 12 12 - 20      GFR est AA 19 (L) >60 ml/min/1.73m2    GFR est non-AA 16 (L) >60 ml/min/1.73m2    Calcium 8.4 (L) 8.5 - 10.1 MG/DL    Bilirubin, total 0.4 0.2 - 1.0 MG/DL    ALT (SGPT) 25 16 - 61 U/L    AST (SGOT) 32 15 - 37 U/L    Alk. phosphatase 57 45 - 117 U/L    Protein, total 7.2 6.4 - 8.2 g/dL    Albumin 2.7 (L) 3.4 - 5.0 g/dL    Globulin 4.5 (H) 2.0 - 4.0 g/dL    A-G Ratio 0.6 (L) 0.8 - 1.7     LIPASE    Collection Time: 12/14/17 11:58 AM   Result Value Ref Range    Lipase 439 (H) 73 - 393 U/L   CBC WITH AUTOMATED DIFF    Collection Time: 12/14/17 11:58 AM   Result Value Ref Range    WBC 9.0 4.6 - 13.2 K/uL    RBC 3.93 (L) 4.70 - 5.50 M/uL    HGB 11.0 (L) 13.0 - 16.0 g/dL    HCT 34.4 (L) 36.0 - 48.0 %    MCV 87.5 74.0 - 97.0 FL    MCH 28.0 24.0 - 34.0 PG    MCHC 32.0 31.0 - 37.0 g/dL    RDW 15.0 (H) 11.6 - 14.5 %    PLATELET 464 032 - 417 K/uL    MPV 10.9 9.2 - 11.8 FL    NEUTROPHILS 70 40 - 73 %    LYMPHOCYTES 21 21 - 52 %    MONOCYTES 6 3 - 10 %    EOSINOPHILS 3 0 - 5 %    BASOPHILS 0 0 - 2 %    ABS. NEUTROPHILS 6.3 1.8 - 8.0 K/UL    ABS. LYMPHOCYTES 1.9 0.9 - 3.6 K/UL    ABS. MONOCYTES 0.6 0.05 - 1.2 K/UL    ABS. EOSINOPHILS 0.2 0.0 - 0.4 K/UL    ABS.  BASOPHILS 0.0 0.0 - 0.06 K/UL    DF AUTOMATED     INFLUENZA A & B AG (RAPID TEST)    Collection Time: 12/14/17 12:00 PM   Result Value Ref Range    Influenza A Antigen NEGATIVE  NEG      Influenza B Antigen NEGATIVE  NEG     URINALYSIS W/ RFLX MICROSCOPIC    Collection Time: 12/14/17 12:35 PM   Result Value Ref Range    Color YELLOW      Appearance CLOUDY      Specific gravity 1.018 1.005 - 1.030      pH (UA) 6.0 5.0 - 8.0      Protein >1000 (A) NEG mg/dL    Glucose 250 (A) NEG mg/dL Ketone NEGATIVE  NEG mg/dL    Bilirubin NEGATIVE  NEG      Blood TRACE (A) NEG      Urobilinogen 0.2 0.2 - 1.0 EU/dL    Nitrites NEGATIVE  NEG      Leukocyte Esterase NEGATIVE  NEG     URINE MICROSCOPIC ONLY    Collection Time: 12/14/17 12:35 PM   Result Value Ref Range    WBC 0 to 3 0 - 4 /hpf    Epithelial cells FEW 0 - 5 /lpf    Bacteria FEW (A) NEG /hpf       Radiologic Studies -   CT ABD PELV WO CONT   Final Result   IMPRESSION:  1. Mild left hydronephrosis with renal atrophy and partially obstructing complex  stone in the left UPJ.  2. Functional hypertrophy of the right kidney without nephrolithiasis. 3. Enlarged prostate. Further follow-up with patient's lower urinary tract  symptoms and PSA levels is recommended. 4. Cardiomegaly. XR CHEST PORT   Final Result   IMPRESSION  Impression: No significant change and specifically no consolidation evident. If  symptoms persist follow-up may be of benefit.            Medical Decision Making   I am the first provider for this patient. I reviewed the vital signs, available nursing notes, past medical history, past surgical history, family history and social history. Vital Signs-Reviewed the patient's vital signs. Records Reviewed: Nursing Notes and Old Medical Records (Time of Review: 11:30 AM)    Provider Notes (Medical Decision Making):  MDM  Number of Diagnoses or Management Options  Constipation, unspecified constipation type: new, needed workup  Left ureteral stone: new, needed workup  Diagnosis management comments: DDX: kidney stone, AAA, musculoskeletal,  UTI,  Pyelonephritis, diverticulitis, CT Abd/Pelvis, Administer medications for pain, nausea, and give fluids.  Check Urinalysis       Amount and/or Complexity of Data Reviewed  Clinical lab tests: ordered and reviewed  Tests in the radiology section of CPT®: ordered and reviewed  Tests in the medicine section of CPT®: ordered and reviewed  Discussion of test results with the performing providers: yes    Risk of Complications, Morbidity, and/or Mortality  Presenting problems: moderate  Diagnostic procedures: moderate  Management options: moderate    Patient Progress  Patient progress: improved      Medications   oxyCODONE-acetaminophen (PERCOCET) 5-325 mg per tablet 1 Tab (not administered)   magnesium citrate solution 296 mL (not administered)   morphine injection 4 mg (4 mg IntraVENous Given 12/14/17 1228)   ondansetron (ZOFRAN) injection 4 mg (4 mg IntraVENous Given 12/14/17 1228)   sodium chloride 0.9 % bolus infusion 1,000 mL (0 mL IntraVENous IV Completed 12/14/17 1350)   HYDROmorphone (PF) (DILAUDID) injection 1 mg (1 mg IntraVENous Given 12/14/17 1534)   tamsulosin (FLOMAX) capsule 0.4 mg (0.4 mg Oral Given 12/14/17 1534)       ED Course: Progress Notes, Reevaluation, and Consults:  After morphine, zofran and flomax, pain returning. Has 6mm UPJ stone. Will try to control his pain, consult urologist    Consult:  Discussed care with Dr Aneesh Lyle, Specialty: Urologist  Standard discussion; including history of patients chief complaint, available diagnostic results, and treatment course. He agrees can dc patient home if get pain controlled and f/u him outpatient urology. 4:15 pm Pt feeling much better after Dilaudid, discussed ureteral stone, gave prescription for zofran, narcotic flomax and follow up with urology. I have reassessed the patient. I have discussed the workup, results and plan with the patient and patient is in agreement. Patient is feeling better. Patient will be prescribed percocet, zofran, flomax. Patient was discharge in stable condition. Patient was given outpatient follow up. Patient is to return to emergency department if any new or worsening condition. Diagnosis     Clinical Impression:   1. Left ureteral stone    2.  Constipation, unspecified constipation type        Disposition: Discharge    Follow-up Information     Follow up With Details Comments Contact Info    keep your appointment with new primary care provider at [facility], MD Call in 1 day  [phone] [facility] EM[facility]  As needed, If symptoms worsen [address]  [phone]           Patient's Medications   Start Taking    ONDANSETRON HCL (ZOFRAN, AS HYDROCHLORIDE,) 4 MG TABLET    Take 1 Tab by mouth every eight (8) hours as needed for Nausea. OXYCODONE-ACETAMINOPHEN (PERCOCET) 5-325 MG PER TABLET    Take 1-2 every 4-6 hours prn pain    TAMSULOSIN (FLOMAX) 0.4 MG CAPSULE    Take 1 Cap by mouth daily. Start this medication on Friday 12/15/17  Indications: Urolithiasis   Continue Taking    ASPIRIN 81 MG CHEWABLE TABLET    Take 1 Tab by mouth daily. ATORVASTATIN (LIPITOR) 40 MG TABLET    Take 1 Tab by mouth nightly. B COMPLEX W-C NO.20/FOLIC ACID (B COMPLEX & C NO.20-FOLIC ACID PO)    Take  by mouth. BISACODYL (DULCOLAX, BISACODYL,) 5 MG EC TABLET    Take 1 Tab by mouth daily as needed for Constipation. BLOOD-GLUCOSE METER MONITORING KIT    Check twice daily. BUDESONIDE-FORMOTEROL (SYMBICORT) 160-4.5 MCG/ACTUATION HFA INHALER    Take 2 Puffs by inhalation two (2) times a day. CALCITRIOL (ROCALTROL) 0.25 MCG CAPSULE    Take 1 Cap by mouth every Monday, Wednesday, Friday. CARVEDILOL (COREG) 6.25 MG TABLET    Take 1 Tab by mouth two (2) times daily (with meals). FEBUXOSTAT (ULORIC) 40 MG TAB TABLET    Take 1 Tab by mouth daily. FLUTICASONE (FLONASE) 50 MCG/ACTUATION NASAL SPRAY    2 Sprays by Both Nostrils route daily. FUROSEMIDE (LASIX) 40 MG TABLET    Take 1 Tab by mouth daily. GLUCOSE BLOOD VI TEST STRIPS (FREESTYLE TEST) STRIP    by Does Not Apply route See Admin Instructions. Check twice a day    HYDRALAZINE (APRESOLINE) 50 MG TABLET    Take 1 Tab by mouth daily. ISOSORBIDE MONONITRATE ER (IMDUR) 60 MG CR TABLET    Take 1 Tab by mouth daily.     LANCETS MISC    Check 3 times a day , needs according contour glucometer    LOSARTAN (COZAAR) 25 MG TABLET    Take 1 Tab by mouth daily. NITROGLYCERIN (NITROSTAT) 0.4 MG SL TABLET    1 Tab by SubLINGual route as needed for Chest Pain. NOVOLIN 70/30 100 UNIT/ML (70-30) INJECTION    INJECT 30 UNITS SC IN THE MORNING AND 20 UNITS SC IN THE EVENING BEFORE SUPPER   These Medications have changed    No medications on file   Stop Taking    No medications on file     _______________________________    Attestations:  Scribe Attestation     Sariah Guy and Concha Escobar acting as a scribe for and in the presence of Khalida Ortiz DO      December 14, 2017 at 11:30 AM       Provider Attestation:      I personally performed the services described in the documentation, reviewed the documentation, as recorded by the scribe in my presence, and it accurately and completely records my words and actions.  December 14, 2017 at 11:30 AM - Khalida Ortiz DO    _______________________________

## 2017-12-14 NOTE — DISCHARGE INSTRUCTIONS
Constipation: Care Instructions  Your Care Instructions    Constipation means that you have a hard time passing stools (bowel movements). People pass stools from 3 times a day to once every 3 days. What is normal for you may be different. Constipation may occur with pain in the rectum and cramping. The pain may get worse when you try to pass stools. Sometimes there are small amounts of bright red blood on toilet paper or the surface of stools. This is because of enlarged veins near the rectum (hemorrhoids). A few changes in your diet and lifestyle may help you avoid ongoing constipation. Your doctor may also prescribe medicine to help loosen your stool. Some medicines can cause constipation. These include pain medicines and antidepressants. Tell your doctor about all the medicines you take. Your doctor may want to make a medicine change to ease your symptoms. Follow-up care is a key part of your treatment and safety. Be sure to make and go to all appointments, and call your doctor if you are having problems. It's also a good idea to know your test results and keep a list of the medicines you take. How can you care for yourself at home? · Drink plenty of fluids, enough so that your urine is light yellow or clear like water. If you have kidney, heart, or liver disease and have to limit fluids, talk with your doctor before you increase the amount of fluids you drink. · Include high-fiber foods in your diet each day. These include fruits, vegetables, beans, and whole grains. · Get at least 30 minutes of exercise on most days of the week. Walking is a good choice. You also may want to do other activities, such as running, swimming, cycling, or playing tennis or team sports. · Take a fiber supplement, such as Citrucel or Metamucil, every day. Read and follow all instructions on the label. · Schedule time each day for a bowel movement. A daily routine may help.  Take your time having your bowel movement. · Support your feet with a small step stool when you sit on the toilet. This helps flex your hips and places your pelvis in a squatting position. · Your doctor may recommend an over-the-counter laxative to relieve your constipation. Examples are Milk of Magnesia and MiraLax. Read and follow all instructions on the label. Do not use laxatives on a long-term basis. When should you call for help? Call your doctor now or seek immediate medical care if:  ? · You have new or worse belly pain. ? · You have new or worse nausea or vomiting. ? · You have blood in your stools. ? Watch closely for changes in your health, and be sure to contact your doctor if:  ? · Your constipation is getting worse. ? · You do not get better as expected. Where can you learn more? Go to http://leslie-graciela.info/. Enter 21 [phone] in the search box to learn more about \"Constipation: Care Instructions. \"  Current as of: March 20, 2017  Content Version: 11.4  © 4103-5331 [employer]. Care instructions adapted under license by [employer] (which disclaims liability or warranty for this information). If you have questions about a medical condition or this instruction, always ask your healthcare professional. [address] any warranty or liability for your use of this information. Kidney Stone: Care Instructions  Your Care Instructions    Kidney stones are formed when salts, minerals, and other substances normally found in the urine clump together. They can be as small as grains of sand or, rarely, as large as golf balls. While the stone is traveling through the ureter, which is the tube that carries urine from the kidney to the bladder, you will probably feel pain. The pain may be mild or very severe. You may also have some blood in your urine. As soon as the stone reaches the bladder, any intense pain should go away.   If a stone is too large to pass on its own, you may need a medical procedure to help you pass the stone. The doctor has checked you carefully, but problems can develop later. If you notice any problems or new symptoms, get medical treatment right away. Follow-up care is a key part of your treatment and safety. Be sure to make and go to all appointments, and call your doctor if you are having problems. It's also a good idea to know your test results and keep a list of the medicines you take. How can you care for yourself at home? · Drink plenty of fluids, enough so that your urine is light yellow or clear like water. If you have kidney, heart, or liver disease and have to limit fluids, talk with your doctor before you increase the amount of fluids you drink. · Take pain medicines exactly as directed. Call your doctor if you think you are having a problem with your medicine. ¨ If the doctor gave you a prescription medicine for pain, take it as prescribed. ¨ If you are not taking a prescription pain medicine, ask your doctor if you can take an over-the-counter medicine. Read and follow all instructions on the label. · Your doctor may ask you to strain your urine so that you can collect your kidney stone when it passes. You can use a kitchen strainer or a tea strainer to catch the stone. Store it in a plastic bag until you see your doctor again. Preventing future kidney stones  Some changes in your diet may help prevent kidney stones. Depending on the cause of your stones, your doctor may recommend that you:  · Drink plenty of fluids, enough so that your urine is light yellow or clear like water. If you have kidney, heart, or liver disease and have to limit fluids, talk with your doctor before you increase the amount of fluids you drink. · Limit coffee, tea, and alcohol. Also avoid grapefruit juice. · Do not take more than the recommended daily dose of vitamins C and D.  · Avoid antacids such as Gaviscon, Maalox, Mylanta, or Tums.   · Limit the amount of salt (sodium) in your diet. · Eat a balanced diet that is not too high in protein. · Limit foods that are high in a substance called oxalate, which can cause kidney stones. These foods include dark green vegetables, rhubarb, chocolate, wheat bran, nuts, cranberries, and beans. When should you call for help? Call your doctor now or seek immediate medical care if:  ? · You cannot keep down fluids. ? · Your pain gets worse. ? · You have a fever or chills. ? · You have new or worse pain in your back just below your rib cage (the flank area). ? · You have new or more blood in your urine. ? Watch closely for changes in your health, and be sure to contact your doctor if:  ? · You do not get better as expected. Where can you learn more? Go to http://leslie-graciela.info/. Enter U038 in the search box to learn more about \"Kidney Stone: Care Instructions. \"  Current as of: May 12, 2017  Content Version: 11.4  © 6208-0857 [employer]. Care instructions adapted under license by [employer] (which disclaims liability or warranty for this information). If you have questions about a medical condition or this instruction, always ask your healthcare professional. [address] any warranty or liability for your use of this information.

## 2017-12-14 NOTE — ED NOTES
I performed a brief evaluation, including history and physical, of the patient here in triage and I have determined that pt will need further treatment and evaluation from the main side ER physician. I have placed initial orders to help in expediting patients care.      December 14, 2017 at 11:19 AM - Catie Moeller PA-C        Visit Vitals    /84 (BP 1 Location: Left arm, BP Patient Position: At rest)    Pulse 81    Temp 97.7 °F (36.5 °C)    Resp 20    Ht 5' 4\" (1.626 m)    Wt 102.1 kg (225 lb)    SpO2 97%    BMI 38.62 kg/m2

## 2017-12-16 LAB
BACTERIA SPEC CULT: NORMAL
SERVICE CMNT-IMP: NORMAL

## 2018-02-09 ENCOUNTER — OFFICE VISIT (OUTPATIENT)
Dept: ORTHOPEDIC SURGERY | Facility: CLINIC | Age: 58
End: 2018-02-09

## 2018-02-09 ENCOUNTER — HOSPITAL ENCOUNTER (OUTPATIENT)
Dept: LAB | Age: 58
Discharge: HOME | End: 2018-02-09
Payer: MEDICARE

## 2018-02-09 VITALS
HEIGHT: 64 IN | DIASTOLIC BLOOD PRESSURE: 113 MMHG | HEART RATE: 80 BPM | TEMPERATURE: 97.7 F | OXYGEN SATURATION: 97 % | BODY MASS INDEX: 41.18 KG/M2 | WEIGHT: 241.2 LBS | SYSTOLIC BLOOD PRESSURE: 200 MMHG | RESPIRATION RATE: 18 BRPM

## 2018-02-09 DIAGNOSIS — M65.831 EXTENSOR INTERSECTION SYNDROME OF RIGHT WRIST: ICD-10-CM

## 2018-02-09 DIAGNOSIS — M25.461 KNEE EFFUSION, RIGHT: ICD-10-CM

## 2018-02-09 DIAGNOSIS — M10.9 ACUTE GOUT OF RIGHT KNEE, UNSPECIFIED CAUSE: Primary | ICD-10-CM

## 2018-02-09 DIAGNOSIS — Z87.39 H/O: GOUT: ICD-10-CM

## 2018-02-09 DIAGNOSIS — M10.9 ACUTE GOUT OF RIGHT KNEE, UNSPECIFIED CAUSE: ICD-10-CM

## 2018-02-09 DIAGNOSIS — M25.471 RIGHT ANKLE EFFUSION: ICD-10-CM

## 2018-02-09 PROBLEM — E66.01 OBESITY, MORBID (HCC): Status: ACTIVE | Noted: 2018-02-09

## 2018-02-09 PROBLEM — E11.21 TYPE 2 DIABETES WITH NEPHROPATHY (HCC): Status: ACTIVE | Noted: 2018-02-09

## 2018-02-09 PROBLEM — E11.40 TYPE 2 DIABETES MELLITUS WITH DIABETIC NEUROPATHY (HCC): Status: ACTIVE | Noted: 2018-02-09

## 2018-02-09 PROCEDURE — 89060 EXAM SYNOVIAL FLUID CRYSTALS: CPT | Performed by: SPECIALIST

## 2018-02-09 PROCEDURE — 87070 CULTURE OTHR SPECIMN AEROBIC: CPT | Performed by: SPECIALIST

## 2018-02-09 RX ORDER — BUPIVACAINE HYDROCHLORIDE 2.5 MG/ML
4 INJECTION, SOLUTION EPIDURAL; INFILTRATION; INTRACAUDAL ONCE
Qty: 4 ML | Refills: 0
Start: 2018-02-09 | End: 2018-02-09

## 2018-02-09 RX ORDER — BETAMETHASONE SODIUM PHOSPHATE AND BETAMETHASONE ACETATE 3; 3 MG/ML; MG/ML
6 INJECTION, SUSPENSION INTRA-ARTICULAR; INTRALESIONAL; INTRAMUSCULAR; SOFT TISSUE ONCE
Qty: 0.5 ML | Refills: 0
Start: 2018-02-09 | End: 2018-02-09

## 2018-02-09 RX ORDER — INDOMETHACIN 50 MG/1
50 CAPSULE ORAL 2 TIMES DAILY
Qty: 30 CAP | Refills: 0 | Status: SHIPPED | OUTPATIENT
Start: 2018-02-09 | End: 2018-05-10

## 2018-02-09 RX ORDER — BUPIVACAINE HYDROCHLORIDE 2.5 MG/ML
10 INJECTION, SOLUTION EPIDURAL; INFILTRATION; INTRACAUDAL ONCE
Qty: 10 ML | Refills: 0
Start: 2018-02-09 | End: 2018-02-09

## 2018-02-09 NOTE — PATIENT INSTRUCTIONS
Indomethacin (By mouth)   Indomethacin (in-galeas-METH-a-sin)  Treats pain. This is an NSAID. Brand Name(s): Indocin, Indocin SR, Tivorbex   There may be other brand names for this medicine. When This Medicine Should Not Be Used: This medicine is not right for everyone. Do not use it if you had an allergic reaction (including asthma) to indomethacin, aspirin, or other NSAIDs. Do not use it if you have had a heart surgery (such as coronary artery bypass graft). How to Use This Medicine:   Capsule, Long Acting Capsule, Liquid  · Take your medicine as directed. Your dose may need to be changed several times to find what works best for you. · It is best to take this medicine with food, milk, or antacids so it does not upset your stomach. · Swallow the capsule whole. Do not open, crush, break, or chew it. · Swallow the extended-release capsule whole. Do not crush, break, or chew it. · Oral liquid: Measure the oral liquid medicine with a marked measuring spoon, oral syringe, or medicine cup. Shake well before using. · This medicine should come with a Medication Guide. Ask your pharmacist for a copy if you do not have one. · Missed dose: Take a dose as soon as you remember. If it is almost time for your next dose, wait until then and take a regular dose. Do not take extra medicine to make up for a missed dose. · Store the medicine in a closed container at room temperature, away from heat, moisture, and direct light. Do not freeze the oral liquid. Drugs and Foods to Avoid:   Ask your doctor or pharmacist before using any other medicine, including over-the-counter medicines, vitamins, and herbal products. · Do not use any other NSAID medicine unless your doctor says it is okay. Some other NSAIDs are aspirin, celecoxib, diclofenac, diflunisal, ibuprofen, naproxen, or salsalate. · Some foods and medicines can affect how indomethacin works.  Tell your doctor if you are using any of the following:  ¨ Cyclosporine, digoxin, lithium, methotrexate, pemetrexed, or probenecid  ¨ Blood pressure medicine  ¨ Blood thinner (including warfarin)  ¨ Diuretic (water pill)  ¨ Medicine to treat depression  ¨ Steroid medicine  Warnings While Using This Medicine:   · Tell your doctor if you are pregnant or breastfeeding. Do not use this medicine during the later part of a pregnancy, unless your doctor tells you to. · Tell your doctor if you have kidney disease, liver disease, asthma, bleeding problems, heart disease, high blood pressure, heart failure, or a history of stomach or bowel problems (including bleeding or ulcers), depression, mental illness, epilepsy, or Parkinson disease. Tell your doctor if you smoke or drink alcohol. · This medicine may cause the following problems:  ¨ Higher risk of blood clots, heart attack, stroke, or heart failure  ¨ Bleeding and ulcers in your stomach or intestines  ¨ Liver damage  ¨ Kidney damage  ¨ Serious skin reactions  ¨ Changes in vision  · Ovulation may be delayed in some women while this medicine is being used. Talk to your doctor if you have concerns about this. · This medicine may make you drowsy. Do not drive or do anything else that could be dangerous until you know how this medicine affects you. · Your doctor will do lab tests at regular visits to check on the effects of this medicine. Keep all appointments. · Keep all medicine out of the reach of children. Never share your medicine with anyone.   Possible Side Effects While Using This Medicine:   Call your doctor right away if you notice any of these side effects:  · Allergic reaction: Itching or hives, swelling in your face or hands, swelling or tingling in your mouth or throat, chest tightness, trouble breathing  · Blistering, peeling, or red skin rash  · Bloody or black, tarry stools, severe stomach pain, vomiting blood or something that looks like coffee grounds  · Change in how much or how often you urinate  · Chest pain that may spread, trouble breathing, unusual sweating, fainting  · Dark urine or pale stools, nausea, vomiting, loss of appetite, stomach pain, yellow skin or eyes  · Numbness or weakness on one side of your body, sudden or severe headache, problems with vision, speech, or walking  · Rapid weight gain, swelling in your hands, ankles, or feet  · Unusual bleeding, bruising, or weakness  If you notice these less serious side effects, talk with your doctor:   · Constipation, gas, stomach pain  · Headache or drowsiness  If you notice other side effects that you think are caused by this medicine, tell your doctor. Call your doctor for medical advice about side effects. You may report side effects to FDA at 4-489-FDA-5850  © 2017 [address] [employer] Information is for End User's use only and may not be sold, redistributed or otherwise used for commercial purposes. The above information is an  only. It is not intended as medical advice for individual conditions or treatments. Talk to your doctor, nurse or pharmacist before following any medical regimen to see if it is safe and effective for you. Joint Injections: Care Instructions  Your Care Instructions  Joint injections are shots into a joint, such as the knee. They may be used to put in medicines, such as pain relievers. Or they can be used to take out fluid. Sometimes the fluid is tested in a lab. This can help find the cause of a joint problem. A corticosteroid, or steroid, shot is used to reduce inflammation in tendons or joints. It is often used to treat problems such as arthritis, tendinitis, and bursitis. Steroids can be injected directly into a painful, inflamed joint. They can also help reduce inflammation of a bursa. A bursa is a sac of fluid. It cushions and lubricates areas where tendons, ligaments, skin, muscles, or bones rub against each other.   A steroid shot can sometimes help with short-term pain relief when other treatments haven't worked. If steroid shots help, pain may improve for weeks or months. Follow-up care is a key part of your treatment and safety. Be sure to make and go to all appointments, and call your doctor if you are having problems. It's also a good idea to know your test results and keep a list of the medicines you take. How can you care for yourself at home? · Put ice or a cold pack on the area for 10 to 20 minutes at a time. Put a thin cloth between the ice and your skin. · Take anti-inflammatory medicines to reduce pain, swelling, or inflammation. These include ibuprofen (Advil, Motrin) and naproxen (Aleve). Read and follow all instructions on the label. · Avoid strenuous activities for several days, especially those that put stress on the area where you got the shot. · If you have dressings over the area, keep them clean and dry. You may remove them when your doctor tells you to. When should you call for help? Call your doctor now or seek immediate medical care if:  ? · You have signs of infection, such as:  ¨ Increased pain, swelling, warmth, or redness. ¨ Red streaks leading from the site. ¨ Pus draining from the site. ¨ A fever. ? Watch closely for changes in your health, and be sure to contact your doctor if you have any problems. Where can you learn more? Go to http://leslie-graciela.info/. Enter N616 in the search box to learn more about \"Joint Injections: Care Instructions. \"  Current as of: March 21, 2017  Content Version: 11.4  © 8006-4047 [employer]. Care instructions adapted under license by [employer] (which disclaims liability or warranty for this information). If you have questions about a medical condition or this instruction, always ask your healthcare professional. [address] any warranty or liability for your use of this information.

## 2018-02-09 NOTE — PROGRESS NOTES
Patient: Anatoly Millard                MRN: [id number]       SSN: [id number]  YOB: 1960        AGE: 62 y.o. SEX: male    PCP: Nate Saleh MD  02/09/18    Chief Complaint   Patient presents with    Leg Pain     Right    Hand Pain     Right     HISTORY:  Anatoly Millard is a 62 y.o. male who is seen for right hand and right knee pain. He reports pain radiating down from his right knee to his ankle and foot. He reports he noticed a tight right knee swelling recently. He notes difficulty sleeping at night. He states he has pain even with light touch. He denies any previous injury or trauma. He reports that he took Percocet for his right dorsal radial forearm pain with benefit recently. He has a h/o of recurrent gout attacks. He did not benefit from a previous forearm cortisone injection or Norco use. He reports a h/o gout. He was previously seen for left knee pain. He has a pain level of 10/10. He is having difficulty standing and bending his knee. He woke up one day with acute knee pain. He saw Dr. Cecily Cota on 2/9/16. 60 cc of fluid was aspirated--gout effusion. He has taken allopurinol in the past.      Occupation, etc:  Mr. Janel Hurtado is a [employer] fan. He lives with is wife in [facility]. He receives social security disability benefits for high blood pressure, eye problems, and diabetes. He reports a h/o kidney stones. Last 3 Recorded Weights in this Encounter    02/09/18 1613   Weight: 241 lb 3.2 oz (109.4 kg)     Body mass index is 41.4 kg/(m^2). Patient Active Problem List   Diagnosis Code    Allergic rhinitis J30.9    Neuropathy G62.9    Right knee pain M25.561    Dyslipidemia E78.5    Gout M10.9    CAD (coronary artery disease) I25.10    Hypertensive heart disease I11.9    Obesity (BMI 30-39. 9) E66.9    Constipation K59.00    Tobacco abuse Z72.0    TIA (transient ischemic attack) G45.9    Hepatic steatosis K76.0    Noncompliance Z91.19    Testicular/scrotal pain [id number]    UTI (urinary tract infection) N39.0    Contusion of knee S80.00XA    Knee strain S86.919A    Effusion of patella M25.469    Hemoptysis R04.2    Rash R21    Laceration of gum S01.512A    Fractured tooth S02. 5XXA    Renal stones N20.0    Microalbuminuria R80.9    Hypertriglyceridemia E78.1    Type 2 diabetes mellitus without complication ([facility]) G88.5    Acute unilateral obstructive uropathy N13.9    ARF (acute renal failure) ([facility]) N17.9    Essential hypertension I10    Advance directive discussed with patient Z70.80    Acute chest pain R07.9    Left sided numbness R20.0    Chronic kidney disease, stage III (moderate) N18.3    Persistent proteinuria R80.1    Secondary hyperparathyroidism of renal origin ([facility] [address] N25.81    Hypoglycemia E16.2    Cocaine abuse F14.10    Chronic renal disease, stage IV (HCC) N18.4    Acute renal failure (ARF) ([facility]) N17.9     REVIEW OF SYSTEMS: All Below are Negative except: See HPI   Constitutional: negative for fever, chills, and weight loss. Cardiovascular: negative for chest pain, claudication, leg swelling, SOB, SALMERON   Gastrointestinal: Negative for pain, N/V/C/D, Blood in stool or urine, dysuria,  hematuria, incontinence, pelvic pain. Musculoskeletal: See HPI   Neurological: Negative for dizziness and weakness. Negative for headaches, Visual changes, confusion, seizures   Phychiatric/Behavioral: Negative for depression, memory loss, substance  abuse. Extremities: Negative for hair changes, rash, or skin lesion changes. Hematologic: Negative for bleeding problems, bruising, pallor or swollen lymph  nodes   Peripheral Vascular: No calf pain, no circulation deficits.     Social History     Social History    Marital status:      Spouse name: N/A    Number of children: N/A    Years of education: N/A     Occupational History    disabled      Social History Main Topics    Smoking status: Current Every Day Smoker Packs/day: 0.25     Types: Cigarettes     Last attempt to quit: 6/28/2016    Smokeless tobacco: Never Used    Alcohol use 0.0 oz/week     0 Standard drinks or equivalent per week      Comment: 1 beers a day.  Drug use: No    Sexual activity: Yes     Other Topics Concern    Not on file     Social History Narrative     Allergies   Allergen Reactions    Shellfish Derived Other (comments)     Causes Gout     Current Outpatient Prescriptions   Medication Sig    ondansetron hcl (ZOFRAN, AS HYDROCHLORIDE,) 4 mg tablet Take 1 Tab by mouth every eight (8) hours as needed for Nausea.  tamsulosin (FLOMAX) 0.4 mg capsule Take 1 Cap by mouth daily. Start this medication on Friday 12/15/17  Indications: Urolithiasis    glucose blood VI test strips (FREESTYLE TEST) strip by Does Not Apply route See Admin Instructions. Check twice a day    bisacodyl (DULCOLAX, BISACODYL,) 5 mg EC tablet Take 1 Tab by mouth daily as needed for Constipation.  NOVOLIN 70/30 100 unit/mL (70-30) injection INJECT 30 UNITS SC IN THE MORNING AND 20 UNITS SC IN THE EVENING BEFORE SUPPER    hydrALAZINE (APRESOLINE) 50 mg tablet Take 1 Tab by mouth daily.  isosorbide mononitrate ER (IMDUR) 60 mg CR tablet Take 1 Tab by mouth daily.  losartan (COZAAR) 25 mg tablet Take 1 Tab by mouth daily.  atorvastatin (LIPITOR) 40 mg tablet Take 1 Tab by mouth nightly.  furosemide (LASIX) 40 mg tablet Take 1 Tab by mouth daily.  fluticasone (FLONASE) 50 mcg/actuation nasal spray 2 Sprays by Both Nostrils route daily.  carvedilol (COREG) 6.25 mg tablet Take 1 Tab by mouth two (2) times daily (with meals).  nitroglycerin (NITROSTAT) 0.4 mg SL tablet 1 Tab by SubLINGual route as needed for Chest Pain. (Patient taking differently: 1 Tab by SubLINGual route as needed for Chest Pain. Pt to take 1 tab q 5 min up to three times, if symptom persist pt. to call 911.)    febuxostat (ULORIC) 40 mg tab tablet Take 1 Tab by mouth daily.     B COMPLEX W-C NO.20/FOLIC ACID (B COMPLEX & C NO.20-FOLIC ACID PO) Take  by mouth.  budesonide-formoterol (SYMBICORT) 160-4.5 mcg/actuation HFA inhaler Take 2 Puffs by inhalation two (2) times a day.  calcitRIOL (ROCALTROL) 0.25 mcg capsule Take 1 Cap by mouth every Monday, Wednesday, Friday.  Lancets misc Check 3 times a day , needs according contour glucometer    Blood-Glucose Meter monitoring kit Check twice daily.  oxyCODONE-acetaminophen (PERCOCET) 5-325 mg per tablet Take 1-2 every 4-6 hours prn pain    aspirin 81 mg chewable tablet Take 1 Tab by mouth daily. No current facility-administered medications for this visit.       PHYSICAL EXAMINATION:  Visit Vitals    BP (!) 200/113    Pulse 80    Temp 97.7 °F (36.5 °C) (Oral)    Resp 18    Ht 5' 4\" (1.626 m)    Wt 241 lb 3.2 oz (109.4 kg)    SpO2 97%    BMI 41.4 kg/m2     ORTHO EXAMINATION:  Examination Right Elbow Left Elbow   Skin Intact Intact   Range of Motion 130-10 135-0   Tenderness - -   Swelling - -   Bruising - -   Stability Normal Normal   Motor Strength  Normal Normal   Neurovascular Intact Intact     Examination Right Wrist   Skin Intact   Tenderness + even to light touch   Flexion 10   Extension 15   Deformity -   Effusion -   Finger flexion Full   Finger extension Full   Tinnel's sign -   Phalen's test -   Finklestein maneuver na   Pain with thumb abduction +   Capillary refill -   Swelling and warmth over right distal dorsal radial forearm, tenderness to light touch  Right wrist: 80 pronation/70 supination  Can bend fingers to within 1 cm of palm, full finger extension    Examination Right knee Left knee   Skin Intact Intact   Range of motion 100-0 120-0   Effusion 4+ -   Medial joint line tenderness + -   Lateral joint line tenderness - -   Popliteal tenderness - -   Osteophytes palpable + +   Dylans - -   Patella crepitus + +   Anterior drawer - -   Lateral laxity - -   Medial laxity - -   Varus deformity - -   Valgus deformity - -   Pretibial edema 2+ -   Calf tenderness - -     Examination Right Ankle/Foot Left Ankle/Foot   Skin Intact Intact   Swelling + -   Dorsiflexion 0 10   Plantarflexion 10 25   Deformity - -   Inversion laxity - -   Anterior drawer - -   Medial tenderness - -   Lateral tenderness - -   Heel cord Intact Intact   Sensation Intact Intact   Bunion - -   Toe nails Normal Normal   Capillary refill Normal Normal       PROCEDURE: After timeout and under sterile conditions, right knee aspirated 70 cc of cloudy and turbid fluid. The fluid was sent to the lab for culture and crystals. After discussing treatment options, patient's right knee was injected with 4 cc Marcaine and 1/2 cc Celestone. Chart reviewed for the following:   Gabi Garvey MD, have reviewed the History, Physical and updated the Allergic reactions for [address] performed immediately prior to start of procedure:  Gabi Garvey MD, have performed the following reviews on Lolis Mccarthy prior to the start of the procedure:            * Patient was identified by name and date of birth   * Agreement on procedure being performed was verified  * Risks and Benefits explained to the patient  * Procedure site verified and marked as necessary  * Patient was positioned for comfort  * Consent was obtained     Time: 4:35 PM     Date of procedure: 2/9/2018    Procedure performed by:  Elton Michelle MD    Mr. David Valenzuela tolerated the procedure well with no complications. RADIOGRAPHS:     XR LEFT KNEE 3/23/17  -I have independently reviewed these images during this office visit. -Dr. Yao Cousins:  Three views - No fractures, no effusion, mild joint space narrowing, + osteophytes present. XR RIGHT FOREARM 8/25/16  IMPRESSION:  No fracture. XR LEFT SHOULDER 2/6/16  IMPRESSION:  Interval development of erosions of the distal left clavicle. Possible slight superior offset of the distal left clavicle.   Progression of arthritis at the [facility] joint    XR LEFT KNEE 2/6/16  IMPRESSION:  Large joint effusion. Mild degenerative arthritis. XR RIGHT SHOULDER 4/16/15  IMPRESSION:  1. No acute fracture or subluxation. 2. Mild osteoarthrosis in the glenohumeral joint. 3. Degenerative hypertrophy of the acromioclavicular joint. LABS 4/29/16:   IMPRESSION:  No Growth in 36-48 hours  Monosodium urate crystals seen, intracellular and extracellular. IMPRESSION:      ICD-10-CM ICD-9-CM    1. Acute gout of right knee, unspecified cause M10.9 274.01 bupivacaine, PF, (MARCAINE, PF,) 0.25 % (2.5 mg/mL) injection      betamethasone (CELESTONE SOLUSPAN) 6 mg/mL injection      BETAMETHASONE ACETATE & SODIUM PHOSPHATE INJECTION 3 MG EA.      DRAIN/INJECT LARGE JOINT/BURSA      bupivacaine, PF, (MARCAINE, PF,) 0.25 % (2.5 mg/mL) injection      CRYSTALS, SYNOVIAL FLUID      CULTURE, BODY FLUID W GRAM STAIN      indomethacin (INDOCIN) 50 mg capsule   2. 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 Knee effusion, right M25.461 719.06 bupivacaine, PF, (MARCAINE, PF,) 0.25 % (2.5 mg/mL) injection      betamethasone (CELESTONE SOLUSPAN) 6 mg/mL injection      BETAMETHASONE ACETATE & SODIUM PHOSPHATE INJECTION 3 MG EA.      DRAIN/INJECT LARGE JOINT/BURSA      bupivacaine, PF, (MARCAINE, PF,) 0.25 % (2.5 mg/mL) injection      CRYSTALS, SYNOVIAL FLUID      CULTURE, BODY FLUID W GRAM STAIN      indomethacin (INDOCIN) 50 mg capsule   4. H/O: gout Z87.39 V12.29 indomethacin (INDOCIN) 50 mg capsule   5. Right ankle effusion M25.471 719.07 indomethacin (INDOCIN) 50 mg capsule     PLAN:  After timeout and under sterile conditions, right knee aspirated 70 cc of cloudy and turbid fluid. The fluid was sent to the lab for culture and crystals. After discussing treatment options, patient's right knee was injected with 4 cc Marcaine and 1/2 cc Celestone. He will follow up on Monday, 2/12/18, with the results of his culture and crystals.   He will follow up with his primary care physician for hypertension. He was provided with a prescription for Indocin today.      Scribed by Eliza Gruber ([address] [place] [address] [address]) as dictated by Huma Patel MD

## 2018-02-09 NOTE — MR AVS SNAPSHOT
[address] [place] [address], [address] [place] [address] 
[phone] Patient: Ashtyn Rivas MRN: [id number] :1960 Visit Information Date & Time Provider Department Dept. Phone Encounter #  
 2018  4:20 PM Dee Dee Larios, 27 [facility] - [facility] [id number] [phone] [id number] Follow-up Instructions Return in about 3 days (around 2018). Upcoming Health Maintenance Date Due COLONOSCOPY 3/9/2015 EYE EXAM RETINAL OR DILATED Q1 3/1/2017 Pneumococcal 19-64 Highest Risk (2 of 3 - PCV13) 2017 Influenza Age 5 to Adult 2017 MICROALBUMIN Q1 2018 HEMOGLOBIN A1C Q6M 2018 FOOT EXAM Q1 3/28/2018 LIPID PANEL Q1 10/4/2018 DTaP/Tdap/Td series (2 - Td) 2026 Allergies as of 2018  Review Complete On: 2018 By: Dee Dee Larios MD  
  
 Severity Noted Reaction Type Reactions Shellfish Derived  2016    Other (comments) Causes Gout Current Immunizations  Reviewed on 2017 No immunizations on file. Not reviewed this visit You Were Diagnosed With   
  
 Codes Comments Acute gout of right knee, unspecified cause    -  Primary ICD-10-CM: M10.9 ICD-9-CM: 274.01 Extensor intersection syndrome of right wrist     ICD-10-CM: P19.511 ICD-9-CM: 727.05   
 Knee effusion, right     ICD-10-CM: M25.461 ICD-9-CM: 719.06   
 H/O: gout     ICD-10-CM: Z87.39 
ICD-9-CM: V12.29 Right ankle effusion     ICD-10-CM: M25.471 ICD-9-CM: 719.07 Vitals BP Pulse Temp Resp Height(growth percentile) Weight(growth percentile) (!) 200/113 80 97.7 °F (36.5 °C) (Oral) 18 5' 4\" (1.626 m) 241 lb 3.2 oz (109.4 kg) SpO2 BMI Smoking Status 97% 41.4 kg/m2 Current Every Day Smoker BMI and BSA Data Body Mass Index Body Surface Area  
 41.4 kg/m 2 2.22 m 2 Preferred Pharmacy Pharmacy Name Phone [facility] [address] [place] [facility] [phone] Your Updated Medication List  
  
   
This list is accurate as of: 2/9/18  4:42 PM.  Always use your most recent med list.  
  
  
  
  
 aspirin 81 mg chewable tablet Take 1 Tab by mouth daily. atorvastatin 40 mg tablet Commonly known as:  LIPITOR Take 1 Tab by mouth nightly. B COMPLEX & C NO.20-FOLIC ACID PO Take  by mouth. betamethasone 6 mg/mL injection Commonly known as:  CELESTONE SOLUSPAN  
1 mL by Intra artICUlar route once for 1 dose. bisacodyl 5 mg EC tablet Commonly known as:  DULCOLAX (BISACODYL) Take 1 Tab by mouth daily as needed for Constipation. Blood-Glucose Meter monitoring kit Check twice daily. budesonide-formoterol 160-4.5 mcg/actuation Hfaa Commonly known as:  SYMBICORT Take 2 Puffs by inhalation two (2) times a day. * bupivacaine (PF) 0.25 % (2.5 mg/mL) injection Commonly known as:  MARCAINE (PF) 10 mL by Intra artICUlar route once for 1 dose. * bupivacaine (PF) 0.25 % (2.5 mg/mL) injection Commonly known as:  MARCAINE (PF)  
4 mL by Intra artICUlar route once for 1 dose. calcitRIOL 0.25 mcg capsule Commonly known as:  ROCALTROL Take 1 Cap by mouth every Monday, Wednesday, Friday. carvedilol 6.25 mg tablet Commonly known as:  Nuzhat Locus Take 1 Tab by mouth two (2) times daily (with meals). febuxostat 40 mg Tab tablet Commonly known as:  Silvia Height Take 1 Tab by mouth daily. FLONASE 50 mcg/actuation nasal spray Generic drug:  fluticasone 2 Sprays by Both Nostrils route daily. furosemide 40 mg tablet Commonly known as:  LASIX Take 1 Tab by mouth daily. glucose blood VI test strips strip Commonly known as:  FREESTYLE TEST  
by Does Not Apply route See Admin Instructions. Check twice a day  
  
 hydrALAZINE 50 mg tablet Commonly known as:  APRESOLINE  
 Take 1 Tab by mouth daily. indomethacin 50 mg capsule Commonly known as:  INDOCIN Take 1 Cap by mouth two (2) times a day for 90 days. isosorbide mononitrate ER 60 mg CR tablet Commonly known as:  IMDUR Take 1 Tab by mouth daily. Lancets Misc Check 3 times a day , needs according contour glucometer  
  
 losartan 25 mg tablet Commonly known as:  COZAAR Take 1 Tab by mouth daily. nitroglycerin 0.4 mg SL tablet Commonly known as:  NITROSTAT  
1 Tab by SubLINGual route as needed for Chest Pain. NovoLIN 70/30 100 unit/mL (70-30) injection Generic drug:  insulin NPH/insulin regular INJECT 30 UNITS SC IN THE MORNING AND 20 UNITS SC IN THE EVENING BEFORE SUPPER  
  
 ondansetron hcl 4 mg tablet Commonly known as:  ZOFRAN (AS HYDROCHLORIDE) Take 1 Tab by mouth every eight (8) hours as needed for Nausea. oxyCODONE-acetaminophen 5-325 mg per tablet Commonly known as:  PERCOCET Take 1-2 every 4-6 hours prn pain  
  
 tamsulosin 0.4 mg capsule Commonly known as:  FLOMAX Take 1 Cap by mouth daily. Start this medication on Friday 12/15/17  Indications: Urolithiasis * Notice: This list has 2 medication(s) that are the same as other medications prescribed for you. Read the directions carefully, and ask your doctor or other care provider to review them with you. Prescriptions Sent to Pharmacy Refills  
 indomethacin (INDOCIN) 50 mg capsule 0 Sig: Take 1 Cap by mouth two (2) times a day for 90 days. Class: Normal  
 Pharmacy: [facility] [address] [place], [place] [address] [address] [place] [address] Ph #: [phone] Route: Oral  
  
We Performed the Following BETAMETHASONE ACETATE & SODIUM PHOSPHATE INJECTION 3 MG EA. [ [facility]] DRAIN/INJECT LARGE JOINT/BURSA [id number] CPT(R)] Follow-up Instructions Return in about 3 days (around 2/12/2018). To-Do List   
 02/09/2018 Lab: CRYSTALS, SYNOVIAL FLUID   
  
 02/09/2018 Microbiology:  CULTURE, BODY FLUID W GRAM STAIN Patient Instructions Indomethacin (By mouth) Indomethacin (in-galeas-METH-a-sin) Treats pain. This is an NSAID. Brand Name(s): Indocin, Indocin SR, Tivorbex There may be other brand names for this medicine. When This Medicine Should Not Be Used: This medicine is not right for everyone. Do not use it if you had an allergic reaction (including asthma) to indomethacin, aspirin, or other NSAIDs. Do not use it if you have had a heart surgery (such as coronary artery bypass graft). How to Use This Medicine:  
Capsule, Long Acting Capsule, Liquid · Take your medicine as directed. Your dose may need to be changed several times to find what works best for you. · It is best to take this medicine with food, milk, or antacids so it does not upset your stomach. · Swallow the capsule whole. Do not open, crush, break, or chew it. · Swallow the extended-release capsule whole. Do not crush, break, or chew it. · Oral liquid: Measure the oral liquid medicine with a marked measuring spoon, oral syringe, or medicine cup. Shake well before using. · This medicine should come with a Medication Guide. Ask your pharmacist for a copy if you do not have one. · Missed dose: Take a dose as soon as you remember. If it is almost time for your next dose, wait until then and take a regular dose. Do not take extra medicine to make up for a missed dose. · Store the medicine in a closed container at room temperature, away from heat, moisture, and direct light. Do not freeze the oral liquid. Drugs and Foods to Avoid: Ask your doctor or pharmacist before using any other medicine, including over-the-counter medicines, vitamins, and herbal products. · Do not use any other NSAID medicine unless your doctor says it is okay. Some other NSAIDs are aspirin, celecoxib, diclofenac, diflunisal, ibuprofen, naproxen, or salsalate. · Some foods and medicines can affect how indomethacin works. Tell your doctor if you are using any of the following: ¨ Cyclosporine, digoxin, lithium, methotrexate, pemetrexed, or probenecid ¨ Blood pressure medicine ¨ Blood thinner (including warfarin) ¨ Diuretic (water pill) ¨ Medicine to treat depression [place] Steroid medicine Warnings While Using This Medicine: · Tell your doctor if you are pregnant or breastfeeding. Do not use this medicine during the later part of a pregnancy, unless your doctor tells you to. · Tell your doctor if you have kidney disease, liver disease, asthma, bleeding problems, heart disease, high blood pressure, heart failure, or a history of stomach or bowel problems (including bleeding or ulcers), depression, mental illness, epilepsy, or Parkinson disease. Tell your doctor if you smoke or drink alcohol. · This medicine may cause the following problems: 
¨ Higher risk of blood clots, heart attack, stroke, or heart failure ¨ Bleeding and ulcers in your stomach or intestines ¨ Liver damage ¨ Kidney damage ¨ Serious skin reactions ¨ Changes in vision · Ovulation may be delayed in some women while this medicine is being used. Talk to your doctor if you have concerns about this. · This medicine may make you drowsy. Do not drive or do anything else that could be dangerous until you know how this medicine affects you. · Your doctor will do lab tests at regular visits to check on the effects of this medicine. Keep all appointments. · Keep all medicine out of the reach of children. Never share your medicine with anyone. Possible Side Effects While Using This Medicine:  
Call your doctor right away if you notice any of these side effects: · Allergic reaction: Itching or hives, swelling in your face or hands, swelling or tingling in your mouth or throat, chest tightness, trouble breathing · Blistering, peeling, or red skin rash · Bloody or black, tarry stools, severe stomach pain, vomiting blood or something that looks like coffee grounds · Change in how much or how often you urinate · Chest pain that may spread, trouble breathing, unusual sweating, fainting · Dark urine or pale stools, nausea, vomiting, loss of appetite, stomach pain, yellow skin or eyes · Numbness or weakness on one side of your body, sudden or severe headache, problems with vision, speech, or walking · Rapid weight gain, swelling in your hands, ankles, or feet · Unusual bleeding, bruising, or weakness If you notice these less serious side effects, talk with your doctor: · Constipation, gas, stomach pain · Headache or drowsiness If you notice other side effects that you think are caused by this medicine, tell your doctor. Call your doctor for medical advice about side effects. You may report side effects to FDA at 8-521-FDA-3172 © 2017 [address] Information is for End User's use only and may not be sold, redistributed or otherwise used for commercial purposes. The above information is an  only. It is not intended as medical advice for individual conditions or treatments. Talk to your doctor, nurse or pharmacist before following any medical regimen to see if it is safe and effective for you. Joint Injections: Care Instructions Your Care Instructions Joint injections are shots into a joint, such as the knee. They may be used to put in medicines, such as pain relievers. Or they can be used to take out fluid. Sometimes the fluid is tested in a lab. This can help find the cause of a joint problem. A corticosteroid, or steroid, shot is used to reduce inflammation in tendons or joints. It is often used to treat problems such as arthritis, tendinitis, and bursitis. Steroids can be injected directly into a painful, inflamed joint. They can also help reduce inflammation of a bursa. A bursa is a sac of fluid.  It cushions and lubricates areas where tendons, ligaments, skin, muscles, or bones rub against each other. A steroid shot can sometimes help with short-term pain relief when other treatments haven't worked. If steroid shots help, pain may improve for weeks or months. Follow-up care is a key part of your treatment and safety. Be sure to make and go to all appointments, and call your doctor if you are having problems. It's also a good idea to know your test results and keep a list of the medicines you take. How can you care for yourself at home? · Put ice or a cold pack on the area for 10 to 20 minutes at a time. Put a thin cloth between the ice and your skin. · Take anti-inflammatory medicines to reduce pain, swelling, or inflammation. These include ibuprofen (Advil, Motrin) and naproxen (Aleve). Read and follow all instructions on the label. · Avoid strenuous activities for several days, especially those that put stress on the area where you got the shot. · If you have dressings over the area, keep them clean and dry. You may remove them when your doctor tells you to. When should you call for help? Call your doctor now or seek immediate medical care if: 
? · You have signs of infection, such as: 
¨ Increased pain, swelling, warmth, or redness. ¨ Red streaks leading from the site. ¨ Pus draining from the site. ¨ A fever. ? Watch closely for changes in your health, and be sure to contact your doctor if you have any problems. Where can you learn more? Go to http://leslie-graciela.info/. Enter N616 in the search box to learn more about \"Joint Injections: Care Instructions. \" Current as of: March 21, 2017 Content Version: 11.4 © 4580-2572 [employer]. Care instructions adapted under license by [employer] (which disclaims liability or warranty for this information).  If you have questions about a medical condition or this instruction, always ask your healthcare professional. [address] any warranty or liability for your use of this information. Introducing [facility]! Dear Aly Baugh: Thank you for requesting a VERTILAS account. Our records indicate that you have previously registered for a VERTILAS account but its currently inactive. Please call our VERTILAS support line at [phone]. Additional Information If you have questions, please visit the Frequently Asked Questions section of the VERTILAS website at [email or web address] Remember, VERTILAS is NOT to be used for urgent needs. For medical emergencies, dial 911. Now available from your iPhone and Android! Please provide this summary of care documentation to your next provider. Your primary care clinician is listed as Desmond Horan. If you have any questions after today's visit, please call [phone].

## 2018-02-12 ENCOUNTER — OFFICE VISIT (OUTPATIENT)
Dept: ORTHOPEDIC SURGERY | Facility: CLINIC | Age: 58
End: 2018-02-12

## 2018-02-12 VITALS
HEIGHT: 64 IN | BODY MASS INDEX: 40.53 KG/M2 | RESPIRATION RATE: 18 BRPM | WEIGHT: 237.4 LBS | TEMPERATURE: 98.3 F | DIASTOLIC BLOOD PRESSURE: 107 MMHG | HEART RATE: 76 BPM | SYSTOLIC BLOOD PRESSURE: 184 MMHG | OXYGEN SATURATION: 98 %

## 2018-02-12 DIAGNOSIS — Z87.39 H/O: GOUT: ICD-10-CM

## 2018-02-12 DIAGNOSIS — M25.461 KNEE EFFUSION, RIGHT: ICD-10-CM

## 2018-02-12 DIAGNOSIS — M10.9 ACUTE GOUT OF RIGHT KNEE, UNSPECIFIED CAUSE: Primary | ICD-10-CM

## 2018-02-12 LAB
BODY FLD TYPE: NORMAL
CRYSTALS FLD MICRO: POSITIVE

## 2018-02-12 NOTE — PATIENT INSTRUCTIONS
Gout: Care Instructions  Your Care Instructions    Gout is a form of arthritis caused by a buildup of uric acid crystals in a joint. It causes sudden attacks of pain, swelling, redness, and stiffness, usually in one joint, especially the big toe. Gout usually comes on without a cause. But it can be brought on by drinking alcohol (especially beer) or eating seafood and red meat. Taking certain medicines, such as diuretics or aspirin, also can bring on an attack of gout. Taking your medicines as prescribed and following up with your doctor regularly can help you avoid gout attacks in the future. Follow-up care is a key part of your treatment and safety. Be sure to make and go to all appointments, and call your doctor if you are having problems. It's also a good idea to know your test results and keep a list of the medicines you take. How can you care for yourself at home? · If the joint is swollen, put ice or a cold pack on the area for 10 to 20 minutes at a time. Put a thin cloth between the ice and your skin. · Prop up the sore limb on a pillow when you ice it or anytime you sit or lie down during the next 3 days. Try to keep it above the level of your heart. This will help reduce swelling. · Rest sore joints. Avoid activities that put weight or strain on the joints for a few days. Take short rest breaks from your regular activities during the day. · Take your medicines exactly as prescribed. Call your doctor if you think you are having a problem with your medicine. · Take pain medicines exactly as directed. ¨ If the doctor gave you a prescription medicine for pain, take it as prescribed. ¨ If you are not taking a prescription pain medicine, ask your doctor if you can take an over-the-counter medicine. · Eat less seafood and red meat. · Check with your doctor before drinking alcohol. · Losing weight, if you are overweight, may help reduce attacks of gout. But do not go on a [employer]. \" Losing a lot of weight in a short amount of time can cause a gout attack. When should you call for help? Call your doctor now or seek immediate medical care if:  ? · You have a fever. ? · The joint is so painful you cannot use it. ? · You have sudden, unexplained swelling, redness, warmth, or severe pain in one or more joints. ? Watch closely for changes in your health, and be sure to contact your doctor if:  ? · You have joint pain. ? · Your symptoms get worse or are not improving after 2 or 3 days. Where can you learn more? Go to http://leslie-graciela.info/. Enter D910 in the search box to learn more about \"Gout: Care Instructions. \"  Current as of: October 31, 2016  Content Version: 11.4  © 6513-7669 [employer]. Care instructions adapted under license by [employer] (which disclaims liability or warranty for this information). If you have questions about a medical condition or this instruction, always ask your healthcare professional. [address] any warranty or liability for your use of this information.

## 2018-02-12 NOTE — PROGRESS NOTES
Patient: Claire Norton                MRN: [id number]       SSN: [id number]  YOB: 1960        AGE: 62 y.o. SEX: male    PCP: Jaylene Szymanski MD  02/12/18    Chief Complaint   Patient presents with    Leg Pain     Right     HISTORY:  Claire Norton is a 62 y.o. male who is seen for right hand and right knee pain. He knee pain has improved over the weekend following his aspiration/injection last ov. He reports pain radiating down from his right knee to his ankle and foot. He reports he noticed a tight right knee swelling recently. He notes difficulty sleeping at night. He states he has pain even with light touch when he is experiencing gout symptoms. He denies any previous injury or trauma. He reports that he took Percocet for his right dorsal radial forearm pain with benefit recently. He has a h/o of recurrent gout attacks. He did not benefit from a previous forearm cortisone injection or Norco use. He was previously seen for left knee pain. He has a pain level of 10/10. He is having difficulty standing and bending his knee. He woke up one day with acute knee pain. He saw Dr. Kirsty Morel on 2/9/16. 60 cc of fluid was aspirated. He has taken allopurinol in the past.      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 He lives with is wife in [place]. He receives social security disability benefits for high blood pressure, eye problems, and diabetes. He reports a h/o kidney stones. He is 237 pounds. He is 5'4\".      Last 3 Recorded Weights in this Encounter    02/12/18 1015   Weight: 237 lb 6.4 oz (107.7 kg)     Body mass index is 40.75 kg/(m^2). Patient Active Problem List   Diagnosis Code    Allergic rhinitis J30.9    Neuropathy G62.9    Right knee pain M25.561    Dyslipidemia E78.5    Gout M10.9    CAD (coronary artery disease) I25.10    Hypertensive heart disease I11.9    Obesity (BMI 30-39. 9) E66.9    Constipation K59.00    Tobacco abuse Z72.0    TIA (transient ischemic attack) G45.9    Hepatic steatosis K76.0    Noncompliance Z91.19    Testicular/scrotal pain [id number]    UTI (urinary tract infection) N39.0    Contusion of knee S80.00XA    Knee strain S86.919A    Effusion of patella M25.469    Hemoptysis R04.2    Rash R21    Laceration of gum S01.512A    Fractured tooth S02. 5XXA    Renal stones N20.0    Microalbuminuria R80.9    Hypertriglyceridemia E78.1    Type 2 diabetes mellitus without complication (HCC) J58.9    Acute unilateral obstructive uropathy N13.9    ARF (acute renal failure) ([facility]) N17.9    Essential hypertension I10    Advance directive discussed with patient Z70.80    Acute chest pain R07.9    Left sided numbness R20.0    Chronic kidney disease, stage III (moderate) N18.3    Persistent proteinuria R80.1    Secondary hyperparathyroidism of renal origin (HCC) N25.81    Hypoglycemia E16.2    Cocaine abuse F14.10    Chronic renal disease, stage IV (HCC) N18.4    Acute renal failure (ARF) (HCC) N17.9    Obesity, morbid ([facility]) E66.01    Type 2 diabetes with nephropathy ([facility]) E11.21    Type 2 diabetes mellitus with diabetic neuropathy ([facility]) E11.40     REVIEW OF SYSTEMS: All Below are Negative except: See HPI   Constitutional: negative for fever, chills, and weight loss. Cardiovascular: negative for chest pain, claudication, leg swelling, SOB, SALMERON   Gastrointestinal: Negative for pain, N/V/C/D, Blood in stool or urine, dysuria,  hematuria, incontinence, pelvic pain. Musculoskeletal: See HPI   Neurological: Negative for dizziness and weakness.    Negative for headaches, Visual changes, confusion, seizures   Phychiatric/Behavioral: Negative for depression, memory loss, substance  abuse. Extremities: Negative for hair changes, rash, or skin lesion changes. Hematologic: Negative for bleeding problems, bruising, pallor or swollen lymph  nodes   Peripheral Vascular: No calf pain, no circulation deficits. Social History     Social History    Marital status:      Spouse name: N/A    Number of children: N/A    Years of education: N/A     Occupational History    disabled      Social History Main Topics    Smoking status: Current Every Day Smoker     Packs/day: 0.25     Types: Cigarettes     Last attempt to quit: 6/28/2016    Smokeless tobacco: Never Used    Alcohol use 0.0 oz/week     0 Standard drinks or equivalent per week      Comment: 1 beers a day.  Drug use: No    Sexual activity: Yes     Other Topics Concern    Not on file     Social History Narrative     Allergies   Allergen Reactions    Shellfish Derived Other (comments)     Causes Gout     Current Outpatient Prescriptions   Medication Sig    indomethacin (INDOCIN) 50 mg capsule Take 1 Cap by mouth two (2) times a day for 90 days.  ondansetron hcl (ZOFRAN, AS HYDROCHLORIDE,) 4 mg tablet Take 1 Tab by mouth every eight (8) hours as needed for Nausea.  tamsulosin (FLOMAX) 0.4 mg capsule Take 1 Cap by mouth daily. Start this medication on Friday 12/15/17  Indications: Urolithiasis    glucose blood VI test strips (FREESTYLE TEST) strip by Does Not Apply route See Admin Instructions. Check twice a day    bisacodyl (DULCOLAX, BISACODYL,) 5 mg EC tablet Take 1 Tab by mouth daily as needed for Constipation.  NOVOLIN 70/30 100 unit/mL (70-30) injection INJECT 30 UNITS SC IN THE MORNING AND 20 UNITS SC IN THE EVENING BEFORE SUPPER    hydrALAZINE (APRESOLINE) 50 mg tablet Take 1 Tab by mouth daily.  isosorbide mononitrate ER (IMDUR) 60 mg CR tablet Take 1 Tab by mouth daily.     losartan (COZAAR) 25 mg tablet Take 1 Tab by mouth daily.  atorvastatin (LIPITOR) 40 mg tablet Take 1 Tab by mouth nightly.  fluticasone (FLONASE) 50 mcg/actuation nasal spray 2 Sprays by Both Nostrils route daily.  carvedilol (COREG) 6.25 mg tablet Take 1 Tab by mouth two (2) times daily (with meals).  nitroglycerin (NITROSTAT) 0.4 mg SL tablet 1 Tab by SubLINGual route as needed for Chest Pain. (Patient taking differently: 1 Tab by SubLINGual route as needed for Chest Pain. Pt to take 1 tab q 5 min up to three times, if symptom persist pt. to call 911.)    febuxostat (ULORIC) 40 mg tab tablet Take 1 Tab by mouth daily.  B COMPLEX W-C NO.20/FOLIC ACID (B COMPLEX & C NO.20-FOLIC ACID PO) Take  by mouth.  budesonide-formoterol (SYMBICORT) 160-4.5 mcg/actuation HFA inhaler Take 2 Puffs by inhalation two (2) times a day.  calcitRIOL (ROCALTROL) 0.25 mcg capsule Take 1 Cap by mouth every Monday, Wednesday, Friday.  Lancets misc Check 3 times a day , needs according contour glucometer    Blood-Glucose Meter monitoring kit Check twice daily.  oxyCODONE-acetaminophen (PERCOCET) 5-325 mg per tablet Take 1-2 every 4-6 hours prn pain    furosemide (LASIX) 40 mg tablet Take 1 Tab by mouth daily.  aspirin 81 mg chewable tablet Take 1 Tab by mouth daily. No current facility-administered medications for this visit.       PHYSICAL EXAMINATION:  Visit Vitals    BP (!) 184/107    Pulse 76    Temp 98.3 °F (36.8 °C) (Oral)    Resp 18    Ht 5' 4\" (1.626 m)    Wt 237 lb 6.4 oz (107.7 kg)    SpO2 98%    BMI 40.75 kg/m2     ORTHO EXAMINATION:  Examination Right Elbow Left Elbow   Skin Intact Intact   Range of Motion 130-10 135-0   Tenderness - -   Swelling - -   Bruising - -   Stability Normal Normal   Motor Strength  Normal Normal   Neurovascular Intact Intact     Examination Right Wrist   Skin Intact   Tenderness -   Flexion 20   Extension 25   Deformity -   Effusion -   Finger flexion Full   Finger extension Full   Tinnel's sign -   Phalen's test -   Finklestein maneuver na   Pain with thumb abduction +   Capillary refill -   Swelling and warmth over right distal dorsal radial forearm, tenderness to light touch  Right wrist: 80 pronation/70 supination  Can bend fingers to within 1 cm of palm, full finger extension    Examination Right knee Left knee   Skin Intact Intact   Range of motion 110-0 120-0   Effusion 2+ -   Medial joint line tenderness + -   Lateral joint line tenderness - -   Popliteal tenderness - -   Osteophytes palpable + +   Dylans - -   Patella crepitus + +   Anterior drawer - -   Lateral laxity - -   Medial laxity - -   Varus deformity - -   Valgus deformity - -   Pretibial edema 2+ -   Calf tenderness - -     Examination Right Ankle/Foot Left Ankle/Foot   Skin Intact Intact   Swelling + -   Dorsiflexion 0 10   Plantarflexion 10 25   Deformity - -   Inversion laxity - -   Anterior drawer - -   Medial tenderness - -   Lateral tenderness - -   Heel cord Intact Intact   Sensation Intact Intact   Bunion - -   Toe nails Normal Normal   Capillary refill Normal Normal       Component      Latest Ref Rng & Units 2/9/2018 2/9/2018           4:35 PM  4:35 PM   Special Requests:        NO SPECIAL REQUESTS   GRAM STAIN       NO ORGANISMS SEEN MODERATE WBC'S   Culture result:        NO GROWTH 3 DAYS     Component      Latest Ref Rng & Units 2/9/2018           4:35 PM   FLUID TYPE(7)       RIGHT KNEE   Crystals, body fluid       POSITIVE   MONOSODIUM URATE CRYSTALS OBSERVED [facility]F    RADIOGRAPHS:     XR LEFT KNEE 3/23/17  -I have independently reviewed these images during this office visit. -Dr. Yao Cousins:  Three views - No fractures, no effusion, mild joint space narrowing, + osteophytes present. XR RIGHT FOREARM 8/25/16  IMPRESSION:  No fracture. XR LEFT SHOULDER 2/6/16  IMPRESSION:  Interval development of erosions of the distal left clavicle. Possible slight superior offset of the distal left clavicle. Progression of arthritis at the [facility] joint    XR LEFT KNEE 2/6/16  IMPRESSION:  Large joint effusion. Mild degenerative arthritis. XR RIGHT SHOULDER 4/16/15  IMPRESSION:  1. No acute fracture or subluxation. 2. Mild osteoarthrosis in the glenohumeral joint. 3. Degenerative hypertrophy of the acromioclavicular joint. LABS 4/29/16:   IMPRESSION:  No Growth in 36-48 hours  Monosodium urate crystals seen, intracellular and extracellular. IMPRESSION:      ICD-10-CM ICD-9-CM    1. Acute gout of right knee, unspecified cause M10.9 274.01    2. Knee effusion, right M25.461 719.06    3. H/O: gout Z87.39 V12.29      PLAN: He will follow up as needed. Discontinue Indocin. He will follow up with his primary care physician for hypertension and to discuss allopurinol treatment.      Scribed by Lawson Castaneda ([address]) as dictated by Brian Solorzano MD

## 2018-02-13 ENCOUNTER — HOSPITAL ENCOUNTER (EMERGENCY)
Age: 58
Discharge: HOME | End: 2018-02-13
Attending: EMERGENCY MEDICINE
Payer: MEDICAID

## 2018-02-13 VITALS
HEIGHT: 64 IN | WEIGHT: 240 LBS | RESPIRATION RATE: 16 BRPM | BODY MASS INDEX: 40.97 KG/M2 | TEMPERATURE: 98.2 F | DIASTOLIC BLOOD PRESSURE: 100 MMHG | HEART RATE: 81 BPM | OXYGEN SATURATION: 96 % | SYSTOLIC BLOOD PRESSURE: 182 MMHG

## 2018-02-13 DIAGNOSIS — R73.9 HYPERGLYCEMIA: ICD-10-CM

## 2018-02-13 DIAGNOSIS — I15.9 SECONDARY HYPERTENSION: Primary | ICD-10-CM

## 2018-02-13 DIAGNOSIS — N18.9 CHRONIC RENAL IMPAIRMENT, UNSPECIFIED CKD STAGE: ICD-10-CM

## 2018-02-13 LAB
ANION GAP BLD CALC-SCNC: 13 MMOL/L (ref 10–20)
BUN BLD-MCNC: 37 MG/DL (ref 7–18)
CA-I BLD-MCNC: 1.14 MMOL/L (ref 1.12–1.32)
CHLORIDE BLD-SCNC: 105 MMOL/L (ref 100–108)
CO2 BLD-SCNC: 29 MMOL/L (ref 19–24)
CREAT UR-MCNC: 3.7 MG/DL (ref 0.6–1.3)
GLUCOSE BLD STRIP.AUTO-MCNC: 219 MG/DL (ref 74–106)
HCT VFR BLD CALC: 33 % (ref 36–49)
HGB BLD-MCNC: 11.2 G/DL (ref 12–16)
POTASSIUM BLD-SCNC: 4.2 MMOL/L (ref 3.5–5.5)
SODIUM BLD-SCNC: 142 MMOL/L (ref 136–145)

## 2018-02-13 PROCEDURE — 74011250637 HC RX REV CODE- 250/637: Performed by: EMERGENCY MEDICINE

## 2018-02-13 PROCEDURE — 99283 EMERGENCY DEPT VISIT LOW MDM: CPT

## 2018-02-13 PROCEDURE — 80047 BASIC METABLC PNL IONIZED CA: CPT

## 2018-02-13 RX ORDER — ALBUTEROL SULFATE 90 UG/1
2 AEROSOL, METERED RESPIRATORY (INHALATION)
Qty: 1 INHALER | Refills: 0 | Status: SHIPPED | OUTPATIENT
Start: 2018-02-13 | End: 2018-02-18

## 2018-02-13 RX ORDER — HYDRALAZINE HYDROCHLORIDE 50 MG/1
50 TABLET, FILM COATED ORAL
Status: COMPLETED | OUTPATIENT
Start: 2018-02-13 | End: 2018-02-13

## 2018-02-13 RX ORDER — HYDRALAZINE HYDROCHLORIDE 50 MG/1
50 TABLET, FILM COATED ORAL 2 TIMES DAILY
Qty: 20 TAB | Refills: 0 | Status: SHIPPED | OUTPATIENT
Start: 2018-02-13

## 2018-02-13 RX ORDER — PANTOPRAZOLE SODIUM 20 MG/1
20 TABLET, DELAYED RELEASE ORAL DAILY
Qty: 30 TAB | Refills: 0 | Status: SHIPPED | OUTPATIENT
Start: 2018-02-13 | End: 2018-09-08

## 2018-02-13 RX ADMIN — HYDRALAZINE HYDROCHLORIDE 50 MG: 50 TABLET, FILM COATED ORAL at 11:34

## 2018-02-13 NOTE — ED PROVIDER NOTES
EMERGENCY DEPARTMENT HISTORY AND PHYSICAL EXAM    11:15 AM      Date: 2/13/2018  Patient Name: Yanick Hemphill    History of Presenting Illness     Chief Complaint   Patient presents with    Hypertension         History Provided By: Patient    Chief Complaint: hypertension  Duration: measured over the past 3 days   Timing:  Constant  Location: generalized   Quality: elevated  Severity: measured over 200   Modifying Factors: notes compliance with hypertension medication  Associated Symptoms: urinary frequency      Additional History (Context): Yanick Hemphill is a 62 y.o. male with diabetes, hypertension, hyperlipidemia and obesity who presents with hypertension measured over the past 3 days at various appointments. He notes urinary frequency. He reports compliance with all prescribed blood pressure medication. He also notes some ear pain. Patient notes increased ankle pain last night and took Tylenol. No other symptoms or concerns were expressed. He is not currently followed by nephrology. PCP: Selam Luna MD    Current Outpatient Prescriptions   Medication Sig Dispense Refill    hydrALAZINE (APRESOLINE) 50 mg tablet Take 1 Tab by mouth two (2) times a day. 20 Tab 0    pantoprazole (PROTONIX) 20 mg tablet Take 1 Tab by mouth daily. 30 Tab 0    albuterol (PROVENTIL HFA, VENTOLIN HFA, PROAIR HFA) 90 mcg/actuation inhaler Take 2 Puffs by inhalation every four (4) hours as needed for Wheezing or Shortness of Breath for up to 5 days. 1 Inhaler 0    isosorbide mononitrate ER (IMDUR) 60 mg CR tablet Take 1 Tab by mouth daily. 30 Tab 6    losartan (COZAAR) 25 mg tablet Take 1 Tab by mouth daily. 30 Tab 6    atorvastatin (LIPITOR) 40 mg tablet Take 1 Tab by mouth nightly. 30 Tab 6    furosemide (LASIX) 40 mg tablet Take 1 Tab by mouth daily. 30 Tab 6    carvedilol (COREG) 6.25 mg tablet Take 1 Tab by mouth two (2) times daily (with meals).  (Patient taking differently: Take 25 mg by mouth two (2) times daily (with meals). ) 60 Tab 6    aspirin 81 mg chewable tablet Take 1 Tab by mouth daily. 30 Tab 0    indomethacin (INDOCIN) 50 mg capsule Take 1 Cap by mouth two (2) times a day for 90 days. 30 Cap 0    oxyCODONE-acetaminophen (PERCOCET) 5-325 mg per tablet Take 1-2 every 4-6 hours prn pain 16 Tab 0    ondansetron hcl (ZOFRAN, AS HYDROCHLORIDE,) 4 mg tablet Take 1 Tab by mouth every eight (8) hours as needed for Nausea. 10 Tab 0    tamsulosin (FLOMAX) 0.4 mg capsule Take 1 Cap by mouth daily. Start this medication on Friday 12/15/17  Indications: Urolithiasis 6 Cap 0    glucose blood VI test strips (FREESTYLE TEST) strip by Does Not Apply route See Admin Instructions. Check twice a day 100 Strip 2    bisacodyl (DULCOLAX, BISACODYL,) 5 mg EC tablet Take 1 Tab by mouth daily as needed for Constipation. 30 Tab 0    NOVOLIN 70/30 100 unit/mL (70-30) injection INJECT 30 UNITS SC IN THE MORNING AND 20 UNITS SC IN THE EVENING BEFORE SUPPER 10 mL 4    fluticasone (FLONASE) 50 mcg/actuation nasal spray 2 Sprays by Both Nostrils route daily.  nitroglycerin (NITROSTAT) 0.4 mg SL tablet 1 Tab by SubLINGual route as needed for Chest Pain. (Patient taking differently: 1 Tab by SubLINGual route as needed for Chest Pain. Pt to take 1 tab q 5 min up to three times, if symptom persist pt. to call 911.) 20 Tab 0    febuxostat (ULORIC) 40 mg tab tablet Take 1 Tab by mouth daily. 30 Tab 1    B COMPLEX W-C NO.20/FOLIC ACID (B COMPLEX & C NO.20-FOLIC ACID PO) Take  by mouth.  budesonide-formoterol (SYMBICORT) 160-4.5 mcg/actuation HFA inhaler Take 2 Puffs by inhalation two (2) times a day. 1 Inhaler 0    calcitRIOL (ROCALTROL) 0.25 mcg capsule Take 1 Cap by mouth every Monday, Wednesday, Friday. 15 Cap 0    Lancets misc Check 3 times a day , needs according contour glucometer 1 Package 11    Blood-Glucose Meter monitoring kit Check twice daily.  1 kit 0       Past History     Past Medical History:  Past Medical History: Diagnosis Date    Alcohol abuse     Arthritis     Atherosclerotic cardiovascular disease     CAD (coronary artery disease)     mild disease of coronaries (cor angio 2006),wife states he has 1 stent    Cardiac cath 01/26/2006    RCA mild. Otherwise patent coronaries. LVEDP 15.  EF 55-60%.  Cardiac echocardiogram 03/27/2009    EF 60%. No cardiac source of embolism. No significant valvular pathology.  Cardiac nuclear imaging test 12/06/2011    Small, mild inferior infarction or possibly artifact. No ischemia. EF 45%. No TID. No reg WMA.  Cardiovascular LLE venous duplex 08/14/2015    Left leg:  No DVT. Dilated lymph node in left groin.  Constipation     Diabetes mellitus type II     Gout     Hepatic steatosis     Hypercholesterolemia     Hypertension     Knee effusion, left     Left knee pain     Morbid obesity (HCC)     Noncompliance     Obesity (BMI 30-39. 9)     S/P colonoscopy 3/8/05    Dr. Jossue Garcia; normal; f/u 10 years    Stomach problems     TIA (transient ischemic attack) 3/09    Tobacco abuse        Past Surgical History:  Past Surgical History:   Procedure Laterality Date    ABDOMEN SURGERY PROC UNLISTED      Hernia repair in 1960's or 1970's.  HX ORTHOPAEDIC      Bones spur removed from left & right foot 2013.  HX UROLOGICAL  8/18/2015    [facility], Dr. Genet Lopez, Cystoscopy, left retrograde, left double-J cath       Family History:  Family History   Problem Relation Age of Onset    Diabetes Father     Heart Disease Mother     Diabetes Sister     Hypertension Sister     Arthritis-osteo Sister     Arthritis-osteo Brother     Diabetes Other     Diabetes Other      Uncle, Aunt    Hypertension Other      Uncle;  Aunt       Social History:  Social History   Substance Use Topics    Smoking status: Current Every Day Smoker     Packs/day: 0.25     Types: Cigarettes     Last attempt to quit: 6/28/2016    Smokeless tobacco: Never Used    Alcohol use 0.0 oz/week 0 Standard drinks or equivalent per week      Comment: 1 beers a day. Allergies: Allergies   Allergen Reactions    Shellfish Derived Other (comments)     Causes Gout         Review of Systems       Review of Systems   Constitutional: Negative for activity change, fatigue and fever. HENT: Negative for congestion and rhinorrhea. Eyes: Negative for visual disturbance. Respiratory: Negative for shortness of breath. Cardiovascular: Negative for chest pain and palpitations.        (+) hypertension   Gastrointestinal: Negative for abdominal pain, diarrhea, nausea and vomiting. Genitourinary: Negative for dysuria and hematuria. Musculoskeletal: Negative for back pain. Skin: Negative for rash. Neurological: Negative for dizziness, weakness and light-headedness. Physical Exam     Visit Vitals    BP (!) 182/100  Comment: Denies any dizziness, HA or chest pain.  Pulse 81    Temp 98.2 °F (36.8 °C)    Resp 16    Ht 5' 4\" (1.626 m)    Wt 108.9 kg (240 lb)    SpO2 96%    BMI 41.2 kg/m2         Physical Exam   Constitutional: He is oriented to person, place, and time. He appears well-developed and well-nourished. No distress. overweight   HENT:   Head: Normocephalic and atraumatic. Right Ear: External ear normal.   Left Ear: External ear normal.   Nose: Nose normal.   Mouth/Throat: Oropharynx is clear and moist.   Eyes: Conjunctivae and EOM are normal. Pupils are equal, round, and reactive to light. No scleral icterus. Neck: Normal range of motion. Neck supple. No JVD present. No tracheal deviation present. No thyromegaly present. Cardiovascular: Normal rate, regular rhythm, normal heart sounds and intact distal pulses. Exam reveals no gallop and no friction rub. No murmur heard. Pulmonary/Chest: Effort normal and breath sounds normal. He exhibits no tenderness. Abdominal: Soft. Bowel sounds are normal. He exhibits no distension. There is no tenderness.  There is no rebound and no guarding. Musculoskeletal: Normal range of motion. He exhibits edema (trace lower extremity) and tenderness (mild to right ankle). Lymphadenopathy:     He has no cervical adenopathy. Neurological: He is alert and oriented to person, place, and time. No cranial nerve deficit. Coordination normal.   No sensory loss, Gait normal, Motor 5/5   Skin: Skin is warm and dry. Psychiatric: He has a normal mood and affect. His behavior is normal. Judgment and thought content normal.   Nursing note and vitals reviewed. Diagnostic Study Results     Labs -  Recent Results (from the past 12 hour(s))   POC CHEM8    Collection Time: 02/13/18 11:50 AM   Result Value Ref Range    CO2, POC 29 (H) 19 - 24 MMOL/L    Glucose,  (H) 74 - 106 MG/DL    BUN, POC 37 (H) 7 - 18 MG/DL    Creatinine, POC 3.7 (H) 0.6 - 1.3 MG/DL    GFRAA, POC 21 (L) >60 ml/min/1.73m2    GFRNA, POC 17 (L) >60 ml/min/1.73m2    Sodium,  136 - 145 MMOL/L    Potassium, POC 4.2 3.5 - 5.5 MMOL/L    Calcium, ionized (POC) 1.14 1.12 - 1.32 MMOL/L    Chloride,  100 - 108 MMOL/L    Anion gap, POC 13 10 - 20      Hematocrit, POC 33 (L) 36 - 49 %    Hemoglobin, POC 11.2 (L) 12 - 16 G/DL       Radiologic Studies -   No orders to display         Medical Decision Making   I am the first provider for this patient. I reviewed the vital signs, available nursing notes, past medical history, past surgical history, family history and social history. Vital Signs-Reviewed the patient's vital signs. Pulse Oximetry Analysis -  96% on room air (Interpretation)    Records Reviewed: Nursing Notes and Old Medical Records (Time of Review: 11:15 AM)    ED Course: Progress Notes, Reevaluation, and Consults:    12:34 PM  Patient creatinine at 3.7, below level measured in December. Consult:  Discussed care with Dr. Robert Leon of [facility]. Discussed patient's elevated BP reading and .  He can be seen at the office by Dr. Rom Lozano Thursday at [facility]. Requests patient be prescribed Hydralazine 50 mg BID until this appointment. 1:22 PM, 2/13/2018       Provider Notes (Medical Decision Making): patient is a 62year old male with extensive history of DM, HTN, poorly controlled obesity and progressive renal disease presents after multiple elevated BP readings at various outpatient appointments. He is compliant with Coreg, Losartan, Lasix and Imdur. He is now being seen by Nancy Rodriguez. He was on 50 mg Hydralazine, which has been stopped for some reason. Family believes that this may have been due to a change in PCP. Will evaluate renal function, dive dose of Hydralazine and discuss with [facility]. Diagnosis     Clinical Impression:   1. Secondary hypertension    2. Hyperglycemia    3. Chronic renal impairment, unspecified CKD stage        Disposition: Discharge    Follow-up Information     Follow up With Details Comments Contact Info    [address] in 2 days ED visit [facility] [address] [facility] [facility]    [facility] EM[facility]GENC[facility] DEPT Go to As needed, If symptoms worsen [address] [place] [id number]  [phone]           Patient's Medications   Start Taking    ALBUTEROL (PROVENTIL HFA, VENTOLIN HFA, PROAIR HFA) 90 MCG/ACTUATION INHALER    Take 2 Puffs by inhalation every four (4) hours as needed for Wheezing or Shortness of Breath for up to 5 days. PANTOPRAZOLE (PROTONIX) 20 MG TABLET    Take 1 Tab by mouth daily. Continue Taking    ASPIRIN 81 MG CHEWABLE TABLET    Take 1 Tab by mouth daily. ATORVASTATIN (LIPITOR) 40 MG TABLET    Take 1 Tab by mouth nightly. B COMPLEX W-C NO.20/FOLIC ACID (B COMPLEX & C NO.20-FOLIC ACID PO)    Take  by mouth. BISACODYL (DULCOLAX, BISACODYL,) 5 MG EC TABLET    Take 1 Tab by mouth daily as needed for Constipation. BLOOD-GLUCOSE METER MONITORING KIT    Check twice daily.     BUDESONIDE-FORMOTEROL (SYMBICORT) 160-4.5 MCG/ACTUATION HFA INHALER    Take 2 Puffs by inhalation two (2) times a day. CALCITRIOL (ROCALTROL) 0.25 MCG CAPSULE    Take 1 Cap by mouth every Monday, Wednesday, Friday. CARVEDILOL (COREG) 6.25 MG TABLET    Take 1 Tab by mouth two (2) times daily (with meals). FEBUXOSTAT (ULORIC) 40 MG TAB TABLET    Take 1 Tab by mouth daily. FLUTICASONE (FLONASE) 50 MCG/ACTUATION NASAL SPRAY    2 Sprays by Both Nostrils route daily. FUROSEMIDE (LASIX) 40 MG TABLET    Take 1 Tab by mouth daily. GLUCOSE BLOOD VI TEST STRIPS (FREESTYLE TEST) STRIP    by Does Not Apply route See Admin Instructions. Check twice a day    INDOMETHACIN (INDOCIN) 50 MG CAPSULE    Take 1 Cap by mouth two (2) times a day for 90 days. ISOSORBIDE MONONITRATE ER (IMDUR) 60 MG CR TABLET    Take 1 Tab by mouth daily. LANCETS MISC    Check 3 times a day , needs according contour glucometer    LOSARTAN (COZAAR) 25 MG TABLET    Take 1 Tab by mouth daily. NITROGLYCERIN (NITROSTAT) 0.4 MG SL TABLET    1 Tab by SubLINGual route as needed for Chest Pain. NOVOLIN 70/30 100 UNIT/ML (70-30) INJECTION    INJECT 30 UNITS SC IN THE MORNING AND 20 UNITS SC IN THE EVENING BEFORE SUPPER    ONDANSETRON HCL (ZOFRAN, AS HYDROCHLORIDE,) 4 MG TABLET    Take 1 Tab by mouth every eight (8) hours as needed for Nausea. OXYCODONE-ACETAMINOPHEN (PERCOCET) 5-325 MG PER TABLET    Take 1-2 every 4-6 hours prn pain    TAMSULOSIN (FLOMAX) 0.4 MG CAPSULE    Take 1 Cap by mouth daily. Start this medication on Friday 12/15/17  Indications: Urolithiasis   These Medications have changed    Modified Medication Previous Medication    HYDRALAZINE (APRESOLINE) 50 MG TABLET hydrALAZINE (APRESOLINE) 50 mg tablet       Take 1 Tab by mouth two (2) times a day. Take 1 Tab by mouth daily.    Stop Taking    No medications on file     _______________________________    Attestations:  Shyam Mcclellan acting as a scribe for and in the presence of Erlinda Jones MD      February 13, 2018 at 11:15 AM       Provider Attestation:      I personally performed the services described in the documentation, reviewed the documentation, as recorded by the scribe in my presence, and it accurately and completely records my words and actions.  February 13, 2018 at 11:15 AM - Kiara Glaser MD    _______________________________

## 2018-02-13 NOTE — ED TRIAGE NOTES
Pt here with elevated BP. Reports occasional headaches, blurred vision, sob. Denies CP. C/o headache at present. I performed a brief evaluation, including history and physical, of the patient here in triage and I have determined that pt will need further treatment and evaluation from the main side ER physician. I have placed initial orders to help in expediting patients care.     Cristy Omalley PA-C        Visit Vitals    BP (!) 182/100    Pulse 81    Temp 98.2 °F (36.8 °C)    Resp 16    Ht 5' 4\" (1.626 m)    Wt 108.9 kg (240 lb)    SpO2 96%    BMI 41.2 kg/m2

## 2018-02-13 NOTE — ED TRIAGE NOTES
Pt. States \"I went to the foot doctor and the dentist yesterday and they said my pressure was up\" pt. States \"when I wake up it feels like I have pain on this side of my face\" refers to right side of face.  Denies any N/V

## 2018-02-13 NOTE — DISCHARGE INSTRUCTIONS
Learning About High Blood Sugar  What is high blood sugar? Your body turns the food you eat into glucose (sugar), which it uses for energy. But if your body isn't able to use the sugar right away, it can build up in your blood and lead to high blood sugar. When the amount of sugar in your blood stays too high for too much of the time, you may have diabetes. Diabetes is a disease that can cause serious health problems. The good news is that lifestyle changes may help you get your blood sugar back to normal and avoid or delay diabetes. What causes high blood sugar? Sugar (glucose) can build up in your blood if you:  · Are overweight. · Have a family history of diabetes. · Take certain medicines, such as steroids. What are the symptoms? Having high blood sugar may not cause any symptoms at all. Or it may make you feel very thirsty or very hungry. You may also urinate more often than usual, have blurry vision, or lose weight without trying. How is high blood sugar treated? You can take steps to lower your blood sugar level if you understand what makes it get higher. Your doctor may want you to learn how to test your blood sugar level at home. Then you can see how illness, stress, or different kinds of food or medicine raise or lower your blood sugar level. Other tests may be needed to see if you have diabetes. How can you prevent high blood sugar? · Watch your weight. If you're overweight, losing just a small amount of weight may help. Reducing fat around your waist is most important. · Limit the amount of calories, sweets, and unhealthy fat you eat. Ask your doctor if a dietitian can help you. A registered dietitian can help you create meal plans that fit your lifestyle. · Get at least 30 minutes of exercise on most days of the week. Exercise helps control your blood sugar. It also helps you maintain a healthy weight. Walking is a good choice.  You also may want to do other activities, such as running, swimming, cycling, or playing tennis or team sports. · If your doctor prescribed medicines, take them exactly as prescribed. Call your doctor if you think you are having a problem with your medicine. You will get more details on the specific medicines your doctor prescribes. Follow-up care is a key part of your treatment and safety. Be sure to make and go to all appointments, and call your doctor if you are having problems. It's also a good idea to know your test results and keep a list of the medicines you take. Where can you learn more? Go to http://leslie-graciela.info/. Enter O108 in the search box to learn more about \"Learning About High Blood Sugar. \"  Current as of: March 13, 2017  Content Version: 11.4  © 0999-3798 [employer]. Care instructions adapted under license by Dr. Tariff (which disclaims liability or warranty for this information). If you have questions about a medical condition or this instruction, always ask your healthcare professional. [place] [address] any warranty or liability for your use of this information. High Blood Pressure: Care Instructions  Your Care Instructions    If your blood pressure is usually above 140/90, you have high blood pressure, or hypertension. That means the top number is 140 or higher or the bottom number is 90 or higher, or both. Despite what a lot of people think, high blood pressure usually doesn't cause headaches or make you feel dizzy or lightheaded. It usually has no symptoms. But it does increase your risk for heart attack, stroke, and kidney or eye damage. The higher your blood pressure, the more your risk increases. Your doctor will give you a goal for your blood pressure. Your goal will be based on your health and your age. An example of a goal is to keep your blood pressure below 140/90.   Lifestyle changes, such as eating healthy and being active, are always important to help lower blood pressure. You might also take medicine to reach your blood pressure goal.  Follow-up care is a key part of your treatment and safety. Be sure to make and go to all appointments, and call your doctor if you are having problems. It's also a good idea to know your test results and keep a list of the medicines you take. How can you care for yourself at home? Medical treatment  · If you stop taking your medicine, your blood pressure will go back up. You may take one or more types of medicine to lower your blood pressure. Be safe with medicines. Take your medicine exactly as prescribed. Call your doctor if you think you are having a problem with your medicine. · Talk to your doctor before you start taking aspirin every day. Aspirin can help certain people lower their risk of a heart attack or stroke. But taking aspirin isn't right for everyone, because it can cause serious bleeding. · See your doctor regularly. You may need to see the doctor more often at first or until your blood pressure comes down. · If you are taking blood pressure medicine, talk to your doctor before you take decongestants or anti-inflammatory medicine, such as ibuprofen. Some of these medicines can raise blood pressure. · Learn how to check your blood pressure at home. Lifestyle changes  · Stay at a healthy weight. This is especially important if you put on weight around the waist. Losing even 10 pounds can help you lower your blood pressure. · If your doctor recommends it, get more exercise. Walking is a good choice. Bit by bit, increase the amount you walk every day. Try for at least 30 minutes on most days of the week. You also may want to swim, bike, or do other activities. · Avoid or limit alcohol. Talk to your doctor about whether you can drink any alcohol. · Try to limit how much sodium you eat to less than 2,300 milligrams (mg) a day. Your doctor may ask you to try to eat less than 1,500 mg a day.   · Eat plenty of fruits (such as bananas and oranges), vegetables, legumes, whole grains, and low-fat dairy products. · Lower the amount of saturated fat in your diet. Saturated fat is found in animal products such as milk, cheese, and meat. Limiting these foods may help you lose weight and also lower your risk for heart disease. · Do not smoke. Smoking increases your risk for heart attack and stroke. If you need help quitting, talk to your doctor about stop-smoking programs and medicines. These can increase your chances of quitting for good. When should you call for help? Call 911 anytime you think you may need emergency care. This may mean having symptoms that suggest that your blood pressure is causing a serious heart or blood vessel problem. Your blood pressure may be over 180/110. ? For example, call 911 if:  ? · You have symptoms of a heart attack. These may include:  ¨ Chest pain or pressure, or a strange feeling in the chest.  ¨ Sweating. ¨ Shortness of breath. ¨ Nausea or vomiting. ¨ Pain, pressure, or a strange feeling in the back, neck, jaw, or upper belly or in one or both shoulders or arms. ¨ Lightheadedness or sudden weakness. ¨ A fast or irregular heartbeat. ? · You have symptoms of a stroke. These may include:  ¨ Sudden numbness, tingling, weakness, or loss of movement in your face, arm, or leg, especially on only one side of your body. ¨ Sudden vision changes. ¨ Sudden trouble speaking. ¨ Sudden confusion or trouble understanding simple statements. ¨ Sudden problems with walking or balance. ¨ A sudden, severe headache that is different from past headaches. ? · You have severe back or belly pain. ?Do not wait until your blood pressure comes down on its own. Get help right away. ?Call your doctor now or seek immediate care if:  ? · Your blood pressure is much higher than normal (such as 180/110 or higher), but you don't have symptoms.    ? · You think high blood pressure is causing symptoms, such as:  ¨ Severe headache. ¨ Blurry vision. ? Watch closely for changes in your health, and be sure to contact your doctor if:  ? · Your blood pressure measures 140/90 or higher at least 2 times. That means the top number is 140 or higher or the bottom number is 90 or higher, or both. ? · You think you may be having side effects from your blood pressure medicine. ? · Your blood pressure is usually normal, but it goes above normal at least 2 times. Where can you learn more? Go to http://leslie-graciela.info/. Enter F450 in the search box to learn more about \"High Blood Pressure: Care Instructions. \"  Current as of: September 21, 2016  Content Version: 11.4  © 1614-5879 [employer]. Care instructions adapted under license by [employer] (which disclaims liability or warranty for this information). If you have questions about a medical condition or this instruction, always ask your healthcare professional. [place] [address] any warranty or liability for your use of this information.

## 2018-02-14 LAB
BACTERIA SPEC CULT: NORMAL
GRAM STN SPEC: NORMAL
GRAM STN SPEC: NORMAL
SERVICE CMNT-IMP: NORMAL

## 2018-02-27 ENCOUNTER — OFFICE VISIT (OUTPATIENT)
Dept: ORTHOPEDIC SURGERY | Facility: CLINIC | Age: 58
End: 2018-02-27

## 2018-02-27 VITALS
TEMPERATURE: 97.2 F | HEIGHT: 64 IN | HEART RATE: 84 BPM | SYSTOLIC BLOOD PRESSURE: 162 MMHG | WEIGHT: 240 LBS | OXYGEN SATURATION: 95 % | BODY MASS INDEX: 40.97 KG/M2 | DIASTOLIC BLOOD PRESSURE: 92 MMHG

## 2018-02-27 DIAGNOSIS — M79.672 LEFT FOOT PAIN: ICD-10-CM

## 2018-02-27 DIAGNOSIS — M25.40 EFFUSION INTO JOINT: ICD-10-CM

## 2018-02-27 DIAGNOSIS — G89.29 CHRONIC PAIN OF LEFT KNEE: ICD-10-CM

## 2018-02-27 DIAGNOSIS — M17.12 PRIMARY OSTEOARTHRITIS OF LEFT KNEE: Primary | ICD-10-CM

## 2018-02-27 DIAGNOSIS — M25.562 CHRONIC PAIN OF LEFT KNEE: ICD-10-CM

## 2018-02-27 RX ORDER — TRIAMCINOLONE ACETONIDE 40 MG/ML
40 INJECTION, SUSPENSION INTRA-ARTICULAR; INTRAMUSCULAR ONCE
Qty: 1 ML | Refills: 0
Start: 2018-02-27 | End: 2018-02-27

## 2018-02-27 NOTE — PROGRESS NOTES
HISTORY:  The patient is a well-known, 63-year-old, obese, -American male who presents to the office alongside his wife complaining of severe left knee pain. He has a history of osteoarthritis of the left knee. He has been treated in the past under the care of Dr. Solis Montana for right knee osteoarthritis and gout. He is a Stage 4 kidney failure patient. He was taken off of Zyloprim/Allopurinol by his nephrologist secondary to his Stage 4 disease. Regarding his current symptoms he did get into some shrimp a couple of days ago at a gathering. He believes that is what triggered his gouty attack. He has had exposure to Colchicine in the past but notes that it has not helped very much. His pain today is in his left knee, severe, and in his left ankle. He denies any trauma to the left lower extremity. He is unable to bear weight secondary to left knee pain and has difficulty bending secondary to the pain as well. He denies any fevers, chills, or night sweats. REVIEW OF SYSTEMS:  Exertional dyspnea, chronic in nature. No fevers, chills, or night sweats reported. No warmth or erythema associated by report to the left knee. No chest pain. He is an insulin-dependent diabetic. EXAMINATION:  Physical examination today reveals a pleasant, healthy appearing, well-developed, well-nourished, pleasant, obese, 63-year-old, -American male, atraumatic and normocephalic. He is alert and oriented x 3. He is found sitting comfortably in the examination room today on the table. He lies supine without difficulty. Examination of left knee with two bumps flexed to 30º reveals a 2+ effusion. He has pain along the medial and lateral joint lines. There is some diffuse swelling over the lateral ankle. Range of motion testing is not possible based on his guarding with pain and swelling in the knee. There is no erythema or evidence of sepsis. There is no evidence of DVT or calf tenderness.  Distal sensation intact fully in the left lower extremity. There is trace lateral effusion of the left knee noted with no warmth or erythema associated. RADIOGRAPHS:  X-rays, two views of the knee and the ankle reveal moderate osteoarthritic changes noted tricompartmentally. No acute fractures or dislocations noted. Ankle mortise is balance. PROCEDURE:  Today, using sterile technique after verbal and written consent were obtained with appropriate time-out performed, 3 cc of 1% Lidocaine is used to anesthetize the left knee using the superolateral intraarticular approach. To follow, 144 cc of cloudy, yellow, blood-tinted, particulate-laden aspirate was removed. To follow 10 cc of Marcaine 0.25% mixed with 1 mL of Kenalog 40 mg/mL was injected in the knee. The patient tolerated the procedures well. IMPRESSION:    1. Left knee effusion. 2. Left knee pain, severe. 3. Likely left knee gout attack, causing left knee and left ankle pain. 4. Left ankle pain. 5. Trace effusion in the left ankle. PLAN:  I am currently recommending an aspiration/injection of his left knee today. The patient will go home and look for his Colchicine. He is advised when he gets home to take two tablets and in about four hours after that he will take another. Again, counseling on purine, limiting his diet with high-purine containing foods is discussed. Today all the patients questions are answered to his satisfaction. A copy of the x-rays are reviewed and provided.

## 2018-03-23 RX ORDER — LOSARTAN POTASSIUM 25 MG/1
25 TABLET ORAL DAILY
Qty: 30 TAB | Refills: 1 | Status: SHIPPED | OUTPATIENT
Start: 2018-03-23 | End: 2018-09-08

## 2018-05-12 ENCOUNTER — APPOINTMENT (OUTPATIENT)
Dept: GENERAL RADIOLOGY | Age: 58
End: 2018-05-12
Attending: PHYSICIAN ASSISTANT
Payer: MEDICARE

## 2018-05-12 ENCOUNTER — HOSPITAL ENCOUNTER (EMERGENCY)
Age: 58
Discharge: HOME | End: 2018-05-12
Attending: EMERGENCY MEDICINE
Payer: MEDICARE

## 2018-05-12 VITALS
OXYGEN SATURATION: 97 % | SYSTOLIC BLOOD PRESSURE: 172 MMHG | HEART RATE: 84 BPM | RESPIRATION RATE: 14 BRPM | DIASTOLIC BLOOD PRESSURE: 104 MMHG

## 2018-05-12 DIAGNOSIS — R03.0 ELEVATED BLOOD PRESSURE READING: ICD-10-CM

## 2018-05-12 DIAGNOSIS — M10.9 ACUTE GOUT OF LEFT ELBOW, UNSPECIFIED CAUSE: Primary | ICD-10-CM

## 2018-05-12 DIAGNOSIS — M10.9 ACUTE GOUT OF RIGHT WRIST, UNSPECIFIED CAUSE: ICD-10-CM

## 2018-05-12 PROCEDURE — 73080 X-RAY EXAM OF ELBOW: CPT

## 2018-05-12 PROCEDURE — 99283 EMERGENCY DEPT VISIT LOW MDM: CPT

## 2018-05-12 PROCEDURE — 74011250637 HC RX REV CODE- 250/637: Performed by: PHYSICIAN ASSISTANT

## 2018-05-12 RX ORDER — HYDROCODONE BITARTRATE AND ACETAMINOPHEN 5; 325 MG/1; MG/1
1 TABLET ORAL
Status: COMPLETED | OUTPATIENT
Start: 2018-05-12 | End: 2018-05-12

## 2018-05-12 RX ORDER — FEBUXOSTAT 40 MG/1
40 TABLET, FILM COATED ORAL DAILY
Qty: 14 TAB | Refills: 1 | Status: ON HOLD | OUTPATIENT
Start: 2018-05-12 | End: 2020-07-06 | Stop reason: SDUPTHER

## 2018-05-12 RX ORDER — HYDROCODONE BITARTRATE AND ACETAMINOPHEN 5; 325 MG/1; MG/1
1 TABLET ORAL
Qty: 4 TAB | Refills: 0 | Status: SHIPPED | OUTPATIENT
Start: 2018-05-12 | End: 2018-09-08

## 2018-05-12 RX ADMIN — HYDROCODONE BITARTRATE AND ACETAMINOPHEN 1 TABLET: 5; 325 TABLET ORAL at 10:02

## 2018-05-12 NOTE — DISCHARGE INSTRUCTIONS
Gout: Care Instructions  Your Care Instructions    Gout is a form of arthritis caused by a buildup of uric acid crystals in a joint. It causes sudden attacks of pain, swelling, redness, and stiffness, usually in one joint, especially the big toe. Gout usually comes on without a cause. But it can be brought on by drinking alcohol (especially beer) or eating seafood and red meat. Taking certain medicines, such as diuretics or aspirin, also can bring on an attack of gout. Taking your medicines as prescribed and following up with your doctor regularly can help you avoid gout attacks in the future. Follow-up care is a key part of your treatment and safety. Be sure to make and go to all appointments, and call your doctor if you are having problems. It's also a good idea to know your test results and keep a list of the medicines you take. How can you care for yourself at home? · If the joint is swollen, put ice or a cold pack on the area for 10 to 20 minutes at a time. Put a thin cloth between the ice and your skin. · Prop up the sore limb on a pillow when you ice it or anytime you sit or lie down during the next 3 days. Try to keep it above the level of your heart. This will help reduce swelling. · Rest sore joints. Avoid activities that put weight or strain on the joints for a few days. Take short rest breaks from your regular activities during the day. · Take your medicines exactly as prescribed. Call your doctor if you think you are having a problem with your medicine. · Take pain medicines exactly as directed. ¨ If the doctor gave you a prescription medicine for pain, take it as prescribed. ¨ If you are not taking a prescription pain medicine, ask your doctor if you can take an over-the-counter medicine. · Eat less seafood and red meat. · Check with your doctor before drinking alcohol. · Losing weight, if you are overweight, may help reduce attacks of gout. But do not go on a [employer]. \" Losing a lot of weight in a short amount of time can cause a gout attack. When should you call for help? Call your doctor now or seek immediate medical care if:  ? · You have a fever. ? · The joint is so painful you cannot use it. ? · You have sudden, unexplained swelling, redness, warmth, or severe pain in one or more joints. ? Watch closely for changes in your health, and be sure to contact your doctor if:  ? · You have joint pain. ? · Your symptoms get worse or are not improving after 2 or 3 days. Where can you learn more? Go to http://leslie-graciela.info/. Enter C591 in the search box to learn more about \"Gout: Care Instructions. \"  Current as of: October 31, 2016  Content Version: 11.4  © 0748-2347 [employer]. Care instructions adapted under license by [employer] (which disclaims liability or warranty for this information). If you have questions about a medical condition or this instruction, always ask your healthcare professional. [place] [address] any warranty or liability for your use of this information. Elevated Blood Pressure: Care Instructions  Your Care Instructions    Blood pressure is a measure of how hard the blood pushes against the walls of your arteries. It's normal for blood pressure to go up and down throughout the day. But if it stays up over time, you have high blood pressure. Two numbers tell you your blood pressure. The first number is the systolic pressure. It shows how hard the blood pushes when your heart is pumping. The second number is the diastolic pressure. It shows how hard the blood pushes between heartbeats, when your heart is relaxed and filling with blood. An ideal blood pressure in adults is less than 120/80 (say \"120 over 80\"). High blood pressure is 140/90 or higher. You have high blood pressure if your top number is 140 or higher or your bottom number is 90 or higher, or both.   The main test for high blood pressure is simple, fast, and painless. To diagnose high blood pressure, your doctor will test your blood pressure at different times. After testing your blood pressure, your doctor may ask you to test it again when you are home. If you are diagnosed with high blood pressure, you can work with your doctor to make a long-term plan to manage it. Follow-up care is a key part of your treatment and safety. Be sure to make and go to all appointments, and call your doctor if you are having problems. It's also a good idea to know your test results and keep a list of the medicines you take. How can you care for yourself at home? · Do not smoke. Smoking increases your risk for heart attack and stroke. If you need help quitting, talk to your doctor about stop-smoking programs and medicines. These can increase your chances of quitting for good. · Stay at a healthy weight. · Try to limit how much sodium you eat to less than 2,300 milligrams (mg) a day. Your doctor may ask you to try to eat less than 1,500 mg a day. · Be physically active. Get at least 30 minutes of exercise on most days of the week. Walking is a good choice. You also may want to do other activities, such as running, swimming, cycling, or playing tennis or team sports. · Avoid or limit alcohol. Talk to your doctor about whether you can drink any alcohol. · Eat plenty of fruits, vegetables, and low-fat dairy products. Eat less saturated and total fats. · Learn how to check your blood pressure at home. When should you call for help? Call your doctor now or seek immediate medical care if:  ? · Your blood pressure is much higher than normal (such as 180/110 or higher). ? · You think high blood pressure is causing symptoms such as:  ¨ Severe headache. ¨ Blurry vision. ? Watch closely for changes in your health, and be sure to contact your doctor if:  ? · You do not get better as expected. Where can you learn more?   Go to http://leslie-graciela.info/. Enter L057 in the search box to learn more about \"Elevated Blood Pressure: Care Instructions. \"  Current as of: September 21, 2016  Content Version: 11.4  © 0982-0577 [employer]. Care instructions adapted under license by [employer] (which disclaims liability or warranty for this information). If you have questions about a medical condition or this instruction, always ask your healthcare professional. [place] [address] any warranty or liability for your use of this information.

## 2018-05-12 NOTE — ED PROVIDER NOTES
EMERGENCY DEPARTMENT HISTORY AND PHYSICAL EXAM    Date: 5/12/2018  Patient Name: Iraj Henriquez    History of Presenting Illness     Chief Complaint   Patient presents with    Arm Pain     left    Arm swelling     left         History Provided By: Patient    Chief Complaint: left elbow pain  Duration: 2 Days  Timing:  Acute  Location: left elbow  Quality: Aching  Severity: 10 out of 10  Modifying Factors:   Associated Symptoms: Edema of the elbow. Additional History (Context): Iraj Henriquez is a 62 y.o. male with gout and arthritis who presents with left elbow pain and swelling onset yesterday. No recent trauma. Pt states pain is similar to prior bouts of gout, but he has only had gout in right knee and toes previously. Denies eating seafood, red meats, alcohol. Pain not relieved with Tylenol. He is also reporting he feels like his left wrist feels tight and like he cannot fully close it. Pt states he started having some right knee pain yesterday that felt like gout but that seems to have resolved. PCP: Selam Luna MD    Current Outpatient Prescriptions   Medication Sig Dispense Refill    febuxostat (ULORIC) 40 mg tab tablet Take 1 Tab by mouth daily. 14 Tab 1    HYDROcodone-acetaminophen (NORCO) 5-325 mg per tablet Take 1 Tab by mouth every four (4) hours as needed for Pain. Max Daily Amount: 6 Tabs. 4 Tab 0    losartan (COZAAR) 25 mg tablet Take 1 Tab by mouth daily. 30 Tab 1    hydrALAZINE (APRESOLINE) 50 mg tablet Take 1 Tab by mouth two (2) times a day. 20 Tab 0    pantoprazole (PROTONIX) 20 mg tablet Take 1 Tab by mouth daily. 30 Tab 0    ondansetron hcl (ZOFRAN, AS HYDROCHLORIDE,) 4 mg tablet Take 1 Tab by mouth every eight (8) hours as needed for Nausea. 10 Tab 0    tamsulosin (FLOMAX) 0.4 mg capsule Take 1 Cap by mouth daily.  Start this medication on Friday 12/15/17  Indications: Urolithiasis 6 Cap 0    glucose blood VI test strips (FREESTYLE TEST) strip by Does Not Apply route See Admin Instructions. Check twice a day 100 Strip 2    bisacodyl (DULCOLAX, BISACODYL,) 5 mg EC tablet Take 1 Tab by mouth daily as needed for Constipation. 30 Tab 0    NOVOLIN 70/30 100 unit/mL (70-30) injection INJECT 30 UNITS SC IN THE MORNING AND 20 UNITS SC IN THE EVENING BEFORE SUPPER 10 mL 4    isosorbide mononitrate ER (IMDUR) 60 mg CR tablet Take 1 Tab by mouth daily. 30 Tab 6    atorvastatin (LIPITOR) 40 mg tablet Take 1 Tab by mouth nightly. 30 Tab 6    furosemide (LASIX) 40 mg tablet Take 1 Tab by mouth daily. 30 Tab 6    fluticasone (FLONASE) 50 mcg/actuation nasal spray 2 Sprays by Both Nostrils route daily.  carvedilol (COREG) 6.25 mg tablet Take 1 Tab by mouth two (2) times daily (with meals). (Patient taking differently: Take 25 mg by mouth two (2) times daily (with meals). ) 60 Tab 6    nitroglycerin (NITROSTAT) 0.4 mg SL tablet 1 Tab by SubLINGual route as needed for Chest Pain. (Patient taking differently: 1 Tab by SubLINGual route as needed for Chest Pain. Pt to take 1 tab q 5 min up to three times, if symptom persist pt. to call 911.) 20 Tab 0    aspirin 81 mg chewable tablet Take 1 Tab by mouth daily. 30 Tab 0    B COMPLEX W-C NO.20/FOLIC ACID (B COMPLEX & C NO.20-FOLIC ACID PO) Take  by mouth.  budesonide-formoterol (SYMBICORT) 160-4.5 mcg/actuation HFA inhaler Take 2 Puffs by inhalation two (2) times a day. 1 Inhaler 0    calcitRIOL (ROCALTROL) 0.25 mcg capsule Take 1 Cap by mouth every Monday, Wednesday, Friday. 15 Cap 0    Lancets misc Check 3 times a day , needs according contour glucometer 1 Package 11    Blood-Glucose Meter monitoring kit Check twice daily. 1 kit 0       Past History     Past Medical History:  Past Medical History:   Diagnosis Date    Alcohol abuse     Arthritis     Atherosclerotic cardiovascular disease     CAD (coronary artery disease)     mild disease of coronaries (cor angio 2006),wife states he has 1 stent    Cardiac cath 01/26/2006    RCA mild. Otherwise patent coronaries. LVEDP 15.  EF 55-60%.  Cardiac echocardiogram 03/27/2009    EF 60%. No cardiac source of embolism. No significant valvular pathology.  Cardiac nuclear imaging test 12/06/2011    Small, mild inferior infarction or possibly artifact. No ischemia. EF 45%. No TID. No reg WMA.  Cardiovascular LLE venous duplex 08/14/2015    Left leg:  No DVT. Dilated lymph node in left groin.  Constipation     Diabetes mellitus type II     Gout     Hepatic steatosis     Hypercholesterolemia     Hypertension     Knee effusion, left     Left knee pain     Morbid obesity (HCC)     Noncompliance     Obesity (BMI 30-39. 9)     S/P colonoscopy 3/8/05    Dr. Katja Combs; normal; f/u 10 years    Stomach problems     TIA (transient ischemic attack) 3/09    Tobacco abuse        Past Surgical History:  Past Surgical History:   Procedure Laterality Date    ABDOMEN SURGERY PROC UNLISTED      Hernia repair in 1960's or 1970's.  HX ORTHOPAEDIC      Bones spur removed from left & right foot 2013.  HX UROLOGICAL  [facility]18/2015    [facility] Dr. Maria Victoria Abreu, Cystoscopy, left retrograde, left double-J cath       Family History:  Family History   Problem Relation Age of Onset    Diabetes Father     Heart Disease Mother     Diabetes Sister     Hypertension Sister     Arthritis-osteo Sister     Arthritis-osteo Brother     Diabetes Other     Diabetes Other      Uncle, Aunt    Hypertension Other      Uncle; Aunt       Social History:  Social History   Substance Use Topics    Smoking status: Current Every Day Smoker     Packs/day: 0.25     Types: Cigarettes     Last attempt to quit: 6/28/2016    Smokeless tobacco: Never Used    Alcohol use 0.0 oz/week     0 Standard drinks or equivalent per week      Comment: 1 beers a day. Allergies:   Allergies   Allergen Reactions    Shellfish Derived Other (comments)     Causes Gout         Review of Systems   Review of Systems   Constitutional: Negative for fever. Musculoskeletal: Positive for arthralgias. Skin: Negative for color change and wound. All other systems reviewed and are negative. All Other Systems Negative  Physical Exam     Vitals:    05/12/18 0816   BP: (!) 194/104   Pulse: 84   Resp: 18   SpO2: 97%     Physical Exam   Constitutional: He appears well-developed and well-nourished. No distress. HENT:   Head: Normocephalic and atraumatic. Right Ear: External ear normal.   Left Ear: External ear normal.   Nose: Nose normal.   Eyes: Conjunctivae are normal.   Neck: Normal range of motion. Cardiovascular: Normal rate and regular rhythm. Pulmonary/Chest: Effort normal. No respiratory distress. Musculoskeletal:   Edema and reproducible tenderness to the left elbow. No overlying erythema, warmth, or skin breaks appreciated. Pain with passive and active flexion and extension at the elbow. No edema appreciated distally. Cap refill < 2 seconds. Neurological: He is alert. Skin: Skin is warm and dry. He is not diaphoretic. Psychiatric: He has a normal mood and affect. Vitals reviewed. Diagnostic Study Results     Labs -   No results found for this or any previous visit (from the past 12 hour(s)). Radiologic Studies -   XR ELBOW LT MIN 3 V    (Results Pending)     CT Results  (Last 48 hours)    None        CXR Results  (Last 48 hours)    None            Medical Decision Making   I am the first provider for this patient. I reviewed the vital signs, available nursing notes, past medical history, past surgical history, family history and social history. Vital Signs-Reviewed the patient's vital signs. Records Reviewed: Nursing Notes    Procedures:  Procedures    Provider Notes (Medical Decision Making): Pt with atraumatic elbow edema and pain, feels similar to prior bouts of gout. No evidence of overlying skin infection at this time. No trauma or skin breaks to suspect bursitis.  Will give sling for comfort, pain medications for presumed gout and advise orthopedist follow up. MED RECONCILIATION:  No current facility-administered medications for this encounter. Current Outpatient Prescriptions   Medication Sig    febuxostat (ULORIC) 40 mg tab tablet Take 1 Tab by mouth daily.  HYDROcodone-acetaminophen (NORCO) 5-325 mg per tablet Take 1 Tab by mouth every four (4) hours as needed for Pain. Max Daily Amount: 6 Tabs.  losartan (COZAAR) 25 mg tablet Take 1 Tab by mouth daily.  hydrALAZINE (APRESOLINE) 50 mg tablet Take 1 Tab by mouth two (2) times a day.  pantoprazole (PROTONIX) 20 mg tablet Take 1 Tab by mouth daily.  ondansetron hcl (ZOFRAN, AS HYDROCHLORIDE,) 4 mg tablet Take 1 Tab by mouth every eight (8) hours as needed for Nausea.  tamsulosin (FLOMAX) 0.4 mg capsule Take 1 Cap by mouth daily. Start this medication on Friday 12/15/17  Indications: Urolithiasis    glucose blood VI test strips (FREESTYLE TEST) strip by Does Not Apply route See Admin Instructions. Check twice a day    bisacodyl (DULCOLAX, BISACODYL,) 5 mg EC tablet Take 1 Tab by mouth daily as needed for Constipation.  NOVOLIN 70/30 100 unit/mL (70-30) injection INJECT 30 UNITS SC IN THE MORNING AND 20 UNITS SC IN THE EVENING BEFORE SUPPER    isosorbide mononitrate ER (IMDUR) 60 mg CR tablet Take 1 Tab by mouth daily.  atorvastatin (LIPITOR) 40 mg tablet Take 1 Tab by mouth nightly.  furosemide (LASIX) 40 mg tablet Take 1 Tab by mouth daily.  fluticasone (FLONASE) 50 mcg/actuation nasal spray 2 Sprays by Both Nostrils route daily.  carvedilol (COREG) 6.25 mg tablet Take 1 Tab by mouth two (2) times daily (with meals). (Patient taking differently: Take 25 mg by mouth two (2) times daily (with meals). )    nitroglycerin (NITROSTAT) 0.4 mg SL tablet 1 Tab by SubLINGual route as needed for Chest Pain. (Patient taking differently: 1 Tab by SubLINGual route as needed for Chest Pain.  Pt to take 1 tab q 5 min up to three times, if symptom persist pt. to call 911.)    aspirin 81 mg chewable tablet Take 1 Tab by mouth daily.  B COMPLEX W-C NO.20/FOLIC ACID (B COMPLEX & C NO.20-FOLIC ACID PO) Take  by mouth.  budesonide-formoterol (SYMBICORT) 160-4.5 mcg/actuation HFA inhaler Take 2 Puffs by inhalation two (2) times a day.  calcitRIOL (ROCALTROL) 0.25 mcg capsule Take 1 Cap by mouth every Monday, Wednesday, Friday.  Lancets misc Check 3 times a day , needs according contour glucometer    Blood-Glucose Meter monitoring kit Check twice daily. Disposition:  Discharge. DISCHARGE NOTE:   10:11 AM  Pt has been reexamined. Patient has no new complaints, changes, or physical findings. Care plan outlined and precautions discussed. Results of xray were reviewed with the patient. All medications were reviewed with the patient. All of pt's questions and concerns were addressed. Patient was instructed and agrees to follow up with orthopedist, as well as to return to the ED upon further deterioration. Patient is ready to go home. Diagnosis     Clinical Impression:   1. Acute gout of left elbow, unspecified cause    2. Elevated blood pressure reading    3. Acute gout of right wrist, unspecified cause        Follow-up Information     Follow up With Details Comments Contact Info    Dara Westbrook PA-C Schedule an appointment as soon as possible for a visit  [address] [facility]  [facility] [address]      [facility] EM[facility]Y DEPT  As needed, If symptoms worsen [address] [address]  [phone]          Current Discharge Medication List      START taking these medications    Details   HYDROcodone-acetaminophen (NORCO) 5-325 mg per tablet Take 1 Tab by mouth every four (4) hours as needed for Pain. Max Daily Amount: 6 Tabs.   Qty: 4 Tab, Refills: 0    Associated Diagnoses: Acute gout of left elbow, unspecified cause         CONTINUE these medications which have CHANGED    Details   febuxostat (ULORIC) 40 mg tab tablet Take 1 Tab by mouth daily.   Qty: 14 Tab, Refills: 1    Associated Diagnoses: Acute gout of right wrist, unspecified cause               Khadijah Montana PA-C

## 2018-05-12 NOTE — ED TRIAGE NOTES
PT reports left arm swelling/pain since yesterday, L arm is noticeably swollen/deformed incomparison to R arm, denies trauma

## 2018-09-06 ENCOUNTER — HOSPITAL ENCOUNTER (INPATIENT)
Age: 58
LOS: 1 days | Discharge: HOME | DRG: 683 | End: 2018-09-08
Attending: EMERGENCY MEDICINE | Admitting: HOSPITALIST
Payer: MEDICARE

## 2018-09-06 ENCOUNTER — APPOINTMENT (OUTPATIENT)
Dept: ULTRASOUND IMAGING | Age: 58
DRG: 683 | End: 2018-09-06
Attending: PHYSICIAN ASSISTANT
Payer: MEDICARE

## 2018-09-06 ENCOUNTER — APPOINTMENT (OUTPATIENT)
Dept: CT IMAGING | Age: 58
DRG: 683 | End: 2018-09-06
Attending: PHYSICIAN ASSISTANT
Payer: MEDICARE

## 2018-09-06 DIAGNOSIS — R10.9 RIGHT SIDED ABDOMINAL PAIN: ICD-10-CM

## 2018-09-06 DIAGNOSIS — N17.9 ACUTE RENAL FAILURE, UNSPECIFIED ACUTE RENAL FAILURE TYPE (HCC): Primary | ICD-10-CM

## 2018-09-06 DIAGNOSIS — K85.90 ACUTE PANCREATITIS, UNSPECIFIED COMPLICATION STATUS, UNSPECIFIED PANCREATITIS TYPE: ICD-10-CM

## 2018-09-06 DIAGNOSIS — N50.811 TESTICULAR PAIN, RIGHT: ICD-10-CM

## 2018-09-06 LAB
ALBUMIN SERPL-MCNC: 3.3 G/DL (ref 3.4–5)
ALBUMIN/GLOB SERPL: 0.9 {RATIO} (ref 0.8–1.7)
ALP SERPL-CCNC: 50 U/L (ref 45–117)
ALT SERPL-CCNC: 21 U/L (ref 16–61)
ANION GAP SERPL CALC-SCNC: 8 MMOL/L (ref 3–18)
AST SERPL-CCNC: 17 U/L (ref 15–37)
BASOPHILS # BLD: 0 K/UL (ref 0–0.1)
BASOPHILS NFR BLD: 0 % (ref 0–2)
BILIRUB SERPL-MCNC: 0.2 MG/DL (ref 0.2–1)
BUN SERPL-MCNC: 63 MG/DL (ref 7–18)
BUN/CREAT SERPL: 11 (ref 12–20)
CALCIUM SERPL-MCNC: 8.2 MG/DL (ref 8.5–10.1)
CHLORIDE SERPL-SCNC: 112 MMOL/L (ref 100–108)
CO2 SERPL-SCNC: 25 MMOL/L (ref 21–32)
CREAT SERPL-MCNC: 5.77 MG/DL (ref 0.6–1.3)
DIFFERENTIAL METHOD BLD: ABNORMAL
EOSINOPHIL # BLD: 0.3 K/UL (ref 0–0.4)
EOSINOPHIL NFR BLD: 4 % (ref 0–5)
ERYTHROCYTE [DISTWIDTH] IN BLOOD BY AUTOMATED COUNT: 15.5 % (ref 11.6–14.5)
GLOBULIN SER CALC-MCNC: 3.5 G/DL (ref 2–4)
GLUCOSE SERPL-MCNC: 136 MG/DL (ref 74–99)
HCT VFR BLD AUTO: 33.1 % (ref 36–48)
HGB BLD-MCNC: 10.5 G/DL (ref 13–16)
LIPASE SERPL-CCNC: 619 U/L (ref 73–393)
LYMPHOCYTES # BLD: 2.3 K/UL (ref 0.9–3.6)
LYMPHOCYTES NFR BLD: 30 % (ref 21–52)
MCH RBC QN AUTO: 27.2 PG (ref 24–34)
MCHC RBC AUTO-ENTMCNC: 31.7 G/DL (ref 31–37)
MCV RBC AUTO: 85.8 FL (ref 74–97)
MONOCYTES # BLD: 0.6 K/UL (ref 0.05–1.2)
MONOCYTES NFR BLD: 8 % (ref 3–10)
NEUTS SEG # BLD: 4.5 K/UL (ref 1.8–8)
NEUTS SEG NFR BLD: 58 % (ref 40–73)
PLATELET # BLD AUTO: 299 K/UL (ref 135–420)
PMV BLD AUTO: 10.6 FL (ref 9.2–11.8)
POTASSIUM SERPL-SCNC: 4.9 MMOL/L (ref 3.5–5.5)
PROT SERPL-MCNC: 6.8 G/DL (ref 6.4–8.2)
RBC # BLD AUTO: 3.86 M/UL (ref 4.7–5.5)
SODIUM SERPL-SCNC: 145 MMOL/L (ref 136–145)
WBC # BLD AUTO: 7.7 K/UL (ref 4.6–13.2)

## 2018-09-06 PROCEDURE — 85025 COMPLETE CBC W/AUTO DIFF WBC: CPT | Performed by: PHYSICIAN ASSISTANT

## 2018-09-06 PROCEDURE — 74176 CT ABD & PELVIS W/O CONTRAST: CPT

## 2018-09-06 PROCEDURE — 99285 EMERGENCY DEPT VISIT HI MDM: CPT

## 2018-09-06 PROCEDURE — 96374 THER/PROPH/DIAG INJ IV PUSH: CPT

## 2018-09-06 PROCEDURE — 96361 HYDRATE IV INFUSION ADD-ON: CPT

## 2018-09-06 PROCEDURE — 74011250636 HC RX REV CODE- 250/636: Performed by: PHYSICIAN ASSISTANT

## 2018-09-06 PROCEDURE — 80053 COMPREHEN METABOLIC PANEL: CPT | Performed by: PHYSICIAN ASSISTANT

## 2018-09-06 PROCEDURE — 96375 TX/PRO/DX INJ NEW DRUG ADDON: CPT

## 2018-09-06 PROCEDURE — 83690 ASSAY OF LIPASE: CPT | Performed by: PHYSICIAN ASSISTANT

## 2018-09-06 PROCEDURE — 74011000250 HC RX REV CODE- 250: Performed by: PHYSICIAN ASSISTANT

## 2018-09-06 PROCEDURE — 94640 AIRWAY INHALATION TREATMENT: CPT

## 2018-09-06 PROCEDURE — 76870 US EXAM SCROTUM: CPT

## 2018-09-06 RX ORDER — MORPHINE SULFATE 4 MG/ML
4 INJECTION, SOLUTION INTRAMUSCULAR; INTRAVENOUS
Status: COMPLETED | OUTPATIENT
Start: 2018-09-06 | End: 2018-09-06

## 2018-09-06 RX ORDER — IPRATROPIUM BROMIDE AND ALBUTEROL SULFATE 2.5; .5 MG/3ML; MG/3ML
3 SOLUTION RESPIRATORY (INHALATION)
Status: COMPLETED | OUTPATIENT
Start: 2018-09-06 | End: 2018-09-06

## 2018-09-06 RX ORDER — SODIUM CHLORIDE 9 MG/ML
1000 INJECTION, SOLUTION INTRAVENOUS ONCE
Status: COMPLETED | OUTPATIENT
Start: 2018-09-06 | End: 2018-09-07

## 2018-09-06 RX ORDER — ONDANSETRON 2 MG/ML
4 INJECTION INTRAMUSCULAR; INTRAVENOUS
Status: COMPLETED | OUTPATIENT
Start: 2018-09-06 | End: 2018-09-06

## 2018-09-06 RX ADMIN — MORPHINE SULFATE 4 MG: 4 INJECTION, SOLUTION INTRAMUSCULAR; INTRAVENOUS at 23:20

## 2018-09-06 RX ADMIN — SODIUM CHLORIDE 1000 ML: 900 INJECTION, SOLUTION INTRAVENOUS at 23:25

## 2018-09-06 RX ADMIN — IPRATROPIUM BROMIDE AND ALBUTEROL SULFATE 3 ML: .5; 3 SOLUTION RESPIRATORY (INHALATION) at 23:23

## 2018-09-06 RX ADMIN — ONDANSETRON 4 MG: 2 INJECTION INTRAMUSCULAR; INTRAVENOUS at 23:20

## 2018-09-06 NOTE — IP AVS SNAPSHOT
[address] [id number] [facility] [address] [place] [address] Patient: Blade Foote MRN: [id number] :1960 About your hospitalization You were admitted on:  2018 You last received care in the:  [address] [place] [address] You were discharged on:  2018 Why you were hospitalized Your primary diagnosis was:  Acute On Chronic Renal Failure (Hcc) Your diagnoses also included:  Hypertensive Heart Disease, Type Ii Diabetes Mellitus With Nephropathy (Hcc), Pancreatic Insufficiency, Acute Pain In Scrotum Follow-up Information Follow up With Details Comments Contact Info Selam Luna MD   Patient can only remember the practice name and not the physician Alla Sheridan MD In 2 weeks  [address] [address] [facility] 
[phone] Sanya Jose MD In 3 months  [address] [place] [address] [address] [place] [address] 
[phone] Discharge Orders None A check tiffani indicates which time of day the medication should be taken. My Medications START taking these medications Instructions Each Dose to Equal  
 Morning Noon Evening Bedtime  
 lipase-protease-amylase 6,000-19,000 -30,000 unit capsule Commonly known as:  CREON 6000 Your last dose was: Your next dose is: Take 1 Cap by mouth three (3) times daily (with meals). Indications: exocrine pancreatic insufficiency 1 Cap CHANGE how you take these medications Instructions Each Dose to Equal  
 Morning Noon Evening Bedtime  
 carvedilol 6.25 mg tablet Commonly known as:  Clejamarcus Berrios What changed:  how much to take Your last dose was: Your next dose is: Take 1 Tab by mouth two (2) times daily (with meals). 6.25 mg  
    
   
   
   
  
 nitroglycerin 0.4 mg SL tablet Commonly known as:  NITROSTAT What changed:  additional instructions Your last dose was: Your next dose is:    
   
   
 1 Tab by SubLINGual route as needed for Chest Pain. 0.4 mg  
    
   
   
   
  
  
CONTINUE taking these medications Instructions Each Dose to Equal  
 Morning Noon Evening Bedtime  
 aspirin 81 mg chewable tablet Your last dose was: Your next dose is: Take 1 Tab by mouth daily. 81 mg  
    
   
   
   
  
 atorvastatin 40 mg tablet Commonly known as:  LIPITOR Your last dose was: Your next dose is: Take 1 Tab by mouth nightly. 40 mg  
    
   
   
   
  
 B COMPLEX & C NO.20-FOLIC ACID PO Your last dose was: Your next dose is: Take  by mouth.  
     
   
   
   
  
 bisacodyl 5 mg EC tablet Commonly known as:  DULCOLAX (BISACODYL) Your last dose was: Your next dose is: Take 1 Tab by mouth daily as needed for Constipation. 5 mg Blood-Glucose Meter monitoring kit Your last dose was: Your next dose is:    
   
   
 Check twice daily. budesonide-formoterol 160-4.5 mcg/actuation Hfaa Commonly known as:  SYMBICORT Your last dose was: Your next dose is: Take 2 Puffs by inhalation two (2) times a day. 2 Puff  
    
   
   
   
  
 calcitRIOL 0.25 mcg capsule Commonly known as:  ROCALTROL Your last dose was: Your next dose is: Take 1 Cap by mouth every Monday, Wednesday, Friday. 0.25 mcg  
    
   
   
   
  
 febuxostat 40 mg Tab tablet Commonly known as:  William Dalton Your last dose was: Your next dose is: Take 1 Tab by mouth daily. 40 mg  
    
   
   
   
  
 FLONASE 50 mcg/actuation nasal spray Generic drug:  fluticasone Your last dose was: Your next dose is: 2 Sprays by Both Nostrils route daily. 2 [place] furosemide 40 mg tablet Commonly known as:  LASIX Your last dose was: Your next dose is: Take 1 Tab by mouth daily. 40 mg  
    
   
   
   
  
 glucose blood VI test strips strip Commonly known as:  FREESTYLE TEST Your last dose was: Your next dose is:    
   
   
 by Does Not Apply route See Admin Instructions. Check twice a day  
     
   
   
   
  
 hydrALAZINE 50 mg tablet Commonly known as:  APRESOLINE Your last dose was: Your next dose is: Take 1 Tab by mouth two (2) times a day. 50 mg  
    
   
   
   
  
 isosorbide mononitrate ER 60 mg CR tablet Commonly known as:  IMDUR Your last dose was: Your next dose is: Take 1 Tab by mouth daily. 60 mg Lancets Misc Your last dose was: Your next dose is:    
   
   
 Check 3 times a day , needs according contour glucometer NovoLIN 70/30 U-100 Insulin 100 unit/mL (70-30) injection Generic drug:  insulin NPH/insulin regular Your last dose was: Your next dose is: INJECT 30 UNITS SC IN THE MORNING AND 20 UNITS SC IN THE EVENING BEFORE SUPPER  
     
   
   
   
  
 omeprazole 20 mg capsule Commonly known as:  PRILOSEC Your last dose was: Your next dose is: Take 20 mg by mouth daily. 20 mg  
    
   
   
   
  
 ondansetron hcl 4 mg tablet Commonly known as:  Jose G Serranoman Your last dose was: Your next dose is: Take 1 Tab by mouth every eight (8) hours as needed for Nausea. 4 mg  
    
   
   
   
  
 tamsulosin 0.4 mg capsule Commonly known as:  FLOMAX Your last dose was: Your next dose is: Take 1 Cap by mouth daily. Start this medication on Friday 12/15/17  Indications: Urolithiasis 0.4 mg  
    
   
   
   
  
  
STOP taking these medications HYDROcodone-acetaminophen 5-325 mg per tablet Commonly known as:  NORCO  
   
  
 losartan 25 mg tablet Commonly known as:  COZAAR  
   
  
 pantoprazole 20 mg tablet Commonly known as:  PROTONIX Where to Get Your Medications Information on where to get these meds will be given to you by the nurse or doctor. ! Ask your nurse or doctor about these medications  
  lipase-protease-amylase 6,000-19,000 -30,000 unit capsule Discharge Instructions Abdominal Pain: Care Instructions Your Care Instructions Abdominal pain has many possible causes. Some aren't serious and get better on their own in a few days. Others need more testing and treatment. If your pain continues or gets worse, you need to be rechecked and may need more tests to find out what is wrong. You may need surgery to correct the problem. Don't ignore new symptoms, such as fever, nausea and vomiting, urination problems, pain that gets worse, and dizziness. These may be signs of a more serious problem. Your doctor may have recommended a follow-up visit in the next 8 to 12 hours. If you are not getting better, you may need more tests or treatment. The doctor has checked you carefully, but problems can develop later. If you notice any problems or new symptoms, get medical treatment right away. Follow-up care is a key part of your treatment and safety. Be sure to make and go to all appointments, and call your doctor if you are having problems. It's also a good idea to know your test results and keep a list of the medicines you take. How can you care for yourself at home? · Rest until you feel better. · To prevent dehydration, drink plenty of fluids, enough so that your urine is light yellow or clear like water. Choose water and other caffeine-free clear liquids until you feel better.  If you have kidney, heart, or liver disease and have to limit fluids, talk with your doctor before you increase the amount of fluids you drink. · If your stomach is upset, eat mild foods, such as rice, dry toast or crackers, bananas, and applesauce. Try eating several small meals instead of two or three large ones. · Wait until 48 hours after all symptoms have gone away before you have spicy foods, alcohol, and drinks that contain caffeine. · Do not eat foods that are high in fat. · Avoid anti-inflammatory medicines such as aspirin, ibuprofen (Advil, Motrin), and naproxen (Aleve). These can cause stomach upset. Talk to your doctor if you take daily aspirin for another health problem. When should you call for help? Call 911 anytime you think you may need emergency care. For example, call if: 
  · You passed out (lost consciousness).  
  · You pass maroon or very bloody stools.  
  · You vomit blood or what looks like coffee grounds.  
  · You have new, severe belly pain.  
 Call your doctor now or seek immediate medical care if: 
  · Your pain gets worse, especially if it becomes focused in one area of your belly.  
  · You have a new or higher fever.  
  · Your stools are black and look like tar, or they have streaks of blood.  
  · You have unexpected vaginal bleeding.  
  · You have symptoms of a urinary tract infection. These may include: 
¨ Pain when you urinate. ¨ Urinating more often than usual. 
¨ Blood in your urine.  
  · You are dizzy or lightheaded, or you feel like you may faint.  
 Watch closely for changes in your health, and be sure to contact your doctor if: 
  · You are not getting better after 1 day (24 hours). Where can you learn more? Go to http://leslie-graciela.info/. Enter X296 in the search box to learn more about \"Abdominal Pain: Care Instructions. \" Current as of: November 20, 2017 Content Version: 11.7 © 4868-9244 CoinKeeper.  Care instructions adapted under license by [employer] (which disclaims liability or warranty for this information). If you have questions about a medical condition or this instruction, always ask your healthcare professional. [address] any warranty or liability for your use of this information. Logisticare Activation Thank you for requesting access to Logisticare. Please follow the instructions below to securely access and download your online medical record. Logisticare allows you to send messages to your doctor, view your test results, renew your prescriptions, schedule appointments, and more. How Do I Sign Up? 1. In your internet browser, go to [email or web address] 
2. Click on the First Time User? Click Here link in the Sign In box. You will be redirect to the New Member Sign Up page. 3. Enter your Logisticare Access Code exactly as it appears below. You will not need to use this code after youve completed the sign-up process. If you do not sign up before the expiration date, you must request a new code. Logisticare Access Code: [email or web address] Expires: 2018  9:03 PM (This is the date your Logisticare access code will ) 4. Enter the last four digits of your Social Security Number (xxxx) and Date of Birth (mm/dd/yyyy) as indicated and click Submit. You will be taken to the next sign-up page. 5. Create a Logisticare ID. This will be your Logisticare login ID and cannot be changed, so think of one that is secure and easy to remember. 6. Create a Logisticare password. You can change your password at any time. 7. Enter your Password Reset Question and Answer. This can be used at a later time if you forget your password. 8. Enter your e-mail address. You will receive e-mail notification when new information is available in [address] 9. Click Sign Up. You can now view and download portions of your medical record. 10. Click the Download Summary menu link to download a portable copy of your medical information. Additional Information If you have questions, please visit the Frequently Asked Questions section of the [employer] website at [email or web address] Remember, MyChart is NOT to be used for urgent needs. For medical emergencies, dial 911. Patient armband removed and shredded DISCHARGE SUMMARY from Nurse PATIENT INSTRUCTIONS: 
 
 
F-face looks uneven A-arms unable to move or move unevenly S-speech slurred or non-existent T-time-call 911 as soon as signs and symptoms begin-DO NOT go Back to bed or wait to see if you get better-TIME IS BRAIN. Warning Signs of HEART ATTACK Call 911 if you have these symptoms: 
? Chest discomfort. Most heart attacks involve discomfort in the center of the chest that lasts more than a few minutes, or that goes away and comes back. It can feel like uncomfortable pressure, squeezing, fullness, or pain. ? Discomfort in other areas of the upper body. Symptoms can include pain or discomfort in one or both arms, the back, neck, jaw, or stomach. ? Shortness of breath with or without chest discomfort. ? Other signs may include breaking out in a cold sweat, nausea, or lightheadedness. Don't wait more than five minutes to call [address]! Fast action can save your life. Calling 911 is almost always the fastest way to get lifesaving treatment. Emergency Medical Services staff can begin treatment when they arrive  up to an hour sooner than if someone gets to the hospital by car. The discharge information has been reviewed with the patient. The patient verbalized understanding.  
Discharge medications reviewed with the patient and appropriate educational materials and side effects teaching were provided. ___________________________________________________________________________________________________________________________________ MyChart Announcement We are excited to announce that we are making your provider's discharge notes available to you in [employer]. You will see these notes when they are completed and signed by the physician that discharged you from your recent hospital stay. If you have any questions or concerns about any information you see in XOGt, please call the Health Information Department where you were seen or reach out to your Primary Care Provider for more information about your plan of care. Introducing [facility]! [employer] introduces [employer] patient portal. Now you can access parts of your medical record, email your doctor's office, and request medication refills online. 1. In your internet browser, go to [email or web address] 2. Click on the First Time User? Click Here link in the Sign In box. You will see the New Member Sign Up page. 3. Enter your XOGt Access Code exactly as it appears below. You will not need to use this code after youve completed the sign-up process. If you do not sign up before the expiration date, you must request a new code. · [employer] Access Code: [email or web address] Expires: 12/5/2018  9:03 PM 
 
4. Enter the last four digits of your Social Security Number (xxxx) and Date of Birth (mm/dd/yyyy) as indicated and click Submit. You will be taken to the next sign-up page. 5. Create a XOGt ID. This will be your [employer] login ID and cannot be changed, so think of one that is secure and easy to remember. 6. Create a [employer] password. You can change your password at any time. 7. Enter your Password Reset Question and Answer. This can be used at a later time if you forget your password. 8. Enter your e-mail address. You will receive e-mail notification when new information is available in [address] 9. Click Sign Up. You can now view and download portions of your medical record. 10. Click the Download Summary menu link to download a portable copy of your medical information. If you have questions, please visit the Frequently Asked Questions section of the [employer] website. Remember, [employer] is NOT to be used for urgent needs. For medical emergencies, dial 911. Now available from your iPhone and Android! Introducing [employer]ado [employer] As a [facility] patient, I wanted to make you aware of our electronic visit tool called [employer] Plaza. Pixable 24/7 allows you to connect within minutes with a medical provider 24 hours a day, seven days a week via a mobile device or tablet or logging into a secure website from your computer. You can access Amado Plaza from anywhere in the [place]. A virtual visit might be right for you when you have a simple condition and feel like you just dont want to get out of bed, or cant get away from work for an appointment, when your regular [facility] provider is not available (evenings, weekends or holidays), or when youre out of town and need minor care. Electronic visits cost only $49 and if the [facility] 24/7 provider determines a prescription is needed to treat your condition, one can be electronically transmitted to a nearby pharmacy*. Please take a moment to enroll today if you have not already done so. The enrollment process is free and takes just a few minutes. To enroll, please download the Pixable 24/7 sahil to your tablet or phone, or visit [email or web address] to enroll on your computer.    
And, as an [address] [place] [address] patient with a [employer] account, the results of your visits will be scanned into your electronic medical record and your primary care provider will be able to view the scanned results. We urge you to continue to see your regular [employer] provider for your ongoing medical care. And while your primary care provider may not be the one available when you seek a Sonavation virtual visit, the peace of mind you get from getting a real diagnosis real time can be priceless. For more information on Sonavation, view our Frequently Asked Questions (FAQs) at [email or web address]. Sincerely, 
 
Desire Quinteros MD 
Chief Medical Officer [address]ota *:  certain medications cannot be prescribed via Sonavation Unresulted Labs-Please follow up with your PCP about these lab tests Order Current Status VITAMIN D, 25 HYDROXY In process Providers Seen During Your Hospitalization Provider Specialty Primary office phone Rajni Azevedo MD Emergency Medicine [phone] Miah Fay MD Internal Medicine [phone] Ronni Andersen, [address] [place] [address] Internal Medicine [phone] Nicci Le MD Infectious Diseases [phone] Your Primary Care Physician (PCP) Primary Care Physician Office Phone Office Fax OTHER, PHYS ** None ** ** None ** You are allergic to the following Allergen Reactions Shellfish Derived Other (comments) Causes Gout Recent Documentation Height Weight BMI Smoking Status 1.626 m 105.1 kg 39.75 kg/m2 Current Every Day Smoker Emergency Contacts Name Discharge Info Relation Home Work Mobile 2 Karlene Stanford CAREGIVER [3] Sister [23] [phone] 30 [facility] CAREGIVER [3] Spouse [3] [phone] Patient Belongings The following personal items are in your possession at time of discharge: 
  Dental Appliances: None  Visual Aid: None          Jewelry: Watch, Earrings  Clothing: Shirt    Other Valuables: None Please provide this summary of care documentation to your next provider. Signatures-by signing, you are acknowledging that this After Visit Summary has been reviewed with you and you have received a copy. Patient Signature:  ____________________________________________________________ Date:  ____________________________________________________________  
  
Steve Sport Provider Signature:  ____________________________________________________________ Date:  ____________________________________________________________

## 2018-09-06 NOTE — IP AVS SNAPSHOT
[address] [address] Deedee Garcia Patient: Xiao Weiss MRN: [id number] :1960 A check tiffani indicates which time of day the medication should be taken. My Medications START taking these medications Instructions Each Dose to Equal  
 Morning Noon Evening Bedtime  
 lipase-protease-amylase 6,000-19,000 -30,000 unit capsule Commonly known as:  CREON 6000 Your last dose was: Your next dose is: Take 1 Cap by mouth three (3) times daily (with meals). Indications: exocrine pancreatic insufficiency 1 Cap CHANGE how you take these medications Instructions Each Dose to Equal  
 Morning Noon Evening Bedtime  
 carvedilol 6.25 mg tablet Commonly known as:  Neeraj Ramsay What changed:  how much to take Your last dose was: Your next dose is: Take 1 Tab by mouth two (2) times daily (with meals). 6.25 mg  
    
   
   
   
  
 nitroglycerin 0.4 mg SL tablet Commonly known as:  NITROSTAT What changed:  additional instructions Your last dose was: Your next dose is:    
   
   
 1 Tab by SubLINGual route as needed for Chest Pain. 0.4 mg  
    
   
   
   
  
  
CONTINUE taking these medications Instructions Each Dose to Equal  
 Morning Noon Evening Bedtime  
 aspirin 81 mg chewable tablet Your last dose was: Your next dose is: Take 1 Tab by mouth daily. 81 mg  
    
   
   
   
  
 atorvastatin 40 mg tablet Commonly known as:  LIPITOR Your last dose was: Your next dose is: Take 1 Tab by mouth nightly. 40 mg  
    
   
   
   
  
 B COMPLEX & C NO.20-FOLIC ACID PO Your last dose was: Your next dose is: Take  by mouth.  
     
   
   
   
  
 bisacodyl 5 mg EC tablet Commonly known as:  DULCOLAX (BISACODYL) Your last dose was: Your next dose is: Take 1 Tab by mouth daily as needed for Constipation. 5 mg Blood-Glucose Meter monitoring kit Your last dose was: Your next dose is:    
   
   
 Check twice daily. budesonide-formoterol 160-4.5 mcg/actuation Hfaa Commonly known as:  SYMBICORT Your last dose was: Your next dose is: Take 2 Puffs by inhalation two (2) times a day. 2 Puff  
    
   
   
   
  
 calcitRIOL 0.25 mcg capsule Commonly known as:  ROCALTROL Your last dose was: Your next dose is: Take 1 Cap by mouth every Monday, Wednesday, Friday. 0.25 mcg  
    
   
   
   
  
 febuxostat 40 mg Tab tablet Commonly known as:  Hopkins Solum Your last dose was: Your next dose is: Take 1 Tab by mouth daily. 40 mg  
    
   
   
   
  
 FLONASE 50 mcg/actuation nasal spray Generic drug:  fluticasone Your last dose was: Your next dose is: 2 Sprays by Both Nostrils route daily. 2 Spray  
    
   
   
   
  
 furosemide 40 mg tablet Commonly known as:  LASIX Your last dose was: Your next dose is: Take 1 Tab by mouth daily. 40 mg  
    
   
   
   
  
 glucose blood VI test strips strip Commonly known as:  FREESTYLE TEST Your last dose was: Your next dose is:    
   
   
 by Does Not Apply route See Admin Instructions. Check twice a day  
     
   
   
   
  
 hydrALAZINE 50 mg tablet Commonly known as:  APRESOLINE Your last dose was: Your next dose is: Take 1 Tab by mouth two (2) times a day. 50 mg  
    
   
   
   
  
 isosorbide mononitrate ER 60 mg CR tablet Commonly known as:  IMDUR Your last dose was: Your next dose is: Take 1 Tab by mouth daily. 60 mg Lancets Misc Your last dose was: Your next dose is:    
   
   
 Check 3 times a day , needs according contour glucometer NovoLIN 70/30 U-100 Insulin 100 unit/mL (70-30) injection Generic drug:  insulin NPH/insulin regular Your last dose was: Your next dose is: INJECT 30 UNITS SC IN THE MORNING AND 20 UNITS SC IN THE EVENING BEFORE SUPPER  
     
   
   
   
  
 omeprazole 20 mg capsule Commonly known as:  PRILOSEC Your last dose was: Your next dose is: Take 20 mg by mouth daily. 20 mg  
    
   
   
   
  
 ondansetron hcl 4 mg tablet Commonly known as:  Nicolasa Beagle Your last dose was: Your next dose is: Take 1 Tab by mouth every eight (8) hours as needed for Nausea. 4 mg  
    
   
   
   
  
 tamsulosin 0.4 mg capsule Commonly known as:  FLOMAX Your last dose was: Your next dose is: Take 1 Cap by mouth daily. Start this medication on Friday 12/15/17  Indications: Urolithiasis 0.4 mg  
    
   
   
   
  
  
STOP taking these medications HYDROcodone-acetaminophen 5-325 mg per tablet Commonly known as:  NORCO  
   
  
 losartan 25 mg tablet Commonly known as:  COZAAR  
   
  
 pantoprazole 20 mg tablet Commonly known as:  PROTONIX Where to Get Your Medications Information on where to get these meds will be given to you by the nurse or doctor. ! Ask your nurse or doctor about these medications  
  lipase-protease-amylase 6,000-19,000 -30,000 unit capsule

## 2018-09-07 PROBLEM — K85.90 PANCREATITIS: Status: ACTIVE | Noted: 2018-09-07

## 2018-09-07 PROBLEM — N18.9 ACUTE ON CHRONIC RENAL FAILURE (HCC): Status: ACTIVE | Noted: 2018-09-07

## 2018-09-07 PROBLEM — N17.9 ACUTE ON CHRONIC RENAL FAILURE (HCC): Status: ACTIVE | Noted: 2018-09-07

## 2018-09-07 PROBLEM — K86.0 ALCOHOL-INDUCED CHRONIC PANCREATITIS (HCC): Chronic | Status: ACTIVE | Noted: 2018-09-07

## 2018-09-07 PROBLEM — E11.21 TYPE II DIABETES MELLITUS WITH NEPHROPATHY (HCC): Status: ACTIVE | Noted: 2018-02-09

## 2018-09-07 LAB
ANION GAP SERPL CALC-SCNC: 5 MMOL/L (ref 3–18)
APPEARANCE UR: CLEAR
BACTERIA URNS QL MICRO: NEGATIVE /HPF
BILIRUB UR QL: NEGATIVE
BUN SERPL-MCNC: 55 MG/DL (ref 7–18)
BUN/CREAT SERPL: 10 (ref 12–20)
CALCIUM SERPL-MCNC: 7.7 MG/DL (ref 8.5–10.1)
CALCIUM SERPL-MCNC: 7.9 MG/DL (ref 8.5–10.1)
CHLORIDE SERPL-SCNC: 109 MMOL/L (ref 100–108)
CO2 SERPL-SCNC: 27 MMOL/L (ref 21–32)
COLOR UR: YELLOW
CREAT SERPL-MCNC: 5.25 MG/DL (ref 0.6–1.3)
EPITH CASTS URNS QL MICRO: NORMAL /LPF (ref 0–5)
EST. AVERAGE GLUCOSE BLD GHB EST-MCNC: 148 MG/DL
FERRITIN SERPL-MCNC: 71 NG/ML (ref 8–388)
GLUCOSE BLD STRIP.AUTO-MCNC: 110 MG/DL (ref 70–110)
GLUCOSE BLD STRIP.AUTO-MCNC: 190 MG/DL (ref 70–110)
GLUCOSE BLD STRIP.AUTO-MCNC: 82 MG/DL (ref 70–110)
GLUCOSE BLD STRIP.AUTO-MCNC: 96 MG/DL (ref 70–110)
GLUCOSE SERPL-MCNC: 99 MG/DL (ref 74–99)
GLUCOSE UR STRIP.AUTO-MCNC: NEGATIVE MG/DL
HBA1C MFR BLD: 6.8 % (ref 4.2–5.6)
HGB UR QL STRIP: NEGATIVE
IRON SATN MFR SERPL: 17 %
IRON SERPL-MCNC: 43 UG/DL (ref 50–175)
KETONES UR QL STRIP.AUTO: NEGATIVE MG/DL
LEUKOCYTE ESTERASE UR QL STRIP.AUTO: NEGATIVE
NITRITE UR QL STRIP.AUTO: NEGATIVE
PH UR STRIP: 5.5 [PH] (ref 5–8)
PHOSPHATE SERPL-MCNC: 5.6 MG/DL (ref 2.5–4.9)
POTASSIUM SERPL-SCNC: 5.1 MMOL/L (ref 3.5–5.5)
PROT UR STRIP-MCNC: 100 MG/DL
PTH-INTACT SERPL-MCNC: 566 PG/ML (ref 18.4–88)
RBC #/AREA URNS HPF: NORMAL /HPF (ref 0–5)
SODIUM SERPL-SCNC: 141 MMOL/L (ref 136–145)
SP GR UR REFRACTOMETRY: 1.01 (ref 1–1.03)
TIBC SERPL-MCNC: 259 UG/DL (ref 250–450)
UROBILINOGEN UR QL STRIP.AUTO: 0.2 EU/DL (ref 0.2–1)
WBC URNS QL MICRO: NORMAL /HPF (ref 0–4)

## 2018-09-07 PROCEDURE — C9113 INJ PANTOPRAZOLE SODIUM, VIA: HCPCS | Performed by: HOSPITALIST

## 2018-09-07 PROCEDURE — 82962 GLUCOSE BLOOD TEST: CPT

## 2018-09-07 PROCEDURE — 96375 TX/PRO/DX INJ NEW DRUG ADDON: CPT

## 2018-09-07 PROCEDURE — 83540 ASSAY OF IRON: CPT | Performed by: INTERNAL MEDICINE

## 2018-09-07 PROCEDURE — 80048 BASIC METABOLIC PNL TOTAL CA: CPT | Performed by: INTERNAL MEDICINE

## 2018-09-07 PROCEDURE — 83970 ASSAY OF PARATHORMONE: CPT | Performed by: INTERNAL MEDICINE

## 2018-09-07 PROCEDURE — 84100 ASSAY OF PHOSPHORUS: CPT | Performed by: INTERNAL MEDICINE

## 2018-09-07 PROCEDURE — 83036 HEMOGLOBIN GLYCOSYLATED A1C: CPT | Performed by: HOSPITALIST

## 2018-09-07 PROCEDURE — 94640 AIRWAY INHALATION TREATMENT: CPT

## 2018-09-07 PROCEDURE — 81001 URINALYSIS AUTO W/SCOPE: CPT | Performed by: PHYSICIAN ASSISTANT

## 2018-09-07 PROCEDURE — 36415 COLL VENOUS BLD VENIPUNCTURE: CPT | Performed by: INTERNAL MEDICINE

## 2018-09-07 PROCEDURE — 74011250637 HC RX REV CODE- 250/637: Performed by: HOSPITALIST

## 2018-09-07 PROCEDURE — 82728 ASSAY OF FERRITIN: CPT | Performed by: INTERNAL MEDICINE

## 2018-09-07 PROCEDURE — 65270000029 HC RM PRIVATE

## 2018-09-07 PROCEDURE — 96376 TX/PRO/DX INJ SAME DRUG ADON: CPT

## 2018-09-07 PROCEDURE — 74011250636 HC RX REV CODE- 250/636: Performed by: HOSPITALIST

## 2018-09-07 PROCEDURE — 96361 HYDRATE IV INFUSION ADD-ON: CPT

## 2018-09-07 PROCEDURE — 74011250636 HC RX REV CODE- 250/636: Performed by: PHYSICIAN ASSISTANT

## 2018-09-07 PROCEDURE — 74011000250 HC RX REV CODE- 250: Performed by: HOSPITALIST

## 2018-09-07 RX ORDER — DIPHENHYDRAMINE HYDROCHLORIDE 50 MG/ML
12.5 INJECTION, SOLUTION INTRAMUSCULAR; INTRAVENOUS
Status: DISCONTINUED | OUTPATIENT
Start: 2018-09-07 | End: 2018-09-08

## 2018-09-07 RX ORDER — MORPHINE SULFATE 4 MG/ML
4 INJECTION, SOLUTION INTRAMUSCULAR; INTRAVENOUS
Status: COMPLETED | OUTPATIENT
Start: 2018-09-07 | End: 2018-09-07

## 2018-09-07 RX ORDER — ALBUTEROL SULFATE 0.83 MG/ML
2.5 SOLUTION RESPIRATORY (INHALATION)
Status: DISCONTINUED | OUTPATIENT
Start: 2018-09-07 | End: 2018-09-08

## 2018-09-07 RX ORDER — HEPARIN SODIUM 5000 [USP'U]/ML
5000 INJECTION, SOLUTION INTRAVENOUS; SUBCUTANEOUS EVERY 8 HOURS
Status: DISCONTINUED | OUTPATIENT
Start: 2018-09-07 | End: 2018-09-08 | Stop reason: HOSPADM

## 2018-09-07 RX ORDER — DEXTROSE 50 % IN WATER (D50W) INTRAVENOUS SYRINGE
25-50 AS NEEDED
Status: DISCONTINUED | OUTPATIENT
Start: 2018-09-07 | End: 2018-09-08 | Stop reason: HOSPADM

## 2018-09-07 RX ORDER — NALOXONE HYDROCHLORIDE 0.4 MG/ML
0.4 INJECTION, SOLUTION INTRAMUSCULAR; INTRAVENOUS; SUBCUTANEOUS AS NEEDED
Status: DISCONTINUED | OUTPATIENT
Start: 2018-09-07 | End: 2018-09-08 | Stop reason: HOSPADM

## 2018-09-07 RX ORDER — OMEPRAZOLE 20 MG/1
20 CAPSULE, DELAYED RELEASE ORAL DAILY
COMMUNITY

## 2018-09-07 RX ORDER — ONDANSETRON 2 MG/ML
4 INJECTION INTRAMUSCULAR; INTRAVENOUS
Status: DISCONTINUED | OUTPATIENT
Start: 2018-09-07 | End: 2018-09-08 | Stop reason: HOSPADM

## 2018-09-07 RX ORDER — HYDROMORPHONE HYDROCHLORIDE 1 MG/ML
1 INJECTION, SOLUTION INTRAMUSCULAR; INTRAVENOUS; SUBCUTANEOUS
Status: DISCONTINUED | OUTPATIENT
Start: 2018-09-07 | End: 2018-09-08 | Stop reason: HOSPADM

## 2018-09-07 RX ORDER — PROCHLORPERAZINE EDISYLATE 5 MG/ML
5 INJECTION INTRAMUSCULAR; INTRAVENOUS
Status: DISCONTINUED | OUTPATIENT
Start: 2018-09-07 | End: 2018-09-08 | Stop reason: SDUPTHER

## 2018-09-07 RX ORDER — METOPROLOL TARTRATE 5 MG/5ML
5 INJECTION INTRAVENOUS EVERY 6 HOURS
Status: DISCONTINUED | OUTPATIENT
Start: 2018-09-07 | End: 2018-09-08

## 2018-09-07 RX ORDER — HYDRALAZINE HYDROCHLORIDE 20 MG/ML
20 INJECTION INTRAMUSCULAR; INTRAVENOUS EVERY 6 HOURS
Status: DISCONTINUED | OUTPATIENT
Start: 2018-09-07 | End: 2018-09-08

## 2018-09-07 RX ORDER — SODIUM CHLORIDE 9 MG/ML
1000 INJECTION, SOLUTION INTRAVENOUS ONCE
Status: COMPLETED | OUTPATIENT
Start: 2018-09-07 | End: 2018-09-07

## 2018-09-07 RX ORDER — LORAZEPAM 2 MG/ML
1 INJECTION INTRAMUSCULAR
Status: DISCONTINUED | OUTPATIENT
Start: 2018-09-07 | End: 2018-09-08 | Stop reason: HOSPADM

## 2018-09-07 RX ORDER — MAGNESIUM SULFATE 100 %
4 CRYSTALS MISCELLANEOUS AS NEEDED
Status: DISCONTINUED | OUTPATIENT
Start: 2018-09-07 | End: 2018-09-08 | Stop reason: HOSPADM

## 2018-09-07 RX ORDER — SODIUM CHLORIDE 9 MG/ML
75 INJECTION, SOLUTION INTRAVENOUS CONTINUOUS
Status: DISCONTINUED | OUTPATIENT
Start: 2018-09-07 | End: 2018-09-08

## 2018-09-07 RX ORDER — FACIAL-BODY WIPES
10 EACH TOPICAL DAILY PRN
Status: DISCONTINUED | OUTPATIENT
Start: 2018-09-07 | End: 2018-09-08 | Stop reason: HOSPADM

## 2018-09-07 RX ORDER — HYDROMORPHONE HYDROCHLORIDE 1 MG/ML
1 INJECTION, SOLUTION INTRAMUSCULAR; INTRAVENOUS; SUBCUTANEOUS
Status: DISCONTINUED | OUTPATIENT
Start: 2018-09-07 | End: 2018-09-07 | Stop reason: SDUPTHER

## 2018-09-07 RX ORDER — SODIUM CHLORIDE 9 MG/ML
100 INJECTION, SOLUTION INTRAVENOUS CONTINUOUS
Status: DISCONTINUED | OUTPATIENT
Start: 2018-09-07 | End: 2018-09-07

## 2018-09-07 RX ORDER — ACETAMINOPHEN 325 MG/1
650 TABLET ORAL
Status: DISCONTINUED | OUTPATIENT
Start: 2018-09-07 | End: 2018-09-08 | Stop reason: HOSPADM

## 2018-09-07 RX ORDER — DIPHENHYDRAMINE HYDROCHLORIDE 50 MG/ML
12.5 INJECTION, SOLUTION INTRAMUSCULAR; INTRAVENOUS
Status: COMPLETED | OUTPATIENT
Start: 2018-09-07 | End: 2018-09-07

## 2018-09-07 RX ORDER — INSULIN LISPRO 100 [IU]/ML
INJECTION, SOLUTION INTRAVENOUS; SUBCUTANEOUS EVERY 6 HOURS
Status: DISCONTINUED | OUTPATIENT
Start: 2018-09-07 | End: 2018-09-08 | Stop reason: HOSPADM

## 2018-09-07 RX ADMIN — ALBUTEROL SULFATE 2.5 MG: 2.5 SOLUTION RESPIRATORY (INHALATION) at 20:10

## 2018-09-07 RX ADMIN — HYDRALAZINE HYDROCHLORIDE 20 MG: 20 INJECTION INTRAMUSCULAR; INTRAVENOUS at 12:42

## 2018-09-07 RX ADMIN — PANCRELIPASE 1 CAPSULE: 30000; 6000; 19000 CAPSULE, DELAYED RELEASE PELLETS ORAL at 21:38

## 2018-09-07 RX ADMIN — SODIUM CHLORIDE 1000 ML: 900 INJECTION, SOLUTION INTRAVENOUS at 02:52

## 2018-09-07 RX ADMIN — METOPROLOL TARTRATE 5 MG: 1 INJECTION, SOLUTION INTRAVENOUS at 12:41

## 2018-09-07 RX ADMIN — PROCHLORPERAZINE EDISYLATE 5 MG: 5 INJECTION INTRAMUSCULAR; INTRAVENOUS at 00:33

## 2018-09-07 RX ADMIN — SODIUM CHLORIDE 40 MG: 9 INJECTION INTRAMUSCULAR; INTRAVENOUS; SUBCUTANEOUS at 21:38

## 2018-09-07 RX ADMIN — METOPROLOL TARTRATE 5 MG: 1 INJECTION, SOLUTION INTRAVENOUS at 18:39

## 2018-09-07 RX ADMIN — MORPHINE SULFATE 4 MG: 4 INJECTION, SOLUTION INTRAMUSCULAR; INTRAVENOUS at 00:30

## 2018-09-07 RX ADMIN — HEPARIN SODIUM 5000 UNITS: 5000 INJECTION, SOLUTION INTRAVENOUS; SUBCUTANEOUS at 13:09

## 2018-09-07 RX ADMIN — SODIUM CHLORIDE 125 ML/HR: 900 INJECTION, SOLUTION INTRAVENOUS at 12:43

## 2018-09-07 RX ADMIN — ALBUTEROL SULFATE 2.5 MG: 2.5 SOLUTION RESPIRATORY (INHALATION) at 12:12

## 2018-09-07 RX ADMIN — ALBUTEROL SULFATE 2.5 MG: 2.5 SOLUTION RESPIRATORY (INHALATION) at 16:00

## 2018-09-07 RX ADMIN — PROCHLORPERAZINE EDISYLATE 5 MG: 5 INJECTION INTRAMUSCULAR; INTRAVENOUS at 12:42

## 2018-09-07 RX ADMIN — HYDROMORPHONE HYDROCHLORIDE 1 MG: 1 INJECTION, SOLUTION INTRAMUSCULAR; INTRAVENOUS; SUBCUTANEOUS at 18:59

## 2018-09-07 RX ADMIN — NITROGLYCERIN 0.5 INCH: 20 OINTMENT TOPICAL at 12:44

## 2018-09-07 RX ADMIN — DIPHENHYDRAMINE HYDROCHLORIDE 12.5 MG: 50 INJECTION, SOLUTION INTRAMUSCULAR; INTRAVENOUS at 00:38

## 2018-09-07 RX ADMIN — ALBUTEROL SULFATE 2.5 MG: 2.5 SOLUTION RESPIRATORY (INHALATION) at 23:42

## 2018-09-07 RX ADMIN — HYDRALAZINE HYDROCHLORIDE 20 MG: 20 INJECTION INTRAMUSCULAR; INTRAVENOUS at 18:39

## 2018-09-07 RX ADMIN — SODIUM CHLORIDE 100 ML/HR: 900 INJECTION, SOLUTION INTRAVENOUS at 10:59

## 2018-09-07 RX ADMIN — HEPARIN SODIUM 5000 UNITS: 5000 INJECTION, SOLUTION INTRAVENOUS; SUBCUTANEOUS at 21:38

## 2018-09-07 RX ADMIN — SODIUM CHLORIDE 40 MG: 9 INJECTION INTRAMUSCULAR; INTRAVENOUS; SUBCUTANEOUS at 13:09

## 2018-09-07 NOTE — CONSULTS
Consult Note  Consult requested by: Dr. Diane Juarez is a 62 y.o. male [address] who is being seen on consult for Worsening CKD 4-5  No chief complaint on file. Admission diagnosis: <principal problem not specified>     HPI: 61 yo AA male admitted for acute right scrotal swelling/pain associated with right flank pain that happened [place]. Denies any prior events, no urinary voiding complaints fever or chills. No N/V/D, denies any gross hematuria or passing any kidney stone( has Hx of renal stones), no trauma. Wife at bedside. Hx of DM 2, HTN, COPD, CKD 4-5 CAD post stent. Was seen by Dr Marisol Sanchez in 2016 at our office, Dx with CKD stage 3 most likley from HTN/DM but with significant proteinuria ( serologic w/up neg inconclusive), renal Bx was planned. Pt did not return for follow up, ended up being admitted here in Feb and July 2017 for uncontrolled HTN and worsening CKD. Again he has not followed up as outpt. He is aware of his worsening renal function, follows with [facility] and as of las visit there a month ago dialysis was reportedly discussed. He has a twin sister and he wants to know if she can be a kidney donor. He currently still c/o of pain although slightly better. He had elevated lipase on admission and currently NPO on IV hydration for presumed pancreatitis. He claims compliance with all his meds, \"good BP and BS control\"  Denies any CP, has occ SALMERON, no recent  weight changes, Bowels are ok. Past Medical History:   Diagnosis Date    Alcohol abuse     Arthritis     Atherosclerotic cardiovascular disease     CAD (coronary artery disease)     mild disease of coronaries (cor angio 2006),wife states he has 1 stent    Cardiac cath 01/26/2006    RCA mild. Otherwise patent coronaries. LVEDP 15.  EF 55-60%.  Cardiac echocardiogram 03/27/2009    EF 60%. No cardiac source of embolism.   No significant valvular pathology.  Cardiac nuclear imaging test 12/06/2011    Small, mild inferior infarction or possibly artifact. No ischemia. EF 45%. No TID. No reg WMA.  Cardiovascular LLE venous duplex 08/14/2015    Left leg:  No DVT. Dilated lymph node in left groin.  Constipation     Diabetes mellitus type II     Gout     Hepatic steatosis     Hypercholesterolemia     Hypertension     Knee effusion, left     Left knee pain     Morbid obesity (HCC)     Noncompliance     Obesity (BMI 30-39. 9)     S/P colonoscopy 3/8/05    Dr. Jai Tao; normal; f/u 10 years    Stomach problems     TIA (transient ischemic attack) 3/09    Tobacco abuse       Past Surgical History:   Procedure Laterality Date    ABDOMEN SURGERY PROC UNLISTED      Hernia repair in 1960's or 1970's.  HX ORTHOPAEDIC      Bones spur removed from left & right foot 2013.  HX UROLOGICAL  8/18/2015    [facility], Dr. Oz Lai, Cystoscopy, left retrograde, left double-J cath       Social History     Social History    Marital status:      Spouse name: N/A    Number of children: N/A    Years of education: N/A     Occupational History    disabled      Social History Main Topics    Smoking status: Current Every Day Smoker     Packs/day: 0.25     Types: Cigarettes     Last attempt to quit: 6/28/2016    Smokeless tobacco: Never Used    Alcohol use 0.0 oz/week     0 Standard drinks or equivalent per week      Comment: 1 beers a day.  Drug use: No    Sexual activity: Yes     Other Topics Concern    Not on file     Social History Narrative       Family History   Problem Relation Age of Onset    Diabetes Father     Heart Disease Mother     Diabetes Sister     Hypertension Sister     Arthritis-osteo Sister     Arthritis-osteo Brother     Diabetes Other     Diabetes Other      Uncle, Aunt    Hypertension Other      Uncle;  Aunt     Allergies   Allergen Reactions    Shellfish Derived Other (comments)     Causes Gout Home Medications:     Prior to Admission Medications   Prescriptions Last Dose Informant Patient Reported? Taking? B COMPLEX W-C NO.20/FOLIC ACID (B COMPLEX & C NO.20-FOLIC ACID PO)  at Unknown time  Yes Yes   Sig: Take  by mouth. Blood-Glucose Meter monitoring kit   No No   Sig: Check twice daily. HYDROcodone-acetaminophen (NORCO) 5-325 mg per tablet Not Taking at Unknown time  No No   Sig: Take 1 Tab by mouth every four (4) hours as needed for Pain. Max Daily Amount: 6 Tabs. Lancets misc   No No   Sig: Check 3 times a day , needs according contour glucometer   NOVOLIN 70/30 100 unit/mL (70-30) injection 2018 at Unknown time  No Yes   Sig: INJECT 30 UNITS SC IN THE MORNING AND 20 UNITS SC IN THE EVENING BEFORE SUPPER   aspirin 81 mg chewable tablet   No No   Sig: Take 1 Tab by mouth daily. atorvastatin (LIPITOR) 40 mg tablet 2018 at Unknown time  No Yes   Sig: Take 1 Tab by mouth nightly. bisacodyl (DULCOLAX, BISACODYL,) 5 mg EC tablet 2018 at Unknown time  No Yes   Sig: Take 1 Tab by mouth daily as needed for Constipation. budesonide-formoterol (SYMBICORT) 160-4.5 mcg/actuation HFA inhaler   No No   Sig: Take 2 Puffs by inhalation two (2) times a day. calcitRIOL (ROCALTROL) 0.25 mcg capsule   No No   Sig: Take 1 Cap by mouth every Monday, Wednesday, Friday. carvedilol (COREG) 6.25 mg tablet 2018 at Unknown time  No Yes   Sig: Take 1 Tab by mouth two (2) times daily (with meals). Patient taking differently: Take 25 mg by mouth two (2) times daily (with meals). febuxostat (ULORIC) 40 mg tab tablet Not Taking at Unknown time  No No   Sig: Take 1 Tab by mouth daily. fluticasone (FLONASE) 50 mcg/actuation nasal spray 2018 at Unknown time  Yes Yes   Si Sprays by Both Nostrils route daily. furosemide (LASIX) 40 mg tablet 2018 at Unknown time  No Yes   Sig: Take 1 Tab by mouth daily.    glucose blood VI test strips (FREESTYLE TEST) strip   No No   Sig: by Does Not Apply route See Admin Instructions. Check twice a day   hydrALAZINE (APRESOLINE) 50 mg tablet 2018 at Unknown time  No Yes   Sig: Take 1 Tab by mouth two (2) times a day. isosorbide mononitrate ER (IMDUR) 60 mg CR tablet 2018 at Unknown time  No Yes   Sig: Take 1 Tab by mouth daily. losartan (COZAAR) 25 mg tablet Not Taking at Unknown time  No No   Sig: Take 1 Tab by mouth daily. nitroglycerin (NITROSTAT) 0.4 mg SL tablet Unknown at Unknown time  No No   Si Tab by SubLINGual route as needed for Chest Pain. Patient taking differently: 1 Tab by SubLINGual route as needed for Chest Pain. Pt to take 1 tab q 5 min up to three times, if symptom persist pt. to call 911. omeprazole (PRILOSEC) 20 mg capsule 2018 at Unknown time  Yes Yes   Sig: Take 20 mg by mouth daily. ondansetron hcl (ZOFRAN, AS HYDROCHLORIDE,) 4 mg tablet Not Taking at Unknown time  No No   Sig: Take 1 Tab by mouth every eight (8) hours as needed for Nausea. pantoprazole (PROTONIX) 20 mg tablet Not Taking at Unknown time  No No   Sig: Take 1 Tab by mouth daily. tamsulosin (FLOMAX) 0.4 mg capsule Not Taking at Unknown time  No No   Sig: Take 1 Cap by mouth daily. Start this medication on Friday 12/15/17  Indications: Urolithiasis      Facility-Administered Medications: None       Current Facility-Administered Medications   Medication Dose Route Frequency    prochlorperazine (COMPAZINE) injection 5 mg  5 mg IntraVENous Q6H PRN       Review of Systems:   Pertinent items are noted in HPI.   Data Review:    Labs: Results:       Chemistry Recent Labs      18   2200   GLU  136*   NA  145   K  4.9   CL  112*   CO2  25   BUN  63*   CREA  5.77*   CA  8.2*   AGAP  8   BUCR  11*   AP  50   TP  6.8   ALB  3.3*   GLOB  3.5   AGRAT  0.9      CBC w/Diff Recent Labs      18   2200   WBC  7.7   RBC  3.86*   HGB  10.5*   HCT  33.1*   PLT  299   GRANS  58   LYMPH  30   EOS  4      Coagulation No results for input(s): PTP, INR, APTT in the last 72 hours. No lab exists for component: INREXT    Iron/Ferritin No results for input(s): IRON in the last 72 hours. No lab exists for component: TIBCCALC   BNP No results for input(s): BNPP in the last 72 hours. Cardiac Enzymes No results for input(s): CPK, CKND1, SID in the last 72 hours. No lab exists for component: CKRMB, TROIP   Liver Enzymes Recent Labs      09/06/18   2200   TP  6.8   ALB  3.3*   AP  50   SGOT  17      Thyroid Studies Lab Results   Component Value Date/Time    TSH 0.55 02/09/2017 04:00 AM           IMAGES: CT abd/pelvis w/o contrast noted small left kidney with multiple calcifications, no hydro, right kidney no stones/hydro  Scrotal U/S report noted multiple epididymal cysts  Lipase 619  U/A + proteinuria      Physical Assessment:     Visit Vitals    /65 (BP 1 Location: Left arm, BP Patient Position: At rest)    Pulse 88    Temp 97.2 °F (36.2 °C)    Resp 18    Ht 5' 4\" (1.626 m)    Wt 104.8 kg (231 lb 1.6 oz)    SpO2 94%    BMI 39.67 kg/m2     Last 3 Recorded Weights in this Encounter    09/06/18 2104 09/07/18 0418   Weight: 102.1 kg (225 lb) 104.8 kg (231 lb 1.6 oz)       Intake/Output Summary (Last 24 hours) at 09/07/18 0947  Last data filed at 09/07/18 7805   Gross per 24 hour   Intake                0 ml   Output                0 ml   Net                0 ml       Physial Exam:  General appearance: alert, cooperative, no distress, appears stated age  Skin: normal coloration and turgor, no rashes, no suspicious skin lesions noted. HEENT: Head; normocephalic, atraumatic. MARISA. ENT- ENT exam normal, no neck nodes or sinus tenderness. Lungs: clear to auscultation bilaterally  Heart: regular rate and rhythm, S1, S2 normal, no murmur, click, rub or gallop  Abdomen: soft, non-tender.  Bowel sounds normal. No masses,  no organomegaly  Extremities: extremities normal, atraumatic, no cyanosis or edema    IMPRESSION AND PLAN:   CKD stage 4-5  Most likely from long standing HTN and DM no acute acid base disorder, or signs of fluid overload. Possible prerenal component given hx of lasix and cozaar use. Agree with gentle diuresis and see if he will trend down, off lasix and ARB. Adjust all meds to renal function, avoid nephrotoxic drugs. Eval for SHPT. Discussed need for close outpt follow up to prep him for dialysis and refer for Tx eval.  Aggressive BP and BS control to delay onset of ESRD and need for dialysis. Early access planning recommended. HTN  Cont same meds hold ARB and diuretic , goal BP <130/80  DM 2 with proteinuria defer DM control  to primary team  Elevated lipase, most likely from CKD  Right scrotal swelling , suggest urologic eval  Anemia from CKF check Fe stores, no MANJULA needed at this time   Thank you will follow with you.       Agueda Romo MD  September 7, 2018

## 2018-09-07 NOTE — H&P
History & Physical 
 
Patient: Shira Sears MRN: [id number]  CSN: [id number] YOB: 1960  Age: 62 y.o. Sex: male DOA: 9/6/2018 HPI:  
 
Shira Sears is a 62 y.o. male with significant past medical history below, who developed right groin and scrotal pain while walking yesterday and it persisted. He came to the ER for evaluation and in work up was found to have a creatinine of 5.77 GFR 12. He is admitted for acute on chronic renal failure and acute pain in scrotum. Past Medical History:  
Diagnosis Date  Alcohol abuse  [facility] 9/7/2018  Arthritis  Atherosclerotic cardiovascular disease  CAD (coronary artery disease)   
 mild disease of coronaries (cor angio 2006),wife states he has 1 stent  Cardiac cath 01/26/2006 RCA mild. Otherwise patent coronaries. LVEDP 15.  EF 55-60%.  Cardiac echocardiogram 03/27/2009 EF 60%. No cardiac source of embolism. No significant valvular pathology.  Cardiac nuclear imaging test 12/06/2011 Small, mild inferior infarction or possibly artifact. No ischemia. EF 45%. No TID. No reg WMA.  Cardiovascular LLE venous duplex 08/14/2015 Left leg:  No DVT. Dilated lymph node in left groin.  Constipation  Diabetes mellitus type II   
 Gout  Hepatic steatosis  Hypercholesterolemia  Hypertension  Knee effusion, left  Left knee pain  Morbid obesity ([facility]  Noncompliance  Obesity (BMI 30-39. 9)  S/P colonoscopy 3/8/05 Dr. Felipe Braswell; normal; f/u 10 years  Stomach problems  TIA (transient ischemic attack) 3/09  Tobacco abuse Past Surgical History:  
Procedure Laterality Date [address] UNLISTED Hernia repair in 1960's or 1970's.  HX ORTHOPAEDIC Bones spur removed from left & right foot 2013.  HX UROLOGICAL  8/18/2015 [facility], Dr. Erin Caldera, Cystoscopy, left retrograde, left double-J cath Family History Problem Relation Age of Onset  Diabetes Father  Heart Disease Mother  Diabetes Sister  Hypertension Sister  Arthritis-osteo Sister  Arthritis-osteo Brother  Diabetes Other  Diabetes Other Argelia Dage  Hypertension Other Uncle; Aunt Social History Social History  Marital status:  Spouse name: N/A  
 Number of children: N/A  
 Years of education: N/A Occupational History  disabled Social History Main Topics  Smoking status: Current Every Day Smoker Packs/day: 0.25 Types: Cigarettes Last attempt to quit: 6/28/2016  Smokeless tobacco: Never Used  Alcohol use 0.0 oz/week  
  0 Standard drinks or equivalent per week Comment: 1 beers a day.  Drug use: No  
 Sexual activity: Yes Other Topics Concern  None Social History Narrative Prior to Admission medications Medication Sig Start Date End Date Taking? Authorizing Provider  
omeprazole (PRILOSEC) 20 mg capsule Take 20 mg by mouth daily. Yes Historical Provider  
hydrALAZINE (APRESOLINE) 50 mg tablet Take 1 Tab by mouth two (2) times a day. 2/13/18  Yes Rosa M Seymour MD  
bisacodyl (DULCOLAX, BISACODYL,) 5 mg EC tablet Take 1 Tab by mouth daily as needed for Constipation. 8/21/17  Yes Heriberto Gaspar MD  
NOVOLIN 70/30 100 unit/mL (70-30) injection INJECT 30 UNITS SC IN THE MORNING AND 20 UNITS SC IN THE EVENING BEFORE SUPPER 8/21/17  Yes Heriberto Gaspar MD  
isosorbide mononitrate ER (IMDUR) 60 mg CR tablet Take 1 Tab by mouth daily. 8/14/17  Yes Trice Castillo NP  
atorvastatin (LIPITOR) 40 mg tablet Take 1 Tab by mouth nightly. 8/14/17  Yes Trice Castillo NP  
furosemide (LASIX) 40 mg tablet Take 1 Tab by mouth daily.  8/14/17  Yes Juana Quiroga NP  
 fluticasone (FLONASE) 50 mcg/actuation nasal spray 2 Sprays by Both Nostrils route daily. Yes Historical Provider  
carvedilol (COREG) 6.25 mg tablet Take 1 Tab by mouth two (2) times daily (with meals). Patient taking differently: Take 25 mg by mouth two (2) times daily (with meals). 7/31/17  Yes Isai Castillo NP  
B COMPLEX W-C NO.20/FOLIC ACID (B COMPLEX & C NO.20-FOLIC ACID PO) Take  by mouth. Yes Historical Provider  
febuxostat (ULORIC) 40 mg tab tablet Take 1 Tab by mouth daily. 5/12/18   Iman Negro PA-C  
HYDROcodone-acetaminophen (NORCO) 5-325 mg per tablet Take 1 Tab by mouth every four (4) hours as needed for Pain. Max Daily Amount: 6 Tabs. 5/12/18   Iman Negro PA-C  
losartan (COZAAR) 25 mg tablet Take 1 Tab by mouth daily. 3/23/18   [address] [place] [address], NP  
pantoprazole (PROTONIX) 20 mg tablet Take 1 Tab by mouth daily. 2/13/18   Ruthie Rea MD  
ondansetron hcl (ZOFRAN, AS HYDROCHLORIDE,) 4 mg tablet Take 1 Tab by mouth every eight (8) hours as needed for Nausea. 12/14/17   Caryl Thurston DO  
tamsulosin (FLOMAX) 0.4 mg capsule Take 1 Cap by mouth daily. Start this medication on Friday 12/15/17  Indications: Urolithiasis 12/15/17   Caryl Thurston DO  
glucose blood VI test strips (FREESTYLE TEST) strip by Does Not Apply route See Admin Instructions. Check twice a day 8/24/17   Catherine Orr MD  
nitroglycerin (NITROSTAT) 0.4 mg SL tablet 1 Tab by SubLINGual route as needed for Chest Pain. Patient taking differently: 1 Tab by SubLINGual route as needed for Chest Pain. Pt to take 1 tab q 5 min up to three times, if symptom persist pt. to call 911. 7/17/17   Maty Ace MD  
aspirin 81 mg chewable tablet Take 1 Tab by mouth daily. 3/21/17   Catherine Orr MD  
budesonide-formoterol (SYMBICORT) 160-4.5 mcg/actuation HFA inhaler Take 2 Puffs by inhalation two (2) times a day.  2/22/17   Catherine Orr MD  
 calcitRIOL (ROCALTROL) 0.25 mcg capsule Take 1 Cap by mouth every Monday, Wednesday, Friday. 2/10/17   Brice Krueger MD  
Lancets misc Check 3 times a day , needs according contour glucometer 1/20/15   Fito Carvajal MD  
Blood-Glucose Meter monitoring kit Check twice daily. 9/23/14   Fito Carvajal MD  
 
 
Allergies Allergen Reactions  Shellfish Derived Other (comments) Causes Gout Physical Exam:  
  
Visit Vitals  BP (!) 181/98 (BP 1 Location: Left arm, BP Patient Position: At rest)  Pulse 71  Temp 96.6 °F (35.9 °C)  Resp 16  
 Ht 5' 4\" (1.626 m)  Wt 104.8 kg (231 lb 1.6 oz)  SpO2 96%  BMI 39.67 kg/m2 Physical Exam: 
GENERAL: alert, cooperative, moderate distress HEENT: conjunctiva clear, facial symmetry, speech clear and gaol directed,  
pharynx clear, no JVD, no bruits Chest: HR reg 71, lungs clear Abdomen: mild tenderness RLQ, no rebound, BS+, soft, right scrotal swelling Ext: no edema, + pulses, moving all 4 extremities Lab/Data Review: 
Labs: Results:  
   
Chemistry Recent Labs  
   09/06/18 2200 GLU  136* NA  145  
K  4.9 CL  112* CO2  25 BUN  63* CREA  5.77* CA  8.2* AGAP  8  
BUCR  11* AP  50  
TP  6.8 ALB  3.3*  
GLOB  3.5 AGRAT  0.9  
  
CBC w/Diff Recent Labs  
   09/06/18 2200 WBC  7.7 RBC  3.86* HGB  10.5* HCT  33.1*  
PLT  299 GRANS  58 LYMPH  30 EOS  4 Coagulation No results for input(s): PTP, INR, APTT in the last 72 hours. No lab exists for component: INREXT Iron/Ferritin No results for input(s): IRON in the last 72 hours. No lab exists for component: TIBCCALC BNP No results for input(s): BNPP in the last 72 hours. Cardiac Enzymes No results for input(s): CPK, CKND1, SID in the last 72 hours. No lab exists for component: Will Ordonez Liver Enzymes Recent Labs  
   09/06/18 
 2200 TP  6.8 ALB  3.3* AP  50 SGOT  17 Thyroid Studies Lab Results Component Value Date/Time TSH 0.55 02/09/2017 04:00 AM  
    
 
All Micro Results None Imaging Reviewed: 
Ultrasound of scrotum with multiple unspecified cysts Assessment:  
 
Hospital Problems  Date Reviewed: 9/7/2018 Codes Class Noted POA Pancreatic insufficiency (Chronic) ICD-10-CM: B27.84 
ICD-9-CM: 577.8  9/8/2018 Yes * (Principal)Acute on chronic renal failure (HCC) ICD-10-CM: N17.9, N18.9 ICD-9-CM: 584.9, 585.9  9/7/2018 Unknown Acute pain in scrotum ICD-10-CM: N50.82 ICD-9-CM: 608.89  9/7/2018 Yes Type [facility] diabetes mellitus [facility]ropathy [facility] ICD-10-CM: E11.21 
ICD-9-CM: 250.40, 583.81  2/9/2018 Yes Hypertensive heart disease ICD-10-CM: I11.9 ICD-9-CM: 402.90  Unknown Yes Diabetes Mellitus on long term insulin with CKD 5 Plan:  
 
Discussed plan with nephrology- follow appreciated Gentle hydration, hold nephro toxic drugs POC glucose with coverage Consult urology Elevated lipase is chronic, with 400 + back to 12/2017 Add creon, discussed with GI- consult deferred unless  
develops acute symptoms, OK to eat Full code Plan for home when stable Eneida Rider DO 
9/7/2018, 11:48 AM

## 2018-09-07 NOTE — PROGRESS NOTES
Important Message from [address] [facility] [address]" reviewed and explained with the patient and/or representative at bedside and signature was obtained. A signed copy provided to patient/representative. Original signed document placed in patient's chart.

## 2018-09-07 NOTE — ROUTINE PROCESS
TRANSFER - OUT REPORT: 
 
Verbal report given to Dario Fleming RN on Lalita Leo  being transferred to [address] [place] [address] (unit) for routine progression of care Report consisted of patients Situation, Background, Assessment and  
Recommendations(SBAR). Information from the following report(s) SBAR, ED Summary, [facility] and Recent Results was reviewed with the receiving nurse. Lines:  
Peripheral IV 09/06/18 Left Antecubital (Active) Site Assessment Clean, dry, & intact 9/6/2018 10:05 PM  
Phlebitis Assessment 0 9/6/2018 10:05 PM  
Infiltration Assessment 0 9/6/2018 10:05 PM  
Dressing Status Clean, dry, & intact 9/6/2018 10:05 PM  
Dressing Type Tape;Transparent 9/6/2018 10:05 PM  
Hub Color/Line Status Pink 9/6/2018 10:05 PM  
Alcohol Cap Used Yes 9/6/2018 10:05 PM  
  
 
Opportunity for questions and clarification was provided. Patient transported with: 
 [place] [employer]

## 2018-09-07 NOTE — ED PROVIDER NOTES
EMERGENCY DEPARTMENT HISTORY AND PHYSICAL EXAM 
 
Date: 9/6/2018 Patient Name: Laura Jacobson History of Presenting Illness No chief complaint on file. History Provided By:patient Chief Complaint: abd pain Duration: 1 day Timing:  acute Location: R side of abdomen, R groin and testicle Quality: sharp pain Severity:severe Modifying Factors: none Associated Symptoms: nausea, constipation, testicular pain and swelling, cough and wheezing Additional History (Context): Laura Jacobson is a 62 y.o. male with PMH DM, gout, obesity, TIA, arthritis, alcohol abuse, CAD, hypercholesterolemia, and hepatic steatosis who presents with complaints of R sided abd pain radiating into the R groin and testicle, with associated testicular swelling, nausea, and constipation. Denies urinary sx, fever, and chills. Pt has COPD and also complains of cough and wheezing. Denies tx PTA. No other complaints at this time. PCP: Selam Luna MD 
 
Current Facility-Administered Medications Medication Dose Route Frequency Provider Last Rate Last Dose  prochlorperazine (COMPAZINE) injection 5 mg  5 mg IntraVENous Q6H PRN April BARON Richter PA-C   5 mg at 09/07/18 0033  
 0.9% sodium chloride infusion 1,000 mL  1,000 mL IntraVENous ONCE April BARON Richter PA-C Current Outpatient Prescriptions Medication Sig Dispense Refill  febuxostat (ULORIC) 40 mg tab tablet Take 1 Tab by mouth daily. 14 Tab 1  
 HYDROcodone-acetaminophen (NORCO) 5-325 mg per tablet Take 1 Tab by mouth every four (4) hours as needed for Pain. Max Daily Amount: 6 Tabs. 4 Tab 0  
 losartan (COZAAR) 25 mg tablet Take 1 Tab by mouth daily. 30 Tab 1  
 hydrALAZINE (APRESOLINE) 50 mg tablet Take 1 Tab by mouth two (2) times a day. 20 Tab 0  
 pantoprazole (PROTONIX) 20 mg tablet Take 1 Tab by mouth daily.  30 Tab 0  
 ondansetron hcl (ZOFRAN, AS HYDROCHLORIDE,) 4 mg tablet Take 1 Tab by mouth every eight (8) hours as needed for Nausea. 10 Tab 0  
 tamsulosin (FLOMAX) 0.4 mg capsule Take 1 Cap by mouth daily. Start this medication on Friday 12/15/17  Indications: Urolithiasis 6 Cap 0  
 glucose blood VI test strips (FREESTYLE TEST) strip by Does Not Apply route See Admin Instructions. Check twice a day 100 Strip 2  
 bisacodyl (DULCOLAX, BISACODYL,) 5 mg EC tablet Take 1 Tab by mouth daily as needed for Constipation. 30 Tab 0  
 NOVOLIN 70/30 100 unit/mL (70-30) injection INJECT 30 UNITS SC IN THE MORNING AND 20 UNITS SC IN THE EVENING BEFORE SUPPER 10 mL 4  
 isosorbide mononitrate ER (IMDUR) 60 mg CR tablet Take 1 Tab by mouth daily. 30 Tab 6  
 atorvastatin (LIPITOR) 40 mg tablet Take 1 Tab by mouth nightly. 30 Tab 6  furosemide (LASIX) 40 mg tablet Take 1 Tab by mouth daily. 30 Tab 6  fluticasone (FLONASE) 50 mcg/actuation nasal spray 2 Sprays by Both Nostrils route daily.  carvedilol (COREG) 6.25 mg tablet Take 1 Tab by mouth two (2) times daily (with meals). (Patient taking differently: Take 25 mg by mouth two (2) times daily (with meals). ) 60 Tab 6  
 nitroglycerin (NITROSTAT) 0.4 mg SL tablet 1 Tab by SubLINGual route as needed for Chest Pain. (Patient taking differently: 1 Tab by SubLINGual route as needed for Chest Pain. Pt to take 1 tab q 5 min up to three times, if symptom persist pt. to call 911.) 20 Tab 0  
 aspirin 81 mg chewable tablet Take 1 Tab by mouth daily. 30 Tab 0  B COMPLEX W-C NO.20/FOLIC ACID (B COMPLEX & C NO.20-FOLIC ACID PO) Take  by mouth.  budesonide-formoterol (SYMBICORT) 160-4.5 mcg/actuation HFA inhaler Take 2 Puffs by inhalation two (2) times a day. 1 Inhaler 0  
 calcitRIOL (ROCALTROL) 0.25 mcg capsule Take 1 Cap by mouth every Monday, Wednesday, Friday. 15 Cap 0  
 Lancets misc Check 3 times a day , needs according contour glucometer 1 Package 11  Blood-Glucose Meter monitoring kit Check twice daily. 1 kit 0 Past History Past Medical History: 
Past Medical History:  
Diagnosis Date  Alcohol abuse  Arthritis  Atherosclerotic cardiovascular disease  CAD (coronary artery disease)   
 mild disease of coronaries (cor angio 2006),wife states he has 1 stent  Cardiac cath 01/26/2006 RCA mild. Otherwise patent coronaries. LVEDP 15.  EF 55-60%.  Cardiac echocardiogram 03/27/2009 EF 60%. No cardiac source of embolism. No significant valvular pathology.  Cardiac nuclear imaging test 12/06/2011 Small, mild inferior infarction or possibly artifact. No ischemia. EF 45%. No TID. No reg WMA.  Cardiovascular LLE venous duplex 08/14/2015 Left leg:  No DVT. Dilated lymph node in left groin.  Constipation  Diabetes mellitus type II   
 Gout  Hepatic steatosis  Hypercholesterolemia  Hypertension  Knee effusion, left  Left knee pain  Morbid obesity ([address]  Noncompliance  Obesity (BMI 30-39. 9)  S/P colonoscopy 3/8/05 Dr. Yue Johnson; normal; f/u 10 years  Stomach problems  TIA (transient ischemic attack) 3/09  Tobacco abuse Past Surgical History: 
Past Surgical History:  
Procedure Laterality Date [address] UNLISTED Hernia repair in 1960's or 1970's.  HX ORTHOPAEDIC Bones spur removed from left & right foot 2013.  HX UROLOGICAL  8/18/2015 [facility], Dr. Sharon Winter, Cystoscopy, left retrograde, left double-J cath Family History: 
Family History Problem Relation Age of Onset  Diabetes Father  Heart Disease Mother  Diabetes Sister  Hypertension Sister  Arthritis-osteo Sister  Arthritis-osteo Brother  Diabetes Other  Diabetes Other Josefina Oddi  Hypertension Other Uncle; Aunt Social History: 
Social History Substance Use Topics  Smoking status: Current Every Day Smoker Packs/day: 0.25 Types: Cigarettes Last attempt to quit: 6/28/2016  Smokeless tobacco: Never Used  Alcohol use 0.0 oz/week  
  0 Standard drinks or equivalent per week Comment: 1 beers a day. Allergies: Allergies Allergen Reactions  Shellfish Derived Other (comments) Causes Gout Review of Systems Review of Systems Constitutional: Negative. Negative for chills and fever. HENT: Negative. Negative for congestion, ear pain and rhinorrhea. Eyes: Negative. Negative for pain and redness. Respiratory: Positive for cough and wheezing. Negative for shortness of breath and stridor. Cardiovascular: Negative. Negative for chest pain and leg swelling. Gastrointestinal: Positive for abdominal pain, constipation and nausea. Negative for diarrhea and vomiting. Genitourinary: Positive for scrotal swelling and testicular pain. Negative for dysuria and frequency. Musculoskeletal: Negative. Negative for back pain and neck pain. Skin: Negative. Negative for rash and wound. Neurological: Negative. Negative for dizziness, seizures, syncope and headaches. All other systems reviewed and are negative. All Other Systems Negative Physical Exam  
 
Vitals:  
 09/06/18 2104 BP: 176/84 Pulse: 77 Resp: 16 Temp: 97.8 °F (36.6 °C) SpO2: 96% Weight: 102.1 kg (225 lb) Height: 5' 4\" (1.626 m) Physical Exam  
Constitutional: He is oriented to person, place, and time. He appears well-developed and well-nourished. He appears distressed. HENT:  
Head: Normocephalic and atraumatic. Eyes: Conjunctivae are normal. Right eye exhibits no discharge. Left eye exhibits no discharge. No scleral icterus. Neck: Normal range of motion. Neck supple. Cardiovascular: Normal rate, regular rhythm and normal heart sounds. Exam reveals no gallop and no friction rub. No murmur heard. Pulmonary/Chest: Effort normal and breath sounds normal. No stridor. No respiratory distress. He has no wheezes. He has no rales. Abdominal: Soft. Bowel sounds are normal. He exhibits no distension. There is tenderness. There is no guarding. Mild diffuse R sided TTP noted, without guarding or peritoneal signs. Genitourinary: No penile tenderness. Genitourinary Comments: R sided testicular TTP noted, no inguinal hernia, small palpable nodules noted around the R testicle. Musculoskeletal: Normal range of motion. Neurological: He is alert and oriented to person, place, and time. Coordination normal.  
Gait is steady. Able to ambulate without difficulty. Skin: Skin is warm and dry. No rash noted. He is not diaphoretic. No erythema. Psychiatric: He has a normal mood and affect. His behavior is normal. Thought content normal.  
Nursing note and vitals reviewed. Diagnostic Study Results Labs - Recent Results (from the past 12 hour(s)) CBC WITH AUTOMATED DIFF Collection Time: 09/06/18 10:00 PM  
Result Value Ref Range WBC 7.7 4.6 - 13.2 K/uL  
 RBC 3.86 (L) 4.70 - 5.50 M/uL  
 HGB 10.5 (L) 13.0 - 16.0 g/dL HCT 33.1 (L) 36.0 - 48.0 % MCV 85.8 74.0 - 97.0 FL  
 MCH 27.2 24.0 - 34.0 PG  
 MCHC 31.7 31.0 - 37.0 g/dL  
 RDW 15.5 (H) 11.6 - 14.5 % PLATELET 697 357 - 442 K/uL MPV 10.6 9.2 - 11.8 FL  
 NEUTROPHILS 58 40 - 73 % LYMPHOCYTES 30 21 - 52 % MONOCYTES 8 3 - 10 % EOSINOPHILS 4 0 - 5 % BASOPHILS 0 0 - 2 %  
 ABS. NEUTROPHILS 4.5 1.8 - 8.0 K/UL  
 ABS. LYMPHOCYTES 2.3 0.9 - 3.6 K/UL  
 ABS. MONOCYTES 0.6 0.05 - 1.2 K/UL  
 ABS. EOSINOPHILS 0.3 0.0 - 0.4 K/UL  
 ABS. BASOPHILS 0.0 0.0 - 0.1 K/UL  
 DF AUTOMATED METABOLIC PANEL, COMPREHENSIVE Collection Time: 09/06/18 10:00 PM  
Result Value Ref Range Sodium 145 136 - 145 mmol/L Potassium 4.9 3.5 - 5.5 mmol/L Chloride 112 (H) 100 - 108 mmol/L  
 CO2 25 21 - 32 mmol/L Anion gap 8 3.0 - 18 mmol/L Glucose 136 (H) 74 - 99 mg/dL BUN 63 (H) 7.0 - 18 MG/DL  Creatinine 5.77 (H) 0.6 - 1.3 MG/DL  
 BUN/Creatinine ratio 11 (L) 12 - 20 GFR est AA 12 (L) >60 ml/min/1.73m2 GFR est non-AA 10 (L) >60 ml/min/1.73m2 Calcium 8.2 (L) 8.5 - 10.1 MG/DL Bilirubin, total 0.2 0.2 - 1.0 MG/DL  
 ALT (SGPT) 21 16 - 61 U/L  
 AST (SGOT) 17 15 - 37 U/L Alk. phosphatase 50 45 - 117 U/L Protein, total 6.8 6.4 - 8.2 g/dL Albumin 3.3 (L) 3.4 - 5.0 g/dL Globulin 3.5 2.0 - 4.0 g/dL A-G Ratio 0.9 0.8 - 1.7 LIPASE Collection Time: 09/06/18 10:00 PM  
Result Value Ref Range Lipase 619 (H) 73 - 393 U/L  
GLUCOSE, POC Collection Time: 09/07/18 12:25 AM  
Result Value Ref Range Glucose (POC) 110 70 - 110 mg/dL Radiologic Studies -  
US SCROTUM/TESTICLES Final Result CT ABD PELV WO CONT Final Result CT Results  (Last 48 hours) 09/06/18 2153  CT ABD PELV WO CONT Final result Impression:  IMPRESSION:  
1. Diffuse left-sided renal atrophy, progressed from 2017.  
-Similar clustered calcifications in the proximal left ureter. Residual  
pelviectasis, though improved from previous. 2.  No right renal stones, hydronephrosis or hydroureter. 3. No bowel distention. Normal caliber appendix. -Tiny amount of fat protrudes at the umbilicus. No ventral hernia otherwise.  
-No free fluid. Narrative:  CT ABDOMEN AND PELVIS-RENAL STONE PROTOCOL  
   
CPT code: 93547 and [id number]. INDICATION: Right flank and abdominal pain radiating to right groin region. Evaluate for nephrolithiasis or other etiology. TECHNIQUE: 5 mm collimation axial images obtained from the diaphragm to the  
level of the pubic symphysis without oral or nonionic intravenous contrast,  
following the renal calculus protocol. All CT scans at this facility are performed using dose optimization technique as  
appropriate to this specific exam, to include automated exposure control,  
adjustment of the mA and/or KP according to patient size or use of iterative reconstruction techniques. COMPARISON: Prior CT 12/14/2017 Abdomen Findings: No consolidation or effusion at the lung bases. Subsegmental anterior left lower  
lobe atelectasis. There is a tiny amount of pericardial fluid present, slightly  
increased from previous exam, nonspecific. Lack of intravenous contrast renders this study suboptimal for evaluating solid  
abdominal organs. Given this, liver appears top normal size. No biliary distention. Peggyann Gang Spleen normal size and grossly unremarkable. Adrenals unremarkable. Pancreas poorly  from adjacent unopacified bowel, but grossly  
unremarkable. Gallbladder not distended. No calcified stones. Abdominal aorta normal caliber. Right kidney not enlarged. No hydronephrosis. No calcified stones. Left kidney is globally atrophic, and slightly smaller size than the comparison  
exam. There is pelviectasis up to 1.6 cm, improved from 2.3 cm previously. Redemonstrated are several punctate calcifications in the proximal ureter on  
image 44, present similarly on the prior exam. There are no intrarenal stones on  
the left. Pelvis Findings: There is no small bowel distention to suggest obstruction. Small amount of fat protrudes at the umbilicus. No ventral hernia otherwise. Appendix normal caliber. No colon distention. No diverticular disease appreciated. Bladder not well distended for evaluation but grossly unremarkable. Scattered  
dystrophic calcifications in the prostate. No free fluid in the pelvis. Scrotum not included in the field-of-view. Normal lumbar alignment. No compression deformity or listhesis. CXR Results  (Last 48 hours) None Medical Decision Making I am the first provider for this patient. I reviewed the vital signs, available nursing notes, past medical history, past surgical history, family history and social history. Vital Signs-Reviewed the patient's vital signs. Records Reviewed: Lizbeth Richter PA-C 10:08 PM  
 
Procedures: 
Procedures Provider Notes (Medical Decision Making): Impression:  abd pain, testicular pain Duo neb given, IV inserted 1 L saline 4 mg morphine, and 4 mg zofran given Consulted with Dr. Marcelino Peralta who recommends paging nephrology for consultation, pt likely needs admission for IV fluids and serial chemistries. 12:51 AM Spoke with Dr. Prince Sinclair, nephrology on call who agrees the pt should be admitted for IV hydration. Pt's PCP is \"Candice\" from [facility]. Will page the on call dr from [facility]. 12:59 AM Spoke with the [facility] doctor on call who agrees the pt should be admitted, however he states [facility] no longer admits there own pts. Will page the hospitalist on call. 1:26 AM Spoke with Dr. Kiah Brown, hospitalist on call who agrees to accept the pt for admission. Pt is stable for admission. Lizbeth Richter PA-C  
 
MED RECONCILIATION: 
Current Facility-Administered Medications Medication Dose Route Frequency  prochlorperazine (COMPAZINE) injection 5 mg  5 mg IntraVENous Q6H PRN  
 0.9% sodium chloride infusion 1,000 mL  1,000 mL IntraVENous ONCE Current Outpatient Prescriptions Medication Sig  
 febuxostat (ULORIC) 40 mg tab tablet Take 1 Tab by mouth daily.  HYDROcodone-acetaminophen (NORCO) 5-325 mg per tablet Take 1 Tab by mouth every four (4) hours as needed for Pain. Max Daily Amount: 6 Tabs.  losartan (COZAAR) 25 mg tablet Take 1 Tab by mouth daily.  hydrALAZINE (APRESOLINE) 50 mg tablet Take 1 Tab by mouth two (2) times a day.  pantoprazole (PROTONIX) 20 mg tablet Take 1 Tab by mouth daily.  ondansetron hcl (ZOFRAN, AS HYDROCHLORIDE,) 4 mg tablet Take 1 Tab by mouth every eight (8) hours as needed for Nausea.  tamsulosin (FLOMAX) 0.4 mg capsule Take 1 Cap by mouth daily. Start this medication on Friday 12/15/17  Indications: Urolithiasis  glucose blood VI test strips (FREESTYLE TEST) strip by Does Not Apply route See Admin Instructions. Check twice a day  bisacodyl (DULCOLAX, BISACODYL,) 5 mg EC tablet Take 1 Tab by mouth daily as needed for Constipation.  NOVOLIN 70/30 100 unit/mL (70-30) injection INJECT 30 UNITS SC IN THE MORNING AND 20 UNITS SC IN THE EVENING BEFORE SUPPER  
 isosorbide mononitrate ER (IMDUR) 60 mg CR tablet Take 1 Tab by mouth daily.  atorvastatin (LIPITOR) 40 mg tablet Take 1 Tab by mouth nightly.  furosemide (LASIX) 40 mg tablet Take 1 Tab by mouth daily.  fluticasone (FLONASE) 50 mcg/actuation nasal spray 2 Sprays by Both Nostrils route daily.  carvedilol (COREG) 6.25 mg tablet Take 1 Tab by mouth two (2) times daily (with meals). (Patient taking differently: Take 25 mg by mouth two (2) times daily (with meals). )  nitroglycerin (NITROSTAT) 0.4 mg SL tablet 1 Tab by SubLINGual route as needed for Chest Pain. (Patient taking differently: 1 Tab by SubLINGual route as needed for Chest Pain. Pt to take 1 tab q 5 min up to three times, if symptom persist pt. to call 911.)  aspirin 81 mg chewable tablet Take 1 Tab by mouth daily.  B COMPLEX W-C NO.20/FOLIC ACID (B COMPLEX & C NO.20-FOLIC ACID PO) Take  by mouth.  budesonide-formoterol (SYMBICORT) 160-4.5 mcg/actuation HFA inhaler Take 2 Puffs by inhalation two (2) times a day.  calcitRIOL (ROCALTROL) 0.25 mcg capsule Take 1 Cap by mouth every Monday, Wednesday, Friday.  Lancets misc Check 3 times a day , needs according contour glucometer  Blood-Glucose Meter monitoring kit Check twice daily. Disposition: 
Admitted Core Measures:  
 
Critical Care Time: 
I have spent 90 minutes of critical care time involved in lab review, consultations with specialist, family decision-making, and documentation.   During this entire length of time I was immediately available to the patient. 
  
 Critical Care: The reason for providing this level of medical care for this critically ill patient was due a critical illness that impaired one or more vital organ systems such that there was a high probability of imminent or life threatening deterioration in the patients condition. This care involved high complexity decision making to assess, manipulate, and support vital system functions, to treat this degreee vital organ system failure and to prevent further life threatening deterioration of the patients condition. For Hospitalized Patients: 
 
1. Hospitalization Decision Time: The decision to hospitalize the patient was made by Dr. Merri Cockayne at 1:25 AM on 9/6/2018 Diagnosis Clinical Impression: 1. Acute renal failure, unspecified acute renal failure type ([facility] [address] 2. Acute pancreatitis, unspecified complication status, unspecified pancreatitis type 3. Right sided abdominal pain 4. Testicular pain, right

## 2018-09-07 NOTE — ROUTINE PROCESS
Bedside and Verbal shift change report given to Stephanie Jamison (oncoming nurse) by Serena Moore (offgoing nurse). Report included the following information SBAR, ED Summary, Intake/Output, MAR and Recent Results.

## 2018-09-07 NOTE — ED TRIAGE NOTES
Pt. States \"I'm swollen up right here\" refers to right groin. Pt. Also c/o right sided abdominal pain. Denies any N/V. Reports symptoms started \"yesterday\".

## 2018-09-07 NOTE — PROGRESS NOTES
conducted an initial consultation and Spiritual Assessment for Goldy Greenberg, who is a 62 y. o.,male. Patients Primary Language is: [place]. According to the patients EMR Congregational Affiliation is: Faith.  
 
The reason the Patient came to the hospital is:  
Patient Active Problem List  
 Diagnosis Date Noted  Pancreatitis 09/07/2018  Obesity, morbid ([address] 02/09/2018  Type 2 diabetes with nephropathy ([address] 02/09/2018  Type 2 diabetes mellitus with diabetic neuropathy ([address] 02/09/2018  Acute renal failure (ARF) ([address] 08/20/2017  Chronic renal disease, stage IV ([address] 07/16/2017  Cocaine abuse 07/14/2017  Chronic kidney disease, stage III (moderate) 02/09/2017  Persistent proteinuria 02/09/2017  Secondary hyperparathyroidism of renal origin ([address] 02/09/2017  Hypoglycemia 02/09/2017  Left sided numbness 02/07/2017  Acute chest pain 08/13/2016  Advance directive discussed with patient 03/11/2016  Essential hypertension 12/11/2015  Acute unilateral obstructive uropathy 08/17/2015  ARF (acute renal failure) ([address] 08/17/2015  Type 2 diabetes mellitus without complication ([address] 44/21/6601  Hypertriglyceridemia 12/12/2014  Microalbuminuria 12/10/2014  Renal stones 11/24/2014  Laceration of gum 02/23/2014  Fractured tooth 02/23/2014  Hemoptysis 02/21/2014  Rash 02/21/2014  Testicular/scrotal pain 07/14/2013  UTI (urinary tract infection) 07/14/2013  Contusion of knee 07/14/2013  Knee strain 07/14/2013  Effusion of patella 07/14/2013  Noncompliance  Constipation 05/25/2012  Tobacco abuse  TIA (transient ischemic attack)  Hepatic steatosis  Dyslipidemia  Gout  CAD (coronary artery disease)  Hypertensive heart disease  Obesity (BMI 30-39. 9)  Right knee pain  Allergic rhinitis 04/28/2010  Neuropathy 04/28/2010 The  provided the following Interventions: Initiated a relationship of care and support. Explored issues of eduardo, belief, spirituality and Jewish/ritual needs while hospitalized. Listened empathically. Provided chaplaincy education. Provided information about Spiritual Care Services. Offered prayer and assurance of continued prayers on patient's behalf. Chart reviewed. The following outcomes where achieved: 
Patient shared limited information about both their medical narrative and spiritual journey/beliefs.  confirmed Patient's Temple Affiliation. Patient processed feeling about current hospitalization. Patient expressed gratitude for 's visit. Assessment: 
Patient does not have any Jewish/cultural needs that will affect patients preferences in health care. There are no spiritual or Jewish issues which require intervention at this time. Plan: 
Chaplains will continue to follow and will provide pastoral care on an as needed/requested basis.  recommends bedside caregivers page  on duty if patient shows signs of acute spiritual or emotional distress. Chaplain Resident Bella Mahoney Spiritual [facility]  
[phone]

## 2018-09-07 NOTE — PROGRESS NOTES
Reason for Admission:   Acute renal failure, pancreatitis RRAT Score:    34 Resources/supports as identified by patient/family:   [employer]  Medicare, [employer] Medicaid Top Challenges facing patient (as identified by patient/family and CM): Finances/Medication cost?      no 
           
Transportation? Depends on his wife Support system or lack thereof? Pt's wife is primary support Living arrangements? Lives with his wife Self-care/ADLs/Cognition? AOx4, self care Current Advanced Directive/Advance Care Plan:   
                       
Plan for utilizing home health:     
                   
Likelihood of readmission: red/high Transition of Care Plan:      Pt states he has a pcp but cannot remember the name. He has a cane at home. He is hoping to go home after discharge and also hopes he will not have too be on dialysis. His wife will be picking him up when discharged.

## 2018-09-08 VITALS
RESPIRATION RATE: 20 BRPM | SYSTOLIC BLOOD PRESSURE: 153 MMHG | DIASTOLIC BLOOD PRESSURE: 78 MMHG | OXYGEN SATURATION: 96 % | WEIGHT: 231.6 LBS | TEMPERATURE: 97.7 F | BODY MASS INDEX: 39.54 KG/M2 | HEIGHT: 64 IN | HEART RATE: 77 BPM

## 2018-09-08 PROBLEM — K86.89 PANCREATIC INSUFFICIENCY: Chronic | Status: ACTIVE | Noted: 2018-09-08

## 2018-09-08 PROBLEM — N50.82 ACUTE PAIN IN SCROTUM: Status: ACTIVE | Noted: 2018-09-07

## 2018-09-08 LAB
25(OH)D3 SERPL-MCNC: 18.3 NG/ML (ref 30–100)
ALBUMIN SERPL-MCNC: 2.8 G/DL (ref 3.4–5)
ALBUMIN/GLOB SERPL: 0.8 {RATIO} (ref 0.8–1.7)
ALP SERPL-CCNC: 42 U/L (ref 45–117)
ALT SERPL-CCNC: 16 U/L (ref 16–61)
ANION GAP SERPL CALC-SCNC: 8 MMOL/L (ref 3–18)
AST SERPL-CCNC: 14 U/L (ref 15–37)
ATRIAL RATE: 83 BPM
BASOPHILS # BLD: 0 K/UL (ref 0–0.1)
BASOPHILS NFR BLD: 0 % (ref 0–2)
BILIRUB DIRECT SERPL-MCNC: <0.1 MG/DL (ref 0–0.2)
BILIRUB SERPL-MCNC: 0.1 MG/DL (ref 0.2–1)
BUN SERPL-MCNC: 53 MG/DL (ref 7–18)
BUN/CREAT SERPL: 11 (ref 12–20)
CALCIUM SERPL-MCNC: 7.9 MG/DL (ref 8.5–10.1)
CALCULATED P AXIS, ECG09: 62 DEGREES
CALCULATED R AXIS, ECG10: 18 DEGREES
CALCULATED T AXIS, ECG11: 163 DEGREES
CHLORIDE SERPL-SCNC: 112 MMOL/L (ref 100–108)
CO2 SERPL-SCNC: 24 MMOL/L (ref 21–32)
CREAT SERPL-MCNC: 4.99 MG/DL (ref 0.6–1.3)
DIAGNOSIS, 93000: NORMAL
DIFFERENTIAL METHOD BLD: ABNORMAL
EOSINOPHIL # BLD: 0.2 K/UL (ref 0–0.4)
EOSINOPHIL NFR BLD: 3 % (ref 0–5)
ERYTHROCYTE [DISTWIDTH] IN BLOOD BY AUTOMATED COUNT: 15.6 % (ref 11.6–14.5)
GLOBULIN SER CALC-MCNC: 3.3 G/DL (ref 2–4)
GLUCOSE BLD STRIP.AUTO-MCNC: 102 MG/DL (ref 70–110)
GLUCOSE BLD STRIP.AUTO-MCNC: 121 MG/DL (ref 70–110)
GLUCOSE BLD STRIP.AUTO-MCNC: 130 MG/DL (ref 70–110)
GLUCOSE SERPL-MCNC: 93 MG/DL (ref 74–99)
HCT VFR BLD AUTO: 32.6 % (ref 36–48)
HGB BLD-MCNC: 10.1 G/DL (ref 13–16)
LIPASE SERPL-CCNC: 452 U/L (ref 73–393)
LYMPHOCYTES # BLD: 2.2 K/UL (ref 0.9–3.6)
LYMPHOCYTES NFR BLD: 31 % (ref 21–52)
MAGNESIUM SERPL-MCNC: 1.4 MG/DL (ref 1.6–2.6)
MCH RBC QN AUTO: 27.1 PG (ref 24–34)
MCHC RBC AUTO-ENTMCNC: 31 G/DL (ref 31–37)
MCV RBC AUTO: 87.4 FL (ref 74–97)
MONOCYTES # BLD: 0.5 K/UL (ref 0.05–1.2)
MONOCYTES NFR BLD: 8 % (ref 3–10)
NEUTS SEG # BLD: 4 K/UL (ref 1.8–8)
NEUTS SEG NFR BLD: 58 % (ref 40–73)
P-R INTERVAL, ECG05: 174 MS
PHOSPHATE SERPL-MCNC: 5 MG/DL (ref 2.5–4.9)
PLATELET # BLD AUTO: 276 K/UL (ref 135–420)
PMV BLD AUTO: 11 FL (ref 9.2–11.8)
POTASSIUM SERPL-SCNC: 4.9 MMOL/L (ref 3.5–5.5)
PROT SERPL-MCNC: 6.1 G/DL (ref 6.4–8.2)
Q-T INTERVAL, ECG07: 374 MS
QRS DURATION, ECG06: 96 MS
QTC CALCULATION (BEZET), ECG08: 439 MS
RBC # BLD AUTO: 3.73 M/UL (ref 4.7–5.5)
SODIUM SERPL-SCNC: 144 MMOL/L (ref 136–145)
VENTRICULAR RATE, ECG03: 83 BPM
WBC # BLD AUTO: 6.9 K/UL (ref 4.6–13.2)

## 2018-09-08 PROCEDURE — 74011250637 HC RX REV CODE- 250/637: Performed by: HOSPITALIST

## 2018-09-08 PROCEDURE — 82306 VITAMIN D 25 HYDROXY: CPT | Performed by: INTERNAL MEDICINE

## 2018-09-08 PROCEDURE — 74011250636 HC RX REV CODE- 250/636: Performed by: HOSPITALIST

## 2018-09-08 PROCEDURE — 83690 ASSAY OF LIPASE: CPT | Performed by: INTERNAL MEDICINE

## 2018-09-08 PROCEDURE — 74011250637 HC RX REV CODE- 250/637: Performed by: INTERNAL MEDICINE

## 2018-09-08 PROCEDURE — 80076 HEPATIC FUNCTION PANEL: CPT | Performed by: INTERNAL MEDICINE

## 2018-09-08 PROCEDURE — 85025 COMPLETE CBC W/AUTO DIFF WBC: CPT | Performed by: HOSPITALIST

## 2018-09-08 PROCEDURE — 83735 ASSAY OF MAGNESIUM: CPT | Performed by: INTERNAL MEDICINE

## 2018-09-08 PROCEDURE — 80048 BASIC METABOLIC PNL TOTAL CA: CPT | Performed by: INTERNAL MEDICINE

## 2018-09-08 PROCEDURE — 84100 ASSAY OF PHOSPHORUS: CPT | Performed by: INTERNAL MEDICINE

## 2018-09-08 PROCEDURE — 93005 ELECTROCARDIOGRAM TRACING: CPT

## 2018-09-08 PROCEDURE — 82962 GLUCOSE BLOOD TEST: CPT

## 2018-09-08 PROCEDURE — 36415 COLL VENOUS BLD VENIPUNCTURE: CPT | Performed by: INTERNAL MEDICINE

## 2018-09-08 RX ORDER — CALCIUM ACETATE 667 MG/1
1 CAPSULE ORAL
Status: DISCONTINUED | OUTPATIENT
Start: 2018-09-08 | End: 2018-09-08 | Stop reason: HOSPADM

## 2018-09-08 RX ORDER — CARVEDILOL 6.25 MG/1
6.25 TABLET ORAL 2 TIMES DAILY WITH MEALS
Status: DISCONTINUED | OUTPATIENT
Start: 2018-09-08 | End: 2018-09-08

## 2018-09-08 RX ORDER — ALBUTEROL SULFATE 0.83 MG/ML
2.5 SOLUTION RESPIRATORY (INHALATION)
Status: DISCONTINUED | OUTPATIENT
Start: 2018-09-08 | End: 2018-09-08

## 2018-09-08 RX ORDER — ATORVASTATIN CALCIUM 40 MG/1
40 TABLET, FILM COATED ORAL
Status: DISCONTINUED | OUTPATIENT
Start: 2018-09-09 | End: 2018-09-08 | Stop reason: HOSPADM

## 2018-09-08 RX ORDER — DIPHENHYDRAMINE HCL 25 MG
25 CAPSULE ORAL
Status: DISCONTINUED | OUTPATIENT
Start: 2018-09-08 | End: 2018-09-08 | Stop reason: HOSPADM

## 2018-09-08 RX ORDER — ISOSORBIDE MONONITRATE 60 MG/1
60 TABLET, EXTENDED RELEASE ORAL DAILY
Status: DISCONTINUED | OUTPATIENT
Start: 2018-09-08 | End: 2018-09-08 | Stop reason: HOSPADM

## 2018-09-08 RX ORDER — PANTOPRAZOLE SODIUM 40 MG/1
40 TABLET, DELAYED RELEASE ORAL
Status: DISCONTINUED | OUTPATIENT
Start: 2018-09-08 | End: 2018-09-08 | Stop reason: HOSPADM

## 2018-09-08 RX ORDER — CARVEDILOL 25 MG/1
25 TABLET ORAL 2 TIMES DAILY WITH MEALS
Status: DISCONTINUED | OUTPATIENT
Start: 2018-09-08 | End: 2018-09-08 | Stop reason: HOSPADM

## 2018-09-08 RX ORDER — FUROSEMIDE 40 MG/1
40 TABLET ORAL DAILY
Status: DISCONTINUED | OUTPATIENT
Start: 2018-09-09 | End: 2018-09-08 | Stop reason: HOSPADM

## 2018-09-08 RX ORDER — LANOLIN ALCOHOL/MO/W.PET/CERES
1 CREAM (GRAM) TOPICAL
Status: DISCONTINUED | OUTPATIENT
Start: 2018-09-08 | End: 2018-09-08 | Stop reason: HOSPADM

## 2018-09-08 RX ORDER — HYDRALAZINE HYDROCHLORIDE 50 MG/1
50 TABLET, FILM COATED ORAL 2 TIMES DAILY
Status: DISCONTINUED | OUTPATIENT
Start: 2018-09-08 | End: 2018-09-08 | Stop reason: HOSPADM

## 2018-09-08 RX ORDER — HYDRALAZINE HYDROCHLORIDE 20 MG/ML
20 INJECTION INTRAMUSCULAR; INTRAVENOUS
Status: DISCONTINUED | OUTPATIENT
Start: 2018-09-08 | End: 2018-09-08 | Stop reason: HOSPADM

## 2018-09-08 RX ORDER — ALBUTEROL SULFATE 0.83 MG/ML
2.5 SOLUTION RESPIRATORY (INHALATION)
Status: DISCONTINUED | OUTPATIENT
Start: 2018-09-08 | End: 2018-09-08 | Stop reason: HOSPADM

## 2018-09-08 RX ADMIN — PANTOPRAZOLE SODIUM 40 MG: 40 TABLET, DELAYED RELEASE ORAL at 09:05

## 2018-09-08 RX ADMIN — ISOSORBIDE MONONITRATE 60 MG: 60 TABLET, EXTENDED RELEASE ORAL at 09:05

## 2018-09-08 RX ADMIN — HYDROMORPHONE HYDROCHLORIDE 1 MG: 1 INJECTION, SOLUTION INTRAMUSCULAR; INTRAVENOUS; SUBCUTANEOUS at 14:00

## 2018-09-08 RX ADMIN — PANCRELIPASE 1 CAPSULE: 30000; 6000; 19000 CAPSULE, DELAYED RELEASE PELLETS ORAL at 12:56

## 2018-09-08 RX ADMIN — HEPARIN SODIUM 5000 UNITS: 5000 INJECTION, SOLUTION INTRAVENOUS; SUBCUTANEOUS at 06:05

## 2018-09-08 RX ADMIN — HEPARIN SODIUM 5000 UNITS: 5000 INJECTION, SOLUTION INTRAVENOUS; SUBCUTANEOUS at 12:55

## 2018-09-08 RX ADMIN — FERROUS SULFATE TAB 325 MG (65 MG ELEMENTAL FE) 325 MG: 325 (65 FE) TAB at 09:05

## 2018-09-08 RX ADMIN — PANCRELIPASE 1 CAPSULE: 30000; 6000; 19000 CAPSULE, DELAYED RELEASE PELLETS ORAL at 09:05

## 2018-09-08 RX ADMIN — CARVEDILOL 6.25 MG: 6.25 TABLET, FILM COATED ORAL at 09:05

## 2018-09-08 RX ADMIN — CALCIUM ACETATE 667 MG: 667 CAPSULE ORAL at 12:56

## 2018-09-08 RX ADMIN — HYDRALAZINE HYDROCHLORIDE 50 MG: 50 TABLET, FILM COATED ORAL at 09:04

## 2018-09-08 RX ADMIN — NEPHROCAP 1 CAPSULE: 1 CAP ORAL at 09:05

## 2018-09-08 NOTE — DISCHARGE INSTRUCTIONS
Abdominal Pain: Care Instructions  Your Care Instructions    Abdominal pain has many possible causes. Some aren't serious and get better on their own in a few days. Others need more testing and treatment. If your pain continues or gets worse, you need to be rechecked and may need more tests to find out what is wrong. You may need surgery to correct the problem. Don't ignore new symptoms, such as fever, nausea and vomiting, urination problems, pain that gets worse, and dizziness. These may be signs of a more serious problem. Your doctor may have recommended a follow-up visit in the next 8 to 12 hours. If you are not getting better, you may need more tests or treatment. The doctor has checked you carefully, but problems can develop later. If you notice any problems or new symptoms, get medical treatment right away. Follow-up care is a key part of your treatment and safety. Be sure to make and go to all appointments, and call your doctor if you are having problems. It's also a good idea to know your test results and keep a list of the medicines you take. How can you care for yourself at home? · Rest until you feel better. · To prevent dehydration, drink plenty of fluids, enough so that your urine is light yellow or clear like water. Choose water and other caffeine-free clear liquids until you feel better. If you have kidney, heart, or liver disease and have to limit fluids, talk with your doctor before you increase the amount of fluids you drink. · If your stomach is upset, eat mild foods, such as rice, dry toast or crackers, bananas, and applesauce. Try eating several small meals instead of two or three large ones. · Wait until 48 hours after all symptoms have gone away before you have spicy foods, alcohol, and drinks that contain caffeine. · Do not eat foods that are high in fat. · Avoid anti-inflammatory medicines such as aspirin, ibuprofen (Advil, Motrin), and naproxen (Aleve).  These can cause stomach upset. Talk to your doctor if you take daily aspirin for another health problem. When should you call for help? Call 911 anytime you think you may need emergency care. For example, call if:    · You passed out (lost consciousness).     · You pass maroon or very bloody stools.     · You vomit blood or what looks like coffee grounds.     · You have new, severe belly pain.    Call your doctor now or seek immediate medical care if:    · Your pain gets worse, especially if it becomes focused in one area of your belly.     · You have a new or higher fever.     · Your stools are black and look like tar, or they have streaks of blood.     · You have unexpected vaginal bleeding.     · You have symptoms of a urinary tract infection. These may include:  ¨ Pain when you urinate. ¨ Urinating more often than usual.  ¨ Blood in your urine.     · You are dizzy or lightheaded, or you feel like you may faint.    Watch closely for changes in your health, and be sure to contact your doctor if:    · You are not getting better after 1 day (24 hours). Where can you learn more? Go to http://leslie-graciela.info/. Enter X604 in the search box to learn more about \"Abdominal Pain: Care Instructions. \"  Current as of: November 20, 2017  Content Version: 11.7  © 8352-9983 [employer]. Care instructions adapted under license by [employer] (which disclaims liability or warranty for this information). If you have questions about a medical condition or this instruction, always ask your healthcare professional. [place] [address] any warranty or liability for your use of this information. [employer] Activation    Thank you for requesting access to [employer]. Please follow the instructions below to securely access and download your online medical record.  [employer] allows you to send messages to your doctor, view your test results, renew your prescriptions, schedule appointments, and more.    How Do I Sign Up? 1. In your internet browser, go to [email or web address]  2. Click on the First Time User? Click Here link in the Sign In box. You will be redirect to the New Member Sign Up page. 3. Enter your [employer] Access Code exactly as it appears below. You will not need to use this code after youve completed the sign-up process. If you do not sign up before the expiration date, you must request a new code. [employer] Access Code: [email or web address]  Expires: 2018  9:03 PM (This is the date your [employer] access code will )    4. Enter the last four digits of your Social Security Number (xxxx) and Date of Birth (mm/dd/yyyy) as indicated and click Submit. You will be taken to the next sign-up page. 5. Create a [employer] ID. This will be your [employer] login ID and cannot be changed, so think of one that is secure and easy to remember. 6. Create a [employer] password. You can change your password at any time. 7. Enter your Password Reset Question and Answer. This can be used at a later time if you forget your password. 8. Enter your e-mail address. You will receive e-mail notification when new information is available in [address] 9. Click Sign Up. You can now view and download portions of your medical record. 10. Click the Download Summary menu link to download a portable copy of your medical information. Additional Information    If you have questions, please visit the Frequently Asked Questions section of the [employer] website at [email or web address]/. Remember, [employer] is NOT to be used for urgent needs. For medical emergencies, dial 911.       Patient armband removed and shredded    DISCHARGE SUMMARY from Nurse    PATIENT INSTRUCTIONS:    After general anesthesia or intravenous sedation, for 24 hours or while taking prescription Narcotics:  · Limit your activities  · Do not drive and operate hazardous machinery  · Do not make important personal or business decisions  · Do  not drink alcoholic beverages  · If you have not urinated within 8 hours after discharge, please contact your surgeon on call. Report the following to your surgeon:  · Excessive pain, swelling, redness or odor of or around the surgical area  · Temperature over 100.5  · Nausea and vomiting lasting longer than 4 hours or if unable to take medications  · Any signs of decreased circulation or nerve impairment to extremity: change in color, persistent  numbness, tingling, coldness or increase pain  · Any questions    What to do at Home:  Recommended activity: Activity as tolerated    If you experience any of the following symptoms nausea, vomiting, chest pain, shortness of breath , fever greater than 101.5, please follow up with PCP    *  Please give a list of your current medications to your Primary Care Provider. *  Please update this list whenever your medications are discontinued, doses are      changed, or new medications (including over-the-counter products) are added. *  Please carry medication information at all times in case of emergency situations. These are general instructions for a healthy lifestyle:    No smoking/ No tobacco products/ Avoid exposure to second hand smoke  Surgeon General's Warning:  Quitting smoking now greatly reduces serious risk to your health. Obesity, smoking, and sedentary lifestyle greatly increases your risk for illness    A healthy diet, regular physical exercise & weight monitoring are important for maintaining a healthy lifestyle    You may be retaining fluid if you have a history of heart failure or if you experience any of the following symptoms:  Weight gain of 3 pounds or more overnight or 5 pounds in a week, increased swelling in our hands or feet or shortness of breath while lying flat in bed. Please call your doctor as soon as you notice any of these symptoms; do not wait until your next office visit.     Recognize signs and symptoms of STROKE:    F-face looks uneven    A-arms unable to move or move unevenly    S-speech slurred or non-existent    T-time-call 911 as soon as signs and symptoms begin-DO NOT go       Back to bed or wait to see if you get better-TIME IS BRAIN. Warning Signs of HEART ATTACK     Call 911 if you have these symptoms:   Chest discomfort. Most heart attacks involve discomfort in the center of the chest that lasts more than a few minutes, or that goes away and comes back. It can feel like uncomfortable pressure, squeezing, fullness, or pain.  Discomfort in other areas of the upper body. Symptoms can include pain or discomfort in one or both arms, the back, neck, jaw, or stomach.  Shortness of breath with or without chest discomfort.  Other signs may include breaking out in a cold sweat, nausea, or lightheadedness. Don't wait more than five minutes to call 911 - MINUTES MATTER! Fast action can save your life. Calling 911 is almost always the fastest way to get lifesaving treatment. Emergency Medical Services staff can begin treatment when they arrive -- up to an hour sooner than if someone gets to the hospital by car. The discharge information has been reviewed with the patient. The patient verbalized understanding. Discharge medications reviewed with the patient and appropriate educational materials and side effects teaching were provided.   ___________________________________________________________________________________________________________________________________

## 2018-09-08 NOTE — PROGRESS NOTES
RENAL DAILY PROGRESS NOTE Subjective:  
 
Admitted for right flank and scrotal pain seen for Acute on chronic renal failure Complaint: feeling much better, no CP or SOB, good urine output. Wife at bedside, reviewed home meds with them Current Facility-Administered Medications Medication Dose Route Frequency  B complex-vitaminC-folic acid (NEPHROCAP) cap  1 Cap Oral DAILY  [START ON 9/9/2018] atorvastatin (LIPITOR) tablet 40 mg  40 mg Oral QHS  carvedilol (COREG) tablet 6.25 mg  6.25 mg Oral BID WITH MEALS  
 hydrALAZINE (APRESOLINE) tablet 50 mg  50 mg Oral BID  isosorbide mononitrate ER (IMDUR) tablet 60 mg  60 mg Oral DAILY  pantoprazole (PROTONIX) tablet 40 mg  40 mg Oral ACB  ferrous sulfate tablet 325 mg  1 Tab Oral DAILY WITH BREAKFAST  hydrALAZINE (APRESOLINE) 20 mg/mL injection 20 mg  20 mg IntraVENous Q6H PRN  
 diphenhydrAMINE (BENADRYL) capsule 25 mg  25 mg Oral Q6H PRN  
 albuterol (PROVENTIL VENTOLIN) nebulizer solution 2.5 mg  2.5 mg Inhalation Q4H PRN  
 calcium acetate (PHOSLO) capsule 667 mg  1 Cap Oral TID WITH MEALS  insulin lispro (HUMALOG) injection   SubCUTAneous Q6H  
 glucose chewable tablet 16 g  4 Tab Oral PRN  
 glucagon (GLUCAGEN) injection 1 mg  1 mg IntraMUSCular PRN  
 dextrose (D50W) injection syrg 12.5-25 g  25-50 mL IntraVENous PRN  
 acetaminophen (TYLENOL) tablet 650 mg  650 mg Oral Q4H PRN  
 HYDROmorphone (PF) (DILAUDID) injection 1 mg  1 mg IntraVENous Q6H PRN  
 naloxone (NARCAN) injection 0.4 mg  0.4 mg IntraVENous PRN  
 ondansetron (ZOFRAN) injection 4 mg  4 mg IntraVENous Q4H PRN  
 bisacodyl (DULCOLAX) suppository 10 mg  10 mg Rectal DAILY PRN  
 LORazepam (ATIVAN) injection 1 mg  1 mg IntraVENous Q6H PRN  
 heparin (porcine) injection 5,000 Units  5,000 Units SubCUTAneous Q8H  
 lipase-protease-amylase (CREON 6000) capsule 1 Cap  1 Cap Oral TID WITH MEALS Objective:  
Patient Vitals for the past 24 hrs: Temp Pulse Resp BP SpO2  
09/08/18 1140 97.7 °F (36.5 °C) 77 20 153/78 96 % 09/08/18 0807 97.1 °F (36.2 °C) 83 20 166/85 95 % 09/08/18 0343 97 °F (36.1 °C) 85 20 (!) 166/93 93 % 09/07/18 2343 - - - - 94 % 09/07/18 2342 97.6 °F (36.4 °C) 76 20 134/82 94 % 09/07/18 1917 97.7 °F (36.5 °C) 75 16 141/81 93 % 09/07/18 1449 97.3 °F (36.3 °C) 73 16 156/86 93 % Weight change: 2.994 kg (6 lb 9.6 oz) 09/06 1901 - 09/08 0700 In: 0 Out: 350 [Urine:350] Intake/Output Summary (Last 24 hours) at 09/08/18 1316 Last data filed at 09/08/18 1141 Gross per 24 hour Intake                0 ml Output             1000 ml Net            -1000 ml Physical Exam: alert, oriented x 3 afebrile HEENT: non icteric Neck: No JVD Cardiovascular: regular no rub 
C/L: clear Abdomen: obese Ext: no LE edema Data Review:  
 
LABS:  
Hematology: Recent Labs  
   09/08/18 
 0248  09/06/18 
 2200 WBC  6.9  7.7 HGB  10.1*  10.5* HCT  32.6*  33.1*  
pl 276K Chemistry: Recent Labs  
   09/08/18 
 0248  09/07/18 
 1214  09/06/18 
 2200 BUN  53*  55*  63* CREA  4.99*  5.25*  5.77* CA  7.9*  7.9*  7.7*  8.2* ALB  2.8*   --   3.3*  
K  4.9  5.1  4.9 NA  144  141  145 CL  112*  109*  112* CO2  24  27  25 PHOS  5.0*  5.6*   --   
GLU  93  99  136* A1c 6.8 Fe 43 Sat 17% karen 71 IPTH 566 IMPRESSION AND PLAN:  
Acute on chronic renal failure improving but not back to baseline yet. Cont to hold ARB for now. Ok to resume lasix po. Ok to D/C from renal standpoint, follow up in the office in 2 weeks. CKD stage 4-5, no acute need for dialysis but will benefit from early access planning. Will arrange as outpt. Avoid any nephrotoxic drugs and adjust meds to renal function HTN reviewed meds with pt, he is actually on Coreg 25 MG BID. Advised to monitor BP at home, hold ARB, dec salt intake Anemia from CKD and fe def. Cont fe supp, no need for MANJULA at this time DM 2, fairly well controlled, defer to PCP SHPTH start Calcium base binder, check Vit D 25 OH. Discuss low Phos diet Right scrotal cysts defer to Urology Brendan Jorgensen MD 
9/8/2018

## 2018-09-08 NOTE — DISCHARGE SUMMARY
Discharge Summary    Patient: Josette Huertas MRN: [id number]  CSN: [id number]    YOB: 1960  Age: 62 y.o. Sex: male    DOA: 9/6/2018 LOS:  LOS: 1 day   Discharge Date:      Admission Diagnoses: Acute renal failure (ARF) ([facility]  Pancreatitis  Acute on chronic renal failure [facility])    Discharge Diagnoses:    Pancreatic insufficiency (Chronic) ICD-10-CM: G64.57  ICD-9-CM: 577.8   9/8/2018 Yes            * (Principal)Acute on chronic renal failure (HCC) ICD-10-CM: N17.9, N18.9  ICD-9-CM: 584.9, 585.9   9/7/2018 Unknown           Acute pain in scrotum ICD-10-CM: N50.82  ICD-9-CM: 608.89   9/7/2018 Yes           Type II diabetes mellitus with nephropathy ([facility] ICD-10-CM: E11.21  ICD-9-CM: 250.40, 583.81   2/9/2018 Yes           Hypertensive heart disease ICD-10-CM: I11.9  ICD-9-CM: 402.90   Unknown Yes                 Diabetes Mellitus on long term insulin with CKD 5    Discharge Condition: Stable    PHYSICAL EXAM  Visit Vitals    /78 (BP 1 Location: Left arm, BP Patient Position: At rest)    Pulse 77    Temp 97.7 °F (36.5 °C)    Resp 20    Ht 5' 4\" (1.626 m)    Wt 105.1 kg (231 lb 9.6 oz)    SpO2 96%    BMI 39.75 kg/m2       General: Alert, cooperative, no acute distress    HEENT: NC, Atraumatic. PERRLA, EOMI. Anicteric sclerae. Lungs:  CTA Bilaterally. No Wheezing/Rhonchi/Rales. Heart:  Regular  rhythm,  No murmur, No Rubs, No Gallops  Abdomen: Soft, obese, Non distended, Non tender.  +Bowel sounds, no HSM  Extremities: No c/c/e  Psych:   Good insight. Not anxious or agitated. Neurologic:  CN 2-12 grossly intact, oriented X 3. No acute neurological                                 Deficits,     Hospital Course: Josette Huertas is a 62 y.o. male with significant past medical history below, who developed right groin and scrotal pain while walking yesterday and it persisted. He came to the ER for evaluation and in work up was found to have a creatinine of 5.77 GFR 12.  He is admitted for acute on chronic renal failure and acute pain in scrotum. He was given aggressive IV fluids with improvement in renal function. Evaluated by Nephrology and ok to d/c from their standpoint with f/u in 2 weeks. Testicular pain somewhat improved. US testicle reveals right testicular cysts. Urology recommends f/u Us in 3 months.        Consults:   Urology: Dr. Carmen Quintanilla  Nephrology: Dr. Elease Libman Diagnostic Studies: Results for Sabrina Magallon (MRN [id number]) as of 9/8/2018 16:24   Ref. Range 9/8/2018 02:48   WBC Latest Ref Range: 4.6 - 13.2 K/uL 6.9   RBC Latest Ref Range: 4.70 - 5.50 M/uL 3.73 (L)   HGB Latest Ref Range: 13.0 - 16.0 g/dL 10.1 (L)   HCT Latest Ref Range: 36.0 - 48.0 % 32.6 (L)   MCV Latest Ref Range: 74.0 - 97.0 FL 87.4   MCH Latest Ref Range: 24.0 - 34.0 PG 27.1   MCHC Latest Ref Range: 31.0 - 37.0 g/dL 31.0   RDW Latest Ref Range: 11.6 - 14.5 % 15.6 (H)   PLATELET Latest Ref Range: 135 - 420 K/uL 276   MPV Latest Ref Range: 9.2 - 11.8 FL 11.0   NEUTROPHILS Latest Ref Range: 40 - 73 % 58   LYMPHOCYTES Latest Ref Range: 21 - 52 % 31   MONOCYTES Latest Ref Range: 3 - 10 % 8   EOSINOPHILS Latest Ref Range: 0 - 5 % 3   BASOPHILS Latest Ref Range: 0 - 2 % 0   DF Latest Units:   AUTOMATED   ABS. NEUTROPHILS Latest Ref Range: 1.8 - 8.0 K/UL 4.0   ABS. LYMPHOCYTES Latest Ref Range: 0.9 - 3.6 K/UL 2.2   ABS. MONOCYTES Latest Ref Range: 0.05 - 1.2 K/UL 0.5   ABS. EOSINOPHILS Latest Ref Range: 0.0 - 0.4 K/UL 0.2   ABS.  BASOPHILS Latest Ref Range: 0.0 - 0.1 K/UL 0.0     BMP:   Lab Results   Component Value Date/Time     09/08/2018 02:48 AM    K 4.9 09/08/2018 02:48 AM     (H) 09/08/2018 02:48 AM    CO2 24 09/08/2018 02:48 AM    AGAP 8 09/08/2018 02:48 AM    GLU 93 09/08/2018 02:48 AM    BUN 53 (H) 09/08/2018 02:48 AM    CREA 4.99 (H) 09/08/2018 02:48 AM    GFRAA 15 (L) 09/08/2018 02:48 AM    GFRNA 12 (L) 09/08/2018 02:48 AM        9/6 US testicle: Multiple cysts in the right scrotum which are likely extratesticular although  slightly limited and localization. These may represent multiple epididymal  cysts. These are of uncertain clinical significance, may benefit from urologic  follow-up. Follow-up ultrasound for stability also may be considered.     Small bilateral hydroceles and varicoceles.     Perfusion present to the right and left testicle at time of examination. 9/6 CT abd/pelvis: IMPRESSION:  1. Diffuse left-sided renal atrophy, progressed from 2017.  -Similar clustered calcifications in the proximal left ureter. Residual  pelviectasis, though improved from previous.     2. No right renal stones, hydronephrosis or hydroureter.     3. No bowel distention. Normal caliber appendix. -Tiny amount of fat protrudes at the umbilicus. No ventral hernia otherwise.  -No free fluid. Discharge Medications:     Current Discharge Medication List      START taking these medications    Details   lipase-protease-amylase (CREON 6000) 6,000-19,000 -30,000 unit capsule Take 1 Cap by mouth three (3) times daily (with meals). Indications: exocrine pancreatic insufficiency  Qty: 90 Cap, Refills: 0         CONTINUE these medications which have NOT CHANGED    Details   omeprazole (PRILOSEC) 20 mg capsule Take 20 mg by mouth daily. hydrALAZINE (APRESOLINE) 50 mg tablet Take 1 Tab by mouth two (2) times a day. Qty: 20 Tab, Refills: 0      bisacodyl (DULCOLAX, BISACODYL,) 5 mg EC tablet Take 1 Tab by mouth daily as needed for Constipation. Qty: 30 Tab, Refills: 0      NOVOLIN 70/30 100 unit/mL (70-30) injection INJECT 30 UNITS SC IN THE MORNING AND 20 UNITS SC IN THE EVENING BEFORE SUPPER  Qty: 10 mL, Refills: 4      isosorbide mononitrate ER (IMDUR) 60 mg CR tablet Take 1 Tab by mouth daily. Qty: 30 Tab, Refills: 6      atorvastatin (LIPITOR) 40 mg tablet Take 1 Tab by mouth nightly. Qty: 30 Tab, Refills: 6      furosemide (LASIX) 40 mg tablet Take 1 Tab by mouth daily.   Qty: 30 Tab, Refills: 6 fluticasone (FLONASE) 50 mcg/actuation nasal spray 2 Sprays by Both Nostrils route daily. carvedilol (COREG) 6.25 mg tablet Take 1 Tab by mouth two (2) times daily (with meals). Qty: 60 Tab, Refills: 6      B COMPLEX W-C NO.20/FOLIC ACID (B COMPLEX & C NO.20-FOLIC ACID PO) Take  by mouth. Associated Diagnoses: Essential hypertension      febuxostat (ULORIC) 40 mg tab tablet Take 1 Tab by mouth daily. Qty: 14 Tab, Refills: 1    Associated Diagnoses: Acute gout of right wrist, unspecified cause      ondansetron hcl (ZOFRAN, AS HYDROCHLORIDE,) 4 mg tablet Take 1 Tab by mouth every eight (8) hours as needed for Nausea. Qty: 10 Tab, Refills: 0      tamsulosin (FLOMAX) 0.4 mg capsule Take 1 Cap by mouth daily. Start this medication on Friday 12/15/17  Indications: Urolithiasis  Qty: 6 Cap, Refills: 0      glucose blood VI test strips (FREESTYLE TEST) strip by Does Not Apply route See Admin Instructions. Check twice a day  Qty: 100 Strip, Refills: 2    Associated Diagnoses: Poorly controlled diabetes mellitus (HCC)      nitroglycerin (NITROSTAT) 0.4 mg SL tablet 1 Tab by SubLINGual route as needed for Chest Pain. Qty: 20 Tab, Refills: 0      aspirin 81 mg chewable tablet Take 1 Tab by mouth daily. Qty: 30 Tab, Refills: 0      budesonide-formoterol (SYMBICORT) 160-4.5 mcg/actuation HFA inhaler Take 2 Puffs by inhalation two (2) times a day. Qty: 1 Inhaler, Refills: 0    Associated Diagnoses: Smoking; Wheezing; SOB (shortness of breath) on exertion      calcitRIOL (ROCALTROL) 0.25 mcg capsule Take 1 Cap by mouth every Monday, Wednesday, Friday. Qty: 15 Cap, Refills: 0      Lancets misc Check 3 times a day , needs according contour glucometer  Qty: 1 Package, Refills: 11    Associated Diagnoses: Diabetes ([address]      Blood-Glucose Meter monitoring kit Check twice daily.   Qty: 1 kit, Refills: 0    Associated Diagnoses: Diabetes mellitus, type 2 ([address]         STOP taking these medications HYDROcodone-acetaminophen (NORCO) 5-325 mg per tablet Comments:   Reason for Stopping:         losartan (COZAAR) 25 mg tablet Comments:   Reason for Stopping:         pantoprazole (PROTONIX) 20 mg tablet Comments:   Reason for Stopping:             Activity: activity as tolerated    Diet: Diabetic Diet    Wound Care: None needed    Follow-up: with PCP, Selam Luna, MD in 7-10days   - Dr. Sacha Michael in 2 weeks   - Dr. Arturo Martínez (Urology) in 3 months   -  testicles in 3 months    Minutes spent on discharge: >30 minutes spent coordinating this discharge (review instructions/follow-up, prescriptions, preparing report for sign off)    Disposition: Home

## 2018-09-08 NOTE — CONSULTS
No chief complaint on file. HISTORY OF PRESENT ILLNESS:  Maynor Najera is a 62 y.o. male whom I was asked to see in consult today because of right-sided testicular pain and an abnormal scrotal ultrasound. He was admitted for acute on chronic renal insufficiency and is being treated for that. He has a host of other comorbidities but at this moment is not on dialysis. He denies any trauma in the scrotum particularly on the right and he has not had any swelling or urinary issues that he is aware of and past.  I have reviewed the scrotal ultrasound with the radiologist and the results of that of a number of cyst within either the scrotum or adjacent to it compressing it. All of these look benign and they also do not look inflammatory at all. ROS documented on the chart below. Past Medical History:   Diagnosis Date    Alcohol abuse     Alcohol-induced chronic pancreatitis ([facility] [address] 9/7/2018    Arthritis     Atherosclerotic cardiovascular disease     CAD (coronary artery disease)     mild disease of coronaries (cor angio 2006),wife states he has 1 stent    Cardiac cath 01/26/2006    RCA mild. Otherwise patent coronaries. LVEDP 15.  EF 55-60%.  Cardiac echocardiogram 03/27/2009    EF 60%. No cardiac source of embolism. No significant valvular pathology.  Cardiac nuclear imaging test 12/06/2011    Small, mild inferior infarction or possibly artifact. No ischemia. EF 45%. No TID. No reg WMA.  Cardiovascular LLE venous duplex 08/14/2015    Left leg:  No DVT. Dilated lymph node in left groin.  Constipation     Diabetes mellitus type II     Gout     Hepatic steatosis     Hypercholesterolemia     Hypertension     Knee effusion, left     Left knee pain     Morbid obesity (HCC)     Noncompliance     Obesity (BMI 30-39. 9)     S/P colonoscopy 3/8/05    Dr. Brain Montaño; normal; f/u 10 years    Stomach problems     TIA (transient ischemic attack) 3/09    Tobacco abuse Past Surgical History:   Procedure Laterality Date    ABDOMEN SURGERY PROC UNLISTED      Hernia repair in 1960's or 1970's.  HX ORTHOPAEDIC      Bones spur removed from left & right foot 2013.  HX UROLOGICAL  8/18/2015    [facility], Dr. Alcazar Heart, Cystoscopy, left retrograde, left double-J cath       Social History   Substance Use Topics    Smoking status: Current Every Day Smoker     Packs/day: 0.25     Types: Cigarettes     Last attempt to quit: 6/28/2016    Smokeless tobacco: Never Used    Alcohol use 0.0 oz/week     0 Standard drinks or equivalent per week      Comment: 1 beers a day. Allergies   Allergen Reactions    Shellfish Derived Other (comments)     Causes Gout       Family History   Problem Relation Age of Onset    Diabetes Father     Heart Disease Mother     Diabetes Sister     Hypertension Sister     Arthritis-osteo Sister     Arthritis-osteo Brother     Diabetes Other     Diabetes Other      Uncle, Aunt    Hypertension Other      Uncle;  Aunt       Current Facility-Administered Medications   Medication Dose Route Frequency Provider Last Rate Last Dose    B complex-vitaminC-folic acid (NEPHROCAP) cap  1 Cap Oral DAILY Ronni Andersen DO   1 Cap at 09/08/18 5234    [START ON 9/9/2018] atorvastatin (LIPITOR) tablet 40 mg  40 mg Oral QHS Ronni Andersen DO        carvedilol (COREG) tablet 6.25 mg  6.25 mg Oral BID WITH MEALS Ronni Andersen DO   6.25 mg at 09/08/18 4381    hydrALAZINE (APRESOLINE) tablet 50 mg  50 mg Oral BID Ronni Andersen DO   50 mg at 09/08/18 5093    isosorbide mononitrate ER (IMDUR) tablet 60 mg  60 mg Oral DAILY Ronni Andersen DO   60 mg at 09/08/18 6377    pantoprazole (PROTONIX) tablet 40 mg  40 mg Oral ACB Ronni Andersen DO   40 mg at 09/08/18 9352    ferrous sulfate tablet 325 mg  1 Tab Oral DAILY WITH BREAKFAST Ronni Andersen DO   325 mg at 09/08/18 0905    hydrALAZINE (APRESOLINE) 20 mg/mL injection 20 mg  20 mg IntraVENous Q6H PRN Aman Cost, DO        diphenhydrAMINE (BENADRYL) capsule 25 mg  25 mg Oral Q6H PRN Aman Cost, DO        albuterol (PROVENTIL VENTOLIN) nebulizer solution 2.5 mg  2.5 mg Inhalation Q4H PRN Aman Cost, DO        insulin lispro (HUMALOG) injection   SubCUTAneous Q6H Aman Cost, DO   Stopped at 09/07/18 1311    glucose chewable tablet 16 g  4 Tab Oral PRN Aman Cost, DO        glucagon (GLUCAGEN) injection 1 mg  1 mg IntraMUSCular PRN Aman Cost, DO        dextrose (D50W) injection syrg 12.5-25 g  25-50 mL IntraVENous PRN Aman Cost, DO        0.9% sodium chloride infusion  75 mL/hr IntraVENous CONTINUOUS Aman Cost, DO 75 mL/hr at 09/07/18 1440 75 mL/hr at 09/07/18 1440    acetaminophen (TYLENOL) tablet 650 mg  650 mg Oral Q4H PRN Aman Cost, DO        HYDROmorphone (PF) (DILAUDID) injection 1 mg  1 mg IntraVENous Q6H PRN Aman Cost, DO   1 mg at 09/07/18 1859    naloxone [facility]) injection 0.4 mg  0.4 mg IntraVENous PRN Aman Cost, DO        ondansetron [facility]) injection 4 mg  4 mg IntraVENous Q4H PRN Aman Cost, DO        bisacodyl (DULCOLAX) suppository 10 mg  10 mg Rectal DAILY PRN Aman Cost, DO        LORazepam (ATIVAN) injection 1 mg  1 mg IntraVENous Q6H PRN Aman Cost, DO        heparin (porcine) injection 5,000 Units  5,000 Units SubCUTAneous Q8H Aman Cost, DO   5,000 Units at 09/08/18 6543    lipase-protease-amylase (CREON 6000) capsule 1 Cap  1 Cap Oral TID WITH MEALS Aman Cost, DO   1 Cap at 09/08/18 0905         PHYSICAL EXAMINATION:   Visit Vitals    /85 (BP 1 Location: Left arm, BP Patient Position: At rest)    Pulse 83    Temp 97.1 °F (36.2 °C)    Resp 20    Ht 5' 4\" (1.626 m)    Wt 231 lb 9.6 oz (105.1 kg)    SpO2 95%    BMI 39.75 kg/m2     Constitutional: WDWN, Pleasant and appropriate affect, No acute distress.     CV:  No peripheral swelling noted  Respiratory: No respiratory distress or difficulties  Abdomen:  No abdominal masses or tenderness. No CVA tenderness. No inguinal hernias noted.  Male:    HERBIE: Deferred today   SCROTUM: Scrotal sac is not swollen or edematous and is really nontender. Examination of the left testicle is what I would consider normal and that there is no induration or tenderness nor is her any mass-effect notable. On the right however the cystic masses are notable particularly along the superior pole of the testicle. The testicle that I can feel does not feel like there is a big mass or cyst inside the testicle and it generally feels normal to me I do appreciate the other cysts that are noted on the ultrasound however. PENIS: Urethral meatus normal in location and size. No urethral discharge. Skin: No jaundice. Neuro/Psych:  Alert and oriented x 3, affect appropriate. Lymphatic:   No enlarged inguinal lymph nodes. Results for orders placed or performed during the hospital encounter of 09/06/18   CBC WITH AUTOMATED DIFF   Result Value Ref Range    WBC 7.7 4.6 - 13.2 K/uL    RBC 3.86 (L) 4.70 - 5.50 M/uL    HGB 10.5 (L) 13.0 - 16.0 g/dL    HCT 33.1 (L) 36.0 - 48.0 %    MCV 85.8 74.0 - 97.0 FL    MCH 27.2 24.0 - 34.0 PG    MCHC 31.7 31.0 - 37.0 g/dL    RDW 15.5 (H) 11.6 - 14.5 %    PLATELET 559 502 - 797 K/uL    MPV 10.6 9.2 - 11.8 FL    NEUTROPHILS 58 40 - 73 %    LYMPHOCYTES 30 21 - 52 %    MONOCYTES 8 3 - 10 %    EOSINOPHILS 4 0 - 5 %    BASOPHILS 0 0 - 2 %    ABS. NEUTROPHILS 4.5 1.8 - 8.0 K/UL    ABS. LYMPHOCYTES 2.3 0.9 - 3.6 K/UL    ABS. MONOCYTES 0.6 0.05 - 1.2 K/UL    ABS. EOSINOPHILS 0.3 0.0 - 0.4 K/UL    ABS.  BASOPHILS 0.0 0.0 - 0.1 K/UL    DF AUTOMATED     METABOLIC PANEL, COMPREHENSIVE   Result Value Ref Range    Sodium 145 136 - 145 mmol/L    Potassium 4.9 3.5 - 5.5 mmol/L    Chloride 112 (H) 100 - 108 mmol/L    CO2 25 21 - 32 mmol/L    Anion gap 8 3.0 - 18 mmol/L    Glucose 136 (H) 74 - 99 mg/dL    BUN 63 (H) 7.0 - 18 MG/DL    Creatinine 5.77 (H) 0.6 - 1.3 MG/DL    BUN/Creatinine ratio 11 (L) 12 - 20      GFR est AA 12 (L) >60 ml/min/1.73m2    GFR est non-AA 10 (L) >60 ml/min/1.73m2    Calcium 8.2 (L) 8.5 - 10.1 MG/DL    Bilirubin, total 0.2 0.2 - 1.0 MG/DL    ALT (SGPT) 21 16 - 61 U/L    AST (SGOT) 17 15 - 37 U/L    Alk.  phosphatase 50 45 - 117 U/L    Protein, total 6.8 6.4 - 8.2 g/dL    Albumin 3.3 (L) 3.4 - 5.0 g/dL    Globulin 3.5 2.0 - 4.0 g/dL    A-G Ratio 0.9 0.8 - 1.7     LIPASE   Result Value Ref Range    Lipase 619 (H) 73 - 393 U/L   URINALYSIS W/ RFLX MICROSCOPIC   Result Value Ref Range    Color YELLOW      Appearance CLEAR      Specific gravity 1.012 1.005 - 1.030      pH (UA) 5.5 5.0 - 8.0      Protein 100 (A) NEG mg/dL    Glucose NEGATIVE  NEG mg/dL    Ketone NEGATIVE  NEG mg/dL    Bilirubin NEGATIVE  NEG      Blood NEGATIVE  NEG      Urobilinogen 0.2 0.2 - 1.0 EU/dL    Nitrites NEGATIVE  NEG      Leukocyte Esterase NEGATIVE  NEG     URINE MICROSCOPIC ONLY   Result Value Ref Range    WBC 0 to 3 0 - 4 /hpf    RBC 0 to 3 0 - 5 /hpf    Epithelial cells NONE 0 - 5 /lpf    Bacteria NEGATIVE  NEG /hpf   PHOSPHORUS   Result Value Ref Range    Phosphorus 5.6 (H) 2.5 - 4.9 MG/DL   PTH INTACT   Result Value Ref Range    Calcium 7.9 (L) 8.5 - 10.1 MG/DL    PTH, Intact 566.0 (H) 18.4 - 54.6 pg/mL   METABOLIC PANEL, BASIC   Result Value Ref Range    Sodium 141 136 - 145 mmol/L    Potassium 5.1 3.5 - 5.5 mmol/L    Chloride 109 (H) 100 - 108 mmol/L    CO2 27 21 - 32 mmol/L    Anion gap 5 3.0 - 18 mmol/L    Glucose 99 74 - 99 mg/dL    BUN 55 (H) 7.0 - 18 MG/DL    Creatinine 5.25 (H) 0.6 - 1.3 MG/DL    BUN/Creatinine ratio 10 (L) 12 - 20      GFR est AA 14 (L) >60 ml/min/1.73m2    GFR est non-AA 11 (L) >60 ml/min/1.73m2    Calcium 7.7 (L) 8.5 - 10.1 MG/DL   IRON PROFILE   Result Value Ref Range    Iron 43 (L) 50 - 175 ug/dL    TIBC 259 250 - 450 ug/dL    Iron % saturation 17 %   FERRITIN   Result Value Ref Range    Ferritin 71 8 - 388 NG/ML   HEMOGLOBIN A1C WITH EAG   Result Value Ref Range    Hemoglobin A1c 6.8 (H) 4.2 - 5.6 %    Est. average glucose 757 mg/dL   METABOLIC PANEL, BASIC   Result Value Ref Range    Sodium 144 136 - 145 mmol/L    Potassium 4.9 3.5 - 5.5 mmol/L    Chloride 112 (H) 100 - 108 mmol/L    CO2 24 21 - 32 mmol/L    Anion gap 8 3.0 - 18 mmol/L    Glucose 93 74 - 99 mg/dL    BUN 53 (H) 7.0 - 18 MG/DL    Creatinine 4.99 (H) 0.6 - 1.3 MG/DL    BUN/Creatinine ratio 11 (L) 12 - 20      GFR est AA 15 (L) >60 ml/min/1.73m2    GFR est non-AA 12 (L) >60 ml/min/1.73m2    Calcium 7.9 (L) 8.5 - 10.1 MG/DL   CBC WITH AUTOMATED DIFF   Result Value Ref Range    WBC 6.9 4.6 - 13.2 K/uL    RBC 3.73 (L) 4.70 - 5.50 M/uL    HGB 10.1 (L) 13.0 - 16.0 g/dL    HCT 32.6 (L) 36.0 - 48.0 %    MCV 87.4 74.0 - 97.0 FL    MCH 27.1 24.0 - 34.0 PG    MCHC 31.0 31.0 - 37.0 g/dL    RDW 15.6 (H) 11.6 - 14.5 %    PLATELET 398 112 - 528 K/uL    MPV 11.0 9.2 - 11.8 FL    NEUTROPHILS 58 40 - 73 %    LYMPHOCYTES 31 21 - 52 %    MONOCYTES 8 3 - 10 %    EOSINOPHILS 3 0 - 5 %    BASOPHILS 0 0 - 2 %    ABS. NEUTROPHILS 4.0 1.8 - 8.0 K/UL    ABS. LYMPHOCYTES 2.2 0.9 - 3.6 K/UL    ABS. MONOCYTES 0.5 0.05 - 1.2 K/UL    ABS. EOSINOPHILS 0.2 0.0 - 0.4 K/UL    ABS. BASOPHILS 0.0 0.0 - 0.1 K/UL    DF AUTOMATED     HEPATIC FUNCTION PANEL   Result Value Ref Range    Protein, total 6.1 (L) 6.4 - 8.2 g/dL    Albumin 2.8 (L) 3.4 - 5.0 g/dL    Globulin 3.3 2.0 - 4.0 g/dL    A-G Ratio 0.8 0.8 - 1.7      Bilirubin, total 0.1 (L) 0.2 - 1.0 MG/DL    Bilirubin, direct <0.1 0.0 - 0.2 MG/DL    Alk.  phosphatase 42 (L) 45 - 117 U/L    AST (SGOT) 14 (L) 15 - 37 U/L    ALT (SGPT) 16 16 - 61 U/L   LIPASE   Result Value Ref Range    Lipase 452 (H) 73 - 393 U/L   MAGNESIUM   Result Value Ref Range    Magnesium 1.4 (L) 1.6 - 2.6 mg/dL   PHOSPHORUS   Result Value Ref Range    Phosphorus 5.0 (H) 2.5 - 4.9 MG/DL   GLUCOSE, POC   Result Value Ref Range    Glucose (POC) 110 70 - 110 mg/dL   GLUCOSE, POC   Result Value Ref Range    Glucose (POC) 96 70 - 110 mg/dL   GLUCOSE, POC   Result Value Ref Range    Glucose (POC) 82 70 - 110 mg/dL   GLUCOSE, POC   Result Value Ref Range    Glucose (POC) 190 (H) 70 - 110 mg/dL   GLUCOSE, POC   Result Value Ref Range    Glucose (POC) 130 (H) 70 - 110 mg/dL   GLUCOSE, POC   Result Value Ref Range    Glucose (POC) 102 70 - 110 mg/dL   EKG, 12 LEAD, SUBSEQUENT   Result Value Ref Range    Ventricular Rate 83 BPM    Atrial Rate 83 BPM    P-R Interval 174 ms    QRS Duration 96 ms    Q-T Interval 374 ms    QTC Calculation (Bezet) 439 ms    Calculated P Axis 62 degrees    Calculated R Axis 18 degrees    Calculated T Axis 163 degrees    Diagnosis       Normal sinus rhythm  Possible Left atrial enlargement  ST & T wave abnormality, consider inferolateral ischemia  Abnormal ECG  When compared with ECG of 04-OCT-2017 12:39,  No significant change was found           REVIEW OF LABS AND IMAGING:     Imaging Report Reviewed? YES     Images Reviewed? YES           Other Lab Data Reviewed? YES         ASSESSMENT:     ICD-10-CM ICD-9-CM    1. Acute renal failure, unspecified acute renal failure type ([facility] [address] N17.9 584.9    2. Acute pancreatitis, unspecified complication status, unspecified pancreatitis type K85.90 577.0    3. Right sided abdominal pain R10.9 789.09    4. Testicular pain, right N50.811 608.9             PLAN / DISCUSSION: : I discussed this with the patient and at this time I think these are probably just benign cysts. I really cannot tell whether some of the cysts are inside the tunica albuginea or not. Radiologist also has a hard time discerning these as well. At this point in time, I think there is no evidence of malignancy, infection, or significant peril to either testicle. I am going to suggest a repeat scrotal ultrasound in 3 months per the original radiologist's recommendation.     The patient expresses understanding and agreement of the discussion and plan. Reyes Hayden MD on 9/8/2018         Please note: This document has been produced using voice recognition software. Unrecognized errors in transcription may be present.

## 2018-09-08 NOTE — PROGRESS NOTES
ADULT PROTOCOL: JET AEROSOL ASSESSMENT Patient  Xiao Weiss     62 y.o.   male     9/8/2018  9:35 AM 
 
Breath Sounds Pre Procedure:  Breath Sounds Bilateral: Diminished Breath Sounds Post Procedure: Breath Sounds Bilateral: Diminished Breathing pattern: Pre procedure  Breathing Pattern: Regular Post procedure  Breathing Pattern: Regular Cough: Pre procedure  Cough: Non-productive Post procedure Cough: Non-productive Heart Rate: Pre procedure Pulse: 84 
         Post procedure Pulse: 84 Resp Rate: Pre procedure  Respirations: 20 
         Post procedure Nebulizer Therapy: Current medications Aerosolized Medications: Albuterol Problem List:  
Patient Active Problem List  
Diagnosis Code  Allergic rhinitis J30.9  Neuropathy G62.9  Right knee pain M25.561  Dyslipidemia E78.5  Gout M10.9  CAD (coronary artery disease) I25.10  Hypertensive heart disease I11.9  Obesity (BMI 30-39. 9) E66.9  
 Constipation K59.00  Tobacco abuse Z72.0  TIA (transient ischemic attack) G45.9  Hepatic steatosis K76.0  Noncompliance Z91.19  Testicular/scrotal pain [id number]  UTI (urinary tract infection) N39.0  Contusion of knee S80.00XA [facility] Knee strain B32.950J  Effusion of patella M25.469  Hemoptysis R04.2  Rash R21  Laceration of gum S01.512A  Fractured tooth S02. [place]  Renal stones N20.0  Microalbuminuria R80.9  Hypertriglyceridemia E78.1  Type 2 diabetes mellitus without complication (HCC) J84.7  Acute unilateral obstructive uropathy N13.9  ARF (acute renal failure) (HCC) N17.9  Essential hypertension I10  
 Advance directive discussed with patient Z70.80  
 Acute chest pain R07.9  Left sided numbness R20.0  Chronic kidney disease, stage III (moderate) N18.3  Persistent proteinuria R80.1  Secondary hyperparathyroidism of renal origin ([facility] [address] N25.81  
 Hypoglycemia E16.2  Cocaine abuse F14.10  Chronic renal disease, stage IV ([facility] [address] N18.4  [facility], [facility] E66.01  
 Type 2 diabetes with nephropathy ([facility] [address] E11.21  
 Type II diabetes mellitus with nephropathy [facility] E11.21  
 Alcohol-induced chronic pancreatitis ([facility] K86.0  Acute on chronic renal failure (HCC) N17.9, N18.9  Pancreatic insufficiency K86.89  
 Acute pain in scrotum N50.82 Patient alert and cooperative to use MDI: Yes 
 
Home Respiratory Therapy Regimen/Frequency:  YES Medication Albuterol Device [employer] Frequency PRN SEVERITY INDEX: 
 
ITEM 0 1 2 3 4 Score Respiratory Pattern and or Rate Regular 10-19 Regular 20-24  
24-30   
30-34 Severe SOB or  
Greater than 35 0 Breath Sounds Clear Occasional Wheeze Mild Wheezing Moderate Wheezing  wheezing/Absent breath sounds 0 Shortness of Breath None Dyspnea on Exertion Dyspnea at Rest Moderate Shortness of Breath at Rest Severe Shortness of Breath - Limited Speech 0 Total Score:  0 
 
* Scoring Guidelines 0-4 pts:  PRN-BID  
5-7 pts:  BID, TID, QID 
8-9 pts:  TID, QID, Q6 
10-12 pts:  Q4-Q6 * - Guidelines used with clinical judgement. PRN Treatments can be ordered to supplement scheduled treatments. Regardless of score, frequency should not be less than normal home regimen. Recommended Order/Frequency:  PRN Comments:   
 
 
 
Respiratory Therapist: Kalie Demarco, RT

## 2018-09-08 NOTE — PROGRESS NOTES
Problem: Falls - Risk of 
Goal: *Absence of Falls Document Shahrzad Gunderson Fall Risk and appropriate interventions in the flowsheet. Outcome: Progressing Towards Goal 
Fall Risk Interventions: 
  
 
  
 
Medication Interventions: Teach patient to arise slowly

## 2018-10-19 ENCOUNTER — APPOINTMENT (OUTPATIENT)
Dept: GENERAL RADIOLOGY | Age: 58
End: 2018-10-19
Attending: PHYSICIAN ASSISTANT
Payer: MEDICARE

## 2018-10-19 ENCOUNTER — HOSPITAL ENCOUNTER (EMERGENCY)
Age: 58
Discharge: HOME | End: 2018-10-19
Attending: EMERGENCY MEDICINE
Payer: MEDICARE

## 2018-10-19 VITALS
DIASTOLIC BLOOD PRESSURE: 91 MMHG | OXYGEN SATURATION: 97 % | HEART RATE: 78 BPM | RESPIRATION RATE: 20 BRPM | SYSTOLIC BLOOD PRESSURE: 158 MMHG | TEMPERATURE: 98.4 F

## 2018-10-19 DIAGNOSIS — M25.571 ACUTE RIGHT ANKLE PAIN: ICD-10-CM
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
## 2018-10-19 DIAGNOSIS — M25.431 PAIN AND SWELLING OF RIGHT WRIST: ICD-10-CM

## 2018-10-19 DIAGNOSIS — S99.911A RIGHT ANKLE INJURY, INITIAL ENCOUNTER: ICD-10-CM

## 2018-10-19 DIAGNOSIS — S69.91XA RIGHT WRIST INJURY, INITIAL ENCOUNTER: ICD-10-CM

## 2018-10-19 DIAGNOSIS — M25.531 PAIN AND SWELLING OF RIGHT WRIST: ICD-10-CM

## 2018-10-19 PROCEDURE — 74011250637 HC RX REV CODE- 250/637: Performed by: PHYSICIAN ASSISTANT

## 2018-10-19 PROCEDURE — L3908 WHO COCK-UP NONMOLDE PRE OTS: HCPCS

## 2018-10-19 PROCEDURE — 73110 X-RAY EXAM OF WRIST: CPT

## 2018-10-19 PROCEDURE — 73610 X-RAY EXAM OF ANKLE: CPT

## 2018-10-19 PROCEDURE — 99283 EMERGENCY DEPT VISIT LOW MDM: CPT

## 2018-10-19 RX ORDER — IBUPROFEN 600 MG/1
600 TABLET ORAL
Qty: 20 TAB | Refills: 0 | Status: SHIPPED | OUTPATIENT
Start: 2018-10-19 | End: 2020-07-06

## 2018-10-19 RX ORDER — HYDROCODONE BITARTRATE AND ACETAMINOPHEN 5; 325 MG/1; MG/1
1 TABLET ORAL
Qty: 20 TAB | Refills: 0 | Status: ON HOLD | OUTPATIENT
Start: 2018-10-19 | End: 2019-06-07 | Stop reason: SDUPTHER

## 2018-10-19 RX ORDER — IBUPROFEN 600 MG/1
600 TABLET ORAL
Status: COMPLETED | OUTPATIENT
Start: 2018-10-19 | End: 2018-10-19

## 2018-10-19 RX ADMIN — IBUPROFEN 600 MG: 600 TABLET ORAL at 11:49

## 2018-10-19 NOTE — ED TRIAGE NOTES
Per Pt \" I tripped on a rug that was on a rug and fell on my hands\" C/o rt wrist pain and right ankle pain.  Pt ambulatory to triage area with steady gait no assist

## 2018-10-19 NOTE — ED PROVIDER NOTES
EMERGENCY DEPARTMENT HISTORY AND PHYSICAL EXAM 
 
Date: 10/19/2018 Patient Name: Juanpablo Escobar History of Presenting Illness Chief Complaint Patient presents with  Wrist Pain  Ankle Pain History Provided By: Patient Chief Complaint: right wrist and ankle pain Duration:6 Days Timing:  Acute Location: ankle and wrist  
Quality: Aching Severity: Moderate Modifying Factors: exacerbated with movement and ambulating Associated Symptoms: mild joint swelling Additional History (Context): Juanpablo Escobar is a 62 y.o. male with h/o TIA, HTN, gout, DM, CAD, ETOH abuse, and arthritis who presents with right wrist and ankle pain after he mechanically tripped over a rug Sunday. His pain is exacerbated with movements and ambulating. Has not taken anything for pain. Tried icing the area but no relief. Hx of gout but this feels different. No prior fractured/dislocations in his right ankle or hand. Otherwise fine. Denies deformity, decreased ROM, weakness, numbness, color change, or any other associated sx. No other complaints or concerns. PCP: Other, MD Selam 
 
Current Outpatient Medications Medication Sig Dispense Refill  ibuprofen (MOTRIN) 600 mg tablet Take 1 Tab by mouth every six (6) hours as needed for Pain. 20 Tab 0  
 lipase-protease-amylase (CREON 6000) 6,000-19,000 -30,000 unit capsule Take 1 Cap by mouth three (3) times daily (with meals). Indications: exocrine pancreatic insufficiency 90 Cap 0  
 omeprazole (PRILOSEC) 20 mg capsule Take 20 mg by mouth daily.  febuxostat (ULORIC) 40 mg tab tablet Take 1 Tab by mouth daily. 14 Tab 1  
 hydrALAZINE (APRESOLINE) 50 mg tablet Take 1 Tab by mouth two (2) times a day. 20 Tab 0  
 ondansetron hcl (ZOFRAN, AS HYDROCHLORIDE,) 4 mg tablet Take 1 Tab by mouth every eight (8) hours as needed for Nausea. 10 Tab 0  
 tamsulosin (FLOMAX) 0.4 mg capsule Take 1 Cap by mouth daily.  Start this medication on Friday 12/15/17  Indications: Urolithiasis 6 Cap 0  
 glucose blood VI test strips (FREESTYLE TEST) strip by Does Not Apply route See Admin Instructions. Check twice a day 100 Strip 2  
 bisacodyl (DULCOLAX, BISACODYL,) 5 mg EC tablet Take 1 Tab by mouth daily as needed for Constipation. 30 Tab 0  
 NOVOLIN 70/30 100 unit/mL (70-30) injection INJECT 30 UNITS SC IN THE MORNING AND 20 UNITS SC IN THE EVENING BEFORE SUPPER 10 mL 4  
 isosorbide mononitrate ER (IMDUR) 60 mg CR tablet Take 1 Tab by mouth daily. 30 Tab 6  
 atorvastatin (LIPITOR) 40 mg tablet Take 1 Tab by mouth nightly. 30 Tab 6  furosemide (LASIX) 40 mg tablet Take 1 Tab by mouth daily. 30 Tab 6  fluticasone (FLONASE) 50 mcg/actuation nasal spray 2 Sprays by Both Nostrils route daily.  carvedilol (COREG) 6.25 mg tablet Take 1 Tab by mouth two (2) times daily (with meals). (Patient taking differently: Take 25 mg by mouth two (2) times daily (with meals). ) 60 Tab 6  
 nitroglycerin (NITROSTAT) 0.4 mg SL tablet 1 Tab by SubLINGual route as needed for Chest Pain. (Patient taking differently: 1 Tab by SubLINGual route as needed for Chest Pain. Pt to take 1 tab q 5 min up to three times, if symptom persist pt. to call 911.) 20 Tab 0  
 aspirin 81 mg chewable tablet Take 1 Tab by mouth daily. 30 Tab 0  B COMPLEX W-C NO.20/FOLIC ACID (B COMPLEX & C NO.20-FOLIC ACID PO) Take  by mouth.  budesonide-formoterol (SYMBICORT) 160-4.5 mcg/actuation HFA inhaler Take 2 Puffs by inhalation two (2) times a day. 1 Inhaler 0  
 calcitRIOL (ROCALTROL) 0.25 mcg capsule Take 1 Cap by mouth every Monday, Wednesday, Friday. 15 Cap 0  
 Lancets misc Check 3 times a day , needs according contour glucometer 1 Package 11  Blood-Glucose Meter monitoring kit Check twice daily. 1 kit 0 Past History Past Medical History: 
Past Medical History:  
Diagnosis Date  Alcohol abuse  Alcohol-induced chronic pancreatitis ([facility] [address] 2018  Arthritis  Atherosclerotic cardiovascular disease  CAD (coronary artery disease)   
 mild disease of coronaries (cor angio ),wife states he has 1 stent  Cardiac cath 2006 RCA mild. Otherwise patent coronaries. LVEDP 15.  EF 55-60%.  Cardiac echocardiogram 2009 EF 60%. No cardiac source of embolism. No significant valvular pathology.  Cardiac nuclear imaging test 2011 Small, mild inferior infarction or possibly artifact. No ischemia. EF 45%. No TID. No reg WMA.  Cardiovascular LLE venous duplex 2015 Left leg:  No DVT. Dilated lymph node in left groin.  Constipation  Diabetes mellitus type II   
 Gout  Hepatic steatosis  Hypercholesterolemia  Hypertension  Knee effusion, left  Left knee pain  Morbid obesity ([facility]  Noncompliance  Obesity (BMI 30-39. 9)  S/P colonoscopy 3/8/05 Dr. Taran Crooks; normal; f/u 10 years  Stomach problems  TIA (transient ischemic attack) 3/09  Tobacco abuse Past Surgical History: 
Past Surgical History:  
Procedure Laterality Date  [address] [place] [address] UNLISTED Hernia repair in 1960's or 1970's.  HX ORTHOPAEDIC Bones spur removed from left & right foot .  HX UROLOGICAL  2015 [facility], Dr. Stuart Chance, Cystoscopy, left retrograde, left double-J cath Family History: 
Family History Problem Relation Age of Onset  Diabetes Father  Heart Disease Mother  Diabetes Sister  Hypertension Sister  Arthritis-osteo Sister  Arthritis-osteo Brother  Diabetes Other  Diabetes Other Kaden Calixto  Hypertension Other Uncle; Aunt Social History: 
Social History Tobacco Use  Smoking status: Current Every Day Smoker Packs/day: 0.25 Types: Cigarettes Last attempt to quit: 2016 Years since quittin.3  Smokeless tobacco: Never Used Substance Use Topics  Alcohol use: Yes Alcohol/week: 0.0 oz  
  Comment: 1 beers a day.  Drug use: No  
 
 
Allergies: Allergies Allergen Reactions  Shellfish Derived Other (comments) Causes Gout Review of Systems Review of Systems Constitutional: Negative for chills and fever. Respiratory: Negative for shortness of breath. Cardiovascular: Negative for chest pain. Gastrointestinal: Negative for nausea and vomiting. Musculoskeletal: Positive for arthralgias and joint swelling. Skin: Negative for color change. Neurological: Negative for weakness and numbness. All other systems reviewed and are negative. All Other Systems Negative Physical Exam  
 
Vitals:  
 10/19/18 1108 BP: (!) 158/91 Pulse: 78 Resp: 20 Temp: 98.4 °F (36.9 °C) SpO2: 97% Physical Exam  
Constitutional: He is oriented to person, place, and time. He appears well-developed and well-nourished. No distress. HENT:  
Head: Normocephalic and atraumatic. Eyes: Conjunctivae are normal.  
Neck: Normal range of motion. Neck supple. Cardiovascular: Normal rate, regular rhythm and normal heart sounds. Pulmonary/Chest: Effort normal and breath sounds normal. No respiratory distress. He exhibits no tenderness. Abdominal: Soft. Bowel sounds are normal. He exhibits no distension. There is no tenderness. There is no rebound and no guarding. Musculoskeletal: He exhibits no edema or deformity. Right wrist: He exhibits decreased range of motion, tenderness and swelling. Snuff box pain, strong radial pulse Neurological: He is alert and oriented to person, place, and time. Skin: Skin is warm and dry. He is not diaphoretic. Psychiatric: He has a normal mood and affect. Nursing note and vitals reviewed. Diagnostic Study Results Labs - No results found for this or any previous visit (from the past 12 hour(s)).  
 
Radiologic Studies -  
 XR WRIST RT AP/LAT/OBL MIN 3V Final Result XR ANKLE RT MIN 3 V    (Results Pending) CT Results  (Last 48 hours) None CXR Results  (Last 48 hours) None Medical Decision Making I am the first provider for this patient. I reviewed the vital signs, available nursing notes, past medical history, past surgical history, family history and social history. Vital Signs-Reviewed the patient's vital signs. Pulse Oximetry Analysis -  100 % RA Records Reviewed: Nursing Notes and Old Medical Records Procedures: None Procedures Provider Notes (Medical Decision Making):  
 
Differential: fracture, dislocation, abrasion, sprain, contusion, laceration Plan: Will order xrays 12:23 PM 
Have shared reassuring xray results with patient, discussed the concern for possible scaphoid fracture and the need to follow up with ortho in one week for repeat xrays,  RICE education has been given and will discharge home with motrin and wrist splint, Patient agrees with the plan and management and states all questions have been thoroughly answered and there are no more remaining questions. Advised follow up with Ortho in no improvement in a couple days MED RECONCILIATION: 
No current facility-administered medications for this encounter. Current Outpatient Medications Medication Sig  ibuprofen (MOTRIN) 600 mg tablet Take 1 Tab by mouth every six (6) hours as needed for Pain.  lipase-protease-amylase (CREON 6000) 6,000-19,000 -30,000 unit capsule Take 1 Cap by mouth three (3) times daily (with meals). Indications: exocrine pancreatic insufficiency  omeprazole (PRILOSEC) 20 mg capsule Take 20 mg by mouth daily.  febuxostat (ULORIC) 40 mg tab tablet Take 1 Tab by mouth daily.  hydrALAZINE (APRESOLINE) 50 mg tablet Take 1 Tab by mouth two (2) times a day.   
 ondansetron hcl (ZOFRAN, AS HYDROCHLORIDE,) 4 mg tablet Take 1 Tab by mouth every eight (8) hours as needed for Nausea.  tamsulosin (FLOMAX) 0.4 mg capsule Take 1 Cap by mouth daily. Start this medication on Friday 12/15/17  Indications: Urolithiasis  glucose blood VI test strips (FREESTYLE TEST) strip by Does Not Apply route See Admin Instructions. Check twice a day  bisacodyl (DULCOLAX, BISACODYL,) 5 mg EC tablet Take 1 Tab by mouth daily as needed for Constipation.  NOVOLIN 70/30 100 unit/mL (70-30) injection INJECT 30 UNITS SC IN THE MORNING AND 20 UNITS SC IN THE EVENING BEFORE SUPPER  
 isosorbide mononitrate ER (IMDUR) 60 mg CR tablet Take 1 Tab by mouth daily.  atorvastatin (LIPITOR) 40 mg tablet Take 1 Tab by mouth nightly.  furosemide (LASIX) 40 mg tablet Take 1 Tab by mouth daily.  fluticasone (FLONASE) 50 mcg/actuation nasal spray 2 Sprays by Both Nostrils route daily.  carvedilol (COREG) 6.25 mg tablet Take 1 Tab by mouth two (2) times daily (with meals). (Patient taking differently: Take 25 mg by mouth two (2) times daily (with meals). )  nitroglycerin (NITROSTAT) 0.4 mg SL tablet 1 Tab by SubLINGual route as needed for Chest Pain. (Patient taking differently: 1 Tab by SubLINGual route as needed for Chest Pain. Pt to take 1 tab q 5 min up to three times, if symptom persist pt. to call 911.)  aspirin 81 mg chewable tablet Take 1 Tab by mouth daily.  B COMPLEX W-C NO.20/FOLIC ACID (B COMPLEX & C NO.20-FOLIC ACID PO) Take  by mouth.  budesonide-formoterol (SYMBICORT) 160-4.5 mcg/actuation HFA inhaler Take 2 Puffs by inhalation two (2) times a day.  calcitRIOL (ROCALTROL) 0.25 mcg capsule Take 1 Cap by mouth every Monday, Wednesday, Friday.  Lancets misc Check 3 times a day , needs according contour glucometer  Blood-Glucose Meter monitoring kit Check twice daily. Disposition: 
Home DISCHARGE NOTE:  
Pt has been reexamined.  Patient has no new complaints, changes, or physical findings. Care plan outlined and precautions discussed. Results of workup were reviewed with the patient. All medications were reviewed with the patient. All of pt's questions and concerns were addressed. Patient was instructed and agrees to follow up with Ortho, as well as to return to the ED upon further deterioration. Patient is ready to go home. Follow-up Information Follow up With Specialties Details Why Contact Info [facility] - [facility] EMERGENCY DEPT Emergency Medicine  As needed [address] [id number] 
[phone] 4 [facility], [address] and [facility] [place] [address] Orthopedic Surgery In 2 days As needed [address], [address] [place] [address] 
[phone] Current Discharge Medication List  
  
START taking these medications Details  
ibuprofen (MOTRIN) 600 mg tablet Take 1 Tab by mouth every six (6) hours as needed for Pain. Qty: 20 Tab, Refills: 0 Diagnosis Clinical Impression: 1. Fall, initial encounter 2. Right wrist injury, initial encounter 3. Pain and swelling of right wrist   
4. Right ankle injury, initial encounter 5. Acute right ankle pain Scribe Attestation Yaquelin Junior acting as a scribe for and in the presence of [place], [place] October 19, 2018 at 11:28 AM 
    
Provider Attestation:     
I personally performed the services described in the documentation, reviewed the documentation, as recorded by the scribe in my presence, and it accurately and completely records my words and actions.  October 19, 2018 at 11:28 AM - [place], [place]

## 2018-10-19 NOTE — DISCHARGE INSTRUCTIONS
How to Get Up Safely After a Fall: Care Instructions  Your Care Instructions    If you have injuries, health problems, or other reasons that may make it easy for you to fall at home, it is a good idea to learn how to get up safely after a fall. Learning how to get up correctly can help you avoid making an injury worse. Also, knowing what to do if you cannot get up can help you stay safe until help arrives. Follow-up care is a key part of your treatment and safety. Be sure to make and go to all appointments, and call your doctor if you are having problems. It's also a good idea to know your test results and keep a list of the medicines you take. How can you care for yourself after a fall? If you think you can get up  First lie still for a few minutes and think about how you feel. If your body feels okay and you think you can get up safely, follow the rest of the steps below:  1. Look for a chair or other piece of furniture that is close to you. 2. Roll onto your side and rest. Roll by turning your head in the direction you want to roll, move your shoulder and arm, then hip and leg in the same direction. 3. Lie still for a moment to let your blood pressure adjust.  4. Slowly push your upper body up, lift your head, and take a moment to rest.  5. Slowly get up on your hands and knees, and crawl to the chair or other stable piece of furniture. 6. Put your hands on the chair. 7. Move one foot forward, and place it flat on the floor. Your other leg should be bent with the knee on the floor. 8. Rise slowly, turn your body, and sit in the chair. Stay seated for a bit and think about how you feel. Call for help. Even if you feel okay, let someone know what happened to you. You might not know that you have a serious injury. If you cannot get up  1. If you think you are injured after a fall or you cannot get up, try not to panic. 2. Call out for help.   3. If you have a phone within reach or you have an emergency call device, use it to call for help. 4. If you do not have a phone within reach, try to slide yourself toward it. If you cannot get to the phone, try to slide toward a door or window or a place where you think you can be heard. 5. [place] or use an object to make noise so someone might hear you. 6. If you can reach something that you can use for a pillow, place it under your head. Try to stay warm by covering yourself with a blanket or clothing while you wait for help. When should you call for help? Call 911 anytime you think you may need emergency care. For example, call if:    · You passed out (lost consciousness).     · You cannot get up after a fall.     · You have severe pain.    Call your doctor now or seek immediate medical care if:    · You have new or worse pain.     · You are dizzy or lightheaded.     · You hit your head.    Watch closely for changes in your health, and be sure to contact your doctor if:    · You do not get better as expected. Where can you learn more? Go to http://leslie-graciela.info/. Enter L769 in the search box to learn more about \"How to Get Up Safely After a Fall: Care Instructions. \"  Current as of: March 16, 2018  Content Version: 11.8  © 9287-0726 [employer]. Care instructions adapted under license by [employer] (which disclaims liability or warranty for this information). If you have questions about a medical condition or this instruction, always ask your healthcare professional. [address] any warranty or liability for your use of this information. Preventing Falls: Care Instructions  Your Care Instructions    Getting around your home safely can be a challenge if you have injuries or health problems that make it easy for you to fall. Loose rugs and furniture in walkways are among the dangers for many older people who have problems walking or who have poor eyesight.  People who have conditions such as arthritis, osteoporosis, or dementia also have to be careful not to fall. You can make your home safer with a few simple measures. Follow-up care is a key part of your treatment and safety. Be sure to make and go to all appointments, and call your doctor if you are having problems. It's also a good idea to know your test results and keep a list of the medicines you take. How can you care for yourself at home? Taking care of yourself  · You may get dizzy if you do not drink enough water. To prevent dehydration, drink plenty of fluids, enough so that your urine is light yellow or clear like water. Choose water and other caffeine-free clear liquids. If you have kidney, heart, or liver disease and have to limit fluids, talk with your doctor before you increase the amount of fluids you drink. · Exercise regularly to improve your strength, muscle tone, and balance. Walk if you can. Swimming may be a good choice if you cannot walk easily. · Have your vision and hearing checked each year or any time you notice a change. If you have trouble seeing and hearing, you might not be able to avoid objects and could lose your balance. · Know the side effects of the medicines you take. Ask your doctor or pharmacist whether the medicines you take can affect your balance. Sleeping pills or sedatives can affect your balance. · Limit the amount of alcohol you drink. Alcohol can impair your balance and other senses. · Ask your doctor whether calluses or corns on your feet need to be removed. If you wear loose-fitting shoes because of calluses or corns, you can lose your balance and fall. · Talk to your doctor if you have numbness in your feet. Preventing falls at home  · Remove raised doorway thresholds, throw rugs, and clutter. Repair loose carpet or raised areas in the floor. · Move furniture and electrical cords to keep them out of walking paths.   · Use nonskid floor wax, and wipe up spills right away, especially on ceramic tile floors. · If you use a walker or cane, put rubber tips on it. If you use crutches, clean the bottoms of them regularly with an abrasive pad, such as steel wool. · Keep your house well lit, especially [place], and outside walkways. Use night-lights in areas such as hallways and bathrooms. Add extra light switches or use remote switches (such as switches that go on or off when you clap your hands) to make it easier to turn lights on if you have to get up during the night. · Install sturdy handrails on stairways. · Move items in your cabinets so that the things you use a lot are on the lower shelves (about waist level). · Keep a cordless phone and a flashlight with new batteries by your bed. If possible, put a phone in each of the main rooms of your house, or carry a cell phone in case you fall and cannot reach a phone. Or, you can wear a device around your neck or wrist. You push a button that sends a signal for help. · Wear low-heeled shoes that fit well and give your feet good support. Use footwear with nonskid soles. Check the heels and soles of your shoes for wear. Repair or replace worn heels or soles. · Do not wear socks without shoes on wood floors. · Walk on the grass when the sidewalks are slippery. If you live in an area that gets snow and ice in the winter, sprinkle salt on slippery steps and sidewalks. Preventing falls in the bath  · Install grab bars and nonskid mats inside and outside your shower or tub and near the toilet and sinks. · Use shower chairs and bath benches. · Use a hand-held shower head that will allow you to sit while showering. · Get into a tub or shower by putting the weaker leg in first. Get out of a tub or shower with your strong side first.  · Repair loose toilet seats and consider installing a raised toilet seat to make getting on and off the toilet easier. · Keep your bathroom door unlocked while you are in the shower.   Where can you learn more? Go to http://leslie-graciela.info/. Enter 0476 79 69 71 in the search box to learn more about \"Preventing Falls: Care Instructions. \"  Current as of: March 16, 2018  Content Version: 11.8  © 2484-0133 [employer]. Care instructions adapted under license by [employer] (which disclaims liability or warranty for this information). If you have questions about a medical condition or this instruction, always ask your healthcare professional. [place] [address] any warranty or liability for your use of this information. Learning About RICE (Rest, Ice, Compression, and Elevation)  What is RICE? RICE is a way to care for an injury. RICE helps relieve pain and swelling. It may also help with healing and flexibility. RICE stands for:  · Rest and protect the injured or sore area. · Ice or a cold pack used as soon as possible. · Compression, or wrapping the injured or sore area with an elastic bandage. · Elevation (propping up) the injured or sore area. How do you do RICE? You can use RICE for home treatment when you have general aches and pains or after an injury or surgery. Rest  · Do not put weight on the injury for at least 24 to 48 hours. · Use crutches for a badly sprained knee or ankle. · Support a sprained wrist, elbow, or shoulder with a sling. Ice  · Put ice or a cold pack on the injury right away to reduce pain and swelling. Frozen vegetables will also work as an ice pack. Put a thin cloth between the ice or cold pack and your skin. The cloth protects the injured area from getting too cold. · Use ice for 10 to 15 minutes at a time for the first 48 to 72 hours. Compression  · Use compression for sprains, strains, and surgeries of the arms and legs. · Wrap the injured area with an elastic bandage or compression sleeve to reduce swelling. · Don't wrap it too tightly.  If the area below it feels numb, tingles, or feels cool, loosen the wrap.  Elevation  · Use elevation for areas of the body that can be propped up, such as arms and legs. · Prop up the injured area on pillows whenever you use ice. Keep it propped up anytime you sit or lie down. · Try to keep the injured area at or above the level of your heart. This will help reduce swelling and bruising. Where can you learn more? Go to http://leslie-graciela.info/. Enter X614 in the search box to learn more about \"Learning About RICE (Rest, Ice, Compression, and Elevation). \"  Current as of: November 29, 2017  Content Version: 11.8  © 2267-3726 [employer]. Care instructions adapted under license by [employer] (which disclaims liability or warranty for this information). If you have questions about a medical condition or this instruction, always ask your healthcare professional. [place] [address] any warranty or liability for your use of this information.

## 2018-10-19 NOTE — ED NOTES
Patient complain of extreme pain to right wrist. Wrist noted to have significant swelling. AUBREY michele notified, orders given.

## 2018-10-24 ENCOUNTER — OFFICE VISIT (OUTPATIENT)
Dept: ORTHOPEDIC SURGERY | Facility: CLINIC | Age: 58
End: 2018-10-24

## 2018-10-24 VITALS
HEART RATE: 77 BPM | TEMPERATURE: 97.9 F | SYSTOLIC BLOOD PRESSURE: 176 MMHG | RESPIRATION RATE: 16 BRPM | HEIGHT: 64 IN | BODY MASS INDEX: 39.61 KG/M2 | DIASTOLIC BLOOD PRESSURE: 99 MMHG | WEIGHT: 232 LBS | OXYGEN SATURATION: 95 %

## 2018-10-24 DIAGNOSIS — M25.60 DECREASED RANGE OF MOTION: ICD-10-CM

## 2018-10-24 DIAGNOSIS — S63.501A RIGHT WRIST SPRAIN, INITIAL ENCOUNTER: ICD-10-CM

## 2018-10-24 DIAGNOSIS — S60.211A CONTUSION OF RIGHT WRIST, INITIAL ENCOUNTER: ICD-10-CM

## 2018-10-24 DIAGNOSIS — R52 PAIN: Primary | ICD-10-CM

## 2018-10-24 RX ORDER — OXYCODONE AND ACETAMINOPHEN 5; 325 MG/1; MG/1
1 TABLET ORAL
Qty: 21 TAB | Refills: 0 | Status: SHIPPED | OUTPATIENT
Start: 2018-10-24 | End: 2018-11-17

## 2018-10-24 NOTE — PROGRESS NOTES
RADIOGRAPHS:  X-rays of the right wrist, three views, today, reveal subtle radial styloid radio lucency that appears to be chronic. A healed, previous ulnar styloid fracture with suspected chronicity. No other fracture deformities, lesions, masses, or osseous deformities are noted. IMPRESSION:      1. Status post fall with right wrist sprain, severe. 2. History of radial styloid injury with chronicity. 3. Swelling of the right wrist.    4. Decreased range of motion of the right wrist secondary to above. PLAN:   The patient is going to continue his volar splint. He was provided Lamar in the hospital for pain control. Unfortunately, the medication was ineffective. He was, therefore, provided Percocet 5/325 mg one po up to three times a day #21 dispensed. This is for acute pain only. He is to receive no other Percocet from this office. We will see him in this office in about two weeks for reevaluation. He is to limit his push, pull, lift, and carry to one pound to the right upper extremity.

## 2018-10-24 NOTE — PROGRESS NOTES
HISTORY OF PRESENT ILLNESS:  Meldon Fabry is a 54-year-old, obese,  male who reportedly fell when he tripped over a carpet on October 19, 2018. He was seen through [facility] and found to have a chronic osseous deformity of his right wrist associated with the ulnar styloid region. He was placed in a volar splint and referred to our office. He has had progressive pain to the right wrist with swelling since the incident. Today, he reports his pain to the wrist is easily a 6-7/10 at rest in the brace. When he removes the brace, his pain increases to upwards of a 10/10. He has poor motion with his fingers extending and flexing.   He has poor motion actively. REVIEW OF SYSTEMS:  No chest pain. He has exertional dyspnea. No fevers, chills, or night sweats. Pain is per the HPI. ALLERGIES:  SHELLFISH-DERIVED. PHYSICAL EXAM:  He is a healthy-appearing, well-developed, well-nourished, pleasant, 54-year-old, morbidly obese,  male, with a BMI calculated at 39.8 today. Examination of the right wrist with the volar splint off reveals moderate swelling circumferentially about the wrist.  He has tenderness over the distal radius at the base of the thumb associated with the radiocarpal joint. There is navicular tenderness as well. He has a positive crank and grind test of the first metacarpophalangeal joint of the right hand. There is diffuse tenderness over the ulnar styloid region. His passive extension is barely 10°, and flexion is 5° with pain in both planes of motion. Inversion and eversion are untested today. He lacks 75° to full supination and 50° to full pronation. Distal sensation is intact fully to the right upper extremity. Capillary refill is brisk and less than 2 seconds to the right upper extremity.

## 2018-11-17 ENCOUNTER — HOSPITAL ENCOUNTER (EMERGENCY)
Age: 58
Discharge: HOME | End: 2018-11-17
Attending: EMERGENCY MEDICINE
Payer: MEDICARE

## 2018-11-17 VITALS
HEART RATE: 105 BPM | BODY MASS INDEX: 47.8 KG/M2 | TEMPERATURE: 97.5 F | WEIGHT: 280 LBS | HEIGHT: 64 IN | SYSTOLIC BLOOD PRESSURE: 150 MMHG | RESPIRATION RATE: 18 BRPM | DIASTOLIC BLOOD PRESSURE: 85 MMHG

## 2018-11-17 DIAGNOSIS — M10.062 ACUTE IDIOPATHIC GOUT OF LEFT KNEE: ICD-10-CM

## 2018-11-17 DIAGNOSIS — R52 PAIN: ICD-10-CM

## 2018-11-17 DIAGNOSIS — M10.061 ACUTE IDIOPATHIC GOUT OF RIGHT KNEE: ICD-10-CM

## 2018-11-17 DIAGNOSIS — S63.501A RIGHT WRIST SPRAIN, INITIAL ENCOUNTER: ICD-10-CM

## 2018-11-17 DIAGNOSIS — M10.031 ACUTE IDIOPATHIC GOUT OF RIGHT WRIST: Primary | ICD-10-CM

## 2018-11-17 DIAGNOSIS — S60.211A CONTUSION OF RIGHT WRIST, INITIAL ENCOUNTER: ICD-10-CM

## 2018-11-17 DIAGNOSIS — M25.60 DECREASED RANGE OF MOTION: ICD-10-CM

## 2018-11-17 PROCEDURE — 74011636637 HC RX REV CODE- 636/637: Performed by: EMERGENCY MEDICINE

## 2018-11-17 PROCEDURE — 74011250637 HC RX REV CODE- 250/637: Performed by: EMERGENCY MEDICINE

## 2018-11-17 PROCEDURE — 99284 EMERGENCY DEPT VISIT MOD MDM: CPT

## 2018-11-17 RX ORDER — INDOMETHACIN 25 MG/1
50 CAPSULE ORAL 3 TIMES DAILY
Qty: 60 CAP | Refills: 0 | Status: SHIPPED | OUTPATIENT
Start: 2018-11-17 | End: 2019-11-19 | Stop reason: SDUPTHER

## 2018-11-17 RX ORDER — OXYCODONE AND ACETAMINOPHEN 5; 325 MG/1; MG/1
2 TABLET ORAL
Status: COMPLETED | OUTPATIENT
Start: 2018-11-17 | End: 2018-11-17

## 2018-11-17 RX ORDER — COLCHICINE 0.6 MG/1
0.6 CAPSULE ORAL
Status: COMPLETED | OUTPATIENT
Start: 2018-11-17 | End: 2018-11-17

## 2018-11-17 RX ORDER — OXYCODONE AND ACETAMINOPHEN 5; 325 MG/1; MG/1
TABLET ORAL
Qty: 12 TAB | Refills: 0 | Status: SHIPPED | OUTPATIENT
Start: 2018-11-17 | End: 2019-07-26

## 2018-11-17 RX ORDER — OXYCODONE AND ACETAMINOPHEN 5; 325 MG/1; MG/1
1 TABLET ORAL
Qty: 21 TAB | Refills: 0 | Status: ON HOLD | OUTPATIENT
Start: 2018-11-17 | End: 2019-07-12 | Stop reason: SDUPTHER

## 2018-11-17 RX ORDER — PREDNISONE 20 MG/1
60 TABLET ORAL
Status: COMPLETED | OUTPATIENT
Start: 2018-11-17 | End: 2018-11-17

## 2018-11-17 RX ORDER — METHYLPREDNISOLONE 4 MG/1
TABLET ORAL
Qty: 1 DOSE PACK | Refills: 0 | Status: SHIPPED | OUTPATIENT
Start: 2018-11-17 | End: 2020-07-03

## 2018-11-17 RX ORDER — COLCHICINE 0.6 MG/1
TABLET ORAL
Qty: 30 TAB | Refills: 0 | Status: SHIPPED | OUTPATIENT
Start: 2018-11-17 | End: 2019-01-11 | Stop reason: SDUPTHER

## 2018-11-17 RX ADMIN — COLCHICINE 0.6 MG: 0.6 CAPSULE ORAL at 10:30

## 2018-11-17 RX ADMIN — PREDNISONE 60 MG: 20 TABLET ORAL at 10:28

## 2018-11-17 RX ADMIN — OXYCODONE HYDROCHLORIDE AND ACETAMINOPHEN 2 TABLET: 5; 325 TABLET ORAL at 10:28

## 2018-11-17 NOTE — ED NOTES
Pt discharged home stable and ambulatory. Pain level at discharge 5 . Pt discharged with friend/family. Reviewed discharged instructions with  rx and verbalized understanding. Patient armband removed and shredded

## 2018-11-17 NOTE — ED TRIAGE NOTES
Pt arrives via EMS for c/o bilateral knee pain, rt wrist pain stating \"feels like my gout pain\" Pt ran out of percocet's yesterday, has swelling to bilateral knees and rt wrist

## 2018-11-17 NOTE — DISCHARGE INSTRUCTIONS
Purine-Restricted Diet: Care Instructions  Your Care Instructions    Purines are substances that are found in some foods. Your body turns purines into uric acid. High levels of uric acid can cause gout, which is a form of arthritis that causes pain and inflammation in joints. You may be able to help control the amount of uric acid in your body by limiting high-purine foods in your diet. Follow-up care is a key part of your treatment and safety. Be sure to make and go to all appointments, and call your doctor if you are having problems. It's also a good idea to know your test results and keep a list of the medicines you take. How can you care for yourself at home? · Plan your meals and snacks around foods that are low in purines and are safe for you to eat. These foods include:  ? Green vegetables and tomatoes. ? Fruits. ? Whole-grain breads, rice, and cereals. ? Eggs, peanut butter, and nuts. ? Low-fat milk, cheese, and other milk products. ? Popcorn. ? Gelatin desserts, chocolate, cocoa, and cakes and sweets, in small amounts. · You can eat certain foods that are medium-high in purines, but eat them only once in a while. These foods include:  ? Legumes, such as dried beans and dried peas. You can have 1 cup cooked legumes each day. ? Asparagus, cauliflower, spinach, mushrooms, and green peas. ? Fish and seafood (other than very high-purine seafood). ? Oatmeal, wheat bran, and wheat germ. · Limit very high-purine foods, including:  ? Organ meats, such as liver, kidneys, sweetbreads, and brains. ? Meats, including day, beef, pork, and lamb. ? Game meats and any other meats in large amounts. ? Anchovies, sardines, herring, mackerel, and scallops. ? Gravy. ? Beer. Where can you learn more? Go to http://leslie-graciela.info/. Enter F448 in the search box to learn more about \"Purine-Restricted Diet: Care Instructions. \"  Current as of: March 29, 2018  Content Version: 11.8  © 3032-1394 [employer]. Care instructions adapted under license by test company (which disclaims liability or warranty for this information). If you have questions about a medical condition or this instruction, always ask your healthcare professional. [place] [address] any warranty or liability for your use of this information.

## 2018-12-10 ENCOUNTER — DOCUMENTATION ONLY (OUTPATIENT)
Dept: ORTHOPEDIC SURGERY | Facility: CLINIC | Age: 58
End: 2018-12-10

## 2018-12-10 NOTE — PROGRESS NOTES
Pt walked in requesting Ralph Macias from 10/24/18. Provided ovnotes;pt completed and signed AUTHORIZATION TO [id number] [address].

## 2019-01-03 ENCOUNTER — HOSPITAL ENCOUNTER (OUTPATIENT)
Dept: LAB | Age: 59
Discharge: HOME | End: 2019-01-03
Payer: MEDICARE

## 2019-01-03 LAB
25(OH)D3 SERPL-MCNC: 40.2 NG/ML (ref 30–100)
ALBUMIN SERPL-MCNC: 3.1 G/DL (ref 3.4–5)
ANION GAP SERPL CALC-SCNC: 8 MMOL/L (ref 3–18)
BUN SERPL-MCNC: 51 MG/DL (ref 7–18)
BUN/CREAT SERPL: 10 (ref 12–20)
CALCIUM SERPL-MCNC: 7.7 MG/DL (ref 8.5–10.1)
CALCIUM SERPL-MCNC: 8.2 MG/DL (ref 8.5–10.1)
CHLORIDE SERPL-SCNC: 111 MMOL/L (ref 100–108)
CO2 SERPL-SCNC: 22 MMOL/L (ref 21–32)
CREAT SERPL-MCNC: 4.88 MG/DL (ref 0.6–1.3)
CREAT UR-MCNC: 53.1 MG/DL (ref 30–125)
ERYTHROCYTE [DISTWIDTH] IN BLOOD BY AUTOMATED COUNT: 16.1 % (ref 11.6–14.5)
GLUCOSE SERPL-MCNC: 221 MG/DL (ref 74–99)
HCT VFR BLD AUTO: 32.3 % (ref 36–48)
HGB BLD-MCNC: 9.9 G/DL (ref 13–16)
MCH RBC QN AUTO: 26.6 PG (ref 24–34)
MCHC RBC AUTO-ENTMCNC: 30.7 G/DL (ref 31–37)
MCV RBC AUTO: 86.8 FL (ref 74–97)
PHOSPHATE SERPL-MCNC: 4.5 MG/DL (ref 2.5–4.9)
PLATELET # BLD AUTO: 259 K/UL (ref 135–420)
PMV BLD AUTO: 11.4 FL (ref 9.2–11.8)
POTASSIUM SERPL-SCNC: 5.3 MMOL/L (ref 3.5–5.5)
PROT UR-MCNC: 127 MG/DL
PROT/CREAT UR-RTO: 2.4
PTH-INTACT SERPL-MCNC: 476.4 PG/ML (ref 18.4–88)
RBC # BLD AUTO: 3.72 M/UL (ref 4.7–5.5)
SODIUM SERPL-SCNC: 141 MMOL/L (ref 136–145)
WBC # BLD AUTO: 7.5 K/UL (ref 4.6–13.2)

## 2019-01-03 PROCEDURE — 82306 VITAMIN D 25 HYDROXY: CPT

## 2019-01-03 PROCEDURE — 80069 RENAL FUNCTION PANEL: CPT

## 2019-01-03 PROCEDURE — 83970 ASSAY OF PARATHORMONE: CPT

## 2019-01-03 PROCEDURE — 36415 COLL VENOUS BLD VENIPUNCTURE: CPT

## 2019-01-03 PROCEDURE — 84156 ASSAY OF PROTEIN URINE: CPT

## 2019-01-03 PROCEDURE — 85027 COMPLETE CBC AUTOMATED: CPT

## 2019-01-11 ENCOUNTER — OFFICE VISIT (OUTPATIENT)
Dept: ORTHOPEDIC SURGERY | Facility: CLINIC | Age: 59
End: 2019-01-11

## 2019-01-11 VITALS
TEMPERATURE: 98.2 F | BODY MASS INDEX: 48.06 KG/M2 | HEART RATE: 78 BPM | HEIGHT: 64 IN | OXYGEN SATURATION: 98 % | RESPIRATION RATE: 18 BRPM | SYSTOLIC BLOOD PRESSURE: 156 MMHG | DIASTOLIC BLOOD PRESSURE: 87 MMHG

## 2019-01-11 DIAGNOSIS — G89.29 CHRONIC PAIN OF RIGHT KNEE: Primary | ICD-10-CM

## 2019-01-11 DIAGNOSIS — M25.561 CHRONIC PAIN OF RIGHT KNEE: Primary | ICD-10-CM

## 2019-01-11 DIAGNOSIS — M25.40 EFFUSION INTO JOINT: ICD-10-CM

## 2019-01-11 DIAGNOSIS — M10.9 ACUTE GOUT OF RIGHT KNEE, UNSPECIFIED CAUSE: ICD-10-CM

## 2019-01-11 DIAGNOSIS — R52 ACUTE PAIN: ICD-10-CM

## 2019-01-11 DIAGNOSIS — R52 PAIN: ICD-10-CM

## 2019-01-11 RX ORDER — COLCHICINE 0.6 MG/1
0.6 TABLET ORAL DAILY
Qty: 22 TAB | Refills: 0 | Status: SHIPPED | OUTPATIENT
Start: 2019-01-11 | End: 2019-02-15 | Stop reason: SDUPTHER

## 2019-01-11 RX ORDER — HYDROCODONE BITARTRATE AND ACETAMINOPHEN 7.5; 325 MG/1; MG/1
1-2 TABLET ORAL
Qty: 28 TAB | Refills: 0 | Status: SHIPPED | OUTPATIENT
Start: 2019-01-11 | End: 2019-06-07

## 2019-01-11 RX ORDER — COLCHICINE 0.6 MG/1
TABLET ORAL
Qty: 30 TAB | Refills: 0 | Status: SHIPPED | OUTPATIENT
Start: 2019-01-11 | End: 2019-11-19 | Stop reason: SDUPTHER

## 2019-01-11 RX ORDER — TRIAMCINOLONE ACETONIDE 40 MG/ML
40 INJECTION, SUSPENSION INTRA-ARTICULAR; INTRAMUSCULAR ONCE
Qty: 1 ML | Refills: 0
Start: 2019-01-11 | End: 2019-01-11

## 2019-01-11 NOTE — PROGRESS NOTES
HISTORY OF PRESENT ILLNESS:  Emma Cruz returns to the office with his wife. He fell sometime after his last visit and has been experiencing worsening right knee pain. He does use a wheelchair for ambulation assistance, as well as a single-post cane. He has a history of osteoarthritis of the right knee and is also an end-stage renal patient. He has an appointment with a nephrologist to decide between peritoneal dialysis versus hemodialysis. He was advised to stop taking his Indocin by his nephrologist.  He is not taking Colchicine at this time either. He reports difficulty bending his knee with a large effusion noted. He is a gout sufferer. He has no chest pain today. He does have exertional dyspnea, which is chronic in nature. He has no fever, chills, or night sweats. His pain to the right knee is, at rest, a 5-6/10, and with activity, to including full weightbearing, his pain is a 7-8/10. PHYSICAL EXAM:  He is a healthy-appearing, well-developed, well-nourished, pleasant, obese, 60-year-old,  male, atraumatic, normocephalic, alert and oriented times three sitting on the table comfortably. He lies supine with no difficulties. He has a 2+ effusion to the right knee. There is no warmth or erythema noted. He can flex with some difficulty back to 70° and extend -5°. There is no calf tenderness or evidence of DVT. RADIOGRAPHS:  X-rays were reviewed previously to reveal moderate to severe tricompartmental osteoarthritis with no fracture deformities noted. IMPRESSION:   
 
1. Right knee pain. 2. Status post fall with right knee effusion. 3. Decreased range of motion of the right knee secondary to above. 4. History of gout.   
 
PROCEDURE:  Today, under sterile technique, after verbal and written consent were obtained and appropriate time out performed, 3 cc of 1% Lidocaine was used to anesthetize the right knee using the superolateral, intraarticular approach. To follow, 109 cc of cloudy, tophus-laden, yellow aspirate was removed from the same portal.  To follow, 0.5 cc of Kenalog at 40 mg per mL mixed with 15 cc of Sensorcaine 0.75% was injected. There were no complications. The patient tolerated the procedure well. PLAN:   I am currently recommending an aspiration and injection of his right knee. We are going to plan to put him back on Colchicine in abortive rescue dose for his gouty attack. We will plan on seeing him back on a prn basis. He is going to discuss with his neurologist restarting his Indocin if necessary since he is accepting the next course of his end-stage renal disease with either peritoneal dialysis at home or hemodialysis outpatient.

## 2019-02-15 RX ORDER — COLCHICINE 0.6 MG/1
0.6 TABLET ORAL DAILY
Qty: 22 TAB | Refills: 2 | Status: SHIPPED | OUTPATIENT
Start: 2019-02-15 | End: 2020-07-03

## 2019-02-28 ENCOUNTER — HOSPITAL ENCOUNTER (OUTPATIENT)
Dept: LAB | Age: 59
Discharge: HOME | End: 2019-02-28
Payer: MEDICARE

## 2019-02-28 LAB
25(OH)D3 SERPL-MCNC: 24.8 NG/ML (ref 30–100)
ALBUMIN SERPL-MCNC: 2.8 G/DL (ref 3.4–5)
ANION GAP SERPL CALC-SCNC: 6 MMOL/L (ref 3–18)
BUN SERPL-MCNC: 53 MG/DL (ref 7–18)
BUN/CREAT SERPL: 11 (ref 12–20)
CALCIUM SERPL-MCNC: 7.4 MG/DL (ref 8.5–10.1)
CALCIUM SERPL-MCNC: 7.8 MG/DL (ref 8.5–10.1)
CHLORIDE SERPL-SCNC: 109 MMOL/L (ref 100–108)
CO2 SERPL-SCNC: 26 MMOL/L (ref 21–32)
CREAT SERPL-MCNC: 4.63 MG/DL (ref 0.6–1.3)
CREAT UR-MCNC: 75.1 MG/DL (ref 30–125)
ERYTHROCYTE [DISTWIDTH] IN BLOOD BY AUTOMATED COUNT: 15.3 % (ref 11.6–14.5)
GLUCOSE SERPL-MCNC: 139 MG/DL (ref 74–99)
HCT VFR BLD AUTO: 33.1 % (ref 36–48)
HGB BLD-MCNC: 10.4 G/DL (ref 13–16)
MCH RBC QN AUTO: 26.3 PG (ref 24–34)
MCHC RBC AUTO-ENTMCNC: 31.4 G/DL (ref 31–37)
MCV RBC AUTO: 83.8 FL (ref 74–97)
PHOSPHATE SERPL-MCNC: 4 MG/DL (ref 2.5–4.9)
PLATELET # BLD AUTO: 370 K/UL (ref 135–420)
PMV BLD AUTO: 10.7 FL (ref 9.2–11.8)
POTASSIUM SERPL-SCNC: 3.9 MMOL/L (ref 3.5–5.5)
PROT UR-MCNC: 501 MG/DL
PROT/CREAT UR-RTO: 6.7
PTH-INTACT SERPL-MCNC: 579.2 PG/ML (ref 18.4–88)
RBC # BLD AUTO: 3.95 M/UL (ref 4.7–5.5)
SODIUM SERPL-SCNC: 141 MMOL/L (ref 136–145)
WBC # BLD AUTO: 9 K/UL (ref 4.6–13.2)

## 2019-02-28 PROCEDURE — 85027 COMPLETE CBC AUTOMATED: CPT

## 2019-02-28 PROCEDURE — 36415 COLL VENOUS BLD VENIPUNCTURE: CPT

## 2019-02-28 PROCEDURE — 83970 ASSAY OF PARATHORMONE: CPT

## 2019-02-28 PROCEDURE — 80069 RENAL FUNCTION PANEL: CPT

## 2019-02-28 PROCEDURE — 84156 ASSAY OF PROTEIN URINE: CPT

## 2019-02-28 PROCEDURE — 82306 VITAMIN D 25 HYDROXY: CPT

## 2019-03-08 ENCOUNTER — DOCUMENTATION ONLY (OUTPATIENT)
Dept: VASCULAR SURGERY | Age: 59
End: 2019-03-08

## 2019-03-08 NOTE — PROGRESS NOTES
Spoke to Isaura Arango from Dr. Hill [facility] office to advise that patient refuse to schedule for study and see the provider because he is busy.

## 2019-04-29 ENCOUNTER — OFFICE VISIT (OUTPATIENT)
Dept: VASCULAR SURGERY | Age: 59
End: 2019-04-29

## 2019-04-29 VITALS
HEART RATE: 76 BPM | SYSTOLIC BLOOD PRESSURE: 160 MMHG | DIASTOLIC BLOOD PRESSURE: 80 MMHG | RESPIRATION RATE: 16 BRPM | WEIGHT: 280 LBS | BODY MASS INDEX: 47.8 KG/M2 | HEIGHT: 64 IN

## 2019-04-29 DIAGNOSIS — N18.4 CHRONIC RENAL DISEASE, STAGE IV (HCC): Primary | ICD-10-CM

## 2019-04-29 NOTE — PROGRESS NOTES
Lemuel Grant Chief Complaint Patient presents with  New Patient HPI Lemuel Grant is a 62 y.o. male with chronic kidney disease stage IV. He is in need of future dialysis. Patient has been recommended for fistula placement prior to dialysis. Patient has elected for hemodialysis. He has no fevers or chills. No previous known DVT or PE. No previous dialysis. He is right-handed. No previous intravascular device. He does not complain of any claudication or rest pain of the upper extremities. Past Medical History:  
Diagnosis Date  Alcohol abuse  Alcohol-induced chronic pancreatitis ([facility] [address] 9/7/2018  Arthritis  Atherosclerotic cardiovascular disease  CAD (coronary artery disease)   
 mild disease of coronaries (cor angio 2006),wife states he has 1 stent  Cardiac cath 01/26/2006 RCA mild. Otherwise patent coronaries. LVEDP 15.  EF 55-60%.  Cardiac echocardiogram 03/27/2009 EF 60%. No cardiac source of embolism. No significant valvular pathology.  Cardiac nuclear imaging test 12/06/2011 Small, mild inferior infarction or possibly artifact. No ischemia. EF 45%. No TID. No reg WMA.  Cardiovascular LLE venous duplex 08/14/2015 Left leg:  No DVT. Dilated lymph node in left groin.  Constipation  Diabetes mellitus type II   
 Gout  Hepatic steatosis  Hypercholesterolemia  Hypertension  Knee effusion, left  Left knee pain  Morbid obesity ([address]  Noncompliance  Obesity (BMI 30-39. 9)  S/P colonoscopy 3/8/05 Dr. Dereck Alva; normal; f/u 10 years  Stomach problems  TIA (transient ischemic attack) 3/09  Tobacco abuse Patient Active Problem List  
Diagnosis Code  Allergic rhinitis J30.9  Neuropathy G62.9  Right knee pain M25.561  Dyslipidemia E78.5  Gout M10.9  CAD (coronary artery disease) I25.10  Hypertensive heart disease I11.9  Obesity (BMI 30-39. 9) E66.9  Constipation K59.00  Tobacco abuse Z72.0  TIA (transient ischemic attack) G45.9  Hepatic steatosis K76.0  Noncompliance Z91.19  Testicular/scrotal pain [id number]  UTI (urinary tract infection) N39.0  Contusion of knee S80.00XA [facility] Knee strain P10.882K  Effusion of patella M25.469  Hemoptysis R04.2  Rash R21  Laceration of gum S01.512A  Fractured tooth S02. Patricia Banner  Renal stones N20.0  Microalbuminuria R80.9  Hypertriglyceridemia E78.1  Type 2 diabetes mellitus without complication (HCC) L93.1  Acute unilateral obstructive uropathy N13.9  ARF (acute renal failure) (HCC) N17.9  Essential hypertension I10  
 Advance directive discussed with patient Z70.80  
 Acute chest pain R07.9  Left sided numbness R20.0  Chronic kidney disease, stage III (moderate) (HCC) N18.3  Persistent proteinuria R80.1  Secondary hyperparathyroidism of renal origin ([address] N25.81  
 Hypoglycemia E16.2  Cocaine abuse ([address] F14.10  Chronic renal disease, stage IV ([address] N18.4  Obesity, morbid ([address] E66.01  
 Type 2 diabetes with nephropathy ([address] E11.21  
 Type II diabetes mellitus with nephropathy ([address] E11.21  
 Alcohol-induced chronic pancreatitis ([address] K86.0  Acute on chronic renal failure (HCC) N17.9, N18.9  Pancreatic insufficiency K86.89  
 Acute pain in scrotum N50.82 Past Surgical History:  
Procedure Laterality Date [address] UNLISTED Hernia repair in 1960's or 1970's.  HX ORTHOPAEDIC Bones spur removed from left & right foot 2013.  HX UROLOGICAL  8/18/2015 [facility], Dr. Alcazar Heart, Cystoscopy, left retrograde, left double-J cath Current Outpatient Medications Medication Sig Dispense Refill  colchicine 0.6 mg tablet Take 1 Tab by mouth daily. 22 Tab 2  
 colchicine 0.6 mg tablet Take 1 tablet by mouth Q1Hour for gout pain. NOT TO EXCEED 3 TABLETS IN 24 HOURS.  30 Tab 0  
  HYDROcodone-acetaminophen (NORCO) 7.5-325 mg per tablet Take 1-2 Tabs by mouth two (2) times daily as needed for Pain. Max Daily Amount: 4 Tabs. 28 Tab 0  
 oxyCODONE-acetaminophen (PERCOCET) 5-325 mg per tablet Take 1 Tab by mouth every eight (8) hours as needed for Pain. Max Daily Amount: 3 Tabs. 21 Tab 0  
 oxyCODONE-acetaminophen (PERCOCET) 5-325 mg per tablet Take 1 tablet every 4-6 hours as needed for pain control. If you were instructed to try over the counter ibuprofen or tylenol, only take the percocet for pain not controlled with the over the counter medication. 12 Tab 0  
 indomethacin (INDOCIN) 25 mg capsule Take 2 Caps by mouth three (3) times daily. With food 60 Cap 0  
 methylPREDNISolone (MEDROL, CASS,) 4 mg tablet Per dose pack instructions 1 Dose Pack 0  ibuprofen (MOTRIN) 600 mg tablet Take 1 Tab by mouth every six (6) hours as needed for Pain. 20 Tab 0  
 HYDROcodone-acetaminophen (NORCO) 5-325 mg per tablet Take 1 Tab by mouth every four (4) hours as needed for Pain. Max Daily Amount: 6 Tabs. 20 Tab 0  
 lipase-protease-amylase (CREON 6000) 6,000-19,000 -30,000 unit capsule Take 1 Cap by mouth three (3) times daily (with meals). Indications: exocrine pancreatic insufficiency 90 Cap 0  
 omeprazole (PRILOSEC) 20 mg capsule Take 20 mg by mouth daily.  febuxostat (ULORIC) 40 mg tab tablet Take 1 Tab by mouth daily. 14 Tab 1  
 hydrALAZINE (APRESOLINE) 50 mg tablet Take 1 Tab by mouth two (2) times a day. 20 Tab 0  
 ondansetron hcl (ZOFRAN, AS HYDROCHLORIDE,) 4 mg tablet Take 1 Tab by mouth every eight (8) hours as needed for Nausea. 10 Tab 0  
 tamsulosin (FLOMAX) 0.4 mg capsule Take 1 Cap by mouth daily. Start this medication on Friday 12/15/17  Indications: Urolithiasis 6 Cap 0  
 glucose blood VI test strips (FREESTYLE TEST) strip by Does Not Apply route See Admin Instructions. Check twice a day 100 Strip 2  bisacodyl (DULCOLAX, BISACODYL,) 5 mg EC tablet Take 1 Tab by mouth daily as needed for Constipation. 30 Tab 0  
 NOVOLIN 70/30 100 unit/mL (70-30) injection INJECT 30 UNITS SC IN THE MORNING AND 20 UNITS SC IN THE EVENING BEFORE SUPPER 10 mL 4  
 isosorbide mononitrate ER (IMDUR) 60 mg CR tablet Take 1 Tab by mouth daily. 30 Tab 6  
 atorvastatin (LIPITOR) 40 mg tablet Take 1 Tab by mouth nightly. 30 Tab 6  furosemide (LASIX) 40 mg tablet Take 1 Tab by mouth daily. 30 Tab 6  fluticasone (FLONASE) 50 mcg/actuation nasal spray 2 Sprays by Both Nostrils route daily.  carvedilol (COREG) 6.25 mg tablet Take 1 Tab by mouth two (2) times daily (with meals). (Patient taking differently: Take 25 mg by mouth two (2) times daily (with meals). ) 60 Tab 6  
 nitroglycerin (NITROSTAT) 0.4 mg SL tablet 1 Tab by SubLINGual route as needed for Chest Pain. (Patient taking differently: 1 Tab by SubLINGual route as needed for Chest Pain. Pt to take 1 tab q 5 min up to three times, if symptom persist pt. to call 911.) 20 Tab 0  
 aspirin 81 mg chewable tablet Take 1 Tab by mouth daily. 30 Tab 0  B COMPLEX W-C NO.20/FOLIC ACID (B COMPLEX & C NO.20-FOLIC ACID PO) Take  by mouth.  budesonide-formoterol (SYMBICORT) 160-4.5 mcg/actuation HFA inhaler Take 2 Puffs by inhalation two (2) times a day. 1 Inhaler 0  
 calcitRIOL (ROCALTROL) 0.25 mcg capsule Take 1 Cap by mouth every Monday, Wednesday, Friday. 15 Cap 0  
 Lancets misc Check 3 times a day , needs according contour glucometer 1 Package 11  Blood-Glucose Meter monitoring kit Check twice daily. 1 kit 0 Allergies Allergen Reactions  Shellfish Derived Other (comments) Causes Gout Social History Socioeconomic History  Marital status:  Spouse name: Not on file  Number of children: Not on file  Years of education: Not on file  Highest education level: Not on file Occupational History  Occupation: disabled Social Needs  Financial resource strain: Not on file  Food insecurity:  
  Worry: Not on file Inability: Not on file  Transportation needs:  
  Medical: Not on file Non-medical: Not on file Tobacco Use  Smoking status: Current Every Day Smoker Packs/day: 0.25 Types: Cigarettes Last attempt to quit: 2016 Years since quittin.8  Smokeless tobacco: Never Used Substance and Sexual Activity  Alcohol use: Yes Alcohol/week: 0.0 oz  
  Comment: 1 beers a day.  Drug use: No  
 Sexual activity: Yes Lifestyle  Physical activity:  
  Days per week: Not on file Minutes per session: Not on file  Stress: Not on file Relationships  Social connections:  
  Talks on phone: Not on file Gets together: Not on file Attends Yazidi service: Not on file Active member of club or organization: Not on file Attends meetings of clubs or organizations: Not on file Relationship status: Not on file  Intimate partner violence:  
  Fear of current or ex partner: Not on file Emotionally abused: Not on file Physically abused: Not on file Forced sexual activity: Not on file Other Topics Concern  Not on file Social History Narrative  Not on file Family History Problem Relation Age of Onset  Diabetes Father  Heart Disease Mother  Diabetes Sister  Hypertension Sister  Arthritis-osteo Sister  Arthritis-osteo Brother  Diabetes Other  Diabetes Other [facility]  Hypertension Other Uncle; Aunt Review of Systems Constitutional: negative Eyes: negative Ears, nose, mouth, throat, and face: negative Respiratory: negative Cardiovascular: negative Gastrointestinal: negative Genitourinary:negative Hematologic/lymphatic: negative Musculoskeletal:negative Neurological: negative Behavioral/Psych: negative Endocrine: negative Allergic/Immunologic: negative Unless otherwise mentioned in the HPI. Physical Exam:   
Visit Vitals /80 (BP 1 Location: Left arm, BP Patient Position: Sitting) Pulse 76 Resp 16 Ht 5' 4\" (1.626 m) Wt 280 lb (127 kg) BMI 48.06 kg/m² General: Well-appearing male in no acute distress HEENT: EOMI no scleral icterus is noted moist mucous membranes noted Pulmonary: No increased work of breathing is noted clear to auscultation bilaterally no wheezes rales rhonchi 
Cardiovascular: Regular rate and rhythm normal S1-S2 no rubs  murmurs or gallops are identified no carotid bruits are identified bilaterally Abdomen: Soft nontender nondistended no rebound guarding is noted no palpable pulsatile abdominal mass can be felt although patient is obese Extremities: Warm and well-perfused bilaterally 2+ radial pulses bilaterally no ulcerations are identified or edema Neuro: Cranial nerves II through XII are grossly intact Impression and Plan: 
Doyce Riedel is a 62 y.o. male with chronic kidney disease stage IV in need of dialysis access. I reviewed his vein mapping in clinic today it does show some superficial thrombophlebitis in the upper arm cephalic vein but the forearm cephalic vein is of appropriate size. We had a discussion that he has a 85% chance of having a radiocephalic fistula be useful given all of this information. Patient has elected to move forward with radiocephalic fistula placement. He is understanding that there is a 50% chance that he would need further surgery down the line. Patient was also given the following risks and benefits including but not limited to bleeding, infection, damage to adjacent structures, MI, stroke, death, need for further surgery, loss of upper extremity, steal syndrome. Patient is understanding all the risks and is willing to move forward with the surgery. We will get him on the schedule as soon as we can. We reviewed the plan with the patient and the patient understands.   We also gave the patient appropriate instructions on their disease process and when to call back. Greater than 50% of this visit was spent with face to face discussion. Lillian Ureña MD 
 
PLEASE NOTE: 
This document has been produced using voice recognition software. Unrecognized errors in transcription may be present.

## 2019-06-06 ENCOUNTER — ANESTHESIA EVENT (OUTPATIENT)
Dept: CARDIOTHORACIC SURGERY | Age: 59
End: 2019-06-06
Payer: MEDICARE

## 2019-06-07 ENCOUNTER — ANESTHESIA (OUTPATIENT)
Dept: CARDIOTHORACIC SURGERY | Age: 59
End: 2019-06-07
Payer: MEDICARE

## 2019-06-07 ENCOUNTER — HOSPITAL ENCOUNTER (OUTPATIENT)
Age: 59
Setting detail: OUTPATIENT SURGERY
Discharge: HOME | End: 2019-06-07
Attending: SURGERY | Admitting: SURGERY
Payer: MEDICARE

## 2019-06-07 VITALS
HEIGHT: 64 IN | SYSTOLIC BLOOD PRESSURE: 160 MMHG | RESPIRATION RATE: 22 BRPM | DIASTOLIC BLOOD PRESSURE: 98 MMHG | WEIGHT: 233.56 LBS | BODY MASS INDEX: 39.87 KG/M2 | OXYGEN SATURATION: 97 % | TEMPERATURE: 97.7 F | HEART RATE: 70 BPM

## 2019-06-07 DIAGNOSIS — M25.571 ACUTE RIGHT ANKLE PAIN: ICD-10-CM

## 2019-06-07 DIAGNOSIS — M25.531 PAIN AND SWELLING OF RIGHT WRIST: ICD-10-CM
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
## 2019-06-07 DIAGNOSIS — S99.911A RIGHT ANKLE INJURY, INITIAL ENCOUNTER: ICD-10-CM

## 2019-06-07 DIAGNOSIS — S69.91XA RIGHT WRIST INJURY, INITIAL ENCOUNTER: ICD-10-CM

## 2019-06-07 DIAGNOSIS — M25.431 PAIN AND SWELLING OF RIGHT WRIST: ICD-10-CM

## 2019-06-07 LAB
BUN BLD-MCNC: 80 MG/DL (ref 7–18)
CHLORIDE BLD-SCNC: 111 MMOL/L (ref 100–108)
GLUCOSE BLD STRIP.AUTO-MCNC: 88 MG/DL (ref 74–106)
HCT VFR BLD CALC: 33 % (ref 36–49)
HGB BLD-MCNC: 11.2 G/DL (ref 12–16)
POTASSIUM BLD-SCNC: 5.6 MMOL/L (ref 3.5–5.5)
SODIUM BLD-SCNC: 141 MMOL/L (ref 136–145)

## 2019-06-07 PROCEDURE — 74011250636 HC RX REV CODE- 250/636: Performed by: SURGERY

## 2019-06-07 PROCEDURE — 76060000033 HC ANESTHESIA 1 TO 1.5 HR: Performed by: SURGERY

## 2019-06-07 PROCEDURE — 77030003601 HC NDL NRV BLK BBMI -A: Performed by: SURGERY

## 2019-06-07 PROCEDURE — 82947 ASSAY GLUCOSE BLOOD QUANT: CPT

## 2019-06-07 PROCEDURE — 77030002986 HC SUT PROL J&J -A: Performed by: SURGERY

## 2019-06-07 PROCEDURE — C1894 INTRO/SHEATH, NON-LASER: HCPCS | Performed by: SURGERY

## 2019-06-07 PROCEDURE — 74011000258 HC RX REV CODE- 258: Performed by: SURGERY

## 2019-06-07 PROCEDURE — 74011000250 HC RX REV CODE- 250

## 2019-06-07 PROCEDURE — 77030018836 HC SOL IRR NACL ICUM -A: Performed by: SURGERY

## 2019-06-07 PROCEDURE — 77030002996 HC SUT SLK J&J -A: Performed by: SURGERY

## 2019-06-07 PROCEDURE — 77030039266 HC ADH SKN EXOFIN S2SG -A: Performed by: SURGERY

## 2019-06-07 PROCEDURE — 76010000113 HC CV SURG 1 TO 1.5 HR: Performed by: SURGERY

## 2019-06-07 PROCEDURE — 74011250636 HC RX REV CODE- 250/636

## 2019-06-07 PROCEDURE — 77030031139 HC SUT VCRL2 J&J -A: Performed by: SURGERY

## 2019-06-07 PROCEDURE — 76210000016 HC OR PH I REC 1 TO 1.5 HR: Performed by: SURGERY

## 2019-06-07 PROCEDURE — 77030010512 HC APPL CLP LIG J&J -C: Performed by: SURGERY

## 2019-06-07 PROCEDURE — 74011250637 HC RX REV CODE- 250/637

## 2019-06-07 PROCEDURE — 76210000020 HC REC RM PH II FIRST 0.5 HR: Performed by: SURGERY

## 2019-06-07 RX ORDER — SODIUM CHLORIDE 0.9 % (FLUSH) 0.9 %
5-40 SYRINGE (ML) INJECTION AS NEEDED
Status: DISCONTINUED | OUTPATIENT
Start: 2019-06-07 | End: 2019-06-07 | Stop reason: HOSPADM

## 2019-06-07 RX ORDER — DEXTROSE MONOHYDRATE AND SODIUM CHLORIDE 5; .225 G/100ML; G/100ML
25 INJECTION, SOLUTION INTRAVENOUS CONTINUOUS
Status: DISCONTINUED | OUTPATIENT
Start: 2019-06-07 | End: 2019-06-07 | Stop reason: HOSPADM

## 2019-06-07 RX ORDER — HEPARIN SODIUM 200 [USP'U]/100ML
INJECTION, SOLUTION INTRAVENOUS
Status: DISPENSED
Start: 2019-06-07 | End: 2019-06-07

## 2019-06-07 RX ORDER — LIDOCAINE HYDROCHLORIDE 10 MG/ML
INJECTION, SOLUTION EPIDURAL; INFILTRATION; INTRACAUDAL; PERINEURAL
Status: DISPENSED
Start: 2019-06-07 | End: 2019-06-07

## 2019-06-07 RX ORDER — LIDOCAINE HYDROCHLORIDE 10 MG/ML
0.1 INJECTION, SOLUTION EPIDURAL; INFILTRATION; INTRACAUDAL; PERINEURAL AS NEEDED
Status: DISCONTINUED | OUTPATIENT
Start: 2019-06-07 | End: 2019-06-07 | Stop reason: HOSPADM

## 2019-06-07 RX ORDER — SODIUM CHLORIDE, SODIUM LACTATE, POTASSIUM CHLORIDE, CALCIUM CHLORIDE 600; 310; 30; 20 MG/100ML; MG/100ML; MG/100ML; MG/100ML
75 INJECTION, SOLUTION INTRAVENOUS CONTINUOUS
Status: DISCONTINUED | OUTPATIENT
Start: 2019-06-07 | End: 2019-06-07 | Stop reason: ALTCHOICE

## 2019-06-07 RX ORDER — SODIUM CHLORIDE 0.9 % (FLUSH) 0.9 %
5-40 SYRINGE (ML) INJECTION AS NEEDED
Status: CANCELLED | OUTPATIENT
Start: 2019-06-07

## 2019-06-07 RX ORDER — CEFAZOLIN SODIUM 2 G/50ML
SOLUTION INTRAVENOUS
Status: COMPLETED
Start: 2019-06-07 | End: 2019-06-07

## 2019-06-07 RX ORDER — MAGNESIUM SULFATE 100 %
4 CRYSTALS MISCELLANEOUS AS NEEDED
Status: CANCELLED | OUTPATIENT
Start: 2019-06-07

## 2019-06-07 RX ORDER — INSULIN LISPRO 100 [IU]/ML
INJECTION, SOLUTION INTRAVENOUS; SUBCUTANEOUS ONCE
Status: DISCONTINUED | OUTPATIENT
Start: 2019-06-07 | End: 2019-06-07 | Stop reason: HOSPADM

## 2019-06-07 RX ORDER — LIDOCAINE HYDROCHLORIDE 10 MG/ML
INJECTION, SOLUTION EPIDURAL; INFILTRATION; INTRACAUDAL; PERINEURAL AS NEEDED
Status: DISCONTINUED | OUTPATIENT
Start: 2019-06-07 | End: 2019-06-07 | Stop reason: HOSPADM

## 2019-06-07 RX ORDER — SODIUM CHLORIDE 0.9 % (FLUSH) 0.9 %
5-40 SYRINGE (ML) INJECTION EVERY 8 HOURS
Status: CANCELLED | OUTPATIENT
Start: 2019-06-07

## 2019-06-07 RX ORDER — ROPIVACAINE HYDROCHLORIDE 5 MG/ML
INJECTION, SOLUTION EPIDURAL; INFILTRATION; PERINEURAL
Status: COMPLETED | OUTPATIENT
Start: 2019-06-07 | End: 2019-06-07

## 2019-06-07 RX ORDER — HYDROCODONE BITARTRATE AND ACETAMINOPHEN 5; 325 MG/1; MG/1
1 TABLET ORAL
Qty: 20 TAB | Refills: 0 | Status: SHIPPED | OUTPATIENT
Start: 2019-06-07 | End: 2019-06-14

## 2019-06-07 RX ORDER — PROPOFOL 10 MG/ML
INJECTION, EMULSION INTRAVENOUS AS NEEDED
Status: DISCONTINUED | OUTPATIENT
Start: 2019-06-07 | End: 2019-06-07 | Stop reason: HOSPADM

## 2019-06-07 RX ORDER — DEXTROSE 50 % IN WATER (D50W) INTRAVENOUS SYRINGE
25-50 AS NEEDED
Status: CANCELLED | OUTPATIENT
Start: 2019-06-07

## 2019-06-07 RX ORDER — INSULIN LISPRO 100 [IU]/ML
INJECTION, SOLUTION INTRAVENOUS; SUBCUTANEOUS ONCE
Status: CANCELLED | OUTPATIENT
Start: 2019-06-07 | End: 2019-06-07

## 2019-06-07 RX ORDER — SODIUM CHLORIDE 0.9 % (FLUSH) 0.9 %
5-40 SYRINGE (ML) INJECTION EVERY 8 HOURS
Status: DISCONTINUED | OUTPATIENT
Start: 2019-06-07 | End: 2019-06-07 | Stop reason: HOSPADM

## 2019-06-07 RX ORDER — FAMOTIDINE 20 MG/1
TABLET, FILM COATED ORAL
Status: COMPLETED
Start: 2019-06-07 | End: 2019-06-07

## 2019-06-07 RX ORDER — CEFAZOLIN SODIUM 2 G/50ML
2 SOLUTION INTRAVENOUS EVERY 8 HOURS
Status: DISCONTINUED | OUTPATIENT
Start: 2019-06-07 | End: 2019-06-07 | Stop reason: HOSPADM

## 2019-06-07 RX ORDER — HEPARIN SODIUM 5000 [USP'U]/ML
INJECTION, SOLUTION INTRAVENOUS; SUBCUTANEOUS AS NEEDED
Status: DISCONTINUED | OUTPATIENT
Start: 2019-06-07 | End: 2019-06-07 | Stop reason: HOSPADM

## 2019-06-07 RX ORDER — FAMOTIDINE 20 MG/1
20 TABLET, FILM COATED ORAL ONCE
Status: COMPLETED | OUTPATIENT
Start: 2019-06-07 | End: 2019-06-07

## 2019-06-07 RX ORDER — KETAMINE HYDROCHLORIDE 50 MG/ML
INJECTION, SOLUTION INTRAMUSCULAR; INTRAVENOUS AS NEEDED
Status: DISCONTINUED | OUTPATIENT
Start: 2019-06-07 | End: 2019-06-07 | Stop reason: HOSPADM

## 2019-06-07 RX ADMIN — PROPOFOL 50 MG: 10 INJECTION, EMULSION INTRAVENOUS at 10:41

## 2019-06-07 RX ADMIN — CEFAZOLIN 2 G: 10 INJECTION, POWDER, FOR SOLUTION INTRAVENOUS at 10:31

## 2019-06-07 RX ADMIN — ROPIVACAINE HYDROCHLORIDE 30 ML: 5 INJECTION, SOLUTION EPIDURAL; INFILTRATION; PERINEURAL at 10:31

## 2019-06-07 RX ADMIN — KETAMINE HYDROCHLORIDE 1 ML: 50 INJECTION, SOLUTION INTRAMUSCULAR; INTRAVENOUS at 10:45

## 2019-06-07 RX ADMIN — HEPARIN SODIUM 5000 UNITS: 5000 INJECTION, SOLUTION INTRAVENOUS; SUBCUTANEOUS at 11:16

## 2019-06-07 RX ADMIN — DEXTROSE MONOHYDRATE AND SODIUM CHLORIDE 25 ML/HR: 5; .225 INJECTION, SOLUTION INTRAVENOUS at 08:15

## 2019-06-07 RX ADMIN — PROPOFOL 30 MG: 10 INJECTION, EMULSION INTRAVENOUS at 11:33

## 2019-06-07 RX ADMIN — FAMOTIDINE 20 MG: 20 TABLET, FILM COATED ORAL at 09:25

## 2019-06-07 NOTE — H&P
Surgery History and Physical    Subjective:      Liseth Day is a 62 y.o. male who presents with esrd in need of dialysis access. Patient Active Problem List    Diagnosis Date Noted    Pancreatic insufficiency 09/08/2018    Alcohol-induced chronic pancreatitis ([address] 09/07/2018    Acute on chronic renal failure ([address] 09/07/2018    Acute pain in scrotum 09/07/2018    Obesity, morbid ([address] 02/09/2018    Type 2 diabetes with nephropathy ([address] 02/09/2018    Type II diabetes mellitus with nephropathy ([address] 02/09/2018    Chronic renal disease, stage IV ([address] 07/16/2017    Cocaine abuse ([address] 07/14/2017    Chronic kidney disease, stage III (moderate) ([facility]) 02/09/2017    Persistent proteinuria 02/09/2017    Secondary hyperparathyroidism of renal origin ([address] 02/09/2017    Hypoglycemia 02/09/2017    Left sided numbness 02/07/2017    Acute chest pain 08/13/2016    Advance directive discussed with patient 03/11/2016    Essential hypertension 12/11/2015    Acute unilateral obstructive uropathy 08/17/2015    ARF (acute renal failure) ([address] 08/17/2015    Type 2 diabetes mellitus without complication ([address] 77/60/6085    Hypertriglyceridemia 12/12/2014    Microalbuminuria 12/10/2014    Renal stones 11/24/2014    Laceration of gum 02/23/2014    Fractured tooth 02/23/2014    Hemoptysis 02/21/2014    Rash 02/21/2014    Testicular/scrotal pain 07/14/2013    UTI (urinary tract infection) 07/14/2013    Contusion of knee 07/14/2013    Knee strain 07/14/2013    Effusion of patella 07/14/2013    Noncompliance     Constipation 05/25/2012    Tobacco abuse     TIA (transient ischemic attack)     Hepatic steatosis     Dyslipidemia     Gout     CAD (coronary artery disease)     Hypertensive heart disease     Obesity (BMI 30-39. 9)     Right knee pain     Allergic rhinitis 04/28/2010    Neuropathy 04/28/2010     Past Medical History:   Diagnosis Date    Alcohol abuse     Alcohol-induced chronic pancreatitis ([facility] 9/7/2018    Arthritis     Atherosclerotic cardiovascular disease     CAD (coronary artery disease)     mild disease of coronaries (cor angio 2006),wife states he has 1 stent    Cardiac cath 01/26/2006    RCA mild. Otherwise patent coronaries. LVEDP 15.  EF 55-60%.  Cardiac echocardiogram 03/27/2009    EF 60%. No cardiac source of embolism. No significant valvular pathology.  Cardiac nuclear imaging test 12/06/2011    Small, mild inferior infarction or possibly artifact. No ischemia. EF 45%. No TID. No reg WMA.  Cardiovascular LLE venous duplex 08/14/2015    Left leg:  No DVT. Dilated lymph node in left groin.  Chronic kidney disease     Chronic obstructive pulmonary disease (HCC)     Constipation     Diabetes mellitus type II     Gout     Hepatic steatosis     Hypercholesterolemia     Hypertension     Knee effusion, left     Left knee pain     Morbid obesity (HCC)     Noncompliance     Obesity (BMI 30-39. 9)     S/P colonoscopy 3/8/05    Dr. Ismael Araiza; normal; f/u 10 years    Stomach problems     TIA (transient ischemic attack) 3/09    Tobacco abuse       Past Surgical History:   Procedure Laterality Date    ABDOMEN SURGERY PROC UNLISTED      Hernia repair in 1960's or 1970's.  HX ORTHOPAEDIC      Bones spur removed from left & right foot 2013.  HX UROLOGICAL  8/18/2015    [facility], Dr. Manny Richards, Cystoscopy, left retrograde, left double-J cath      Social History     Tobacco Use    Smoking status: Current Every Day Smoker     Packs/day: 0.25     Types: Cigarettes    Smokeless tobacco: Never Used   Substance Use Topics    Alcohol use: Not Currently     Alcohol/week: 0.0 oz     Comment: 1 beers a day.        Family History   Problem Relation Age of Onset    Diabetes Father     Heart Disease Mother     Diabetes Sister     Hypertension Sister     Arthritis-osteo Sister     Arthritis-osteo Brother     Diabetes Other     Diabetes Other Uncle, Aunt    Hypertension Other         Uncle; Aunt      Prior to Admission medications    Medication Sig Start Date End Date Taking? Authorizing Provider   colchicine 0.6 mg tablet Take 1 tablet by mouth Q1Hour for gout pain. NOT TO EXCEED 3 TABLETS IN 24 HOURS. 1/11/19  Yes Ridge Tran PA-C   indomethacin (INDOCIN) 25 mg capsule Take 2 Caps by mouth three (3) times daily. With food 11/17/18  Yes Janice Mead PA   ibuprofen (MOTRIN) 600 mg tablet Take 1 Tab by mouth every six (6) hours as needed for Pain. 10/19/18  Yes Harinder Sanchez PA   lipase-protease-amylase (CREON 6000) 6,000-19,000 -30,000 unit capsule Take 1 Cap by mouth three (3) times daily (with meals). Indications: exocrine pancreatic insufficiency 9/8/18  Yes Violeta Westfall MD   febuxostat (ULORIC) 40 mg tab tablet Take 1 Tab by mouth daily. 5/12/18  Yes Iman Negro PA-C   hydrALAZINE (APRESOLINE) 50 mg tablet Take 1 Tab by mouth two (2) times a day. 2/13/18  Yes Matty Lozano MD   ondansetron hcl (ZOFRAN, AS HYDROCHLORIDE,) 4 mg tablet Take 1 Tab by mouth every eight (8) hours as needed for Nausea. 12/14/17  Yes Antione Balderas DO   tamsulosin (FLOMAX) 0.4 mg capsule Take 1 Cap by mouth daily. Start this medication on Friday 12/15/17  Indications: Urolithiasis 12/15/17  Yes Antione Balderas DO   isosorbide mononitrate ER (IMDUR) 60 mg CR tablet Take 1 Tab by mouth daily. 8/14/17  Yes Ed Learn P, NP   atorvastatin (LIPITOR) 40 mg tablet Take 1 Tab by mouth nightly. 8/14/17  Yes Ed Learn P, NP   furosemide (LASIX) 40 mg tablet Take 1 Tab by mouth daily. 8/14/17  Yes Ed Learn P, NP   fluticasone (FLONASE) 50 mcg/actuation nasal spray 2 Sprays by Both Nostrils route daily. Yes Provider, Historical   carvedilol (COREG) 6.25 mg tablet Take 1 Tab by mouth two (2) times daily (with meals). Patient taking differently: Take 25 mg by mouth two (2) times daily (with meals). 7/31/17  Yes [address] P, NP   aspirin 81 mg chewable tablet Take 1 Tab by mouth daily. 3/21/17  Yes Dk Briggs MD   budesonide-formoterol [facility]) 160-4.5 mcg/actuation HFA inhaler Take 2 Puffs by inhalation two (2) times a day. 2/22/17  Yes Dk Briggs MD   colchicine 0.6 mg tablet Take 1 Tab by mouth daily. 2/15/19   Heaven Tran PA-C   HYDROcodone-acetaminophen (NORCO) 7.5-325 mg per tablet Take 1-2 Tabs by mouth two (2) times daily as needed for Pain. Max Daily Amount: 4 Tabs. 1/11/19   Heaven Tran PA-C   oxyCODONE-acetaminophen (PERCOCET) 5-325 mg per tablet Take 1 Tab by mouth every eight (8) hours as needed for Pain. Max Daily Amount: 3 Tabs. 11/17/18   AUBREY Delaney   oxyCODONE-acetaminophen (PERCOCET) 5-325 mg per tablet Take 1 tablet every 4-6 hours as needed for pain control. If you were instructed to try over the counter ibuprofen or tylenol, only take the percocet for pain not controlled with the over the counter medication. 11/17/18   AUBREY Delaney   methylPREDNISolone (MEDROL, CASS,) 4 mg tablet Per dose pack instructions 11/17/18   AUBREY Vidal   HYDROcodone-acetaminophen (NORCO) 5-325 mg per tablet Take 1 Tab by mouth every four (4) hours as needed for Pain. Max Daily Amount: 6 Tabs. 10/19/18   AUBREY Traore   omeprazole (PRILOSEC) 20 mg capsule Take 20 mg by mouth daily. Provider, Historical   glucose blood VI test strips (FREESTYLE TEST) strip by Does Not Apply route See Admin Instructions. Check twice a day 8/24/17   Dk Briggs MD   bisacodyl (DULCOLAX, BISACODYL,) 5 mg EC tablet Take 1 Tab by mouth daily as needed for Constipation. 8/21/17   Jola Ormond, MD   NOVOLIN 70/30 100 unit/mL (70-30) injection INJECT 30 UNITS SC IN THE MORNING AND 20 UNITS SC IN THE EVENING BEFORE SUPPER 8/21/17   Jola Ormond, MD   nitroglycerin (NITROSTAT) 0.4 mg SL tablet 1 Tab by SubLINGual route as needed for Chest Pain.   Patient taking differently: 1 Tab by SubLINGual route as needed for Chest Pain. Pt to take 1 tab q 5 min up to three times, if symptom persist pt. to call 911. 17   Demetria Davila MD   B COMPLEX W-C NO.20/FOLIC ACID (B COMPLEX & C NO.20-FOLIC ACID PO) Take  by mouth. Provider, Historical   calcitRIOL (ROCALTROL) 0.25 mcg capsule Take 1 Cap by mouth every Monday, Wednesday, Friday. 2/10/17   Shasha Beltran MD   Lancets misc Check 3 times a day , needs according contour glucometer 1/20/15   Sarah Thompson MD   Blood-Glucose Meter monitoring kit Check twice daily. 14   Sarah Thompson MD     Allergies   Allergen Reactions    Shellfish Derived Other (comments)     Causes Gout         ROS:  Pertinent items are noted in HPI. Unless otherwise mentioned in the HPI. Objective:     Patient Vitals for the past 8 hrs:   BP Temp Pulse Resp SpO2 Height Weight   19 0806 119/70 97.5 °F (36.4 °C) 74 20 97 % 5' 4\" (1.626 m) 233 lb 9 oz (105.9 kg)       Temp (24hrs), Av.5 °F (36.4 °C), Min:97.5 °F (36.4 °C), Max:97.5 °F (36.4 °C)      Physical Exam:  GENERAL: alert, cooperative, no distress, appears stated age, THROAT & NECK: normal and no erythema or exudates noted. , LUNG: clear to auscultation bilaterally, HEART: regular rate and rhythm, S1, S2 normal, no murmur, click, rub or gallop, ABDOMEN: soft, non-tender.  Bowel sounds normal. No masses,  no organomegaly    Labs:   Recent Results (from the past 24 hour(s))   POC 6 PLUS    Collection Time: 19  8:20 AM   Result Value Ref Range    Sodium,  136 - 145 MMOL/L    Potassium, POC 5.6 (H) 3.5 - 5.5 MMOL/L    Chloride,  (H) 100 - 108 MMOL/L    BUN, POC 80 (H) 7 - 18 MG/DL    Glucose, POC 88 74 - 106 MG/DL    Hematocrit, POC 33 (L) 36 - 49 %    Hemoglobin, POC 11.2 (L) 12 - 16 G/DL       Data Review:    CBC:   Lab Results   Component Value Date/Time    WBC 9.0 2019 08:23 AM    RBC 3.95 (L) 2019 08:23 AM    HGB 10.4 (L) 2019 08:23 AM    HCT 33.1 (L) 02/28/2019 08:23 AM    PLATELET 224 56/75/8572 08:23 AM      BMP:   Lab Results   Component Value Date/Time    Glucose 139 (H) 02/28/2019 08:23 AM    Sodium 141 02/28/2019 08:23 AM    Potassium 3.9 02/28/2019 08:23 AM    Chloride 109 (H) 02/28/2019 08:23 AM    CO2 26 02/28/2019 08:23 AM    BUN 53 (H) 02/28/2019 08:23 AM    Creatinine 4.63 (H) 02/28/2019 08:23 AM    Calcium 7.8 (L) 02/28/2019 08:30 AM     Coagulation:   Lab Results   Component Value Date/Time    Prothrombin time 11.1 (L) 08/20/2017 03:00 PM    INR 0.8 08/20/2017 03:00 PM    aPTT 25.9 08/20/2017 03:00 PM       Assessment:     Active Problems:    * No active hospital problems. *      Plan:     Left radialcephalic fistula.     Signed By: Umm Mauricio MD     June 7, 2019

## 2019-06-07 NOTE — BRIEF OP NOTE
BRIEF OPERATIVE NOTE    Date of Procedure: 6/7/2019   Preoperative Diagnosis: ESRD (end stage renal disease) ([facility] [N18.6]  Postoperative Diagnosis: ESRD (end stage renal disease) ([facility] [N18.6]    Procedure(s):  LEFT UPPER EXTREMITY RADIOCEPHALIC FISTULA CREATION  Surgeon(s) and Role:     * Lesa Brito MD - Primary         Surgical Assistant: none    Surgical Staff:  Circ-1: Aldair Benavides RN  Scrub Tech-1: Mee Newman   Surg Asst-1: Edin Brewer  Surg Asst-2: Antonino Azul  Event Time In Time Out   Incision Start 1055    Incision Close       Anesthesia: MAC   Estimated Blood Loss: 50mL  Specimens: * No specimens in log *   Findings: none   Complications: none  Implants: * No implants in log *

## 2019-06-07 NOTE — ANESTHESIA PROCEDURE NOTES
Peripheral Block    Start time: 6/7/2019 10:30 AM  End time: 6/7/2019 10:31 AM  Performed by: Mallory Hernandez MD  Authorized by: Mallory Hernandez MD       Pre-procedure: Indications: at surgeon's request, post-op pain management and procedure for pain    Preanesthetic Checklist: patient identified, risks and benefits discussed, site marked, timeout performed, anesthesia consent given and patient being monitored      Block Type:   Block Type:  Brachial plexus  Laterality:  Left  Monitoring:  Standard ASA monitoring, continuous pulse ox, frequent vital sign checks, oxygen, responsive to questions and heart rate  Injection Technique:  Single shot  Procedures: ultrasound guided    Prep: chlorhexidine    Needle Type:  Stimuplex  Needle Gauge:  22 G  Needle Localization:  Ultrasound guidance    Assessment:  Number of attempts:  1  Injection Assessment:  No intravascular symptoms, negative aspiration for blood, local visualized surrounding nerve on ultrasound, ultrasound image on chart, no paresthesia and incremental injection every 5 mL  Patient tolerance:  Patient tolerated the procedure well with no immediate complications  Location:  PREOP HOLDING    Patient given 2 mg IV Versed for sedation.     6/7/2019     10:31 AM     Amber Maharaj MD

## 2019-06-07 NOTE — DISCHARGE INSTRUCTIONS
DISCHARGE SUMMARY from Nurse    PATIENT INSTRUCTIONS:    After general anesthesia or intravenous sedation, for 24 hours or while taking prescription Narcotics:  · Limit your activities  · Do not drive and operate hazardous machinery  · Do not make important personal or business decisions  · Do  not drink alcoholic beverages  · If you have not urinated within 8 hours after discharge, please contact your surgeon on call. Report the following to your surgeon:  · Excessive pain, swelling, redness or odor of or around the surgical area  · Temperature over 100.5  · Nausea and vomiting lasting longer than 4 hours or if unable to take medications  · Any signs of decreased circulation or nerve impairment to extremity: change in color, persistent  numbness, tingling, coldness or increase pain  · Any questions    What to do at Home:  Recommended activity: Activity as tolerated    *  Please give a list of your current medications to your Primary Care Provider. *  Please update this list whenever your medications are discontinued, doses are      changed, or new medications (including over-the-counter products) are added. *  Please carry medication information at all times in case of emergency situations. These are general instructions for a healthy lifestyle:    No smoking/ No tobacco products/ Avoid exposure to second hand smoke  Surgeon General's Warning:  Quitting smoking now greatly reduces serious risk to your health. Obesity, smoking, and sedentary lifestyle greatly increases your risk for illness    A healthy diet, regular physical exercise & weight monitoring are important for maintaining a healthy lifestyle    You may be retaining fluid if you have a history of heart failure or if you experience any of the following symptoms:  Weight gain of 3 pounds or more overnight or 5 pounds in a week, increased swelling in our hands or feet or shortness of breath while lying flat in bed.   Please call your doctor as soon as you notice any of these symptoms; do not wait until your next office visit. Recognize signs and symptoms of STROKE:    F-face looks uneven    A-arms unable to move or move unevenly    S-speech slurred or non-existent    T-time-call 911 as soon as signs and symptoms begin-DO NOT go       Back to bed or wait to see if you get better-TIME IS BRAIN. Warning Signs of HEART ATTACK     Call 911 if you have these symptoms:   Chest discomfort. Most heart attacks involve discomfort in the center of the chest that lasts more than a few minutes, or that goes away and comes back. It can feel like uncomfortable pressure, squeezing, fullness, or pain.  Discomfort in other areas of the upper body. Symptoms can include pain or discomfort in one or both arms, the back, neck, jaw, or stomach.  Shortness of breath with or without chest discomfort.  Other signs may include breaking out in a cold sweat, nausea, or lightheadedness. Don't wait more than five minutes to call 911 - MINUTES MATTER! Fast action can save your life. Calling 911 is almost always the fastest way to get lifesaving treatment. Emergency Medical Services staff can begin treatment when they arrive -- up to an hour sooner than if someone gets to the hospital by car. The discharge information has been reviewed with the patient and spouse. The patient and spouse verbalized understanding. Discharge medications reviewed with the patient and spouse and appropriate educational materials and side effects teaching were provided. ___________________________________________________________________________________________________________________________________  Patient Education        Hemodialysis Access: What to Expect at [address] [place] [address]  Hemodialysis is a way to remove wastes from the blood when your kidneys can no longer do the job. It is not a cure, but it can help you live longer and feel better.  It is a lifesaving treatment when you have kidney failure. Hemodialysis is often called dialysis. Your doctor created a place (called an access) in your arm for your blood to flow in and out of your body during your dialysis sessions. Your arm will probably be bruised and swollen. It may hurt. The cut (incision) may bleed. The pain and bleeding will get better over several days. You will probably need only over-the-counter pain medicine. You can reduce swelling by propping your arm on 1 or 2 pillows and keeping your elbow straight. You will have stitches. These may dissolve on their own, or your doctor will tell you when to come in to have them removed. You should also be able to return to work in a few days. You may feel some coolness or numbness in your hand. These feelings usually go away in a few weeks. Your doctor may suggest squeezing a soft object. This will strengthen your access and may make hemodialysis faster and easier. You should always be able to feel blood rushing through the fistula or graft. It feels like a slight vibration when you put your fingers on the skin over the fistula or graft. This feeling is called a thrill or pulse. This care sheet gives you a general idea about how long it will take for you to recover. But each person recovers at a different pace. Follow the steps below to get better as quickly as possible. How can you care for yourself at home? Activity    · Rest when you feel tired. Getting enough sleep will help you recover. Do not lie on or sleep on the arm with the access.     · Avoid activities such as washing windows or gardening that put stress on the arm with the access.     · You may use your arm, but do not lift anything that weighs more than about 15 pounds. This may include a child, heavy grocery bags, a heavy briefcase or backpack, cat litter or dog food bags, or a vacuum .     · You can shower, but keep the access dry for the first 2 days.  Cover the area with a plastic bag to keep it dry.     · Do not soak or scrub the incision until it has healed.     · Wear an arm guard to protect the area if you play sports or work with your arms.     · You may drive when your doctor says it is okay. This is usually in 1 to 2 days.     · Most people are able to return to work about 1 or 2 days after surgery. Diet    · Follow an eating plan that is good for your kidneys. A registered dietitian can help you make a meal plan that is right for you. You may need to limit protein, salt, fluids, and certain foods. Medicines    · Your doctor will tell you if and when you can restart your medicines. He or she will also give you instructions about taking any new medicines.     · If you take blood thinners, such as warfarin (Coumadin), clopidogrel (Plavix), or aspirin, be sure to talk to your doctor. He or she will tell you if and when to start taking those medicines again. Make sure that you understand exactly what your doctor wants you to do.     · Take pain medicines exactly as directed. ? If the doctor gave you a prescription medicine for pain, take it as prescribed. ? If you are not taking a prescription pain medicine, ask your doctor if you can take acetaminophen (Tylenol). Do not take ibuprofen (Advil, Motrin) or naproxen (Aleve), or similar medicines, unless your doctor tells you to. They may make chronic kidney disease worse. ? Do not take two or more pain medicines at the same time unless the doctor told you to. Many pain medicines have acetaminophen, which is Tylenol. Too much acetaminophen (Tylenol) can be harmful.     · If you think your pain medicine is making you sick to your stomach:  ? Take your medicine after meals (unless your doctor has told you not to). ? Ask your doctor for a different pain medicine.     · If your doctor prescribed antibiotics, take them as directed. Do not stop taking them just because you feel better. You need to take the full course of antibiotics.    Incision care    · Keep the area dry for 2 days. After 2 days, wash the area with soap and water every day, and always before dialysis.     · Do not soak or scrub the incision until it has healed.     · If you have a bandage, change it every day or as your doctor recommends. Your doctor will tell you when you can remove it. Exercise    · Squeeze a soft ball or other object as your doctor tells you. This will help blood flow through the access and help prevent blood clots.    Elevation    · Prop up the sore arm on a pillow anytime you sit or lie down during the next 3 days. Try to keep it above the level of your heart. This will help reduce swelling. Other instructions    · Every day, check your access for a pulse or thrill in the fistula or graft area. A thrill is a vibration. To feel a pulse or thrill, place the first two fingers of your hand over the access.     · Do not bump your arm.     · Do not wear tight clothing, jewelry, or anything else that may squeeze the access.     · Use your other arm to have blood drawn or blood pressure taken.     · Do not put cream or lotion on or near the access.     · Make sure all doctors you deal with know you have a vascular access. Follow-up care is a key part of your treatment and safety. Be sure to make and go to all appointments, and call your doctor if you are having problems. It's also a good idea to know your test results and keep a list of the medicines you take. When should you call for help? Call 911 anytime you think you may need emergency care.  For example, call if:    · You passed out (lost consciousness).     · You have chest pain, are short of breath, or cough up blood.    Call your doctor now or seek immediate medical care if:    · Your hand or arm is cold or dark-colored.     · You have no pulse in your access.     · You have nausea or you vomit.     · You have pain that does not get better after you take pain medicine.     · You have loose stitches, or your incision comes open.     · You are bleeding from the incision.     · You have signs of infection, such as:  ? Increased pain, swelling, warmth, or redness. ? Red streaks leading from the area. ? Pus draining from the area. ? A fever.     · You have signs of a blood clot in your leg (called a deep vein thrombosis), such as:  ? Pain in your calf, back of the knee, thigh, or groin. ? Redness or swelling in your leg.    Watch closely for changes in your health, and be sure to contact your doctor if you have any problems. Where can you learn more? Go to http://leslie-graciela.info/. Enter P616 in the search box to learn more about \"Hemodialysis Access: What to Expect at Home. \"  Current as of: March 14, 2018  Content Version: 11.9  © 8492-6105 [employer]. Care instructions adapted under license by [employer] (which disclaims liability or warranty for this information). If you have questions about a medical condition or this instruction, always ask your healthcare professional. [place] [address] any warranty or liability for your use of this information. Patient Education   Hydrocodone/Acetaminophen (By mouth)   Acetaminophen (i-pnnz-j-MIN-oh-fen), Hydrocodone Bitartrate (ttv-rbtb-UQS-done bye-TAR-trate)  Treats pain. This medicine contains a narcotic pain reliever. Brand Name(s): Hycet, Lorcet, Lorcet HD, Lorcet Plus, Lortab 10/325, Lortab 5/325, Lortab 7.5/325, Lortab Elixir, Norco, Verdrocet, Vicodin, Vicodin ES, Vicodin HP, Xodol, Xodol 5/300   There may be other brand names for this medicine. When This Medicine Should Not Be Used: This medicine is not right for everyone. Do not use it if you had an allergic reaction to acetaminophen, hydrocodone, or other narcotic medicines, or stomach or bowel blockage (including paralytic ileus). How to Use This Medicine:   Capsule, Liquid, Tablet  · Your doctor will tell you how much medicine to use.  Do not use more than directed. · An overdose can be dangerous. Follow directions carefully so you do not get too much medicine at one time. · Oral liquid: Measure the oral liquid medicine with a marked measuring spoon, oral syringe, or medicine cup. · Drink plenty of liquids to help avoid constipation. · This medicine should come with a Medication Guide. Ask your pharmacist for a copy if you do not have one. · Missed dose: Take a dose as soon as you remember. If it is almost time for your next dose, wait until then and take a regular dose. Do not take extra medicine to make up for a missed dose. · Store the medicine in a closed container at room temperature, away from heat, moisture, and direct light. Flush any unused Norco® tablets down the toilet. Drugs and Foods to Avoid:   Ask your doctor or pharmacist before using any other medicine, including over-the-counter medicines, vitamins, and herbal products. · Do not use this medicine if you are using or have used an MAO inhibitor within the past 14 days. · Some medicines can affect how hydrocodone/acetaminophen works. Tell your doctor if you are using any of the following:   ¨ Carbamazepine, erythromycin, ketoconazole, mirtazapine, phenytoin, rifampin, ritonavir, tramadol, trazodone  ¨ Diuretic (water pill)  ¨ Medicine to treat depression or mental health problems  ¨ Medicine to treat migraine headaches  ¨ Phenothiazine medicine  · Tell your doctor if you use anything else that makes you sleepy. Some examples are allergy medicine, narcotic pain medicine, and alcohol. Tell your doctor if you are using buprenorphine, butorphanol, nalbuphine, pentazocine, or a muscle relaxer. · Do not drink alcohol while you are using this medicine. Acetaminophen can damage your liver, and your risk is higher if you also drink alcohol.   Warnings While Using This Medicine:   · Tell your doctor if you are pregnant or breastfeeding, or if you have kidney disease, liver disease, lung or breathing problems, gallbladder or pancreas problems, an underactive thyroid, Harinder disease, prostate problems, trouble urinating, stomach problems, or a history of head injury or brain tumor, seizures, alcohol or drug addiction. · This medicine may cause the following problems:   ¨ High risk of overdose, which can lead to death  ¨ Respiratory depression (serious breathing problem that can be life-threatening)  ¨ Liver problems  ¨ Serious skin reactions  ¨ Serotonin syndrome (when used with certain medicines)  · This medicine can be habit-forming. Do not use more than your prescribed dose. Call your doctor if you think your medicine is not working. · This medicine may make you dizzy or drowsy. Do not drive or doing anything else that could be dangerous until you know how this medicine affects you. · This medicine contains acetaminophen. Read the labels of all other medicines you are using to see if they also contain acetaminophen, or ask your doctor or pharmacist. Lachelle Lui not use more than 4 grams (4,000 milligrams) total of acetaminophen in one day. · Tell any doctor or dentist who treats you that you are using this medicine. This medicine may affect certain medical test results. · This medicine may cause constipation, especially with long-term use. Ask your doctor if you should use a laxative to prevent and treat constipation. · This medicine could cause infertility. Talk with your doctor before using this medicine if you plan to have children. · Keep all medicine out of the reach of children. Never share your medicine with anyone.   Possible Side Effects While Using This Medicine:   Call your doctor right away if you notice any of these side effects:  · Allergic reaction: Itching or hives, swelling in your face or hands, swelling or tingling in your mouth or throat, chest tightness, trouble breathing  · Anxiety, restlessness, fast heartbeat, fever, sweating, muscle spasms, twitching, diarrhea, seeing or hearing things that are not there  · Blistering, peeling, red skin rash  · Blue lips, fingernails, or skin  · Dark urine or pale stools, loss of appetite, nausea or vomiting, stomach pain, yellow skin or eyes  · Extreme weakness, shallow breathing, slow heartbeat, sweating, seizures, cold or clammy skin  · Lightheadedness, dizziness, fainting  If you notice these less serious side effects, talk with your doctor:   · Constipation, nausea, vomiting  · Tiredness or sleepiness  If you notice other side effects that you think are caused by this medicine, tell your doctor. Call your doctor for medical advice about side effects. You may report side effects to FDA at 7-662-OCO-3214  © 2017 [address]  Information is for End User's use only and may not be sold, redistributed or otherwise used for commercial purposes. The above information is an  only. It is not intended as medical advice for individual conditions or treatments. Talk to your doctor, nurse or pharmacist before following any medical regimen to see if it is safe and effective for you. [employer] Activation    Thank you for enrolling in [facility]. Please follow the instructions below to securely access your online medical record. [employer] allows you to send messages to your doctor, view your test results, renew your prescriptions, schedule appointments, and more. How Do I Sign Up? 1. In your internet browser, go to [email or web address]. 2. Click on the First Time User? Click Here link in the Sign In box. You will see the New Member Sign Up page. 3. Enter your S[employer] Access Code exactly as it appears below. You will not need to use this code after youve completed the sign-up process. If you do not sign up before the expiration date, you must request a new code. [employer] Access Code: [email or web address]  Expires: 6/13/2019 10:04 AM     4.  Enter the last four digits of your Social Security Number (xxxx) and Date of Birth (mm/dd/yyyy) as indicated and click Submit. You will be taken to the next sign-up page. 5. Create a Tonchidot ID. This will be your [employer] login ID and cannot be changed, so think of one that is secure and easy to remember. 6. Create a [employer] password. You can change your password at any time. 7. Enter your Password Reset Question and Answer. This can be used at a later time if you forget your password. 8. Enter your e-mail address. You will receive e-mail notification when new information is available in [address] 9. Click Sign Up. You can now view your medical record. Additional Information    Remember, [employer] is NOT to be used for urgent needs. For medical emergencies, dial 911. Now available from your iPhone and Android!

## 2019-06-07 NOTE — ANESTHESIA POSTPROCEDURE EVALUATION
Procedure(s):  LEFT UPPER EXTREMITY RADIOCEPHALIC FISTULA CREATION. general - backup, MAC, regional    Anesthesia Post Evaluation      Multimodal analgesia: multimodal analgesia used between 6 hours prior to anesthesia start to PACU discharge  Patient location during evaluation: bedside  Patient participation: complete - patient participated  Level of consciousness: awake  Pain score: 0  Pain management: adequate  Airway patency: patent  Anesthetic complications: no  Cardiovascular status: stable  Respiratory status: acceptable  Hydration status: acceptable  Post anesthesia nausea and vomiting:  controlled      Vitals Value Taken Time   /77 6/7/2019  1:12 PM   Temp 35.6 °C (96 °F) 6/7/2019 12:05 PM   Pulse 72 6/7/2019  1:14 PM   Resp 22 6/7/2019 12:05 PM   SpO2 97 % 6/7/2019  1:14 PM   Vitals shown include unvalidated device data.

## 2019-06-07 NOTE — ANESTHESIA PREPROCEDURE EVALUATION
Anesthetic History   No history of anesthetic complications            Review of Systems / Medical History  Patient summary reviewed, nursing notes reviewed and pertinent labs reviewed    Pulmonary    COPD               Neuro/Psych       CVA  TIA     Cardiovascular    Hypertension: well controlled          CAD and hyperlipidemia    Exercise tolerance: >4 METS     GI/Hepatic/Renal           Liver disease     Endo/Other    Diabetes: well controlled, type 2    Morbid obesity and arthritis     Other Findings              Physical Exam    Airway  Mallampati: III  TM Distance: 4 - 6 cm  Neck ROM: short neck   Mouth opening: Diminished (comment)     Cardiovascular  Regular rate and rhythm,  S1 and S2 normal,  no murmur, click, rub, or gallop             Dental    Dentition: Poor dentition     Pulmonary  Breath sounds clear to auscultation               Abdominal  GI exam deferred       Other Findings            Anesthetic Plan    ASA: 3  Anesthesia type: general - backup, MAC and regional      Post-op pain plan if not by surgeon: peripheral nerve block single    Induction: Intravenous  Anesthetic plan and risks discussed with: Patient

## 2019-06-08 NOTE — OP NOTES
[address] [place] [address]   OPERATIVE REPORT    Name:  Ronald Helms  MR#:   [id number]  :  1960  ACCOUNT #:  [de-identified]  DATE OF SERVICE:  2019      PREOPERATIVE DIAGNOSIS:  End-stage renal disease, in need of dialysis access. POSTOPERATIVE DIAGNOSIS:  End-stage renal disease, in need of dialysis access. PROCEDURE PERFORMED:  Left radiocephalic fistula creation. SURGEON:  Marina Noyola MD.    CO-SURGEON:  None. ASSISTANTS:  None. CULTURES:  None. SPECIMENS REMOVED:  None. DRAINS:  None. ESTIMATED BLOOD LOSS:  15 mL. ANESTHESIA:  Regional with MAC. COMPLICATIONS:  None. IMPLANTS:  None. INDICATIONS FOR THE PROCEDURE:  The patient is a 63-year-old gentleman with renal disease in need of dialysis access. The patient was given risks and benefits of the procedure including but not limited to bleeding, infection, damage to the adjacent structures, MI, stroke, and death as well as loss of upper extremity, loss of fistula, and need for further surgery. The patient was understanding of all the risks and underwent the procedure. PROCEDURE:  The patient was correctly identified in the preoperative holding area, taken to the operating room in stable condition. The patient had pre-incision time-out prior to any incision. The patient was prepped and draped in normal sterile fashion according to the CDC guidelines for aseptic technique. The patient also had preoperative antibiotics prior to any incision. We then were able to use ultrasound to visualize the radial artery and cephalic vein. We numbed up an area in between these areas and made our longitudinal incision there. We then were able to get to the radial artery, looped this with red vessels in a Vo fashion both proximally and distally and then we were able to get to the cephalic vein. We harvested this. All branches were ligated with 3-0 silk ties on the vein side and then clips on the patient's side. We then double clipped the distal portion of the cephalic, transposed it over to the radial artery, heparinized the patient appropriately, spatulated our vein appropriately and the balloon did open with heparinized saline, I marked the anterior surface. We then were able to cut it and spatulate it appropriately, created an arteriotomy using 11 blade and Vo scissors and created anastomosis with 5-0 Prolene suture. No repair sutures were required. There was a good thrill at the end of the case. Great Doppler signals both within proximal and distal radial artery as well as the cephalic vein. Decision was then made to conclude the case. We then copiously irrigated the wound. Closed with 3-0 Vicryl deep dermal layer, 4-0 Vicryl subcuticular layer and Dermabond for dressing. The patient tolerated the procedure well without any new issues and taken to recovery area.       Shireen Hernandez MD [id number]  D:  06/07/2019 11:38  T:  06/07/2019 14:42  JOB #:  [id number]

## 2019-06-19 ENCOUNTER — OFFICE VISIT (OUTPATIENT)
Dept: VASCULAR SURGERY | Age: 59
End: 2019-06-19

## 2019-06-19 VITALS
SYSTOLIC BLOOD PRESSURE: 160 MMHG | WEIGHT: 233 LBS | HEIGHT: 64 IN | DIASTOLIC BLOOD PRESSURE: 88 MMHG | RESPIRATION RATE: 16 BRPM | BODY MASS INDEX: 39.78 KG/M2 | HEART RATE: 92 BPM

## 2019-06-19 DIAGNOSIS — N18.6 ESRD (END STAGE RENAL DISEASE) (HCC): Primary | ICD-10-CM

## 2019-06-19 NOTE — PROGRESS NOTES
1. Have you been to an emergency room or urgent care clinic since your last visit?   no  Hospitalized since your last visit? If yes, where, when, and reason for visit?   no  2. Have you seen or consulted any other health care providers outside of the [facility] since your last visit including any procedures, health maintenance items. If yes, where, when and reason for visit?

## 2019-06-19 NOTE — PROGRESS NOTES
55-year-old male with end-stage renal disease. He has not yet started dialysis. He presents to the office today in follow-up after left radiocephalic fistula creation  He is doing fairly well postoperatively. He denies any fevers or chills. He does report some numbness in his wrist and thumb directly in line with his incision. Otherwise he states that his hand feels fine and he denies any pain or decreased function in the hand. His incision is clean dry and intact. There is a small hematoma at the incision site and some bruising  He does have what appears to be a large tributary branch coming off of the fistula laterally. Fistula has a strong palpable thrill and excellent bruit  I discussed that he may ultimately require branch ligation of the large vein coming off the fistula  We will have him back in 2 to 3 weeks to reassess and further surgical discussion at that time  He is understanding to call the office sooner if the numbness in his thumb worsens or he develops any new concerns. Expresses understanding to all of this and agrees to the plan.

## 2019-06-19 NOTE — Clinical Note
6/19/19 Patient: Tommy Peña YOB: 1960 Date of Visit: 6/19/2019 Selam Luna MD 
Patient Can Only Remember The Practice Name And Not The Physician 
VIA Dear Edu Luna MD, Thank you for referring . Tommy Peña to [facility] for evaluation. My notes for this consultation are attached. If you have questions, please do not hesitate to call me. I look forward to following your patient along with you. Sincerely, [address] [place] [address]

## 2019-07-10 ENCOUNTER — OFFICE VISIT (OUTPATIENT)
Dept: VASCULAR SURGERY | Age: 59
End: 2019-07-10

## 2019-07-10 VITALS
WEIGHT: 233 LBS | RESPIRATION RATE: 17 BRPM | HEIGHT: 64 IN | DIASTOLIC BLOOD PRESSURE: 80 MMHG | HEART RATE: 80 BPM | BODY MASS INDEX: 39.78 KG/M2 | SYSTOLIC BLOOD PRESSURE: 150 MMHG

## 2019-07-10 DIAGNOSIS — N17.9 ACUTE RENAL FAILURE SUPERIMPOSED ON STAGE 4 CHRONIC KIDNEY DISEASE, UNSPECIFIED ACUTE RENAL FAILURE TYPE (HCC): Primary | ICD-10-CM

## 2019-07-10 DIAGNOSIS — N18.4 ACUTE RENAL FAILURE SUPERIMPOSED ON STAGE 4 CHRONIC KIDNEY DISEASE, UNSPECIFIED ACUTE RENAL FAILURE TYPE (HCC): Primary | ICD-10-CM

## 2019-07-10 NOTE — Clinical Note
7/10/19 Patient: Matthew Benson YOB: 1960 Date of Visit: 7/10/2019 Selam Luna MD 
Patient Can Only Remember The Practice Name And Not The Physician 
VIA Dear Ruben Luna MD, Thank you for referring Mr. Matthew Benson to [facility] for evaluation. My notes for this consultation are attached. If you have questions, please do not hesitate to call me. I look forward to following your patient along with you. Sincerely, Isha Lynn MD

## 2019-07-10 NOTE — PROGRESS NOTES
Rosangela Monk    Chief Complaint   Patient presents with    End Stage Renal Disease       History and Physical    Rosangela Monk is a 62 y.o. male who is one-month status post left radiocephalic fistula placement. Overall seems to doing very well he no longer has any problems with his thumb and numbness and tingling. No evidence of steal syndrome. Past Medical History:   Diagnosis Date    Alcohol abuse     Alcohol-induced chronic pancreatitis ([address] 9/7/2018    Arthritis     Atherosclerotic cardiovascular disease     CAD (coronary artery disease)     mild disease of coronaries (cor angio 2006),wife states he has 1 stent    Cardiac cath 01/26/2006    RCA mild. Otherwise patent coronaries. LVEDP 15.  EF 55-60%.  Cardiac echocardiogram 03/27/2009    EF 60%. No cardiac source of embolism. No significant valvular pathology.  Cardiac nuclear imaging test 12/06/2011    Small, mild inferior infarction or possibly artifact. No ischemia. EF 45%. No TID. No reg WMA.  Cardiovascular LLE venous duplex 08/14/2015    Left leg:  No DVT. Dilated lymph node in left groin.  Chronic kidney disease     Chronic obstructive pulmonary disease (HCC)     Constipation     Diabetes mellitus type II     Gout     Hepatic steatosis     Hypercholesterolemia     Hypertension     Knee effusion, left     Left knee pain     Morbid obesity (HCC)     Noncompliance     Obesity (BMI 30-39. 9)     S/P colonoscopy 3/8/05    Dr. Nayeli Oneill; normal; f/u 10 years    Stomach problems     TIA (transient ischemic attack) 3/09    Tobacco abuse      Past Surgical History:   Procedure Laterality Date    ABDOMEN SURGERY PROC UNLISTED      Hernia repair in 1960's or 1970's.  HX ORTHOPAEDIC      Bones spur removed from left & right foot 2013.     HX UROLOGICAL  8/18/2015    [facility], Dr. Domenic Hannah, Cystoscopy, left retrograde, left double-J cath     Patient Active Problem List   Diagnosis Code    Allergic rhinitis J30.9    Neuropathy G62.9    Right knee pain M25.561    Dyslipidemia E78.5    Gout M10.9    CAD (coronary artery disease) I25.10    Hypertensive heart disease I11.9    Obesity (BMI 30-39. 9) E66.9    Constipation K59.00    Tobacco abuse Z72.0    TIA (transient ischemic attack) G45.9    Hepatic steatosis K76.0    Noncompliance Z91.19    Testicular/scrotal pain [id number]    UTI (urinary tract infection) N39.0    Contusion of knee S80.00XA    Knee strain S86.919A    Effusion of patella M25.469    Hemoptysis R04.2    Rash R21    Laceration of gum S01.512A    Fractured tooth S02. 5XXA    Renal stones N20.0    Microalbuminuria R80.9    Hypertriglyceridemia E78.1    Type 2 diabetes mellitus without complication (HCC) D33.1    Acute unilateral obstructive uropathy N13.9    ARF (acute renal failure) (HCC) N17.9    Essential hypertension I10    Advance directive discussed with patient Z70.80    Acute chest pain R07.9    Left sided numbness R20.0    Chronic kidney disease, stage III (moderate) (HCC) N18.3    Persistent proteinuria R80.1    Secondary hyperparathyroidism of renal origin ([facility] N25.81    Hypoglycemia E16.2    Cocaine abuse (HCC) F14.10    Chronic renal disease, stage IV (HCC) N18.4    Obesity, morbid (HCC) E66.01    Type 2 diabetes with nephropathy (HCC) E11.21    Type II diabetes mellitus with nephropathy (HCC) E11.21    Alcohol-induced chronic pancreatitis (HCC) K86.0    Acute on chronic renal failure (HCC) N17.9, N18.9    Pancreatic insufficiency K86.89    Acute pain in scrotum N50.82     Current Outpatient Medications   Medication Sig Dispense Refill    colchicine 0.6 mg tablet Take 1 Tab by mouth daily. 22 Tab 2    colchicine 0.6 mg tablet Take 1 tablet by mouth Q1Hour for gout pain. NOT TO EXCEED 3 TABLETS IN 24 HOURS. 30 Tab 0    oxyCODONE-acetaminophen (PERCOCET) 5-325 mg per tablet Take 1 Tab by mouth every eight (8) hours as needed for Pain. Max Daily Amount: 3 Tabs. 21 Tab 0    oxyCODONE-acetaminophen (PERCOCET) 5-325 mg per tablet Take 1 tablet every 4-6 hours as needed for pain control. If you were instructed to try over the counter ibuprofen or tylenol, only take the percocet for pain not controlled with the over the counter medication. 12 Tab 0    indomethacin (INDOCIN) 25 mg capsule Take 2 Caps by mouth three (3) times daily. With food 60 Cap 0    methylPREDNISolone (MEDROL, CASS,) 4 mg tablet Per dose pack instructions 1 Dose Pack 0    ibuprofen (MOTRIN) 600 mg tablet Take 1 Tab by mouth every six (6) hours as needed for Pain. 20 Tab 0    lipase-protease-amylase (CREON 6000) 6,000-19,000 -30,000 unit capsule Take 1 Cap by mouth three (3) times daily (with meals). Indications: exocrine pancreatic insufficiency 90 Cap 0    omeprazole (PRILOSEC) 20 mg capsule Take 20 mg by mouth daily.  febuxostat (ULORIC) 40 mg tab tablet Take 1 Tab by mouth daily. 14 Tab 1    hydrALAZINE (APRESOLINE) 50 mg tablet Take 1 Tab by mouth two (2) times a day. 20 Tab 0    ondansetron hcl (ZOFRAN, AS HYDROCHLORIDE,) 4 mg tablet Take 1 Tab by mouth every eight (8) hours as needed for Nausea. 10 Tab 0    tamsulosin (FLOMAX) 0.4 mg capsule Take 1 Cap by mouth daily. Start this medication on Friday 12/15/17  Indications: Urolithiasis 6 Cap 0    glucose blood VI test strips (FREESTYLE TEST) strip by Does Not Apply route See Admin Instructions. Check twice a day 100 Strip 2    bisacodyl (DULCOLAX, BISACODYL,) 5 mg EC tablet Take 1 Tab by mouth daily as needed for Constipation. 30 Tab 0    NOVOLIN 70/30 100 unit/mL (70-30) injection INJECT 30 UNITS SC IN THE MORNING AND 20 UNITS SC IN THE EVENING BEFORE SUPPER 10 mL 4    isosorbide mononitrate ER (IMDUR) 60 mg CR tablet Take 1 Tab by mouth daily. 30 Tab 6    atorvastatin (LIPITOR) 40 mg tablet Take 1 Tab by mouth nightly. 30 Tab 6    furosemide (LASIX) 40 mg tablet Take 1 Tab by mouth daily.  30 Tab 6    fluticasone (FLONASE) 50 mcg/actuation nasal spray 2 Sprays by Both Nostrils route daily.  carvedilol (COREG) 6.25 mg tablet Take 1 Tab by mouth two (2) times daily (with meals). (Patient taking differently: Take 25 mg by mouth two (2) times daily (with meals). ) 60 Tab 6    nitroglycerin (NITROSTAT) 0.4 mg SL tablet 1 Tab by SubLINGual route as needed for Chest Pain. (Patient taking differently: 1 Tab by SubLINGual route as needed for Chest Pain. Pt to take 1 tab q 5 min up to three times, if symptom persist pt. to call 911.) 20 Tab 0    aspirin 81 mg chewable tablet Take 1 Tab by mouth daily. 30 Tab 0    B COMPLEX W-C NO.20/FOLIC ACID (B COMPLEX & C NO.20-FOLIC ACID PO) Take  by mouth.  budesonide-formoterol (SYMBICORT) 160-4.5 mcg/actuation HFA inhaler Take 2 Puffs by inhalation two (2) times a day. 1 Inhaler 0    calcitRIOL (ROCALTROL) 0.25 mcg capsule Take 1 Cap by mouth every Monday, Wednesday, Friday. 15 Cap 0    Lancets misc Check 3 times a day , needs according contour glucometer 1 Package 11    Blood-Glucose Meter monitoring kit Check twice daily. 1 kit 0     Allergies   Allergen Reactions    Shellfish Derived Other (comments)     Causes Gout     Social History     Socioeconomic History    Marital status:      Spouse name: Not on file    Number of children: Not on file    Years of education: Not on file    Highest education level: Not on file   Occupational History    Occupation: disabled   Social Needs    Financial resource strain: Not on file    Food insecurity:     Worry: Not on file     Inability: Not on file   Prediculous needs:     Medical: Not on file     Non-medical: Not on file   Tobacco Use    Smoking status: Current Every Day Smoker     Packs/day: 0.25     Types: Cigarettes    Smokeless tobacco: Never Used   Substance and Sexual Activity    Alcohol use: Not Currently     Alcohol/week: 0.0 oz     Comment: 1 beers a day.      Drug use: No    Sexual activity: Yes   Lifestyle    Physical activity:     Days per week: Not on file     Minutes per session: Not on file    Stress: Not on file   Relationships    Social connections:     Talks on phone: Not on file     Gets together: Not on file     Attends [employer] service: Not on file     Active member of club or organization: Not on file     Attends meetings of clubs or organizations: Not on file     Relationship status: Not on file    Intimate partner violence:     Fear of current or ex partner: Not on file     Emotionally abused: Not on file     Physically abused: Not on file     Forced sexual activity: Not on file   Other Topics Concern    Not on file   Social History Narrative    Not on file      Family History   Problem Relation Age of Onset    Diabetes Father     Heart Disease Mother     Diabetes Sister     Hypertension Sister     Arthritis-osteo Sister     Arthritis-osteo Brother     Diabetes Other     Diabetes Other         Uncle, Aunt    Hypertension Other         Uncle; Aunt       Physical Exam:    Visit Vitals  /80 (BP 1 Location: Left arm, BP Patient Position: Sitting)   Pulse 80   Resp 17   Ht 5' 4\" (1.626 m)   Wt 233 lb (105.7 kg)   BMI 39.99 kg/m²      General: Well-appearing male in no acute distress  HEENT: EOMI no scleral icterus is noted  Pulmonary: No increased work of breathing is noted  Extremity: Left upper extremity radiocephalic fistula has a great thrill throughout it. He does have an early branch which does also seem to be stealing some of the blood flow. Neuro: Cranial nerves II through XII are grossly intact good strength in the left upper extremity and hand with good  strength and feeling. Impression and Plan:  Briseyda Landrum is a 62 y.o. male with end-stage renal disease. His left radiocephalic fistula is growing and maturing. We talked about performing a branch ligation to really aid his maturity of his fistula. We also talked about watchful waiting of this area.   Patient is willing to move forward with the surgery to perform a branch ligation of this area. We talked about the risks and benefits of the procedure including but not limited to bleeding, infection, damage to adjacent structures, MI, stroke, death, loss of fistula, loss of upper extremity, need for further surgery. Is understanding all these risks and wants to move forward with the procedure. We reviewed the plan with the patient and the patient understands. We also gave the patient appropriate instructions on their disease process and when to call back. Greater than 50% of this visit was spent with face to face discussion. Casey Obregon MD    PLEASE NOTE:  This document has been produced using voice recognition software. Unrecognized errors in transcription may be present.

## 2019-07-10 NOTE — PROGRESS NOTES
1. Have you been to an emergency room or urgent care clinic since your last visit? No    Hospitalized since your last visit? If yes, where, when, and reason for visit? NO  2. Have you seen or consulted any other health care providers outside of the [facility] since your last visit including any procedures, health maintenance items. If yes, where, when and reason for visit?  NO

## 2019-07-10 NOTE — H&P (VIEW-ONLY)
Misa Ansari Chief Complaint Patient presents with  End Stage Renal Disease History and Physical   
Misa Ansari is a 62 y.o. male who is one-month status post left radiocephalic fistula placement. Overall seems to doing very well he no longer has any problems with his thumb and numbness and tingling. No evidence of steal syndrome. Past Medical History:  
Diagnosis Date  Alcohol abuse  Alcohol-induced chronic pancreatitis ([facility] [address] 9/7/2018  Arthritis  Atherosclerotic cardiovascular disease  CAD (coronary artery disease)   
 mild disease of coronaries (cor angio 2006),wife states he has 1 stent  Cardiac cath 01/26/2006 RCA mild. Otherwise patent coronaries. LVEDP 15.  EF 55-60%.  Cardiac echocardiogram 03/27/2009 EF 60%. No cardiac source of embolism. No significant valvular pathology.  Cardiac nuclear imaging test 12/06/2011 Small, mild inferior infarction or possibly artifact. No ischemia. EF 45%. No TID. No reg WMA.  Cardiovascular LLE venous duplex 08/14/2015 Left leg:  No DVT. Dilated lymph node in left groin.  Chronic kidney disease  Chronic obstructive pulmonary disease ([address]  Constipation  Diabetes mellitus type II   
 Gout  Hepatic steatosis  Hypercholesterolemia  Hypertension  Knee effusion, left  Left knee pain  Morbid obesity ([facility]  Noncompliance  Obesity (BMI 30-39. 9)  S/P colonoscopy 3/8/05 Dr. Theodora Collins; normal; f/u 10 years  Stomach problems  TIA (transient ischemic attack) 3/09  Tobacco abuse Past Surgical History:  
Procedure Laterality Date [address] [place] [address] UNLISTED Hernia repair in 1960's or 1970's.  HX ORTHOPAEDIC Bones spur removed from left & right foot 2013.  HX UROLOGICAL  8/18/2015 [facility], Dr. Tyree Al, Cystoscopy, left retrograde, left double-J cath Patient Active Problem List  
Diagnosis Code  Allergic rhinitis J30.9  Neuropathy G62.9  Right knee pain M25.561  Dyslipidemia E78.5  Gout M10.9  CAD (coronary artery disease) I25.10  Hypertensive heart disease I11.9  Obesity (BMI 30-39. 9) E66.9  
 Constipation K59.00  Tobacco abuse Z72.0  TIA (transient ischemic attack) G45.9  Hepatic steatosis K76.0  Noncompliance Z91.19  Testicular/scrotal pain Lorita Cage  UTI (urinary tract infection) N39.0  Contusion of knee S80.00XA [facility] Knee strain T33.937Y  Effusion of patella M25.469  Hemoptysis R04.2  Rash R21  Laceration of gum S01.512A  Fractured tooth S02. Minda   Renal stones N20.0  Microalbuminuria R80.9  Hypertriglyceridemia E78.1  Type 2 diabetes mellitus without complication (HCC) R40.7  Acute unilateral obstructive uropathy N13.9  ARF (acute renal failure) (HCC) N17.9  Essential hypertension I10  
 Advance directive discussed with patient Z70.80  
 Acute chest pain R07.9  Left sided numbness R20.0  Chronic kidney disease, stage III (moderate) (HCC) N18.3  Persistent proteinuria R80.1  Secondary hyperparathyroidism of renal origin ([address] N25.81  
 Hypoglycemia E16.2  Cocaine abuse ([address] F14.10  Chronic renal disease, stage IV ([address] N18.4  Obesity, morbid ([address] E66.01  
 Type 2 diabetes with nephropathy ([address] E11.21  
 Type II diabetes mellitus with nephropathy ([address] E11.21  
 Alcohol-induced chronic pancreatitis ([address] K86.0  Acute on chronic renal failure (HCC) N17.9, N18.9  Pancreatic insufficiency K86.89  
 Acute pain in scrotum N50.82 Current Outpatient Medications Medication Sig Dispense Refill  colchicine 0.6 mg tablet Take 1 Tab by mouth daily. 22 Tab 2  
 colchicine 0.6 mg tablet Take 1 tablet by mouth Q1Hour for gout pain. NOT TO EXCEED 3 TABLETS IN 24 HOURS. 30 Tab 0  
 oxyCODONE-acetaminophen (PERCOCET) 5-325 mg per tablet Take 1 Tab by mouth every eight (8) hours as needed for Pain. Max Daily Amount: 3 Tabs. 21 Tab 0  
 oxyCODONE-acetaminophen (PERCOCET) 5-325 mg per tablet Take 1 tablet every 4-6 hours as needed for pain control. If you were instructed to try over the counter ibuprofen or tylenol, only take the percocet for pain not controlled with the over the counter medication. 12 Tab 0  
 indomethacin (INDOCIN) 25 mg capsule Take 2 Caps by mouth three (3) times daily. With food 60 Cap 0  
 methylPREDNISolone (MEDROL, CASS,) 4 mg tablet Per dose pack instructions 1 Dose Pack 0  ibuprofen (MOTRIN) 600 mg tablet Take 1 Tab by mouth every six (6) hours as needed for Pain. 20 Tab 0  
 lipase-protease-amylase (CREON 6000) 6,000-19,000 -30,000 unit capsule Take 1 Cap by mouth three (3) times daily (with meals). Indications: exocrine pancreatic insufficiency 90 Cap 0  
 omeprazole (PRILOSEC) 20 mg capsule Take 20 mg by mouth daily.  febuxostat (ULORIC) 40 mg tab tablet Take 1 Tab by mouth daily. 14 Tab 1  
 hydrALAZINE (APRESOLINE) 50 mg tablet Take 1 Tab by mouth two (2) times a day. 20 Tab 0  
 ondansetron hcl (ZOFRAN, AS HYDROCHLORIDE,) 4 mg tablet Take 1 Tab by mouth every eight (8) hours as needed for Nausea. 10 Tab 0  
 tamsulosin (FLOMAX) 0.4 mg capsule Take 1 Cap by mouth daily. Start this medication on Friday 12/15/17  Indications: Urolithiasis 6 Cap 0  
 glucose blood VI test strips (FREESTYLE TEST) strip by Does Not Apply route See Admin Instructions. Check twice a day 100 Strip 2  
 bisacodyl (DULCOLAX, BISACODYL,) 5 mg EC tablet Take 1 Tab by mouth daily as needed for Constipation. 30 Tab 0  
 NOVOLIN 70/30 100 unit/mL (70-30) injection INJECT 30 UNITS SC IN THE MORNING AND 20 UNITS SC IN THE EVENING BEFORE SUPPER 10 mL 4  
 isosorbide mononitrate ER (IMDUR) 60 mg CR tablet Take 1 Tab by mouth daily. 30 Tab 6  
 atorvastatin (LIPITOR) 40 mg tablet Take 1 Tab by mouth nightly. 30 Tab 6  furosemide (LASIX) 40 mg tablet Take 1 Tab by mouth daily. 30 Tab 6  fluticasone (FLONASE) 50 mcg/actuation nasal spray 2 Sprays by Both Nostrils route daily.  carvedilol (COREG) 6.25 mg tablet Take 1 Tab by mouth two (2) times daily (with meals). (Patient taking differently: Take 25 mg by mouth two (2) times daily (with meals). ) 60 Tab 6  
 nitroglycerin (NITROSTAT) 0.4 mg SL tablet 1 Tab by SubLINGual route as needed for Chest Pain. (Patient taking differently: 1 Tab by SubLINGual route as needed for Chest Pain. Pt to take 1 tab q 5 min up to three times, if symptom persist pt. to call 911.) 20 Tab 0  
 aspirin 81 mg chewable tablet Take 1 Tab by mouth daily. 30 Tab 0  B COMPLEX W-C NO.20/FOLIC ACID (B COMPLEX & C NO.20-FOLIC ACID PO) Take  by mouth.  budesonide-formoterol (SYMBICORT) 160-4.5 mcg/actuation HFA inhaler Take 2 Puffs by inhalation two (2) times a day. 1 Inhaler 0  
 calcitRIOL (ROCALTROL) 0.25 mcg capsule Take 1 Cap by mouth every Monday, Wednesday, Friday. 15 Cap 0  
 Lancets misc Check 3 times a day , needs according contour glucometer 1 Package 11  Blood-Glucose Meter monitoring kit Check twice daily. 1 kit 0 Allergies Allergen Reactions  Shellfish Derived Other (comments) Causes Gout Social History Socioeconomic History  Marital status:  Spouse name: Not on file  Number of children: Not on file  Years of education: Not on file  Highest education level: Not on file Occupational History  Occupation: disabled Social Needs  Financial resource strain: Not on file  Food insecurity:  
  Worry: Not on file Inability: Not on file  Transportation needs:  
  Medical: Not on file Non-medical: Not on file Tobacco Use  Smoking status: Current Every Day Smoker Packs/day: 0.25 Types: Cigarettes  Smokeless tobacco: Never Used Substance and Sexual Activity  Alcohol use: Not Currently Alcohol/week: 0.0 oz  
  Comment: 1 beers a day.  Drug use:  No  
  Sexual activity: Yes Lifestyle  Physical activity:  
  Days per week: Not on file Minutes per session: Not on file  Stress: Not on file Relationships  Social connections:  
  Talks on phone: Not on file Gets together: Not on file Attends [employer] service: Not on file Active member of club or organization: Not on file Attends meetings of clubs or organizations: Not on file Relationship status: Not on file  Intimate partner violence:  
  Fear of current or ex partner: Not on file Emotionally abused: Not on file Physically abused: Not on file Forced sexual activity: Not on file Other Topics Concern  Not on file Social History Narrative  Not on file Family History Problem Relation Age of Onset  Diabetes Father  Heart Disease Mother  Diabetes Sister  Hypertension Sister  Arthritis-osteo Sister  Arthritis-osteo Brother  Diabetes Other  Diabetes Other [place]  Hypertension Other Uncle; Aunt Physical Exam:   
Visit Vitals /80 (BP 1 Location: Left arm, BP Patient Position: Sitting) Pulse 80 Resp 17 Ht 5' 4\" (1.626 m) Wt 233 lb (105.7 kg) BMI 39.99 kg/m² General: Well-appearing male in no acute distress HEENT: EOMI no scleral icterus is noted Pulmonary: No increased work of breathing is noted Extremity: Left upper extremity radiocephalic fistula has a great thrill throughout it. He does have an early branch which does also seem to be stealing some of the blood flow. Neuro: Cranial nerves II through XII are grossly intact good strength in the left upper extremity and hand with good  strength and feeling. Impression and Plan: 
Mahogany Vidal is a 62 y.o. male with end-stage renal disease. His left radiocephalic fistula is growing and maturing. We talked about performing a branch ligation to really aid his maturity of his fistula.   We also talked about watchful waiting of this area. Patient is willing to move forward with the surgery to perform a branch ligation of this area. We talked about the risks and benefits of the procedure including but not limited to bleeding, infection, damage to adjacent structures, MI, stroke, death, loss of fistula, loss of upper extremity, need for further surgery. Is understanding all these risks and wants to move forward with the procedure. We reviewed the plan with the patient and the patient understands. We also gave the patient appropriate instructions on their disease process and when to call back. Greater than 50% of this visit was spent with face to face discussion. Darleen Macario MD 
 
PLEASE NOTE: 
This document has been produced using voice recognition software. Unrecognized errors in transcription may be present.

## 2019-07-11 ENCOUNTER — ANESTHESIA EVENT (OUTPATIENT)
Dept: CARDIOTHORACIC SURGERY | Age: 59
End: 2019-07-11
Payer: MEDICARE

## 2019-07-12 ENCOUNTER — ANESTHESIA (OUTPATIENT)
Dept: CARDIOTHORACIC SURGERY | Age: 59
End: 2019-07-12
Payer: MEDICARE

## 2019-07-12 ENCOUNTER — HOSPITAL ENCOUNTER (OUTPATIENT)
Age: 59
Setting detail: OUTPATIENT SURGERY
Discharge: HOME | End: 2019-07-12
Attending: SURGERY | Admitting: SURGERY
Payer: MEDICARE

## 2019-07-12 VITALS
DIASTOLIC BLOOD PRESSURE: 100 MMHG | HEIGHT: 64 IN | OXYGEN SATURATION: 96 % | HEART RATE: 62 BPM | TEMPERATURE: 97.3 F | RESPIRATION RATE: 20 BRPM | SYSTOLIC BLOOD PRESSURE: 180 MMHG | BODY MASS INDEX: 39.78 KG/M2 | WEIGHT: 233 LBS

## 2019-07-12 DIAGNOSIS — R52 PAIN: ICD-10-CM

## 2019-07-12 DIAGNOSIS — M25.60 DECREASED RANGE OF MOTION: ICD-10-CM

## 2019-07-12 DIAGNOSIS — S63.501A RIGHT WRIST SPRAIN, INITIAL ENCOUNTER: ICD-10-CM

## 2019-07-12 DIAGNOSIS — S60.211A CONTUSION OF RIGHT WRIST, INITIAL ENCOUNTER: ICD-10-CM

## 2019-07-12 LAB
AMPHET UR QL SCN: NEGATIVE
BARBITURATES UR QL SCN: NEGATIVE
BENZODIAZ UR QL: NEGATIVE
BUN BLD-MCNC: 81 MG/DL (ref 7–18)
CANNABINOIDS UR QL SCN: NEGATIVE
CHLORIDE BLD-SCNC: 118 MMOL/L (ref 100–108)
COCAINE UR QL SCN: NEGATIVE
GLUCOSE BLD STRIP.AUTO-MCNC: 129 MG/DL (ref 70–110)
GLUCOSE BLD STRIP.AUTO-MCNC: 96 MG/DL (ref 74–106)
HCT VFR BLD CALC: 31 % (ref 36–49)
HDSCOM,HDSCOM: NORMAL
HGB BLD-MCNC: 10.5 G/DL (ref 12–16)
METHADONE UR QL: NEGATIVE
OPIATES UR QL: NEGATIVE
PCP UR QL: NEGATIVE
POTASSIUM BLD-SCNC: 5.8 MMOL/L (ref 3.5–5.5)
SODIUM BLD-SCNC: 143 MMOL/L (ref 136–145)

## 2019-07-12 PROCEDURE — 76210000006 HC OR PH I REC 0.5 TO 1 HR: Performed by: SURGERY

## 2019-07-12 PROCEDURE — 77030002986 HC SUT PROL J&J -A: Performed by: SURGERY

## 2019-07-12 PROCEDURE — 77030018836 HC SOL IRR NACL ICUM -A: Performed by: SURGERY

## 2019-07-12 PROCEDURE — 82962 GLUCOSE BLOOD TEST: CPT

## 2019-07-12 PROCEDURE — 74011250636 HC RX REV CODE- 250/636

## 2019-07-12 PROCEDURE — 74011250636 HC RX REV CODE- 250/636: Performed by: SURGERY

## 2019-07-12 PROCEDURE — C1894 INTRO/SHEATH, NON-LASER: HCPCS | Performed by: SURGERY

## 2019-07-12 PROCEDURE — 76210000021 HC REC RM PH II 0.5 TO 1 HR: Performed by: SURGERY

## 2019-07-12 PROCEDURE — 76010000112 HC CV SURG 0.5 TO 1 HR: Performed by: SURGERY

## 2019-07-12 PROCEDURE — 77030039266 HC ADH SKN EXOFIN S2SG -A: Performed by: SURGERY

## 2019-07-12 PROCEDURE — 77030002996 HC SUT SLK J&J -A: Performed by: SURGERY

## 2019-07-12 PROCEDURE — 74011250637 HC RX REV CODE- 250/637

## 2019-07-12 PROCEDURE — 82947 ASSAY GLUCOSE BLOOD QUANT: CPT

## 2019-07-12 PROCEDURE — 74011000258 HC RX REV CODE- 258: Performed by: NURSE ANESTHETIST, CERTIFIED REGISTERED

## 2019-07-12 PROCEDURE — 76060000032 HC ANESTHESIA 0.5 TO 1 HR: Performed by: SURGERY

## 2019-07-12 PROCEDURE — 77030031139 HC SUT VCRL2 J&J -A: Performed by: SURGERY

## 2019-07-12 PROCEDURE — 80307 DRUG TEST PRSMV CHEM ANLYZR: CPT

## 2019-07-12 RX ORDER — CEFAZOLIN SODIUM 2 G/50ML
SOLUTION INTRAVENOUS
Status: COMPLETED
Start: 2019-07-12 | End: 2019-07-12

## 2019-07-12 RX ORDER — LIDOCAINE HYDROCHLORIDE 10 MG/ML
0.1 INJECTION, SOLUTION EPIDURAL; INFILTRATION; INTRACAUDAL; PERINEURAL AS NEEDED
Status: DISCONTINUED | OUTPATIENT
Start: 2019-07-12 | End: 2019-07-12 | Stop reason: HOSPADM

## 2019-07-12 RX ORDER — FAMOTIDINE 20 MG/1
TABLET, FILM COATED ORAL
Status: COMPLETED
Start: 2019-07-12 | End: 2019-07-12

## 2019-07-12 RX ORDER — HEPARIN SODIUM 200 [USP'U]/100ML
INJECTION, SOLUTION INTRAVENOUS
Status: DISCONTINUED
Start: 2019-07-12 | End: 2019-07-12 | Stop reason: HOSPADM

## 2019-07-12 RX ORDER — LIDOCAINE HYDROCHLORIDE 20 MG/ML
INJECTION, SOLUTION EPIDURAL; INFILTRATION; INTRACAUDAL; PERINEURAL AS NEEDED
Status: DISCONTINUED | OUTPATIENT
Start: 2019-07-12 | End: 2019-07-12 | Stop reason: HOSPADM

## 2019-07-12 RX ORDER — SODIUM CHLORIDE 0.9 % (FLUSH) 0.9 %
5-40 SYRINGE (ML) INJECTION EVERY 8 HOURS
Status: DISCONTINUED | OUTPATIENT
Start: 2019-07-12 | End: 2019-07-12 | Stop reason: HOSPADM

## 2019-07-12 RX ORDER — SODIUM CHLORIDE 0.9 % (FLUSH) 0.9 %
5-40 SYRINGE (ML) INJECTION AS NEEDED
Status: DISCONTINUED | OUTPATIENT
Start: 2019-07-12 | End: 2019-07-12 | Stop reason: HOSPADM

## 2019-07-12 RX ORDER — DEXTROSE 50 % IN WATER (D50W) INTRAVENOUS SYRINGE
25-50 AS NEEDED
Status: DISCONTINUED | OUTPATIENT
Start: 2019-07-12 | End: 2019-07-12 | Stop reason: HOSPADM

## 2019-07-12 RX ORDER — INSULIN LISPRO 100 [IU]/ML
INJECTION, SOLUTION INTRAVENOUS; SUBCUTANEOUS ONCE
Status: DISCONTINUED | OUTPATIENT
Start: 2019-07-12 | End: 2019-07-12 | Stop reason: HOSPADM

## 2019-07-12 RX ORDER — LIDOCAINE HYDROCHLORIDE 10 MG/ML
INJECTION, SOLUTION EPIDURAL; INFILTRATION; INTRACAUDAL; PERINEURAL AS NEEDED
Status: DISCONTINUED | OUTPATIENT
Start: 2019-07-12 | End: 2019-07-12 | Stop reason: HOSPADM

## 2019-07-12 RX ORDER — MAGNESIUM SULFATE 100 %
4 CRYSTALS MISCELLANEOUS AS NEEDED
Status: DISCONTINUED | OUTPATIENT
Start: 2019-07-12 | End: 2019-07-12 | Stop reason: HOSPADM

## 2019-07-12 RX ORDER — FENTANYL CITRATE 50 UG/ML
INJECTION, SOLUTION INTRAMUSCULAR; INTRAVENOUS AS NEEDED
Status: DISCONTINUED | OUTPATIENT
Start: 2019-07-12 | End: 2019-07-12 | Stop reason: HOSPADM

## 2019-07-12 RX ORDER — OXYCODONE AND ACETAMINOPHEN 5; 325 MG/1; MG/1
1 TABLET ORAL
Qty: 10 TAB | Refills: 0 | Status: SHIPPED | OUTPATIENT
Start: 2019-07-12 | End: 2019-07-17

## 2019-07-12 RX ORDER — FAMOTIDINE 20 MG/1
20 TABLET, FILM COATED ORAL ONCE
Status: COMPLETED | OUTPATIENT
Start: 2019-07-12 | End: 2019-07-12

## 2019-07-12 RX ORDER — DEXTROSE MONOHYDRATE AND SODIUM CHLORIDE 5; .225 G/100ML; G/100ML
25 INJECTION, SOLUTION INTRAVENOUS CONTINUOUS
Status: DISCONTINUED | OUTPATIENT
Start: 2019-07-12 | End: 2019-07-12 | Stop reason: HOSPADM

## 2019-07-12 RX ORDER — FENTANYL CITRATE 50 UG/ML
25 INJECTION, SOLUTION INTRAMUSCULAR; INTRAVENOUS AS NEEDED
Status: DISCONTINUED | OUTPATIENT
Start: 2019-07-12 | End: 2019-07-12 | Stop reason: HOSPADM

## 2019-07-12 RX ORDER — PROPOFOL 10 MG/ML
INJECTION, EMULSION INTRAVENOUS
Status: DISCONTINUED | OUTPATIENT
Start: 2019-07-12 | End: 2019-07-12 | Stop reason: HOSPADM

## 2019-07-12 RX ORDER — MIDAZOLAM HYDROCHLORIDE 1 MG/ML
INJECTION, SOLUTION INTRAMUSCULAR; INTRAVENOUS AS NEEDED
Status: DISCONTINUED | OUTPATIENT
Start: 2019-07-12 | End: 2019-07-12 | Stop reason: HOSPADM

## 2019-07-12 RX ORDER — CEFAZOLIN SODIUM 2 G/50ML
2 SOLUTION INTRAVENOUS EVERY 8 HOURS
Status: DISCONTINUED | OUTPATIENT
Start: 2019-07-12 | End: 2019-07-12 | Stop reason: HOSPADM

## 2019-07-12 RX ORDER — HEPARIN SODIUM 200 [USP'U]/100ML
INJECTION, SOLUTION INTRAVENOUS
Status: COMPLETED | OUTPATIENT
Start: 2019-07-12 | End: 2019-07-12

## 2019-07-12 RX ORDER — LIDOCAINE HYDROCHLORIDE 10 MG/ML
INJECTION, SOLUTION EPIDURAL; INFILTRATION; INTRACAUDAL; PERINEURAL
Status: DISCONTINUED
Start: 2019-07-12 | End: 2019-07-12 | Stop reason: HOSPADM

## 2019-07-12 RX ADMIN — DEXTROSE MONOHYDRATE AND SODIUM CHLORIDE 25 ML/HR: 5; .225 INJECTION, SOLUTION INTRAVENOUS at 08:03

## 2019-07-12 RX ADMIN — PROPOFOL 75 MCG/KG/MIN: 10 INJECTION, EMULSION INTRAVENOUS at 09:01

## 2019-07-12 RX ADMIN — FENTANYL CITRATE 50 MCG: 50 INJECTION, SOLUTION INTRAMUSCULAR; INTRAVENOUS at 09:01

## 2019-07-12 RX ADMIN — DEXTROSE MONOHYDRATE AND SODIUM CHLORIDE: 5; .225 INJECTION, SOLUTION INTRAVENOUS at 08:46

## 2019-07-12 RX ADMIN — FENTANYL CITRATE 50 MCG: 50 INJECTION, SOLUTION INTRAMUSCULAR; INTRAVENOUS at 08:53

## 2019-07-12 RX ADMIN — FAMOTIDINE 20 MG: 20 TABLET, FILM COATED ORAL at 08:07

## 2019-07-12 RX ADMIN — LIDOCAINE HYDROCHLORIDE 40 MG: 20 INJECTION, SOLUTION EPIDURAL; INFILTRATION; INTRACAUDAL; PERINEURAL at 09:01

## 2019-07-12 RX ADMIN — CEFAZOLIN 2 G: 10 INJECTION, POWDER, FOR SOLUTION INTRAVENOUS at 08:59

## 2019-07-12 RX ADMIN — MIDAZOLAM HYDROCHLORIDE 2 MG: 1 INJECTION, SOLUTION INTRAMUSCULAR; INTRAVENOUS at 08:48

## 2019-07-12 RX ADMIN — FAMOTIDINE 20 MG: 20 TABLET ORAL at 08:07

## 2019-07-12 NOTE — INTERVAL H&P NOTE
H&P Update: 
Denita Kumar was seen and examined. History and physical has been reviewed. The patient has been examined.  There have been no significant clinical changes since the completion of the originally dated History and Physical.

## 2019-07-12 NOTE — BRIEF OP NOTE
BRIEF OPERATIVE NOTE    Date of Procedure: 7/12/2019   Preoperative Diagnosis: P28.931Q ACCESS COMPLICATION  Postoperative Diagnosis: T56.839A ACCESS COMPLICATION    Procedure(s):  LEFT RADIOCEPHALIC FISTULA BRANCH LIGATION  Surgeon(s) and Role:     * Cassandra Whitney MD - Primary         Surgical Assistant: none    Surgical Staff:  Circ-1: Christina Jacobson RN; Antoinette Segal RN  Scrub Tech-1: Maria Esther Barragan  Scrub Tech-2: Courtney Moreno  Surg Asst-1: Hesham Campoverde  Event Time In Time Out   Incision Start 0919    Incision Close       Anesthesia: MAC   Estimated Blood Loss: <50mL  Specimens: * No specimens in log *   Findings: one large branch no other major branches in distal and mid forearm   Complications: none  Implants: * No implants in log *

## 2019-07-12 NOTE — DISCHARGE INSTRUCTIONS
Hand Sprain: Care Instructions  Your Care Instructions  A hand sprain occurs when you stretch or tear a ligament in your hand. Ligaments are the tough tissues that connect one bone to another. Most hand sprains will heal with treatment you can do at home. Follow-up care is a key part of your treatment and safety. Be sure to make and go to all appointments, and call your doctor if you are having problems. It's also a good idea to know your test results and keep a list of the medicines you take. How can you care for yourself at home? · If your doctor gave you a splint or immobilizer, wear it as directed. This will help keep swelling down and help your hand heal.  · Follow your doctor's directions for exercise and other activity. · For the first 2 days after your injury, avoid things that might increase swelling, such as hot showers, hot tubs, or hot packs. · Put ice or a cold pack on your hand for 10 to 20 minutes at a time to stop swelling. Try this every 1 to 2 hours for 3 days (when you are awake) or until the swelling goes down. Put a thin cloth between the ice pack and your skin. Keep your splint dry. · After 2 or 3 days, if your swelling is gone, put a heating pad (set on low) or a warm cloth on your hand. Some experts suggest that you go back and forth between hot and cold treatments. · Prop up your hand on a pillow when you ice it or anytime you sit or lie down. Try to keep it above the level of your heart. This will help reduce swelling. · Take pain medicines exactly as directed. ? If the doctor gave you a prescription medicine for pain, take it as prescribed. ? If you are not taking a prescription pain medicine, ask your doctor if you can take an over-the-counter medicine. · Return to your usual level of activity slowly. When should you call for help?   Call your doctor now or seek immediate medical care if:    · Your pain is worse.     · You have new or increased swelling in your hand.     · You cannot move your hand.     · You have tingling, weakness, or numbness in your hand or fingers.     · Your hand or fingers are cool or pale or change color.     · You have a fever.     · Your hand or fingers are red.    Watch closely for changes in your health, and be sure to contact your doctor if:    · Your hand does not get better as expected. Where can you learn more? Go to http://leslie-graciela.info/. Enter H661 in the search box to learn more about \"Hand Sprain: Care Instructions. \"  Current as of: September 20, 2018  Content Version: 11.9  © 1406-1017 [employer]. Care instructions adapted under license by [employer] (which disclaims liability or warranty for this information). If you have questions about a medical condition or this instruction, always ask your healthcare professional. [address] any warranty or liability for your use of this information. DISCHARGE SUMMARY from Nurse    PATIENT INSTRUCTIONS:    After general anesthesia or intravenous sedation, for 24 hours or while taking prescription Narcotics:  · Limit your activities  · Do not drive and operate hazardous machinery  · Do not make important personal or business decisions  · Do  not drink alcoholic beverages  · If you have not urinated within 8 hours after discharge, please contact your surgeon on call. Report the following to your surgeon:  · Excessive pain, swelling, redness or odor of or around the surgical area  · Temperature over 100.5  · Nausea and vomiting lasting longer than 4 hours or if unable to take medications  · Any signs of decreased circulation or nerve impairment to extremity: change in color, persistent  numbness, tingling, coldness or increase pain  · Any questions    *  Please give a list of your current medications to your Primary Care Provider.     *  Please update this list whenever your medications are discontinued, doses are      changed, or new medications (including over-the-counter products) are added. *  Please carry medication information at all times in case of emergency situations. These are general instructions for a healthy lifestyle:    No smoking/ No tobacco products/ Avoid exposure to second hand smoke  Surgeon General's Warning:  Quitting smoking now greatly reduces serious risk to your health. Obesity, smoking, and sedentary lifestyle greatly increases your risk for illness    A healthy diet, regular physical exercise & weight monitoring are important for maintaining a healthy lifestyle    You may be retaining fluid if you have a history of heart failure or if you experience any of the following symptoms:  Weight gain of 3 pounds or more overnight or 5 pounds in a week, increased swelling in our hands or feet or shortness of breath while lying flat in bed. Please call your doctor as soon as you notice any of these symptoms; do not wait until your next office visit. The discharge information has been reviewed with the patient and spouse. The patient and spouse verbalized understanding. Discharge medications reviewed with the patient and spouse and appropriate educational materials and side effects teaching were provided.   ___________________________________________________________________________________________________________________________________

## 2019-07-12 NOTE — ANESTHESIA PREPROCEDURE EVALUATION
Relevant Problems   No relevant active problems       Anesthetic History   No history of anesthetic complications            Review of Systems / Medical History  Patient summary reviewed, nursing notes reviewed and pertinent labs reviewed    Pulmonary    COPD               Neuro/Psych       CVA  TIA     Cardiovascular    Hypertension          CAD         GI/Hepatic/Renal         Renal disease: ESRD  Liver disease     Endo/Other    Diabetes    Morbid obesity and arthritis     Other Findings              Physical Exam    Airway  Mallampati: III  TM Distance: 4 - 6 cm  Neck ROM: normal range of motion   Mouth opening: Normal     Cardiovascular    Rhythm: regular  Rate: normal         Dental      Comments: Poor dentition   Pulmonary  Breath sounds clear to auscultation               Abdominal  Abdominal exam normal       Other Findings            Anesthetic Plan    ASA: 3  Anesthesia type: MAC          Induction: Intravenous  Anesthetic plan and risks discussed with: Patient

## 2019-07-12 NOTE — ANESTHESIA POSTPROCEDURE EVALUATION
Procedure(s):  LEFT RADIOCEPHALIC FISTULA BRANCH LIGATION. MAC    Anesthesia Post Evaluation      Multimodal analgesia: multimodal analgesia used between 6 hours prior to anesthesia start to PACU discharge  Patient location during evaluation: PACU  Patient participation: complete - patient participated  Level of consciousness: awake and alert  Pain management: satisfactory to patient  Airway patency: patent  Anesthetic complications: no  Cardiovascular status: acceptable and hemodynamically stable  Respiratory status: acceptable and room air  Hydration status: acceptable  Post anesthesia nausea and vomiting:  none      Vitals Value Taken Time   /68 7/12/2019 10:14 AM   Temp 36.4 °C (97.5 °F) 7/12/2019  9:44 AM   Pulse 63 7/12/2019 10:20 AM   Resp 17 7/12/2019 10:20 AM   SpO2 99 % 7/12/2019 10:20 AM   Vitals shown include unvalidated device data.

## 2019-07-12 NOTE — PERIOP NOTES
Pt's blood pressure elevated 180/100 in phase 2. Anesthesia notified with instructions for the patient to take his blood pressure medications he has brought with him.

## 2019-07-13 NOTE — OP NOTES
[address] [place] [address]   OPERATIVE REPORT    Name:  Kathia Aldridge  MR#:   [id number]  :  1960  ACCOUNT #:  [de-identified]  DATE OF SERVICE:  2019    PREOPERATIVE DIAGNOSIS:  End-stage renal disease with poorly maturing fistula. POSTOPERATIVE DIAGNOSIS:  End-stage renal disease with poorly maturing fistula. PROCEDURE PERFORMED:  Branch ligation of left radiocephalic fistula. SURGEON:  Bekah Ansari MD.    ASSISTANT:  None. ANESTHESIA:  MAC with local anesthetic. COMPLICATIONS:  None. SPECIMENS REMOVED:  None. IMPLANTS:  None. ESTIMATED BLOOD LOSS:  Less than 50 mL. CULTURES:  None. DRAINS:  None. INDICATIONS FOR THE PROCEDURE:  The patient is a 59-year-old gentleman with chronic kidney disease in need of branch ligation. The patient was given risks and benefits of the procedure including but not limited to bleeding, infection, damage to adjacent structures, hemorrhage, stroke, and death as well as need for further surgery. The patient was understanding of all the risks and underwent the procedure. PROCEDURE:  The patient was correctly identified in the preoperative holding area and taken to the operating room in stable condition. The patient had pre-incision time-out per Anesthesia. The patient was prepped and draped in the normal sterile fashion per CDC guidelines for aseptic technique. The patient also had preoperative antibiotics prior to any incision. We then were able to use ultrasound to identify the large branch, marked this area out appropriately. We looked all the way up and down the cephalic vein, all the way into kind of the proximal cephalic and the forearm, did not identify any other branches, so it was decided to go ahead and just do the ligation on the large branch down low.   We numbed this area up appropriately, dissected down to the branch itself, looped it with 0 silk ties x2, tied it off, had a great thrill still within our main cephalic vein and lost thrill within the large branch vein and actually started to see the vein slowly disappear, so we then copiously irrigated and closed with 3-0 Vicryl deep dermal layer, 4-0 Vicryl subcuticular layer, and Dermabond for dressing. The patient tolerated the procedure well without any issues.       Jenni Bess MD [id number]  D:  07/12/2019 11:06  T:  07/12/2019 13:31  JOB #:  [id number]

## 2019-07-26 ENCOUNTER — APPOINTMENT (OUTPATIENT)
Dept: CT IMAGING | Age: 59
End: 2019-07-26
Attending: EMERGENCY MEDICINE
Payer: MEDICARE

## 2019-07-26 ENCOUNTER — APPOINTMENT (OUTPATIENT)
Dept: GENERAL RADIOLOGY | Age: 59
End: 2019-07-26
Attending: EMERGENCY MEDICINE
Payer: MEDICARE

## 2019-07-26 ENCOUNTER — HOSPITAL ENCOUNTER (EMERGENCY)
Age: 59
Discharge: HOME | End: 2019-07-26
Attending: EMERGENCY MEDICINE
Payer: MEDICARE

## 2019-07-26 VITALS
RESPIRATION RATE: 20 BRPM | SYSTOLIC BLOOD PRESSURE: 163 MMHG | DIASTOLIC BLOOD PRESSURE: 79 MMHG | HEART RATE: 96 BPM | OXYGEN SATURATION: 96 % | TEMPERATURE: 98.1 F
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
## 2019-07-26 DIAGNOSIS — M10.031 ACUTE IDIOPATHIC GOUT OF RIGHT WRIST: ICD-10-CM

## 2019-07-26 DIAGNOSIS — M10.061 ACUTE IDIOPATHIC GOUT OF RIGHT KNEE: ICD-10-CM

## 2019-07-26 DIAGNOSIS — M10.062 ACUTE IDIOPATHIC GOUT OF LEFT KNEE: ICD-10-CM

## 2019-07-26 DIAGNOSIS — G44.209 TENSION-TYPE HEADACHE, NOT INTRACTABLE, UNSPECIFIED CHRONICITY PATTERN: ICD-10-CM

## 2019-07-26 DIAGNOSIS — S00.03XA CONTUSION OF SCALP, INITIAL ENCOUNTER: ICD-10-CM

## 2019-07-26 DIAGNOSIS — S46.912A SHOULDER STRAIN, LEFT, INITIAL ENCOUNTER: ICD-10-CM

## 2019-07-26 LAB
ANION GAP SERPL CALC-SCNC: 9 MMOL/L (ref 3–18)
BASOPHILS # BLD: 0 K/UL (ref 0–0.1)
BASOPHILS NFR BLD: 1 % (ref 0–2)
BNP SERPL-MCNC: 6681 PG/ML (ref 0–900)
BUN SERPL-MCNC: 78 MG/DL (ref 7–18)
BUN/CREAT SERPL: 10 (ref 12–20)
CALCIUM SERPL-MCNC: 8.4 MG/DL (ref 8.5–10.1)
CHLORIDE SERPL-SCNC: 110 MMOL/L (ref 100–111)
CK MB CFR SERPL CALC: 4.4 % (ref 0–4)
CK MB SERPL-MCNC: 15.9 NG/ML (ref 5–25)
CK SERPL-CCNC: 365 U/L (ref 39–308)
CO2 SERPL-SCNC: 20 MMOL/L (ref 21–32)
CREAT SERPL-MCNC: 7.83 MG/DL (ref 0.6–1.3)
DIFFERENTIAL METHOD BLD: ABNORMAL
EOSINOPHIL # BLD: 0.4 K/UL (ref 0–0.4)
EOSINOPHIL NFR BLD: 6 % (ref 0–5)
ERYTHROCYTE [DISTWIDTH] IN BLOOD BY AUTOMATED COUNT: 15.4 % (ref 11.6–14.5)
ETHANOL SERPL-MCNC: <3 MG/DL (ref 0–3)
GLUCOSE SERPL-MCNC: 113 MG/DL (ref 74–99)
HCT VFR BLD AUTO: 30.2 % (ref 36–48)
HGB BLD-MCNC: 9.8 G/DL (ref 13–16)
INR PPP: 0.9 (ref 0.8–1.2)
LIPASE SERPL-CCNC: 529 U/L (ref 73–393)
LYMPHOCYTES # BLD: 1.7 K/UL (ref 0.9–3.6)
LYMPHOCYTES NFR BLD: 27 % (ref 21–52)
MCH RBC QN AUTO: 27 PG (ref 24–34)
MCHC RBC AUTO-ENTMCNC: 32.5 G/DL (ref 31–37)
MCV RBC AUTO: 83.2 FL (ref 74–97)
MONOCYTES # BLD: 0.4 K/UL (ref 0.05–1.2)
MONOCYTES NFR BLD: 7 % (ref 3–10)
NEUTS SEG # BLD: 4 K/UL (ref 1.8–8)
NEUTS SEG NFR BLD: 59 % (ref 40–73)
PLATELET # BLD AUTO: 343 K/UL (ref 135–420)
PMV BLD AUTO: 10.1 FL (ref 9.2–11.8)
POTASSIUM SERPL-SCNC: 5 MMOL/L (ref 3.5–5.5)
PROTHROMBIN TIME: 12 SEC (ref 11.5–15.2)
RBC # BLD AUTO: 3.63 M/UL (ref 4.7–5.5)
SODIUM SERPL-SCNC: 139 MMOL/L (ref 136–145)
TROPONIN I SERPL-MCNC: 0.04 NG/ML (ref 0–0.04)
WBC # BLD AUTO: 6.5 K/UL (ref 4.6–13.2)

## 2019-07-26 PROCEDURE — 99285 EMERGENCY DEPT VISIT HI MDM: CPT

## 2019-07-26 PROCEDURE — 83690 ASSAY OF LIPASE: CPT

## 2019-07-26 PROCEDURE — 85025 COMPLETE CBC W/AUTO DIFF WBC: CPT

## 2019-07-26 PROCEDURE — 80048 BASIC METABOLIC PNL TOTAL CA: CPT

## 2019-07-26 PROCEDURE — 80307 DRUG TEST PRSMV CHEM ANLYZR: CPT

## 2019-07-26 PROCEDURE — 73030 X-RAY EXAM OF SHOULDER: CPT

## 2019-07-26 PROCEDURE — 96361 HYDRATE IV INFUSION ADD-ON: CPT

## 2019-07-26 PROCEDURE — 74011250637 HC RX REV CODE- 250/637: Performed by: EMERGENCY MEDICINE

## 2019-07-26 PROCEDURE — 96374 THER/PROPH/DIAG INJ IV PUSH: CPT

## 2019-07-26 PROCEDURE — 83880 ASSAY OF NATRIURETIC PEPTIDE: CPT

## 2019-07-26 PROCEDURE — 71046 X-RAY EXAM CHEST 2 VIEWS: CPT

## 2019-07-26 PROCEDURE — 74011250636 HC RX REV CODE- 250/636: Performed by: EMERGENCY MEDICINE

## 2019-07-26 PROCEDURE — 82550 ASSAY OF CK (CPK): CPT

## 2019-07-26 PROCEDURE — 93005 ELECTROCARDIOGRAM TRACING: CPT

## 2019-07-26 PROCEDURE — 70450 CT HEAD/BRAIN W/O DYE: CPT

## 2019-07-26 PROCEDURE — 85610 PROTHROMBIN TIME: CPT

## 2019-07-26 RX ORDER — ONDANSETRON 4 MG/1
4 TABLET, FILM COATED ORAL
Qty: 10 TAB | Refills: 0 | Status: SHIPPED | OUTPATIENT
Start: 2019-07-26

## 2019-07-26 RX ORDER — OXYCODONE AND ACETAMINOPHEN 5; 325 MG/1; MG/1
1 TABLET ORAL
Qty: 12 TAB | Refills: 0 | Status: SHIPPED | OUTPATIENT
Start: 2019-07-26 | End: 2019-07-29

## 2019-07-26 RX ORDER — OXYCODONE AND ACETAMINOPHEN 5; 325 MG/1; MG/1
1 TABLET ORAL ONCE
Status: COMPLETED | OUTPATIENT
Start: 2019-07-26 | End: 2019-07-26

## 2019-07-26 RX ORDER — ACETAMINOPHEN 325 MG/1
650 TABLET ORAL ONCE
Status: DISCONTINUED | OUTPATIENT
Start: 2019-07-26 | End: 2019-07-26

## 2019-07-26 RX ORDER — ONDANSETRON 2 MG/ML
4 INJECTION INTRAMUSCULAR; INTRAVENOUS
Status: COMPLETED | OUTPATIENT
Start: 2019-07-26 | End: 2019-07-26

## 2019-07-26 RX ADMIN — SODIUM CHLORIDE 500 ML: 900 INJECTION, SOLUTION INTRAVENOUS at 15:54

## 2019-07-26 RX ADMIN — ONDANSETRON 4 MG: 2 INJECTION INTRAMUSCULAR; INTRAVENOUS at 15:24

## 2019-07-26 RX ADMIN — OXYCODONE HYDROCHLORIDE AND ACETAMINOPHEN 1 TABLET: 5; 325 TABLET ORAL at 14:50

## 2019-07-26 NOTE — ED NOTES
Patient discharged and given discharge instructions by. Patient had an opportunity to ask questions. Patient verbalized understanding of discharge instructions. Patient d/c from ED ambulatory, discharge instructions and prescriptions in hand. Patient accompanied by wife. Removed and shredded pt's armband.

## 2019-07-26 NOTE — ED NOTES
Bedside and Verbal shift change report given to JEN Echols (oncoming nurse) by Jaime NUNEZ RN (offgoing nurse). Report included the following information SBAR, Kardex and ED Summary. Patient resting comfortably, side rails up, call bell w/in reach, no further needs expressed at this time, aware of POC. Female  at the bedside.

## 2019-07-26 NOTE — ED PROVIDER NOTES
EMERGENCY DEPARTMENT HISTORY AND PHYSICAL EXAM      Date: 7/26/2019  Patient Name: Tang Price    History of Presenting Illness     No chief complaint on file. History Provided By: Patient and EMS    Chief Complaint: Fall and questionable syncopal event    Additional History (Context): Tang Price is a 62 y.o. male with Multiple medical problems as below who presents with a fall and questionable syncopal event. States he was at [employer] for a social function, was feeling okay and went to sit down in a chair. At that time he is unsure what happened but had a fall with questionable loss of consciousness and hit the back of his head into a cement wall. Bystanders called EMS when patient was dazed and confused. Upon arrival of EMS, patient was feeling better and by the time he was transported to the emergency department he is feeling back to his normal self except for a headache where his head struck the cement wall. EMS noted a normal blood sugar of 95 and also noted the patient was very hypertensive. Patient denies any visual changes, chest pain, palpitations, shortness of breath, abdominal pain, nausea, vomiting, diarrhea, fevers, chills, sweats. PCP: Selam Luna MD    Current Outpatient Medications   Medication Sig Dispense Refill    colchicine 0.6 mg tablet Take 1 Tab by mouth daily. 22 Tab 2    colchicine 0.6 mg tablet Take 1 tablet by mouth Q1Hour for gout pain. NOT TO EXCEED 3 TABLETS IN 24 HOURS. 30 Tab 0    oxyCODONE-acetaminophen (PERCOCET) 5-325 mg per tablet Take 1 tablet every 4-6 hours as needed for pain control. If you were instructed to try over the counter ibuprofen or tylenol, only take the percocet for pain not controlled with the over the counter medication. 12 Tab 0    indomethacin (INDOCIN) 25 mg capsule Take 2 Caps by mouth three (3) times daily.  With food 60 Cap 0    methylPREDNISolone (MEDROL, CASS,) 4 mg tablet Per dose pack instructions 1 Dose Pack 0    ibuprofen (MOTRIN) 600 mg tablet Take 1 Tab by mouth every six (6) hours as needed for Pain. 20 Tab 0    lipase-protease-amylase (CREON 6000) 6,000-19,000 -30,000 unit capsule Take 1 Cap by mouth three (3) times daily (with meals). Indications: exocrine pancreatic insufficiency 90 Cap 0    omeprazole (PRILOSEC) 20 mg capsule Take 20 mg by mouth daily.  febuxostat (ULORIC) 40 mg tab tablet Take 1 Tab by mouth daily. 14 Tab 1    hydrALAZINE (APRESOLINE) 50 mg tablet Take 1 Tab by mouth two (2) times a day. 20 Tab 0    ondansetron hcl (ZOFRAN, AS HYDROCHLORIDE,) 4 mg tablet Take 1 Tab by mouth every eight (8) hours as needed for Nausea. 10 Tab 0    tamsulosin (FLOMAX) 0.4 mg capsule Take 1 Cap by mouth daily. Start this medication on Friday 12/15/17  Indications: Urolithiasis 6 Cap 0    glucose blood VI test strips (FREESTYLE TEST) strip by Does Not Apply route See Admin Instructions. Check twice a day 100 Strip 2    bisacodyl (DULCOLAX, BISACODYL,) 5 mg EC tablet Take 1 Tab by mouth daily as needed for Constipation. 30 Tab 0    NOVOLIN 70/30 100 unit/mL (70-30) injection INJECT 30 UNITS SC IN THE MORNING AND 20 UNITS SC IN THE EVENING BEFORE SUPPER 10 mL 4    isosorbide mononitrate ER (IMDUR) 60 mg CR tablet Take 1 Tab by mouth daily. 30 Tab 6    atorvastatin (LIPITOR) 40 mg tablet Take 1 Tab by mouth nightly. 30 Tab 6    furosemide (LASIX) 40 mg tablet Take 1 Tab by mouth daily. 30 Tab 6    fluticasone (FLONASE) 50 mcg/actuation nasal spray 2 Sprays by Both Nostrils route daily.  carvedilol (COREG) 6.25 mg tablet Take 1 Tab by mouth two (2) times daily (with meals). (Patient taking differently: Take 25 mg by mouth two (2) times daily (with meals). ) 60 Tab 6    nitroglycerin (NITROSTAT) 0.4 mg SL tablet 1 Tab by SubLINGual route as needed for Chest Pain. (Patient taking differently: 1 Tab by SubLINGual route as needed for Chest Pain.  Pt to take 1 tab q 5 min up to three times, if symptom persist pt. to call 911.) 20 Tab 0    aspirin 81 mg chewable tablet Take 1 Tab by mouth daily. 30 Tab 0    B COMPLEX W-C NO.20/FOLIC ACID (B COMPLEX & C NO.20-FOLIC ACID PO) Take  by mouth.  budesonide-formoterol (SYMBICORT) 160-4.5 mcg/actuation HFA inhaler Take 2 Puffs by inhalation two (2) times a day. 1 Inhaler 0    calcitRIOL (ROCALTROL) 0.25 mcg capsule Take 1 Cap by mouth every Monday, Wednesday, Friday. 15 Cap 0    Lancets misc Check 3 times a day , needs according contour glucometer 1 Package 11    Blood-Glucose Meter monitoring kit Check twice daily. 1 kit 0       Past History     Past Medical History:  Past Medical History:   Diagnosis Date    Alcohol abuse     Alcohol-induced chronic pancreatitis ([facility] [address] 9/7/2018    Arthritis     Atherosclerotic cardiovascular disease     CAD (coronary artery disease)     mild disease of coronaries (cor angio 2006),wife states he has 1 stent    Cardiac cath 01/26/2006    RCA mild. Otherwise patent coronaries. LVEDP 15.  EF 55-60%.  Cardiac echocardiogram 03/27/2009    EF 60%. No cardiac source of embolism. No significant valvular pathology.  Cardiac nuclear imaging test 12/06/2011    Small, mild inferior infarction or possibly artifact. No ischemia. EF 45%. No TID. No reg WMA.  Cardiovascular LLE venous duplex 08/14/2015    Left leg:  No DVT. Dilated lymph node in left groin.  Chronic kidney disease     Chronic obstructive pulmonary disease (HCC)     Constipation     Diabetes mellitus type II     Gout     Hepatic steatosis     Hypercholesterolemia     Hypertension     Knee effusion, left     Left knee pain     Morbid obesity (HCC)     Noncompliance     Obesity (BMI 30-39. 9)     S/P colonoscopy 3/8/05    Dr. Vineet Belcher; normal; f/u 10 years    Stomach problems     TIA (transient ischemic attack) 3/09    Tobacco abuse        Past Surgical History:  Past Surgical History:   Procedure Laterality Date    ABDOMEN SURGERY PROC UNLISTED Hernia repair in 1960's or 1970's.  HX ORTHOPAEDIC      Bones spur removed from left & right foot 2013.  HX UROLOGICAL  8/18/2015    1316 Dr. Bradley Patricio Patella, Cystoscopy, left retrograde, left double-J cath       Family History:  Family History   Problem Relation Age of Onset    Diabetes Father     Heart Disease Mother     Diabetes Sister     Hypertension Sister     Arthritis-osteo Sister     Arthritis-osteo Brother     Diabetes Other     Diabetes Other         Uncle, Aunt    Hypertension Other         Uncle; Aunt       Social History:  Social History     Tobacco Use    Smoking status: Current Every Day Smoker     Packs/day: 0.25     Types: Cigarettes    Smokeless tobacco: Never Used   Substance Use Topics    Alcohol use: Not Currently     Alcohol/week: 0.0 standard drinks     Comment: 1 beers a day.  Drug use: Not Currently     Types: Cocaine     Comment: +UDS for cocaine in 2017       Allergies: Allergies   Allergen Reactions    Shellfish Derived Other (comments)     Causes Gout    Tramadol Itching         Review of Systems   Review of Systems   Constitutional: Negative for chills, fatigue and fever. HENT: Negative for congestion, rhinorrhea, sore throat and trouble swallowing. Eyes: Negative for discharge, redness and itching. Respiratory: Negative for cough, shortness of breath, wheezing and stridor. Cardiovascular: Negative for chest pain, palpitations and leg swelling. Gastrointestinal: Negative for abdominal pain, blood in stool, diarrhea, nausea and vomiting. Endocrine: Negative for polydipsia and polyuria. Genitourinary: Negative for difficulty urinating and dysuria. Musculoskeletal: Negative for back pain. Skin: Negative for rash. Neurological: Positive for syncope and headaches. Negative for light-headedness. Headache over his head struck the cement wall. Questionable syncope as per HPI. Hematological: Does not bruise/bleed easily. Psychiatric/Behavioral: Negative for behavioral problems and confusion. All other systems reviewed and are negative. Physical Exam     Vitals:    07/26/19 1329 07/26/19 1330 07/26/19 1339 07/26/19 1341   BP: 185/83      Temp:   98.1 °F (36.7 °C)    SpO2:  98%  98%     Physical Exam   Constitutional: He is oriented to person, place, and time. He appears well-developed and well-nourished. No distress. HENT:   Head: Normocephalic and atraumatic. Mouth/Throat: Oropharynx is clear and moist.   Postero-temporal area of tenderness to the scalp where patient states he struck the cement wall. There are no skin changes, no ecchymosis, no underlying crepitance or bony step-off at this area. Eyes: Pupils are equal, round, and reactive to light. Conjunctivae and EOM are normal.   Neck: Normal range of motion. Neck supple. No JVD present. No tracheal deviation present. Cardiovascular: Normal rate, regular rhythm and normal heart sounds. Exam reveals no gallop and no friction rub. No murmur heard. Pulmonary/Chest: Effort normal and breath sounds normal. He has no wheezes. Abdominal: Soft. Bowel sounds are normal. There is no tenderness. Musculoskeletal: Normal range of motion. He exhibits no edema, tenderness or deformity. Neurological: He is alert and oriented to person, place, and time. No cranial nerve deficit. Skin: Skin is warm and dry. No rash noted. No erythema. Psychiatric: He has a normal mood and affect.  His behavior is normal. Judgment and thought content normal.         Diagnostic Study Results     Labs -     Recent Results (from the past 12 hour(s))   EKG, 12 LEAD, INITIAL    Collection Time: 07/26/19  1:28 PM   Result Value Ref Range    Ventricular Rate 97 BPM    Atrial Rate 97 BPM    P-R Interval 152 ms    QRS Duration 90 ms    Q-T Interval 364 ms    QTC Calculation (Bezet) 462 ms    Calculated P Axis 56 degrees    Calculated R Axis 18 degrees    Calculated T Axis 165 degrees Diagnosis       Normal sinus rhythm  Possible Left atrial enlargement  ST & T wave abnormality, consider inferolateral ischemia  Prolonged QT  Abnormal ECG  When compared with ECG of 08-SEP-2018 07:57,  No significant change was found     CBC WITH AUTOMATED DIFF    Collection Time: 07/26/19  1:42 PM   Result Value Ref Range    WBC 6.5 4.6 - 13.2 K/uL    RBC 3.63 (L) 4.70 - 5.50 M/uL    HGB 9.8 (L) 13.0 - 16.0 g/dL    HCT 30.2 (L) 36.0 - 48.0 %    MCV 83.2 74.0 - 97.0 FL    MCH 27.0 24.0 - 34.0 PG    MCHC 32.5 31.0 - 37.0 g/dL    RDW 15.4 (H) 11.6 - 14.5 %    PLATELET 177 879 - 077 K/uL    MPV 10.1 9.2 - 11.8 FL    NEUTROPHILS 59 40 - 73 %    LYMPHOCYTES 27 21 - 52 %    MONOCYTES 7 3 - 10 %    EOSINOPHILS 6 (H) 0 - 5 %    BASOPHILS 1 0 - 2 %    ABS. NEUTROPHILS 4.0 1.8 - 8.0 K/UL    ABS. LYMPHOCYTES 1.7 0.9 - 3.6 K/UL    ABS. MONOCYTES 0.4 0.05 - 1.2 K/UL    ABS. EOSINOPHILS 0.4 0.0 - 0.4 K/UL    ABS. BASOPHILS 0.0 0.0 - 0.1 K/UL    DF AUTOMATED         Radiologic Studies -   XR CHEST PA LAT    (Results Pending)   XR SHOULDER LT AP/LAT MIN 2 V    (Results Pending)   CT HEAD WO CONT    (Results Pending)     CT Results  (Last 48 hours)    None        CXR Results  (Last 48 hours)    None            Medical Decision Making   I am the first provider for this patient. I reviewed the vital signs, available nursing notes, past medical history, past surgical history, family history and social history. Vital Signs-Reviewed the patient's vital signs. EKG: Interpreted by the EP. Time Interpreted: 1:28 PM   Rate: 97 bpm   Rhythm: Normal sinus rhythm   Interpretation: Left atrial enlargement; left ventricular hypertrophy with strain pattern. Records Reviewed: Old Medical Records    ED Course:   Patient felt better after Tylenol in emergency department and remained stable without further complaint. Disposition:  TBD      Provider Notes (Medical Decision Making):    Fall with minor closed head injury and left shoulder/chest pain. At time of turnover, labs, head CT, chest x-ray, and shoulder x-rays are pending. 1:48 PM : Pt care transferred to Dr. Keyana Horn  ,ED provider. History of patient complaint(s), available diagnostic reports and current treatment plan has been discussed thoroughly. Bedside rounding on patient occured : yes . Intended disposition of patient : TBD  Pending diagnostics reports and/or labs (please list): labs, head CT, CXR, left shoulder series. Diagnosis     Clinical Impression:   1. Fall from slip, trip, or stumble, initial encounter    2. Tension-type headache, not intractable, unspecified chronicity pattern    3. Contusion of scalp, initial encounter    4.  Shoulder strain, left, initial encounter

## 2019-07-26 NOTE — DISCHARGE INSTRUCTIONS
Patient Education        Head Injury: Care Instructions  Your Care Instructions    Most injuries to the head are minor. Bumps, cuts, and scrapes on the head and face usually heal well and can be treated the same as injuries to other parts of the body. Although it's rare, once in a while a more serious problem shows up after you are home. So it's good to be on the lookout for symptoms for a day or two. Follow-up care is a key part of your treatment and safety. Be sure to make and go to all appointments, and call your doctor if you are having problems. It's also a good idea to know your test results and keep a list of the medicines you take. How can you care for yourself at home? · Follow your doctor's instructions. He or she will tell you if you need someone to watch you closely for the next 24 hours or longer. · Take it easy for the next few days or more if you are not feeling well. · Ask your doctor when it's okay for you to go back to activities like driving a car, riding a bike, or operating machinery. When should you call for help? Call 911 anytime you think you may need emergency care. For example, call if:    · You have a seizure.     · You passed out (lost consciousness).     · You are confused or can't stay awake.    Call your doctor now or seek immediate medical care if:    · You have new or worse vomiting.     · You feel less alert.     · You have new weakness or numbness in any part of your body.    Watch closely for changes in your health, and be sure to contact your doctor if:    · You do not get better as expected.     · You have new symptoms, such as headaches, trouble concentrating, or changes in mood. Where can you learn more? Go to http://leslie-graciela.info/. Enter Y443 in the search box to learn more about \"Head Injury: Care Instructions. \"  Current as of: March 28, 2019  Content Version: 12.1  © 5952-2876 Healthwise, Incorporated.  Care instructions adapted under license by [address] (which disclaims liability or warranty for this information). If you have questions about a medical condition or this instruction, always ask your healthcare professional. [address] any warranty or liability for your use of this information. Patient Education     Contusion: Care Instructions  Your Care Instructions  Contusion is the medical term for a bruise. It is the result of a direct blow or an impact, such as a fall. Contusions are common sports injuries. Most people think of a bruise as a black-and-blue spot. This happens when small blood vessels get torn and leak blood under the skin. But bones, muscles, and organs can also get bruised. This may damage deep tissues but not cause a bruise you can see. The doctor will do a physical exam to find the location of your contusion. You may also have tests to make sure you do not have a more serious injury, such as a broken bone or nerve damage. These may include X-rays or other imaging tests like a CT scan or MRI. Deep-tissue contusions may cause pain and swelling. But if there is no serious damage, they will often get better in a few weeks with home treatment. The doctor has checked you carefully, but problems can develop later. If you notice any problems or new symptoms, get medical treatment right away. Follow-up care is a key part of your treatment and safety. Be sure to make and go to all appointments, and call your doctor if you are having problems. It's also a good idea to know your test results and keep a list of the medicines you take. How can you care for yourself at home? · Put ice or a cold pack on the sore area for 10 to 20 minutes at a time to stop swelling. Put a thin cloth between the ice pack and your skin. · Be safe with medicines. Read and follow all instructions on the label. ¨ If the doctor gave you a prescription medicine for pain, take it as prescribed.   ¨ If you are not taking a prescription pain medicine, ask your doctor if you can take an over-the-counter medicine. · If you can, prop up the sore area on pillows as much as possible for the next few days. Try to keep the sore area above the level of your heart. When should you call for help? Call your doctor now or seek immediate medical care if:  · Your pain gets worse. · You have new or worse swelling. · You have tingling, weakness, or numbness in the area near the contusion. · The area near the contusion is cold or pale. Watch closely for changes in your health, and be sure to contact your doctor if:  · You do not get better as expected. Where can you learn more? Go to Immunetics.be  Enter [id number] in the search box to learn more about \"Contusion: Care Instructions. \"   © 5715-7800 Healthwise, Incorporated. Care instructions adapted under license by [facility] [employer] (which disclaims liability or warranty for this information). This care instruction is for use with your licensed healthcare professional. If you have questions about a medical condition or this instruction, always ask your healthcare professional. [place] [address] any warranty or liability for your use of this information.   Content Version: 61.9.102839; Current as of: May 22, 2015

## 2019-07-26 NOTE — ED NOTES
2:00 PM :Pt care assumed from Dr. Coco Lindsey, ED provider. Pt complaint(s), current treatment plan, progression and available diagnostic results have been discussed thoroughly. Rounding occurred: yes  Intended Disposition: TBD   Pending diagnostic reports and/or labs (please list): CT Head      3:08 PM  Consult:  Discussed care with Dr. Latoya Eng (Nephrology) Standard discussion; including history of patients chief complaint, available diagnostic results, and treatment course. Recommends 500 cc bolus and states patient can follow-up in the office. 5:09 PM  Patient is a 51-year-old male who had a fall and near syncopal episode. Patient appears well. Feels better after medication. Discussed with nephrology due to increasing creatinine and potassium of 5. Recommends 500 cc bolus and office follow-up next week. Patient has been advised and he is agreeable to this. Blood pressure is 293 systolic without treatment. Pain is been controlled with Percocet initially and Zofran for headache. No signs of fluid overload. Patient does not require admission this time. No acute findings on imaging of head, shoulder or chest.  Stable for discharge. NAD  AVSS  Stable for discharge. Agrees with plan. Follow-up with PMD.  Return if further concerns. Scribe Attestation     Eyad Love acting as a scribe for and in the presence of Attila Castro DO      July 26, 2019 at 2:17 PM       Provider Attestation:      I personally performed the services described in the documentation, reviewed the documentation, as recorded by the scribe in my presence, and it accurately and completely records my words and actions.  July 26, 2019 at 2:17 PM - Attila Castro DO

## 2019-07-26 NOTE — ED TRIAGE NOTES
Per EMS. Patient had a syncopal episode while at [employer]. He fell into a sitting position. Endorses a headache, denies chest pain, SOB, n/v. Blood pressure upon EMS arrival was 230/110. Last BP was 190/100. Hx of DM and HTN.

## 2019-07-27 LAB
ATRIAL RATE: 97 BPM
CALCULATED P AXIS, ECG09: 56 DEGREES
CALCULATED R AXIS, ECG10: 18 DEGREES
CALCULATED T AXIS, ECG11: 165 DEGREES
DIAGNOSIS, 93000: NORMAL
P-R INTERVAL, ECG05: 152 MS
Q-T INTERVAL, ECG07: 364 MS
QRS DURATION, ECG06: 90 MS
QTC CALCULATION (BEZET), ECG08: 462 MS
VENTRICULAR RATE, ECG03: 97 BPM

## 2019-08-05 ENCOUNTER — HOSPITAL ENCOUNTER (OUTPATIENT)
Dept: LAB | Age: 59
Discharge: HOME | End: 2019-08-05
Payer: MEDICARE

## 2019-08-05 LAB
25(OH)D3 SERPL-MCNC: 20.6 NG/ML (ref 30–100)
ALBUMIN SERPL-MCNC: 3.3 G/DL (ref 3.4–5)
ANION GAP SERPL CALC-SCNC: 9 MMOL/L (ref 3–18)
BASOPHILS # BLD: 0 K/UL (ref 0–0.1)
BASOPHILS NFR BLD: 0 % (ref 0–2)
BUN SERPL-MCNC: 62 MG/DL (ref 7–18)
BUN/CREAT SERPL: 8 (ref 12–20)
CALCIUM SERPL-MCNC: 8.1 MG/DL (ref 8.5–10.1)
CALCIUM SERPL-MCNC: 8.5 MG/DL (ref 8.5–10.1)
CHLORIDE SERPL-SCNC: 110 MMOL/L (ref 100–111)
CO2 SERPL-SCNC: 25 MMOL/L (ref 21–32)
CREAT SERPL-MCNC: 8.12 MG/DL (ref 0.6–1.3)
CREAT UR-MCNC: 133 MG/DL (ref 30–125)
DIFFERENTIAL METHOD BLD: ABNORMAL
EOSINOPHIL # BLD: 0.2 K/UL (ref 0–0.4)
EOSINOPHIL NFR BLD: 2 % (ref 0–5)
ERYTHROCYTE [DISTWIDTH] IN BLOOD BY AUTOMATED COUNT: 15.5 % (ref 11.6–14.5)
GLUCOSE SERPL-MCNC: 116 MG/DL (ref 74–99)
HCT VFR BLD AUTO: 29.9 % (ref 36–48)
HGB BLD-MCNC: 9.4 G/DL (ref 13–16)
LYMPHOCYTES # BLD: 1.7 K/UL (ref 0.9–3.6)
LYMPHOCYTES NFR BLD: 20 % (ref 21–52)
MCH RBC QN AUTO: 26.9 PG (ref 24–34)
MCHC RBC AUTO-ENTMCNC: 31.4 G/DL (ref 31–37)
MCV RBC AUTO: 85.4 FL (ref 74–97)
MONOCYTES # BLD: 0.6 K/UL (ref 0.05–1.2)
MONOCYTES NFR BLD: 8 % (ref 3–10)
NEUTS SEG # BLD: 5.9 K/UL (ref 1.8–8)
NEUTS SEG NFR BLD: 70 % (ref 40–73)
PHOSPHATE SERPL-MCNC: 6.1 MG/DL (ref 2.5–4.9)
PLATELET # BLD AUTO: 374 K/UL (ref 135–420)
PMV BLD AUTO: 10.1 FL (ref 9.2–11.8)
POTASSIUM SERPL-SCNC: 4.9 MMOL/L (ref 3.5–5.5)
PROT UR-MCNC: 783 MG/DL
PTH-INTACT SERPL-MCNC: 566.7 PG/ML (ref 18.4–88)
RBC # BLD AUTO: 3.5 M/UL (ref 4.7–5.5)
SODIUM SERPL-SCNC: 144 MMOL/L (ref 136–145)
WBC # BLD AUTO: 8.4 K/UL (ref 4.6–13.2)

## 2019-08-05 PROCEDURE — 85025 COMPLETE CBC W/AUTO DIFF WBC: CPT

## 2019-08-05 PROCEDURE — 82570 ASSAY OF URINE CREATININE: CPT

## 2019-08-05 PROCEDURE — 36415 COLL VENOUS BLD VENIPUNCTURE: CPT

## 2019-08-05 PROCEDURE — 80069 RENAL FUNCTION PANEL: CPT

## 2019-08-05 PROCEDURE — 83970 ASSAY OF PARATHORMONE: CPT

## 2019-08-05 PROCEDURE — 84156 ASSAY OF PROTEIN URINE: CPT

## 2019-08-05 PROCEDURE — 82306 VITAMIN D 25 HYDROXY: CPT

## 2019-08-07 ENCOUNTER — OFFICE VISIT (OUTPATIENT)
Dept: VASCULAR SURGERY | Age: 59
End: 2019-08-07

## 2019-08-07 VITALS
SYSTOLIC BLOOD PRESSURE: 140 MMHG | DIASTOLIC BLOOD PRESSURE: 78 MMHG | RESPIRATION RATE: 17 BRPM | BODY MASS INDEX: 39.78 KG/M2 | WEIGHT: 233 LBS | HEART RATE: 80 BPM | HEIGHT: 64 IN

## 2019-08-07 DIAGNOSIS — N18.4 CHRONIC RENAL DISEASE, STAGE IV (HCC): Primary | ICD-10-CM

## 2019-08-07 NOTE — LETTER
8/7/19 Patient: Dorita Worley YOB: 1960 Date of Visit: 8/7/2019 Denisse Pak MD 
[address] [place] [address] VIA Facsimile: [phone] Dear Denisse Pak MD, Thank you for referring Mr. Dorita Worley to [facility] for evaluation. My notes for this consultation are attached. If you have questions, please do not hesitate to call me. I look forward to following your patient along with you. Sincerely, Danilo Obregon MD

## 2019-08-07 NOTE — PROGRESS NOTES
1. Have you been to an emergency room or urgent care clinic since your last visit? No    Hospitalized since your last visit? If yes, where, when, and reason for visit? NO  2. Have you seen or consulted any other health care providers outside of the [facility] since your last visit including any procedures, health maintenance items. If yes, where, when and reason for visit?  NO

## 2019-08-07 NOTE — PROGRESS NOTES
Rodger Salguero    Chief Complaint   Patient presents with    End Stage Renal Disease       History and Physical    Rodger Salguero is a 62 y.o. male with left radiocephalic fistula. He had a branch ligation seems to be doing well without. No claudication or rest pain. Past Medical History:   Diagnosis Date    Alcohol abuse     Alcohol-induced chronic pancreatitis ([facility] [address] 9/7/2018    Arthritis     Atherosclerotic cardiovascular disease     CAD (coronary artery disease)     mild disease of coronaries (cor angio 2006),wife states he has 1 stent    Cardiac cath 01/26/2006    RCA mild. Otherwise patent coronaries. LVEDP 15.  EF 55-60%.  Cardiac echocardiogram 03/27/2009    EF 60%. No cardiac source of embolism. No significant valvular pathology.  Cardiac nuclear imaging test 12/06/2011    Small, mild inferior infarction or possibly artifact. No ischemia. EF 45%. No TID. No reg WMA.  Cardiovascular LLE venous duplex 08/14/2015    Left leg:  No DVT. Dilated lymph node in left groin.  Chronic kidney disease     Chronic obstructive pulmonary disease (HCC)     Constipation     Diabetes mellitus type II     Gout     Hepatic steatosis     Hypercholesterolemia     Hypertension     Knee effusion, left     Left knee pain     Morbid obesity (HCC)     Noncompliance     Obesity (BMI 30-39. 9)     S/P colonoscopy 3/8/05    Dr. Kaci Aguila; normal; f/u 10 years    Stomach problems     TIA (transient ischemic attack) 3/09    Tobacco abuse      Past Surgical History:   Procedure Laterality Date    ABDOMEN SURGERY PROC UNLISTED      Hernia repair in 1960's or 1970's.  HX ORTHOPAEDIC      Bones spur removed from left & right foot 2013.     HX UROLOGICAL  8/18/2015    [facility], Dr. Monroe Reasons, Cystoscopy, left retrograde, left double-J cath     Patient Active Problem List   Diagnosis Code    Allergic rhinitis J30.9    Neuropathy G62.9    Right knee pain M25.561    Dyslipidemia E78.5    Gout M10.9  CAD (coronary artery disease) I25.10    Hypertensive heart disease I11.9    Obesity (BMI 30-39. 9) E66.9    Constipation K59.00    Tobacco abuse Z72.0    TIA (transient ischemic attack) G45.9    Hepatic steatosis K76.0    Noncompliance Z91.19    Testicular/scrotal pain MRF0036    UTI (urinary tract infection) N39.0    Contusion of knee S80.00XA    Knee strain S86.919A    Effusion of patella M25.469    Hemoptysis R04.2    Rash R21    Laceration of gum S01.512A    Fractured tooth S02. 5XXA    Renal stones N20.0    Microalbuminuria R80.9    Hypertriglyceridemia E78.1    Type 2 diabetes mellitus without complication (HCC) L06.2    Acute unilateral obstructive uropathy N13.9    ARF (acute renal failure) (HCC) N17.9    Essential hypertension I10    Advance directive discussed with patient Z70.80    Acute chest pain R07.9    Left sided numbness R20.0    Chronic kidney disease, stage III (moderate) (HCC) N18.3    Persistent proteinuria R80.1    Secondary hyperparathyroidism of renal origin ([facility] [address] N25.81    Hypoglycemia E16.2    Cocaine abuse (HCC) F14.10    Chronic renal disease, stage IV (HCC) N18.4    Obesity, morbid (HCC) E66.01    Type 2 diabetes with nephropathy (HCC) E11.21    Type II diabetes mellitus with nephropathy (HCC) E11.21    Alcohol-induced chronic pancreatitis (HCC) K86.0    Acute on chronic renal failure (HCC) N17.9, N18.9    Pancreatic insufficiency K86.89    Acute pain in scrotum N50.82     Current Outpatient Medications   Medication Sig Dispense Refill    ondansetron hcl (ZOFRAN) 4 mg tablet Take 1 Tab by mouth every eight (8) hours as needed for Nausea. 10 Tab 0    colchicine 0.6 mg tablet Take 1 Tab by mouth daily. 22 Tab 2    colchicine 0.6 mg tablet Take 1 tablet by mouth Q1Hour for gout pain. NOT TO EXCEED 3 TABLETS IN 24 HOURS. 30 Tab 0    indomethacin (INDOCIN) 25 mg capsule Take 2 Caps by mouth three (3) times daily.  With food 60 Cap 0    methylPREDNISolone (MEDROL, CASS,) 4 mg tablet Per dose pack instructions 1 Dose Pack 0    ibuprofen (MOTRIN) 600 mg tablet Take 1 Tab by mouth every six (6) hours as needed for Pain. 20 Tab 0    lipase-protease-amylase (CREON 6000) 6,000-19,000 -30,000 unit capsule Take 1 Cap by mouth three (3) times daily (with meals). Indications: exocrine pancreatic insufficiency 90 Cap 0    omeprazole (PRILOSEC) 20 mg capsule Take 20 mg by mouth daily.  febuxostat (ULORIC) 40 mg tab tablet Take 1 Tab by mouth daily. 14 Tab 1    hydrALAZINE (APRESOLINE) 50 mg tablet Take 1 Tab by mouth two (2) times a day. 20 Tab 0    tamsulosin (FLOMAX) 0.4 mg capsule Take 1 Cap by mouth daily. Start this medication on Friday 12/15/17  Indications: Urolithiasis 6 Cap 0    glucose blood VI test strips (FREESTYLE TEST) strip by Does Not Apply route See Admin Instructions. Check twice a day 100 Strip 2    bisacodyl (DULCOLAX, BISACODYL,) 5 mg EC tablet Take 1 Tab by mouth daily as needed for Constipation. 30 Tab 0    NOVOLIN 70/30 100 unit/mL (70-30) injection INJECT 30 UNITS SC IN THE MORNING AND 20 UNITS SC IN THE EVENING BEFORE SUPPER 10 mL 4    isosorbide mononitrate ER (IMDUR) 60 mg CR tablet Take 1 Tab by mouth daily. 30 Tab 6    atorvastatin (LIPITOR) 40 mg tablet Take 1 Tab by mouth nightly. 30 Tab 6    furosemide (LASIX) 40 mg tablet Take 1 Tab by mouth daily. 30 Tab 6    fluticasone (FLONASE) 50 mcg/actuation nasal spray 2 Sprays by Both Nostrils route daily.  carvedilol (COREG) 6.25 mg tablet Take 1 Tab by mouth two (2) times daily (with meals). (Patient taking differently: Take 25 mg by mouth two (2) times daily (with meals). ) 60 Tab 6    nitroglycerin (NITROSTAT) 0.4 mg SL tablet 1 Tab by SubLINGual route as needed for Chest Pain. (Patient taking differently: 1 Tab by SubLINGual route as needed for Chest Pain.  Pt to take 1 tab q 5 min up to three times, if symptom persist pt. to call 911.) 20 Tab 0    aspirin 81 mg chewable tablet Take 1 Tab by mouth daily. 30 Tab 0    B COMPLEX W-C NO.20/FOLIC ACID (B COMPLEX & C NO.20-FOLIC ACID PO) Take  by mouth.  budesonide-formoterol (SYMBICORT) 160-4.5 mcg/actuation HFA inhaler Take 2 Puffs by inhalation two (2) times a day. 1 Inhaler 0    calcitRIOL (ROCALTROL) 0.25 mcg capsule Take 1 Cap by mouth every Monday, Wednesday, Friday. 15 Cap 0    Lancets misc Check 3 times a day , needs according contour glucometer 1 Package 11    Blood-Glucose Meter monitoring kit Check twice daily. 1 kit 0     Allergies   Allergen Reactions    Shellfish Derived Other (comments)     Causes Gout    Tramadol Itching     Social History     Socioeconomic History    Marital status:      Spouse name: Not on file    Number of children: Not on file    Years of education: Not on file    Highest education level: Not on file   Occupational History    Occupation: disabled   Social Needs    Financial resource strain: Not on file    Food insecurity:     Worry: Not on file     Inability: Not on file   InformedDNA needs:     Medical: Not on file     Non-medical: Not on file   Tobacco Use    Smoking status: Current Every Day Smoker     Packs/day: 0.25     Types: Cigarettes    Smokeless tobacco: Never Used   Substance and Sexual Activity    Alcohol use: Not Currently     Alcohol/week: 0.0 standard drinks     Comment: 1 beers a day.      Drug use: Not Currently     Types: Cocaine     Comment: +UDS for cocaine in 2017    Sexual activity: Yes   Lifestyle    Physical activity:     Days per week: Not on file     Minutes per session: Not on file    Stress: Not on file   Relationships    Social connections:     Talks on phone: Not on file     Gets together: Not on file     Attends Latter-day service: Not on file     Active member of club or organization: Not on file     Attends meetings of clubs or organizations: Not on file     Relationship status: Not on file    Intimate partner violence: Fear of current or ex partner: Not on file     Emotionally abused: Not on file     Physically abused: Not on file     Forced sexual activity: Not on file   Other Topics Concern    Not on file   Social History Narrative    Not on file      Family History   Problem Relation Age of Onset    Diabetes Father     Heart Disease Mother     Diabetes Sister     Hypertension Sister     Arthritis-osteo Sister     Arthritis-osteo Brother     Diabetes Other     Diabetes Other         Uncle, Aunt    Hypertension Other         Uncle; Aunt       Physical Exam:    Visit Vitals  /78 (BP 1 Location: Left arm, BP Patient Position: Sitting)   Pulse 80   Resp 17   Ht 5' 4\" (1.626 m)   Wt 233 lb (105.7 kg)   BMI 39.99 kg/m²      General: Well-appearing male in no acute distress  HEENT: EOMI no scleral icterus is noted  Pulmonary: No increased work of breathing is noted  Extremity: Left upper extremity is warm and well-perfused there is an easily palpable thrill of his radiocephalic fistula  Neuro: Cranial nerves II through XII grossly intact    Impression and Plan:  Bob Helms is a 62 y.o. male with radiocephalic fistula of the left upper extremity status post branch ligation. Easily palpable thrill all the way into his upper forearm. I think is perfectly fine for them to attempt to use of his fistula. If there is any issues or problems he can come back and see us for any further needs. We reviewed the plan with the patient and the patient understands. We also gave the patient appropriate instructions on their disease process and when to call back. Greater than 50% of this visit was spent with face to face discussion. Follow-up and Dispositions    · Return if symptoms worsen or fail to improve. Najma Guerrero MD    PLEASE NOTE:  This document has been produced using voice recognition software. Unrecognized errors in transcription may be present.

## 2019-11-06 ENCOUNTER — HOSPITAL ENCOUNTER (OUTPATIENT)
Dept: LAB | Age: 59
Discharge: HOME | End: 2019-11-06
Payer: MEDICARE

## 2019-11-06 DIAGNOSIS — D63.1 ANEMIA IN CHRONIC KIDNEY DISEASE: ICD-10-CM

## 2019-11-06 DIAGNOSIS — D50.9 IRON DEFICIENCY ANEMIA, UNSPECIFIED: ICD-10-CM

## 2019-11-06 DIAGNOSIS — N18.9 ANEMIA IN CHRONIC KIDNEY DISEASE: ICD-10-CM

## 2019-11-06 DIAGNOSIS — N18.6 TYPE 2 DIABETES MELLITUS WITH ESRD (END-STAGE RENAL DISEASE) (HCC): ICD-10-CM

## 2019-11-06 DIAGNOSIS — E55.9 VITAMIN D DEFICIENCY: ICD-10-CM

## 2019-11-06 DIAGNOSIS — R80.1 PERSISTENT PROTEINURIA: ICD-10-CM

## 2019-11-06 DIAGNOSIS — R31.9 BLOOD IN THE URINE: ICD-10-CM

## 2019-11-06 DIAGNOSIS — E11.22 TYPE 2 DIABETES MELLITUS WITH ESRD (END-STAGE RENAL DISEASE) (HCC): ICD-10-CM

## 2019-11-06 DIAGNOSIS — E79.0 URICACIDEMIA: ICD-10-CM

## 2019-11-06 DIAGNOSIS — N18.9 CHRONIC KIDNEY DISEASE, UNSPECIFIED: ICD-10-CM

## 2019-11-06 DIAGNOSIS — N18.5 CHRONIC KIDNEY DISEASE, STAGE V (HCC): ICD-10-CM

## 2019-11-06 DIAGNOSIS — I12.9 MALIGNANT HYPERTENSIVE KIDNEY DISEASE WITH CHRONIC KIDNEY DISEASE STAGE I THROUGH STAGE IV, OR UNSPECIFIED(403.00): ICD-10-CM

## 2019-11-06 DIAGNOSIS — N25.81 HYPERPARATHYROIDISM DUE TO RENAL INSUFFICIENCY (HCC): ICD-10-CM

## 2019-11-06 LAB
25(OH)D3 SERPL-MCNC: 18.6 NG/ML (ref 30–100)
ALBUMIN SERPL-MCNC: 3.2 G/DL (ref 3.4–5)
ANION GAP SERPL CALC-SCNC: 10 MMOL/L (ref 3–18)
BASOPHILS # BLD: 0 K/UL (ref 0–0.1)
BASOPHILS NFR BLD: 0 % (ref 0–2)
BUN SERPL-MCNC: 103 MG/DL (ref 7–18)
BUN/CREAT SERPL: 8 (ref 12–20)
CALCIUM SERPL-MCNC: 6.5 MG/DL (ref 8.5–10.1)
CALCIUM SERPL-MCNC: 6.7 MG/DL (ref 8.5–10.1)
CHLORIDE SERPL-SCNC: 110 MMOL/L (ref 100–111)
CO2 SERPL-SCNC: 20 MMOL/L (ref 21–32)
CREAT SERPL-MCNC: 13.2 MG/DL (ref 0.6–1.3)
DIFFERENTIAL METHOD BLD: ABNORMAL
EOSINOPHIL # BLD: 0 K/UL (ref 0–0.4)
EOSINOPHIL NFR BLD: 0 % (ref 0–5)
ERYTHROCYTE [DISTWIDTH] IN BLOOD BY AUTOMATED COUNT: 16.2 % (ref 11.6–14.5)
GLUCOSE SERPL-MCNC: 175 MG/DL (ref 74–99)
HCT VFR BLD AUTO: 27.6 % (ref 36–48)
HGB BLD-MCNC: 8.4 G/DL (ref 13–16)
LYMPHOCYTES # BLD: 0.8 K/UL (ref 0.9–3.6)
LYMPHOCYTES NFR BLD: 11 % (ref 21–52)
MCH RBC QN AUTO: 26.7 PG (ref 24–34)
MCHC RBC AUTO-ENTMCNC: 30.4 G/DL (ref 31–37)
MCV RBC AUTO: 87.6 FL (ref 74–97)
MONOCYTES # BLD: 0.2 K/UL (ref 0.05–1.2)
MONOCYTES NFR BLD: 2 % (ref 3–10)
NEUTS SEG # BLD: 6.7 K/UL (ref 1.8–8)
NEUTS SEG NFR BLD: 87 % (ref 40–73)
PHOSPHATE SERPL-MCNC: 9.9 MG/DL (ref 2.5–4.9)
PLATELET # BLD AUTO: 332 K/UL (ref 135–420)
PMV BLD AUTO: 11 FL (ref 9.2–11.8)
POTASSIUM SERPL-SCNC: 5.6 MMOL/L (ref 3.5–5.5)
PTH-INTACT SERPL-MCNC: 1181.5 PG/ML (ref 18.4–88)
RBC # BLD AUTO: 3.15 M/UL (ref 4.7–5.5)
SODIUM SERPL-SCNC: 140 MMOL/L (ref 136–145)
WBC # BLD AUTO: 7.6 K/UL (ref 4.6–13.2)

## 2019-11-06 PROCEDURE — 85025 COMPLETE CBC W/AUTO DIFF WBC: CPT

## 2019-11-06 PROCEDURE — 82306 VITAMIN D 25 HYDROXY: CPT

## 2019-11-06 PROCEDURE — 36415 COLL VENOUS BLD VENIPUNCTURE: CPT

## 2019-11-06 PROCEDURE — 80069 RENAL FUNCTION PANEL: CPT

## 2019-11-06 PROCEDURE — 83970 ASSAY OF PARATHORMONE: CPT

## 2019-11-07 ENCOUNTER — HOSPITAL ENCOUNTER (OUTPATIENT)
Dept: GENERAL RADIOLOGY | Age: 59
Discharge: HOME | End: 2019-11-07
Payer: MEDICARE

## 2019-11-07 DIAGNOSIS — N18.6 END STAGE RENAL DISEASE (HCC): ICD-10-CM

## 2019-11-07 PROCEDURE — 71046 X-RAY EXAM CHEST 2 VIEWS: CPT

## 2019-11-19 ENCOUNTER — OFFICE VISIT (OUTPATIENT)
Dept: ORTHOPEDIC SURGERY | Facility: CLINIC | Age: 59
End: 2019-11-19

## 2019-11-19 VITALS
WEIGHT: 219.4 LBS | BODY MASS INDEX: 37.46 KG/M2 | HEART RATE: 85 BPM | SYSTOLIC BLOOD PRESSURE: 146 MMHG | TEMPERATURE: 97.5 F | DIASTOLIC BLOOD PRESSURE: 68 MMHG | OXYGEN SATURATION: 98 % | HEIGHT: 64 IN | RESPIRATION RATE: 16 BRPM

## 2019-11-19 DIAGNOSIS — M17.11 PRIMARY OSTEOARTHRITIS OF RIGHT KNEE: Primary | ICD-10-CM

## 2019-11-19 RX ORDER — INDOMETHACIN 25 MG/1
50 CAPSULE ORAL 3 TIMES DAILY
Qty: 60 CAP | Refills: 0 | Status: SHIPPED | OUTPATIENT
Start: 2019-11-19 | End: 2019-11-19 | Stop reason: SDUPTHER

## 2019-11-19 RX ORDER — COLCHICINE 0.6 MG/1
TABLET ORAL
Qty: 30 TAB | Refills: 0 | Status: SHIPPED | OUTPATIENT
Start: 2019-11-19 | End: 2020-01-14 | Stop reason: SDUPTHER

## 2019-11-19 RX ORDER — INDOMETHACIN 25 MG/1
CAPSULE ORAL
Qty: 540 CAP | Refills: 0 | Status: SHIPPED | OUTPATIENT
Start: 2019-11-19 | End: 2020-07-06

## 2019-11-19 RX ORDER — TRIAMCINOLONE ACETONIDE 40 MG/ML
40 INJECTION, SUSPENSION INTRA-ARTICULAR; INTRAMUSCULAR ONCE
Qty: 1 ML | Refills: 0
Start: 2019-11-19 | End: 2019-11-19

## 2019-11-19 NOTE — PROGRESS NOTES
1. Have you been to the ER, urgent care clinic since your last visit? Hospitalized since your last visit? No    2. Have you seen or consulted any other health care providers outside of the [address] [place] [address] since your last visit? Include any pap smears or colon screening.  No

## 2019-11-19 NOTE — PROGRESS NOTES
HISTORY OF PRESENT ILLNESS:  Ben Benítez returns to the office with his wife. He fell sometime after his last visit and has been experiencing worsening right knee pain. He does use a wheelchair for ambulation assistance, as well as a single-post cane. He has a history of osteoarthritis of the right knee and is also an end-stage renal patient. He has an appointment with a nephrologist to decide between peritoneal dialysis versus hemodialysis. He was advised to stop taking his Indocin by his nephrologist.  He is not taking Colchicine at this time either. He reports difficulty bending his knee with a large effusion noted. He is a gout sufferer. He has no chest pain today. He does have exertional dyspnea, which is chronic in nature. He has no fever, chills, or night sweats. His pain to the right knee is, at rest, a 5-6/10, and with activity, to including full weightbearing, his pain is a 7-8/10. Knowing better he ate a chinese meal a couple days ago that triggered this gouty attack. PHYSICAL EXAM:  He is a healthy-appearing, well-developed, well-nourished, pleasant, obese, 49-year-old,  male, atraumatic, normocephalic, alert and oriented times three sitting on the table comfortably. He lies supine with no difficulties. He has a 2+ effusion to the right knee. There is no warmth or erythema noted. He can flex with some difficulty back to 70° and extend -5°. There is no calf tenderness or evidence of DVT. RADIOGRAPHS:  X-rays were reviewed previously to reveal moderate to severe tricompartmental osteoarthritis with no fracture deformities noted. IMPRESSION:      1. Right knee pain. 2.  right knee effusion. 3. Decreased range of motion of the right knee secondary to above. 4. History of gout.      Medical Decision Making with Comprehensive Data Review:    The patients presenting problems have been discussed, and they/their family are in agreement with the care plan formulated and outlined with them. Treatment option(s) discussed to include, but cannot be limited to Physical / Occupational outpatient therapy, arthrocentesis, elective and or urgent surgical intervention of aforementioned patient medical condition. I have encouraged the patient / family to ask questions as they arise throughout their visit. The patient elected after all options discussed to treat the   Chief Complaint   Patient presents with    Knee Pain     right knee pain    with aspiration with cortisone injection    PROCEDURE:  Today, under sterile technique, after verbal and written consent were obtained and appropriate time out performed, 3 cc of 1% Lidocaine was used to anesthetize the right knee using the superolateral, intraarticular approach. To follow, 65 cc of cloudy, tophus-laden, yellow aspirate was removed from the same portal.  To follow, 0.5 cc of Kenalog at 40 mg per mL mixed with 15 cc of Sensorcaine 0.75% was injected. There were no complications. The patient tolerated the procedure well. Please Note:    The above patient was treated with the assistance of the [employer] Ultrasound device. Image(s) captured, saved, printed, and copied to chart. PLAN:   I am currently recommending an aspiration and injection of his right knee. We are going to plan to put him back on Colchicine in abortive rescue dose for his gouty attack. We will plan on seeing him back on a prn basis.

## 2019-12-06 RX ORDER — COLCHICINE 0.6 MG/1
TABLET, FILM COATED ORAL
Qty: 30 TAB | Refills: 0 | Status: SHIPPED | OUTPATIENT
Start: 2019-12-06

## 2020-01-02 ENCOUNTER — HOSPITAL ENCOUNTER (EMERGENCY)
Age: 60
Discharge: HOME | End: 2020-01-03
Attending: EMERGENCY MEDICINE
Payer: MEDICARE

## 2020-01-02 VITALS
HEART RATE: 73 BPM | DIASTOLIC BLOOD PRESSURE: 93 MMHG | SYSTOLIC BLOOD PRESSURE: 171 MMHG | TEMPERATURE: 98.2 F | HEIGHT: 64 IN | RESPIRATION RATE: 14 BRPM | BODY MASS INDEX: 37.05 KG/M2 | WEIGHT: 217 LBS | OXYGEN SATURATION: 95 %

## 2020-01-02 DIAGNOSIS — R03.0 ELEVATED BLOOD PRESSURE READING: ICD-10-CM

## 2020-01-02 DIAGNOSIS — M54.42 ACUTE LEFT-SIDED LOW BACK PAIN WITH LEFT-SIDED SCIATICA: Primary | ICD-10-CM

## 2020-01-02 PROCEDURE — 74011250637 HC RX REV CODE- 250/637: Performed by: PHYSICIAN ASSISTANT

## 2020-01-02 PROCEDURE — 99283 EMERGENCY DEPT VISIT LOW MDM: CPT

## 2020-01-02 RX ORDER — CLONIDINE HYDROCHLORIDE 0.1 MG/1
0.1 TABLET ORAL
Status: COMPLETED | OUTPATIENT
Start: 2020-01-02 | End: 2020-01-02

## 2020-01-02 RX ORDER — OXYCODONE AND ACETAMINOPHEN 5; 325 MG/1; MG/1
1 TABLET ORAL
Status: COMPLETED | OUTPATIENT
Start: 2020-01-02 | End: 2020-01-02

## 2020-01-02 RX ORDER — CYCLOBENZAPRINE HCL 5 MG
5 TABLET ORAL
Qty: 15 TAB | Refills: 0 | Status: SHIPPED | OUTPATIENT
Start: 2020-01-02 | End: 2020-07-03

## 2020-01-02 RX ADMIN — OXYCODONE HYDROCHLORIDE AND ACETAMINOPHEN 1 TABLET: 5; 325 TABLET ORAL at 14:00

## 2020-01-02 RX ADMIN — CLONIDINE HYDROCHLORIDE 0.1 MG: 0.1 TABLET ORAL at 13:59

## 2020-01-02 NOTE — DISCHARGE INSTRUCTIONS
Patient Education        Back Pain, Emergency or Urgent Symptoms: Care Instructions  Your Care Instructions    Many people have back pain at one time or another. In most cases, pain gets better with self-care that includes over-the-counter pain medicine, ice, heat, and exercises. Unless you have symptoms of a severe injury or heart attack, you may be able to give yourself a few days before you call a doctor. But some back problems are very serious. Do not ignore symptoms that need to be checked right away. Follow-up care is a key part of your treatment and safety. Be sure to make and go to all appointments, and call your doctor if you are having problems. It's also a good idea to know your test results and keep a list of the medicines you take. How can you care for yourself at home? · Sit or lie in positions that are most comfortable and that reduce your pain. Try one of these positions when you lie down:  ? Lie on your back with your knees bent and supported by large pillows. ? Lie on the floor with your legs on the seat of a sofa or chair. ? Lie on your side with your knees and hips bent and a pillow between your legs. ? Lie on your stomach if it does not make pain worse. · Do not sit up in bed, and avoid soft couches and twisted positions. Bed rest can help relieve pain at first, but it delays healing. Avoid bed rest after the first day. · Change positions every 30 minutes. If you must sit for long periods of time, take breaks from sitting. Get up and walk around, or lie flat. · Try using a heating pad on a low or medium setting, for 15 to 20 minutes every 2 or 3 hours. Try a warm shower in place of one session with the heating pad. You can also buy single-use heat wraps that last up to 8 hours. You can also try ice or cold packs on your back for 10 to 20 minutes at a time, several times a day.  (Put a thin cloth between the ice pack and your skin.) This reduces pain and makes it easier to be active and exercise. · Take pain medicines exactly as directed. ? If the doctor gave you a prescription medicine for pain, take it as prescribed. ? If you are not taking a prescription pain medicine, ask your doctor if you can take an over-the-counter medicine. When should you call for help? Call 911 anytime you think you may need emergency care. For example, call if:    · You are unable to move a leg at all.     · You have back pain with severe belly pain.     · You have symptoms of a heart attack. These may include:  ? Chest pain or pressure, or a strange feeling in the chest.  ? Sweating. ? Shortness of breath. ? Nausea or vomiting. ? Pain, pressure, or a strange feeling in the back, neck, jaw, or upper belly or in one or both shoulders or arms. ? Lightheadedness or sudden weakness. ? A fast or irregular heartbeat. After you call 911, the  may tell you to chew 1 adult-strength or 2 to 4 low-dose aspirin. Wait for an ambulance. Do not try to drive yourself.    Call your doctor now or seek immediate medical care if:    · You have new or worse symptoms in your arms, legs, chest, belly, or buttocks. Symptoms may include:  ? Numbness or tingling. ? Weakness. ? Pain.     · You lose bladder or bowel control.     · You have back pain and:  ? You have injured your back while lifting or doing some other activity. Call if the pain is severe, has not gone away after 1 or 2 days, and you cannot do your normal daily activities. ? You have had a back injury before that needed treatment. ? Your pain has lasted longer than 4 weeks. ? You have had weight loss you cannot explain. ? You have a fever. ? You are age 48 or older. ? You have cancer now or have had it before.    Watch closely for changes in your health, and be sure to contact your doctor if you are not getting better as expected. Where can you learn more? Go to http://leslie-graciela.info/.   Enter M040 in the search box to learn more about \"Back Pain, Emergency or Urgent Symptoms: Care Instructions. \"  Current as of: June 26, 2019  Content Version: 12.2  © 1958-8922 [employer]. Care instructions adapted under license by [employer] (which disclaims liability or warranty for this information). If you have questions about a medical condition or this instruction, always ask your healthcare professional. [facility] any warranty or liability for your use of this information. Patient Education        Low Back Pain: Exercises  Introduction  Here are some examples of exercises for you to try. The exercises may be suggested for a condition or for rehabilitation. Start each exercise slowly. Ease off the exercises if you start to have pain. You will be told when to start these exercises and which ones will work best for you. How to do the exercises  Press-up    1. Lie on your stomach, supporting your body with your forearms. 2. Press your elbows down into the floor to raise your upper back. As you do this, relax your stomach muscles and allow your back to arch without using your back muscles. As your press up, do not let your hips or pelvis come off the floor. 3. Hold for 15 to 30 seconds, then relax. 4. Repeat 2 to 4 times. Alternate arm and leg (bird dog) exercise    1. Start on the floor, on your hands and knees. 2. Tighten your belly muscles. 3. Raise one leg off the floor, and hold it straight out behind you. Be careful not to let your hip drop down, because that will twist your trunk. 4. Hold for about 6 seconds, then lower your leg and switch to the other leg. 5. Repeat 8 to 12 times on each leg. 6. Over time, work up to holding for 10 to 30 seconds each time. 7. If you feel stable and secure with your leg raised, try raising the opposite arm straight out in front of you at the same time. Knee-to-chest exercise    1.  Lie on your back with your knees bent and your feet flat on the floor. 2. Bring one knee to your chest, keeping the other foot flat on the floor (or keeping the other leg straight, whichever feels better on your lower back). 3. Keep your lower back pressed to the floor. Hold for at least 15 to 30 seconds. 4. Relax, and lower the knee to the starting position. 5. Repeat with the other leg. Repeat 2 to 4 times with each leg. 6. To get more stretch, put your other leg flat on the floor while pulling your knee to your chest.    Curl-ups    1. Lie on the floor on your back with your knees bent at a 90-degree angle. Your feet should be flat on the floor, about 12 inches from your buttocks. 2. Cross your arms over your chest. If this bothers your neck, try putting your hands behind your neck (not your head), with your elbows spread apart. 3. Slowly tighten your belly muscles and raise your shoulder blades off the floor. 4. Keep your head in line with your body, and do not press your chin to your chest.  5. Hold this position for 1 or 2 seconds, then slowly lower yourself back down to the floor. 6. Repeat 8 to 12 times. Pelvic tilt exercise    1. Lie on your back with your knees bent. 2. \"Brace\" your stomach. This means to tighten your muscles by pulling in and imagining your belly button moving toward your spine. You should feel like your back is pressing to the floor and your hips and pelvis are rocking back. 3. Hold for about 6 seconds while you breathe smoothly. 4. Repeat 8 to 12 times. Heel dig bridging    1. Lie on your back with both knees bent and your ankles bent so that only your heels are digging into the floor. Your knees should be bent about 90 degrees. 2. Then push your heels into the floor, squeeze your buttocks, and lift your hips off the floor until your shoulders, hips, and knees are all in a straight line.   3. Hold for about 6 seconds as you continue to breathe normally, and then slowly lower your hips back down to the floor and rest for up to 10 seconds. 4. Do 8 to 12 repetitions. Hamstring stretch in doorway    1. Lie on your back in a doorway, with one leg through the open door. 2. Slide your leg up the wall to straighten your knee. You should feel a gentle stretch down the back of your leg. 3. Hold the stretch for at least 15 to 30 seconds. Do not arch your back, point your toes, or bend either knee. Keep one heel touching the floor and the other heel touching the wall. 4. Repeat with your other leg. 5. Do 2 to 4 times for each leg. Hip flexor stretch    1. Kneel on the floor with one knee bent and one leg behind you. Place your forward knee over your foot. Keep your other knee touching the floor. 2. Slowly push your hips forward until you feel a stretch in the upper thigh of your rear leg. 3. Hold the stretch for at least 15 to 30 seconds. Repeat with your other leg. 4. Do 2 to 4 times on each side. Wall sit    1. Stand with your back 10 to 12 inches away from a wall. 2. Lean into the wall until your back is flat against it. 3. Slowly slide down until your knees are slightly bent, pressing your lower back into the wall. 4. Hold for about 6 seconds, then slide back up the wall. 5. Repeat 8 to 12 times. Follow-up care is a key part of your treatment and safety. Be sure to make and go to all appointments, and call your doctor if you are having problems. It's also a good idea to know your test results and keep a list of the medicines you take. Where can you learn more? Go to http://leslie-graciela.info/. Enter G223 in the search box to learn more about \"Low Back Pain: Exercises. \"  Current as of: June 26, 2019  Content Version: 12.2  © 7729-3343 [employer]. Care instructions adapted under license by [employer] (which disclaims liability or warranty for this information).  If you have questions about a medical condition or this instruction, always ask your healthcare professional. [employer] disclaims any warranty or liability for your use of this information.

## 2020-01-02 NOTE — ED PROVIDER NOTES
EMERGENCY DEPARTMENT HISTORY AND PHYSICAL EXAM    Date: 1/2/2020  Patient Name: Dima Oliveira    History of Presenting Illness     Chief Complaint   Patient presents with    Hip Pain         History Provided By: Patient and sister    Chief Complaint: Left-sided hip pain with radiation into the left lower leg  Duration: Days  Timing: Gradual  Location: Left buttock into the left posterior thigh  Quality: Sharp and aching  Severity: Severe  Modifying Factors: Worse with movement and trying to sleep at night  Associated Symptoms: none       Additional History (Context): Dima Oliveira is a 61 y.o. male with a history of diabetes, chronic kidney disease, hypertension who presents today for history as listed above. Patient denies any fall or trauma. Denies any loss of bowel or bladder. Denies any IV drug abuse. Patient states that he has not tried anything for this at home. Patient states he did take his blood pressure medication today. Denies any headache or other associated symptoms. PCP: Jon Doran MD    Current Outpatient Medications   Medication Sig Dispense Refill    cyclobenzaprine (FLEXERIL) 5 mg tablet Take 1 Tab by mouth three (3) times daily as needed for Muscle Spasm(s). 15 Tab 0    COLCRYS 0.6 mg tablet TAKE 1 TABLET BY MOUTH EVERY 1 HOURS FOR GOUT PAIN. DO NOT EXCEED 3 TABLETS IN 24 HOURS. 30 Tab 0    colchicine 0.6 mg tablet Take 1 tablet by mouth Q1Hour for gout pain. NOT TO EXCEED 3 TABLETS IN 24 HOURS. 30 Tab 0    indomethacin (INDOCIN) 25 mg capsule TAKE 2 CAPSULES BY MOUTH THREE TIMES DAILY WITH FOOD 540 Cap 0    ondansetron hcl (ZOFRAN) 4 mg tablet Take 1 Tab by mouth every eight (8) hours as needed for Nausea. 10 Tab 0    colchicine 0.6 mg tablet Take 1 Tab by mouth daily. 22 Tab 2    methylPREDNISolone (MEDROL, CASS,) 4 mg tablet Per dose pack instructions 1 Dose Pack 0    ibuprofen (MOTRIN) 600 mg tablet Take 1 Tab by mouth every six (6) hours as needed for Pain.  20 Tab 0    lipase-protease-amylase (CREON 6000) 6,000-19,000 -30,000 unit capsule Take 1 Cap by mouth three (3) times daily (with meals). Indications: exocrine pancreatic insufficiency 90 Cap 0    omeprazole (PRILOSEC) 20 mg capsule Take 20 mg by mouth daily.  febuxostat (ULORIC) 40 mg tab tablet Take 1 Tab by mouth daily. 14 Tab 1    hydrALAZINE (APRESOLINE) 50 mg tablet Take 1 Tab by mouth two (2) times a day. 20 Tab 0    tamsulosin (FLOMAX) 0.4 mg capsule Take 1 Cap by mouth daily. Start this medication on Friday 12/15/17  Indications: Urolithiasis 6 Cap 0    glucose blood VI test strips (FREESTYLE TEST) strip by Does Not Apply route See Admin Instructions. Check twice a day 100 Strip 2    bisacodyl (DULCOLAX, BISACODYL,) 5 mg EC tablet Take 1 Tab by mouth daily as needed for Constipation. 30 Tab 0    NOVOLIN 70/30 100 unit/mL (70-30) injection INJECT 30 UNITS SC IN THE MORNING AND 20 UNITS SC IN THE EVENING BEFORE SUPPER 10 mL 4    isosorbide mononitrate ER (IMDUR) 60 mg CR tablet Take 1 Tab by mouth daily. 30 Tab 6    atorvastatin (LIPITOR) 40 mg tablet Take 1 Tab by mouth nightly. 30 Tab 6    furosemide (LASIX) 40 mg tablet Take 1 Tab by mouth daily. 30 Tab 6    fluticasone (FLONASE) 50 mcg/actuation nasal spray 2 Sprays by Both Nostrils route daily.  carvedilol (COREG) 6.25 mg tablet Take 1 Tab by mouth two (2) times daily (with meals). (Patient taking differently: Take 25 mg by mouth two (2) times daily (with meals). ) 60 Tab 6    nitroglycerin (NITROSTAT) 0.4 mg SL tablet 1 Tab by SubLINGual route as needed for Chest Pain. (Patient taking differently: 1 Tab by SubLINGual route as needed for Chest Pain. Pt to take 1 tab q 5 min up to three times, if symptom persist pt. to call 911.) 20 Tab 0    aspirin 81 mg chewable tablet Take 1 Tab by mouth daily. 30 Tab 0    B COMPLEX W-C NO.20/FOLIC ACID (B COMPLEX & C NO.20-FOLIC ACID PO) Take  by mouth.       budesonide-formoterol (SYMBICORT) 160-4.5 mcg/actuation HFA inhaler Take 2 Puffs by inhalation two (2) times a day. 1 Inhaler 0    calcitRIOL (ROCALTROL) 0.25 mcg capsule Take 1 Cap by mouth every Monday, Wednesday, Friday. 15 Cap 0    Lancets misc Check 3 times a day , needs according contour glucometer 1 Package 11    Blood-Glucose Meter monitoring kit Check twice daily. 1 kit 0       Past History     Past Medical History:  Past Medical History:   Diagnosis Date    Alcohol abuse     Alcohol-induced chronic pancreatitis ([address] 9/7/2018    Arthritis     Atherosclerotic cardiovascular disease     CAD (coronary artery disease)     mild disease of coronaries (cor angio 2006),wife states he has 1 stent    Cardiac cath 01/26/2006    RCA mild. Otherwise patent coronaries. LVEDP 15.  EF 55-60%.  Cardiac echocardiogram 03/27/2009    EF 60%. No cardiac source of embolism. No significant valvular pathology.  Cardiac nuclear imaging test 12/06/2011    Small, mild inferior infarction or possibly artifact. No ischemia. EF 45%. No TID. No reg WMA.  Cardiovascular LLE venous duplex 08/14/2015    Left leg:  No DVT. Dilated lymph node in left groin.  Chronic kidney disease     Chronic obstructive pulmonary disease (HCC)     Constipation     Diabetes mellitus type II     Gout     Hepatic steatosis     Hypercholesterolemia     Hypertension     Knee effusion, left     Left knee pain     Morbid obesity (HCC)     Noncompliance     Obesity (BMI 30-39. 9)     S/P colonoscopy 3/8/05    Dr. Albert Ignacio; normal; f/u 10 years    Stomach problems     TIA (transient ischemic attack) 3/09    Tobacco abuse        Past Surgical History:  Past Surgical History:   Procedure Laterality Date    ABDOMEN SURGERY PROC UNLISTED      Hernia repair in 1960's or 1970's.  HX ORTHOPAEDIC      Bones spur removed from left & right foot 2013.     HX UROLOGICAL  8/18/2015    [facility], Dr. Echeverria Diver, Cystoscopy, left retrograde, left double-J cath       [facility] History:  Family History   Problem Relation Age of Onset    Diabetes Father     Heart Disease Mother     Diabetes Sister     Hypertension Sister     Arthritis-osteo Sister     Arthritis-osteo Brother     Diabetes Other     Diabetes Other         Uncle, Aunt    Hypertension Other         Uncle; Aunt       Social History:  Social History     Tobacco Use    Smoking status: Current Every Day Smoker     Packs/day: 0.25     Types: Cigarettes    Smokeless tobacco: Never Used   Substance Use Topics    Alcohol use: Not Currently     Alcohol/week: 0.0 standard drinks     Comment: 1 beers a day.  Drug use: Not Currently     Types: Cocaine     Comment: +UDS for cocaine in 2017       Allergies: Allergies   Allergen Reactions    Shellfish Derived Other (comments)     Causes Gout    Tramadol Itching         Review of Systems   Review of Systems   Constitutional: Negative for chills and fever. HENT: Negative for congestion, rhinorrhea and sore throat. Respiratory: Negative for cough and shortness of breath. Cardiovascular: Negative for chest pain. Gastrointestinal: Negative for abdominal pain, blood in stool, constipation, diarrhea, nausea and vomiting. Genitourinary: Negative for dysuria, frequency and hematuria. Musculoskeletal: Positive for back pain. Negative for myalgias. Skin: Negative for rash and wound. Neurological: Negative for dizziness and headaches. All other systems reviewed and are negative. All Other Systems Negative  Physical Exam     Vitals:    01/02/20 1211 01/02/20 1404   BP: (!) 212/102 (!) 171/93   Pulse: 73    Resp: 14    Temp: 98.2 °F (36.8 °C)    SpO2: 95%    Weight: 98.4 kg (217 lb)    Height: 5' 4\" (1.626 m)      Physical Exam  Vitals signs and nursing note reviewed. Constitutional:       General: He is not in acute distress. Appearance: He is well-developed. He is not diaphoretic. HENT:      Head: Normocephalic and atraumatic.    Eyes: Conjunctiva/sclera: Conjunctivae normal.   Neck:      Musculoskeletal: Normal range of motion and neck supple. Cardiovascular:      Rate and Rhythm: Normal rate and regular rhythm. Heart sounds: Normal heart sounds. Pulmonary:      Effort: Pulmonary effort is normal. No respiratory distress. Breath sounds: Normal breath sounds. Chest:      Chest wall: No tenderness. Abdominal:      General: Bowel sounds are normal. There is no distension. Palpations: Abdomen is soft. Tenderness: There is no tenderness. There is no guarding or rebound. Musculoskeletal: Normal range of motion. General: No deformity. Lumbar back: He exhibits tenderness. Comments: Left Lower  paralumbar reproducible tenderness to palpation. No Lumbar midline TTP. No other midline vertebral bony point tenderness or step-off. No foot drop. Distal sensation intact bilaterally, normal gait, no saddle anesthesia. Bilateral DP 2+. + left straight leg raise. Skin:     General: Skin is warm and dry. Neurological:      Mental Status: He is alert and oriented to person, place, and time. Diagnostic Study Results     Labs -   No results found for this or any previous visit (from the past 12 hour(s)). Radiologic Studies -   No orders to display     CT Results  (Last 48 hours)    None        CXR Results  (Last 48 hours)    None            Medical Decision Making   I am the first provider for this patient. I reviewed the vital signs, available nursing notes, past medical history, past surgical history, family history and social history. Vital Signs-Reviewed the patient's vital signs.       Records Reviewed: Nursing Notes and Old Medical Records     Procedures: None   Procedures    Provider Notes (Medical Decision Making):     Differential Diagnosis: Musculoskeletal pain, myofascial strain/sprain, muscle spasm, spondylolisthesis, spondylosis, DJD, OA, sciatica, cauda equina syndrome, hypertensive urgency, hypertensive emergency    Plan: Patient is asymptomatic with a high elevated blood pressure. Will order clonidine and continue to monitor. We will also order dose of pain medicine. No labs needed at this time. 2:09 PM  Blood pressure has improved, will discharge home. History and physical exam consistent with sciatica. No red flag signs or emergent need for imaging at this time. No midline tenderness. Will discharge home with pain medication and muscle relaxants. Have stressed the importance of stretching and hot baths with Epsom salt. Have advised orthopedic follow-up. Patient agrees with the plan and management and states all questions have been thoroughly answered and there are no more remaining questions. MED RECONCILIATION:  No current facility-administered medications for this encounter. Current Outpatient Medications   Medication Sig    cyclobenzaprine (FLEXERIL) 5 mg tablet Take 1 Tab by mouth three (3) times daily as needed for Muscle Spasm(s).  COLCRYS 0.6 mg tablet TAKE 1 TABLET BY MOUTH EVERY 1 HOURS FOR GOUT PAIN. DO NOT EXCEED 3 TABLETS IN 24 HOURS.  colchicine 0.6 mg tablet Take 1 tablet by mouth Q1Hour for gout pain. NOT TO EXCEED 3 TABLETS IN 24 HOURS.  indomethacin (INDOCIN) 25 mg capsule TAKE 2 CAPSULES BY MOUTH THREE TIMES DAILY WITH FOOD    ondansetron hcl (ZOFRAN) 4 mg tablet Take 1 Tab by mouth every eight (8) hours as needed for Nausea.  colchicine 0.6 mg tablet Take 1 Tab by mouth daily.  methylPREDNISolone (MEDROL, CASS,) 4 mg tablet Per dose pack instructions    ibuprofen (MOTRIN) 600 mg tablet Take 1 Tab by mouth every six (6) hours as needed for Pain.  lipase-protease-amylase (CREON 6000) 6,000-19,000 -30,000 unit capsule Take 1 Cap by mouth three (3) times daily (with meals). Indications: exocrine pancreatic insufficiency    omeprazole (PRILOSEC) 20 mg capsule Take 20 mg by mouth daily.     febuxostat (ULORIC) 40 mg tab tablet Take 1 Tab by mouth daily.  hydrALAZINE (APRESOLINE) 50 mg tablet Take 1 Tab by mouth two (2) times a day.  tamsulosin (FLOMAX) 0.4 mg capsule Take 1 Cap by mouth daily. Start this medication on Friday 12/15/17  Indications: Urolithiasis    glucose blood VI test strips (FREESTYLE TEST) strip by Does Not Apply route See Admin Instructions. Check twice a day    bisacodyl (DULCOLAX, BISACODYL,) 5 mg EC tablet Take 1 Tab by mouth daily as needed for Constipation.  NOVOLIN 70/30 100 unit/mL (70-30) injection INJECT 30 UNITS SC IN THE MORNING AND 20 UNITS SC IN THE EVENING BEFORE SUPPER    isosorbide mononitrate ER (IMDUR) 60 mg CR tablet Take 1 Tab by mouth daily.  atorvastatin (LIPITOR) 40 mg tablet Take 1 Tab by mouth nightly.  furosemide (LASIX) 40 mg tablet Take 1 Tab by mouth daily.  fluticasone (FLONASE) 50 mcg/actuation nasal spray 2 Sprays by Both Nostrils route daily.  carvedilol (COREG) 6.25 mg tablet Take 1 Tab by mouth two (2) times daily (with meals). (Patient taking differently: Take 25 mg by mouth two (2) times daily (with meals). )    nitroglycerin (NITROSTAT) 0.4 mg SL tablet 1 Tab by SubLINGual route as needed for Chest Pain. (Patient taking differently: 1 Tab by SubLINGual route as needed for Chest Pain. Pt to take 1 tab q 5 min up to three times, if symptom persist pt. to call 911.)    aspirin 81 mg chewable tablet Take 1 Tab by mouth daily.  B COMPLEX W-C NO.20/FOLIC ACID (B COMPLEX & C NO.20-FOLIC ACID PO) Take  by mouth.  budesonide-formoterol (SYMBICORT) 160-4.5 mcg/actuation HFA inhaler Take 2 Puffs by inhalation two (2) times a day.  calcitRIOL (ROCALTROL) 0.25 mcg capsule Take 1 Cap by mouth every Monday, Wednesday, Friday.  Lancets misc Check 3 times a day , needs according contour glucometer    Blood-Glucose Meter monitoring kit Check twice daily. Disposition:  Home     DISCHARGE NOTE:   Pt has been reexamined.  Patient has no new complaints, changes, or physical findings. Care plan outlined and precautions discussed. Results of workup were reviewed with the patient. All medications were reviewed with the patient. All of pt's questions and concerns were addressed. Patient was instructed and agrees to follow up with PCP/Ortho as well as to return to the ED upon further deterioration. Patient is ready to go home. Follow-up Information     Follow up With Specialties Details Why Contact Info    [facility] EMERGENCY DEPT Emergency Medicine  As needed [address]    [place] [address] [phone]      [facility]  [place] [address] Orthopedic Surgery Schedule an appointment as soon as possible for a visit  [address] [facility] [address], [address] [facility]  [phone]          Current Discharge Medication List      START taking these medications    Details   cyclobenzaprine (FLEXERIL) 5 mg tablet Take 1 Tab by mouth three (3) times daily as needed for Muscle Spasm(s). Qty: 15 Tab, Refills: 0                 Diagnosis     Clinical Impression:   1. Acute left-sided low back pain with left-sided sciatica    2. Elevated blood pressure reading          \"Please note that this dictation was completed with [employer], the computer voice recognition software. Quite often unanticipated grammatical, syntax, homophones, and other interpretive errors are inadvertently transcribed by the computer software. Please disregard these errors. Please excuse any errors that have escaped final proofreading. \"

## 2020-01-02 NOTE — ED TRIAGE NOTES
The patient presents for evaluation of left hip and leg pain x \"a week or two. \"  Denies recent trauma.

## 2020-01-13 ENCOUNTER — HOSPITAL ENCOUNTER (EMERGENCY)
Age: 60
Discharge: HOME | End: 2020-01-13
Attending: EMERGENCY MEDICINE
Payer: MEDICARE

## 2020-01-13 ENCOUNTER — APPOINTMENT (OUTPATIENT)
Dept: GENERAL RADIOLOGY | Age: 60
End: 2020-01-13
Attending: EMERGENCY MEDICINE
Payer: MEDICARE

## 2020-01-13 ENCOUNTER — APPOINTMENT (OUTPATIENT)
Dept: VASCULAR SURGERY | Age: 60
End: 2020-01-13
Attending: EMERGENCY MEDICINE
Payer: MEDICARE

## 2020-01-13 VITALS
SYSTOLIC BLOOD PRESSURE: 183 MMHG | DIASTOLIC BLOOD PRESSURE: 97 MMHG | RESPIRATION RATE: 12 BRPM | OXYGEN SATURATION: 98 % | BODY MASS INDEX: 37.05 KG/M2 | HEART RATE: 66 BPM | WEIGHT: 217 LBS | HEIGHT: 64 IN | TEMPERATURE: 97.5 F

## 2020-01-13 DIAGNOSIS — M25.552 ACUTE HIP PAIN, LEFT: Primary | ICD-10-CM

## 2020-01-13 DIAGNOSIS — M79.662 PAIN OF LEFT CALF: ICD-10-CM

## 2020-01-13 PROCEDURE — 73502 X-RAY EXAM HIP UNI 2-3 VIEWS: CPT

## 2020-01-13 PROCEDURE — 99281 EMR DPT VST MAYX REQ PHY/QHP: CPT

## 2020-01-13 PROCEDURE — 93971 EXTREMITY STUDY: CPT

## 2020-01-13 RX ORDER — IBUPROFEN 800 MG/1
800 TABLET ORAL EVERY 8 HOURS
Qty: 15 TAB | Refills: 0 | Status: SHIPPED | OUTPATIENT
Start: 2020-01-13 | End: 2020-01-14 | Stop reason: SDUPTHER

## 2020-01-13 RX ORDER — OXYCODONE AND ACETAMINOPHEN 5; 325 MG/1; MG/1
1 TABLET ORAL
Qty: 6 TAB | Refills: 0 | Status: SHIPPED | OUTPATIENT
Start: 2020-01-13 | End: 2020-01-16

## 2020-01-13 NOTE — ED PROVIDER NOTES
EMERGENCY DEPARTMENT HISTORY AND PHYSICAL EXAM    Date: 1/13/2020  Patient Name: Goldy Greenberg    History of Presenting Illness     Chief Complaint   Patient presents with    Back Pain         History Provided By: Patient    Additional History (Context): Goldy Greenberg is a 61 y.o. male with diabetes, hypertension, hyperlipidemia, obesity and gout, ETOH induced pancreatitis who presents with left calf pain as well as left inferior posterior hip pain for the past 3 days. Denies trauma fever IV drug use rash saddle anesthesia bowel incontinence or sciatica. Denies chest pain or shortness of breath or history of a DVT. PCP: Fior Bird MD    Current Outpatient Medications   Medication Sig Dispense Refill    ibuprofen (MOTRIN) 800 mg tablet Take 1 Tab by mouth every eight (8) hours for 5 days. 15 Tab 0    cyclobenzaprine (FLEXERIL) 5 mg tablet Take 1 Tab by mouth three (3) times daily as needed for Muscle Spasm(s). 15 Tab 0    COLCRYS 0.6 mg tablet TAKE 1 TABLET BY MOUTH EVERY 1 HOURS FOR GOUT PAIN. DO NOT EXCEED 3 TABLETS IN 24 HOURS. 30 Tab 0    colchicine 0.6 mg tablet Take 1 tablet by mouth Q1Hour for gout pain. NOT TO EXCEED 3 TABLETS IN 24 HOURS. 30 Tab 0    indomethacin (INDOCIN) 25 mg capsule TAKE 2 CAPSULES BY MOUTH THREE TIMES DAILY WITH FOOD 540 Cap 0    ondansetron hcl (ZOFRAN) 4 mg tablet Take 1 Tab by mouth every eight (8) hours as needed for Nausea. 10 Tab 0    colchicine 0.6 mg tablet Take 1 Tab by mouth daily. 22 Tab 2    methylPREDNISolone (MEDROL, CASS,) 4 mg tablet Per dose pack instructions 1 Dose Pack 0    ibuprofen (MOTRIN) 600 mg tablet Take 1 Tab by mouth every six (6) hours as needed for Pain. 20 Tab 0    lipase-protease-amylase (CREON 6000) 6,000-19,000 -30,000 unit capsule Take 1 Cap by mouth three (3) times daily (with meals). Indications: exocrine pancreatic insufficiency 90 Cap 0    omeprazole (PRILOSEC) 20 mg capsule Take 20 mg by mouth daily.       febuxostat (ULORIC) 40 mg tab tablet Take 1 Tab by mouth daily. 14 Tab 1    hydrALAZINE (APRESOLINE) 50 mg tablet Take 1 Tab by mouth two (2) times a day. 20 Tab 0    tamsulosin (FLOMAX) 0.4 mg capsule Take 1 Cap by mouth daily. Start this medication on Friday 12/15/17  Indications: Urolithiasis 6 Cap 0    glucose blood VI test strips (FREESTYLE TEST) strip by Does Not Apply route See Admin Instructions. Check twice a day 100 Strip 2    bisacodyl (DULCOLAX, BISACODYL,) 5 mg EC tablet Take 1 Tab by mouth daily as needed for Constipation. 30 Tab 0    NOVOLIN 70/30 100 unit/mL (70-30) injection INJECT 30 UNITS SC IN THE MORNING AND 20 UNITS SC IN THE EVENING BEFORE SUPPER 10 mL 4    isosorbide mononitrate ER (IMDUR) 60 mg CR tablet Take 1 Tab by mouth daily. 30 Tab 6    atorvastatin (LIPITOR) 40 mg tablet Take 1 Tab by mouth nightly. 30 Tab 6    furosemide (LASIX) 40 mg tablet Take 1 Tab by mouth daily. 30 Tab 6    fluticasone (FLONASE) 50 mcg/actuation nasal spray 2 Sprays by Both Nostrils route daily.  carvedilol (COREG) 6.25 mg tablet Take 1 Tab by mouth two (2) times daily (with meals). (Patient taking differently: Take 25 mg by mouth two (2) times daily (with meals). ) 60 Tab 6    nitroglycerin (NITROSTAT) 0.4 mg SL tablet 1 Tab by SubLINGual route as needed for Chest Pain. (Patient taking differently: 1 Tab by SubLINGual route as needed for Chest Pain. Pt to take 1 tab q 5 min up to three times, if symptom persist pt. to call 911.) 20 Tab 0    aspirin 81 mg chewable tablet Take 1 Tab by mouth daily. 30 Tab 0    B COMPLEX W-C NO.20/FOLIC ACID (B COMPLEX & C NO.20-FOLIC ACID PO) Take  by mouth.  budesonide-formoterol (SYMBICORT) 160-4.5 mcg/actuation HFA inhaler Take 2 Puffs by inhalation two (2) times a day. 1 Inhaler 0    calcitRIOL (ROCALTROL) 0.25 mcg capsule Take 1 Cap by mouth every Monday, Wednesday, Friday.  15 Cap 0    Lancets misc Check 3 times a day , needs according contour glucometer 1 Package 11    Blood-Glucose Meter monitoring kit Check twice daily. 1 kit 0       Past History     Past Medical History:  Past Medical History:   Diagnosis Date    Alcohol abuse     Alcohol-induced chronic pancreatitis ([address] 9/7/2018    Arthritis     Atherosclerotic cardiovascular disease     CAD (coronary artery disease)     mild disease of coronaries (cor angio 2006),wife states he has 1 stent    Cardiac cath 01/26/2006    RCA mild. Otherwise patent coronaries. LVEDP 15.  EF 55-60%.  Cardiac echocardiogram 03/27/2009    EF 60%. No cardiac source of embolism. No significant valvular pathology.  Cardiac nuclear imaging test 12/06/2011    Small, mild inferior infarction or possibly artifact. No ischemia. EF 45%. No TID. No reg WMA.  Cardiovascular LLE venous duplex 08/14/2015    Left leg:  No DVT. Dilated lymph node in left groin.  Chronic kidney disease     Chronic obstructive pulmonary disease (HCC)     Constipation     Diabetes mellitus type II     Gout     Hepatic steatosis     Hypercholesterolemia     Hypertension     Knee effusion, left     Left knee pain     Morbid obesity (HCC)     Noncompliance     Obesity (BMI 30-39. 9)     S/P colonoscopy 3/8/05    Dr. Dwayne Maharaj; normal; f/u 10 years    Stomach problems     TIA (transient ischemic attack) 3/09    Tobacco abuse        Past Surgical History:  Past Surgical History:   Procedure Laterality Date    ABDOMEN SURGERY PROC UNLISTED      Hernia repair in 1960's or 1970's.  HX ORTHOPAEDIC      Bones spur removed from left & right foot 2013.     HX UROLOGICAL  8/18/2015    [facility], Dr. Matilde Pichardo, Cystoscopy, left retrograde, left double-J cath       Family History:  Family History   Problem Relation Age of Onset    Diabetes Father     Heart Disease Mother     Diabetes Sister     Hypertension Sister     Arthritis-osteo Sister     Arthritis-osteo Brother     Diabetes Other     Diabetes Other         Uncle, [facility] Hypertension Other         Uncle; Aunt       Social History:  Social History     Tobacco Use    Smoking status: Current Every Day Smoker     Packs/day: 0.25     Types: Cigarettes    Smokeless tobacco: Never Used   Substance Use Topics    Alcohol use: Not Currently     Alcohol/week: 0.0 standard drinks     Comment: 1 beers a day.  Drug use: Not Currently     Types: Cocaine     Comment: +UDS for cocaine in 2017       Allergies: Allergies   Allergen Reactions    Shellfish Derived Other (comments)     Causes Gout    Tramadol Itching         Review of Systems   Review of Systems   Constitutional: Negative for fever and unexpected weight change. Respiratory: Negative for shortness of breath. Cardiovascular: Negative for chest pain. Gastrointestinal: Negative for abdominal pain. Musculoskeletal: Positive for arthralgias and myalgias. Skin: Negative for rash and wound. Neurological: Negative for weakness and numbness. All Other Systems Negative  Physical Exam     Vitals:    01/13/20 1120   BP: (!) 183/97   Pulse: 66   Resp: 12   Temp: 97.5 °F (36.4 °C)   SpO2: 98%   Weight: 98.4 kg (217 lb)   Height: 5' 4\" (1.626 m)     Physical Exam  Vitals signs and nursing note reviewed. Constitutional:       General: He is not in acute distress. Appearance: He is well-developed. He is not ill-appearing, toxic-appearing or diaphoretic. HENT:      Head: Normocephalic and atraumatic. Neck:      Musculoskeletal: Normal range of motion and neck supple. Thyroid: No thyromegaly. Vascular: No carotid bruit. Trachea: No tracheal deviation. Cardiovascular:      Rate and Rhythm: Normal rate and regular rhythm. Heart sounds: Normal heart sounds. No murmur. No friction rub. No gallop. Pulmonary:      Effort: Pulmonary effort is normal. No respiratory distress. Breath sounds: Normal breath sounds. No stridor. No wheezing or rales. Chest:      Chest wall: No tenderness.    Abdominal: General: There is no distension. Palpations: Abdomen is soft. There is no mass. Tenderness: There is no tenderness. There is no guarding or rebound. Comments: No pulsatile mass palpated. Musculoskeletal: Normal range of motion. General: Tenderness present. Comments: Left posterior inferior hip tenderness. DP PT pulses palpable. Left calf tenderness. No skin changes or open wounds. Skin:     General: Skin is warm and dry. Coloration: Skin is not pale. Neurological:      Mental Status: He is alert. Psychiatric:         Speech: Speech normal.         Behavior: Behavior normal.         Thought Content: Thought content normal.         Judgment: Judgment normal.          Diagnostic Study Results     Labs -   No results found for this or any previous visit (from the past 12 hour(s)). Radiologic Studies -   XR HIP LT W OR WO PELV 2-3 VWS    (Results Pending)     CT Results  (Last 48 hours)    None        CXR Results  (Last 48 hours)    None            Medical Decision Making   I am the first provider for this patient. I reviewed the vital signs, available nursing notes, past medical history, past surgical history, family history and social history. Vital Signs-Reviewed the patient's vital signs. Procedures:  Procedures    Provider Notes (Medical Decision Making): X-ray hip and sent for PVL. Nothing acute on his x-ray and no repeat DVT on his PVL. Treat his pain and have him follow-up with his PCP. MED RECONCILIATION:  No current facility-administered medications for this encounter. Current Outpatient Medications   Medication Sig    ibuprofen (MOTRIN) 800 mg tablet Take 1 Tab by mouth every eight (8) hours for 5 days.  cyclobenzaprine (FLEXERIL) 5 mg tablet Take 1 Tab by mouth three (3) times daily as needed for Muscle Spasm(s).  COLCRYS 0.6 mg tablet TAKE 1 TABLET BY MOUTH EVERY 1 HOURS FOR GOUT PAIN. DO NOT EXCEED 3 TABLETS IN 24 HOURS.     colchicine 0.6 mg tablet Take 1 tablet by mouth Q1Hour for gout pain. NOT TO EXCEED 3 TABLETS IN 24 HOURS.  indomethacin (INDOCIN) 25 mg capsule TAKE 2 CAPSULES BY MOUTH THREE TIMES DAILY WITH FOOD    ondansetron hcl (ZOFRAN) 4 mg tablet Take 1 Tab by mouth every eight (8) hours as needed for Nausea.  colchicine 0.6 mg tablet Take 1 Tab by mouth daily.  methylPREDNISolone (MEDROL, CASS,) 4 mg tablet Per dose pack instructions    ibuprofen (MOTRIN) 600 mg tablet Take 1 Tab by mouth every six (6) hours as needed for Pain.  lipase-protease-amylase (CREON 6000) 6,000-19,000 -30,000 unit capsule Take 1 Cap by mouth three (3) times daily (with meals). Indications: exocrine pancreatic insufficiency    omeprazole (PRILOSEC) 20 mg capsule Take 20 mg by mouth daily.  febuxostat (ULORIC) 40 mg tab tablet Take 1 Tab by mouth daily.  hydrALAZINE (APRESOLINE) 50 mg tablet Take 1 Tab by mouth two (2) times a day.  tamsulosin (FLOMAX) 0.4 mg capsule Take 1 Cap by mouth daily. Start this medication on Friday 12/15/17  Indications: Urolithiasis    glucose blood VI test strips (FREESTYLE TEST) strip by Does Not Apply route See Admin Instructions. Check twice a day    bisacodyl (DULCOLAX, BISACODYL,) 5 mg EC tablet Take 1 Tab by mouth daily as needed for Constipation.  NOVOLIN 70/30 100 unit/mL (70-30) injection INJECT 30 UNITS SC IN THE MORNING AND 20 UNITS SC IN THE EVENING BEFORE SUPPER    isosorbide mononitrate ER (IMDUR) 60 mg CR tablet Take 1 Tab by mouth daily.  atorvastatin (LIPITOR) 40 mg tablet Take 1 Tab by mouth nightly.  furosemide (LASIX) 40 mg tablet Take 1 Tab by mouth daily.  fluticasone (FLONASE) 50 mcg/actuation nasal spray 2 Sprays by Both Nostrils route daily.  carvedilol (COREG) 6.25 mg tablet Take 1 Tab by mouth two (2) times daily (with meals). (Patient taking differently: Take 25 mg by mouth two (2) times daily (with meals). )    nitroglycerin (NITROSTAT) 0.4 mg SL tablet 1 Tab by SubLINGual route as needed for Chest Pain. (Patient taking differently: 1 Tab by SubLINGual route as needed for Chest Pain. Pt to take 1 tab q 5 min up to three times, if symptom persist pt. to call 911.)    aspirin 81 mg chewable tablet Take 1 Tab by mouth daily.  B COMPLEX W-C NO.20/FOLIC ACID (B COMPLEX & C NO.20-FOLIC ACID PO) Take  by mouth.  budesonide-formoterol (SYMBICORT) 160-4.5 mcg/actuation HFA inhaler Take 2 Puffs by inhalation two (2) times a day.  calcitRIOL (ROCALTROL) 0.25 mcg capsule Take 1 Cap by mouth every Monday, Wednesday, Friday.  Lancets misc Check 3 times a day , needs according contour glucometer    Blood-Glucose Meter monitoring kit Check twice daily. Disposition:  home    DISCHARGE NOTE:   1:16 PM    Pt has been reexamined. Patient has no new complaints, changes, or physical findings. Care plan outlined and precautions discussed. Results of x-rays, PVL were reviewed with the patient. All medications were reviewed with the patient; will d/c home with ibuporfen, ultram. All of pt's questions and concerns were addressed. Patient was instructed and agrees to follow up with PCP, as well as to return to the ED upon further deterioration. Patient is ready to go home. Follow-up Information     Follow up With Specialties Details Why Contact Info    Gabe Mckeon MD Family Practice Schedule an appointment as soon as possible for a visit in 2 days  [place] [id number]  [phone]      [address] [place] [address] EMERGENCY DEPT Emergency Medicine  If symptoms worsen return immediately [address]  [phone]          Current Discharge Medication List      START taking these medications    Details   !! ibuprofen (MOTRIN) 800 mg tablet Take 1 Tab by mouth every eight (8) hours for 5 days. Qty: 15 Tab, Refills: 0       !! - Potential duplicate medications found. Please discuss with provider.       CONTINUE these medications which have NOT CHANGED    Details   !! ibuprofen (MOTRIN) 600 mg tablet Take 1 Tab by mouth every six (6) hours as needed for Pain. Qty: 20 Tab, Refills: 0       !! - Potential duplicate medications found. Please discuss with provider. Diagnosis     Clinical Impression:   1. Acute hip pain, left    2.  Pain of left calf

## 2020-01-13 NOTE — ED TRIAGE NOTES
The patient presents for evaluation of left side back pain with radiation down his left lower extremity. States, \"I was here last week. They gave me six pain pills. I have an appointment with my orthopedist tomorrow, but I can't wait until then. \"

## 2020-01-13 NOTE — DISCHARGE INSTRUCTIONS
Patient Education        Hip Pain: Care Instructions  Your Care Instructions    Hip pain may be caused by many things, including overuse, a fall, or a twisting movement. Another cause of hip pain is arthritis. Your pain may increase when you stand up, walk, or squat. The pain may come and go or may be constant. Home treatment can help relieve hip pain, swelling, and stiffness. If your pain is ongoing, you may need more tests and treatment. Follow-up care is a key part of your treatment and safety. Be sure to make and go to all appointments, and call your doctor if you are having problems. It's also a good idea to know your test results and keep a list of the medicines you take. How can you care for yourself at home? · Take pain medicines exactly as directed. ? If the doctor gave you a prescription medicine for pain, take it as prescribed. ? If you are not taking a prescription pain medicine, ask your doctor if you can take an over-the-counter medicine. · Rest and protect your hip. Take a break from any activity, including standing or walking, that may cause pain. · Put ice or a cold pack against your hip for 10 to 20 minutes at a time. Try to do this every 1 to 2 hours for the next 3 days (when you are awake) or until the swelling goes down. Put a thin cloth between the ice and your skin. · Sleep on your healthy side with a pillow between your knees, or sleep on your back with pillows under your knees. · If there is no swelling, you can put moist heat, a heating pad, or a warm cloth on your hip. Do gentle stretching exercises to help keep your hip flexible. · Learn how to prevent falls. Have your vision and hearing checked regularly. Wear slippers or shoes with a nonskid sole. · Stay at a healthy weight. · Wear comfortable shoes. When should you call for help? Call 911 anytime you think you may need emergency care.  For example, call if:    · You have sudden chest pain and shortness of breath, or you cough up blood.     · You are not able to stand or walk or bear weight.     · Your buttocks, legs, or feet feel numb or tingly.     · Your leg or foot is cool or pale or changes color.     · You have severe pain.    Call your doctor now or seek immediate medical care if:    · You have signs of infection, such as:  ? Increased pain, swelling, warmth, or redness in the hip area. ? Red streaks leading from the hip area. ? Pus draining from the hip area. ? A fever.     · You have signs of a blood clot, such as:  ? Pain in your calf, back of the knee, thigh, or groin. ? Redness and swelling in your leg or groin.     · You are not able to bend, straighten, or move your leg normally.     · You have trouble urinating or having bowel movements.    Watch closely for changes in your health, and be sure to contact your doctor if:    · You do not get better as expected. Where can you learn more? Go to http://leslie-graciela.info/. Enter I779 in the search box to learn more about \"Hip Pain: Care Instructions. \"  Current as of: June 26, 2019  Content Version: 12.2  © 2588-4734 [employer]. Care instructions adapted under license by [employer] (which disclaims liability or warranty for this information). If you have questions about a medical condition or this instruction, always ask your healthcare professional. [place] [address] any warranty or liability for your use of this information. Patient Education        Leg Pain: Care Instructions  Your Care Instructions  Many things can cause leg pain. Too much exercise or overuse can cause a muscle cramp (or charley horse). You can get leg cramps from not eating a balanced diet that has enough potassium, calcium, and other minerals. If you do not drink enough fluids or are taking certain medicines, you may develop leg cramps.  Other causes of leg pain include injuries, blood flow problems, nerve damage, and twisted and enlarged veins (varicose veins). You can usually ease pain with self-care. Your doctor may recommend that you rest your leg and keep it elevated. Follow-up care is a key part of your treatment and safety. Be sure to make and go to all appointments, and call your doctor if you are having problems. It's also a good idea to know your test results and keep a list of the medicines you take. How can you care for yourself at home? · Take pain medicines exactly as directed. ? If the doctor gave you a prescription medicine for pain, take it as prescribed. ? If you are not taking a prescription pain medicine, ask your doctor if you can take an over-the-counter medicine. · Take any other medicines exactly as prescribed. Call your doctor if you think you are having a problem with your medicine. · Rest your leg while you have pain, and avoid standing for long periods of time. · Prop up your leg at or above the level of your heart when possible. · Make sure you are eating a balanced diet that is rich in calcium, potassium, and magnesium, especially if you are pregnant. · If directed by your doctor, put ice or a cold pack on the area for 10 to 20 minutes at a time. Put a thin cloth between the ice and your skin. · Your leg may be in a splint, a brace, or an elastic bandage, and you may have crutches to help you walk. Follow your doctor's directions about how long to wear supports and how to use the crutches. When should you call for help? Call 911 anytime you think you may need emergency care. For example, call if:    · You have sudden chest pain and shortness of breath, or you cough up blood.     · Your leg is cool or pale or changes color.    Call your doctor now or seek immediate medical care if:    · You have increasing or severe pain.     · Your leg suddenly feels weak and you cannot move it.     · You have signs of a blood clot, such as:  ? Pain in your calf, back of the knee, thigh, or groin. ?  Redness and swelling in your leg or groin.     · You have signs of infection, such as:  ? Increased pain, swelling, warmth, or redness. ? Red streaks leading from the sore area. ? Pus draining from a place on your leg. ? A fever.     · You cannot bear weight on your leg.    Watch closely for changes in your health, and be sure to contact your doctor if:    · You do not get better as expected. Where can you learn more? Go to http://leslie-graciela.info/. Enter B245 in the search box to learn more about \"Leg Pain: Care Instructions. \"  Current as of: June 26, 2019  Content Version: 12.2  © 6217-5894 [employer]. Care instructions adapted under license by [employer] (which disclaims liability or warranty for this information). If you have questions about a medical condition or this instruction, always ask your healthcare professional. [address] any warranty or liability for your use of this information.

## 2020-01-14 ENCOUNTER — OFFICE VISIT (OUTPATIENT)
Dept: ORTHOPEDIC SURGERY | Facility: CLINIC | Age: 60
End: 2020-01-14

## 2020-01-14 VITALS
BODY MASS INDEX: 49.68 KG/M2 | HEIGHT: 64 IN | SYSTOLIC BLOOD PRESSURE: 164 MMHG | HEART RATE: 68 BPM | TEMPERATURE: 96.6 F | RESPIRATION RATE: 16 BRPM | WEIGHT: 291 LBS | DIASTOLIC BLOOD PRESSURE: 95 MMHG | OXYGEN SATURATION: 96 %

## 2020-01-14 DIAGNOSIS — M70.62 TROCHANTERIC BURSITIS OF LEFT HIP: Primary | ICD-10-CM

## 2020-01-14 DIAGNOSIS — M25.552 PAIN OF LEFT HIP JOINT: ICD-10-CM

## 2020-01-14 DIAGNOSIS — M25.652 DECREASED RANGE OF LEFT HIP MOVEMENT: ICD-10-CM

## 2020-01-14 RX ORDER — TRIAMCINOLONE ACETONIDE 40 MG/ML
40 INJECTION, SUSPENSION INTRA-ARTICULAR; INTRAMUSCULAR ONCE
Qty: 1 ML | Refills: 0
Start: 2020-01-14 | End: 2020-01-14

## 2020-01-14 NOTE — PROGRESS NOTES
1. Have you been to the ER, urgent care clinic since your last visit? Hospitalized since your last visit? Yes, [facility] ED    2. Have you seen or consulted any other health care providers outside of the [address] [place] [address] since your last visit? Include any pap smears or colon screening.    NO

## 2020-01-14 NOTE — PROGRESS NOTES
HISTORY OF PRESENT ILLNESS:  Michael Santiago returns to the office complaining of left hip pain. No falls, and he has trouble with laying on the left sideHe has no chest pain today. He does have exertional dyspnea, which is chronic in nature. He has no fever, chills, or night sweats. His pain to the left hip is, at rest, a 5-6/10, and with activity, to including full weightbearing, his pain is a 7-8/10. He went to ER yesterday with above complaint and was negative for DVT with X rays left hip unremarkable        PHYSICAL EXAM:  He is a healthy-appearing, well-developed, well-nourished, pleasant, obese, 51-year-old,  male, atraumatic, normocephalic, alert and oriented times three sitting on the table comfortably. He lies right recumbent and Left hip reveals pain over greater trochanter with radiation 10cm distal from point of maximal tenderness°. There is no calf tenderness or evidence of DVT. Distal NVI LLE. RADIOGRAPHS:  X-rays were reviewed [facility] 13 Jan 20 early joint space narrowing left femoral acetabular juction. No other lesions nor masses. IMPRESSION:        1. History of gout. 2.Left hip trochanteric bursitis    Medical Decision Making with Comprehensive Data Review:    The patients presenting problems have been discussed, and they/their family are in agreement with the care plan formulated and outlined with them. Treatment option(s) discussed to include, but cannot be limited to Physical / Occupational outpatient therapy, arthrocentesis, elective and or urgent surgical intervention of aforementioned patient medical condition. I have encouraged the patient / family to ask questions as they arise throughout their visit.  The patient elected after all options discussed to treat the   Left hip with cortisone     PROCEDURE:  PROCEDURE:  Under sterile technique, after verbal and written consent were obtained and appropriate time out performed, with the patient lying right recumbent, the left hip point of maximal tenderness was identified while draped appropriate, and as I am joined by my chaperon, Jhonny Castillo, RTR, 1 cc of Kenalog at 40 mg per mL mixed with 7 mL of Sensorcaine 0.75% was injected at the point of maximal tenderness consistent with the greater trochanteric bursal region. There were no complications. The patient tolerated the procedure well. The patient is going to continue to follow with our office prn. An order for physical therapy per trochanteric bursal stretching was provided today. All his questions were answered to his satisfaction. A copy of his images from [facility] was reviewed and provided.

## 2020-02-03 ENCOUNTER — HOSPITAL ENCOUNTER (EMERGENCY)
Age: 60
Discharge: HOME | End: 2020-02-03
Attending: EMERGENCY MEDICINE
Payer: MEDICARE

## 2020-02-03 ENCOUNTER — APPOINTMENT (OUTPATIENT)
Dept: CT IMAGING | Age: 60
End: 2020-02-03
Attending: EMERGENCY MEDICINE
Payer: MEDICARE

## 2020-02-03 VITALS
HEART RATE: 95 BPM | RESPIRATION RATE: 20 BRPM | DIASTOLIC BLOOD PRESSURE: 66 MMHG | SYSTOLIC BLOOD PRESSURE: 176 MMHG | OXYGEN SATURATION: 97 %

## 2020-02-03 DIAGNOSIS — M54.32 SCIATICA OF LEFT SIDE: Primary | ICD-10-CM

## 2020-02-03 LAB
ALBUMIN SERPL-MCNC: 3.1 G/DL (ref 3.4–5)
ALBUMIN/GLOB SERPL: 1 {RATIO} (ref 0.8–1.7)
ALP SERPL-CCNC: 61 U/L (ref 45–117)
ALT SERPL-CCNC: 24 U/L (ref 16–61)
ANION GAP SERPL CALC-SCNC: 7 MMOL/L (ref 3–18)
AST SERPL-CCNC: 19 U/L (ref 10–38)
BASOPHILS # BLD: 0 K/UL (ref 0–0.1)
BASOPHILS NFR BLD: 0 % (ref 0–2)
BILIRUB SERPL-MCNC: 0.2 MG/DL (ref 0.2–1)
BUN SERPL-MCNC: 43 MG/DL (ref 7–18)
BUN/CREAT SERPL: 5 (ref 12–20)
CALCIUM SERPL-MCNC: 9.9 MG/DL (ref 8.5–10.1)
CHLORIDE SERPL-SCNC: 105 MMOL/L (ref 100–111)
CO2 SERPL-SCNC: 28 MMOL/L (ref 21–32)
CREAT SERPL-MCNC: 8.33 MG/DL (ref 0.6–1.3)
DIFFERENTIAL METHOD BLD: ABNORMAL
EOSINOPHIL # BLD: 0.3 K/UL (ref 0–0.4)
EOSINOPHIL NFR BLD: 4 % (ref 0–5)
ERYTHROCYTE [DISTWIDTH] IN BLOOD BY AUTOMATED COUNT: 16.3 % (ref 11.6–14.5)
GLOBULIN SER CALC-MCNC: 3.1 G/DL (ref 2–4)
GLUCOSE SERPL-MCNC: 228 MG/DL (ref 74–99)
HCT VFR BLD AUTO: 35.1 % (ref 36–48)
HGB BLD-MCNC: 11 G/DL (ref 13–16)
LYMPHOCYTES # BLD: 1.4 K/UL (ref 0.9–3.6)
LYMPHOCYTES NFR BLD: 19 % (ref 21–52)
MCH RBC QN AUTO: 28 PG (ref 24–34)
MCHC RBC AUTO-ENTMCNC: 31.3 G/DL (ref 31–37)
MCV RBC AUTO: 89.3 FL (ref 74–97)
MONOCYTES # BLD: 0.5 K/UL (ref 0.05–1.2)
MONOCYTES NFR BLD: 6 % (ref 3–10)
NEUTS SEG # BLD: 5 K/UL (ref 1.8–8)
NEUTS SEG NFR BLD: 71 % (ref 40–73)
PLATELET # BLD AUTO: 211 K/UL (ref 135–420)
PMV BLD AUTO: 11.1 FL (ref 9.2–11.8)
POTASSIUM SERPL-SCNC: 3.9 MMOL/L (ref 3.5–5.5)
PROT SERPL-MCNC: 6.2 G/DL (ref 6.4–8.2)
RBC # BLD AUTO: 3.93 M/UL (ref 4.7–5.5)
SODIUM SERPL-SCNC: 140 MMOL/L (ref 136–145)
WBC # BLD AUTO: 7.1 K/UL (ref 4.6–13.2)

## 2020-02-03 PROCEDURE — 74011250637 HC RX REV CODE- 250/637: Performed by: EMERGENCY MEDICINE

## 2020-02-03 PROCEDURE — 73700 CT LOWER EXTREMITY W/O DYE: CPT

## 2020-02-03 PROCEDURE — 85025 COMPLETE CBC W/AUTO DIFF WBC: CPT

## 2020-02-03 PROCEDURE — 80053 COMPREHEN METABOLIC PANEL: CPT

## 2020-02-03 PROCEDURE — 99284 EMERGENCY DEPT VISIT MOD MDM: CPT

## 2020-02-03 RX ORDER — OXYCODONE AND ACETAMINOPHEN 5; 325 MG/1; MG/1
1 TABLET ORAL
Qty: 12 TAB | Refills: 0 | Status: SHIPPED | OUTPATIENT
Start: 2020-02-03 | End: 2020-02-06

## 2020-02-03 RX ORDER — OXYCODONE AND ACETAMINOPHEN 5; 325 MG/1; MG/1
1 TABLET ORAL
Status: COMPLETED | OUTPATIENT
Start: 2020-02-03 | End: 2020-02-03

## 2020-02-03 RX ADMIN — OXYCODONE HYDROCHLORIDE AND ACETAMINOPHEN 1 TABLET: 5; 325 TABLET ORAL at 15:19

## 2020-02-03 NOTE — ED PROVIDER NOTES
EMERGENCY DEPARTMENT HISTORY AND PHYSICAL EXAM    2:13 PM  Date: 2/3/2020  Patient Name: Conner Alba    History of Presenting Illness     Chief Complaint   Patient presents with    Leg Pain    Hypotension      provided By: patient     HPI: Conner Alba is a 61 y.o. male with past medical history of hypertension, diabetes, hyperlipidemia, obesity, alcoholic pancreatitis presents with left lower back pain radiating to his left thigh and the groin. Worse with movement. Patient had pain for some time but worse in the last 2 weeks. Patient has had a hydrocortisone injection on his left hip for osteoarthritis. Patient denies any fever or chills. Denies any weakness numbness or paresthesia. No urinary incontinence, no groin numbness. Describes the pain he gets is shooting, 7/10, sometimes electric sensation. Patient states that he was having a lot of pain during his dialysis and got an episode where he havng a lot of pain was diaphoretic and was hypotensive. He only received 30 mins of his dialysis. Patient no longer hypotensive in the ED. PCP: Lo Goyal MD    Past History     Past Medical History:  Past Medical History:   Diagnosis Date    Alcohol abuse     Alcohol-induced chronic pancreatitis ([facility] [address] 9/7/2018    Arthritis     Atherosclerotic cardiovascular disease     CAD (coronary artery disease)     mild disease of coronaries (cor angio 2006),wife states he has 1 stent    Cardiac cath 01/26/2006    RCA mild. Otherwise patent coronaries. LVEDP 15.  EF 55-60%.  Cardiac echocardiogram 03/27/2009    EF 60%. No cardiac source of embolism. No significant valvular pathology.  Cardiac nuclear imaging test 12/06/2011    Small, mild inferior infarction or possibly artifact. No ischemia. EF 45%. No TID. No reg WMA.  Cardiovascular LLE venous duplex 08/14/2015    Left leg:  No DVT. Dilated lymph node in left groin.     Chronic kidney disease     Chronic obstructive pulmonary disease ([address]     Constipation     Diabetes mellitus type II     Gout     Hepatic steatosis     Hypercholesterolemia     Hypertension     Knee effusion, left     Left knee pain     Morbid obesity (HCC)     Noncompliance     Obesity (BMI 30-39. 9)     S/P colonoscopy 3/8/05    Dr. Yue Johnson; normal; f/u 10 years    Stomach problems     TIA (transient ischemic attack) 3/09    Tobacco abuse        Past Surgical History:  Past Surgical History:   Procedure Laterality Date    ABDOMEN SURGERY PROC UNLISTED      Hernia repair in 1960's or 1970's.  HX ORTHOPAEDIC      Bones spur removed from left & right foot 2013.  HX UROLOGICAL  8/18/2015    [facility] [place], Dr. Sharon Witner, Cystoscopy, left retrograde, left double-J cath       Family History:  Family History   Problem Relation Age of Onset    Diabetes Father     Heart Disease Mother     Diabetes Sister     Hypertension Sister     Arthritis-osteo Sister     Arthritis-osteo Brother     Diabetes Other     Diabetes Other         Uncle, Aunt    Hypertension Other         Uncle; Aunt       Social History:  Social History     Tobacco Use    Smoking status: Current Every Day Smoker     Packs/day: 0.25     Types: Cigarettes    Smokeless tobacco: Never Used   Substance Use Topics    Alcohol use: Not Currently     Alcohol/week: 0.0 standard drinks     Comment: 1 beers a day.  Drug use: Not Currently     Types: Cocaine     Comment: +UDS for cocaine in 2017       Allergies: Allergies   Allergen Reactions    Shellfish Derived Other (comments)     Causes Gout    Tramadol Itching       Review of Systems   Review of Systems   Constitutional: Negative for activity change, appetite change and chills. HENT: Negative for congestion, ear discharge, ear pain and sore throat. Eyes: Negative for photophobia and pain. Respiratory: Negative for cough and choking. Cardiovascular: Negative for palpitations and leg swelling.    Gastrointestinal: Negative for anal bleeding and rectal pain. Endocrine: Negative for polydipsia and polyuria. Genitourinary: Negative for genital sores and urgency. Musculoskeletal: Positive for myalgias. Negative for arthralgias. Neurological: Negative for dizziness, seizures and speech difficulty. Psychiatric/Behavioral: Negative for hallucinations, self-injury and suicidal ideas. Physical Exam     No data found. Physical Exam  Vitals signs and nursing note reviewed. Constitutional:       Appearance: He is well-developed. HENT:      Head: Normocephalic and atraumatic. Eyes:      General:         Right eye: No discharge. Left eye: No discharge. Neck:      Musculoskeletal: Normal range of motion and neck supple. Cardiovascular:      Rate and Rhythm: Normal rate and regular rhythm. Heart sounds: Normal heart sounds. No murmur. Pulmonary:      Effort: Pulmonary effort is normal. No respiratory distress. Breath sounds: Normal breath sounds. No stridor. No wheezing or rales. Chest:      Chest wall: No tenderness. Abdominal:      General: Bowel sounds are normal. There is no distension. Palpations: Abdomen is soft. Tenderness: There is no abdominal tenderness. There is no guarding or rebound. Genitourinary:     Comments: Rectal tone intact  Musculoskeletal: Normal range of motion. General: No swelling or deformity. Comments: Straight leg rise positive left  Neurovascular status intact. Skin:     General: Skin is warm and dry. Neurological:      General: No focal deficit present. Mental Status: He is alert and oriented to person, place, and time. Sensory: No sensory deficit. Motor: No weakness. Diagnostic Study Results     Labs -  No results found for this or any previous visit (from the past 12 hour(s)). Radiologic Studies -   Ct Low Ext Lt Wo Cont    Result Date: 2/3/2020  IMPRESSION: 1. No acute findings identified.  2. Bilateral L4-L5 facet hypertrophy 3. Mild bilateral sacroiliac degenerative joint disease 4. Osteoarthritis at the knee with mild-moderate suprapatellar joint effusion. 5. Minimal fatty left inguinal hernia. Medical Decision Making     ED Course: Progress Notes, Reevaluation, and Consults:    2:13 PM Initial assessment performed. The patients presenting problems have been discussed, and they/their family are in agreement with the care plan formulated and outlined with them. I have encouraged them to ask questions as they arise throughout their visit. Provider Notes (Medical Decision Making):   Patient with back and left hip pain. symptoms consistent with sciatica. However because patient had osteoarthritis cortisone injection. Going to do a CT of his hip to rule out infection or osteomyelitis  CT unremarkable apart from osteoarthritic changes. No acute findings on CT. No evidence of osteomyelitis. No evidence of abscess. Patient with normal vitals at present. Feels better after Percocet. Impression is a sciatica we are going to refer to spine specialist  Discussed with Dr. Patt Escamilla advised for patient to call tomorrow at the dialysis center to get the his dialysis set up    Vital Signs-Reviewed the patient's vital signs. Reviewed pt's pulse ox reading. Records Reviewed:  old medical records (Time of Review: 2:13 PM)  -I am the first provider for this patient.  -I reviewed the vital signs, available nursing notes, past medical history, past surgical history, family history and social history. Current Outpatient Medications   Medication Sig Dispense Refill    oxyCODONE-acetaminophen (PERCOCET) 5-325 mg per tablet Take 1 Tab by mouth every six (6) hours as needed for Pain for up to 3 days. Max Daily Amount: 4 Tabs. 12 Tab 0    cyclobenzaprine (FLEXERIL) 5 mg tablet Take 1 Tab by mouth three (3) times daily as needed for Muscle Spasm(s).  15 Tab 0    COLCRYS 0.6 mg tablet TAKE 1 TABLET BY MOUTH EVERY 1 HOURS FOR GOUT PAIN. DO NOT EXCEED 3 TABLETS IN 24 HOURS. 30 Tab 0    indomethacin (INDOCIN) 25 mg capsule TAKE 2 CAPSULES BY MOUTH THREE TIMES DAILY WITH FOOD 540 Cap 0    ondansetron hcl (ZOFRAN) 4 mg tablet Take 1 Tab by mouth every eight (8) hours as needed for Nausea. 10 Tab 0    colchicine 0.6 mg tablet Take 1 Tab by mouth daily. 22 Tab 2    methylPREDNISolone (MEDROL, CASS,) 4 mg tablet Per dose pack instructions 1 Dose Pack 0    ibuprofen (MOTRIN) 600 mg tablet Take 1 Tab by mouth every six (6) hours as needed for Pain. 20 Tab 0    lipase-protease-amylase (CREON 6000) 6,000-19,000 -30,000 unit capsule Take 1 Cap by mouth three (3) times daily (with meals). Indications: exocrine pancreatic insufficiency 90 Cap 0    omeprazole (PRILOSEC) 20 mg capsule Take 20 mg by mouth daily.  febuxostat (ULORIC) 40 mg tab tablet Take 1 Tab by mouth daily. 14 Tab 1    hydrALAZINE (APRESOLINE) 50 mg tablet Take 1 Tab by mouth two (2) times a day. 20 Tab 0    tamsulosin (FLOMAX) 0.4 mg capsule Take 1 Cap by mouth daily. Start this medication on Friday 12/15/17  Indications: Urolithiasis 6 Cap 0    glucose blood VI test strips (FREESTYLE TEST) strip by Does Not Apply route See Admin Instructions. Check twice a day 100 Strip 2    bisacodyl (DULCOLAX, BISACODYL,) 5 mg EC tablet Take 1 Tab by mouth daily as needed for Constipation. 30 Tab 0    NOVOLIN 70/30 100 unit/mL (70-30) injection INJECT 30 UNITS SC IN THE MORNING AND 20 UNITS SC IN THE EVENING BEFORE SUPPER 10 mL 4    isosorbide mononitrate ER (IMDUR) 60 mg CR tablet Take 1 Tab by mouth daily. 30 Tab 6    atorvastatin (LIPITOR) 40 mg tablet Take 1 Tab by mouth nightly. 30 Tab 6    furosemide (LASIX) 40 mg tablet Take 1 Tab by mouth daily. 30 Tab 6    fluticasone (FLONASE) 50 mcg/actuation nasal spray 2 Sprays by Both Nostrils route daily.  carvedilol (COREG) 6.25 mg tablet Take 1 Tab by mouth two (2) times daily (with meals).  (Patient taking differently: Take 25 mg by mouth two (2) times daily (with meals). ) 60 Tab 6    nitroglycerin (NITROSTAT) 0.4 mg SL tablet 1 Tab by SubLINGual route as needed for Chest Pain. (Patient taking differently: 1 Tab by SubLINGual route as needed for Chest Pain. Pt to take 1 tab q 5 min up to three times, if symptom persist pt. to call 911.) 20 Tab 0    aspirin 81 mg chewable tablet Take 1 Tab by mouth daily. 30 Tab 0    B COMPLEX W-C NO.20/FOLIC ACID (B COMPLEX & C NO.20-FOLIC ACID PO) Take  by mouth.  budesonide-formoterol (SYMBICORT) 160-4.5 mcg/actuation HFA inhaler Take 2 Puffs by inhalation two (2) times a day. 1 Inhaler 0    calcitRIOL (ROCALTROL) 0.25 mcg capsule Take 1 Cap by mouth every Monday, Wednesday, Friday. 15 Cap 0    Lancets misc Check 3 times a day , needs according contour glucometer 1 Package 11    Blood-Glucose Meter monitoring kit Check twice daily. 1 kit 0        Clinical Impression     Clinical Impression:   1. Sciatica of left side        Disposition: discharge      DISCHARGE NOTE:   Pt has been reexamined. Patient has no new complaints, changes, or physical findings. Care plan outlined and precautions discussed. Results were reviewed with the patient. All medications were reviewed with the patient; will d/c home with PMD f/u. All of pt's questions and concerns were addressed. Patient was instructed and agrees to follow up with PMD, as well as to return to the ED upon further deterioration. Patient is ready to go home. This note was dictated utilizing voice recognition software which may lead to typographical errors. I apologize in advance if the situation occurs. If questions arise please do not hesitate to contact me or call our department. This note was dictated utilizing voice recognition software which may lead to typographical errors. I apologize in advance if the situation occurs. If questions arise please do not hesitate to contact me or call our department.     Michelle Najera MD  2:13 PM

## 2020-02-03 NOTE — DISCHARGE INSTRUCTIONS
Patient Education        Sciatica: Care Instructions  Your Care Instructions    Sciatica (say \"fam-EV-hm-kuh\") is an irritation of one of the sciatic nerves, which come from the spinal cord in the lower back. The sciatic nerves and their branches extend down through the buttock to the foot. Sciatica can develop when an injured disc in the back presses against a spinal nerve root. Its main symptom is pain, numbness, or weakness that is often worse in the leg or foot than in the back. Sciatica often will improve and go away with time. Early treatment usually includes medicines and exercises to relieve pain. Follow-up care is a key part of your treatment and safety. Be sure to make and go to all appointments, and call your doctor if you are having problems. It's also a good idea to know your test results and keep a list of the medicines you take. How can you care for yourself at home? · Take pain medicines exactly as directed. ? If the doctor gave you a prescription medicine for pain, take it as prescribed. ? If you are not taking a prescription pain medicine, ask your doctor if you can take an over-the-counter medicine. · Use heat or ice to relieve pain. ? To apply heat, put a warm water bottle, heating pad set on low, or warm cloth on your back. Do not go to sleep with a heating pad on your skin. ? To use ice, put ice or a cold pack on the area for 10 to 20 minutes at a time. Put a thin cloth between the ice and your skin. · Avoid sitting if possible, unless it feels better than standing. · Alternate lying down with short walks. Increase your walking distance as you are able to without making your symptoms worse. · Do not do anything that makes your symptoms worse. When should you call for help? Call 911 anytime you think you may need emergency care.  For example, call if:    · You are unable to move a leg at all.   [facility] your doctor now or seek immediate medical care if:    · You have new or worse symptoms in your legs or buttocks. Symptoms may include:  ? Numbness or tingling. ? Weakness. ? Pain.     · You lose bladder or bowel control.    Watch closely for changes in your health, and be sure to contact your doctor if:    · You are not getting better as expected. Where can you learn more? Go to http://leslie-graciela.info/. Enter [phone] in the search box to learn more about \"Sciatica: Care Instructions. \"  Current as of: June 26, 2019  Content Version: 12.2  © 0412-4586 [employer]. Care instructions adapted under license by [employer] (which disclaims liability or warranty for this information). If you have questions about a medical condition or this instruction, always ask your healthcare professional. [address] any warranty or liability for your use of this information.

## 2020-02-03 NOTE — ED TRIAGE NOTES
Patient arrives from dialysis with c/o left leg pain that has occurred for over a week and a run of low blood pressure per dialysis.

## 2020-02-26 ENCOUNTER — APPOINTMENT (OUTPATIENT)
Dept: GENERAL RADIOLOGY | Age: 60
End: 2020-02-26
Attending: EMERGENCY MEDICINE
Payer: MEDICARE

## 2020-02-26 ENCOUNTER — APPOINTMENT (OUTPATIENT)
Dept: GENERAL RADIOLOGY | Age: 60
End: 2020-02-26
Attending: STUDENT IN AN ORGANIZED HEALTH CARE EDUCATION/TRAINING PROGRAM
Payer: MEDICARE

## 2020-02-26 ENCOUNTER — HOSPITAL ENCOUNTER (EMERGENCY)
Age: 60
Discharge: HOME | End: 2020-02-26
Attending: HOSPITALIST | Admitting: HOSPITALIST
Payer: MEDICARE

## 2020-02-26 VITALS
RESPIRATION RATE: 18 BRPM | SYSTOLIC BLOOD PRESSURE: 183 MMHG | OXYGEN SATURATION: 99 % | DIASTOLIC BLOOD PRESSURE: 85 MMHG | TEMPERATURE: 98.4 F | HEART RATE: 86 BPM

## 2020-02-26 DIAGNOSIS — N18.6 ESRD ON DIALYSIS (HCC): ICD-10-CM

## 2020-02-26 DIAGNOSIS — Z99.2 ESRD ON DIALYSIS (HCC): ICD-10-CM

## 2020-02-26 DIAGNOSIS — R07.9 ACUTE CHEST PAIN: Primary | ICD-10-CM

## 2020-02-26 PROBLEM — D64.9 ANEMIA: Status: ACTIVE | Noted: 2020-02-26

## 2020-02-26 PROBLEM — T14.8XXA HEMATOMA: Status: ACTIVE | Noted: 2020-02-26

## 2020-02-26 PROBLEM — K92.2 GI BLEED: Status: ACTIVE | Noted: 2020-02-26
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
## 2020-02-26 PROBLEM — R79.1 SUPRATHERAPEUTIC INR: Status: ACTIVE | Noted: 2020-02-26

## 2020-02-26 PROBLEM — S51.019A SKIN TEAR OF ELBOW WITHOUT COMPLICATION: Status: ACTIVE | Noted: 2020-02-26

## 2020-02-26 LAB
ALBUMIN SERPL-MCNC: 3.2 G/DL (ref 3.4–5)
ALBUMIN/GLOB SERPL: 0.8 {RATIO} (ref 0.8–1.7)
ALP SERPL-CCNC: 48 U/L (ref 45–117)
ALT SERPL-CCNC: 16 U/L (ref 16–61)
ANION GAP SERPL CALC-SCNC: 6 MMOL/L (ref 3–18)
AST SERPL-CCNC: 14 U/L (ref 10–38)
ATRIAL RATE: 86 BPM
BASOPHILS # BLD: 0 K/UL (ref 0–0.1)
BASOPHILS NFR BLD: 0 % (ref 0–2)
BILIRUB SERPL-MCNC: 0.2 MG/DL (ref 0.2–1)
BUN SERPL-MCNC: 36 MG/DL (ref 7–18)
BUN/CREAT SERPL: 5 (ref 12–20)
CALCIUM SERPL-MCNC: 8.8 MG/DL (ref 8.5–10.1)
CALCULATED P AXIS, ECG09: 58 DEGREES
CALCULATED R AXIS, ECG10: 14 DEGREES
CALCULATED T AXIS, ECG11: -179 DEGREES
CHLORIDE SERPL-SCNC: 100 MMOL/L (ref 100–111)
CO2 SERPL-SCNC: 32 MMOL/L (ref 21–32)
CREAT SERPL-MCNC: 7.49 MG/DL (ref 0.6–1.3)
DIAGNOSIS, 93000: NORMAL
DIFFERENTIAL METHOD BLD: ABNORMAL
EOSINOPHIL # BLD: 0.1 K/UL (ref 0–0.4)
EOSINOPHIL NFR BLD: 2 % (ref 0–5)
ERYTHROCYTE [DISTWIDTH] IN BLOOD BY AUTOMATED COUNT: 15.7 % (ref 11.6–14.5)
GLOBULIN SER CALC-MCNC: 3.8 G/DL (ref 2–4)
GLUCOSE SERPL-MCNC: 163 MG/DL (ref 74–99)
HCT VFR BLD AUTO: 35.3 % (ref 36–48)
HGB BLD-MCNC: 11.1 G/DL (ref 13–16)
LYMPHOCYTES # BLD: 1.4 K/UL (ref 0.9–3.6)
LYMPHOCYTES NFR BLD: 21 % (ref 21–52)
MAGNESIUM SERPL-MCNC: 2.4 MG/DL (ref 1.6–2.6)
MCH RBC QN AUTO: 28.6 PG (ref 24–34)
MCHC RBC AUTO-ENTMCNC: 31.4 G/DL (ref 31–37)
MCV RBC AUTO: 91 FL (ref 74–97)
MONOCYTES # BLD: 0.5 K/UL (ref 0.05–1.2)
MONOCYTES NFR BLD: 7 % (ref 3–10)
NEUTS SEG # BLD: 4.6 K/UL (ref 1.8–8)
NEUTS SEG NFR BLD: 70 % (ref 40–73)
P-R INTERVAL, ECG05: 182 MS
PLATELET # BLD AUTO: 288 K/UL (ref 135–420)
PMV BLD AUTO: 10.8 FL (ref 9.2–11.8)
POTASSIUM SERPL-SCNC: 3.8 MMOL/L (ref 3.5–5.5)
PROT SERPL-MCNC: 7 G/DL (ref 6.4–8.2)
Q-T INTERVAL, ECG07: 394 MS
QRS DURATION, ECG06: 98 MS
QTC CALCULATION (BEZET), ECG08: 471 MS
RBC # BLD AUTO: 3.88 M/UL (ref 4.7–5.5)
SODIUM SERPL-SCNC: 138 MMOL/L (ref 136–145)
TROPONIN I SERPL-MCNC: 0.06 NG/ML (ref 0–0.04)
TROPONIN I SERPL-MCNC: 0.07 NG/ML (ref 0–0.04)
VENTRICULAR RATE, ECG03: 86 BPM
WBC # BLD AUTO: 6.6 K/UL (ref 4.6–13.2)

## 2020-02-26 PROCEDURE — 80053 COMPREHEN METABOLIC PANEL: CPT

## 2020-02-26 PROCEDURE — 93005 ELECTROCARDIOGRAM TRACING: CPT

## 2020-02-26 PROCEDURE — 84484 ASSAY OF TROPONIN QUANT: CPT

## 2020-02-26 PROCEDURE — 83735 ASSAY OF MAGNESIUM: CPT

## 2020-02-26 PROCEDURE — 85025 COMPLETE CBC W/AUTO DIFF WBC: CPT

## 2020-02-26 PROCEDURE — 74011250637 HC RX REV CODE- 250/637: Performed by: EMERGENCY MEDICINE

## 2020-02-26 PROCEDURE — 71046 X-RAY EXAM CHEST 2 VIEWS: CPT

## 2020-02-26 PROCEDURE — 99284 EMERGENCY DEPT VISIT MOD MDM: CPT

## 2020-02-26 PROCEDURE — 65660000000 HC RM CCU STEPDOWN

## 2020-02-26 RX ORDER — ACETAMINOPHEN 500 MG
1000 TABLET ORAL
Status: COMPLETED | OUTPATIENT
Start: 2020-02-26 | End: 2020-02-26

## 2020-02-26 RX ORDER — GUAIFENESIN 100 MG/5ML
324 LIQUID (ML) ORAL
Status: COMPLETED | OUTPATIENT
Start: 2020-02-26 | End: 2020-02-26

## 2020-02-26 RX ADMIN — ACETAMINOPHEN 1000 MG: 500 TABLET ORAL at 15:21

## 2020-02-26 RX ADMIN — ASPIRIN 81 MG 324 MG: 81 TABLET ORAL at 15:22

## 2020-02-26 NOTE — DISCHARGE INSTRUCTIONS

## 2020-02-26 NOTE — ED PROVIDER NOTES
EMERGENCY DEPARTMENT HISTORY AND PHYSICAL EXAM    Date: 2/26/2020  Patient Name: Ray Mercado    History of Presenting Illness     Chief Complaint   Patient presents with    Chest Pain         History Provided By: Patient and Patient's Wife    Additional History (Context): Ray Mercado is a 61 y.o. male with hypertension, hyperlipidemia and ESRD on dialysis who presents with implant of chest pain while he was at dialysis today. Denies any shortness of breath. Pain was on the left side. Essentially nearly resolved. Denies fever or cough. Last stress test and stents were placed numerous years ago. Had an echocardiogram performed 5/2019 with LVEF 60%. Not actively followed by cardiology. .  Brother had an MI at age 48. PCP: Brian Yan MD    Current Outpatient Medications   Medication Sig Dispense Refill    cyclobenzaprine (FLEXERIL) 5 mg tablet Take 1 Tab by mouth three (3) times daily as needed for Muscle Spasm(s). 15 Tab 0    COLCRYS 0.6 mg tablet TAKE 1 TABLET BY MOUTH EVERY 1 HOURS FOR GOUT PAIN. DO NOT EXCEED 3 TABLETS IN 24 HOURS. 30 Tab 0    indomethacin (INDOCIN) 25 mg capsule TAKE 2 CAPSULES BY MOUTH THREE TIMES DAILY WITH FOOD 540 Cap 0    ondansetron hcl (ZOFRAN) 4 mg tablet Take 1 Tab by mouth every eight (8) hours as needed for Nausea. 10 Tab 0    colchicine 0.6 mg tablet Take 1 Tab by mouth daily. 22 Tab 2    methylPREDNISolone (MEDROL, CASS,) 4 mg tablet Per dose pack instructions 1 Dose Pack 0    ibuprofen (MOTRIN) 600 mg tablet Take 1 Tab by mouth every six (6) hours as needed for Pain. 20 Tab 0    lipase-protease-amylase (CREON 6000) 6,000-19,000 -30,000 unit capsule Take 1 Cap by mouth three (3) times daily (with meals). Indications: exocrine pancreatic insufficiency 90 Cap 0    omeprazole (PRILOSEC) 20 mg capsule Take 20 mg by mouth daily.  febuxostat (ULORIC) 40 mg tab tablet Take 1 Tab by mouth daily.  14 Tab 1    hydrALAZINE (APRESOLINE) 50 mg tablet Take 1 Tab by mouth two (2) times a day. 20 Tab 0    tamsulosin (FLOMAX) 0.4 mg capsule Take 1 Cap by mouth daily. Start this medication on Friday 12/15/17  Indications: Urolithiasis 6 Cap 0    glucose blood VI test strips (FREESTYLE TEST) strip by Does Not Apply route See Admin Instructions. Check twice a day 100 Strip 2    bisacodyl (DULCOLAX, BISACODYL,) 5 mg EC tablet Take 1 Tab by mouth daily as needed for Constipation. 30 Tab 0    NOVOLIN 70/30 100 unit/mL (70-30) injection INJECT 30 UNITS SC IN THE MORNING AND 20 UNITS SC IN THE EVENING BEFORE SUPPER 10 mL 4    isosorbide mononitrate ER (IMDUR) 60 mg CR tablet Take 1 Tab by mouth daily. 30 Tab 6    atorvastatin (LIPITOR) 40 mg tablet Take 1 Tab by mouth nightly. 30 Tab 6    furosemide (LASIX) 40 mg tablet Take 1 Tab by mouth daily. 30 Tab 6    fluticasone (FLONASE) 50 mcg/actuation nasal spray 2 Sprays by Both Nostrils route daily.  carvedilol (COREG) 6.25 mg tablet Take 1 Tab by mouth two (2) times daily (with meals). (Patient taking differently: Take 25 mg by mouth two (2) times daily (with meals). ) 60 Tab 6    nitroglycerin (NITROSTAT) 0.4 mg SL tablet 1 Tab by SubLINGual route as needed for Chest Pain. (Patient taking differently: 1 Tab by SubLINGual route as needed for Chest Pain. Pt to take 1 tab q 5 min up to three times, if symptom persist pt. to call 911.) 20 Tab 0    aspirin 81 mg chewable tablet Take 1 Tab by mouth daily. 30 Tab 0    B COMPLEX W-C NO.20/FOLIC ACID (B COMPLEX & C NO.20-FOLIC ACID PO) Take  by mouth.  budesonide-formoterol (SYMBICORT) 160-4.5 mcg/actuation HFA inhaler Take 2 Puffs by inhalation two (2) times a day. 1 Inhaler 0    calcitRIOL (ROCALTROL) 0.25 mcg capsule Take 1 Cap by mouth every Monday, Wednesday, Friday. 15 Cap 0    Lancets misc Check 3 times a day , needs according contour glucometer 1 Package 11    Blood-Glucose Meter monitoring kit Check twice daily.  1 kit 0       Past History     Past Medical History:  Past Medical History:   Diagnosis Date    Alcohol abuse     Alcohol-induced chronic pancreatitis ([facility] [address] 9/7/2018    Arthritis     Atherosclerotic cardiovascular disease     CAD (coronary artery disease)     mild disease of coronaries (cor angio 2006),wife states he has 1 stent    Cardiac cath 01/26/2006    RCA mild. Otherwise patent coronaries. LVEDP 15.  EF 55-60%.  Cardiac echocardiogram 03/27/2009    EF 60%. No cardiac source of embolism. No significant valvular pathology.  Cardiac nuclear imaging test 12/06/2011    Small, mild inferior infarction or possibly artifact. No ischemia. EF 45%. No TID. No reg WMA.  Cardiovascular LLE venous duplex 08/14/2015    Left leg:  No DVT. Dilated lymph node in left groin.  Chronic kidney disease     Chronic obstructive pulmonary disease (HCC)     Constipation     Diabetes mellitus type II     Gout     Hepatic steatosis     Hypercholesterolemia     Hypertension     Knee effusion, left     Left knee pain     Morbid obesity (HCC)     Noncompliance     Obesity (BMI 30-39. 9)     S/P colonoscopy 3/8/05    Dr. Luis Angel Castorena; normal; f/u 10 years    Stomach problems     TIA (transient ischemic attack) 3/09    Tobacco abuse        Past Surgical History:  Past Surgical History:   Procedure Laterality Date    ABDOMEN SURGERY PROC UNLISTED      Hernia repair in 1960's or 1970's.  HX ORTHOPAEDIC      Bones spur removed from left & right foot 2013.  HX UROLOGICAL  8/18/2015    [facility], Dr. Agustín Vásquez, Cystoscopy, left retrograde, left double-J cath       Family History:  Family History   Problem Relation Age of Onset    Diabetes Father     Heart Disease Mother     Diabetes Sister     Hypertension Sister     Arthritis-osteo Sister     Arthritis-osteo Brother     Diabetes Other     Diabetes Other         Uncle, Aunt    Hypertension Other         Uncle;  Aunt       Social History:  Social History     Tobacco Use    Smoking status: Current Every Day Smoker     Packs/day: 0.25     Types: Cigarettes    Smokeless tobacco: Never Used   Substance Use Topics    Alcohol use: Not Currently     Alcohol/week: 0.0 standard drinks     Comment: 1 beers a day.  Drug use: Not Currently     Types: Cocaine     Comment: +UDS for cocaine in 2017       Allergies: Allergies   Allergen Reactions    Shellfish Derived Other (comments)     Causes Gout    Tramadol Itching         Review of Systems   Review of Systems   Constitutional: Negative for fever. Respiratory: Negative for cough and shortness of breath. Cardiovascular: Positive for chest pain. Gastrointestinal: Negative for nausea and vomiting. Neurological: Positive for headaches. All Other Systems Negative  Physical Exam     Vitals:    02/26/20 1400   BP: 183/85   Pulse: 86   Resp: 18   Temp: 98.4 °F (36.9 °C)     Physical Exam  Vitals signs and nursing note reviewed. Constitutional:       General: He is not in acute distress. Appearance: He is well-developed. He is not ill-appearing, toxic-appearing or diaphoretic. HENT:      Head: Normocephalic and atraumatic. Neck:      Musculoskeletal: Normal range of motion and neck supple. Thyroid: No thyromegaly. Vascular: No carotid bruit. Trachea: No tracheal deviation. Cardiovascular:      Rate and Rhythm: Normal rate and regular rhythm. Heart sounds: Normal heart sounds. No murmur. No friction rub. No gallop. Pulmonary:      Effort: Pulmonary effort is normal. No respiratory distress. Breath sounds: Normal breath sounds. No stridor. No wheezing or rales. Chest:      Chest wall: No tenderness. Abdominal:      General: There is no distension. Palpations: Abdomen is soft. There is no mass. Tenderness: There is no abdominal tenderness. There is no guarding or rebound. Musculoskeletal: Normal range of motion. Skin:     General: Skin is warm and dry. Coloration: Skin is not pale. Neurological:      Mental Status: He is alert. Psychiatric:         Speech: Speech normal.         Behavior: Behavior normal.         Thought Content: Thought content normal.         Judgment: Judgment normal.          Diagnostic Study Results     Labs -     Recent Results (from the past 12 hour(s))   EKG, 12 LEAD, INITIAL    Collection Time: 02/26/20  2:21 PM   Result Value Ref Range    Ventricular Rate 86 BPM    Atrial Rate 86 BPM    P-R Interval 182 ms    QRS Duration 98 ms    Q-T Interval 394 ms    QTC Calculation (Bezet) 471 ms    Calculated P Axis 58 degrees    Calculated R Axis 14 degrees    Calculated T Axis -179 degrees    Diagnosis       Normal sinus rhythm  Left ventricular hypertrophy with repolarization abnormality  Abnormal ECG  When compared with ECG of 26-JUL-2019 13:28,  No significant change was found  Confirmed by Melody Files, Dean LOCK, --- ([id number]) on 2/26/2020 4:58:00 PM     METABOLIC PANEL, COMPREHENSIVE    Collection Time: 02/26/20  2:25 PM   Result Value Ref Range    Sodium 138 136 - 145 mmol/L    Potassium 3.8 3.5 - 5.5 mmol/L    Chloride 100 100 - 111 mmol/L    CO2 32 21 - 32 mmol/L    Anion gap 6 3.0 - 18 mmol/L    Glucose 163 (H) 74 - 99 mg/dL    BUN 36 (H) 7.0 - 18 MG/DL    Creatinine 7.49 (H) 0.6 - 1.3 MG/DL    BUN/Creatinine ratio 5 (L) 12 - 20      GFR est AA 9 (L) >60 ml/min/1.73m2    GFR est non-AA 7 (L) >60 ml/min/1.73m2    Calcium 8.8 8.5 - 10.1 MG/DL    Bilirubin, total 0.2 0.2 - 1.0 MG/DL    ALT (SGPT) 16 16 - 61 U/L    AST (SGOT) 14 10 - 38 U/L    Alk.  phosphatase 48 45 - 117 U/L    Protein, total 7.0 6.4 - 8.2 g/dL    Albumin 3.2 (L) 3.4 - 5.0 g/dL    Globulin 3.8 2.0 - 4.0 g/dL    A-G Ratio 0.8 0.8 - 1.7     CBC WITH AUTOMATED DIFF    Collection Time: 02/26/20  2:25 PM   Result Value Ref Range    WBC 6.6 4.6 - 13.2 K/uL    RBC 3.88 (L) 4.70 - 5.50 M/uL    HGB 11.1 (L) 13.0 - 16.0 g/dL    HCT 35.3 (L) 36.0 - 48.0 %    MCV 91.0 74.0 - 97.0 FL    MCH 28.6 24.0 - 34.0 PG    MCHC 31.4 31.0 - 37.0 g/dL    RDW 15.7 (H) 11.6 - 14.5 %    PLATELET 326 954 - 949 K/uL    MPV 10.8 9.2 - 11.8 FL    NEUTROPHILS 70 40 - 73 %    LYMPHOCYTES 21 21 - 52 %    MONOCYTES 7 3 - 10 %    EOSINOPHILS 2 0 - 5 %    BASOPHILS 0 0 - 2 %    ABS. NEUTROPHILS 4.6 1.8 - 8.0 K/UL    ABS. LYMPHOCYTES 1.4 0.9 - 3.6 K/UL    ABS. MONOCYTES 0.5 0.05 - 1.2 K/UL    ABS. EOSINOPHILS 0.1 0.0 - 0.4 K/UL    ABS. BASOPHILS 0.0 0.0 - 0.1 K/UL    DF AUTOMATED     TROPONIN I    Collection Time: 02/26/20  2:25 PM   Result Value Ref Range    Troponin-I, QT 0.06 (H) 0.0 - 0.045 NG/ML   MAGNESIUM    Collection Time: 02/26/20  2:25 PM   Result Value Ref Range    Magnesium 2.4 1.6 - 2.6 mg/dL   TROPONIN I    Collection Time: 02/26/20  5:30 PM   Result Value Ref Range    Troponin-I, QT 0.07 (H) 0.0 - 0.045 NG/ML       Radiologic Studies -   XR CHEST PA LAT   Final Result   IMPRESSION[de-identified]      1.  No evidence of pneumonia or acute bony infiltrate. 2. Mild cardiomegaly. CT Results  (Last 48 hours)    None        CXR Results  (Last 48 hours)               02/26/20 1410  XR CHEST PA LAT Final result    Impression:  IMPRESSION[de-identified]       1.  No evidence of pneumonia or acute bony infiltrate. 2. Mild cardiomegaly. Narrative:  EXAM: XR CHEST PA LAT       HISTORY: chest pain       COMPARISON: December 7, 2018       FINDINGS:       No evidence of pneumonia or acute pulmonary infiltrate. There is mild   cardiomegaly but compared to the prior study the heart appears smaller. Pulmonary vascular markings are within normal limits. There is no evidence of a   pleural effusion or pneumothorax. BONES/SOFT TISSUES: Unremarkable for age                   Medical Decision Making   I am the first provider for this patient. I reviewed the vital signs, available nursing notes, past medical history, past surgical history, family history and social history. Vital Signs-Reviewed the patient's vital signs.     Records Reviewed: Nursing Notes    Procedures:  EKG  Date/Time: 2/26/2020 2:21 PM  Performed by: AUBREY Mishra  Authorized by: AUBREY Mishra     ECG reviewed by ED Physician in the absence of a cardiologist: yes    Interpretation:     Interpretation: abnormal    Rate:     ECG rate:  86    ECG rate assessment: normal    Rhythm:     Rhythm: sinus rhythm    Ectopy:     Ectopy: none    QRS:     QRS axis:  Normal    QRS intervals:  Normal  Conduction:     Conduction: normal    ST segments:     ST segments:  Normal  T waves:     T waves: normal    Other findings:     Other findings: early repolarization          Provider Notes (Medical Decision Making): Had echocardiogram last year; no active CP in the department. No acute EKG changes. Will get two sets of CE and give cardiology f/up. MED RECONCILIATION:  No current facility-administered medications for this encounter. Current Outpatient Medications   Medication Sig    cyclobenzaprine (FLEXERIL) 5 mg tablet Take 1 Tab by mouth three (3) times daily as needed for Muscle Spasm(s).  COLCRYS 0.6 mg tablet TAKE 1 TABLET BY MOUTH EVERY 1 HOURS FOR GOUT PAIN. DO NOT EXCEED 3 TABLETS IN 24 HOURS.  indomethacin (INDOCIN) 25 mg capsule TAKE 2 CAPSULES BY MOUTH THREE TIMES DAILY WITH FOOD    ondansetron hcl (ZOFRAN) 4 mg tablet Take 1 Tab by mouth every eight (8) hours as needed for Nausea.  colchicine 0.6 mg tablet Take 1 Tab by mouth daily.  methylPREDNISolone (MEDROL, CASS,) 4 mg tablet Per dose pack instructions    ibuprofen (MOTRIN) 600 mg tablet Take 1 Tab by mouth every six (6) hours as needed for Pain.  lipase-protease-amylase (CREON 6000) 6,000-19,000 -30,000 unit capsule Take 1 Cap by mouth three (3) times daily (with meals). Indications: exocrine pancreatic insufficiency    omeprazole (PRILOSEC) 20 mg capsule Take 20 mg by mouth daily.  febuxostat (ULORIC) 40 mg tab tablet Take 1 Tab by mouth daily.     hydrALAZINE (APRESOLINE) 50 mg tablet Take 1 Tab by mouth two (2) times a day.  tamsulosin (FLOMAX) 0.4 mg capsule Take 1 Cap by mouth daily. Start this medication on Friday 12/15/17  Indications: Urolithiasis    glucose blood VI test strips (FREESTYLE TEST) strip by Does Not Apply route See Admin Instructions. Check twice a day    bisacodyl (DULCOLAX, BISACODYL,) 5 mg EC tablet Take 1 Tab by mouth daily as needed for Constipation.  NOVOLIN 70/30 100 unit/mL (70-30) injection INJECT 30 UNITS SC IN THE MORNING AND 20 UNITS SC IN THE EVENING BEFORE SUPPER    isosorbide mononitrate ER (IMDUR) 60 mg CR tablet Take 1 Tab by mouth daily.  atorvastatin (LIPITOR) 40 mg tablet Take 1 Tab by mouth nightly.  furosemide (LASIX) 40 mg tablet Take 1 Tab by mouth daily.  fluticasone (FLONASE) 50 mcg/actuation nasal spray 2 Sprays by Both Nostrils route daily.  carvedilol (COREG) 6.25 mg tablet Take 1 Tab by mouth two (2) times daily (with meals). (Patient taking differently: Take 25 mg by mouth two (2) times daily (with meals). )    nitroglycerin (NITROSTAT) 0.4 mg SL tablet 1 Tab by SubLINGual route as needed for Chest Pain. (Patient taking differently: 1 Tab by SubLINGual route as needed for Chest Pain. Pt to take 1 tab q 5 min up to three times, if symptom persist pt. to call 911.)    aspirin 81 mg chewable tablet Take 1 Tab by mouth daily.  B COMPLEX W-C NO.20/FOLIC ACID (B COMPLEX & C NO.20-FOLIC ACID PO) Take  by mouth.  budesonide-formoterol (SYMBICORT) 160-4.5 mcg/actuation HFA inhaler Take 2 Puffs by inhalation two (2) times a day.  calcitRIOL (ROCALTROL) 0.25 mcg capsule Take 1 Cap by mouth every Monday, Wednesday, Friday.  Lancets misc Check 3 times a day , needs according contour glucometer    Blood-Glucose Meter monitoring kit Check twice daily. Disposition:  home    DISCHARGE NOTE:   6:08 PM    Pt has been reexamined. Patient has no new complaints, changes, or physical findings.   Care plan outlined and precautions discussed. Results of labs, CXR were reviewed with the patient. All medications were reviewed with the patient. All of pt's questions and concerns were addressed. Patient was instructed and agrees to follow up with cardiology, as well as to return to the ED upon further deterioration. Patient is ready to go home. Follow-up Information     Follow up With Specialties Details Why Contact Info    Fay Mckeon MD [facility] Schedule an appointment as soon as possible for a visit in 1 day  [place] [address]  [phone]      Edie [address] Cardiology Schedule an appointment as soon as possible for a visit in 1 day  [address] [facility] [address] [place] [id number] [phone]      [facility] DEPT Emergency Medicine  If symptoms worsen return immediately [address]  [phone]          Current Discharge Medication List        Diagnosis     Clinical Impression:   1. Acute chest pain    2.  ESRD on dialysis [facility])

## 2020-02-26 NOTE — ED TRIAGE NOTES
Patient arrives via EMS with c/o sharp chest pain that occurred while the patient was in dialysis. Patient denies chest pain now and states this happened previously at dialysis when the machine was pulling too hard. Patient given 2 nitro en route but denies relief. Patient placed in position of comfort on ER stretcher.

## 2020-03-30 ENCOUNTER — HOSPITAL ENCOUNTER (EMERGENCY)
Age: 60
Discharge: HOME | End: 2020-03-30
Attending: EMERGENCY MEDICINE
Payer: MEDICARE

## 2020-03-30 ENCOUNTER — APPOINTMENT (OUTPATIENT)
Dept: GENERAL RADIOLOGY | Age: 60
End: 2020-03-30
Attending: STUDENT IN AN ORGANIZED HEALTH CARE EDUCATION/TRAINING PROGRAM
Payer: MEDICARE

## 2020-03-30 VITALS
RESPIRATION RATE: 15 BRPM | TEMPERATURE: 98.7 F | DIASTOLIC BLOOD PRESSURE: 87 MMHG | OXYGEN SATURATION: 98 % | SYSTOLIC BLOOD PRESSURE: 151 MMHG | HEART RATE: 71 BPM

## 2020-03-30 DIAGNOSIS — R07.9 CHEST PAIN, UNSPECIFIED TYPE: Primary | ICD-10-CM

## 2020-03-30 LAB
ANION GAP SERPL CALC-SCNC: 6 MMOL/L (ref 3–18)
BASOPHILS # BLD: 0 K/UL (ref 0–0.1)
BASOPHILS NFR BLD: 0 % (ref 0–2)
BNP SERPL-MCNC: 5976 PG/ML (ref 0–900)
BUN SERPL-MCNC: 32 MG/DL (ref 7–18)
BUN/CREAT SERPL: 4 (ref 12–20)
CALCIUM SERPL-MCNC: 8.3 MG/DL (ref 8.5–10.1)
CHLORIDE SERPL-SCNC: 101 MMOL/L (ref 100–111)
CO2 SERPL-SCNC: 33 MMOL/L (ref 21–32)
CREAT SERPL-MCNC: 7.26 MG/DL (ref 0.6–1.3)
DIFFERENTIAL METHOD BLD: ABNORMAL
EOSINOPHIL # BLD: 0.2 K/UL (ref 0–0.4)
EOSINOPHIL NFR BLD: 3 % (ref 0–5)
ERYTHROCYTE [DISTWIDTH] IN BLOOD BY AUTOMATED COUNT: 16.6 % (ref 11.6–14.5)
GLUCOSE SERPL-MCNC: 132 MG/DL (ref 74–99)
HCT VFR BLD AUTO: 37.7 % (ref 36–48)
HGB BLD-MCNC: 11.8 G/DL (ref 13–16)
INR PPP: 0.9 (ref 0.8–1.2)
LYMPHOCYTES # BLD: 2 K/UL (ref 0.9–3.6)
LYMPHOCYTES NFR BLD: 29 % (ref 21–52)
MCH RBC QN AUTO: 29.2 PG (ref 24–34)
MCHC RBC AUTO-ENTMCNC: 31.3 G/DL (ref 31–37)
MCV RBC AUTO: 93.3 FL (ref 74–97)
MONOCYTES # BLD: 0.5 K/UL (ref 0.05–1.2)
MONOCYTES NFR BLD: 7 % (ref 3–10)
NEUTS SEG # BLD: 4.2 K/UL (ref 1.8–8)
NEUTS SEG NFR BLD: 61 % (ref 40–73)
PLATELET # BLD AUTO: 351 K/UL (ref 135–420)
PMV BLD AUTO: 10.2 FL (ref 9.2–11.8)
POTASSIUM SERPL-SCNC: 3.7 MMOL/L (ref 3.5–5.5)
PROTHROMBIN TIME: 12.2 SEC (ref 11.5–15.2)
RBC # BLD AUTO: 4.04 M/UL (ref 4.7–5.5)
SODIUM SERPL-SCNC: 140 MMOL/L (ref 136–145)
TROPONIN I SERPL-MCNC: 0.02 NG/ML (ref 0–0.04)
TROPONIN I SERPL-MCNC: 0.02 NG/ML (ref 0–0.04)
WBC # BLD AUTO: 6.8 K/UL (ref 4.6–13.2)

## 2020-03-30 PROCEDURE — 84484 ASSAY OF TROPONIN QUANT: CPT

## 2020-03-30 PROCEDURE — 83880 ASSAY OF NATRIURETIC PEPTIDE: CPT

## 2020-03-30 PROCEDURE — 80048 BASIC METABOLIC PNL TOTAL CA: CPT

## 2020-03-30 PROCEDURE — 85025 COMPLETE CBC W/AUTO DIFF WBC: CPT

## 2020-03-30 PROCEDURE — 93005 ELECTROCARDIOGRAM TRACING: CPT

## 2020-03-30 PROCEDURE — 99284 EMERGENCY DEPT VISIT MOD MDM: CPT

## 2020-03-30 PROCEDURE — 74011250637 HC RX REV CODE- 250/637: Performed by: STUDENT IN AN ORGANIZED HEALTH CARE EDUCATION/TRAINING PROGRAM

## 2020-03-30 PROCEDURE — 71046 X-RAY EXAM CHEST 2 VIEWS: CPT

## 2020-03-30 PROCEDURE — 85610 PROTHROMBIN TIME: CPT

## 2020-03-30 RX ORDER — ACETAMINOPHEN 500 MG
1000 TABLET ORAL
Status: COMPLETED | OUTPATIENT
Start: 2020-03-30 | End: 2020-03-30

## 2020-03-30 RX ADMIN — ACETAMINOPHEN 1000 MG: 500 TABLET ORAL at 19:02

## 2020-03-30 NOTE — ED PROVIDER NOTES
EMERGENCY DEPARTMENT HISTORY AND PHYSICAL EXAM    3:21 PM      Date: 3/30/2020  Patient Name: Wilma Diaz    History of Presenting Illness     Chief Complaint   Patient presents with    Chest Pain         History Provided By: Patient  Location/Duration/Severity/Modifying factors   60 y/o male with PMH DMT2, HTN, HLD, Tobacco use, Obesity, CAD, Cath with stent, COPD, and end stage renal disease on dialysis presented 2.5 hr into his 4hr dialysis treatment with L sided 10/10 \"stabbing\" CP with associated bilateral hand paresthesias and diaphoresis. EMS was called and he was given Nitro x2 and ASA x3 en route to the ED. Patient A&Ox3 in ED and endorsed resolution of all his symptoms. At time of presentation he only complained of mild HA. He had no other associated complaints at the time. PCP: Sunitha Mercado MD    Current Facility-Administered Medications   Medication Dose Route Frequency Provider Last Rate Last Dose    acetaminophen (TYLENOL) tablet 1,000 mg  1,000 mg Oral NOW Zully Zaidi MD         Current Outpatient Medications   Medication Sig Dispense Refill    cyclobenzaprine (FLEXERIL) 5 mg tablet Take 1 Tab by mouth three (3) times daily as needed for Muscle Spasm(s). 15 Tab 0    COLCRYS 0.6 mg tablet TAKE 1 TABLET BY MOUTH EVERY 1 HOURS FOR GOUT PAIN. DO NOT EXCEED 3 TABLETS IN 24 HOURS. 30 Tab 0    indomethacin (INDOCIN) 25 mg capsule TAKE 2 CAPSULES BY MOUTH THREE TIMES DAILY WITH FOOD 540 Cap 0    ondansetron hcl (ZOFRAN) 4 mg tablet Take 1 Tab by mouth every eight (8) hours as needed for Nausea. 10 Tab 0    colchicine 0.6 mg tablet Take 1 Tab by mouth daily. 22 Tab 2    methylPREDNISolone (MEDROL, CASS,) 4 mg tablet Per dose pack instructions 1 Dose Pack 0    ibuprofen (MOTRIN) 600 mg tablet Take 1 Tab by mouth every six (6) hours as needed for Pain.  20 Tab 0    lipase-protease-amylase (CREON 6000) 6,000-19,000 -30,000 unit capsule Take 1 Cap by mouth three (3) times daily (with meals). Indications: exocrine pancreatic insufficiency 90 Cap 0    omeprazole (PRILOSEC) 20 mg capsule Take 20 mg by mouth daily.  febuxostat (ULORIC) 40 mg tab tablet Take 1 Tab by mouth daily. 14 Tab 1    hydrALAZINE (APRESOLINE) 50 mg tablet Take 1 Tab by mouth two (2) times a day. 20 Tab 0    tamsulosin (FLOMAX) 0.4 mg capsule Take 1 Cap by mouth daily. Start this medication on Friday 12/15/17  Indications: Urolithiasis 6 Cap 0    glucose blood VI test strips (FREESTYLE TEST) strip by Does Not Apply route See Admin Instructions. Check twice a day 100 Strip 2    bisacodyl (DULCOLAX, BISACODYL,) 5 mg EC tablet Take 1 Tab by mouth daily as needed for Constipation. 30 Tab 0    NOVOLIN 70/30 100 unit/mL (70-30) injection INJECT 30 UNITS SC IN THE MORNING AND 20 UNITS SC IN THE EVENING BEFORE SUPPER 10 mL 4    isosorbide mononitrate ER (IMDUR) 60 mg CR tablet Take 1 Tab by mouth daily. 30 Tab 6    atorvastatin (LIPITOR) 40 mg tablet Take 1 Tab by mouth nightly. 30 Tab 6    furosemide (LASIX) 40 mg tablet Take 1 Tab by mouth daily. 30 Tab 6    fluticasone (FLONASE) 50 mcg/actuation nasal spray 2 Sprays by Both Nostrils route daily.  carvedilol (COREG) 6.25 mg tablet Take 1 Tab by mouth two (2) times daily (with meals). (Patient taking differently: Take 25 mg by mouth two (2) times daily (with meals). ) 60 Tab 6    nitroglycerin (NITROSTAT) 0.4 mg SL tablet 1 Tab by SubLINGual route as needed for Chest Pain. (Patient taking differently: 1 Tab by SubLINGual route as needed for Chest Pain. Pt to take 1 tab q 5 min up to three times, if symptom persist pt. to call 911.) 20 Tab 0    aspirin 81 mg chewable tablet Take 1 Tab by mouth daily. 30 Tab 0    B COMPLEX W-C NO.20/FOLIC ACID (B COMPLEX & C NO.20-FOLIC ACID PO) Take  by mouth.  budesonide-formoterol (SYMBICORT) 160-4.5 mcg/actuation HFA inhaler Take 2 Puffs by inhalation two (2) times a day.  1 Inhaler 0    calcitRIOL (ROCALTROL) 0.25 mcg capsule Take 1 Cap by mouth every Monday, Wednesday, Friday. 15 Cap 0    Lancets misc Check 3 times a day , needs according contour glucometer 1 Package 11    Blood-Glucose Meter monitoring kit Check twice daily. 1 kit 0       Past History     Past Medical History:  Past Medical History:   Diagnosis Date    Alcohol abuse     Alcohol-induced chronic pancreatitis ([facility] [address] 9/7/2018    Arthritis     Atherosclerotic cardiovascular disease     CAD (coronary artery disease)     mild disease of coronaries (cor angio 2006),wife states he has 1 stent    Cardiac cath 01/26/2006    RCA mild. Otherwise patent coronaries. LVEDP 15.  EF 55-60%.  Cardiac echocardiogram 03/27/2009    EF 60%. No cardiac source of embolism. No significant valvular pathology.  Cardiac nuclear imaging test 12/06/2011    Small, mild inferior infarction or possibly artifact. No ischemia. EF 45%. No TID. No reg WMA.  Cardiovascular LLE venous duplex 08/14/2015    Left leg:  No DVT. Dilated lymph node in left groin.  Chronic kidney disease     Chronic obstructive pulmonary disease (HCC)     Constipation     Diabetes mellitus type II     Gout     Hepatic steatosis     Hypercholesterolemia     Hypertension     Knee effusion, left     Left knee pain     Morbid obesity (HCC)     Noncompliance     Obesity (BMI 30-39. 9)     S/P colonoscopy 3/8/05    Dr. Timmy Jean; normal; f/u 10 years    Stomach problems     TIA (transient ischemic attack) 3/09    Tobacco abuse        Past Surgical History:  Past Surgical History:   Procedure Laterality Date    ABDOMEN SURGERY PROC UNLISTED      Hernia repair in 1960's or 1970's.  HX ORTHOPAEDIC      Bones spur removed from left & right foot 2013.     HX UROLOGICAL  8/18/2015    [facility], Dr. Rubi Billingsley, Cystoscopy, left retrograde, left double-J cath       Family History:  Family History   Problem Relation Age of Onset    Diabetes Father     Heart Disease Mother     Diabetes Sister     Hypertension Sister     Arthritis-osteo Sister     Arthritis-osteo Brother     Diabetes Other     Diabetes Other         Uncle, Aunt    Hypertension Other         Uncle; Aunt       Social History:  Social History     Tobacco Use    Smoking status: Current Every Day Smoker     Packs/day: 0.25     Types: Cigarettes    Smokeless tobacco: Never Used   Substance Use Topics    Alcohol use: Not Currently     Alcohol/week: 0.0 standard drinks     Comment: 1 beers a day.  Drug use: Not Currently     Types: Cocaine     Comment: +UDS for cocaine in 2017       Allergies: Allergies   Allergen Reactions    Shellfish Derived Other (comments)     Causes Gout    Tramadol Itching         Review of Systems       Review of Systems   Constitutional: Negative for chills and fever. HENT: Negative for congestion and sore throat. Eyes: Negative for visual disturbance. Respiratory: Negative for cough and shortness of breath. Gastrointestinal: Negative for abdominal pain. Genitourinary: Negative for difficulty urinating, dysuria, frequency and urgency. Skin: Negative for rash. Neurological: Negative for weakness, light-headedness and headaches. Hematological: Negative for adenopathy. Psychiatric/Behavioral: Negative for confusion. Physical Exam     Visit Vitals  /87   Pulse 71   Temp 98.7 °F (37.1 °C)   Resp 15   SpO2 98%         Physical Exam  Vitals signs and nursing note reviewed. Constitutional:       General: He is not in acute distress. Appearance: Normal appearance. HENT:      Head: Normocephalic and atraumatic. Nose: Nose normal.      Mouth/Throat:      Mouth: Mucous membranes are moist.      Pharynx: Oropharynx is clear. No oropharyngeal exudate or posterior oropharyngeal erythema. Eyes:      Extraocular Movements: Extraocular movements intact. Pupils: Pupils are equal, round, and reactive to light. Neck:      Musculoskeletal: Neck supple. Cardiovascular:      Rate and Rhythm: Normal rate and regular rhythm. Pulses: Normal pulses. Radial pulses are 2+ on the right side and 2+ on the left side. Dorsalis pedis pulses are 2+ on the right side and 2+ on the left side. Heart sounds: Normal heart sounds. Pulmonary:      Effort: Pulmonary effort is normal.      Breath sounds: Normal breath sounds. Chest:      Chest wall: No mass, deformity, tenderness or crepitus. Abdominal:      Palpations: Abdomen is soft. Tenderness: There is no abdominal tenderness. Musculoskeletal:      Right lower leg: He exhibits no tenderness. No edema. Left lower leg: He exhibits no tenderness. No edema. Lymphadenopathy:      Cervical: No cervical adenopathy. Skin:     General: Skin is warm and dry. Capillary Refill: Capillary refill takes less than 2 seconds. Findings: No rash. Neurological:      Mental Status: He is alert and oriented to person, place, and time.    Psychiatric:         Mood and Affect: Mood normal.           Diagnostic Study Results     Labs -  Recent Results (from the past 12 hour(s))   EKG, 12 LEAD, INITIAL    Collection Time: 03/30/20  2:42 PM   Result Value Ref Range    Ventricular Rate 65 BPM    Atrial Rate 65 BPM    P-R Interval 186 ms    QRS Duration 102 ms    Q-T Interval 448 ms    QTC Calculation (Bezet) 465 ms    Calculated P Axis 82 degrees    Calculated R Axis 39 degrees    Calculated T Axis 134 degrees    Diagnosis       Normal sinus rhythm  Possible Left atrial enlargement  Left ventricular hypertrophy  T wave abnormality, consider inferolateral ischemia  Prolonged QT  Abnormal ECG  When compared with ECG of 26-FEB-2020 14:21,  ST no longer depressed in Lateral leads  T wave inversion no longer evident in Anterior leads     CBC WITH AUTOMATED DIFF    Collection Time: 03/30/20  3:00 PM   Result Value Ref Range    WBC 6.8 4.6 - 13.2 K/uL    RBC 4.04 (L) 4.70 - 5.50 M/uL    HGB 11.8 (L) 13.0 - 16.0 g/dL    HCT 37.7 36.0 - 48.0 %    MCV 93.3 74.0 - 97.0 FL    MCH 29.2 24.0 - 34.0 PG    MCHC 31.3 31.0 - 37.0 g/dL    RDW 16.6 (H) 11.6 - 14.5 %    PLATELET 484 870 - 892 K/uL    MPV 10.2 9.2 - 11.8 FL    NEUTROPHILS 61 40 - 73 %    LYMPHOCYTES 29 21 - 52 %    MONOCYTES 7 3 - 10 %    EOSINOPHILS 3 0 - 5 %    BASOPHILS 0 0 - 2 %    ABS. NEUTROPHILS 4.2 1.8 - 8.0 K/UL    ABS. LYMPHOCYTES 2.0 0.9 - 3.6 K/UL    ABS. MONOCYTES 0.5 0.05 - 1.2 K/UL    ABS. EOSINOPHILS 0.2 0.0 - 0.4 K/UL    ABS.  BASOPHILS 0.0 0.0 - 0.1 K/UL    DF AUTOMATED     PROTHROMBIN TIME + INR    Collection Time: 03/30/20  3:00 PM   Result Value Ref Range    Prothrombin time 12.2 11.5 - 15.2 sec    INR 0.9 0.8 - 1.2     METABOLIC PANEL, BASIC    Collection Time: 03/30/20  3:00 PM   Result Value Ref Range    Sodium 140 136 - 145 mmol/L    Potassium 3.7 3.5 - 5.5 mmol/L    Chloride 101 100 - 111 mmol/L    CO2 33 (H) 21 - 32 mmol/L    Anion gap 6 3.0 - 18 mmol/L    Glucose 132 (H) 74 - 99 mg/dL    BUN 32 (H) 7.0 - 18 MG/DL    Creatinine 7.26 (H) 0.6 - 1.3 MG/DL    BUN/Creatinine ratio 4 (L) 12 - 20      GFR est AA 9 (L) >60 ml/min/1.73m2    GFR est non-AA 8 (L) >60 ml/min/1.73m2    Calcium 8.3 (L) 8.5 - 10.1 MG/DL   TROPONIN I    Collection Time: 03/30/20  3:00 PM   Result Value Ref Range    Troponin-I, QT 0.02 0.0 - 0.045 NG/ML   NT-PRO BNP    Collection Time: 03/30/20  3:00 PM   Result Value Ref Range    NT pro-BNP 5,976 (H) 0 - 900 PG/ML   EKG, 12 LEAD, SUBSEQUENT    Collection Time: 03/30/20  5:54 PM   Result Value Ref Range    Ventricular Rate 71 BPM    Atrial Rate 71 BPM    P-R Interval 192 ms    QRS Duration 102 ms    Q-T Interval 430 ms    QTC Calculation (Bezet) 467 ms    Calculated P Axis 59 degrees    Calculated R Axis 20 degrees    Calculated T Axis 172 degrees    Diagnosis       Normal sinus rhythm  Possible Left atrial enlargement  Left ventricular hypertrophy with repolarization abnormality  Abnormal ECG  When compared with ECG of 30-MAR-2020 14:42,  No significant change was found     TROPONIN I    Collection Time: 03/30/20  6:10 PM   Result Value Ref Range    Troponin-I, QT 0.02 0.0 - 0.045 NG/ML       Radiologic Studies -   XR CHEST PA LAT   Final Result   IMPRESSION:      Moderate enlargement of the cardiac silhouette with mild pulmonary vascular   congestion. .            Medical Decision Making   I am the first provider for this patient. I reviewed the vital signs, available nursing notes, past medical history, past surgical history, family history and social history. Vital Signs-Reviewed the patient's vital signs. EKG:    Normal sinus rhythm   Possible Left atrial enlargement   Left ventricular hypertrophy   T wave abnormality, consider inferolateral ischemia   Prolonged QT   Abnormal ECG   When compared with ECG of 26-FEB-2020 14:21,   ST no longer depressed in Lateral leads   T wave inversion no longer evident in Anterior leads          Normal sinus rhythm   Possible Left atrial enlargement   Left ventricular hypertrophy with repolarization abnormality   Abnormal ECG   When compared with ECG of 30-MAR-2020 14:42,   No significant change was found             Records Reviewed: Nursing Notes and Old Medical Records (Time of Review: 3:21 PM)    ED Course: Progress Notes, Reevaluation, and Consults:         Provider Notes (Medical Decision Making):   MDM  Number of Diagnoses or Management Options  Chest pain, unspecified type:   Diagnosis management comments: Vitals notable for /87, otherwise within normal limits. Exam remarkable for Obese male resting comfortably when I saw him. Exam otherwise unremarkable. Only complaint was mild HA most likely from Nitro. Wells Low Risk (0.0 points). EKG unchanged from previous. Will get CXR, CBC, BMP, BNP, Coags, Trop. Gave 1gm tylenol PO for HA. Trop negative. BNP elevated, but has baseline elevation. Other labs close to his baseline with normal K. Heart score 6 (moderate risk). Discussed with him and he is not amenable to admission. Will get 3hr repeat trop and EKG. Repeat EKG unchanged, Trop negative. Discussed at length with him and will dc home with instructions for f/u with cardiologist, his [facility] and gave return precautions for worsening or worrisome symptoms. He expressed understanding and was on board with the plan. Procedures    Critical Care Time: None      Diagnosis     Clinical Impression:   1. Chest pain, unspecified type        Disposition: Discharged     Follow-up Information     Follow up With Specialties Details Why Contact Info    Marquis Emelyn DO Cardiology Schedule an appointment as soon as possible for a visit   [address] [facility] [address] [facility] [address] [facility]      Luis Felipe Boyd MD Family Practice Schedule an appointment as soon as possible for a visit   [address]      [facility] EMERGENCY DEPT Emergency Medicine  As needed, If symptoms worsen [address] [facility] [id number] [phone]           Patient's Medications   Start Taking    No medications on file   Continue Taking    ASPIRIN 81 MG CHEWABLE TABLET    Take 1 Tab by mouth daily. ATORVASTATIN (LIPITOR) 40 MG TABLET    Take 1 Tab by mouth nightly. B COMPLEX W-C NO.20/FOLIC ACID (B COMPLEX & C NO.20-FOLIC ACID PO)    Take  by mouth. BISACODYL (DULCOLAX, BISACODYL,) 5 MG EC TABLET    Take 1 Tab by mouth daily as needed for Constipation. BLOOD-GLUCOSE METER MONITORING KIT    Check twice daily. BUDESONIDE-FORMOTEROL (SYMBICORT) 160-4.5 MCG/ACTUATION HFA INHALER    Take 2 Puffs by inhalation two (2) times a day. CALCITRIOL (ROCALTROL) 0.25 MCG CAPSULE    Take 1 Cap by mouth every Monday, Wednesday, Friday. CARVEDILOL (COREG) 6.25 MG TABLET    Take 1 Tab by mouth two (2) times daily (with meals). COLCHICINE 0.6 MG TABLET    Take 1 Tab by mouth daily.     COLCRYS 0.6 MG TABLET    TAKE 1 TABLET BY MOUTH EVERY 1 HOURS FOR GOUT PAIN. DO NOT EXCEED 3 TABLETS IN 24 HOURS. CYCLOBENZAPRINE (FLEXERIL) 5 MG TABLET    Take 1 Tab by mouth three (3) times daily as needed for Muscle Spasm(s). FEBUXOSTAT (ULORIC) 40 MG TAB TABLET    Take 1 Tab by mouth daily. FLUTICASONE (FLONASE) 50 MCG/ACTUATION NASAL SPRAY    2 Sprays by Both Nostrils route daily. FUROSEMIDE (LASIX) 40 MG TABLET    Take 1 Tab by mouth daily. GLUCOSE BLOOD VI TEST STRIPS (FREESTYLE TEST) STRIP    by Does Not Apply route See Admin Instructions. Check twice a day    HYDRALAZINE (APRESOLINE) 50 MG TABLET    Take 1 Tab by mouth two (2) times a day. IBUPROFEN (MOTRIN) 600 MG TABLET    Take 1 Tab by mouth every six (6) hours as needed for Pain. INDOMETHACIN (INDOCIN) 25 MG CAPSULE    TAKE 2 CAPSULES BY MOUTH THREE TIMES DAILY WITH FOOD    ISOSORBIDE MONONITRATE ER (IMDUR) 60 MG CR TABLET    Take 1 Tab by mouth daily. LANCETS MISC    Check 3 times a day , needs according contour glucometer    LIPASE-PROTEASE-AMYLASE (CREON 6000) 6,000-19,000 -30,000 UNIT CAPSULE    Take 1 Cap by mouth three (3) times daily (with meals). Indications: exocrine pancreatic insufficiency    METHYLPREDNISOLONE (MEDROL, CASS,) 4 MG TABLET    Per dose pack instructions    NITROGLYCERIN (NITROSTAT) 0.4 MG SL TABLET    1 Tab by SubLINGual route as needed for Chest Pain. NOVOLIN 70/30 100 UNIT/ML (70-30) INJECTION    INJECT 30 UNITS SC IN THE MORNING AND 20 UNITS SC IN THE EVENING BEFORE SUPPER    OMEPRAZOLE (PRILOSEC) 20 MG CAPSULE    Take 20 mg by mouth daily. ONDANSETRON HCL (ZOFRAN) 4 MG TABLET    Take 1 Tab by mouth every eight (8) hours as needed for Nausea. TAMSULOSIN (FLOMAX) 0.4 MG CAPSULE    Take 1 Cap by mouth daily.  Start this medication on Friday 12/15/17  Indications: Urolithiasis   These Medications have changed    No medications on file   Stop Taking    No medications on file     Disclaimer: Sections of this note are dictated using utilizing voice recognition software. Minor typographical errors may be present. If questions arise, please do not hesitate to contact me or call our department.

## 2020-03-30 NOTE — ED TRIAGE NOTES
PT arrived via ems from dialysis with complaints of chest pain.  Per medics, chest pain started at dialysis and went down left arm

## 2020-03-31 ENCOUNTER — PATIENT OUTREACH (OUTPATIENT)
Dept: CASE MANAGEMENT | Age: 60
End: 2020-03-31

## 2020-03-31 LAB
ATRIAL RATE: 65 BPM
ATRIAL RATE: 71 BPM
CALCULATED P AXIS, ECG09: 59 DEGREES
CALCULATED P AXIS, ECG09: 82 DEGREES
CALCULATED R AXIS, ECG10: 20 DEGREES
CALCULATED R AXIS, ECG10: 39 DEGREES
CALCULATED T AXIS, ECG11: 134 DEGREES
CALCULATED T AXIS, ECG11: 172 DEGREES
DIAGNOSIS, 93000: NORMAL
DIAGNOSIS, 93000: NORMAL
P-R INTERVAL, ECG05: 186 MS
P-R INTERVAL, ECG05: 192 MS
Q-T INTERVAL, ECG07: 430 MS
Q-T INTERVAL, ECG07: 448 MS
QRS DURATION, ECG06: 102 MS
QRS DURATION, ECG06: 102 MS
QTC CALCULATION (BEZET), ECG08: 465 MS
QTC CALCULATION (BEZET), ECG08: 467 MS
VENTRICULAR RATE, ECG03: 65 BPM
VENTRICULAR RATE, ECG03: 71 BPM

## 2020-03-31 NOTE — PROGRESS NOTES
Patient contacted regarding recent discharge and COVID-19 risk   Care Transition Nurse/ Ambulatory Care Manager contacted the family by telephone to perform post discharge assessment. Verified name and  with family as identifiers. Patient has following risk factors of: CHF, CAD, DM2, and COPD. CTN/ACM reviewed discharge instructions, medical action plan and red flags related to discharge diagnosis. Reviewed and educated them on any new and changed medications related to discharge diagnosis. Advised obtaining a 90-day supply of all daily and as-needed medications. Education provided regarding infection prevention, and signs and symptoms of COVID-19 and when to seek medical attention with family who verbalized understanding. Discussed exposure protocols and quarantine from [address] you at higher risk for severe illness  and given an opportunity for questions and concerns. The family agrees to contact the COVID-19 hotline [phone] or PCP office for questions related to their healthcare. CTN/ACM provided contact information for future reference. From CDC: Are you at higher risk for severe illness?  Wash your hands often.  Avoid close contact (6 feet, which is about two arm lengths) with people who are sick.  Put distance between yourself and other people if COVID-19 is spreading in your community.  Clean and disinfect frequently touched surfaces.  Avoid all cruise travel and non-essential air travel.  Call your healthcare professional if you have concerns about COVID-19 and your underlying condition or if you are sick.     For more information on steps you can take to protect yourself, see CDC's How to Protect Yourself

## 2020-04-17 ENCOUNTER — PATIENT OUTREACH (OUTPATIENT)
Dept: CASE MANAGEMENT | Age: 60
End: 2020-04-17

## 2020-04-17 NOTE — PROGRESS NOTES
Patient resolved from Transition of Care episode on 04/17/20  Patient/family has been provided the following resources and education related to COVID-19:                         Signs, symptoms and red flags related to COVID-19            CDC exposure and quarantine guidelines            [employer] exposure contact - [phone]            Contact for their local Department of Health                 Patient currently reports that their sx have improved or resolved. No further outreach scheduled with this CTN/ACM. Episode of Care resolved. Patient has this CTN/ACM contact information if future needs arise.

## 2020-04-27 ENCOUNTER — HOSPITAL ENCOUNTER (EMERGENCY)
Age: 60
Discharge: HOME | End: 2020-04-27
Attending: EMERGENCY MEDICINE | Admitting: INTERNAL MEDICINE
Payer: MEDICARE

## 2020-04-27 VITALS
SYSTOLIC BLOOD PRESSURE: 135 MMHG | BODY MASS INDEX: 38.07 KG/M2 | OXYGEN SATURATION: 96 % | HEIGHT: 64 IN | TEMPERATURE: 98.4 F | HEART RATE: 60 BPM | WEIGHT: 223 LBS | DIASTOLIC BLOOD PRESSURE: 79 MMHG | RESPIRATION RATE: 18 BRPM

## 2020-04-27 DIAGNOSIS — Z20.822 EXPOSURE TO COVID-19 VIRUS: Primary | ICD-10-CM

## 2020-04-27 DIAGNOSIS — N18.6 ESRD (END STAGE RENAL DISEASE) (HCC): ICD-10-CM

## 2020-04-27 LAB
ALBUMIN SERPL-MCNC: 3.7 G/DL (ref 3.4–5)
ALBUMIN/GLOB SERPL: 0.9 {RATIO} (ref 0.8–1.7)
ALP SERPL-CCNC: 62 U/L (ref 45–117)
ALT SERPL-CCNC: 38 U/L (ref 16–61)
ANION GAP SERPL CALC-SCNC: 10 MMOL/L (ref 3–18)
AST SERPL-CCNC: 27 U/L (ref 10–38)
BASOPHILS # BLD: 0 K/UL (ref 0–0.1)
BASOPHILS NFR BLD: 0 % (ref 0–2)
BILIRUB SERPL-MCNC: 0.3 MG/DL (ref 0.2–1)
BUN SERPL-MCNC: 70 MG/DL (ref 7–18)
BUN/CREAT SERPL: 5 (ref 12–20)
CALCIUM SERPL-MCNC: 8.4 MG/DL (ref 8.5–10.1)
CHLORIDE SERPL-SCNC: 107 MMOL/L (ref 100–111)
CO2 SERPL-SCNC: 27 MMOL/L (ref 21–32)
CREAT SERPL-MCNC: 13.2 MG/DL (ref 0.6–1.3)
DIFFERENTIAL METHOD BLD: ABNORMAL
EOSINOPHIL # BLD: 0.3 K/UL (ref 0–0.4)
EOSINOPHIL NFR BLD: 5 % (ref 0–5)
ERYTHROCYTE [DISTWIDTH] IN BLOOD BY AUTOMATED COUNT: 18.7 % (ref 11.6–14.5)
GLOBULIN SER CALC-MCNC: 4.2 G/DL (ref 2–4)
GLUCOSE SERPL-MCNC: 106 MG/DL (ref 74–99)
HBV SURFACE AB SER QL IA: POSITIVE
HBV SURFACE AB SERPL IA-ACNC: 23.52 MIU/ML
HBV SURFACE AG SER QL: <0.1 INDEX
HBV SURFACE AG SER QL: NEGATIVE
HCT VFR BLD AUTO: 35.1 % (ref 36–48)
HEP BS AB COMMENT,HBSAC: NORMAL
HGB BLD-MCNC: 10.9 G/DL (ref 13–16)
LYMPHOCYTES # BLD: 1.6 K/UL (ref 0.9–3.6)
LYMPHOCYTES NFR BLD: 24 % (ref 21–52)
MCH RBC QN AUTO: 29.5 PG (ref 24–34)
MCHC RBC AUTO-ENTMCNC: 31.1 G/DL (ref 31–37)
MCV RBC AUTO: 95.1 FL (ref 74–97)
MONOCYTES # BLD: 0.6 K/UL (ref 0.05–1.2)
MONOCYTES NFR BLD: 10 % (ref 3–10)
NEUTS SEG # BLD: 4 K/UL (ref 1.8–8)
NEUTS SEG NFR BLD: 61 % (ref 40–73)
PLATELET # BLD AUTO: 245 K/UL (ref 135–420)
PMV BLD AUTO: 9.6 FL (ref 9.2–11.8)
POTASSIUM SERPL-SCNC: 5.8 MMOL/L (ref 3.5–5.5)
PROT SERPL-MCNC: 7.9 G/DL (ref 6.4–8.2)
RBC # BLD AUTO: 3.69 M/UL (ref 4.7–5.5)
SODIUM SERPL-SCNC: 144 MMOL/L (ref 136–145)
WBC # BLD AUTO: 6.5 K/UL (ref 4.6–13.2)

## 2020-04-27 PROCEDURE — 87635 SARS-COV-2 COVID-19 AMP PRB: CPT

## 2020-04-27 PROCEDURE — 74011250637 HC RX REV CODE- 250/637: Performed by: NURSE PRACTITIONER

## 2020-04-27 PROCEDURE — 86706 HEP B SURFACE ANTIBODY: CPT

## 2020-04-27 PROCEDURE — 87340 HEPATITIS B SURFACE AG IA: CPT

## 2020-04-27 PROCEDURE — 85025 COMPLETE CBC W/AUTO DIFF WBC: CPT

## 2020-04-27 PROCEDURE — 99284 EMERGENCY DEPT VISIT MOD MDM: CPT

## 2020-04-27 PROCEDURE — 90935 HEMODIALYSIS ONE EVALUATION: CPT

## 2020-04-27 PROCEDURE — 80053 COMPREHEN METABOLIC PANEL: CPT

## 2020-04-27 RX ORDER — ACETAMINOPHEN 500 MG
1000 TABLET ORAL
Status: COMPLETED | OUTPATIENT
Start: 2020-04-27 | End: 2020-04-27

## 2020-04-27 RX ORDER — SODIUM CHLORIDE 9 MG/ML
100 INJECTION, SOLUTION INTRAVENOUS
Status: DISCONTINUED | OUTPATIENT
Start: 2020-04-27 | End: 2020-04-27 | Stop reason: HOSPADM

## 2020-04-27 RX ADMIN — ACETAMINOPHEN 1000 MG: 500 TABLET ORAL at 16:38

## 2020-04-27 NOTE — PROGRESS NOTES
Hyu, [phone],  the patients niece,  from [facility] would like to be notified with any updates or any news for the patient.  The patients wife is not available at this time, she has been admitted to another hospital.       Catherine Bond RN  Case Management [phone]

## 2020-04-27 NOTE — PROGRESS NOTES
Patient is on isolation for droplet plus and r/o Covid- 19. Patient is not available to be assessed at this time.       [address]   [phone]

## 2020-04-27 NOTE — DIALYSIS
ZAY        ACUTE HEMODIALYSIS FLOW SHEET      HEMODIALYSIS ORDERS: Physician: Jacqueline Matthews     Dialyzer: revaclear   Duration: 3 hr  BFR: 400   DFR: 800   Dialysate:  Temp 36-37*C  K+   2    Ca+  2.5 Na 138 Bicarb 35   Weight: 101.2 kg   Patient Chart [x]     Unable to Obtain []   Dry weight/UF Goal: 3000 Access AVF  Needle Gauge 15    Heparin []  Bolus      Units    [] Hourly       Units    [x]None      Catheter locking solution    Pre BP:   157/82    Pulse:     57       Respirations: 18  Temperature:   97.7   Labs: Pre        Post:        [x] N/A   Additional Orders(medications, blood products, hypotension management) [x] N/A     [x] Zay Consent Verified     GRAFT/FISTULA ACCESS:  []N/A     []Right     [x]Left     [x]UE     []LE   []AVG   [x]AVF        []Buttonhole    [x]Medical Aseptic Prep Utilized   [x]No S/S infection  []Redness  []Drainage []Cultured []Swelling []Pain    Bruit:   [x] Strong    [] Weak       Thrill :   [x] Strong    [] Weak       Needle Gauge: 15  Length: 1   If access problem,  notified: []Yes     [x]N/A  Date:        Please describe access if present and not used:                            GENERAL ASSESSMENT:      LUNGS:  Rate  SaO2% [] N/A    [x] Clear  [] Coarse  [] Crackles  [] Wheezing        [] Diminished     Location : []RLL   []LLL    []RUL  []JUAN LUIS     Cough: []Productive  []Dry  [x]N/A   Respirations:  [x]Easy  []Labored     Therapy:   [x]RA  []NC  l/min    Mask: []NRB []Venti       O2%                  []Ventilator  []Intubated  [] Trach  [] BiPaP     CARDIAC: [x]Regular      [] Irregular   [] Pericardial Rub  [] JVD        []  Monitored  [] Bedside  [] Remotely monitored [] N/A  Rhythm:      EDEMA: [] None  [x]Generalized  [] Pitting [] 1    [] 2    [] 3    [] 4                 [] Facial  [] Pedal  []  UE  [] LE     SKIN:   [x] Warm  [] Hot     [] Cold   [x] Dry     [] Pale   [] Diaphoretic                  [] Flushed  [] Jaundiced  [] Cyanotic  [] Rash  [] Weeping     LOC: [x] Alert      [x]Oriented:    [x] Person     [x] Place  [x]Time               [] Confused  [] Lethargic  [] Medicated  [] Non-responsive     GI / ABDOMEN:  [] Flat    [] Distended    [x] Soft    [] Firm   []  Obese                             [] Diarrhea  [x] Bowel Sounds  [] Nausea  [] Vomiting       / URINE ASSESSMENT:[] Voiding   [x] Oliguria  [] Anuria   []  Montaño     [] Incontinent    []  Incontinent Brief      []  Bathroom Privileges       PAIN: [x] 0 []1  []2   []3   []4   []5   []6   []7   []8   []9   []10              Scale 0-10  Action/Follow Up:      MOBILITY:  [] Amb    [] Amb/Assist    [x] Bed    [] Wheelchair  [] Stretcher      All Vitals and Treatment Details on Attached [address]: [facility]          Room # ZF11/26      [] 1st Time Acute  [] Stat  [x] Routine  [] Urgent     [] Acute Room  [x]  Bedside  [] ICU/CCU  [] ER   Isolation Precautions:  Droplet Plus, Droplet Plus      Special Considerations:         [] Blood Consent Verified [x]N/A     ALLERGIES:   Allergies   Allergen Reactions    Shellfish Derived Other (comments)     Causes Gout    Tramadol Itching               Code Status:Prior        Hepatitis Status:                        Lab Results   Component Value Date/Time    Hepatitis B surface Ag <0.10 04/27/2020 12:26 PM    Hepatitis B surface Ab 23.52 04/27/2020 12:26 PM    Hep B Core Ab, total Positive (A) 11/06/2019 10:21 AM    HEP C VIRUS AB <0.1 09/23/2015 11:29 AM                     Current Labs:   Lab Results   Component Value Date/Time    Sodium 144 04/27/2020 12:26 PM    Potassium 5.8 (H) 04/27/2020 12:26 PM    Chloride 107 04/27/2020 12:26 PM    CO2 27 04/27/2020 12:26 PM    Anion gap 10 04/27/2020 12:26 PM    Glucose 106 (H) 04/27/2020 12:26 PM    BUN 70 (H) 04/27/2020 12:26 PM    Creatinine 13.20 (H) 04/27/2020 12:26 PM    BUN/Creatinine ratio 5 (L) 04/27/2020 12:26 PM    GFR est AA 5 (L) 04/27/2020 12:26 PM    GFR est non-AA 4 (L) 04/27/2020 12:26 PM    Calcium 8.4 (L) 04/27/2020 12:26 PM      Lab Results   Component Value Date/Time    WBC 6.5 04/27/2020 12:26 PM    Hemoglobin, POC 10.5 (L) 07/12/2019 08:05 AM    HGB 10.9 (L) 04/27/2020 12:26 PM    Hematocrit, POC 31 (L) 07/12/2019 08:05 AM    HCT 35.1 (L) 04/27/2020 12:26 PM    PLATELET 775 22/32/1826 12:26 PM    MCV 95.1 04/27/2020 12:26 PM                                                                                     DIET: DIET DIABETIC CONSISTENT CARB       PRIMARY NURSE REPORT: First initial/Last name/Title      Pre Dialysis: Helen Condon RN     Time: 1400      EDUCATION:    [x] Patient [] Other         Knowledge Basis: []None [x]Minimal [] Substantial   Barriers to learning  [x]N/A   [] Access Care     [] S&S of infection     [] Fluid Management     []K+     [x]Procedural    []Albumin     [] Medications     [] Tx Options     [] Transplant     [] Diet     [] Other   Teaching Tools:  [x] Explain  [] Demo  [] Handouts [] Video  Patient response:   [x] Verbalized understanding  [] Teach back  [] Return demonstration [] Requires follow up   Inappropriate due to            [x] Time Out/Safety Check  [x]Extracorporeal Circuit Tested for integrity       RO/HEMODIALYSIS MACHINE SAFETY CHECKS  Before each treatment:     Machine Number:                   [facility]                                   [] Unit Machine #  with centralized RO                                  [x] Portable Machine #1/RO serial # [id number]                                  [] Portable Machine #2/RO serial # [id number]                                  [] Portable Machine #4/RO serial # [id number]                                                     [facility]                                  [] Portable Machine #11/RO serial # [id number]                                   [] Portable Machine #12/RO serial # [id number]                                  [] Portable Machine #13/RO serial #  [id number]      Alarm Test:  Pass time 1400               [x] RO/Machine Log Complete      Temp    36*-37*             Dialysate: pH  7.4 Conductivity: Meter   14     HD Machine   14                  TCD: 14  Dialyzer Lot # [id number]            Blood Tubing Lot # [id number]          Saline Lot #  [id number]     CHLORINE TESTING-Before each treatment and every 4 hours    Total Chlorine: [x] less than 0.1 ppm  Time: 1500 4 Hr/2nd Check Time:    (if greater than 0.1 ppm from Primary then every 30 minutes from Secondary)     TREATMENT INITIATION  with Dialysis Precautions:   [x] All Connections Secured                 [x] Saline Line Double Clamped   [x] Venous Parameters Set                  [x] Arterial Parameters Set    [x] Prime Given 250ml                          [x]Air Foam Detector Engaged      Treatment Initiation Note:Pt in stable condition. AVF accessed and treatment initiated without complication. Post Assessment:   Dialyzer Cleared: [] Good [x] Fair  [] Poor  Blood processed:  62.2 L  UF Removed  3000 Ml  POst BP:   135/79       Pulse: 60        Respirations: 18  Temperature: 98.4 Lungs:     [x] Clear      [] Course         [] Crackles    [] Wheezing         [] Diminished   Post Tx Vascular Access:   AVF/AVG: Bleeding stopped   Art 10 min. Moi. 10 Min    Cardiac:   [x] Regular   [] Irregular   [] Monitor  [] N/A      Rhythm:       Catheter:   Locking solution: Heparin 1:1000   Art. Moi. N/A    Skin:   Pain:    [x] Warm  [x] Dry [] Diaphoretic    [] Flushed    [] Pale [] Cyanotic [x]0  []1  []2   []3  []4   []5   []6   []7   []8   []9   []10     Post Treatment Note:   HD well tolerated. 3L UF removed.  NAD noted during or post treatment       POST TREATMENT PRIMARY NURSE HANDOFF REPORT:     First initial/Last name/Title         Post Dialysis: Kvng Salazar RN Time:  0149     Abbreviations: AVG-arterial venous graft, AVF-arterial venous fistula, IJ-Internal Jugular, Subcl-Subclavian, Fem-Femoral, Tx-treatment, AP/HR-apical heart rate, DFR-dialysate flow rate, BFR-blood flow rate, AP-arterial pressure, -venous pressure, UF-ultrafiltrate, TMP-transmembrane pressure, Moi-Venous, Art-Arterial, RO-Reverse Osmosis

## 2020-04-27 NOTE — PROGRESS NOTES
RENAL DAILY PROGRESS NOTE    Subjective:     Patient in the ER for Covid testing. Wife in [facility] admitted for covid. Reviewed ER notes, spoke with ER NP. Pt asymptomatic. Spoke with outpt HD unit nurse, last HD was Wed, missed HD on Friday. No current facility-administered medications for this encounter. Current Outpatient Medications   Medication Sig    cyclobenzaprine (FLEXERIL) 5 mg tablet Take 1 Tab by mouth three (3) times daily as needed for Muscle Spasm(s).  COLCRYS 0.6 mg tablet TAKE 1 TABLET BY MOUTH EVERY 1 HOURS FOR GOUT PAIN. DO NOT EXCEED 3 TABLETS IN 24 HOURS.  indomethacin (INDOCIN) 25 mg capsule TAKE 2 CAPSULES BY MOUTH THREE TIMES DAILY WITH FOOD    ondansetron hcl (ZOFRAN) 4 mg tablet Take 1 Tab by mouth every eight (8) hours as needed for Nausea.  colchicine 0.6 mg tablet Take 1 Tab by mouth daily.  methylPREDNISolone (MEDROL, CASS,) 4 mg tablet Per dose pack instructions    ibuprofen (MOTRIN) 600 mg tablet Take 1 Tab by mouth every six (6) hours as needed for Pain.  lipase-protease-amylase (CREON 6000) 6,000-19,000 -30,000 unit capsule Take 1 Cap by mouth three (3) times daily (with meals). Indications: exocrine pancreatic insufficiency    omeprazole (PRILOSEC) 20 mg capsule Take 20 mg by mouth daily.  febuxostat (ULORIC) 40 mg tab tablet Take 1 Tab by mouth daily.  hydrALAZINE (APRESOLINE) 50 mg tablet Take 1 Tab by mouth two (2) times a day.  tamsulosin (FLOMAX) 0.4 mg capsule Take 1 Cap by mouth daily. Start this medication on Friday 12/15/17  Indications: Urolithiasis    glucose blood VI test strips (FREESTYLE TEST) strip by Does Not Apply route See Admin Instructions. Check twice a day    bisacodyl (DULCOLAX, BISACODYL,) 5 mg EC tablet Take 1 Tab by mouth daily as needed for Constipation.     NOVOLIN 70/30 100 unit/mL (70-30) injection INJECT 30 UNITS SC IN THE MORNING AND 20 UNITS SC IN THE EVENING BEFORE SUPPER    isosorbide mononitrate ER (IMDUR) 60 mg CR tablet Take 1 Tab by mouth daily.  atorvastatin (LIPITOR) 40 mg tablet Take 1 Tab by mouth nightly.  furosemide (LASIX) 40 mg tablet Take 1 Tab by mouth daily.  fluticasone (FLONASE) 50 mcg/actuation nasal spray 2 Sprays by Both Nostrils route daily.  carvedilol (COREG) 6.25 mg tablet Take 1 Tab by mouth two (2) times daily (with meals). (Patient taking differently: Take 25 mg by mouth two (2) times daily (with meals). )    nitroglycerin (NITROSTAT) 0.4 mg SL tablet 1 Tab by SubLINGual route as needed for Chest Pain. (Patient taking differently: 1 Tab by SubLINGual route as needed for Chest Pain. Pt to take 1 tab q 5 min up to three times, if symptom persist pt. to call 911.)    aspirin 81 mg chewable tablet Take 1 Tab by mouth daily.  B COMPLEX W-C NO.20/FOLIC ACID (B COMPLEX & C NO.20-FOLIC ACID PO) Take  by mouth.  budesonide-formoterol (SYMBICORT) 160-4.5 mcg/actuation HFA inhaler Take 2 Puffs by inhalation two (2) times a day.  calcitRIOL (ROCALTROL) 0.25 mcg capsule Take 1 Cap by mouth every Monday, Wednesday, Friday.  Lancets misc Check 3 times a day , needs according contour glucometer    Blood-Glucose Meter monitoring kit Check twice daily. Objective:     Patient Vitals for the past 24 hrs:   Temp Pulse Resp BP SpO2   04/27/20 1027 97.7 °F (36.5 °C) 66 20 150/76 94 %        Weight change:      No intake/output data recorded. No intake or output data in the 24 hours ending 04/27/20 1330    Data Review:     LABS:   Hematology:   Recent Labs     04/27/20  1226   WBC 6.5   HGB 10.9*   HCT 35.1*     Chemistry:   Recent Labs     04/27/20  1226   BUN 70*   CREA 13.20*   CA 8.4*   ALB 3.7   K 5.8*         CO2 27   *    COVID TEST PEND      IMPRESSION AND PLAN:   ESRD sched HD today for clearance and volume. Patient can be d/c post HD if stable.  The outpt HD unit will call him and let him know where he will be doing his outpt HD while waiting for results of his Covid tests.          Scarlett Kuhn MD  4/27/2020

## 2020-04-27 NOTE — PROGRESS NOTES
CM notified by Luis M Saucedo from [facility], patient's niece,  that patient has not been not had dialysis in 2-3 days. CM updated Dr. Les Hutton, he was aware, and they are working on this.     Xiao Streeter, RN  Case Management [phone]

## 2020-04-27 NOTE — ED PROVIDER NOTES
EMERGENCY DEPARTMENT HISTORY AND PHYSICAL EXAM    Date: 4/27/2020  Patient Name: Nitza Urena    History of Presenting Illness     Chief Complaint   Patient presents with    Other     COVID 19 exposure         History Provided By: Patient        Additional History (Context): Nitza Urena is a 61 y.o. male with     Patient voices concern over possible COVID-19 infection. Patient reports, his wife who is currently admitted to the close contact to confirmed patient with coronavirus [facility] in [facility]. He denies history of travel from any geographic areas of concern within the 14-days prior. The patient is not a healthcare worker. The patient is immunocompromised with history of tobacco abuse, obesity, CAD, diabetes, COPD, and chronic kidney disease on hemodialysis. He denies any symptoms at this time including cough, fever, lower extremity pain or swelling, shortness of breath, chest pain, abdominal pain, rash, vomiting, diarrhea, constipation, or congestion. PCP: Ang Tay MD    Current Facility-Administered Medications   Medication Dose Route Frequency Provider Last Rate Last Dose    0.9% sodium chloride infusion  100 mL/hr IntraVENous DIALYSIS PRN Carito Davalos MD         Current Outpatient Medications   Medication Sig Dispense Refill    cyclobenzaprine (FLEXERIL) 5 mg tablet Take 1 Tab by mouth three (3) times daily as needed for Muscle Spasm(s). 15 Tab 0    COLCRYS 0.6 mg tablet TAKE 1 TABLET BY MOUTH EVERY 1 HOURS FOR GOUT PAIN. DO NOT EXCEED 3 TABLETS IN 24 HOURS. 30 Tab 0    indomethacin (INDOCIN) 25 mg capsule TAKE 2 CAPSULES BY MOUTH THREE TIMES DAILY WITH FOOD 540 Cap 0    ondansetron hcl (ZOFRAN) 4 mg tablet Take 1 Tab by mouth every eight (8) hours as needed for Nausea. 10 Tab 0    colchicine 0.6 mg tablet Take 1 Tab by mouth daily.  22 Tab 2    methylPREDNISolone (MEDROL, CASS,) 4 mg tablet Per dose pack instructions 1 Dose Pack 0    ibuprofen (MOTRIN) 600 mg tablet Take 1 Tab by mouth every six (6) hours as needed for Pain. 20 Tab 0    lipase-protease-amylase (CREON 6000) 6,000-19,000 -30,000 unit capsule Take 1 Cap by mouth three (3) times daily (with meals). Indications: exocrine pancreatic insufficiency 90 Cap 0    omeprazole (PRILOSEC) 20 mg capsule Take 20 mg by mouth daily.  febuxostat (ULORIC) 40 mg tab tablet Take 1 Tab by mouth daily. 14 Tab 1    hydrALAZINE (APRESOLINE) 50 mg tablet Take 1 Tab by mouth two (2) times a day. 20 Tab 0    tamsulosin (FLOMAX) 0.4 mg capsule Take 1 Cap by mouth daily. Start this medication on Friday 12/15/17  Indications: Urolithiasis 6 Cap 0    glucose blood VI test strips (FREESTYLE TEST) strip by Does Not Apply route See Admin Instructions. Check twice a day 100 Strip 2    bisacodyl (DULCOLAX, BISACODYL,) 5 mg EC tablet Take 1 Tab by mouth daily as needed for Constipation. 30 Tab 0    NOVOLIN 70/30 100 unit/mL (70-30) injection INJECT 30 UNITS SC IN THE MORNING AND 20 UNITS SC IN THE EVENING BEFORE SUPPER 10 mL 4    isosorbide mononitrate ER (IMDUR) 60 mg CR tablet Take 1 Tab by mouth daily. 30 Tab 6    atorvastatin (LIPITOR) 40 mg tablet Take 1 Tab by mouth nightly. 30 Tab 6    furosemide (LASIX) 40 mg tablet Take 1 Tab by mouth daily. 30 Tab 6    fluticasone (FLONASE) 50 mcg/actuation nasal spray 2 Sprays by Both Nostrils route daily.  carvedilol (COREG) 6.25 mg tablet Take 1 Tab by mouth two (2) times daily (with meals). (Patient taking differently: Take 25 mg by mouth two (2) times daily (with meals). ) 60 Tab 6    nitroglycerin (NITROSTAT) 0.4 mg SL tablet 1 Tab by SubLINGual route as needed for Chest Pain. (Patient taking differently: 1 Tab by SubLINGual route as needed for Chest Pain. Pt to take 1 tab q 5 min up to three times, if symptom persist pt. to call 911.) 20 Tab 0    aspirin 81 mg chewable tablet Take 1 Tab by mouth daily.  30 Tab 0    B COMPLEX W-C NO.20/FOLIC ACID (B COMPLEX & C NO.20-FOLIC ACID PO) Take  by mouth.  budesonide-formoterol (SYMBICORT) 160-4.5 mcg/actuation HFA inhaler Take 2 Puffs by inhalation two (2) times a day. 1 Inhaler 0    calcitRIOL (ROCALTROL) 0.25 mcg capsule Take 1 Cap by mouth every Monday, Wednesday, Friday. 15 Cap 0    Lancets misc Check 3 times a day , needs according contour glucometer 1 Package 11    Blood-Glucose Meter monitoring kit Check twice daily. 1 kit 0       Past History     Past Medical History:  Past Medical History:   Diagnosis Date    Alcohol abuse     Alcohol-induced chronic pancreatitis ([facility] [address] 9/7/2018    Arthritis     Atherosclerotic cardiovascular disease     CAD (coronary artery disease)     mild disease of coronaries (cor angio 2006),wife states he has 1 stent    Cardiac cath 01/26/2006    RCA mild. Otherwise patent coronaries. LVEDP 15.  EF 55-60%.  Cardiac echocardiogram 03/27/2009    EF 60%. No cardiac source of embolism. No significant valvular pathology.  Cardiac nuclear imaging test 12/06/2011    Small, mild inferior infarction or possibly artifact. No ischemia. EF 45%. No TID. No reg WMA.  Cardiovascular LLE venous duplex 08/14/2015    Left leg:  No DVT. Dilated lymph node in left groin.  Chronic kidney disease     Chronic obstructive pulmonary disease (HCC)     Constipation     Diabetes mellitus type II     Gout     Hepatic steatosis     Hypercholesterolemia     Hypertension     Knee effusion, left     Left knee pain     Morbid obesity (HCC)     Noncompliance     Obesity (BMI 30-39. 9)     S/P colonoscopy 3/8/05    Dr. Luis Doan; normal; f/u 10 years    Stomach problems     TIA (transient ischemic attack) 3/09    Tobacco abuse        Past Surgical History:  Past Surgical History:   Procedure Laterality Date    ABDOMEN SURGERY PROC UNLISTED      Hernia repair in 1960's or 1970's.  HX ORTHOPAEDIC      Bones spur removed from left & right foot 2013.     HX UROLOGICAL  8/18/2015    [facility], Dr. Jonah Boudreaux [place], Cystoscopy, left retrograde, left double-J cath       Family History:  Family History   Problem Relation Age of Onset    Diabetes Father     Heart Disease Mother     Diabetes Sister     Hypertension Sister     Arthritis-osteo Sister     Arthritis-osteo Brother     Diabetes Other     Diabetes Other         Uncle, Aunt    Hypertension Other         Uncle; Aunt       Social History:  Social History     Tobacco Use    Smoking status: Current Every Day Smoker     Packs/day: 0.25     Types: Cigarettes    Smokeless tobacco: Current User   Substance Use Topics    Alcohol use: Not Currently     Alcohol/week: 0.0 standard drinks     Comment: 1 beers a day.  Drug use: Not Currently     Types: Cocaine     Comment: +UDS for cocaine in 2017       Allergies: Allergies   Allergen Reactions    Shellfish Derived Other (comments)     Causes Gout    Tramadol Itching         Review of Systems     Review of Systems   Constitutional: Negative for chills and fever. HENT: Negative for nasal congestion, sore throat, rhinorrhea  Eyes: Negative. Respiratory: Negative cough and negative for shortness of breath. Cardiovascular: Negative for chest pain and palpitations. Gastrointestinal: Negative for abdominal pain, constipation, diarrhea, nausea and vomiting. Genitourinary: Negative. Negative for difficulty urinating and flank pain. Musculoskeletal: Negative for back pain. Negative for gait problem and neck pain. Skin: Negative. Allergic/Immunologic: Negative. Neurological: Negative for dizziness, weakness, numbness and headaches. Psychiatric/Behavioral: Negative. All other systems reviewed and are negative.   All Other Systems Negative  Physical Exam     Vitals:    04/27/20 1715 04/27/20 1730 04/27/20 1745 04/27/20 1800   BP: 140/74 147/86 127/76 125/85   Pulse: 61 60 62 62   Resp:       Temp:       TempSrc:       SpO2: 98% 95% 96% 95%   Weight:       Height:         Physical Exam  Vitals signs and nursing note reviewed. Constitutional:       General: He is not in acute distress. Appearance: Normal appearance. He is normal weight. He is not ill-appearing, toxic-appearing or diaphoretic. HENT:      Head: Normocephalic and atraumatic. Eyes:      General: Vision grossly intact. Gaze aligned appropriately. Right eye: No discharge. Left eye: No discharge. Conjunctiva/sclera: Conjunctivae normal.      Pupils: Pupils are equal, round, and reactive to light. Neck:      Musculoskeletal: Full passive range of motion without pain, normal range of motion and neck supple. Cardiovascular:      Rate and Rhythm: Normal rate and regular rhythm. Pulses: Normal pulses. Heart sounds: Normal heart sounds. No murmur. No friction rub. No gallop. Pulmonary:      Effort: Pulmonary effort is normal. No respiratory distress. Breath sounds: Normal breath sounds. No stridor. No wheezing, rhonchi or rales. Chest:      Chest wall: No tenderness. Abdominal:      General: Abdomen is flat. Palpations: Abdomen is soft. Tenderness: There is no abdominal tenderness. Musculoskeletal: Normal range of motion. Skin:     General: Skin is warm and dry. Capillary Refill: Capillary refill takes less than 2 seconds. Neurological:      General: No focal deficit present. Mental Status: He is alert and oriented to person, place, and time.    Psychiatric:         Mood and Affect: Mood normal.         Behavior: Behavior normal.         Diagnostic Study Results     Labs -     Recent Results (from the past 12 hour(s))   CBC WITH AUTOMATED DIFF    Collection Time: 04/27/20 12:26 PM   Result Value Ref Range    WBC 6.5 4.6 - 13.2 K/uL    RBC 3.69 (L) 4.70 - 5.50 M/uL    HGB 10.9 (L) 13.0 - 16.0 g/dL    HCT 35.1 (L) 36.0 - 48.0 %    MCV 95.1 74.0 - 97.0 FL    MCH 29.5 24.0 - 34.0 PG    MCHC 31.1 31.0 - 37.0 g/dL    RDW 18.7 (H) 11.6 - 14.5 %    PLATELET 965 644 - 982 K/uL MPV 9.6 9.2 - 11.8 FL    NEUTROPHILS 61 40 - 73 %    LYMPHOCYTES 24 21 - 52 %    MONOCYTES 10 3 - 10 %    EOSINOPHILS 5 0 - 5 %    BASOPHILS 0 0 - 2 %    ABS. NEUTROPHILS 4.0 1.8 - 8.0 K/UL    ABS. LYMPHOCYTES 1.6 0.9 - 3.6 K/UL    ABS. MONOCYTES 0.6 0.05 - 1.2 K/UL    ABS. EOSINOPHILS 0.3 0.0 - 0.4 K/UL    ABS. BASOPHILS 0.0 0.0 - 0.1 K/UL    DF AUTOMATED     METABOLIC PANEL, COMPREHENSIVE    Collection Time: 04/27/20 12:26 PM   Result Value Ref Range    Sodium 144 136 - 145 mmol/L    Potassium 5.8 (H) 3.5 - 5.5 mmol/L    Chloride 107 100 - 111 mmol/L    CO2 27 21 - 32 mmol/L    Anion gap 10 3.0 - 18 mmol/L    Glucose 106 (H) 74 - 99 mg/dL    BUN 70 (H) 7.0 - 18 MG/DL    Creatinine 13.20 (H) 0.6 - 1.3 MG/DL    BUN/Creatinine ratio 5 (L) 12 - 20      GFR est AA 5 (L) >60 ml/min/1.73m2    GFR est non-AA 4 (L) >60 ml/min/1.73m2    Calcium 8.4 (L) 8.5 - 10.1 MG/DL    Bilirubin, total 0.3 0.2 - 1.0 MG/DL    ALT (SGPT) 38 16 - 61 U/L    AST (SGOT) 27 10 - 38 U/L    Alk. phosphatase 62 45 - 117 U/L    Protein, total 7.9 6.4 - 8.2 g/dL    Albumin 3.7 3.4 - 5.0 g/dL    Globulin 4.2 (H) 2.0 - 4.0 g/dL    A-G Ratio 0.9 0.8 - 1.7     HEP B SURFACE AB    Collection Time: 04/27/20 12:26 PM   Result Value Ref Range    Hepatitis B surface Ab 23.52 >10.0 mIU/mL    Hep B surface Ab Interp. Positive POS      Hep B surface Ab comment        Samples with a  value of 10 mIU/mL or greater are considered positive (protective immunity) in accordance with the CDC guidelines. HEP B SURFACE AG    Collection Time: 04/27/20 12:26 PM   Result Value Ref Range    Hepatitis B surface Ag <0.10 <1.00 Index    Hep B surface Ag Interp. Negative NEG         Radiologic Studies -   No orders to display     CT Results  (Last 48 hours)    None        CXR Results  (Last 48 hours)    None            Medical Decision Making   I am the first provider for this patient.     I reviewed the vital signs, available nursing notes, past medical history, past surgical history, family history and social history. Vital Signs-Reviewed the patient's vital signs. Pulse Oximetry Analysis - 94% on room air    Records Reviewed: Nursing notes, old medical records and any previous labs, imaging, visits, consultations pertinent to patient care    Procedures:  Procedures    PPE worn during exam: Gloves, eye protection, and surgical mask    ED Course: Progress Notes, Reevaluation, and Consults:  11:29 AM  Initial assessment performed. The patients presenting problems have been discussed, and they/their family are in agreement with the care plan formulated and outlined with them. I have encouraged them to ask questions as they arise throughout their visit. 11:55 AM spoke with nephrologist, Dr. David Edwards regarding patient being a COVID exposure and need for hemodialysis today. He apparently missed his dialysis on Friday. She recommends bringing patient into the ER and getting baseline labs and planning to dialyze him in a private room in the ER.    6:06 PM 6:06 PM :Pt care signed out to Anamaria BURGOS, ED provider. Pt complaint(s), current treatment plan, progression and available diagnostic results have been discussed thoroughly. Rounding occurred: no  Intended Disposition: Home  Pending diagnostic reports and/or labs (please list): Pending completion of HD    1824hrs HD is complete. Pt is ready for discharge. Provider Notes (Medical Decision Making):     Differential diagnosis: COVID-19, electrolyte abnormality, fluid overload    Patient presents with coronavirus exposure requesting testing. Vital signs are stable and patient is afebrile. He is overdue for hemodialysis. His last dialysis was Wednesday (he goes Monday, Wednesday, Friday). Exam unremarkable. No retraction,stridor, or wheezing. No indication for imaging. Due to coronavirus outbreak will recommend a 14-day self quarantine. Patient is in agreement to the quarantine measures.   Will be tested for novel coronavirus which is pending at this time. Due to patient being overdue for hemodialysis, nephrology was consulted and they recommend checking labs and dialyzing patient today as it will take time for him to be arranged to have outpatient hemodialysis in a facility for patients under investigation. Reviewed with him that COVID-19 pandemic is an evolving situation with rapidly changing recommendations & guidelines. Medical decisions are made based on the the best information available at the time. Recommended he stay tuned for updates published by trusted sources and to advise your PCP of any unexpected changes in clinical condition     Although this patient is high risk for COVID-19 viral infection, respiratory rate is less than 22 and patient's oxygen saturation on room air has been greater than 94% throughout the ER stay. Speaking in full, clear sentences without staccato speech. No further evidence of septic shock, therefore fluids were withheld. Discussed the possibility of worsening despite outpatient treatment plan. Patient understands this possibility and will follow-up immediately with worsening symptoms. Follow-up w/ PCP and supportive treatment. Recommended taking Tylenol as needed for fever and body aches and to avoid NSAIDs if possible. MED RECONCILIATION:  Current Facility-Administered Medications   Medication Dose Route Frequency    0.9% sodium chloride infusion  100 mL/hr IntraVENous DIALYSIS PRN     Current Outpatient Medications   Medication Sig    cyclobenzaprine (FLEXERIL) 5 mg tablet Take 1 Tab by mouth three (3) times daily as needed for Muscle Spasm(s).  COLCRYS 0.6 mg tablet TAKE 1 TABLET BY MOUTH EVERY 1 HOURS FOR GOUT PAIN. DO NOT EXCEED 3 TABLETS IN 24 HOURS.  indomethacin (INDOCIN) 25 mg capsule TAKE 2 CAPSULES BY MOUTH THREE TIMES DAILY WITH FOOD    ondansetron hcl (ZOFRAN) 4 mg tablet Take 1 Tab by mouth every eight (8) hours as needed for Nausea.     colchicine 0.6 mg tablet Take 1 Tab by mouth daily.  methylPREDNISolone (MEDROL, CASS,) 4 mg tablet Per dose pack instructions    ibuprofen (MOTRIN) 600 mg tablet Take 1 Tab by mouth every six (6) hours as needed for Pain.  lipase-protease-amylase (CREON 6000) 6,000-19,000 -30,000 unit capsule Take 1 Cap by mouth three (3) times daily (with meals). Indications: exocrine pancreatic insufficiency    omeprazole (PRILOSEC) 20 mg capsule Take 20 mg by mouth daily.  febuxostat (ULORIC) 40 mg tab tablet Take 1 Tab by mouth daily.  hydrALAZINE (APRESOLINE) 50 mg tablet Take 1 Tab by mouth two (2) times a day.  tamsulosin (FLOMAX) 0.4 mg capsule Take 1 Cap by mouth daily. Start this medication on Friday 12/15/17  Indications: Urolithiasis    glucose blood VI test strips (FREESTYLE TEST) strip by Does Not Apply route See Admin Instructions. Check twice a day    bisacodyl (DULCOLAX, BISACODYL,) 5 mg EC tablet Take 1 Tab by mouth daily as needed for Constipation.  NOVOLIN 70/30 100 unit/mL (70-30) injection INJECT 30 UNITS SC IN THE MORNING AND 20 UNITS SC IN THE EVENING BEFORE SUPPER    isosorbide mononitrate ER (IMDUR) 60 mg CR tablet Take 1 Tab by mouth daily.  atorvastatin (LIPITOR) 40 mg tablet Take 1 Tab by mouth nightly.  furosemide (LASIX) 40 mg tablet Take 1 Tab by mouth daily.  fluticasone (FLONASE) 50 mcg/actuation nasal spray 2 Sprays by Both Nostrils route daily.  carvedilol (COREG) 6.25 mg tablet Take 1 Tab by mouth two (2) times daily (with meals). (Patient taking differently: Take 25 mg by mouth two (2) times daily (with meals). )    nitroglycerin (NITROSTAT) 0.4 mg SL tablet 1 Tab by SubLINGual route as needed for Chest Pain. (Patient taking differently: 1 Tab by SubLINGual route as needed for Chest Pain. Pt to take 1 tab q 5 min up to three times, if symptom persist pt. to call 911.)    aspirin 81 mg chewable tablet Take 1 Tab by mouth daily.     B COMPLEX W-C NO.20/FOLIC ACID (B COMPLEX & C NO.20-FOLIC ACID PO) Take  by mouth.  budesonide-formoterol (SYMBICORT) 160-4.5 mcg/actuation HFA inhaler Take 2 Puffs by inhalation two (2) times a day.  calcitRIOL (ROCALTROL) 0.25 mcg capsule Take 1 Cap by mouth every Monday, Wednesday, Friday.  Lancets misc Check 3 times a day , needs according contour glucometer    Blood-Glucose Meter monitoring kit Check twice daily. Disposition:  Discharge home in stable condition with strict 2-week self quarantine instructions    DISCHARGE NOTE:     Patient has been reexamined. Patient has no new complaints, changes, or physical findings. Vital signs are stable. Care plan outlined and precautions discussed. Results of work up, plan of care and treatment plan, expectations, etc were reviewed with the patient. All medications were reviewed with the patient; will d/c home with outpatient f/u. All of pt's questions and concerns were addressed. Patient was instructed and agrees to follow up with pcp/specialists if indicated, as well as to return to the ED upon further deterioration. Patient is ready to go home.     Follow-up Information     Follow up With Specialties Details Why Contact Hiren Dougherty MD Family Practice Schedule an appointment as soon as possible for a visit Follow-up from the Emergency Department [address] [facility] [address]  [phone]      [facility] EMERGENCY DEPT Emergency Medicine  As needed, If symptoms worsen [address]  [phone]    [facility] Schedule an appointment as soon as possible for a visit  [place] [address]  [phone]      [facility] EMERGENCY DEPT Emergency Medicine  If symptoms worsen [id number] [facility] [id number] [phone]          Current Discharge Medication List      CONTINUE these medications which have NOT CHANGED    Details   cyclobenzaprine (FLEXERIL) 5 mg tablet Take 1 Tab by mouth three (3) times daily as needed for Muscle Spasm(s). Qty: 15 Tab, Refills: 0      !! COLCRYS 0.6 mg tablet TAKE 1 TABLET BY MOUTH EVERY 1 HOURS FOR GOUT PAIN. DO NOT EXCEED 3 TABLETS IN 24 HOURS. Qty: 30 Tab, Refills: 0      indomethacin (INDOCIN) 25 mg capsule TAKE 2 CAPSULES BY MOUTH THREE TIMES DAILY WITH FOOD  Qty: 540 Cap, Refills: 0    Comments: **Patient requests 90 days supply**      ondansetron hcl (ZOFRAN) 4 mg tablet Take 1 Tab by mouth every eight (8) hours as needed for Nausea. Qty: 10 Tab, Refills: 0      !! colchicine 0.6 mg tablet Take 1 Tab by mouth daily. Qty: 22 Tab, Refills: 2      methylPREDNISolone (MEDROL, CASS,) 4 mg tablet Per dose pack instructions  Qty: 1 Dose Pack, Refills: 0      ibuprofen (MOTRIN) 600 mg tablet Take 1 Tab by mouth every six (6) hours as needed for Pain. Qty: 20 Tab, Refills: 0      lipase-protease-amylase (CREON 6000) 6,000-19,000 -30,000 unit capsule Take 1 Cap by mouth three (3) times daily (with meals). Indications: exocrine pancreatic insufficiency  Qty: 90 Cap, Refills: 0      omeprazole (PRILOSEC) 20 mg capsule Take 20 mg by mouth daily. febuxostat (ULORIC) 40 mg tab tablet Take 1 Tab by mouth daily. Qty: 14 Tab, Refills: 1    Associated Diagnoses: Acute gout of right wrist, unspecified cause      hydrALAZINE (APRESOLINE) 50 mg tablet Take 1 Tab by mouth two (2) times a day. Qty: 20 Tab, Refills: 0      tamsulosin (FLOMAX) 0.4 mg capsule Take 1 Cap by mouth daily. Start this medication on Friday 12/15/17  Indications: Urolithiasis  Qty: 6 Cap, Refills: 0      glucose blood VI test strips (FREESTYLE TEST) strip by Does Not Apply route See Admin Instructions. Check twice a day  Qty: 100 Strip, Refills: 2    Associated Diagnoses: Poorly controlled diabetes mellitus (HCC)      bisacodyl (DULCOLAX, BISACODYL,) 5 mg EC tablet Take 1 Tab by mouth daily as needed for Constipation.   Qty: 30 Tab, Refills: 0      NOVOLIN 70/30 100 unit/mL (70-30) injection INJECT 30 UNITS SC IN THE MORNING AND 20 UNITS SC IN THE EVENING BEFORE SUPPER  Qty: 10 mL, Refills: 4      isosorbide mononitrate ER (IMDUR) 60 mg CR tablet Take 1 Tab by mouth daily. Qty: 30 Tab, Refills: 6      atorvastatin (LIPITOR) 40 mg tablet Take 1 Tab by mouth nightly. Qty: 30 Tab, Refills: 6      furosemide (LASIX) 40 mg tablet Take 1 Tab by mouth daily. Qty: 30 Tab, Refills: 6      fluticasone (FLONASE) 50 mcg/actuation nasal spray 2 Sprays by Both Nostrils route daily. carvedilol (COREG) 6.25 mg tablet Take 1 Tab by mouth two (2) times daily (with meals). Qty: 60 Tab, Refills: 6      nitroglycerin (NITROSTAT) 0.4 mg SL tablet 1 Tab by SubLINGual route as needed for Chest Pain. Qty: 20 Tab, Refills: 0      aspirin 81 mg chewable tablet Take 1 Tab by mouth daily. Qty: 30 Tab, Refills: 0      B COMPLEX W-C NO.20/FOLIC ACID (B COMPLEX & C NO.20-FOLIC ACID PO) Take  by mouth. Associated Diagnoses: Essential hypertension      budesonide-formoterol (SYMBICORT) 160-4.5 mcg/actuation HFA inhaler Take 2 Puffs by inhalation two (2) times a day. Qty: 1 Inhaler, Refills: 0    Associated Diagnoses: Smoking; Wheezing; SOB (shortness of breath) on exertion      calcitRIOL (ROCALTROL) 0.25 mcg capsule Take 1 Cap by mouth every Monday, Wednesday, Friday. Qty: 15 Cap, Refills: 0      Lancets misc Check 3 times a day , needs according contour glucometer  Qty: 1 Package, Refills: 11    Associated Diagnoses: Diabetes ([address]      Blood-Glucose Meter monitoring kit Check twice daily. Qty: 1 kit, Refills: 0    Associated Diagnoses: Diabetes mellitus, type 2 ([address]       ! ! - Potential duplicate medications found. Please discuss with provider. Diagnosis     Clinical Impression:   1. Exposure to Covid-19 Virus    2. ESRD (end stage renal disease) [facility])          Dictation disclaimer:  Please note that this dictation was completed with [employer], the computer voice recognition software.   Quite often unanticipated grammatical, syntax, homophones, and other interpretive errors are inadvertently transcribed by the computer software. Please disregard these errors. Please excuse any errors that have escaped final proofreading.

## 2020-04-27 NOTE — ED NOTES
I have reviewed discharge and prescription instructions with the patient. The patient verbalized understanding. Denies pain, remains alert, and ambulatory. No acute distress noted. Needs met.

## 2020-04-27 NOTE — ED TRIAGE NOTES
:Pt reports COVID 19 exposure by wife who is positive and admitted to [facility] on Thursday.  Pt currently denies symptoms

## 2020-04-28 ENCOUNTER — PATIENT OUTREACH (OUTPATIENT)
Dept: FAMILY MEDICINE CLINIC | Age: 60
End: 2020-04-28

## 2020-04-28 LAB — SARS-COV-2, COV2NT: DETECTED

## 2020-04-28 NOTE — PROGRESS NOTES
Patient advised positive COVID-19 test was resulted today. I contacted the patient at his home phone number to advise this test was positive. Patient has received quarantine information for COVID. This was discussed in detail with the patient again by myself. HOME ISOLATION PRECAUTIONS DURING COVID-19 OUTBREAK (per CDC guidelines)    1) Stay home except to get medical care:  People who are mildly ill with COVID-19 are able to isolate at home during their illness. You should restrict activities outside your home, except for getting medical care. Do not go to work, school, or public areas. Avoid using public transportation, ride-sharing, or taxis. 2) Separate yourself from other people and animals in your home  People: As much as possible, you should stay in a specific room and away from other people in your home. Also, you should use a separate bathroom, if available. 3) Animals: You should restrict contact with pets and other animals while you are sick with COVID-19, just like you would around other people. Although there have not been reports of pets or other animals becoming sick with COVID-19, it is still recommended that people sick with COVID-19 limit contact with animals until more information is known about the virus. When possible, have another member of your household care for your animals while you are sick. If you are sick with COVID-19, avoid contact with your pet, including petting, snuggling, being kissed or licked, and sharing food. If you must care for your pet or be around animals while you are sick, wash your hands before and after you interact with pets and wear a facemask. See COVID-19 and Animals for more information. 4) Call ahead before visiting your doctor  If you have a medical appointment, call the healthcare provider and tell them that you have or may have COVID-19.  This will help the healthcare providers office take steps to keep other people from getting infected or exposed. 5) Wear a facemask  You should wear a facemask when you are around other people (e.g., sharing a room or vehicle) or pets and before you enter a healthcare providers office. If you are not able to wear a facemask (for example, because it causes trouble breathing), then people who live with you should not stay in the same room with you, or they should wear a facemask if they enter your room. 6) Cover your coughs and sneezes  Cover your mouth and nose with a tissue when you cough or sneeze. Throw used tissues in a lined trash can. Immediately wash your hands with soap and water for at least 20 seconds or, if soap and water are not available, clean your hands with an alcohol-based hand  that contains at least 60% alcohol. 7) Clean your hands often  Wash your hands often with soap and water for at least 20 seconds, especially after blowing your nose, coughing, or sneezing; going to the bathroom; and before eating or preparing food. If soap and water are not readily available, use an alcohol-based hand  with at least 60% alcohol, covering all surfaces of your hands and rubbing them together until they feel dry. 8) Soap and water are the best option if hands are visibly dirty. Avoid touching your eyes, nose, and mouth with unwashed hands. 9) Avoid sharing personal household items  You should not share dishes, drinking glasses, cups, eating utensils, towels, or bedding with other people or pets in your home. After using these items, they should be washed thoroughly with soap and water. 10) Clean all high-touch surfaces everyday  High touch surfaces include counters, tabletops, doorknobs, bathroom fixtures, toilets, phones, keyboards, tablets, and bedside tables. Also, clean any surfaces that may have blood, stool, or body fluids on them. Use a household cleaning spray or wipe, according to the label instructions.  Labels contain instructions for safe and effective use of the cleaning product including precautions you should take when applying the product, such as wearing gloves and making sure you have good ventilation during use of the product. 11) Monitor your symptoms  Seek prompt medical attention if your illness is worsening (e.g., difficulty breathing). Before seeking care, call your healthcare provider and tell them that you have, or are being evaluated for, COVID-19. Put on a facemask before you enter the facility. These steps will help the healthcare providers office to keep other people in the office or waiting room from getting infected or exposed. Ask your healthcare provider to call the local or state health department. Persons who are placed under active monitoring or facilitated self-monitoring should follow instructions provided by their local health department or occupational health professionals, as appropriate. When working with your local health department check their available hours. 12) If you have a medical emergency and need to call 911, notify the dispatch personnel that you have, or are being evaluated for COVID-19. If possible, put on a facemask before emergency medical services arrive. 13) Discontinuing home isolation  Patients with confirmed COVID-19 should remain under home isolation precautions until the risk of secondary transmission to others is thought to be low. The decision to discontinue home isolation precautions should be made on a case-by-case basis, in consultation with healthcare providers and state and local health departments. Recommended precautions for household members, intimate partners, and caregivers in a nonhealthcare setting of  A patient with symptomatic laboratory-confirmed COVID-19   or   a patient under investigation  Household members, intimate partners, and caregivers in a nonhealthcare setting may have close contact2 with a person with symptomatic, laboratory-confirmed COVID-19 or a person under investigation. Close contacts should monitor their health; they should call their healthcare provider right away if they develop symptoms suggestive of COVID-19 (e.g., fever, cough, shortness of breath)     Close contacts should also follow these recommendations:   Make sure that you understand and can help the patient follow their healthcare provider's instructions for medication(s) and care. You should help the patient with basic needs in the home and provide support for getting groceries, prescriptions, and other personal needs.  Monitor the patient's symptoms. If the patient is getting sicker, call his or her healthcare provider and tell them that the patient has laboratory-confirmed COVID-19. This will help the healthcare provider's office take steps to keep other people in the office or waiting room from getting infected. Ask the healthcare provider to call the local or [facility] hospital health department for additional guidance. If the patient has a medical emergency and you need to call 911, notify the dispatch personnel that the patient has, or is being evaluated for COVID-19.   Household members should stay in another room or be  from the patient as much as possible. Household members should use a separate bedroom and bathroom, if available.  Prohibit visitors who do not have an essential need to be in the home.  Make sure that shared spaces in the home have good air flow, such as by an air conditioner or an opened window, weather permitting.  Perform hand hygiene frequently. Wash your hands often with soap and water for at least 20 seconds or use an alcohol-based hand  that contains 60 to 95% alcohol, covering all surfaces of your hands and rubbing them together until they feel dry. Soap and water should be used preferentially if hands are visibly dirty.  You and the patient should wear a facemask if you are in the same room.    Wear a disposable facemask and gloves when you touch or have contact with the patient's blood, stool, or body fluids, such as saliva, sputum, nasal mucus, vomit, urine. o Throw out disposable facemasks and gloves after using them. Do not reuse. o When removing personal protective equipment, first remove and dispose of gloves. Then, immediately clean your hands with soap and water or alcohol-based hand . Next, remove and dispose of facemask, and immediately clean your hands again with soap and water or alcohol-based hand . [address] thoroughly.  o Immediately remove and wash clothes or bedding that have blood, stool, or body fluids on them.  o Wear disposable gloves while handling soiled items and keep soiled items away from your body. Clean your hands (with soap and water or an alcohol-based hand ) immediately after removing your gloves. o Read and follow directions on labels of laundry or clothing items and detergent. In general, using a normal laundry detergent according to washing machine instructions and dry thoroughly using the warmest temperatures recommended on the clothing label.  Discuss any additional questions with your state or local health department or healthcare provider. Footnotes  2Close contact is defined as  a) being within approximately 6 feet (2 meters) of a COVID-19 case for a prolonged period of time; close contact can occur while caring for, living with, visiting, or sharing a health care waiting area or room with a COVID-19 case   or   b) having direct contact with infectious secretions of a COVID-19 case (e.g., being coughed on). Patient understands he needs to maintain a low threshold for coming into the ER if he has shortness of breath or experiencing worsening symptoms.

## 2020-04-28 NOTE — PROGRESS NOTES
.Patient contacted regarding recent discharge and COVID-19 risk   Care Transition Nurse/ Ambulatory Care Manager contacted the patient by telephone to perform post discharge assessment. Verified name and  with patient as identifiers. Provider Notes (Medical Decision Making):      Differential diagnosis: COVID-19, electrolyte abnormality, fluid overload     Patient presents with coronavirus exposure requesting testing. Vital signs are stable and patient is afebrile. He is overdue for hemodialysis. His last dialysis was Wednesday (he goes Monday, Wednesday, Friday). Exam unremarkable. No retraction,stridor, or wheezing. No indication for imaging.      Due to coronavirus outbreak will recommend a 14-day self quarantine. Patient is in agreement to the quarantine measures. Will be tested for novel coronavirus which is pending at this time.     Due to patient being overdue for hemodialysis, nephrology was consulted and they recommend checking labs and dialyzing patient today as it will take time for him to be arranged to have outpatient hemodialysis in a facility for patients under investigation.       Reviewed with him that COVID-19 pandemic is an evolving situation with rapidly changing recommendations & guidelines. Medical decisions are made based on the the best information available at the time. Recommended he stay tuned for updates published by trusted sources and to advise your PCP of any unexpected changes in clinical condition      Although this patient is high risk for COVID-19 viral infection, respiratory rate is less than 22 and patient's oxygen saturation on room air has been greater than 94% throughout the ER stay. Speaking in full, clear sentences without staccato speech. No further evidence of septic shock, therefore fluids were withheld.       Patient has following risk factors of: immunocompromised, diabetes and chronic kidney disease.  CTN/ACM reviewed discharge instructions, medical action plan and red flags related to discharge diagnosis. Reviewed and educated them on any new and changed medications related to discharge diagnosis. Advised obtaining a 90-day supply of all daily and as-needed medications. Education provided regarding infection prevention, and signs and symptoms of COVID-19 and when to seek medical attention with patient who verbalized understanding. Discussed exposure protocols and quarantine from [address] you at higher risk for severe illness 2019 and given an opportunity for questions and concerns. The patient agrees to contact the COVID-19 hotline [phone] or PCP office for questions related to their healthcare. CTN/ACM provided contact information for future reference. From CDC: Are you at higher risk for severe illness?  Wash your hands often.  Avoid close contact (6 feet, which is about two arm lengths) with people who are sick.  Put distance between yourself and other people if COVID-19 is spreading in your community.  Clean and disinfect frequently touched surfaces.  Avoid all cruise travel and non-essential air travel.  Call your healthcare professional if you have concerns about COVID-19 and your underlying condition or if you are sick. For more information on steps you can take to protect yourself, see CDC's How to Protect Yourself      Patient/family/caregiver given information for [facility] and agrees to enroll no  Patient's preferred e-mail:  n/a  Patient's preferred phone number: n/a  Based on Loop alert triggers, patient will be contacted by nurse care manager for worsening symptoms. Plan for follow-up call in 7-14 days based on severity of symptoms and risk factors.

## 2020-05-01 NOTE — PROGRESS NOTES
The result was called to the patient and instructions given to the patient regarding isolation.  Rosalva Hannah DO 4:58 PM

## 2020-05-05 ENCOUNTER — PATIENT OUTREACH (OUTPATIENT)
Dept: FAMILY MEDICINE CLINIC | Age: 60
End: 2020-05-05

## 2020-05-05 NOTE — PROGRESS NOTES
ACM call to check in with patient for his results. Patient is Positive COVID19 state's he is not have any symptoms. The hospital did call him last week with the results. Wife remains at [facility] in ICU still with [address]. Explained to patient again for self-quarantine is isolating self to separate area of the house and separate bathroom, if possible. To avoid contact with family and others for a period of 14 days to avoid spreading illness. Patient state's I am doing everything to stay safe.

## 2020-05-12 ENCOUNTER — PATIENT OUTREACH (OUTPATIENT)
Dept: FAMILY MEDICINE CLINIC | Age: 60
End: 2020-05-12

## 2020-05-12 NOTE — PROGRESS NOTES
.Patient resolved from Transition of Care episode on 5/12/2020  Patient/family has been provided the following resources and education related to COVID-19:                         Signs, symptoms and red flags related to COVID-19            CDC exposure and quarantine guidelines            [facility] exposure contact - [phone]            Contact for their local Department of Health                 Patient currently reports that the following symptoms have improved:  PATIENT CONTINUES TO  [address] No further outreach scheduled with this CTN/ACM. Episode of Care resolved. Patient has this CTN/ACM contact information if future needs arise.

## 2020-05-16 ENCOUNTER — HOSPITAL ENCOUNTER (EMERGENCY)
Age: 60
Discharge: STILL ADMITTED OR ADMINISTRATIVE | End: 2020-05-16

## 2020-06-25 ENCOUNTER — HOSPITAL ENCOUNTER (EMERGENCY)
Age: 60
Discharge: HOME | End: 2020-06-26
Attending: EMERGENCY MEDICINE
Payer: MEDICARE

## 2020-06-25 ENCOUNTER — APPOINTMENT (OUTPATIENT)
Dept: GENERAL RADIOLOGY | Age: 60
End: 2020-06-25
Attending: EMERGENCY MEDICINE
Payer: MEDICARE

## 2020-06-25 ENCOUNTER — APPOINTMENT (OUTPATIENT)
Dept: CT IMAGING | Age: 60
End: 2020-06-25
Attending: EMERGENCY MEDICINE
Payer: MEDICARE

## 2020-06-25 VITALS
TEMPERATURE: 99.2 F | HEIGHT: 64 IN | BODY MASS INDEX: 38.41 KG/M2 | RESPIRATION RATE: 19 BRPM | WEIGHT: 225 LBS | DIASTOLIC BLOOD PRESSURE: 82 MMHG | HEART RATE: 83 BPM | OXYGEN SATURATION: 99 % | SYSTOLIC BLOOD PRESSURE: 184 MMHG

## 2020-06-25 DIAGNOSIS — M25.50 POLYARTHRALGIA: ICD-10-CM

## 2020-06-25 DIAGNOSIS — R07.9 ACUTE CHEST PAIN: Primary | ICD-10-CM

## 2020-06-25 LAB
ALBUMIN SERPL-MCNC: 3.6 G/DL (ref 3.4–5)
ALBUMIN/GLOB SERPL: 0.8 {RATIO} (ref 0.8–1.7)
ALP SERPL-CCNC: 72 U/L (ref 45–117)
ALT SERPL-CCNC: 20 U/L (ref 16–61)
ANION GAP SERPL CALC-SCNC: 6 MMOL/L (ref 3–18)
AST SERPL-CCNC: 24 U/L (ref 10–38)
BASOPHILS # BLD: 0 K/UL (ref 0–0.1)
BASOPHILS NFR BLD: 0 % (ref 0–2)
BILIRUB SERPL-MCNC: 0.2 MG/DL (ref 0.2–1)
BUN SERPL-MCNC: 16 MG/DL (ref 7–18)
BUN/CREAT SERPL: 3 (ref 12–20)
CALCIUM SERPL-MCNC: 8.5 MG/DL (ref 8.5–10.1)
CHLORIDE SERPL-SCNC: 98 MMOL/L (ref 100–111)
CK MB CFR SERPL CALC: 1.8 % (ref 0–4)
CK MB SERPL-MCNC: 3.9 NG/ML (ref 5–25)
CK SERPL-CCNC: 219 U/L (ref 39–308)
CO2 SERPL-SCNC: 34 MMOL/L (ref 21–32)
CREAT SERPL-MCNC: 5.49 MG/DL (ref 0.6–1.3)
DIFFERENTIAL METHOD BLD: ABNORMAL
EOSINOPHIL # BLD: 0.2 K/UL (ref 0–0.4)
EOSINOPHIL NFR BLD: 2 % (ref 0–5)
ERYTHROCYTE [DISTWIDTH] IN BLOOD BY AUTOMATED COUNT: 17.7 % (ref 11.6–14.5)
GLOBULIN SER CALC-MCNC: 4.4 G/DL (ref 2–4)
GLUCOSE SERPL-MCNC: 175 MG/DL (ref 74–99)
HCT VFR BLD AUTO: 33 % (ref 36–48)
HGB BLD-MCNC: 10.3 G/DL (ref 13–16)
LYMPHOCYTES # BLD: 1.1 K/UL (ref 0.9–3.6)
LYMPHOCYTES NFR BLD: 14 % (ref 21–52)
MCH RBC QN AUTO: 29.9 PG (ref 24–34)
MCHC RBC AUTO-ENTMCNC: 31.2 G/DL (ref 31–37)
MCV RBC AUTO: 95.9 FL (ref 74–97)
MONOCYTES # BLD: 0.5 K/UL (ref 0.05–1.2)
MONOCYTES NFR BLD: 6 % (ref 3–10)
NEUTS SEG # BLD: 6.2 K/UL (ref 1.8–8)
NEUTS SEG NFR BLD: 78 % (ref 40–73)
PLATELET # BLD AUTO: 267 K/UL (ref 135–420)
PMV BLD AUTO: 10.3 FL (ref 9.2–11.8)
POTASSIUM SERPL-SCNC: 3.8 MMOL/L (ref 3.5–5.5)
PROT SERPL-MCNC: 8 G/DL (ref 6.4–8.2)
RBC # BLD AUTO: 3.44 M/UL (ref 4.7–5.5)
SODIUM SERPL-SCNC: 138 MMOL/L (ref 136–145)
TROPONIN I SERPL-MCNC: 0.02 NG/ML (ref 0–0.04)
WBC # BLD AUTO: 8 K/UL (ref 4.6–13.2)

## 2020-06-25 PROCEDURE — 71045 X-RAY EXAM CHEST 1 VIEW: CPT

## 2020-06-25 PROCEDURE — 96374 THER/PROPH/DIAG INJ IV PUSH: CPT

## 2020-06-25 PROCEDURE — 82550 ASSAY OF CK (CPK): CPT

## 2020-06-25 PROCEDURE — 71275 CT ANGIOGRAPHY CHEST: CPT

## 2020-06-25 PROCEDURE — 85025 COMPLETE CBC W/AUTO DIFF WBC: CPT

## 2020-06-25 PROCEDURE — 93005 ELECTROCARDIOGRAM TRACING: CPT

## 2020-06-25 PROCEDURE — 99284 EMERGENCY DEPT VISIT MOD MDM: CPT

## 2020-06-25 PROCEDURE — 74011636320 HC RX REV CODE- 636/320: Performed by: EMERGENCY MEDICINE

## 2020-06-25 PROCEDURE — 74011250636 HC RX REV CODE- 250/636: Performed by: EMERGENCY MEDICINE

## 2020-06-25 PROCEDURE — 80053 COMPREHEN METABOLIC PANEL: CPT

## 2020-06-25 RX ORDER — HYDROMORPHONE HYDROCHLORIDE 1 MG/ML
1 INJECTION, SOLUTION INTRAMUSCULAR; INTRAVENOUS; SUBCUTANEOUS ONCE
Status: COMPLETED | OUTPATIENT
Start: 2020-06-25 | End: 2020-06-25

## 2020-06-25 RX ADMIN — IOPAMIDOL 100 ML: 755 INJECTION, SOLUTION INTRAVENOUS at 22:49

## 2020-06-25 RX ADMIN — HYDROMORPHONE HYDROCHLORIDE 1 MG: 1 INJECTION, SOLUTION INTRAMUSCULAR; INTRAVENOUS; SUBCUTANEOUS at 21:46

## 2020-06-25 NOTE — ED TRIAGE NOTES
Pt reports left sided chest pain starting yesterday. Pain persisted all day yesterday and throughout today. Pt is on dialysis and went through his full treatment today. Pt also has mild SOB.

## 2020-06-26 ENCOUNTER — APPOINTMENT (OUTPATIENT)
Dept: NUCLEAR MEDICINE | Age: 60
End: 2020-06-26
Attending: EMERGENCY MEDICINE
Payer: MEDICARE

## 2020-06-26 LAB
ATRIAL RATE: 93 BPM
CALCULATED P AXIS, ECG09: 51 DEGREES
CALCULATED R AXIS, ECG10: -21 DEGREES
CALCULATED T AXIS, ECG11: 127 DEGREES
DIAGNOSIS, 93000: NORMAL
P-R INTERVAL, ECG05: 172 MS
Q-T INTERVAL, ECG07: 394 MS
QRS DURATION, ECG06: 92 MS
QTC CALCULATION (BEZET), ECG08: 489 MS
VENTRICULAR RATE, ECG03: 93 BPM

## 2020-06-26 PROCEDURE — 74011250637 HC RX REV CODE- 250/637: Performed by: EMERGENCY MEDICINE

## 2020-06-26 PROCEDURE — A9540 TC99M MAA: HCPCS

## 2020-06-26 RX ORDER — DICLOFENAC SODIUM 10 MG/G
2 GEL TOPICAL 4 TIMES DAILY
Qty: 1 EACH | Refills: 0 | Status: SHIPPED | OUTPATIENT
Start: 2020-06-26

## 2020-06-26 RX ORDER — ACETAMINOPHEN 500 MG
1000 TABLET ORAL
Status: COMPLETED | OUTPATIENT
Start: 2020-06-26 | End: 2020-06-26

## 2020-06-26 RX ORDER — ACETAMINOPHEN 325 MG/1
650 TABLET ORAL
Qty: 30 TAB | Refills: 1 | Status: SHIPPED | OUTPATIENT
Start: 2020-06-26

## 2020-06-26 RX ORDER — ACETAMINOPHEN 500 MG
TABLET ORAL
Status: DISCONTINUED
Start: 2020-06-26 | End: 2020-06-26 | Stop reason: HOSPADM

## 2020-06-26 RX ADMIN — ACETAMINOPHEN 1000 MG: 500 TABLET ORAL at 01:14

## 2020-06-26 RX ADMIN — ACETAMINOPHEN 1000 MG: 500 TABLET ORAL at 06:16

## 2020-06-26 NOTE — DISCHARGE INSTRUCTIONS

## 2020-06-26 NOTE — ED PROVIDER NOTES
EMERGENCY DEPARTMENT HISTORY AND PHYSICAL EXAM      Date: 6/25/2020  Patient Name: Marquita Kimble    History of Presenting Illness     Chief Complaint   Patient presents with    Chest Pain       History (Context): Marquita Kimble is a 61 y.o. gentleman with a complicated set of comorbid conditions as noted below comorbid conditions as noted below in the past medical history, who presents with subacute onset, localized, severe, substernal and right-sided chest pain started many hours ago. The pain is sharp and made worse with deep breathing. It is associated with left arm pain near the site of the venipuncture. No exacerbating/relieving features or other associated symptoms. On review of systems, the patient denies fever, chills, trauma, back pain, abdominal pain, nausea, vomiting, diaphoresis.     PCP: Obed Irvin MD    Facility-Administered Medications Ordered in Other Encounters   Medication Dose Route Frequency Provider Last Rate Last Dose    diclofenac (VOLTAREN) 1 % topical gel 4 g  4 g Topical QID PRN Radha Current, DO   4 g at 07/05/20 1045    vit B12-levomefolate calcium-vit B6 (FOLBIC RF, FOLTX) 2-1.13-25 mg tablet 1 Tab  1 Tab Oral DAILY Shyla COLLIER NP   1 Tab at 07/05/20 1616    oxyCODONE-acetaminophen (PERCOCET) 5-325 mg per tablet 2 Tab  2 Tab Oral Q4H PRN Radha Current, DO   2 Tab at 07/05/20 2021    arformoterol 15 mcg/budesonide 0.25 mg neb solution   Nebulization BID RT Shyla COLLIER NP        acetaminophen (TYLENOL) tablet 650 mg  650 mg Oral Q4H PRN Magy Martínez MD   650 mg at 07/04/20 1323    atorvastatin (LIPITOR) tablet 40 mg  40 mg Oral QHS Shyla COLLIER NP   40 mg at 07/05/20 2213    hydrALAZINE (APRESOLINE) tablet 50 mg  50 mg Oral BID Shyla COLLIER NP   50 mg at 07/05/20 1732    lipase-protease-amylase (CREON 6,000) capsule 1 Cap  1 Cap Oral TID WITH MEALS Shyla COLLIER NP   1 Cap at 07/05/20 1608    pantoprazole (PROTONIX) tablet 40 mg  40 mg Oral ACB Pat Callow B, NP   40 mg at 07/05/20 0827    carvediloL (COREG) tablet 6.25 mg  6.25 mg Oral BID WITH MEALS Pat Callow B, NP   6.25 mg at 07/05/20 1608    insulin lispro (HUMALOG) injection   SubCUTAneous AC&HS Basim Qureshi, NP   Stopped at 07/05/20 1630    glucose chewable tablet 16 g  4 Tab Oral PRN Basim Titus, NP        glucagon (GLUCAGEN) injection 1 mg  1 mg IntraMUSCular PRN Basim Titus, NP        dextrose (D50W) injection syrg 12.5-25 g  25-50 mL IntraVENous PRN Basim Titus, NP        heparin (porcine) injection 5,000 Units  5,000 Units SubCUTAneous Q8H Pat Callow B, NP   5,000 Units at 07/04/20 4390       Past History     Past Medical History:  Past Medical History:   Diagnosis Date    Alcohol abuse     Alcohol-induced chronic pancreatitis ([facility] [address] 9/7/2018    Arthritis     Atherosclerotic cardiovascular disease     CAD (coronary artery disease)     mild disease of coronaries (cor angio 2006),wife states he has 1 stent    Cardiac cath 01/26/2006    RCA mild. Otherwise patent coronaries. LVEDP 15.  EF 55-60%.  Cardiac echocardiogram 03/27/2009    EF 60%. No cardiac source of embolism. No significant valvular pathology.  Cardiac nuclear imaging test 12/06/2011    Small, mild inferior infarction or possibly artifact. No ischemia. EF 45%. No TID. No reg WMA.  Cardiovascular LLE venous duplex 08/14/2015    Left leg:  No DVT. Dilated lymph node in left groin.  Chronic kidney disease     Chronic obstructive pulmonary disease (HCC)     Constipation     Diabetes mellitus type II     Gout     Hepatic steatosis     Hypercholesterolemia     Hypertension     Knee effusion, left     Left knee pain     Morbid obesity (HCC)     Noncompliance     Obesity (BMI 30-39. 9)     S/P colonoscopy 3/8/05    Dr. Kd Salazar; normal; f/u 10 years    Stomach problems     TIA (transient ischemic attack) 3/09    Tobacco abuse        Past Surgical History:  Past Surgical History:   Procedure Laterality Date    ABDOMEN SURGERY PROC UNLISTED      Hernia repair in 1960's or 1970's.  HX ORTHOPAEDIC      Bones spur removed from left & right foot 2013.  HX UROLOGICAL  8/18/2015    [facility], Dr. Belinda Figueroa, Cystoscopy, left retrograde, left double-J cath       Family History:  Family History   Problem Relation Age of Onset    Diabetes Father     Heart Disease Mother     Diabetes Sister     Hypertension Sister     Arthritis-osteo Sister     Arthritis-osteo Brother     Diabetes Other     Diabetes Other         Uncle, Aunt    Hypertension Other         Uncle; Aunt       Social History:  Social History     Tobacco Use    Smoking status: Current Every Day Smoker     Packs/day: 0.25     Types: Cigarettes    Smokeless tobacco: Current User   Substance Use Topics    Alcohol use: Not Currently     Alcohol/week: 0.0 standard drinks     Comment: 1 beers a day.  Drug use: Not Currently     Types: Cocaine     Comment: +UDS for cocaine in 2017       Allergies: Allergies   Allergen Reactions    Shellfish Derived Other (comments)     Causes Gout    Tramadol Itching       PMH, PSH, family history, social history, allergies reviewed with the patient with significant items noted above. Review of Systems   As per HPI, otherwise reviewed and negative. Physical Exam     Vitals:    06/25/20 1922 06/25/20 2100   BP: 125/82 184/82   Pulse: 92 83   Resp: 22 19   Temp: 99.2 °F (37.3 °C)    SpO2: 94% 99%   Weight: 102.1 kg (225 lb)    Height: 5' 4\" (1.626 m)        Gen: Well-appearing, in no acute distress   HEENT: Normocephalic, sclera anicteric  Cardiovascular: Normal rate, regular rhythm, no murmurs, rubs, gallops. Pulses intact and equal distally. Pulmonary: No respiratory distress. No stridor. Clear lungs. ABD: Soft, nontender, nondistended. Neuro: Alert. Normal speech. Normal mentation.   Psych: Normal thought content and thought processes. : No CVA tenderness  EXT: Bilateral peripheral edema. Moves all extremities well. No cyanosis or clubbing. Skin: Warm and well-perfused. Other:        Diagnostic Study Results     Labs -     Recent Results (from the past 12 hour(s))   GLUCOSE, POC    Collection Time: 07/05/20 10:11 PM   Result Value Ref Range    Glucose (POC) 96 70 - 110 mg/dL   CBC WITH AUTOMATED DIFF    Collection Time: 07/06/20  5:01 AM   Result Value Ref Range    WBC 6.4 4.6 - 13.2 K/uL    RBC 3.58 (L) 4.70 - 5.50 M/uL    HGB 10.5 (L) 13.0 - 16.0 g/dL    HCT 32.6 (L) 36.0 - 48.0 %    MCV 91.1 74.0 - 97.0 FL    MCH 29.3 24.0 - 34.0 PG    MCHC 32.2 31.0 - 37.0 g/dL    RDW 16.2 (H) 11.6 - 14.5 %    PLATELET 739 603 - 032 K/uL    MPV 9.7 9.2 - 11.8 FL    NEUTROPHILS 44 40 - 73 %    LYMPHOCYTES 38 21 - 52 %    MONOCYTES 12 (H) 3 - 10 %    EOSINOPHILS 5 0 - 5 %    BASOPHILS 1 0 - 2 %    ABS. NEUTROPHILS 2.9 1.8 - 8.0 K/UL    ABS. LYMPHOCYTES 2.4 0.9 - 3.6 K/UL    ABS. MONOCYTES 0.8 0.05 - 1.2 K/UL    ABS. EOSINOPHILS 0.3 0.0 - 0.4 K/UL    ABS.  BASOPHILS 0.0 0.0 - 0.1 K/UL    DF AUTOMATED     MAGNESIUM    Collection Time: 07/06/20  5:01 AM   Result Value Ref Range    Magnesium 2.1 1.6 - 2.6 mg/dL   METABOLIC PANEL, BASIC    Collection Time: 07/06/20  5:01 AM   Result Value Ref Range    Sodium 131 (L) 136 - 145 mmol/L    Potassium 5.4 3.5 - 5.5 mmol/L    Chloride 100 100 - 111 mmol/L    CO2 23 21 - 32 mmol/L    Anion gap 8 3.0 - 18 mmol/L    Glucose 108 (H) 74 - 99 mg/dL    BUN 49 (H) 7.0 - 18 MG/DL    Creatinine 9.25 (H) 0.6 - 1.3 MG/DL    BUN/Creatinine ratio 5 (L) 12 - 20      GFR est AA 7 (L) >60 ml/min/1.73m2    GFR est non-AA 6 (L) >60 ml/min/1.73m2    Calcium 8.4 (L) 8.5 - 10.1 MG/DL   PHOSPHORUS    Collection Time: 07/06/20  5:01 AM   Result Value Ref Range    Phosphorus 5.6 (H) 2.5 - 4.9 MG/DL   PTH INTACT    Collection Time: 07/06/20  5:01 AM   Result Value Ref Range    Calcium 8.7 8.5 - 10.1 MG/DL    PTH, Intact PENDING pg/mL       Radiologic Studies -   NM LUNG PERFUSION W VENT   Final Result   IMPRESSION:      No evidence of pulmonary embolism. CTA CHEST W OR W WO CONT   Final Result   IMPRESSION:      1. No CT evidence for central pulmonary embolism. 2. Nonenlarged heart. Trace pericardial fluid.   -Nonenlarged aorta. No evidence of dissection. 3. Small amount of subpleural bullous disease. No pneumothorax, evidence of   pneumonia or pleural effusion. XR CHEST PORT   Final Result   Impression:        1. Stable mildly enlarged cardiac silhouette. 2. Mild prominence of the bronchovascular markings, possibly mild pulmonary   vascular congestion or nonspecific bronchitis. CT Results  (Last 48 hours)    None        CXR Results  (Last 48 hours)    None            Medical Decision Making   I am the first provider for this patient. I reviewed the vital signs, available nursing notes, past medical history, past surgical history, family history and social history. Vital Signs-Reviewed the patient's vital signs. EKG: Interpreted by myself. Records Reviewed: Personally, on initial evaluation    MDM:   Patient presents with substernal chest pain. Exam significant for .    DDX considered: ACS, unstable angina, pneumothorax, GERD, MSK chest pain, anxiety  DDX thought to be less likely but also considered due to high risk condition: Aortic dissection, PE, Boerhaave's, pericarditis, mediastinitis    Patient condition on initial evaluation: Stable    Plan:   Pain Control  Close Observation  Cardiac monitoring  As per orders noted below:  Orders Placed This Encounter    XR CHEST PORT    CTA CHEST W OR W WO CONT    NM LUNG PERFUSION W VENT    CBC WITH AUTOMATED DIFF    METABOLIC PANEL, COMPREHENSIVE    CARDIAC PANEL,(CK, CKMB & TROPONIN)    EKG, 12 LEAD, INITIAL    HYDROmorphone (DILAUDID) injection 1 mg    iopamidoL (ISOVUE-370) 76 % injection 100 mL  acetaminophen (TYLENOL) tablet 1,000 mg    DISCONTD: acetaminophen (TYLENOL) 500 mg tablet    technetium albumin aggregated (MAA) solution 6 millicurie    acetaminophen (TYLENOL) 325 mg tablet    diclofenac (VOLTAREN) 1 % gel    acetaminophen (TYLENOL) tablet 1,000 mg        ED Course:   Work-up as above was negative. CTA was nondiagnostic, so ordered VQ scan. VQ scan was negative, and the patient discharged home. Patient condition at time of disposition: Stable  DISCHARGE NOTE:   Pt has been reexamined. Patient has no new complaints, changes, or physical findings. Care plan outlined and precautions discussed. Results were reviewed with the patient. All medications were reviewed with the patient; will d/c home with no changes to meds. All of pt's questions and concerns were addressed. Alarm symptoms and return precautions associated with chief complaint and evaluation were reviewed with the patient in detail. The patient demonstrated adequate understanding. Patient was instructed and agrees to follow up with PCP, as well as to return to the ED upon further deterioration. Patient is ready to go home. The patient is happy with this plan    Follow-up Information     Follow up With Specialties Details Why [address] [facility] EMERGENCY DEPT Emergency Medicine  As needed, If symptoms worsen [address] [facility], [address] [place] [address] Today  Alyson Jonas [address] [place] [address]  [phone]            Discharge Medication List as of 6/26/2020  5:55 AM      CONTINUE these medications which have NOT CHANGED    Details   omeprazole (PRILOSEC) 20 mg capsule Take 20 mg by mouth daily. , Historical Med      hydrALAZINE (APRESOLINE) 50 mg tablet Take 1 Tab by mouth two (2) times a day., Normal, Disp-20 Tab, R-0      glucose blood VI test strips (FREESTYLE TEST) strip by Does Not Apply route See Admin Instructions.  Check twice a day, Normal, Disp-100 Strip, R-2      atorvastatin (LIPITOR) 40 mg tablet Take 1 Tab by mouth nightly., Normal, Disp-30 Tab, R-6      furosemide (LASIX) 40 mg tablet Take 1 Tab by mouth daily. , Normal, Disp-30 Tab, R-6      fluticasone (FLONASE) 50 mcg/actuation nasal spray 2 Sprays by Both Nostrils route daily. , Historical Med      carvedilol (COREG) 6.25 mg tablet Take 1 Tab by mouth two (2) times daily (with meals). , Normal, Disp-60 Tab, R-6      B COMPLEX W-C NO.20/FOLIC ACID (B COMPLEX & C NO.20-FOLIC ACID PO) Take  by mouth., Historical Med      budesonide-formoterol (SYMBICORT) 160-4.5 mcg/actuation HFA inhaler Take 2 Puffs by inhalation two (2) times a day., Normal, Disp-1 Inhaler, R-0      calcitRIOL (ROCALTROL) 0.25 mcg capsule Take 1 Cap by mouth every Monday, Wednesday, Friday. , Print, Disp-15 Cap, R-0      Blood-Glucose Meter monitoring kit Check twice daily. , Normal, Disp-1 kit, R-0      cyclobenzaprine (FLEXERIL) 5 mg tablet Take 1 Tab by mouth three (3) times daily as needed for Muscle Spasm(s). , Print, Disp-15 Tab, R-0      !! COLCRYS 0.6 mg tablet TAKE 1 TABLET BY MOUTH EVERY 1 HOURS FOR GOUT PAIN. DO NOT EXCEED 3 TABLETS IN 24 HOURS., Normal, Disp-30 Tab, R-0      indomethacin (INDOCIN) 25 mg capsule TAKE 2 CAPSULES BY MOUTH THREE TIMES DAILY WITH FOOD, Normal**Patient requests 90 days supply**Disp-540 Cap, R-0      ondansetron hcl (ZOFRAN) 4 mg tablet Take 1 Tab by mouth every eight (8) hours as needed for Nausea. , Print, Disp-10 Tab, R-0      !! colchicine 0.6 mg tablet Take 1 Tab by mouth daily. , Normal, Disp-22 Tab, R-2      methylPREDNISolone (MEDROL, CASS,) 4 mg tablet Per dose pack instructions, Print, Disp-1 Dose Pack, R-0      ibuprofen (MOTRIN) 600 mg tablet Take 1 Tab by mouth every six (6) hours as needed for Pain., Print, Disp-20 Tab, R-0      lipase-protease-amylase (CREON 6000) 6,000-19,000 -30,000 unit capsule Take 1 Cap by mouth three (3) times daily (with meals).  Indications: exocrine pancreatic insufficiency, Print, Disp-90 Cap, R-0      febuxostat (ULORIC) 40 mg tab tablet Take 1 Tab by mouth daily. , Print, Disp-14 Tab, R-1      tamsulosin (FLOMAX) 0.4 mg capsule Take 1 Cap by mouth daily. Start this medication on Friday 12/15/17  Indications: Urolithiasis, Print, Disp-6 Cap, R-0      bisacodyl (DULCOLAX, BISACODYL,) 5 mg EC tablet Take 1 Tab by mouth daily as needed for Constipation. , Print, Disp-30 Tab, R-0      NOVOLIN 70/30 100 unit/mL (70-30) injection INJECT 30 UNITS SC IN THE MORNING AND 20 UNITS SC IN THE EVENING BEFORE SUPPER, Normal, Disp-10 mL, R-4, MITRA      isosorbide mononitrate ER (IMDUR) 60 mg CR tablet Take 1 Tab by mouth daily. , Normal, Disp-30 Tab, R-6      nitroglycerin (NITROSTAT) 0.4 mg SL tablet 1 Tab by SubLINGual route as needed for Chest Pain., Print, Disp-20 Tab, R-0      aspirin 81 mg chewable tablet Take 1 Tab by mouth daily. , Normal, Disp-30 Tab, R-0      Lancets misc Check 3 times a day , needs according contour glucometer, Normal, Disp-1 Package, R-11       !! - Potential duplicate medications found. Please discuss with provider. Procedures:  Procedures      Critical Care Time:       Diagnosis     Clinical Impression:   1. Acute chest pain    2. Polyarthralgia        Signed,  Rose Garcia MD  Emergency Physician  [facility]    As a voice dictation software was utilized to dictate this note, minor word transpositions can occur. I apologize for confusing wording and typographic errors. Please feel free to contact me for clarification.

## 2020-06-27 ENCOUNTER — PATIENT OUTREACH (OUTPATIENT)
Dept: CASE MANAGEMENT | Age: 60
End: 2020-06-27

## 2020-07-03 ENCOUNTER — HOSPITAL ENCOUNTER (INPATIENT)
Age: 60
LOS: 3 days | Discharge: HOME | DRG: 252 | End: 2020-07-06
Attending: EMERGENCY MEDICINE | Admitting: INTERNAL MEDICINE
Payer: MEDICARE

## 2020-07-03 ENCOUNTER — APPOINTMENT (OUTPATIENT)
Dept: VASCULAR SURGERY | Age: 60
DRG: 252 | End: 2020-07-03
Attending: EMERGENCY MEDICINE
Payer: MEDICARE

## 2020-07-03 DIAGNOSIS — T82.49XA OTHER COMPLICATION OF VASCULAR DIALYSIS CATHETER, INITIAL ENCOUNTER (HCC): Primary | ICD-10-CM

## 2020-07-03 DIAGNOSIS — M10.9 ACUTE GOUT OF RIGHT WRIST, UNSPECIFIED CAUSE: ICD-10-CM

## 2020-07-03 DIAGNOSIS — T82.41XD: ICD-10-CM

## 2020-07-03 DIAGNOSIS — E87.5 HYPERKALEMIA: ICD-10-CM

## 2020-07-03 PROBLEM — N18.6 ESRD (END STAGE RENAL DISEASE) (HCC): Status: ACTIVE | Noted: 2020-07-03

## 2020-07-03 LAB
ALBUMIN SERPL-MCNC: 3.2 G/DL (ref 3.4–5)
ALBUMIN/GLOB SERPL: 0.7 {RATIO} (ref 0.8–1.7)
ALP SERPL-CCNC: 86 U/L (ref 45–117)
ALT SERPL-CCNC: 28 U/L (ref 16–61)
ANION GAP SERPL CALC-SCNC: 7 MMOL/L (ref 3–18)
APPEARANCE UR: CLEAR
AST SERPL-CCNC: 27 U/L (ref 10–38)
BACTERIA URNS QL MICRO: NEGATIVE /HPF
BASOPHILS # BLD: 0 K/UL (ref 0–0.06)
BASOPHILS NFR BLD: 0 % (ref 0–3)
BILIRUB SERPL-MCNC: 0.2 MG/DL (ref 0.2–1)
BILIRUB UR QL: NEGATIVE
BUN SERPL-MCNC: 53 MG/DL (ref 7–18)
BUN/CREAT SERPL: 4 (ref 12–20)
CALCIUM SERPL-MCNC: 9.2 MG/DL (ref 8.5–10.1)
CHLORIDE SERPL-SCNC: 106 MMOL/L (ref 100–111)
CO2 SERPL-SCNC: 26 MMOL/L (ref 21–32)
COLOR UR: YELLOW
CREAT SERPL-MCNC: 11.8 MG/DL (ref 0.6–1.3)
DIFFERENTIAL METHOD BLD: ABNORMAL
EOSINOPHIL # BLD: 0.3 K/UL (ref 0–0.4)
EOSINOPHIL NFR BLD: 4 % (ref 0–5)
EPITH CASTS URNS QL MICRO: NORMAL /LPF (ref 0–5)
ERYTHROCYTE [DISTWIDTH] IN BLOOD BY AUTOMATED COUNT: 16.3 % (ref 11.6–14.5)
EST. AVERAGE GLUCOSE BLD GHB EST-MCNC: 100 MG/DL
GLOBULIN SER CALC-MCNC: 4.4 G/DL (ref 2–4)
GLUCOSE BLD STRIP.AUTO-MCNC: 113 MG/DL (ref 70–110)
GLUCOSE BLD STRIP.AUTO-MCNC: 141 MG/DL (ref 70–110)
GLUCOSE BLD STRIP.AUTO-MCNC: 99 MG/DL (ref 70–110)
GLUCOSE SERPL-MCNC: 147 MG/DL (ref 74–99)
GLUCOSE UR STRIP.AUTO-MCNC: 250 MG/DL
HBA1C MFR BLD: 5.1 % (ref 4.2–5.6)
HCT VFR BLD AUTO: 34.6 % (ref 36–48)
HGB BLD-MCNC: 11 G/DL (ref 13–16)
HGB UR QL STRIP: ABNORMAL
INR PPP: 0.9 (ref 0.8–1.2)
KETONES UR QL STRIP.AUTO: NEGATIVE MG/DL
LEUKOCYTE ESTERASE UR QL STRIP.AUTO: NEGATIVE
LYMPHOCYTES # BLD: 1.5 K/UL (ref 0.8–3.5)
LYMPHOCYTES NFR BLD: 23 % (ref 20–51)
MCH RBC QN AUTO: 29.3 PG (ref 24–34)
MCHC RBC AUTO-ENTMCNC: 31.8 G/DL (ref 31–37)
MCV RBC AUTO: 92.3 FL (ref 74–97)
MONOCYTES # BLD: 0.4 K/UL (ref 0–1)
MONOCYTES NFR BLD: 6 % (ref 2–9)
NEUTS SEG # BLD: 4.4 K/UL (ref 1.8–8)
NEUTS SEG NFR BLD: 67 % (ref 42–75)
NITRITE UR QL STRIP.AUTO: NEGATIVE
PH UR STRIP: >8.5 [PH] (ref 5–8)
PLATELET # BLD AUTO: 317 K/UL (ref 135–420)
PLATELET COMMENTS,PCOM: ABNORMAL
PMV BLD AUTO: 9.5 FL (ref 9.2–11.8)
POTASSIUM SERPL-SCNC: 5.9 MMOL/L (ref 3.5–5.5)
PROT SERPL-MCNC: 7.6 G/DL (ref 6.4–8.2)
PROT UR STRIP-MCNC: 300 MG/DL
PROTHROMBIN TIME: 11.8 SEC (ref 11.5–15.2)
RBC # BLD AUTO: 3.75 M/UL (ref 4.7–5.5)
RBC #/AREA URNS HPF: NORMAL /HPF (ref 0–5)
RBC MORPH BLD: ABNORMAL
SODIUM SERPL-SCNC: 139 MMOL/L (ref 136–145)
SP GR UR REFRACTOMETRY: 1.01 (ref 1–1.03)
UROBILINOGEN UR QL STRIP.AUTO: 0.2 EU/DL (ref 0.2–1)
WBC # BLD AUTO: 6.6 K/UL (ref 4.6–13.2)
WBC URNS QL MICRO: NORMAL /HPF (ref 0–5)

## 2020-07-03 PROCEDURE — 80053 COMPREHEN METABOLIC PANEL: CPT

## 2020-07-03 PROCEDURE — 82962 GLUCOSE BLOOD TEST: CPT

## 2020-07-03 PROCEDURE — 85025 COMPLETE CBC W/AUTO DIFF WBC: CPT

## 2020-07-03 PROCEDURE — 06HM33Z INSERTION OF INFUSION DEVICE INTO RIGHT FEMORAL VEIN, PERCUTANEOUS APPROACH: ICD-10-PCS | Performed by: SURGERY

## 2020-07-03 PROCEDURE — 83036 HEMOGLOBIN GLYCOSYLATED A1C: CPT

## 2020-07-03 PROCEDURE — 96375 TX/PRO/DX INJ NEW DRUG ADDON: CPT

## 2020-07-03 PROCEDURE — 65270000029 HC RM PRIVATE

## 2020-07-03 PROCEDURE — 74011250637 HC RX REV CODE- 250/637: Performed by: NURSE PRACTITIONER

## 2020-07-03 PROCEDURE — 85610 PROTHROMBIN TIME: CPT

## 2020-07-03 PROCEDURE — 81001 URINALYSIS AUTO W/SCOPE: CPT

## 2020-07-03 PROCEDURE — 96374 THER/PROPH/DIAG INJ IV PUSH: CPT

## 2020-07-03 PROCEDURE — 90935 HEMODIALYSIS ONE EVALUATION: CPT

## 2020-07-03 PROCEDURE — 74011250636 HC RX REV CODE- 250/636: Performed by: NURSE PRACTITIONER

## 2020-07-03 PROCEDURE — 93990 DOPPLER FLOW TESTING: CPT

## 2020-07-03 PROCEDURE — 74011250636 HC RX REV CODE- 250/636: Performed by: EMERGENCY MEDICINE

## 2020-07-03 PROCEDURE — 99284 EMERGENCY DEPT VISIT MOD MDM: CPT

## 2020-07-03 PROCEDURE — C1752 CATH,HEMODIALYSIS,SHORT-TERM: HCPCS

## 2020-07-03 PROCEDURE — 5A1D70Z PERFORMANCE OF URINARY FILTRATION, INTERMITTENT, LESS THAN 6 HOURS PER DAY: ICD-10-PCS | Performed by: INTERNAL MEDICINE

## 2020-07-03 RX ORDER — ATORVASTATIN CALCIUM 40 MG/1
40 TABLET, FILM COATED ORAL
Status: DISCONTINUED | OUTPATIENT
Start: 2020-07-03 | End: 2020-07-07 | Stop reason: HOSPADM

## 2020-07-03 RX ORDER — ONDANSETRON 2 MG/ML
4 INJECTION INTRAMUSCULAR; INTRAVENOUS
Status: COMPLETED | OUTPATIENT
Start: 2020-07-03 | End: 2020-07-03

## 2020-07-03 RX ORDER — CARVEDILOL 6.25 MG/1
6.25 TABLET ORAL 2 TIMES DAILY WITH MEALS
Status: DISCONTINUED | OUTPATIENT
Start: 2020-07-03 | End: 2020-07-07 | Stop reason: HOSPADM

## 2020-07-03 RX ORDER — MAGNESIUM SULFATE 100 %
4 CRYSTALS MISCELLANEOUS AS NEEDED
Status: DISCONTINUED | OUTPATIENT
Start: 2020-07-03 | End: 2020-07-07 | Stop reason: HOSPADM

## 2020-07-03 RX ORDER — PANTOPRAZOLE SODIUM 40 MG/1
40 TABLET, DELAYED RELEASE ORAL
Status: DISCONTINUED | OUTPATIENT
Start: 2020-07-04 | End: 2020-07-07 | Stop reason: HOSPADM

## 2020-07-03 RX ORDER — INSULIN LISPRO 100 [IU]/ML
INJECTION, SOLUTION INTRAVENOUS; SUBCUTANEOUS
Status: DISCONTINUED | OUTPATIENT
Start: 2020-07-03 | End: 2020-07-07 | Stop reason: HOSPADM

## 2020-07-03 RX ORDER — MORPHINE SULFATE 4 MG/ML
4 INJECTION, SOLUTION INTRAMUSCULAR; INTRAVENOUS
Status: COMPLETED | OUTPATIENT
Start: 2020-07-03 | End: 2020-07-03

## 2020-07-03 RX ORDER — HYDRALAZINE HYDROCHLORIDE 50 MG/1
50 TABLET, FILM COATED ORAL 2 TIMES DAILY
Status: DISCONTINUED | OUTPATIENT
Start: 2020-07-03 | End: 2020-07-07 | Stop reason: HOSPADM

## 2020-07-03 RX ORDER — DEXTROSE 50 % IN WATER (D50W) INTRAVENOUS SYRINGE
25-50 AS NEEDED
Status: DISCONTINUED | OUTPATIENT
Start: 2020-07-03 | End: 2020-07-07 | Stop reason: HOSPADM

## 2020-07-03 RX ORDER — OXYCODONE AND ACETAMINOPHEN 5; 325 MG/1; MG/1
1 TABLET ORAL
Status: DISCONTINUED | OUTPATIENT
Start: 2020-07-03 | End: 2020-07-04

## 2020-07-03 RX ORDER — OXYCODONE AND ACETAMINOPHEN 5; 325 MG/1; MG/1
1 TABLET ORAL
Status: ACTIVE | OUTPATIENT
Start: 2020-07-03 | End: 2020-07-04

## 2020-07-03 RX ORDER — HEPARIN SODIUM 5000 [USP'U]/ML
5000 INJECTION, SOLUTION INTRAVENOUS; SUBCUTANEOUS EVERY 8 HOURS
Status: DISCONTINUED | OUTPATIENT
Start: 2020-07-03 | End: 2020-07-07 | Stop reason: HOSPADM

## 2020-07-03 RX ADMIN — HYDRALAZINE HYDROCHLORIDE 50 MG: 50 TABLET, FILM COATED ORAL at 18:06

## 2020-07-03 RX ADMIN — CARVEDILOL 6.25 MG: 6.25 TABLET, FILM COATED ORAL at 18:06

## 2020-07-03 RX ADMIN — ONDANSETRON 4 MG: 2 INJECTION INTRAMUSCULAR; INTRAVENOUS at 09:38

## 2020-07-03 RX ADMIN — OXYCODONE AND ACETAMINOPHEN 1 TABLET: 5; 325 TABLET ORAL at 14:56

## 2020-07-03 RX ADMIN — HEPARIN SODIUM 5000 UNITS: 5000 INJECTION INTRAVENOUS; SUBCUTANEOUS at 16:31

## 2020-07-03 RX ADMIN — MORPHINE SULFATE 4 MG: 4 INJECTION, SOLUTION INTRAMUSCULAR; INTRAVENOUS at 09:38

## 2020-07-03 RX ADMIN — OXYCODONE AND ACETAMINOPHEN 1 TABLET: 5; 325 TABLET ORAL at 23:59

## 2020-07-03 RX ADMIN — HEPARIN SODIUM 5000 UNITS: 5000 INJECTION INTRAVENOUS; SUBCUTANEOUS at 21:56

## 2020-07-03 RX ADMIN — ATORVASTATIN CALCIUM 40 MG: 40 TABLET, FILM COATED ORAL at 21:56

## 2020-07-03 RX ADMIN — OXYCODONE AND ACETAMINOPHEN 1 TABLET: 5; 325 TABLET ORAL at 18:06

## 2020-07-03 NOTE — ACP (ADVANCE CARE PLANNING)
Advance Care Planning     Advance Care Planning Clinical Specialist  Conversation Note      Date of ACP Conversation: 7/3/2020    Gaeileen Laxmi Conducted with:   Patient with Decision Making Capacity    ACP Clinical Specialist: Yvonne Stephenson    *When Decision Maker makes decisions on behalf of the incapacitated patient: Decision Maker is asked to consider and make decisions based on patient values, known preferences, or best interests. Health Care Decision Maker:    Current Designated Health Care Decision Maker:   Primary Decision Maker: Beth Velasco - Spouse - [phone]  (If there is a valid [place] named in the [facility] Makers\" box in the ACP activity, but it is not visible above, be sure to open that field and then select the health care decision maker relationship (ie \"primary\") in the blank space to the right of the name.) Validate  this information as still accurate & up-to-date; edit [place] field as needed.)    Note: Assess and validate information in current ACP documents, as indicated. Note: If the relationship of these Decision-Makers to the patient does NOT follow your state's Next of Kin hierarchy, recommend that patient complete ACP document that meets state-specific requirements to allow them to act on the patient's behalf when appropriate. Care Preferences    Hospitalization: \"If your health worsens and it becomes clear that your chance of recovery is unlikely, what would your preference be regarding hospitalization? \"    Choice:  [x]  The patient wants hospitalization  []  The patient prefers comfort-focused treatment without hospitalization.     Length of ACP Conversation in minutes:      Conversation Outcomes:  [] ACP discussion completed  [] Existing advance directive reviewed with patient; no changes to patient's previously recorded wishes   [] New Advance Directive completed   [] Portable Do Not Rescitate prepared for Provider review and signature  [] POLST/POST/MOLST/MOST prepared for Provider review and signature      Follow-up plan:    [x] Schedule follow-up conversation to continue planning  [x] Referred individual to Provider for additional questions/concerns   [] Advised patient/agent/surrogate to review completed ACP document and update if needed with changes in condition, patient preferences or care setting     [] This note routed to one or more involved healthcare providers    Wanda Fischer RN BSN  Care Manager  [phone]

## 2020-07-03 NOTE — ED NOTES
TRANSFER - OUT REPORT:    Verbal report given to JEN Villalta  on Ashley Can  being transferred to [facility] for routine progression of care       Report consisted of patients Situation, Background, Assessment and   Recommendations(SBAR). Information from the following report(s) ED Summary was reviewed with the receiving nurse. Lines:       Opportunity for questions and clarification was provided.       Patient transported with:   Transportation

## 2020-07-03 NOTE — ED PROVIDER NOTES
EMERGENCY DEPARTMENT HISTORY AND PHYSICAL EXAM  This was created with voice recognition software and transcription errors may be present. 7:52 AM  Date: 7/3/2020  Patient Name: Leah Reddy    History of Presenting Illness     Chief Complaint:    History Provided By:     HPI: Leah Reddy is a 61 y.o. male past medical history of alcohol abuse pancreatitis coronary disease diabetes COPD high cholesterol hypertension end-stage renal disease on dialysis Monday Wednesday Friday who presents from dialysis for nonfunctioning shunt. Patient states he has not had dialysis since a week ago Monday which is approximate 11 days. He notes that he has forearm pain the left arm. PCP: Mikel Alfonso MD      Past History     Past Medical History:  Past Medical History:   Diagnosis Date    Alcohol abuse     Alcohol-induced chronic pancreatitis ([facility] [address] 9/7/2018    Arthritis     Atherosclerotic cardiovascular disease     CAD (coronary artery disease)     mild disease of coronaries (cor angio 2006),wife states he has 1 stent    Cardiac cath 01/26/2006    RCA mild. Otherwise patent coronaries. LVEDP 15.  EF 55-60%.  Cardiac echocardiogram 03/27/2009    EF 60%. No cardiac source of embolism. No significant valvular pathology.  Cardiac nuclear imaging test 12/06/2011    Small, mild inferior infarction or possibly artifact. No ischemia. EF 45%. No TID. No reg WMA.  Cardiovascular LLE venous duplex 08/14/2015    Left leg:  No DVT. Dilated lymph node in left groin.  Chronic kidney disease     Chronic obstructive pulmonary disease (HCC)     Constipation     Diabetes mellitus type II     Gout     Hepatic steatosis     Hypercholesterolemia     Hypertension     Knee effusion, left     Left knee pain     Morbid obesity (HCC)     Noncompliance     Obesity (BMI 30-39. 9)     S/P colonoscopy 3/8/05    Dr. Octavio Harris; normal; f/u 10 years    Stomach problems     TIA (transient ischemic attack) 3/09    Tobacco abuse        Past Surgical History:  Past Surgical History:   Procedure Laterality Date    ABDOMEN SURGERY PROC UNLISTED      Hernia repair in 1960's or 1970's.  HX ORTHOPAEDIC      Bones spur removed from left & right foot 2013.  HX UROLOGICAL  8/18/2015    [facility], Dr. Azeb Ortega, Cystoscopy, left retrograde, left double-J cath       Family History:  Family History   Problem Relation Age of Onset    Diabetes Father     Heart Disease Mother     Diabetes Sister     Hypertension Sister     Arthritis-osteo Sister     Arthritis-osteo Brother     Diabetes Other     Diabetes Other         Uncle, Aunt    Hypertension Other         Uncle; Aunt       Social History:  Social History     Tobacco Use    Smoking status: Current Every Day Smoker     Packs/day: 0.25     Types: Cigarettes    Smokeless tobacco: Current User   Substance Use Topics    Alcohol use: Not Currently     Alcohol/week: 0.0 standard drinks     Comment: 1 beers a day.  Drug use: Not Currently     Types: Cocaine     Comment: +UDS for cocaine in 2017       Allergies: Allergies   Allergen Reactions    Shellfish Derived Other (comments)     Causes Gout    Tramadol Itching       Review of Systems     Review of Systems   All other systems reviewed and are negative. 10 point review of systems otherwise negative unless noted in HPI. Physical Exam       Physical Exam  Constitutional:       Appearance: He is well-developed. HENT:      Head: Normocephalic and atraumatic. Eyes:      Pupils: Pupils are equal, round, and reactive to light. Neck:      Musculoskeletal: Normal range of motion and neck supple. Cardiovascular:      Rate and Rhythm: Normal rate and regular rhythm. Heart sounds: Normal heart sounds. No murmur. No friction rub. Comments: No thrill to the shunt but he does have a good radial pulse on that side  Pulmonary:      Effort: Pulmonary effort is normal. No respiratory distress.       Breath sounds: Normal breath sounds. No wheezing. Abdominal:      General: There is no distension. Palpations: Abdomen is soft. Tenderness: There is no abdominal tenderness. There is no guarding or rebound. Musculoskeletal: Normal range of motion. Skin:     General: Skin is warm and dry. Neurological:      Mental Status: He is alert and oriented to person, place, and time. Psychiatric:         Behavior: Behavior normal.         Thought Content: Thought content normal.         Diagnostic Study Results     Vital Signs  EKG:  Labs:   Imaging:     Medical Decision Making     ED Course: Progress Notes, Reevaluation, and Consults:      Provider Notes (Medical Decision Making):     K noted to be 5.9. Discussed with Dr. Larisa Davis for nephrology will obtain dialysis Dr. Raffy Redman was placed to [facility] and Dr. Ana Dias will admit         Diagnosis     Clinical Impression: No diagnosis found. Disposition:    Patient's Medications   Start Taking    No medications on file   Continue Taking    ACETAMINOPHEN (TYLENOL) 325 MG TABLET    Take 2 Tabs by mouth every six (6) hours as needed for Pain. ASPIRIN 81 MG CHEWABLE TABLET    Take 1 Tab by mouth daily. ATORVASTATIN (LIPITOR) 40 MG TABLET    Take 1 Tab by mouth nightly. B COMPLEX W-C NO.20/FOLIC ACID (B COMPLEX & C NO.20-FOLIC ACID PO)    Take  by mouth. BISACODYL (DULCOLAX, BISACODYL,) 5 MG EC TABLET    Take 1 Tab by mouth daily as needed for Constipation. BLOOD-GLUCOSE METER MONITORING KIT    Check twice daily. BUDESONIDE-FORMOTEROL (SYMBICORT) 160-4.5 MCG/ACTUATION HFA INHALER    Take 2 Puffs by inhalation two (2) times a day. CALCITRIOL (ROCALTROL) 0.25 MCG CAPSULE    Take 1 Cap by mouth every Monday, Wednesday, Friday. CARVEDILOL (COREG) 6.25 MG TABLET    Take 1 Tab by mouth two (2) times daily (with meals). COLCHICINE 0.6 MG TABLET    Take 1 Tab by mouth daily. COLCRYS 0.6 MG TABLET    TAKE 1 TABLET BY MOUTH EVERY 1 HOURS FOR GOUT PAIN.  DO NOT EXCEED 3 TABLETS IN 24 HOURS. CYCLOBENZAPRINE (FLEXERIL) 5 MG TABLET    Take 1 Tab by mouth three (3) times daily as needed for Muscle Spasm(s). DICLOFENAC (VOLTAREN) 1 % GEL    Apply 2 g to affected area four (4) times daily. FEBUXOSTAT (ULORIC) 40 MG TAB TABLET    Take 1 Tab by mouth daily. FLUTICASONE (FLONASE) 50 MCG/ACTUATION NASAL SPRAY    2 Sprays by Both Nostrils route daily. FUROSEMIDE (LASIX) 40 MG TABLET    Take 1 Tab by mouth daily. GLUCOSE BLOOD VI TEST STRIPS (FREESTYLE TEST) STRIP    by Does Not Apply route See Admin Instructions. Check twice a day    HYDRALAZINE (APRESOLINE) 50 MG TABLET    Take 1 Tab by mouth two (2) times a day. IBUPROFEN (MOTRIN) 600 MG TABLET    Take 1 Tab by mouth every six (6) hours as needed for Pain. INDOMETHACIN (INDOCIN) 25 MG CAPSULE    TAKE 2 CAPSULES BY MOUTH THREE TIMES DAILY WITH FOOD    ISOSORBIDE MONONITRATE ER (IMDUR) 60 MG CR TABLET    Take 1 Tab by mouth daily. LANCETS MISC    Check 3 times a day , needs according contour glucometer    LIPASE-PROTEASE-AMYLASE (CREON 6000) 6,000-19,000 -30,000 UNIT CAPSULE    Take 1 Cap by mouth three (3) times daily (with meals). Indications: exocrine pancreatic insufficiency    METHYLPREDNISOLONE (MEDROL, CASS,) 4 MG TABLET    Per dose pack instructions    NITROGLYCERIN (NITROSTAT) 0.4 MG SL TABLET    1 Tab by SubLINGual route as needed for Chest Pain. NOVOLIN 70/30 100 UNIT/ML (70-30) INJECTION    INJECT 30 UNITS SC IN THE MORNING AND 20 UNITS SC IN THE EVENING BEFORE SUPPER    OMEPRAZOLE (PRILOSEC) 20 MG CAPSULE    Take 20 mg by mouth daily. ONDANSETRON HCL (ZOFRAN) 4 MG TABLET    Take 1 Tab by mouth every eight (8) hours as needed for Nausea. TAMSULOSIN (FLOMAX) 0.4 MG CAPSULE    Take 1 Cap by mouth daily.  Start this medication on Friday 12/15/17  Indications: Urolithiasis   These Medications have changed    No medications on file   Stop Taking    No medications on file

## 2020-07-03 NOTE — H&P
Hospitalist Admission History and Physical    NAME:  Natasha Prasad   :   1960   MRN:   [id number]     PCP:  Evangelina Hurst MD  Date/Time:  7/3/2020 1:53 PM    Subjective:   CHIEF COMPLAINT: Patient presented to ER due to difficulty sleeping and urinary frequency    HISTORY OF PRESENT ILLNESS:     Marino Argueta is a 61 y.o.  male who presents with due to difficulty sleeping x 2 week and left arm pain and urinary frequency x1 week. Upon presentation to ED patient found to have nonfunctioning AV fistula left arm and lack of ability to do dialysis for the last 11 days. In discussion with patient his main complaint is pain at right groin TDC site and some epigastric pain. Patient currently denies any shortness of breath, chest pain, nausea vomiting. He denies any particular constipation complaints but does state that it has been about 1 week since his last bowel movement. Denies pain with urination but reports continued frequency, urgency and difficulty initiating stream.  Patient is a poor historian and does not seem reliable about his medications either. Patient does report continuing to smoke states 1 pack will last him for 1 week. Denies recent alcohol use states last was 2 to 3 years ago. Denies illicit drug use including cocaine and heroin. Patient seen in HD    Past Medical History:   Diagnosis Date    Alcohol abuse     Alcohol-induced chronic pancreatitis ([facility] [address] 2018    Arthritis     Atherosclerotic cardiovascular disease     CAD (coronary artery disease)     mild disease of coronaries (cor angio ),wife states he has 1 stent    Cardiac cath 2006    RCA mild. Otherwise patent coronaries. LVEDP 15.  EF 55-60%.  Cardiac echocardiogram 2009    EF 60%. No cardiac source of embolism. No significant valvular pathology.  Cardiac nuclear imaging test 2011    Small, mild inferior infarction or possibly artifact. No ischemia. EF 45%. No TID.   No reg WMA.  Cardiovascular LLE venous duplex 08/14/2015    Left leg:  No DVT. Dilated lymph node in left groin.  Chronic kidney disease     Chronic obstructive pulmonary disease (HCC)     Constipation     Diabetes mellitus type II     Gout     Hepatic steatosis     Hypercholesterolemia     Hypertension     Knee effusion, left     Left knee pain     Morbid obesity (HCC)     Noncompliance     Obesity (BMI 30-39. 9)     S/P colonoscopy 3/8/05    Dr. Akin Mosher; normal; f/u 10 years    Stomach problems     TIA (transient ischemic attack) 3/09    Tobacco abuse         Past Surgical History:   Procedure Laterality Date    ABDOMEN SURGERY PROC UNLISTED      Hernia repair in 1960's or 1970's.  HX ORTHOPAEDIC      Bones spur removed from left & right foot 2013.  HX UROLOGICAL  8/18/2015    [facility], Dr. Adrián Greene, Cystoscopy, left retrograde, left double-J cath       Social History     Tobacco Use    Smoking status: Current Every Day Smoker     Packs/day: 0.25     Types: Cigarettes    Smokeless tobacco: Current User   Substance Use Topics    Alcohol use: Not Currently     Alcohol/week: 0.0 standard drinks     Comment: 1 beers a day. Family History   Problem Relation Age of Onset    Diabetes Father     Heart Disease Mother     Diabetes Sister     Hypertension Sister     Arthritis-osteo Sister     Arthritis-osteo Brother     Diabetes Other     Diabetes Other         Uncle, Aunt    Hypertension Other         Uncle; Aunt        Allergies   Allergen Reactions    Shellfish Derived Other (comments)     Causes Gout    Tramadol Itching        Prior to Admission Medications   Prescriptions Last Dose Informant Patient Reported? Taking? B COMPLEX W-C NO.20/FOLIC ACID (B COMPLEX & C NO.20-FOLIC ACID PO)   Yes No   Sig: Take  by mouth. Blood-Glucose Meter monitoring kit   No No   Sig: Check twice daily.    COLCRYS 0.6 mg tablet   No No   Sig: TAKE 1 TABLET BY MOUTH EVERY 1 HOURS FOR GOUT PAIN. DO NOT EXCEED 3 TABLETS IN 24 HOURS. Lancets misc   No No   Sig: Check 3 times a day , needs according contour glucometer   NOVOLIN 70/30 100 unit/mL (70-30) injection   No No   Sig: INJECT 30 UNITS SC IN THE MORNING AND 20 UNITS SC IN THE EVENING BEFORE SUPPER   acetaminophen (TYLENOL) 325 mg tablet   No No   Sig: Take 2 Tabs by mouth every six (6) hours as needed for Pain. aspirin 81 mg chewable tablet   No No   Sig: Take 1 Tab by mouth daily. atorvastatin (LIPITOR) 40 mg tablet   No No   Sig: Take 1 Tab by mouth nightly. bisacodyl (DULCOLAX, BISACODYL,) 5 mg EC tablet   No No   Sig: Take 1 Tab by mouth daily as needed for Constipation. budesonide-formoterol (SYMBICORT) 160-4.5 mcg/actuation HFA inhaler   No No   Sig: Take 2 Puffs by inhalation two (2) times a day. calcitRIOL (ROCALTROL) 0.25 mcg capsule   No No   Sig: Take 1 Cap by mouth every Monday, Wednesday, Friday. carvedilol (COREG) 6.25 mg tablet   No No   Sig: Take 1 Tab by mouth two (2) times daily (with meals). Patient taking differently: Take 25 mg by mouth two (2) times daily (with meals). colchicine 0.6 mg tablet   No No   Sig: Take 1 Tab by mouth daily. cyclobenzaprine (FLEXERIL) 5 mg tablet   No No   Sig: Take 1 Tab by mouth three (3) times daily as needed for Muscle Spasm(s). diclofenac (VOLTAREN) 1 % gel   No No   Sig: Apply 2 g to affected area four (4) times daily. febuxostat (ULORIC) 40 mg tab tablet   No No   Sig: Take 1 Tab by mouth daily. fluticasone (FLONASE) 50 mcg/actuation nasal spray   Yes No   Si Sprays by Both Nostrils route daily. furosemide (LASIX) 40 mg tablet   No No   Sig: Take 1 Tab by mouth daily. glucose blood VI test strips (FREESTYLE TEST) strip   No No   Sig: by Does Not Apply route See Admin Instructions. Check twice a day   hydrALAZINE (APRESOLINE) 50 mg tablet   No No   Sig: Take 1 Tab by mouth two (2) times a day.    ibuprofen (MOTRIN) 600 mg tablet   No No   Sig: Take 1 Tab by mouth every six (6) hours as needed for Pain. indomethacin (INDOCIN) 25 mg capsule   No No   Sig: TAKE 2 CAPSULES BY MOUTH THREE TIMES DAILY WITH FOOD   isosorbide mononitrate ER (IMDUR) 60 mg CR tablet   No No   Sig: Take 1 Tab by mouth daily. lipase-protease-amylase (CREON 6000) 6,000-19,000 -30,000 unit capsule   No No   Sig: Take 1 Cap by mouth three (3) times daily (with meals). Indications: exocrine pancreatic insufficiency   methylPREDNISolone (MEDROL, CASS,) 4 mg tablet   No No   Sig: Per dose pack instructions   nitroglycerin (NITROSTAT) 0.4 mg SL tablet   No No   Si Tab by SubLINGual route as needed for Chest Pain. Patient taking differently: 1 Tab by SubLINGual route as needed for Chest Pain. Pt to take 1 tab q 5 min up to three times, if symptom persist pt. to call 911. omeprazole (PRILOSEC) 20 mg capsule   Yes No   Sig: Take 20 mg by mouth daily. ondansetron hcl (ZOFRAN) 4 mg tablet   No No   Sig: Take 1 Tab by mouth every eight (8) hours as needed for Nausea. tamsulosin (FLOMAX) 0.4 mg capsule   No No   Sig: Take 1 Cap by mouth daily. Start this medication on Friday 12/15/17  Indications: Urolithiasis      Facility-Administered Medications: None     REVIEW OF SYSTEMS:     [] Unable to obtain  ROS due to  []mental status change  []sedated   []intubated   [x]Total of 12 systems reviewed as follows:    GENERAL: no fever or chills   HEENT: no sinus congestion / hearing or vision changes  NECK: No pain or stiffness. PULMONARY: no shortness of breath or cough  Cardiovascular: denies palpitations; no lower extremity edema  GASTROINTESTINAL: Denies abdominal pain, constipation, diarrhea, nausea, vomiting or melena / bloody stools  GENITOURINARY: + No urinary frequency,  Hesitancy; denies dysuria. MUSCULOSKELETAL: + pain to L forearm posterior aspect and mild to left knee, no recent trauma.    DERMATOLOGIC: denies pruritis, rashes or open areas   ENDOCRINE: No polyuria, polydipsia, no heat or cold intolerance. HEMATOLOGICAL: No anemia or easy bruising or bleeding. NEUROLOGIC:  no focal weakness,  no speech changes     Objective:   VITALS:    Visit Vitals  BP (!) 168/92 (BP 1 Location: Right arm, BP Patient Position: At rest)   Pulse 69   Temp 98.6 °F (37 °C)   Resp 18   SpO2 97%     Temp (24hrs), Av.6 °F (37 °C), Min:98.6 °F (37 °C), Max:98.6 °F (37 °C)      PHYSICAL EXAM:   General:          Alert, in NAD  HEENT:           Pupils equal.  Sclera anicteric. Conjunctiva pink. Neck:               Supple. Trachea midline. No accessory muscle use. No jugular venous distention, no carotid bruit  CV:                  Regular rate and rhythm. S1S2+  Lungs:             Clear to auscultation bilaterally. No Wheezing or Rhonchi. No rales. Abdomen:        Soft, non-tender. Not distended. Bowel sounds normal.   Extremities:     +Right groin + TDC; L arm AV fistula no thrill; No cyanosis. No edema. Pulses 2+ b/l  Neurologic:      Alert and oriented X 4. Follows commands, responds appropriately. No focal neurological deficit was noted  Skin:                Warm and dry. No rashes. LAB DATA REVIEWED:    No components found for: [place]  Recent Labs     20  0825      K 5.9*      CO2 26   BUN 53*   CREA 11.80*   *   CA 9.2   ALB 3.2*   WBC 6.6   HGB 11.0*   HCT 34.6*        Assessment/Plan:      Active Problems:    Hyperkalemia (7/3/2020)      ESRD (end stage renal disease) ([facility] [address] (7/3/2020)     ___________________________________________________  PLAN:    1. ESRD on HD - no HD x several days prior to admission / Vasc surgery contacted by ED and pt now s/p TDC right groin / cont HD as per nephrology   2. Hypertension - likely in setting of medication non-compliance and missed HD / follow post HD and home antihypertensives resumed   3.  Type 2 DM with hyperglycemia - pt reports he has had diet control only for the past 1-2 years as meds made him feel bad, start SSI, check A1c   4. Chronic pancreatitis - resume creon / reports no EtOH x 2-3 years  5. Hyperkalemia - in setting of missed HD / follow in AM  6. Urinary hesitinency / frequency - check U/A, bladder check / previously on flomax in 2016 r/t uretal stones however off this for some time  7. Ongoing tobacco use - reports ~1PPD prior to admission / counseled on cessation   8. Medical non- compliance - pt reports not taking several of his home medications / running out prior to admission / states he has been busy with HD / states sister and/or wife could help with meds in the future.    Per wife lasix is only medication out of at home     Family contact - Wife Mihir Bravo  [phone]    Risk of deterioration:  []Low    [x]Moderate  []High    Prophylaxis:  []Lovenox  []Coumadin  [x]Hep SQ  [x]SCDs  []H2B/PPI    Disposition:  []Home w/ Family   [x] PT,OT,RN   []SNF/LTC   []SAH/Rehab    Discussed Code Status:    [x]Full Code      []DNR     ___________________________________________________    Care Plan discussed with:    [x]Patient   [x]Family spoke with wife over the phone  []ED Care Manager  []ED Doc   []Specialist :  ___________________________________________________  Admitting Provider: Sandra Kraus NP

## 2020-07-03 NOTE — CONSULTS
Surgery Consult      Patient: Og Kelley MRN: [id number]  CSN: [id number]      YOB: 1960    Age: 61 y.o. Sex: male      DOA: 7/3/2020       HPI:     Og Kelley is a 61 y.o. male who presents with end-stage renal disease. Patient has not had dialysis in the past 11 days. He has an occluded left arm AV fistula. He states that last Monday he had dialysis something occurred at that time where he had a lot of pain and discomfort within the forearm. He actually passed out. Since then he has not felt a thrill within his arm. He does have some slight discomfort along the level of the cephalic vein. No fevers or chills. No shortness of breath. He did not go to dialysis for the rest of that week secondary to need for court appearances. He stated that he went to his dialysis center the next Monday and was told that it was clotted but never got a phone call back to have that taken care of. He is now here in the emergency room for treatment. Patient potassium is 5.9. Past Medical History:   Diagnosis Date    Alcohol abuse     Alcohol-induced chronic pancreatitis ([facility] [address] 9/7/2018    Arthritis     Atherosclerotic cardiovascular disease     CAD (coronary artery disease)     mild disease of coronaries (cor angio 2006),wife states he has 1 stent    Cardiac cath 01/26/2006    RCA mild. Otherwise patent coronaries. LVEDP 15.  EF 55-60%.  Cardiac echocardiogram 03/27/2009    EF 60%. No cardiac source of embolism. No significant valvular pathology.  Cardiac nuclear imaging test 12/06/2011    Small, mild inferior infarction or possibly artifact. No ischemia. EF 45%. No TID. No reg WMA.  Cardiovascular LLE venous duplex 08/14/2015    Left leg:  No DVT. Dilated lymph node in left groin.     Chronic kidney disease     Chronic obstructive pulmonary disease (HCC)     Constipation     Diabetes mellitus type II     Gout     Hepatic steatosis     Hypercholesterolemia     Hypertension     Knee effusion, left     Left knee pain     Morbid obesity (HCC)     Noncompliance     Obesity (BMI 30-39. 9)     S/P colonoscopy 3/8/05    Dr. Hayes Figures; normal; f/u 10 years    Stomach problems     TIA (transient ischemic attack) 3/09    Tobacco abuse        Past Surgical History:   Procedure Laterality Date    ABDOMEN SURGERY PROC UNLISTED      Hernia repair in 1960's or 1970's.  HX ORTHOPAEDIC      Bones spur removed from left & right foot 2013.  HX UROLOGICAL  8/18/2015    [facility], Dr. Heather Lopez, Cystoscopy, left retrograde, left double-J cath       Family History   Problem Relation Age of Onset    Diabetes Father     Heart Disease Mother     Diabetes Sister     Hypertension Sister     Arthritis-osteo Sister     Arthritis-osteo Brother     Diabetes Other     Diabetes Other         Uncle, Aunt    Hypertension Other         Uncle; Aunt       Social History     Socioeconomic History    Marital status:      Spouse name: Not on file    Number of children: Not on file    Years of education: Not on file    Highest education level: Not on file   Occupational History    Occupation: disabled   Tobacco Use    Smoking status: Current Every Day Smoker     Packs/day: 0.25     Types: Cigarettes    Smokeless tobacco: Current User   Substance and Sexual Activity    Alcohol use: Not Currently     Alcohol/week: 0.0 standard drinks     Comment: 1 beers a day.  Drug use: Not Currently     Types: Cocaine     Comment: +UDS for cocaine in 2017    Sexual activity: Yes       Prior to Admission medications    Medication Sig Start Date End Date Taking? Authorizing Provider   acetaminophen (TYLENOL) 325 mg tablet Take 2 Tabs by mouth every six (6) hours as needed for Pain. 6/26/20   Rafael Frazier MD   diclofenac (VOLTAREN) 1 % gel Apply 2 g to affected area four (4) times daily.  6/26/20   Rafael Frazier MD   cyclobenzaprine (FLEXERIL) 5 mg tablet Take 1 Tab by mouth three (3) times daily as needed for Muscle Spasm(s). 1/2/20   Anastasia Pereira PA   COLCRYS 0.6 mg tablet TAKE 1 TABLET BY MOUTH EVERY 1 HOURS FOR GOUT PAIN. DO NOT EXCEED 3 TABLETS IN 24 HOURS. 12/6/19   Caryn Tran PA-C   indomethacin (INDOCIN) 25 mg capsule TAKE 2 CAPSULES BY MOUTH THREE TIMES DAILY WITH FOOD 11/19/19   Ridge Tran PA-C   ondansetron hcl (ZOFRAN) 4 mg tablet Take 1 Tab by mouth every eight (8) hours as needed for Nausea. 7/26/19   Ameena DEAN DO   colchicine 0.6 mg tablet Take 1 Tab by mouth daily. 2/15/19   Caryn Tran PA-C   methylPREDNISolone (MEDROL, CASS,) 4 mg tablet Per dose pack instructions 11/17/18   AUBREY Gonzales   ibuprofen (MOTRIN) 600 mg tablet Take 1 Tab by mouth every six (6) hours as needed for Pain. 10/19/18   AUBREY Siddiqi   lipase-protease-amylase (CREON 6000) 6,000-19,000 -30,000 unit capsule Take 1 Cap by mouth three (3) times daily (with meals). Indications: exocrine pancreatic insufficiency 9/8/18   Buddy Burch MD   omeprazole (PRILOSEC) 20 mg capsule Take 20 mg by mouth daily. Provider, Tal   febuxostat (ULORIC) 40 mg tab tablet Take 1 Tab by mouth daily. 5/12/18   Yana BAUMANN PA-C   hydrALAZINE (APRESOLINE) 50 mg tablet Take 1 Tab by mouth two (2) times a day. 2/13/18   Shon Bajwa MD   tamsulosin (FLOMAX) 0.4 mg capsule Take 1 Cap by mouth daily. Start this medication on Friday 12/15/17  Indications: Urolithiasis 12/15/17   Felipa Balderas DO   glucose blood VI test strips (FREESTYLE TEST) strip by Does Not Apply route See Admin Instructions. Check twice a day 8/24/17   Jennifer Alba MD   bisacodyl (DULCOLAX, BISACODYL,) 5 mg EC tablet Take 1 Tab by mouth daily as needed for Constipation.  8/21/17   Jennifer Reeves MD   NOVOLIN 70/30 100 unit/mL (70-30) injection INJECT 30 UNITS SC IN THE MORNING AND 20 UNITS SC IN THE EVENING BEFORE SUPPER 8/21/17   Jennifer Reeves MD   isosorbide mononitrate ER (IMDUR) 60 mg CR tablet Take 1 Tab by mouth daily. 8/14/17   Tammy Castillo NP   atorvastatin (LIPITOR) 40 mg tablet Take 1 Tab by mouth nightly. 8/14/17   Tammy Castillo NP   furosemide (LASIX) 40 mg tablet Take 1 Tab by mouth daily. 8/14/17   Tammy Castillo NP   fluticasone (FLONASE) 50 mcg/actuation nasal spray 2 Sprays by Both Nostrils route daily. Provider, Historical   carvedilol (COREG) 6.25 mg tablet Take 1 Tab by mouth two (2) times daily (with meals). Patient taking differently: Take 25 mg by mouth two (2) times daily (with meals). 7/31/17   Tammy Castillo NP   nitroglycerin (NITROSTAT) 0.4 mg SL tablet 1 Tab by SubLINGual route as needed for Chest Pain. Patient taking differently: 1 Tab by SubLINGual route as needed for Chest Pain. Pt to take 1 tab q 5 min up to three times, if symptom persist pt. to call 911. 7/17/17   Elena Marley MD   aspirin 81 mg chewable tablet Take 1 Tab by mouth daily. 3/21/17   Lance Cason MD   B COMPLEX W-C NO.20/FOLIC ACID (B COMPLEX & C NO.20-FOLIC ACID PO) Take  by mouth. Provider, Historical   budesonide-formoterol (SYMBICORT) 160-4.5 mcg/actuation HFA inhaler Take 2 Puffs by inhalation two (2) times a day. 2/22/17   Lance Cason MD   calcitRIOL (ROCALTROL) 0.25 mcg capsule Take 1 Cap by mouth every Monday, Wednesday, Friday. 2/10/17   Ilda Jeong MD   Lancets misc Check 3 times a day , needs according contour glucometer 1/20/15   Lance Cason MD   Blood-Glucose Meter monitoring kit Check twice daily.  9/23/14   Lance Cason MD       Allergies   Allergen Reactions    Shellfish Derived Other (comments)     Causes Gout    Tramadol Itching       Physical Exam:      Visit Vitals  BP (!) 168/92 (BP 1 Location: Right arm, BP Patient Position: At rest)   Pulse 69   Temp 98.6 °F (37 °C)   Resp 18   SpO2 97%       GENERAL: alert, cooperative, no distress, appears stated age, moderately obese, EYE: negative findings: anicteric sclera and EOMI, THROAT & NECK: normal and no erythema or exudates noted. , LUNG: clear to auscultation bilaterally, HEART: regular rate and rhythm, S1, S2 normal, no murmur, click, rub or gallop, ABDOMEN: soft, non-tender. Bowel sounds normal. No masses,  no organomegaly, EXTREMITIES:  extremities normal, atraumatic, no cyanosis or edema, NEUROLOGIC: negative findings: alert, oriented x3  speech normal in context and clarity  memory intact grossly  cranial nerves II-XII intact  no involuntary movements - tremors    ROS:  Constitutional: negative  Eyes: negative  Ears, nose, mouth, throat, and face: negative  Respiratory: negative  Cardiovascular: negative  Gastrointestinal: negative  Genitourinary:negative  Hematologic/lymphatic: negative  Musculoskeletal:negative  Neurological: negative  Behavioral/Psych: negative  Endocrine: negative  Allergic/Immunologic: negative  Unless otherwise mentioned in the HPI. Data Review:    CBC:   Lab Results   Component Value Date/Time    WBC 6.6 07/03/2020 08:25 AM    RBC 3.75 (L) 07/03/2020 08:25 AM    HGB 11.0 (L) 07/03/2020 08:25 AM    HCT 34.6 (L) 07/03/2020 08:25 AM    PLATELET 708 96/18/6137 08:25 AM      BMP:   Lab Results   Component Value Date/Time    Glucose 147 (H) 07/03/2020 08:25 AM    Sodium 139 07/03/2020 08:25 AM    Potassium 5.9 (H) 07/03/2020 08:25 AM    Chloride 106 07/03/2020 08:25 AM    CO2 26 07/03/2020 08:25 AM    BUN 53 (H) 07/03/2020 08:25 AM    Creatinine 11.80 (H) 07/03/2020 08:25 AM    Calcium 9.2 07/03/2020 08:25 AM     Coagulation:   Lab Results   Component Value Date/Time    Prothrombin time 11.8 07/03/2020 08:25 AM    INR 0.9 07/03/2020 08:25 AM    aPTT 25.9 08/20/2017 03:00 PM         Assessment/Plan     79-year-old male with hyperkalemia and end-stage renal disease need of dialysis access    --Patient has occluded left radiocephalic fistula will require declot.   This can be done Monday  --We will place temporary dialysis catheter as it is a holiday weekend  --Please call with any further questions or concerns    Active Problems:    Hyperkalemia (7/3/2020)      ESRD (end stage renal disease) ([facility] [address] (7/3/2020)        Tina Gandhi MD  July 3, 2020

## 2020-07-03 NOTE — PROGRESS NOTES
Progress Note    61y M with ESRD, HTN, DM, missed several dialysis, seen for esrd managment  Subjective    Examined during HD        IMPRESSION:   ESRD, MWF, missed several treatment,   Access; left arm fistula , clotted, temp HD catheter placed in ER on 7/3  HTN  Anemia   Hyperkalemia    PLAN:   Arrange urgent HD with 2K bath UF goal 2-3L as tolerate. Plan for HD tomorrow. Follow up with vascular for declotting. Adjust meds per ESRD status. Discussed with Dr. Aracely Petty , Dr. Gladys Gunter    HD rounding    Blood pressure 157/81, pulse 79, temperature 97.9 °F (36.6 °C), temperature source Oral, resp. rate 18, SpO2 97 %.   Temp (24hrs), Av.1 °F (36.7 °C), Min:97.9 °F (36.6 °C), Max:98.6 °F (37 °C)      Blood Pressure: BP: 157/81  Pulse: Pulse (Heart Rate): 79  Temp:  Temp: 97.9 °F (36.6 °C)    Artificial Kidney Dialyzer/Set Up Inspection: Revaclear   hours Duration of Treatment (hours): 3 hours   Heparin Bolus    Blood flow rate Blood Flow Rate (ml/min): 350 ml/min   Dialysate rate     Arterial Access Pressure Arterial Access Pressure (mmHg): -180  Venous Return Pressure Venous Return Pressure (mmHg): 140  Ultrafiltration Rate Goal/Amount of Fluid to Remove (mL): 2500 mL  Fluid Removal Fluid Removed (mL): 3000  Net Fluid Removal NET Fluid Removed (mL): 2500 ml        Facility-Administered Medications: None       Current Facility-Administered Medications   Medication Dose Route Frequency    oxyCODONE-acetaminophen (PERCOCET) 5-325 mg per tablet 1 Tab  1 Tab Oral Q6H PRN    atorvastatin (LIPITOR) tablet 40 mg  40 mg Oral QHS    [START ON 3075] folic acid-vit D1-QNN Q02 (FOLTX) 2.5-25-2 mg tablet 1 Tab  1 Tab Oral DAILY    arformoterol 15 mcg/budesonide 0.25 mg neb solution   Nebulization BID    hydrALAZINE (APRESOLINE) tablet 50 mg  50 mg Oral BID    lipase-protease-amylase (CREON 6,000) capsule 1 Cap  1 Cap Oral TID WITH MEALS    [START ON 2020] pantoprazole (PROTONIX) tablet 40 mg  40 mg Oral ACB    carvediloL (COREG) tablet 6.25 mg  6.25 mg Oral BID WITH MEALS    insulin lispro (HUMALOG) injection   SubCUTAneous AC&HS    glucose chewable tablet 16 g  4 Tab Oral PRN    glucagon (GLUCAGEN) injection 1 mg  1 mg IntraMUSCular PRN    dextrose (D50W) injection syrg 12.5-25 g  25-50 mL IntraVENous PRN    heparin (porcine) injection 5,000 Units  5,000 Units SubCUTAneous Q8H     Current Outpatient Medications   Medication Sig    omeprazole (PRILOSEC) 20 mg capsule Take 20 mg by mouth daily.  hydrALAZINE (APRESOLINE) 50 mg tablet Take 1 Tab by mouth two (2) times a day.  glucose blood VI test strips (FREESTYLE TEST) strip by Does Not Apply route See Admin Instructions. Check twice a day    atorvastatin (LIPITOR) 40 mg tablet Take 1 Tab by mouth nightly.  furosemide (LASIX) 40 mg tablet Take 1 Tab by mouth daily.  fluticasone (FLONASE) 50 mcg/actuation nasal spray 2 Sprays by Both Nostrils route daily.  carvedilol (COREG) 6.25 mg tablet Take 1 Tab by mouth two (2) times daily (with meals). (Patient taking differently: Take 25 mg by mouth two (2) times daily (with meals). )    B COMPLEX W-C NO.20/FOLIC ACID (B COMPLEX & C NO.20-FOLIC ACID PO) Take  by mouth.  budesonide-formoterol (SYMBICORT) 160-4.5 mcg/actuation HFA inhaler Take 2 Puffs by inhalation two (2) times a day.  calcitRIOL (ROCALTROL) 0.25 mcg capsule Take 1 Cap by mouth every Monday, Wednesday, Friday.  Blood-Glucose Meter monitoring kit Check twice daily.  acetaminophen (TYLENOL) 325 mg tablet Take 2 Tabs by mouth every six (6) hours as needed for Pain.  diclofenac (VOLTAREN) 1 % gel Apply 2 g to affected area four (4) times daily.  COLCRYS 0.6 mg tablet TAKE 1 TABLET BY MOUTH EVERY 1 HOURS FOR GOUT PAIN. DO NOT EXCEED 3 TABLETS IN 24 HOURS.     indomethacin (INDOCIN) 25 mg capsule TAKE 2 CAPSULES BY MOUTH THREE TIMES DAILY WITH FOOD    ondansetron hcl (ZOFRAN) 4 mg tablet Take 1 Tab by mouth every eight (8) hours as needed for Nausea.  ibuprofen (MOTRIN) 600 mg tablet Take 1 Tab by mouth every six (6) hours as needed for Pain.  lipase-protease-amylase (CREON 6000) 6,000-19,000 -30,000 unit capsule Take 1 Cap by mouth three (3) times daily (with meals). Indications: exocrine pancreatic insufficiency    febuxostat (ULORIC) 40 mg tab tablet Take 1 Tab by mouth daily.  tamsulosin (FLOMAX) 0.4 mg capsule Take 1 Cap by mouth daily. Start this medication on Friday 12/15/17  Indications: Urolithiasis    bisacodyl (DULCOLAX, BISACODYL,) 5 mg EC tablet Take 1 Tab by mouth daily as needed for Constipation.  NOVOLIN 70/30 100 unit/mL (70-30) injection INJECT 30 UNITS SC IN THE MORNING AND 20 UNITS SC IN THE EVENING BEFORE SUPPER    isosorbide mononitrate ER (IMDUR) 60 mg CR tablet Take 1 Tab by mouth daily.  nitroglycerin (NITROSTAT) 0.4 mg SL tablet 1 Tab by SubLINGual route as needed for Chest Pain. (Patient taking differently: 1 Tab by SubLINGual route as needed for Chest Pain. Pt to take 1 tab q 5 min up to three times, if symptom persist pt. to call 911.)    aspirin 81 mg chewable tablet Take 1 Tab by mouth daily.  Lancets misc Check 3 times a day , needs according contour glucometer       Review of Systems:   As above   Data Review:    Labs: Results:       Chemistry Recent Labs     07/03/20  0825   *      K 5.9*      CO2 26   BUN 53*   CREA 11.80*   CA 9.2   AGAP 7   BUCR 4*   AP 86   TP 7.6   ALB 3.2*   GLOB 4.4*   AGRAT 0.7*      CBC w/Diff Recent Labs     07/03/20  0825   WBC 6.6   RBC 3.75*   HGB 11.0*   HCT 34.6*      GRANS 67   LYMPH 23   EOS 4      Coagulation Recent Labs     07/03/20  0825   PTP 11.8   INR 0.9       Iron/Ferritin No results for input(s): IRON in the last 72 hours. No lab exists for component: TIBCCALC   BNP No results for input(s): BNPP in the last 72 hours. Cardiac Enzymes No results for input(s): CPK, CKND1, SID in the last 72 hours.     No lab exists for component: CKRMB, TROIP   Liver Enzymes Recent Labs     07/03/20  0825   TP 7.6   ALB 3.2*   AP 86      Thyroid Studies Lab Results   Component Value Date/Time    TSH 0.55 02/09/2017 04:00 AM         EKG: sinus     Physical Assessment:     Visit Vitals  /81   Pulse 79   Temp 97.9 °F (36.6 °C) (Oral)   Resp 18   SpO2 97%     Weight change:     Intake/Output Summary (Last 24 hours) at 7/3/2020 1612  Last data filed at 7/3/2020 1545  Gross per 24 hour   Intake    Output 2500 ml   Net -2500 ml     Physical Exam:   General: comfortable, no acute distress   HEENT sclera anicteric, supple neck, no thyromegaly  CVS: S1S2 heard,  no rub  RS: + air entry b/l, poor inspiratory effort  Abd: Soft, Non tender, Not distended, Positive bowel sounds, no organomegaly, no CVA / supra pubic tenderness  Neuro: non focal, awake, alert , CN II-XII are grossly intact  Extrm + edema, no cyanosis, clubbing   Skin: no visible  Rash  Musculoskeletal: No gross joints or bone deformities     Procedures/imaging: see electronic medical records for all procedures, Xrays and details which were not copied into this note but were reviewed prior to creation of Ulises Castro MD  July 3, 2020  [facility]  Office [phone]

## 2020-07-03 NOTE — CONSULTS
Consult Note  Consult requested by: Dr. Jun Lee is a 61 y.o. male [address] who is being seen on consult for ESRD management. Chief Complaint   Patient presents with    Vascular Access Problem     Admission diagnosis:     HPI:  61y M with PMH HTN, DM, HTN ESRD, MWF, was send from HD unit for clotted AV access. His last dialysis was last Thursday, in ER his work up shows hyperkalemia, evaluated by vascular colleague, placed groin HD catheter. Because of long weekend cath lab will be open only for emergent cases. He is being admitted for dialysis and access management. During my evaluation, he admit heavy breathing and request for dialysis. Past Medical History:   Diagnosis Date    Alcohol abuse     Alcohol-induced chronic pancreatitis ([facility] [address] 9/7/2018    Arthritis     Atherosclerotic cardiovascular disease     CAD (coronary artery disease)     mild disease of coronaries (cor angio 2006),wife states he has 1 stent    Cardiac cath 01/26/2006    RCA mild. Otherwise patent coronaries. LVEDP 15.  EF 55-60%.  Cardiac echocardiogram 03/27/2009    EF 60%. No cardiac source of embolism. No significant valvular pathology.  Cardiac nuclear imaging test 12/06/2011    Small, mild inferior infarction or possibly artifact. No ischemia. EF 45%. No TID. No reg WMA.  Cardiovascular LLE venous duplex 08/14/2015    Left leg:  No DVT. Dilated lymph node in left groin.  Chronic kidney disease     Chronic obstructive pulmonary disease (HCC)     Constipation     Diabetes mellitus type II     Gout     Hepatic steatosis     Hypercholesterolemia     Hypertension     Knee effusion, left     Left knee pain     Morbid obesity (HCC)     Noncompliance     Obesity (BMI 30-39. 9)     S/P colonoscopy 3/8/05    Dr. Jairo Xiong; normal; f/u 10 years    Stomach problems     TIA (transient ischemic attack) 3/09    Tobacco abuse       Past Surgical History:   Procedure Laterality Date    ABDOMEN SURGERY PROC UNLISTED      Hernia repair in 5584'P or 18's.  HX ORTHOPAEDIC      Bones spur removed from left & right foot 2013.  HX UROLOGICAL  8/18/2015    [facility], Dr. Brenden Hurst, Cystoscopy, left retrograde, left double-J cath       Social History     Socioeconomic History    Marital status:      Spouse name: Not on file    Number of children: Not on file    Years of education: Not on file    Highest education level: Not on file   Occupational History    Occupation: disabled   Social Needs    Financial resource strain: Not on file    Food insecurity     Worry: Not on file     Inability: Not on file   [employer] needs     Medical: Not on file     Non-medical: Not on file   Tobacco Use    Smoking status: Current Every Day Smoker     Packs/day: 0.25     Types: Cigarettes    Smokeless tobacco: Current User   Substance and Sexual Activity    Alcohol use: Not Currently     Alcohol/week: 0.0 standard drinks     Comment: 1 beers a day.      Drug use: Not Currently     Types: Cocaine     Comment: +UDS for cocaine in 2017    Sexual activity: Yes   Lifestyle    Physical activity     Days per week: Not on file     Minutes per session: Not on file    Stress: Not on file   Relationships    Social connections     Talks on phone: Not on file     Gets together: Not on file     Attends Rastafari service: Not on file     Active member of club or organization: Not on file     Attends meetings of clubs or organizations: Not on file     Relationship status: Not on file    Intimate partner violence     Fear of current or ex partner: Not on file     Emotionally abused: Not on file     Physically abused: Not on file     Forced sexual activity: Not on file   Other Topics Concern    Not on file   Social History Narrative    Not on file       Family History   Problem Relation Age of Onset    Diabetes Father     Heart Disease Mother     Diabetes Sister     Hypertension Sister    [facility] Arthritis-osteo Sister     Arthritis-osteo Brother     Diabetes Other     Diabetes Other         Uncle, Aunt    Hypertension Other         Uncle; Aunt     Allergies   Allergen Reactions    Shellfish Derived Other (comments)     Causes Gout    Tramadol Itching        Home Medications:     Prior to Admission Medications   Prescriptions Last Dose Informant Patient Reported? Taking? B COMPLEX W-C NO.20/FOLIC ACID (B COMPLEX & C NO.20-FOLIC ACID PO) 5/7/4195 at Unknown time  Yes Yes   Sig: Take  by mouth. Blood-Glucose Meter monitoring kit 7/3/2020 at Unknown time  No Yes   Sig: Check twice daily. COLCRYS 0.6 mg tablet Not Taking at Unknown time  No No   Sig: TAKE 1 TABLET BY MOUTH EVERY 1 HOURS FOR GOUT PAIN. DO NOT EXCEED 3 TABLETS IN 24 HOURS. Lancets misc   No No   Sig: Check 3 times a day , needs according contour glucometer   NOVOLIN 70/30 100 unit/mL (70-30) injection   No No   Sig: INJECT 30 UNITS SC IN THE MORNING AND 20 UNITS SC IN THE EVENING BEFORE SUPPER   acetaminophen (TYLENOL) 325 mg tablet Not Taking at Unknown time  No No   Sig: Take 2 Tabs by mouth every six (6) hours as needed for Pain. aspirin 81 mg chewable tablet Not Taking at Unknown time  No No   Sig: Take 1 Tab by mouth daily. atorvastatin (LIPITOR) 40 mg tablet 7/3/2020 at Unknown time  No Yes   Sig: Take 1 Tab by mouth nightly. bisacodyl (DULCOLAX, BISACODYL,) 5 mg EC tablet Unknown at Unknown time  No No   Sig: Take 1 Tab by mouth daily as needed for Constipation. budesonide-formoterol (SYMBICORT) 160-4.5 mcg/actuation HFA inhaler 7/3/2020 at Unknown time  No Yes   Sig: Take 2 Puffs by inhalation two (2) times a day. calcitRIOL (ROCALTROL) 0.25 mcg capsule 7/3/2020 at Unknown time  No Yes   Sig: Take 1 Cap by mouth every Monday, Wednesday, Friday. carvedilol (COREG) 6.25 mg tablet 6/26/2020 at Unknown time  No Yes   Sig: Take 1 Tab by mouth two (2) times daily (with meals).    Patient taking differently: Take 25 mg by mouth two (2) times daily (with meals). diclofenac (VOLTAREN) 1 % gel Not Taking at Unknown time  No No   Sig: Apply 2 g to affected area four (4) times daily. febuxostat (ULORIC) 40 mg tab tablet Unknown at Unknown time  No No   Sig: Take 1 Tab by mouth daily. fluticasone (FLONASE) 50 mcg/actuation nasal spray 2020 at Unknown time  Yes Yes   Si Sprays by Both Nostrils route daily. furosemide (LASIX) 40 mg tablet 6/3/2020 at Unknown time  No Yes   Sig: Take 1 Tab by mouth daily. glucose blood VI test strips (FREESTYLE TEST) strip 7/3/2020 at Unknown time  No Yes   Sig: by Does Not Apply route See Admin Instructions. Check twice a day   hydrALAZINE (APRESOLINE) 50 mg tablet 6/3/2020 at Unknown time  No Yes   Sig: Take 1 Tab by mouth two (2) times a day. ibuprofen (MOTRIN) 600 mg tablet Unknown at Unknown time  No No   Sig: Take 1 Tab by mouth every six (6) hours as needed for Pain. indomethacin (INDOCIN) 25 mg capsule Unknown at Unknown time  No No   Sig: TAKE 2 CAPSULES BY MOUTH THREE TIMES DAILY WITH FOOD   isosorbide mononitrate ER (IMDUR) 60 mg CR tablet Unknown at Unknown time  No No   Sig: Take 1 Tab by mouth daily. lipase-protease-amylase (CREON 6000) 6,000-19,000 -30,000 unit capsule Not Taking at Unknown time  No No   Sig: Take 1 Cap by mouth three (3) times daily (with meals). Indications: exocrine pancreatic insufficiency   nitroglycerin (NITROSTAT) 0.4 mg SL tablet Not Taking at Unknown time  No No   Si Tab by SubLINGual route as needed for Chest Pain. Patient taking differently: 1 Tab by SubLINGual route as needed for Chest Pain. Pt to take 1 tab q 5 min up to three times, if symptom persist pt. to call 911. omeprazole (PRILOSEC) 20 mg capsule 6/3/2020 at Unknown time  Yes Yes   Sig: Take 20 mg by mouth daily. ondansetron hcl (ZOFRAN) 4 mg tablet   No No   Sig: Take 1 Tab by mouth every eight (8) hours as needed for Nausea.    tamsulosin (FLOMAX) 0.4 mg capsule Unknown at Unknown time  No No   Sig: Take 1 Cap by mouth daily. Start this medication on Friday 12/15/17  Indications: Urolithiasis      Facility-Administered Medications: None       Current Facility-Administered Medications   Medication Dose Route Frequency    oxyCODONE-acetaminophen (PERCOCET) 5-325 mg per tablet 1 Tab  1 Tab Oral Q6H PRN    atorvastatin (LIPITOR) tablet 40 mg  40 mg Oral QHS    [START ON 4/3/4839] folic acid-vit Q0-XFA V64 (FOLTX) 2.5-25-2 mg tablet 1 Tab  1 Tab Oral DAILY    arformoterol 15 mcg/budesonide 0.25 mg neb solution   Nebulization BID    hydrALAZINE (APRESOLINE) tablet 50 mg  50 mg Oral BID    lipase-protease-amylase (CREON 6,000) capsule 1 Cap  1 Cap Oral TID WITH MEALS    [START ON 7/4/2020] pantoprazole (PROTONIX) tablet 40 mg  40 mg Oral ACB    carvediloL (COREG) tablet 6.25 mg  6.25 mg Oral BID WITH MEALS    insulin lispro (HUMALOG) injection   SubCUTAneous AC&HS    glucose chewable tablet 16 g  4 Tab Oral PRN    glucagon (GLUCAGEN) injection 1 mg  1 mg IntraMUSCular PRN    dextrose (D50W) injection syrg 12.5-25 g  25-50 mL IntraVENous PRN    heparin (porcine) injection 5,000 Units  5,000 Units SubCUTAneous Q8H     Current Outpatient Medications   Medication Sig    omeprazole (PRILOSEC) 20 mg capsule Take 20 mg by mouth daily.  hydrALAZINE (APRESOLINE) 50 mg tablet Take 1 Tab by mouth two (2) times a day.  glucose blood VI test strips (FREESTYLE TEST) strip by Does Not Apply route See Admin Instructions. Check twice a day    atorvastatin (LIPITOR) 40 mg tablet Take 1 Tab by mouth nightly.  furosemide (LASIX) 40 mg tablet Take 1 Tab by mouth daily.  fluticasone (FLONASE) 50 mcg/actuation nasal spray 2 Sprays by Both Nostrils route daily.  carvedilol (COREG) 6.25 mg tablet Take 1 Tab by mouth two (2) times daily (with meals). (Patient taking differently: Take 25 mg by mouth two (2) times daily (with meals). )    B COMPLEX W-C NO.20/FOLIC ACID (B COMPLEX & C NO. 20-FOLIC ACID PO) Take  by mouth.  budesonide-formoterol (SYMBICORT) 160-4.5 mcg/actuation HFA inhaler Take 2 Puffs by inhalation two (2) times a day.  calcitRIOL (ROCALTROL) 0.25 mcg capsule Take 1 Cap by mouth every Monday, Wednesday, Friday.  Blood-Glucose Meter monitoring kit Check twice daily.  acetaminophen (TYLENOL) 325 mg tablet Take 2 Tabs by mouth every six (6) hours as needed for Pain.  diclofenac (VOLTAREN) 1 % gel Apply 2 g to affected area four (4) times daily.  COLCRYS 0.6 mg tablet TAKE 1 TABLET BY MOUTH EVERY 1 HOURS FOR GOUT PAIN. DO NOT EXCEED 3 TABLETS IN 24 HOURS.  indomethacin (INDOCIN) 25 mg capsule TAKE 2 CAPSULES BY MOUTH THREE TIMES DAILY WITH FOOD    ondansetron hcl (ZOFRAN) 4 mg tablet Take 1 Tab by mouth every eight (8) hours as needed for Nausea.  ibuprofen (MOTRIN) 600 mg tablet Take 1 Tab by mouth every six (6) hours as needed for Pain.  lipase-protease-amylase (CREON 6000) 6,000-19,000 -30,000 unit capsule Take 1 Cap by mouth three (3) times daily (with meals). Indications: exocrine pancreatic insufficiency    febuxostat (ULORIC) 40 mg tab tablet Take 1 Tab by mouth daily.  tamsulosin (FLOMAX) 0.4 mg capsule Take 1 Cap by mouth daily. Start this medication on Friday 12/15/17  Indications: Urolithiasis    bisacodyl (DULCOLAX, BISACODYL,) 5 mg EC tablet Take 1 Tab by mouth daily as needed for Constipation.  NOVOLIN 70/30 100 unit/mL (70-30) injection INJECT 30 UNITS SC IN THE MORNING AND 20 UNITS SC IN THE EVENING BEFORE SUPPER    isosorbide mononitrate ER (IMDUR) 60 mg CR tablet Take 1 Tab by mouth daily.  nitroglycerin (NITROSTAT) 0.4 mg SL tablet 1 Tab by SubLINGual route as needed for Chest Pain. (Patient taking differently: 1 Tab by SubLINGual route as needed for Chest Pain. Pt to take 1 tab q 5 min up to three times, if symptom persist pt. to call 911.)    aspirin 81 mg chewable tablet Take 1 Tab by mouth daily.     Lancets misc Check 3 times a day , needs according contour glucometer       Review of Systems:    Complete 10-point review of systems were obtained and discussed in length  with the patient. Complete review of systems was negative/unremarkable  except as mentioned in HPI section. Data Review:    Labs: Results:       Chemistry Recent Labs     07/03/20 0825   *      K 5.9*      CO2 26   BUN 53*   CREA 11.80*   CA 9.2   AGAP 7   BUCR 4*   AP 86   TP 7.6   ALB 3.2*   GLOB 4.4*   AGRAT 0.7*      CBC w/Diff Recent Labs     07/03/20 0825   WBC 6.6   RBC 3.75*   HGB 11.0*   HCT 34.6*      GRANS 67   LYMPH 23   EOS 4      Coagulation Recent Labs     07/03/20 0825   PTP 11.8   INR 0.9       Iron/Ferritin No results for input(s): IRON in the last 72 hours. No lab exists for component: TIBCCALC   BNP No results for input(s): BNPP in the last 72 hours. Cardiac Enzymes No results for input(s): CPK, CKND1, SID in the last 72 hours.     No lab exists for component: CKRMB, TROIP   Liver Enzymes Recent Labs     07/03/20  0825   TP 7.6   ALB 3.2*   AP 86      Thyroid Studies Lab Results   Component Value Date/Time    TSH 0.55 02/09/2017 04:00 AM         EKG: sinus     Physical Assessment:     Visit Vitals  BP (!) 168/92 (BP 1 Location: Right arm, BP Patient Position: At rest)   Pulse 69   Temp 98.6 °F (37 °C)   Resp 18   SpO2 97%     Weight change:   No intake or output data in the 24 hours ending 07/03/20 1697  Physical Exam:   General: comfortable, no acute distress   HEENT sclera anicteric, supple neck, no thyromegaly  CVS: S1S2 heard,  no rub  RS: + air entry b/l, poor inspiratory effort  Abd: Soft, Non tender, Not distended, Positive bowel sounds, no organomegaly, no CVA / supra pubic tenderness  Neuro: non focal, awake, alert , CN II-XII are grossly intact  Extrm + edema, no cyanosis, clubbing   Skin: no visible  Rash  Musculoskeletal: No gross joints or bone deformities     Procedures/imaging: see electronic medical records for all procedures, Xrays and details which were not copied into this note but were reviewed prior to creation of Plan    61y M with ESRD, HTN, DM, missed several dialysis, seen for esrd managment  Impression & Plan:   IMPRESSION:   ESRD, MWF, missed several treatment,   Access; left arm fistula , clotted, temp HD catheter placed in ER on 7/3  HTN  Anemia   Hyperkalemia    PLAN:   Arrange urgent HD with 2K bath UF goal 2-3L as tolerate. Adjust meds per ESRD status.      Discussed with Dr. Radha Reyes , Dr. Rafy Ernst MD  July 3, 2020  [facility]  Office [phone]

## 2020-07-03 NOTE — OP NOTES
Preoperative diagnosis: End-stage renal disease need of dialysis access    Postoperative diagnosis: End-stage renal disease in need of dialysis access with hyperkalemia    Procedures performed:  #1  Ultrasound guided access of right common femoral vein  #2  Placement of temporary dialysis catheter 24 cm [place] catheter    Cultures: None    Specimens: None    Drains: None    Estimated blood loss: Less than 50 mL    Assistants: None    Implants: Please see above    Complications: None    Anesthesia: Lidocaine only    Indications for the procedure:  Ayden Mckeon is a 61 y.o. male with end-stage renal disease with hyperkalemia in need of dialysis access. Patient was given the appropriate risk and benefits of the procedure including but not limited to bleeding, infection, damage to adjacent structures, MI, stroke, death, loss of lower extremity, need for further surgery. Patient was understanding of all the risks and underwent a procedure. Operative findings:   #1  Widely patent right common femoral vein    Procedure:  Patient was correctly identified in the emergency room in stable condition. Patient had pre-incision timeout prior to any incision. Patient was prepped and draped in the normal sterile fashion according to CDC guidelines aseptic technique. Ultrasound was used to visualize the right common femoral vein. Based on ultrasound imaging there is no evidence of DVT within the right common femoral vein. We were able to numb up the area appropriately using 1% lidocaine. Singlewall entry needle was used to gain access into the vein once the needle tip was identified within the vein and it was positive blood return a wire was placed. Small skin incision was created using 11 blade. The area was then dilated x2 and catheter was then placed. We then were able to secure the catheter using 2-0 Ethilon sutures at both butterfly holes with Biopatch and Tegaderm being placed.   We then were able to easily flush and aspirate all three ports of the catheter. Patient tolerated procedure well without any issues.

## 2020-07-03 NOTE — DIALYSIS
ZAY        ACUTE HEMODIALYSIS FLOW SHEET      HEMODIALYSIS ORDERS: Physician: Corbin Mirza     Dialyzer: revaclear   Duration: 3 hr  BFR: 350   DFR: 600   Dialysate:  Temp 36-37*C  K+   2   Ca+  2.5 Na 138 Bicarb 35   Weight:    Wt Readings from Last 1 Encounters:   06/25/20 102.1 kg (225 lb)      UF Goal: 2500 ml Access temp fem cath    Heparin []  Bolus      Units    [] Hourly       Units    [x]None    Catheter locking solution Heparin   Pre BP:   164/81    Pulse:     65       Respirations: 17  Temperature:   97.9   Labs: Pre        Post:        [x] N/A   Additional Orders(medications, blood products, hypotension management):       [x] N/A     [x] Zay Consent Verified     CATHETER ACCESS: []N/A   [x]Right   []Left   []IJ     [x]Fem   []Transhepatic   [] First use X-ray verified     []Permanent                [x] Temporary   [x]No S/S infection  []Redness  []Drainage []Cultured []Swelling []Pain   [x]Medical Aseptic Prep Utilized   []Dressing Changed  [x] Biopatch  Date: 7/3/2020       []Clotted   [x]Patent   Flows: [x]Good  []Poor  []Reversed   If access problem,  notified: []Yes    [x]N/A  Date:                       GENERAL ASSESSMENT:      LUNGS:  Rate 17 SaO2%        [] N/A    [x] Clear  [] Coarse  [] Crackles  [] Wheezing        [] Diminished     Location : []RLL   []LLL    []RUL  []JUAN LUIS     Cough: []Productive  []Dry  [x]N/A   Respirations:  [x]Easy  []Labored     Therapy:   [x]RA  []NC  l/min    Mask: []NRB []Venti       O2%                  []Ventilator  []Intubated  [] Trach  [] BiPaP     CARDIAC: [x]Regular      [] Irregular   [] Pericardial Rub  [] JVD        []  Monitored  [] Bedside  [] Remotely monitored [] N/A  Rhythm:      EDEMA: [] None  [x]Generalized  [] Pitting [] 1    [] 2    [] 3    [] 4                 [] Facial  [] Pedal  []  UE  [] LE     SKIN:   [x] Warm  [] Hot     [] Cold   [x] Dry     [] Pale   [] Diaphoretic                  [] Flushed  [] Jaundiced  [] Cyanotic  [] Rash  [] Weeping     LOC:    [x] Alert      []Oriented:    [x] Person     [x] Place  [x]Time               [] Confused  [] Lethargic  [] Medicated  [] Non-responsive     GI / ABDOMEN:  [] Flat    [] Distended    [] Soft    [x] Firm   [x]  Obese                             [] Diarrhea  [x] Bowel Sounds  [] Nausea  [] Vomiting       / URINE ASSESSMENT:[] Voiding   [x] Oliguria  [] Anuria   []  Montaño     [] Incontinent    []  Incontinent Brief      [x]  Bathroom Privileges       PAIN: [x] 0 []1  []2   []3   []4   []5   []6   []7   []8   []9   []10              Scale 0-10  Action/Follow Up:      MOBILITY:  [] Amb    [] Amb/Assist    [] Bed    [] Wheelchair  [x] Stretcher      All Vitals and Treatment Details on Attached [address]: [facility]          Room # NJ80/54      [] 1st Time Acute  [x] Stat  [] Routine  [] Urgent     [x] Acute Room  []  Bedside  [] ICU/CCU  [] ER   Isolation Precautions:   There are currently no Active Isolations      Special Considerations:         [] Blood Consent Verified [x]N/A     ALLERGIES:   Allergies   Allergen Reactions    Shellfish Derived Other (comments)     Causes Gout    Tramadol Itching               Code Status:Full Code        Hepatitis Status:                   Lab Results   Component Value Date/Time    Hepatitis B surface Ag <0.10 04/27/2020 12:26 PM    Hepatitis B surface Ab 23.52 04/27/2020 12:26 PM    Hep B Core Ab, total Positive (A) 11/06/2019 10:21 AM    HEP C VIRUS AB <0.1 09/23/2015 11:29 AM                     Current Labs:   Lab Results   Component Value Date/Time    Sodium 139 07/03/2020 08:25 AM    Potassium 5.9 (H) 07/03/2020 08:25 AM    Chloride 106 07/03/2020 08:25 AM    CO2 26 07/03/2020 08:25 AM    Anion gap 7 07/03/2020 08:25 AM    Glucose 147 (H) 07/03/2020 08:25 AM    BUN 53 (H) 07/03/2020 08:25 AM    Creatinine 11.80 (H) 07/03/2020 08:25 AM    BUN/Creatinine ratio 4 (L) 07/03/2020 08:25 AM    GFR est AA 5 (L) 07/03/2020 08:25 AM    GFR est non-AA 4 (L) 07/03/2020 08:25 AM    Calcium 9.2 07/03/2020 08:25 AM      Lab Results   Component Value Date/Time    WBC 6.6 07/03/2020 08:25 AM    Hemoglobin, POC 10.5 (L) 07/12/2019 08:05 AM    HGB 11.0 (L) 07/03/2020 08:25 AM    Hematocrit, POC 31 (L) 07/12/2019 08:05 AM    HCT 34.6 (L) 07/03/2020 08:25 AM    PLATELET 214 37/92/4452 08:25 AM    MCV 92.3 07/03/2020 08:25 AM                                                                                     DIET: DIET RENAL       PRIMARY NURSE REPORT: First initial/Last name/Title      Pre Dialysis: Refugio Hernandez RN    Time: 1150      EDUCATION:    [x] Patient [] Other         Knowledge Basis: []None []Minimal [x] Substantial   Barriers to learning  [x]N/A   [x] Access Care     [x] S&S of infection     [] Fluid Management     []K+     [x]Procedural    []Albumin     [] Medications     [] Tx Options     [] Transplant     [] Diet     [x] Other   Teaching Tools:  [x] Explain  [] Demo  [] Handouts [] Video  Patient response:  [x] Verbalized understanding  [] Teach back  [] Return demonstration [x] Requires follow up   Inappropriate due to            [x] Time Out/Safety Check  [x]Extracorporeal Circuit Tested for integrity       RO/HEMODIALYSIS MACHINE SAFETY CHECKS  Before each treatment:     Machine Number:                   [facility]                                  [x] Unit Machine # 9 with centralized RO  Alarm Test:  Pass time 1200               [x] RO/Machine Log Complete      Temp    36             Dialysate: pH  7.4 Conductivity: Meter   13.8     HD Machine   13.8                  TCD: 13.7  Dialyzer Lot # [id number]            Blood Tubing Lot # [id number]          Saline Lot #  [id number]     CHLORINE TESTING-Before each treatment and every 4 hours    Total Chlorine: [x] less than 0.1 ppm  Time: 0900 4 Hr/2nd Check Time: 1300   (if greater than 0.1 ppm from Primary then every 30 minutes from Secondary)     TREATMENT INITIATION  with Dialysis Precautions:   [x] All Connections Secured                 [x] Saline Line Double Clamped   [x] Venous Parameters Set                  [x] Arterial Parameters Set    [x] Prime Given 250ml                          [x]Air Foam Detector Engaged      Treatment Initiation Note:  pt arrived via transport in bed, pt is A&O, no S/S of distress, VSS. tx started with out complications. CVL no S/S of complications. Post Assessment:   Dialyzer Cleared: [] Good [x] Fair  [] Poor  Blood processed:  50.3 L  UF Removed  2500 Ml  POst BP:   157/81       Pulse: 79        Respirations: 18  Temperature: 97.9 Lungs:     [x] Clear      [] Course         [] Crackles    [] Wheezing         [] Diminished    Cardiac:   [x] Regular   [] Irregular   [] Monitor  [] N/A      Rhythm:       Catheter:   Locking solution: Heparin 1:1000   Art. 1.6  Moi. 1.6      Skin:   Pain:    [x] Warm  [x] Dry [] Diaphoretic    [] Flushed    [] Pale [] Cyanotic [x]0  []1  []2   []3  []4   []5   []6   []7   []8   []9   []10     Post Treatment Note:    Pt leaving via transport, pt tolerated the tx, pt states they are good, no S/S of distress.       POST TREATMENT PRIMARY NURSE HANDOFF REPORT:     First initial/Last name/Title         Post Dialysis: Roro Valdivia RN  Time:  8132     Abbreviations: AVG-arterial venous graft, AVF-arterial venous fistula, IJ-Internal Jugular, Subcl-Subclavian, Fem-Femoral, Tx-treatment, AP/HR-apical heart rate, DFR-dialysate flow rate, BFR-blood flow rate, AP-arterial pressure, -venous pressure, UF-ultrafiltrate, TMP-transmembrane pressure, Moi-Venous, Art-Arterial, RO-Reverse Osmosis

## 2020-07-03 NOTE — PROGRESS NOTES
Reason for Admission:  Hyperkalemia [E87.5]  ESRD (end stage renal disease) ([facility] [address] [N18.6]  Hyperkalemia [E87.5]  ESRD (end stage renal disease) ([facility] [address] [N18.6]                 RRAT Score:    N/A            Plan for utilizing home health:    no                      Likelihood of Readmission:   LOW                         Transition of Care Plan:              Initial assessment completed with patient. Cognitive status of patient: oriented to time, place, person and situation. Face sheet information confirmed:  yes. The patient designates Ally Dias [phone] to participate in his discharge plan and to receive any needed information. This patient lives in a single family home with patient and spouse. Patient is able to navigate steps as needed. Prior to hospitalization, patient was considered to be independent with ADLs/IADLS : yes . Patient has a current ACP document on file: no  The patient and spouse will be available to transport patient home upon discharge. The patient already has [employer],  medical equipment available in the home. Patient is not currently active with home health. Patient has not stayed in a skilled nursing facility or rehab. This patient is on dialysis :no      Freedom of choice signed: no,. Currently, the discharge plan is Home. The patient states that he can obtain his medications from the pharmacy, and take his medications as directed. Patient's current insurance is Medicare and Medicaid       Care Management Interventions  PCP Verified by CM:  Yes  Mode of Transport at Discharge: Self  Current Support Network: Lives with Spouse  Confirm Follow Up Transport: Family  The Plan for Transition of Care is Related to the Following Treatment Goals : Home  Discharge Location  Discharge Placement: Home        Darya Kwok RN BSN  Care Manager  [phone]

## 2020-07-03 NOTE — ED TRIAGE NOTES
Pt arrived POV from home for Dialysis fistula problems in his left arm. Pt states his fistula needs to be unclogged per dialysis. Pt was directed to come for evaluation yesterday. Pt states he has not had dialysis since last Monday 6/21/20.  Pt also has pain throughout his left forearm and elbow

## 2020-07-04 PROBLEM — T82.9XXA COMPLICATION OF VASCULAR DIALYSIS CATHETER: Status: ACTIVE | Noted: 2020-07-04

## 2020-07-04 LAB
ANION GAP SERPL CALC-SCNC: 8 MMOL/L (ref 3–18)
BUN SERPL-MCNC: 34 MG/DL (ref 7–18)
BUN/CREAT SERPL: 4 (ref 12–20)
CALCIUM SERPL-MCNC: 8 MG/DL (ref 8.5–10.1)
CHLORIDE SERPL-SCNC: 102 MMOL/L (ref 100–111)
CO2 SERPL-SCNC: 25 MMOL/L (ref 21–32)
CREAT SERPL-MCNC: 8.06 MG/DL (ref 0.6–1.3)
ERYTHROCYTE [DISTWIDTH] IN BLOOD BY AUTOMATED COUNT: 16.4 % (ref 11.6–14.5)
GLUCOSE BLD STRIP.AUTO-MCNC: 122 MG/DL (ref 70–110)
GLUCOSE BLD STRIP.AUTO-MCNC: 162 MG/DL (ref 70–110)
GLUCOSE BLD STRIP.AUTO-MCNC: 95 MG/DL (ref 70–110)
GLUCOSE SERPL-MCNC: 94 MG/DL (ref 74–99)
HCT VFR BLD AUTO: 32.7 % (ref 36–48)
HGB BLD-MCNC: 10.2 G/DL (ref 13–16)
MCH RBC QN AUTO: 29.1 PG (ref 24–34)
MCHC RBC AUTO-ENTMCNC: 31.2 G/DL (ref 31–37)
MCV RBC AUTO: 93.4 FL (ref 74–97)
PLATELET # BLD AUTO: 306 K/UL (ref 135–420)
PMV BLD AUTO: 9.7 FL (ref 9.2–11.8)
POTASSIUM SERPL-SCNC: 4.8 MMOL/L (ref 3.5–5.5)
RBC # BLD AUTO: 3.5 M/UL (ref 4.7–5.5)
SODIUM SERPL-SCNC: 135 MMOL/L (ref 136–145)
WBC # BLD AUTO: 6.4 K/UL (ref 4.6–13.2)

## 2020-07-04 PROCEDURE — 74011250636 HC RX REV CODE- 250/636: Performed by: NURSE PRACTITIONER

## 2020-07-04 PROCEDURE — 85027 COMPLETE CBC AUTOMATED: CPT

## 2020-07-04 PROCEDURE — 65270000029 HC RM PRIVATE

## 2020-07-04 PROCEDURE — 90935 HEMODIALYSIS ONE EVALUATION: CPT

## 2020-07-04 PROCEDURE — 74011250637 HC RX REV CODE- 250/637: Performed by: INTERNAL MEDICINE

## 2020-07-04 PROCEDURE — 74011000250 HC RX REV CODE- 250: Performed by: NURSE PRACTITIONER

## 2020-07-04 PROCEDURE — 94640 AIRWAY INHALATION TREATMENT: CPT

## 2020-07-04 PROCEDURE — 74011636637 HC RX REV CODE- 636/637: Performed by: NURSE PRACTITIONER

## 2020-07-04 PROCEDURE — 74011250637 HC RX REV CODE- 250/637: Performed by: NURSE PRACTITIONER

## 2020-07-04 PROCEDURE — 36415 COLL VENOUS BLD VENIPUNCTURE: CPT

## 2020-07-04 PROCEDURE — 80048 BASIC METABOLIC PNL TOTAL CA: CPT

## 2020-07-04 PROCEDURE — 82962 GLUCOSE BLOOD TEST: CPT

## 2020-07-04 RX ORDER — ACETAMINOPHEN 325 MG/1
650 TABLET ORAL
Status: DISCONTINUED | OUTPATIENT
Start: 2020-07-04 | End: 2020-07-07 | Stop reason: HOSPADM

## 2020-07-04 RX ORDER — OXYCODONE AND ACETAMINOPHEN 5; 325 MG/1; MG/1
1 TABLET ORAL
Status: DISCONTINUED | OUTPATIENT
Start: 2020-07-04 | End: 2020-07-05

## 2020-07-04 RX ADMIN — Medication 1 TABLET: at 09:00

## 2020-07-04 RX ADMIN — CARVEDILOL 6.25 MG: 6.25 TABLET, FILM COATED ORAL at 17:53

## 2020-07-04 RX ADMIN — PANCRELIPASE 1 CAPSULE: 30000; 6000; 19000 CAPSULE, DELAYED RELEASE PELLETS ORAL at 08:23

## 2020-07-04 RX ADMIN — OXYCODONE AND ACETAMINOPHEN 1 TABLET: 5; 325 TABLET ORAL at 11:46

## 2020-07-04 RX ADMIN — OXYCODONE AND ACETAMINOPHEN 1 TABLET: 5; 325 TABLET ORAL at 17:53

## 2020-07-04 RX ADMIN — OXYCODONE HYDROCHLORIDE AND ACETAMINOPHEN 1 TABLET: 5; 325 TABLET ORAL at 22:31

## 2020-07-04 RX ADMIN — ACETAMINOPHEN 650 MG: 325 TABLET ORAL at 13:23

## 2020-07-04 RX ADMIN — INSULIN LISPRO 2 UNITS: 100 INJECTION, SOLUTION INTRAVENOUS; SUBCUTANEOUS at 11:43

## 2020-07-04 RX ADMIN — OXYCODONE AND ACETAMINOPHEN 1 TABLET: 5; 325 TABLET ORAL at 05:48

## 2020-07-04 RX ADMIN — HYDRALAZINE HYDROCHLORIDE 50 MG: 50 TABLET, FILM COATED ORAL at 17:52

## 2020-07-04 RX ADMIN — HEPARIN SODIUM 5000 UNITS: 5000 INJECTION INTRAVENOUS; SUBCUTANEOUS at 05:48

## 2020-07-04 RX ADMIN — ARFORMOTEROL TARTRATE: 15 SOLUTION RESPIRATORY (INHALATION) at 08:40

## 2020-07-04 RX ADMIN — ATORVASTATIN CALCIUM 40 MG: 40 TABLET, FILM COATED ORAL at 21:58

## 2020-07-04 RX ADMIN — PANCRELIPASE 1 CAPSULE: 30000; 6000; 19000 CAPSULE, DELAYED RELEASE PELLETS ORAL at 11:46

## 2020-07-04 RX ADMIN — HYDRALAZINE HYDROCHLORIDE 50 MG: 50 TABLET, FILM COATED ORAL at 08:23

## 2020-07-04 RX ADMIN — CARVEDILOL 6.25 MG: 6.25 TABLET, FILM COATED ORAL at 08:23

## 2020-07-04 RX ADMIN — PANCRELIPASE 1 CAPSULE: 30000; 6000; 19000 CAPSULE, DELAYED RELEASE PELLETS ORAL at 17:53

## 2020-07-04 RX ADMIN — PANTOPRAZOLE SODIUM 40 MG: 40 TABLET, DELAYED RELEASE ORAL at 08:23

## 2020-07-04 NOTE — PROGRESS NOTES
1930: Assumed care of pt. Pt resting in bed in NAD. Pt continues to complain of left arm pain. Pt states it has improved since pain medication administration. Pt requesting crackers and peanut butter.  Given per pt request

## 2020-07-04 NOTE — DIALYSIS
ZAY        ACUTE HEMODIALYSIS FLOW SHEET      HEMODIALYSIS ORDERS: Physician: kayden     Dialyzer: revaclear   Duration: 3 hr  BFR: 300-350   DFR: 600   Dialysate:  Temp 36-37*C  K+   2   Ca+  2.5 Na 138 Bicarb 35   Weight:    Wt Readings from Last 1 Encounters:   07/04/20 107 kg (235 lb 14.3 oz)        UF Goal: 2500 ml Access Fem cath    Heparin []  Bolus      Units    [] Hourly       Units    [x]None    Catheter locking solution Heparin   Pre BP:   155/87    Pulse:     65       Respirations: 18  Temperature:   97.7   Labs: Pre        Post:        [x] N/A   Additional Orders(medications, blood products, hypotension management):       [x] N/A     [x] Zay Consent Verified     CATHETER ACCESS: []N/A   [x]Right   []Left   []IJ     [x]Fem   []Transhepatic   [] First use X-ray verified     []Permanent                [x] Temporary   [x]No S/S infection  []Redness  []Drainage []Cultured []Swelling []Pain   [x]Medical Aseptic Prep Utilized   []Dressing Changed  [x] Biopatch  Date: 7/3/2020       []Clotted   [x]Patent   Flows: [x]Good  []Poor  []Reversed   If access problem,  notified: []Yes    []N/A  Date:                       GENERAL ASSESSMENT:      LUNGS:  Rate 18 SaO2%        [] N/A    [x] Clear  [] Coarse  [] Crackles  [] Wheezing        [] Diminished     Location : []RLL   []LLL    []RUL  []JUAN LUIS     Cough: []Productive  []Dry  [x]N/A   Respirations:  [x]Easy  []Labored     Therapy:   [x]RA  []NC  l/min    Mask: []NRB []Venti       O2%                  []Ventilator  []Intubated  [] Trach  [] BiPaP     CARDIAC: [x]Regular      [] Irregular   [] Pericardial Rub  [] JVD        []  Monitored  [] Bedside  [] Remotely monitored [] N/A  Rhythm:      EDEMA: [] None  [x]Generalized  [] Pitting [] 1    [] 2    [] 3    [] 4                 [] Facial  [] Pedal  []  UE  [] LE     SKIN:   [] Warm  [] Hot     [] Cold   [x] Dry     [] Pale   [] Diaphoretic                  [] Flushed  [] Jaundiced  [] Cyanotic  [] Rash  [] Weeping     LOC:    [x] Alert      [x]Oriented:    [x] Person     [x] Place  [x]Time               [] Confused  [] Lethargic  [] Medicated  [] Non-responsive     GI / ABDOMEN:  [] Flat    [] Distended    [x] Soft    [] Firm   [x]  Obese                             [] Diarrhea  [x] Bowel Sounds  [] Nausea  [] Vomiting       / URINE ASSESSMENT:[x] Voiding   [] Oliguria  [] Anuria   []  Montaño     [] Incontinent    []  Incontinent Brief      [x]  Bathroom Privileges       PAIN: [x] 0 []1  []2   []3   []4   []5   []6   []7   []8   []9   []10              Scale 0-10  Action/Follow Up:      MOBILITY:  [] Amb    [] Amb/Assist    [x] Bed    [] Wheelchair  [] Stretcher      All Vitals and Treatment Details on Attached [address]: [facility]          Room # 455/01      [] 1st Time Acute  [] Stat  [x] Routine  [] Urgent     [x] Acute Room  []  Bedside  [] ICU/CCU  [] ER   Isolation Precautions:   There are currently no Active Isolations      Special Considerations:         [] Blood Consent Verified [x]N/A     ALLERGIES:   Allergies   Allergen Reactions    Shellfish Derived Other (comments)     Causes Gout    Tramadol Itching               Code Status:Full Code        Hepatitis Status:                   Lab Results   Component Value Date/Time    Hepatitis B surface Ag <0.10 04/27/2020 12:26 PM    Hepatitis B surface Ab 23.52 04/27/2020 12:26 PM    Hep B Core Ab, total Positive (A) 11/06/2019 10:21 AM    HEP C VIRUS AB <0.1 09/23/2015 11:29 AM                     Current Labs:   Lab Results   Component Value Date/Time    Sodium 135 (L) 07/04/2020 12:34 AM    Potassium 4.8 07/04/2020 12:34 AM    Chloride 102 07/04/2020 12:34 AM    CO2 25 07/04/2020 12:34 AM    Anion gap 8 07/04/2020 12:34 AM    Glucose 94 07/04/2020 12:34 AM    BUN 34 (H) 07/04/2020 12:34 AM    Creatinine 8.06 (H) 07/04/2020 12:34 AM    BUN/Creatinine ratio 4 (L) 07/04/2020 12:34 AM    GFR est AA 8 (L) 07/04/2020 12:34 AM    GFR est non-AA 7 (L) 07/04/2020 12:34 AM    Calcium 8.0 (L) 07/04/2020 12:34 AM      Lab Results   Component Value Date/Time    WBC 6.4 07/04/2020 12:34 AM    Hemoglobin, POC 10.5 (L) 07/12/2019 08:05 AM    HGB 10.2 (L) 07/04/2020 12:34 AM    Hematocrit, POC 31 (L) 07/12/2019 08:05 AM    HCT 32.7 (L) 07/04/2020 12:34 AM    PLATELET 947 52/58/2278 12:34 AM    MCV 93.4 07/04/2020 12:34 AM                                                                                     DIET: DIET RENAL       PRIMARY NURSE REPORT: First initial/Last name/Title      Pre Dialysis: Ximena Bear RN    Time: 4068      EDUCATION:    [x] Patient [] Other         Knowledge Basis: []None []Minimal [x] Substantial   Barriers to learning  []N/A   [x] Access Care     [] S&S of infection     [] Fluid Management     []K+     [x]Procedural    []Albumin     [] Medications     [] Tx Options     [] Transplant     [] Diet     [x] Other   Teaching Tools:  [x] Explain  [] Demo  [] Handouts [] Video  Patient response:   [x] Verbalized understanding  [] Teach back  [] Return demonstration [x] Requires follow up   Inappropriate due to            [x] Time Out/Safety Check  [x]Extracorporeal Circuit Tested for integrity       RO/HEMODIALYSIS MACHINE SAFETY CHECKS  Before each treatment:     Machine Number:                   [facility]                                   [x] Unit Machine # 5 with centralized RO  Alarm Test:  Pass time 0973               [x] RO/Machine Log Complete      Temp    36             Dialysate: pH  7.4 Conductivity: Meter   14     HD Machine   13.9                  TCD: 13.7  Dialyzer Lot # [id number]            Blood Tubing Lot # [id number]          Saline Lot #  [id number]     CHLORINE TESTING-Before each treatment and every 4 hours    Total Chlorine: [x] less than 0.1 ppm  Time: 0900 4 Hr/2nd Check Time: 1300   (if greater than 0.1 ppm from Primary then every 30 minutes from Secondary)     TREATMENT INITIATION  with Dialysis Precautions:   [x] All Connections Secured                 [x] Saline Line Double Clamped   [x] Venous Parameters Set                  [x] Arterial Parameters Set    [x] Prime Given 250ml                          [x]Air Foam Detector Engaged      Treatment Initiation Note:  pt arrived via transport in bed, pt is A&O, no S/S of distress, VSS. tx started with out complications. Temp fem cath no S/S of complications. Pt came with lunch tray and eat lunch at beginning of treatment. During Treatment Notes:  1300:  pt watching TV, pt states he feels good, No S/S of distress, face and access in view. 1400:  pt watching TV, pt states he feels good, No S/S of distress, face and access in view. Post Assessment:   Dialyzer Cleared: [] Good [] Fair  [x] Poor  Blood processed:  52.3 L  UF Removed  2500 Ml  POst BP:   113/74       Pulse: 65        Respirations: 18  Temperature: 97.6 Lungs:     [x] Clear      [] Course         [] Crackles    [] Wheezing         [] Diminished    Cardiac:   [x] Regular   [] Irregular   [] Monitor  [] N/A      Rhythm:       Catheter:   Locking solution: Heparin 1:1000   Art. 1.7  Moi. 1.7      Skin:   Pain:    [x] Warm  [x] Dry [] Diaphoretic    [] Flushed    [] Pale [] Cyanotic [x]0  []1  []2   []3  []4   []5   []6   []7   []8   []9   []10     Post Treatment Note:    Pt leaving via transport, pt tolerated the tx, pt states they are good, no S/S of distress.       POST TREATMENT PRIMARY NURSE HANDOFF REPORT:     First initial/Last name/Title         Post Dialysis: Emily Jones RN  Time:  5717     Abbreviations: AVG-arterial venous graft, AVF-arterial venous fistula, IJ-Internal Jugular, Subcl-Subclavian, Fem-Femoral, Tx-treatment, AP/HR-apical heart rate, DFR-dialysate flow rate, BFR-blood flow rate, AP-arterial pressure, -venous pressure, UF-ultrafiltrate, TMP-transmembrane pressure, Moi-Venous, Art-Arterial, RO-Reverse Osmosis

## 2020-07-04 NOTE — PROGRESS NOTES
Problem: Falls - Risk of  Goal: *Absence of Falls  Description: Document Thomas Kamaljit Fall Risk and appropriate interventions in the flowsheet. Outcome: Progressing Towards Goal  Note: Fall Risk Interventions:            Medication Interventions: Teach patient to arise slowly, Evaluate medications/consider consulting pharmacy                   Problem: Patient Education: Go to Patient Education Activity  Goal: Patient/Family Education  Outcome: Progressing Towards Goal     Problem: Infection - Risk of, Central Venous Catheter-Associated Bloodstream Infection  Goal: *Absence of infection signs and symptoms  Outcome: Progressing Towards Goal     Problem: Patient Education: Go to Patient Education Activity  Goal: Patient/Family Education  Outcome: Progressing Towards Goal     Problem: Pain  Goal: *Control of Pain  Outcome: Progressing Towards Goal     Problem: Patient Education: Go to Patient Education Activity  Goal: Patient/Family Education  Outcome: Progressing Towards Goal     Problem: Diabetes Self-Management  Goal: *Disease process and treatment process  Description: Define diabetes and identify own type of diabetes; list 3 options for treating diabetes. Outcome: Progressing Towards Goal  Goal: *Incorporating nutritional management into lifestyle  Description: Describe effect of type, amount and timing of food on blood glucose; list 3 methods for planning meals. Outcome: Progressing Towards Goal  Goal: *Incorporating physical activity into lifestyle  Description: State effect of exercise on blood glucose levels. Outcome: Progressing Towards Goal  Goal: *Developing strategies to promote health/change behavior  Description: Define the ABC's of diabetes; identify appropriate screenings, schedule and personal plan for screenings.   Outcome: Progressing Towards Goal  Goal: *Using medications safely  Description: State effect of diabetes medications on diabetes; name diabetes medication taking, action and side effects. Outcome: Progressing Towards Goal  Goal: *Monitoring blood glucose, interpreting and using results  Description: Identify recommended blood glucose targets  and personal targets. Outcome: Progressing Towards Goal  Goal: *Prevention, detection, treatment of acute complications  Description: List symptoms of hyper- and hypoglycemia; describe how to treat low blood sugar and actions for lowering  high blood glucose level. Outcome: Progressing Towards Goal  Goal: *Prevention, detection and treatment of chronic complications  Description: Define the natural course of diabetes and describe the relationship of blood glucose levels to long term complications of diabetes.   Outcome: Progressing Towards Goal  Goal: *Developing strategies to address psychosocial issues  Description: Describe feelings about living with diabetes; identify support needed and support network  Outcome: Progressing Towards Goal  Goal: *Sick day guidelines  Outcome: Progressing Towards Goal  Goal: *Patient Specific Goal (EDIT GOAL, INSERT TEXT)  Outcome: Progressing Towards Goal     Problem: Patient Education: Go to Patient Education Activity  Goal: Patient/Family Education  Outcome: Progressing Towards Goal

## 2020-07-04 NOTE — PROGRESS NOTES
Progress Note         Patient: Ben Benítez MRN: [id number]  CSN: [id number]    YOB: 1960  Age: 61 y.o. Sex: male    DOA: 7/3/2020 LOS:  LOS: 1 day                    Subjective:     Ben Benítez is a 61 y.o. male with a PMHx of ESRD on HD, CAD, COPD, Type II DM, HTN, HLD, alcohol induced chronic pancreatitis, and obesity who is admitted for occluded L radiocephalic dialysis fistula and missing dialysis. Temporary dialysis cath placed yesterday   Pt received dialysis last and today   De-clot of fistula scheduled for Monday with vascular     Seen after dialysis today   Denies complaints   Says he feels good       Objective:     Physical Exam:  Visit Vitals  /74   Pulse 65   Temp 97.6 °F (36.4 °C) (Oral)   Resp 18   Ht 5' 4\" (1.626 m)   Wt 107 kg (235 lb 14.3 oz)   SpO2 (!) 3%   BMI 40.49 kg/m²        General:         Alert, cooperative, no acute distress    HEENT: NC, Atraumatic. Anicteric sclerae. Lungs: CTA Bilaterally. No Wheezing/Rhonchi/Rales. Heart:  Regular  rhythm,  No murmur, No Rubs, No Gallops  Abdomen: Soft, Non distended, Non tender. +Bowel sounds  Extremities: No c/c/e; TDC in R groin   Psych:   Not anxious or agitated. Neurologic:  Alert and oriented X 3. No acute neurological deficits.      Intake and Output:  Current Shift:  07/04 0701 - 07/04 1900  In: -   Out: 3100 [Urine:600]  Last three shifts:  07/02 1901 - 07/04 0700  In: 240 [P.O.:240]  Out: 2700 [Urine:200]    Labs: Results:       Chemistry Recent Labs     07/04/20  0034 07/03/20  0825   GLU 94 147*   * 139   K 4.8 5.9*    106   CO2 25 26   BUN 34* 53*   CREA 8.06* 11.80*   CA 8.0* 9.2   AGAP 8 7   BUCR 4* 4*   AP  --  86   TP  --  7.6   ALB  --  3.2*   GLOB  --  4.4*   AGRAT  --  0.7*      CBC w/Diff Recent Labs     07/04/20  0034 07/03/20  0825   WBC 6.4 6.6   RBC 3.50* 3.75*   HGB 10.2* 11.0*   HCT 32.7* 34.6*    317   GRANS  --  67   LYMPH  --  23   EOS  --  4      Cardiac Enzymes No results for input(s): CPK, CKND1, SID in the last 72 hours. No lab exists for component: CKRMB, TROIP   Coagulation Recent Labs     07/03/20  0825   PTP 11.8   INR 0.9       Lipid Panel Lab Results   Component Value Date/Time    Cholesterol, total 179 10/04/2017 12:47 PM    HDL Cholesterol 40 10/04/2017 12:47 PM    LDL, calculated 77.2 10/04/2017 12:47 PM    VLDL, calculated 61.8 10/04/2017 12:47 PM    Triglyceride 309 (H) 10/04/2017 12:47 PM    CHOL/HDL Ratio 4.5 10/04/2017 12:47 PM      BNP No results for input(s): BNPP in the last 72 hours. Liver Enzymes Recent Labs     07/03/20  0825   TP 7.6   ALB 3.2*   AP 86      Thyroid Studies Lab Results   Component Value Date/Time    TSH 0.55 02/09/2017 04:00 AM                Assessment and Plan:     Helen Thompson is a 61 y.o. male with a PMHx of ESRD on HD, CAD, COPD, Type II DM, HTN, HLD, alcohol induced chronic pancreatitis, and obesity who is admitted for occluded L radiocephalic dialysis fistula and missing dialysis. 1. Malfunctioning/clotted dialysis fistula   2. Hyperkalemia- resolved   3. ESRD on HD   4. Anemia of CKD    5. HTN   6. Type II DM w/hyperglycemia- HbA1c 5.1 on 7/3/20    7. Chronic pancreatitis   8. Urinary hesitancy/frequency   9. Tobacco abuse   10. Medical non-compliance     Vascular surgery to perform declot on Monday   HD per nephrology   Cont home meds: statin, carvedilol, hydralazine  Cont home meds: arformoterol/budesonide, creon, protonix   SSI w/accuchecks   Cont creon   Counseled on smoking cessation   Need to discuss home meds; possible medication assistance prior to discharge     Case discussed with:  []Patient  []Family  []Nursing  []Case Management  DVT prophylaxis: SQH   Diet: Renal   Contact: Jacqueline Franco  (wife)   [phone]   Code Status: FULL   Disposition: Continue current care; likely home Mon or Tue HLily Beverly DO  7/4/2020

## 2020-07-04 NOTE — PROGRESS NOTES
Progress Note    61y M with ESRD, HTN, DM, missed several dialysis, seen for esrd managment  Subjective    Breathing is better  Tolerated HD well, UF about 2.5L  Afebrile         IMPRESSION:   ESRD, MWF, missed several treatment,   Access; left arm fistula , clotted, temp HD catheter placed in ER on 7/3  HTN  Anemia   Hyperkalemia    PLAN:   Plan for HD today as he missed several HD this week. UF goal 2L as toleated, 2K bath   Awaiting for fistulogram.   Adjust meds per ESRD status.            Facility-Administered Medications: None       Current Facility-Administered Medications   Medication Dose Route Frequency    arformoterol 15 mcg/budesonide 0.25 mg neb solution   Nebulization BID RT    oxyCODONE-acetaminophen (PERCOCET) 5-325 mg per tablet 1 Tab  1 Tab Oral Q6H PRN    atorvastatin (LIPITOR) tablet 40 mg  40 mg Oral QHS    folic acid-vit X3-OUI Y80 (FOLTX) 2.5-25-2 mg tablet 1 Tab  1 Tab Oral DAILY    hydrALAZINE (APRESOLINE) tablet 50 mg  50 mg Oral BID    lipase-protease-amylase (CREON 6,000) capsule 1 Cap  1 Cap Oral TID WITH MEALS    pantoprazole (PROTONIX) tablet 40 mg  40 mg Oral ACB    carvediloL (COREG) tablet 6.25 mg  6.25 mg Oral BID WITH MEALS    insulin lispro (HUMALOG) injection   SubCUTAneous AC&HS    glucose chewable tablet 16 g  4 Tab Oral PRN    glucagon (GLUCAGEN) injection 1 mg  1 mg IntraMUSCular PRN    dextrose (D50W) injection syrg 12.5-25 g  25-50 mL IntraVENous PRN    heparin (porcine) injection 5,000 Units  5,000 Units SubCUTAneous Q8H       Review of Systems:   As above   Data Review:    Labs: Results:       Chemistry Recent Labs     07/04/20  0034 07/03/20  0825   GLU 94 147*   * 139   K 4.8 5.9*    106   CO2 25 26   BUN 34* 53*   CREA 8.06* 11.80*   CA 8.0* 9.2   AGAP 8 7   BUCR 4* 4*   AP  --  86   TP  --  7.6   ALB  --  3.2*   GLOB  --  4.4*   AGRAT  --  0.7*      CBC w/Diff Recent Labs     07/04/20  0034 07/03/20  0825   WBC 6.4 6.6   RBC 3.50* 3.75* HGB 10.2* 11.0*   HCT 32.7* 34.6*    317   GRANS  --  67   LYMPH  --  23   EOS  --  4      Coagulation Recent Labs     07/03/20  0825   PTP 11.8   INR 0.9       Iron/Ferritin No results for input(s): IRON in the last 72 hours. No lab exists for component: TIBCCALC   BNP No results for input(s): BNPP in the last 72 hours. Cardiac Enzymes No results for input(s): CPK, CKND1, SID in the last 72 hours.     No lab exists for component: CKRMB, TROIP   Liver Enzymes Recent Labs     07/03/20  0825   TP 7.6   ALB 3.2*   AP 86      Thyroid Studies Lab Results   Component Value Date/Time    TSH 0.55 02/09/2017 04:00 AM         EKG: sinus     Physical Assessment:     Visit Vitals  /73 (BP 1 Location: Right arm, BP Patient Position: At rest)   Pulse 64   Temp 98.6 °F (37 °C)   Resp 19   Ht 5' 4\" (1.626 m)   Wt 107 kg (235 lb 14.3 oz)   SpO2 (!) 3%   BMI 40.49 kg/m²     Weight change:     Intake/Output Summary (Last 24 hours) at 7/4/2020 1131  Last data filed at 7/4/2020 0802  Gross per 24 hour   Intake 240 ml   Output 3100 ml   Net -2860 ml     Physical Exam:   General: comfortable, no acute distress   HEENT sclera anicteric, supple neck, no thyromegaly  CVS: S1S2 heard,  no rub  RS: + air entry b/l, poor inspiratory effort  Abd: Soft, Non tender, Not distended, Positive bowel sounds, no organomegaly, no CVA / supra pubic tenderness  Neuro: non focal, awake, alert , CN II-XII are grossly intact  Extrm + edema, no cyanosis, clubbing   Skin: no visible  Rash  Musculoskeletal: No gross joints or bone deformities     Procedures/imaging: see electronic medical records for all procedures, Xrays and details which were not copied into this note but were reviewed prior to creation of Andrew Harper MD  July 4, 2020  [facility]  Office [phone]        Progress Note    61y M with ESRD, HTN, DM, missed several dialysis, seen for esrd managment  Subjective    Examined during HD        IMPRESSION:   ESRD, MWF, missed several treatment,   Access; left arm fistula , clotted, temp HD catheter placed in ER on 7/3  HTN  Anemia   Hyperkalemia    PLAN:   Arrange urgent HD with 2K bath UF goal 2-3L as tolerate. Plan for HD tomorrow. Follow up with vascular for declotting. Adjust meds per ESRD status. Discussed with Dr. Rosa Maria Terrell , Dr. Almira Opitz    HD rounding    Blood pressure 115/73, pulse 64, temperature 98.6 °F (37 °C), resp. rate 19, height 5' 4\" (1.626 m), weight 107 kg (235 lb 14.3 oz), SpO2 (!) 3 %.   Temp (24hrs), Av.8 °F (36.6 °C), Min:96.8 °F (36 °C), Max:98.6 °F (37 °C)      Blood Pressure: BP: 115/73  Pulse: Pulse (Heart Rate): 64  Temp:  Temp: 98.6 °F (37 °C)    Artificial Kidney Dialyzer/Set Up Inspection: Revaclear   hours Duration of Treatment (hours): 3 hours   Heparin Bolus    Blood flow rate Blood Flow Rate (ml/min): 350 ml/min   Dialysate rate     Arterial Access Pressure Arterial Access Pressure (mmHg): -180  Venous Return Pressure Venous Return Pressure (mmHg): 140  Ultrafiltration Rate Goal/Amount of Fluid to Remove (mL): 2500 mL  Fluid Removal Fluid Removed (mL): 3000  Net Fluid Removal NET Fluid Removed (mL): 2500 ml        Facility-Administered Medications: None       Current Facility-Administered Medications   Medication Dose Route Frequency    arformoterol 15 mcg/budesonide 0.25 mg neb solution   Nebulization BID RT    oxyCODONE-acetaminophen (PERCOCET) 5-325 mg per tablet 1 Tab  1 Tab Oral Q6H PRN    atorvastatin (LIPITOR) tablet 40 mg  40 mg Oral QHS    folic acid-vit K2-DBG S81 (FOLTX) 2.5-25-2 mg tablet 1 Tab  1 Tab Oral DAILY    hydrALAZINE (APRESOLINE) tablet 50 mg  50 mg Oral BID    lipase-protease-amylase (CREON 6,000) capsule 1 Cap  1 Cap Oral TID WITH MEALS    pantoprazole (PROTONIX) tablet 40 mg  40 mg Oral ACB    carvediloL (COREG) tablet 6.25 mg  6.25 mg Oral BID WITH MEALS    insulin lispro (HUMALOG) injection   SubCUTAneous AC&HS    glucose chewable tablet 16 g  4 Tab Oral PRN    glucagon (GLUCAGEN) injection 1 mg  1 mg IntraMUSCular PRN    dextrose (D50W) injection syrg 12.5-25 g  25-50 mL IntraVENous PRN    heparin (porcine) injection 5,000 Units  5,000 Units SubCUTAneous Q8H       Review of Systems:   As above   Data Review:    Labs: Results:       Chemistry Recent Labs     07/04/20 0034 07/03/20  0825   GLU 94 147*   * 139   K 4.8 5.9*    106   CO2 25 26   BUN 34* 53*   CREA 8.06* 11.80*   CA 8.0* 9.2   AGAP 8 7   BUCR 4* 4*   AP  --  86   TP  --  7.6   ALB  --  3.2*   GLOB  --  4.4*   AGRAT  --  0.7*      CBC w/Diff Recent Labs     07/04/20 0034 07/03/20  0825   WBC 6.4 6.6   RBC 3.50* 3.75*   HGB 10.2* 11.0*   HCT 32.7* 34.6*    317   GRANS  --  67   LYMPH  --  23   EOS  --  4      Coagulation Recent Labs     07/03/20  0825   PTP 11.8   INR 0.9       Iron/Ferritin No results for input(s): IRON in the last 72 hours. No lab exists for component: TIBCCALC   BNP No results for input(s): BNPP in the last 72 hours. Cardiac Enzymes No results for input(s): CPK, CKND1, SID in the last 72 hours.     No lab exists for component: CKRMB, TROIP   Liver Enzymes Recent Labs     07/03/20  0825   TP 7.6   ALB 3.2*   AP 86      Thyroid Studies Lab Results   Component Value Date/Time    TSH 0.55 02/09/2017 04:00 AM         EKG: sinus     Physical Assessment:     Visit Vitals  /73 (BP 1 Location: Right arm, BP Patient Position: At rest)   Pulse 64   Temp 98.6 °F (37 °C)   Resp 19   Ht 5' 4\" (1.626 m)   Wt 107 kg (235 lb 14.3 oz)   SpO2 (!) 3%   BMI 40.49 kg/m²     Weight change:     Intake/Output Summary (Last 24 hours) at 7/4/2020 1131  Last data filed at 7/4/2020 0802  Gross per 24 hour   Intake 240 ml   Output 3100 ml   Net -2860 ml     Physical Exam:   General: comfortable, no acute distress   HEENT sclera anicteric, supple neck, no thyromegaly  CVS: S1S2 heard,  no rub  RS: + air entry b/l, poor inspiratory effort  Abd: Soft, Non tender, Not distended, Positive bowel sounds, no organomegaly, no CVA / supra pubic tenderness  Neuro: non focal, awake, alert , CN II-XII are grossly intact  Extrm + edema, no cyanosis, clubbing   Skin: no visible  Rash  Musculoskeletal: No gross joints or bone deformities     Procedures/imaging: see electronic medical records for all procedures, Xrays and details which were not copied into this note but were reviewed prior to creation of Jesus Hernandez MD  July 4, 2020  [facility]  Office [phone]

## 2020-07-05 LAB
GLUCOSE BLD STRIP.AUTO-MCNC: 117 MG/DL (ref 70–110)
GLUCOSE BLD STRIP.AUTO-MCNC: 214 MG/DL (ref 70–110)
GLUCOSE BLD STRIP.AUTO-MCNC: 95 MG/DL (ref 70–110)
GLUCOSE BLD STRIP.AUTO-MCNC: 96 MG/DL (ref 70–110)

## 2020-07-05 PROCEDURE — 65270000029 HC RM PRIVATE

## 2020-07-05 PROCEDURE — 82962 GLUCOSE BLOOD TEST: CPT

## 2020-07-05 PROCEDURE — 94640 AIRWAY INHALATION TREATMENT: CPT

## 2020-07-05 PROCEDURE — 74011250637 HC RX REV CODE- 250/637: Performed by: INTERNAL MEDICINE

## 2020-07-05 PROCEDURE — 74011250637 HC RX REV CODE- 250/637: Performed by: NURSE PRACTITIONER

## 2020-07-05 PROCEDURE — 74011000250 HC RX REV CODE- 250: Performed by: NURSE PRACTITIONER

## 2020-07-05 PROCEDURE — 74011250637 HC RX REV CODE- 250/637: Performed by: FAMILY MEDICINE

## 2020-07-05 PROCEDURE — 74011636637 HC RX REV CODE- 636/637: Performed by: NURSE PRACTITIONER

## 2020-07-05 RX ORDER — OXYCODONE AND ACETAMINOPHEN 5; 325 MG/1; MG/1
2 TABLET ORAL
Status: DISCONTINUED | OUTPATIENT
Start: 2020-07-05 | End: 2020-07-07 | Stop reason: HOSPADM

## 2020-07-05 RX ORDER — DICLOFENAC SODIUM 10 MG/G
4 GEL TOPICAL
Status: DISCONTINUED | OUTPATIENT
Start: 2020-07-05 | End: 2020-07-07 | Stop reason: HOSPADM

## 2020-07-05 RX ADMIN — ARFORMOTEROL TARTRATE: 15 SOLUTION RESPIRATORY (INHALATION) at 19:39

## 2020-07-05 RX ADMIN — PANCRELIPASE 1 CAPSULE: 30000; 6000; 19000 CAPSULE, DELAYED RELEASE PELLETS ORAL at 16:08

## 2020-07-05 RX ADMIN — ARFORMOTEROL TARTRATE: 15 SOLUTION RESPIRATORY (INHALATION) at 07:03

## 2020-07-05 RX ADMIN — OXYCODONE HYDROCHLORIDE AND ACETAMINOPHEN 1 TABLET: 5; 325 TABLET ORAL at 08:26

## 2020-07-05 RX ADMIN — OXYCODONE HYDROCHLORIDE AND ACETAMINOPHEN 2 TABLET: 5; 325 TABLET ORAL at 20:21

## 2020-07-05 RX ADMIN — PANCRELIPASE 1 CAPSULE: 30000; 6000; 19000 CAPSULE, DELAYED RELEASE PELLETS ORAL at 08:27

## 2020-07-05 RX ADMIN — INSULIN LISPRO 4 UNITS: 100 INJECTION, SOLUTION INTRAVENOUS; SUBCUTANEOUS at 11:50

## 2020-07-05 RX ADMIN — Medication 1 TABLET: at 16:16

## 2020-07-05 RX ADMIN — ATORVASTATIN CALCIUM 40 MG: 40 TABLET, FILM COATED ORAL at 22:13

## 2020-07-05 RX ADMIN — CARVEDILOL 6.25 MG: 6.25 TABLET, FILM COATED ORAL at 08:26

## 2020-07-05 RX ADMIN — OXYCODONE HYDROCHLORIDE AND ACETAMINOPHEN 1 TABLET: 5; 325 TABLET ORAL at 12:50

## 2020-07-05 RX ADMIN — PANTOPRAZOLE SODIUM 40 MG: 40 TABLET, DELAYED RELEASE ORAL at 08:27

## 2020-07-05 RX ADMIN — HYDRALAZINE HYDROCHLORIDE 50 MG: 50 TABLET, FILM COATED ORAL at 08:27

## 2020-07-05 RX ADMIN — PANCRELIPASE 1 CAPSULE: 30000; 6000; 19000 CAPSULE, DELAYED RELEASE PELLETS ORAL at 11:51

## 2020-07-05 RX ADMIN — CARVEDILOL 6.25 MG: 6.25 TABLET, FILM COATED ORAL at 16:08

## 2020-07-05 RX ADMIN — OXYCODONE HYDROCHLORIDE AND ACETAMINOPHEN 1 TABLET: 5; 325 TABLET ORAL at 04:08

## 2020-07-05 RX ADMIN — DICLOFENAC 4 G: 10 GEL TOPICAL at 10:45

## 2020-07-05 RX ADMIN — HYDRALAZINE HYDROCHLORIDE 50 MG: 50 TABLET, FILM COATED ORAL at 17:32

## 2020-07-05 RX ADMIN — OXYCODONE HYDROCHLORIDE AND ACETAMINOPHEN 2 TABLET: 5; 325 TABLET ORAL at 16:08

## 2020-07-05 NOTE — PROGRESS NOTES
Progress Note         Patient: Rachael Crain MRN: [id number]  CSN: [id number]    YOB: 1960  Age: 61 y.o. Sex: male    DOA: 7/3/2020 LOS:  LOS: 2 days                    Subjective:     Rachael Crain is a 61 y.o. male with a PMHx of ESRD on HD, CAD, COPD, Type II DM, HTN, HLD, alcohol induced chronic pancreatitis, and obesity who is admitted for occluded L radiocephalic dialysis fistula and missing dialysis. Temporary dialysis cath placed 7/3   De-clot of fistula scheduled for Monday with vascular     Comfortable in bed   C/o pain to L arm AVG and to L groin TDC site  Pain not well controlled with current regimen       Objective:     Physical Exam:  Visit Vitals  /81   Pulse 77   Temp 97.6 °F (36.4 °C)   Resp 20   Ht 5' 4\" (1.626 m)   Wt 92.9 kg (204 lb 11.2 oz)   SpO2 96%   BMI 35.14 kg/m²        General:         Alert, cooperative, no acute distress    HEENT: NC, Atraumatic. Anicteric sclerae. Lungs: CTA Bilaterally. No Wheezing/Rhonchi/Rales. Heart:  Regular  rhythm,  No murmur, No Rubs, No Gallops  Abdomen: Soft, Non distended, Non tender. +Bowel sounds  Extremities: No c/c/e; L arm AVG, TDC in R groin   Psych:   Not anxious or agitated. Neurologic:  Alert and oriented X 3. No acute neurological deficits.      Intake and Output:  Current Shift:  07/05 0701 - 07/05 1900  In: 480 [P.O.:480]  Out: -   Last three shifts:  07/03 1901 - 07/05 0700  In: 610 [P.O.:610]  Out: 3400 [Urine:900]    Labs: Results:       Chemistry Recent Labs     07/04/20  0034 07/03/20  0825   GLU 94 147*   * 139   K 4.8 5.9*    106   CO2 25 26   BUN 34* 53*   CREA 8.06* 11.80*   CA 8.0* 9.2   AGAP 8 7   BUCR 4* 4*   AP  --  86   TP  --  7.6   ALB  --  3.2*   GLOB  --  4.4*   AGRAT  --  0.7*      CBC w/Diff Recent Labs     07/04/20  0034 07/03/20  0825   WBC 6.4 6.6   RBC 3.50* 3.75*   HGB 10.2* 11.0*   HCT 32.7* 34.6*    317   GRANS  --  67   LYMPH  --  23   EOS  --  4      Cardiac Enzymes No results for input(s): CPK, CKND1, SID in the last 72 hours. No lab exists for component: CKRMB, TROIP   Coagulation Recent Labs     07/03/20  0825   PTP 11.8   INR 0.9       Lipid Panel Lab Results   Component Value Date/Time    Cholesterol, total 179 10/04/2017 12:47 PM    HDL Cholesterol 40 10/04/2017 12:47 PM    LDL, calculated 77.2 10/04/2017 12:47 PM    VLDL, calculated 61.8 10/04/2017 12:47 PM    Triglyceride 309 (H) 10/04/2017 12:47 PM    CHOL/HDL Ratio 4.5 10/04/2017 12:47 PM      BNP No results for input(s): BNPP in the last 72 hours. Liver Enzymes Recent Labs     07/03/20  0825   TP 7.6   ALB 3.2*   AP 86      Thyroid Studies Lab Results   Component Value Date/Time    TSH 0.55 02/09/2017 04:00 AM                Assessment and Plan:     Miles Madrigal is a 61 y.o. male with a PMHx of ESRD on HD, CAD, COPD, Type II DM, HTN, HLD, alcohol induced chronic pancreatitis, and obesity who is admitted for occluded L radiocephalic dialysis fistula and missing dialysis. 1. Malfunctioning/clotted dialysis fistula   2. Hyperkalemia- resolved   3. ESRD on HD   4. Anemia of CKD    5. HTN   6. Type II DM w/hyperglycemia- HbA1c 5.1 on 7/3/20    7. Chronic pancreatitis   8. Urinary hesitancy/frequency   9. Tobacco abuse   10. Medical non-compliance     Vascular surgery to perform shuntogram, declot on Monday   HD per nephrology   Cont home meds: statin, carvedilol, hydralazine  Cont home meds: arformoterol/budesonide, creon, protonix   SSI w/accuchecks   Cont creon  Pain control    Counseled on smoking cessation   Need to discuss home meds; possible medication assistance prior to discharge   FU am labs     Case discussed with:  [x]Patient  []Family  [x]Nursing  [x]Case Management  DVT prophylaxis: SQH   Diet: Renal   Contact: Mihir Bravo  (wife)   [phone]   Code Status: FULL   Disposition: Continue current care; likely home Mon or Tue         HLily Roy Doing, DO  7/5/2020 Spiral Flap Text: The defect edges were debeveled with a #15c scalpel blade.  Given the location of the defect, shape of the defect and the proximity to free margins a spiral flap was deemed most appropriate.  Using a sterile surgical marker, an appropriate rotation flap was drawn incorporating the defect and placing the expected incisions within the relaxed skin tension lines where possible. The area thus outlined was incised deep to adipose tissue with a #15 scalpel blade.  The skin margins were undermined to an appropriate distance in all directions utilizing iris scissors.

## 2020-07-05 NOTE — PROGRESS NOTES
RENAL DAILY PROGRESS NOTE    Patient: Mekhi Ma               Sex: male          DOA: 7/3/2020  7:16 AM        YOB: 1960      Age:  61 y.o.        LOS:  LOS: 2 days     Subjective:     Mekhi Ma is a 61 y.o.  who presents with Hyperkalemia [E87.5]  ESRD (end stage renal disease) ([facility] [address] [N18.6]  Hyperkalemia [E87.5]  ESRD (end stage renal disease) ([facility] [address] [N18.6].    Asked to evaluate for esrd,admitted with clotted access  Chief complains: Patient denies nausea, vomiting, chest pain, dizziness, shortness of breath or headache.  - Reviewed last 24 hrs events     Current Facility-Administered Medications   Medication Dose Route Frequency    diclofenac (VOLTAREN) 1 % topical gel 4 g  4 g Topical QID PRN    vit B12-levomefolate calcium-vit B6 (FOLBIC RF, FOLTX) 2-1.13-25 mg tablet 1 Tab  1 Tab Oral DAILY    arformoterol 15 mcg/budesonide 0.25 mg neb solution   Nebulization BID RT    acetaminophen (TYLENOL) tablet 650 mg  650 mg Oral Q4H PRN    oxyCODONE-acetaminophen (PERCOCET) 5-325 mg per tablet 1 Tab  1 Tab Oral Q4H PRN    atorvastatin (LIPITOR) tablet 40 mg  40 mg Oral QHS    hydrALAZINE (APRESOLINE) tablet 50 mg  50 mg Oral BID    lipase-protease-amylase (CREON 6,000) capsule 1 Cap  1 Cap Oral TID WITH MEALS    pantoprazole (PROTONIX) tablet 40 mg  40 mg Oral ACB    carvediloL (COREG) tablet 6.25 mg  6.25 mg Oral BID WITH MEALS    insulin lispro (HUMALOG) injection   SubCUTAneous AC&HS    glucose chewable tablet 16 g  4 Tab Oral PRN    glucagon (GLUCAGEN) injection 1 mg  1 mg IntraMUSCular PRN    dextrose (D50W) injection syrg 12.5-25 g  25-50 mL IntraVENous PRN    heparin (porcine) injection 5,000 Units  5,000 Units SubCUTAneous Q8H       Objective:     Visit Vitals  /65 (BP 1 Location: Right arm)   Pulse 71   Temp 98.5 °F (36.9 °C)   Resp 20   Ht 5' 4\" (1.626 m)   Wt 92.9 kg (204 lb 11.2 oz)   SpO2 100%   BMI 35.14 kg/m²       Intake/Output Summary (Last 24 hours) at 7/5/2020 1521  Last data filed at 7/5/2020 1155  Gross per 24 hour   Intake 1090 ml   Output 300 ml   Net 790 ml       Physical Examination:     GEN: AAO X 3, NAD  RS: Chest is bilateral equal, no wheezing / rales / crackles  CVS: S1-S2 heard  Abdomen: Soft  Extremities: No edema, no cyanosis, skin is warm on touch  CNS: Awake   HEENT: Head is atraumatic, PERRLA, conjunctiva pink & non icteric. No JVD      Data Review:      Labs:     Hematology:   Recent Labs     07/04/20 0034 07/03/20  0825   WBC 6.4 6.6   HGB 10.2* 11.0*   HCT 32.7* 34.6*     Chemistry:   Recent Labs     07/04/20 0034 07/03/20  0825   BUN 34* 53*   CREA 8.06* 11.80*   CA 8.0* 9.2   ALB  --  3.2*   K 4.8 5.9*   * 139    106   CO2 25 26   GLU 94 147*        Images:    XR (Most Recent). CXR reviewed by me and compared with previous CXR Results from Hospital Encounter encounter on 06/25/20   XR CHEST PORT    Narrative AP CHEST, PORTABLE    INDICATION: Chest pain, shortness of breath    COMPARISON: Prior chest x-rays, most recent 3/30/2020    FINDINGS:    Stable enlarged cardiac silhouette. Mild prominence of the bronchovascular  markings. No focal consolidation, pleural effusion, or pneumothorax. No acute  osseous abnormalities are identified. Impression Impression:      1. Stable mildly enlarged cardiac silhouette. 2. Mild prominence of the bronchovascular markings, possibly mild pulmonary  vascular congestion or nonspecific bronchitis. CT (Most Recent) Results from Hospital Encounter encounter on 06/25/20   CTA CHEST W OR W WO CONT    Narrative CT CHEST PULMONARY EMBOLISM PROTOCOL    CPT code: 52332    INDICATION: Left-sided chest pain x1 day. Shortness of breath. Question pulmonary  embolism.     TECHNIQUE: 2.5XG collimation helical images obtained through the level of the  pulmonary arteries with additional imaging through the chest following the  uneventful administration of 100 cc's nonionic intravenous contrast.   - Images reconstructed into three dimensional coronal and sagittal projections  for complete evaluation of the tortuous and overlapping pulmonary vascular  structures and to reduce patient radiation dose. All CT scans at this facility are performed using dose optimization technique as  appropriate to this specific exam, to include automated exposure control,  adjustment of the mA and/or KP according to patient size or use of iterative  reconstruction techniques. COMPARISON: Chest x-ray earlier same day. FINDINGS:  Opacification of the pulmonary arteries is adequate. Mild motion artifact. Main  pulmonary artery not enlarged. Right and left pulmonary arteries are borderline  enlarged. May reflect component of pulmonary artery hypertension. Correlate  clinically. No filling defects are appreciated within the main, left, right, lobar or  visualized segmental pulmonary arteries to suggest embolism. Heart size top normal. Subjective left ventricular hypertrophy. Trace pericardial fluid present dating back to abdominal CT of September 2018  however. .  Aorta not enlarged. No evidence of dissection. No pneumothorax. No overt pulmonary edema. Subpleural cystic lucencies anterior  medial left upper lobe with essentially imperceptible walls likely bullae. Subsegmental atelectasis or scarring in the lingular segment left upper lobe. No focal consolidation. No pleural effusion. No appreciably enlarged axillary or mediastinal lymph nodes. Possible hepatomegaly though incompletely included. Degenerative change  commensurate with age. Impression IMPRESSION:    1. No CT evidence for central pulmonary embolism. 2. Nonenlarged heart. Trace pericardial fluid.  -Nonenlarged aorta. No evidence of dissection. 3. Small amount of subpleural bullous disease. No pneumothorax, evidence of  pneumonia or pleural effusion.           EKG Results for orders placed or performed in visit on 07/31/17   AMB POC EKG ROUTINE W/ 12 LEADS, INTER & REP     Status: None    Narrative    Normal sinus rhythm, rate 97. LAE. T-wave inversions in anterolateral leads, consider lateral ischemia. I have personally reviewed the old medical records and patient's labs    Plan / Recommendation:      1. Esrd,dialysis mon wed Friday. orders written,dialysis nurse notified. check labs in am  2.clotted avg ,for shntogram tomorrow?     D/w Dr. Donnie Doss MD  Nephrology  7/5/2020

## 2020-07-05 NOTE — PROGRESS NOTES
Dr. Florinda Ro was informed of patient's c/o severe pain on the left arm A-V Fistula site and that Percocet is not due until 12 mn. He said he will change frequency to q 4 hours.

## 2020-07-05 NOTE — PROGRESS NOTES
Bedside and Verbal shift change report given to Hermes Winkler, JEN (oncoming nurse) by Kayden Deng RN (offgoing nurse). Report included the following information SBAR, Kardex, MAR and Recent Results. SITUATION:  Code Status: Full Code  Reason for Admission: Hyperkalemia [E87.5]  ESRD (end stage renal disease) ([facility] [N18.6]  Hyperkalemia [E87.5]  ESRD (end stage renal disease) ([facility] [N18.6]  Hospital day: 2  Problem List:       Hospital Problems  Date Reviewed: 8/7/2019          Codes Class Noted POA    * (Principal) Complication of vascular dialysis catheter ICD-10-CM: T82. 9XXA  ICD-9-CM: 996.1  7/4/2020 Unknown        Hyperkalemia ICD-10-CM: E87.5  ICD-9-CM: 276.7  7/3/2020 Unknown        ESRD (end stage renal disease) ([facility] ICD-10-CM: N18.6  ICD-9-CM: 585.6  7/3/2020 Unknown              BACKGROUND:   Past Medical History:   Past Medical History:   Diagnosis Date    Alcohol abuse     Alcohol-induced chronic pancreatitis ([facility] 9/7/2018    Arthritis     Atherosclerotic cardiovascular disease     CAD (coronary artery disease)     mild disease of coronaries (cor angio 2006),wife states he has 1 stent    Cardiac cath 01/26/2006    RCA mild. Otherwise patent coronaries. LVEDP 15.  EF 55-60%.  Cardiac echocardiogram 03/27/2009    EF 60%. No cardiac source of embolism. No significant valvular pathology.  Cardiac nuclear imaging test 12/06/2011    Small, mild inferior infarction or possibly artifact. No ischemia. EF 45%. No TID. No reg WMA.  Cardiovascular LLE venous duplex 08/14/2015    Left leg:  No DVT. Dilated lymph node in left groin.  Chronic kidney disease     Chronic obstructive pulmonary disease (HCC)     Constipation     Diabetes mellitus type II     Gout     Hepatic steatosis     Hypercholesterolemia     Hypertension     Knee effusion, left     Left knee pain     Morbid obesity (HCC)     Noncompliance     Obesity (BMI 30-39. 9)     S/P colonoscopy 3/8/05    Dr. Ganesh Musa; normal; f/u 10 years    Stomach problems     TIA (transient ischemic attack) 3/09    Tobacco abuse       Patient taking anticoagulants yes    Patient has a defibrillator: no    History of shots YES for example, flu, pneumonia, tetanus   Isolation History NO for example, MRSA, CDiff    ASSESSMENT:  Changes in Assessment Throughout Shift: NONE  Significant Changes in 24 hours (for example, RR/code, fall)  Patient has Central Line: yes Reasons if yes: Hemodialysis  Patient has Montaño Cath: no   Mobility Issues  PT  IV Patency  OR Checklist  Pending Tests    Last Vitals:  Vitals w/ MEWS Score (last day)     Date/Time MEWS Score Pulse Resp Temp BP Level of Consciousness SpO2    07/05/20 0719  1  67  19  98.2 °F (36.8 °C)  152/79  Alert  94 %    07/05/20 0703              98 %    07/05/20 0402  1  70  18  97.4 °F (36.3 °C)  131/70  Alert  97 %    07/04/20 2329  1  70  18  98.5 °F (36.9 °C)  133/76  Alert  96 %    07/04/20 2022  1  68  18  98.5 °F (36.9 °C)  125/74  Alert  97 %    07/04/20 1515    65  18  97.6 °F (36.4 °C)  113/74        07/04/20 1500    66      102/68        07/04/20 1445    65      119/66        07/04/20 1430    66      108/73        07/04/20 1415    65      106/74        07/04/20 1400    63      112/73        07/04/20 1345    65      109/70        07/04/20 1330    66      116/63        07/04/20 1315    65      120/62        07/04/20 1300    66      116/73        07/04/20 1245    66      113/73        07/04/20 1230    67      115/66        07/04/20 1215    63      122/50        07/04/20 1200    69      142/83        07/04/20 1155    65  18  97.7 °F (36.5 °C)  155/87        07/04/20 1110  1  64  19  98.6 °F (37 °C)  115/73  Alert  (!) 3 %    07/04/20 0843              98 %    07/04/20 0800  1  62  18  97.5 °F (36.4 °C)  (!) 147/95  Alert  97 %    07/04/20 0407  1  63  17  98 °F (36.7 °C)  155/83  Alert  98 %    07/04/20 0003  1  65  16  97.6 °F (36.4 °C)  137/83  Alert  97 %            PAIN    Pain Assessment    Pain Intensity 1: 8 (07/05/20 0409)    Pain Location 1: Arm    Pain Intervention(s) 1: Medication (see MAR)    Patient Stated Pain Goal: 0  Intervention effective: yes  Time of last intervention: 2021 Reassessment Completed: yes   Other actions taken for pain: Distraction    Last 3 Weights:  Last 3 Recorded Weights in this Encounter    07/03/20 1910 07/04/20 0407 07/05/20 0640   Weight: 106.6 kg (235 lb) 107 kg (235 lb 14.3 oz) 92.9 kg (204 lb 11.2 oz)   Weight change: -13.7 kg (-30 lb 4.8 oz)    INTAKE/OUPUT    Current Shift: No intake/output data recorded. Last three shifts: 07/03 1901 - 07/05 0700  In: 56 [P.O.:610]  Out: 3400 [Urine:900]    RECOMMENDATIONS AND DISCHARGE PLANNING  Patient needs and requests: Pain Management    Pending tests/procedures: labs     Discharge plan for patient: Home    Discharge planning Needs or Barriers: None    Estimated Discharge Date: 7/06/2020 Posted on Whiteboard in Patients Room: yes       \"HEALS\" SAFETY CHECK  A safety check occurred in the patient's room between off going nurse and oncoming nurse listed above. The safety check included the below items:    H  High Alert Medications Verify all high alert medication drips (heparin, PCA, etc.)  E  Equipment Suction is set up for ALL patients (with yanker)  Red plugs utilized for all equipment (IV pumps, etc.)  WOWs wiped down at end of shift. Room stocked with oxygen, suction, and other unit-specific supplies  A  Alarms Bed alarm is set for fall risk patients  Ensure chair alarm is in place and activated if patient is up in a chair  L  Lines Check IV for any infiltration  Montaño bag is empty if patient has a Montaño   Tubing and IV bags are labeled  S  Safety  Room is clean, patient is clean, and equipment is clean. Hallways are clear from equipment besides carts.    Fall bracelet on for fall risk patients  Ensure room is clear and free of clutter  Suction is set up for ALL patients (with yanker)  Hallways are clear from equipment besides carts.    Isolation precautions followed, supplies available outside room, sign posted    Delicia Adorno RN Spontaneous, unlabored and symmetrical

## 2020-07-06 ENCOUNTER — APPOINTMENT (OUTPATIENT)
Dept: GENERAL RADIOLOGY | Age: 60
DRG: 252 | End: 2020-07-06
Attending: SURGERY
Payer: MEDICARE

## 2020-07-06 ENCOUNTER — ANESTHESIA EVENT (OUTPATIENT)
Dept: CARDIOTHORACIC SURGERY | Age: 60
DRG: 252 | End: 2020-07-06
Payer: MEDICARE

## 2020-07-06 ENCOUNTER — ANESTHESIA (OUTPATIENT)
Dept: CARDIOTHORACIC SURGERY | Age: 60
DRG: 252 | End: 2020-07-06
Payer: MEDICARE

## 2020-07-06 VITALS
BODY MASS INDEX: 34.95 KG/M2 | RESPIRATION RATE: 18 BRPM | DIASTOLIC BLOOD PRESSURE: 96 MMHG | SYSTOLIC BLOOD PRESSURE: 158 MMHG | HEIGHT: 64 IN | WEIGHT: 204.7 LBS | HEART RATE: 92 BPM | TEMPERATURE: 97.6 F | OXYGEN SATURATION: 100 %

## 2020-07-06 LAB
ANION GAP SERPL CALC-SCNC: 8 MMOL/L (ref 3–18)
BASOPHILS # BLD: 0 K/UL (ref 0–0.1)
BASOPHILS NFR BLD: 1 % (ref 0–2)
BUN SERPL-MCNC: 49 MG/DL (ref 7–18)
BUN/CREAT SERPL: 5 (ref 12–20)
CALCIUM SERPL-MCNC: 8.4 MG/DL (ref 8.5–10.1)
CALCIUM SERPL-MCNC: 8.7 MG/DL (ref 8.5–10.1)
CHLORIDE SERPL-SCNC: 100 MMOL/L (ref 100–111)
CO2 SERPL-SCNC: 23 MMOL/L (ref 21–32)
COVID-19 RAPID TEST, COVR: NOT DETECTED
CREAT SERPL-MCNC: 9.25 MG/DL (ref 0.6–1.3)
DIFFERENTIAL METHOD BLD: ABNORMAL
EOSINOPHIL # BLD: 0.3 K/UL (ref 0–0.4)
EOSINOPHIL NFR BLD: 5 % (ref 0–5)
ERYTHROCYTE [DISTWIDTH] IN BLOOD BY AUTOMATED COUNT: 16.2 % (ref 11.6–14.5)
GLUCOSE BLD STRIP.AUTO-MCNC: 117 MG/DL (ref 70–110)
GLUCOSE BLD STRIP.AUTO-MCNC: 89 MG/DL (ref 70–110)
GLUCOSE SERPL-MCNC: 108 MG/DL (ref 74–99)
HCT VFR BLD AUTO: 32.6 % (ref 36–48)
HGB BLD-MCNC: 10.5 G/DL (ref 13–16)
LYMPHOCYTES # BLD: 2.4 K/UL (ref 0.9–3.6)
LYMPHOCYTES NFR BLD: 38 % (ref 21–52)
MAGNESIUM SERPL-MCNC: 2.1 MG/DL (ref 1.6–2.6)
MCH RBC QN AUTO: 29.3 PG (ref 24–34)
MCHC RBC AUTO-ENTMCNC: 32.2 G/DL (ref 31–37)
MCV RBC AUTO: 91.1 FL (ref 74–97)
MONOCYTES # BLD: 0.8 K/UL (ref 0.05–1.2)
MONOCYTES NFR BLD: 12 % (ref 3–10)
NEUTS SEG # BLD: 2.9 K/UL (ref 1.8–8)
NEUTS SEG NFR BLD: 44 % (ref 40–73)
PHOSPHATE SERPL-MCNC: 5.6 MG/DL (ref 2.5–4.9)
PLATELET # BLD AUTO: 294 K/UL (ref 135–420)
PMV BLD AUTO: 9.7 FL (ref 9.2–11.8)
POTASSIUM SERPL-SCNC: 5.4 MMOL/L (ref 3.5–5.5)
PTH-INTACT SERPL-MCNC: 336.5 PG/ML (ref 18.4–88)
RBC # BLD AUTO: 3.58 M/UL (ref 4.7–5.5)
SODIUM SERPL-SCNC: 131 MMOL/L (ref 136–145)
SOURCE, COVRS: NORMAL
WBC # BLD AUTO: 6.4 K/UL (ref 4.6–13.2)

## 2020-07-06 PROCEDURE — 90935 HEMODIALYSIS ONE EVALUATION: CPT

## 2020-07-06 PROCEDURE — C1894 INTRO/SHEATH, NON-LASER: HCPCS | Performed by: SURGERY

## 2020-07-06 PROCEDURE — 77030010507 HC ADH SKN DERMBND J&J -B: Performed by: SURGERY

## 2020-07-06 PROCEDURE — C1725 CATH, TRANSLUMIN NON-LASER: HCPCS | Performed by: SURGERY

## 2020-07-06 PROCEDURE — 77030002933 HC SUT MCRYL J&J -A: Performed by: SURGERY

## 2020-07-06 PROCEDURE — 85025 COMPLETE CBC W/AUTO DIFF WBC: CPT

## 2020-07-06 PROCEDURE — 03WY37Z REVISION OF AUTOLOGOUS TISSUE SUBSTITUTE IN UPPER ARTERY, PERCUTANEOUS APPROACH: ICD-10-PCS | Performed by: SURGERY

## 2020-07-06 PROCEDURE — 84100 ASSAY OF PHOSPHORUS: CPT

## 2020-07-06 PROCEDURE — 74011000250 HC RX REV CODE- 250: Performed by: NURSE ANESTHETIST, CERTIFIED REGISTERED

## 2020-07-06 PROCEDURE — C1757 CATH, THROMBECTOMY/EMBOLECT: HCPCS | Performed by: SURGERY

## 2020-07-06 PROCEDURE — 75710 ARTERY X-RAYS ARM/LEG: CPT

## 2020-07-06 PROCEDURE — 74011250637 HC RX REV CODE- 250/637: Performed by: INTERNAL MEDICINE

## 2020-07-06 PROCEDURE — 03CY3ZZ EXTIRPATION OF MATTER FROM UPPER ARTERY, PERCUTANEOUS APPROACH: ICD-10-PCS | Performed by: SURGERY

## 2020-07-06 PROCEDURE — 74011250636 HC RX REV CODE- 250/636: Performed by: NURSE ANESTHETIST, CERTIFIED REGISTERED

## 2020-07-06 PROCEDURE — 83735 ASSAY OF MAGNESIUM: CPT

## 2020-07-06 PROCEDURE — 74011250636 HC RX REV CODE- 250/636: Performed by: SURGERY

## 2020-07-06 PROCEDURE — 83970 ASSAY OF PARATHORMONE: CPT

## 2020-07-06 PROCEDURE — 74011000250 HC RX REV CODE- 250: Performed by: SURGERY

## 2020-07-06 PROCEDURE — C1769 GUIDE WIRE: HCPCS | Performed by: SURGERY

## 2020-07-06 PROCEDURE — 77030002986 HC SUT PROL J&J -A: Performed by: SURGERY

## 2020-07-06 PROCEDURE — 76210000063 HC OR PH I REC FIRST 0.5 HR: Performed by: SURGERY

## 2020-07-06 PROCEDURE — 93005 ELECTROCARDIOGRAM TRACING: CPT

## 2020-07-06 PROCEDURE — 77030014023 HC SYR INFL LVEEN BSC -B: Performed by: SURGERY

## 2020-07-06 PROCEDURE — 82962 GLUCOSE BLOOD TEST: CPT

## 2020-07-06 PROCEDURE — 76010000113 HC CV SURG 1 TO 1.5 HR: Performed by: SURGERY

## 2020-07-06 PROCEDURE — 87635 SARS-COV-2 COVID-19 AMP PRB: CPT

## 2020-07-06 PROCEDURE — 36415 COLL VENOUS BLD VENIPUNCTURE: CPT

## 2020-07-06 PROCEDURE — 76060000033 HC ANESTHESIA 1 TO 1.5 HR: Performed by: SURGERY

## 2020-07-06 PROCEDURE — 77030010512 HC APPL CLP LIG J&J -C: Performed by: SURGERY

## 2020-07-06 PROCEDURE — 77030018836 HC SOL IRR NACL ICUM -A: Performed by: SURGERY

## 2020-07-06 PROCEDURE — 77030002996 HC SUT SLK J&J -A: Performed by: SURGERY

## 2020-07-06 PROCEDURE — 77030003390 HC NDL ANGI MRTM -A: Performed by: SURGERY

## 2020-07-06 PROCEDURE — 80048 BASIC METABOLIC PNL TOTAL CA: CPT

## 2020-07-06 RX ORDER — SODIUM CHLORIDE 9 MG/ML
INJECTION, SOLUTION INTRAVENOUS
Status: DISCONTINUED | OUTPATIENT
Start: 2020-07-06 | End: 2020-07-06 | Stop reason: HOSPADM

## 2020-07-06 RX ORDER — HEPARIN SODIUM 1000 [USP'U]/ML
INJECTION, SOLUTION INTRAVENOUS; SUBCUTANEOUS AS NEEDED
Status: DISCONTINUED | OUTPATIENT
Start: 2020-07-06 | End: 2020-07-06 | Stop reason: HOSPADM

## 2020-07-06 RX ORDER — HEPARIN SODIUM 200 [USP'U]/100ML
INJECTION, SOLUTION INTRAVENOUS
Status: DISCONTINUED
Start: 2020-07-06 | End: 2020-07-07 | Stop reason: HOSPADM

## 2020-07-06 RX ORDER — DIPHENHYDRAMINE HYDROCHLORIDE 50 MG/ML
INJECTION, SOLUTION INTRAMUSCULAR; INTRAVENOUS
Status: COMPLETED
Start: 2020-07-06 | End: 2020-07-06

## 2020-07-06 RX ORDER — CEFAZOLIN SODIUM 2 G/50ML
2 SOLUTION INTRAVENOUS ONCE
Status: COMPLETED | OUTPATIENT
Start: 2020-07-06 | End: 2020-07-06

## 2020-07-06 RX ORDER — HEPARIN SODIUM 5000 [USP'U]/ML
INJECTION, SOLUTION INTRAVENOUS; SUBCUTANEOUS
Status: DISCONTINUED
Start: 2020-07-06 | End: 2020-07-07 | Stop reason: HOSPADM

## 2020-07-06 RX ORDER — HYDROMORPHONE HYDROCHLORIDE 2 MG/ML
0.5 INJECTION, SOLUTION INTRAMUSCULAR; INTRAVENOUS; SUBCUTANEOUS AS NEEDED
Status: DISCONTINUED | OUTPATIENT
Start: 2020-07-06 | End: 2020-07-07 | Stop reason: HOSPADM

## 2020-07-06 RX ORDER — FEBUXOSTAT 40 MG/1
40 TABLET, FILM COATED ORAL DAILY
Qty: 30 TAB | Refills: 0 | Status: SHIPPED | OUTPATIENT
Start: 2020-07-06

## 2020-07-06 RX ORDER — HEPARIN SODIUM 5000 [USP'U]/ML
INJECTION, SOLUTION INTRAVENOUS; SUBCUTANEOUS
Status: DISCONTINUED
Start: 2020-07-06 | End: 2020-07-06 | Stop reason: WASHOUT

## 2020-07-06 RX ORDER — OXYCODONE AND ACETAMINOPHEN 5; 325 MG/1; MG/1
2 TABLET ORAL
Qty: 20 TAB | Refills: 0 | Status: SHIPPED | OUTPATIENT
Start: 2020-07-06 | End: 2020-07-09

## 2020-07-06 RX ORDER — SODIUM CHLORIDE 0.9 % (FLUSH) 0.9 %
5-40 SYRINGE (ML) INJECTION AS NEEDED
Status: DISCONTINUED | OUTPATIENT
Start: 2020-07-06 | End: 2020-07-07 | Stop reason: HOSPADM

## 2020-07-06 RX ORDER — LIDOCAINE HYDROCHLORIDE 10 MG/ML
INJECTION, SOLUTION EPIDURAL; INFILTRATION; INTRACAUDAL; PERINEURAL AS NEEDED
Status: DISCONTINUED | OUTPATIENT
Start: 2020-07-06 | End: 2020-07-06 | Stop reason: HOSPADM

## 2020-07-06 RX ORDER — INSULIN LISPRO 100 [IU]/ML
INJECTION, SOLUTION INTRAVENOUS; SUBCUTANEOUS ONCE
Status: DISCONTINUED | OUTPATIENT
Start: 2020-07-06 | End: 2020-07-07 | Stop reason: HOSPADM

## 2020-07-06 RX ORDER — DEXTROSE 50 % IN WATER (D50W) INTRAVENOUS SYRINGE
25-50 AS NEEDED
Status: DISCONTINUED | OUTPATIENT
Start: 2020-07-06 | End: 2020-07-07 | Stop reason: HOSPADM

## 2020-07-06 RX ORDER — MAGNESIUM SULFATE 100 %
4 CRYSTALS MISCELLANEOUS AS NEEDED
Status: DISCONTINUED | OUTPATIENT
Start: 2020-07-06 | End: 2020-07-07 | Stop reason: HOSPADM

## 2020-07-06 RX ORDER — HEPARIN SODIUM 200 [USP'U]/100ML
INJECTION, SOLUTION INTRAVENOUS
Status: COMPLETED | OUTPATIENT
Start: 2020-07-06 | End: 2020-07-06

## 2020-07-06 RX ORDER — LIDOCAINE HYDROCHLORIDE 20 MG/ML
INJECTION, SOLUTION EPIDURAL; INFILTRATION; INTRACAUDAL; PERINEURAL AS NEEDED
Status: DISCONTINUED | OUTPATIENT
Start: 2020-07-06 | End: 2020-07-06 | Stop reason: HOSPADM

## 2020-07-06 RX ORDER — DIPHENHYDRAMINE HYDROCHLORIDE 50 MG/ML
INJECTION, SOLUTION INTRAMUSCULAR; INTRAVENOUS AS NEEDED
Status: DISCONTINUED | OUTPATIENT
Start: 2020-07-06 | End: 2020-07-06 | Stop reason: HOSPADM

## 2020-07-06 RX ORDER — LIDOCAINE HYDROCHLORIDE 10 MG/ML
INJECTION, SOLUTION EPIDURAL; INFILTRATION; INTRACAUDAL; PERINEURAL
Status: DISCONTINUED
Start: 2020-07-06 | End: 2020-07-07 | Stop reason: HOSPADM

## 2020-07-06 RX ORDER — ONDANSETRON 2 MG/ML
4 INJECTION INTRAMUSCULAR; INTRAVENOUS ONCE
Status: DISCONTINUED | OUTPATIENT
Start: 2020-07-06 | End: 2020-07-07 | Stop reason: HOSPADM

## 2020-07-06 RX ORDER — OXYCODONE AND ACETAMINOPHEN 5; 325 MG/1; MG/1
2 TABLET ORAL
Qty: 20 TAB | Refills: 0 | OUTPATIENT
Start: 2020-07-06 | End: 2020-07-06

## 2020-07-06 RX ORDER — MIDAZOLAM HYDROCHLORIDE 1 MG/ML
INJECTION, SOLUTION INTRAMUSCULAR; INTRAVENOUS AS NEEDED
Status: DISCONTINUED | OUTPATIENT
Start: 2020-07-06 | End: 2020-07-06 | Stop reason: HOSPADM

## 2020-07-06 RX ORDER — FENTANYL CITRATE 50 UG/ML
INJECTION, SOLUTION INTRAMUSCULAR; INTRAVENOUS AS NEEDED
Status: DISCONTINUED | OUTPATIENT
Start: 2020-07-06 | End: 2020-07-06 | Stop reason: HOSPADM

## 2020-07-06 RX ORDER — PROPOFOL 10 MG/ML
VIAL (ML) INTRAVENOUS
Status: DISCONTINUED | OUTPATIENT
Start: 2020-07-06 | End: 2020-07-06 | Stop reason: HOSPADM

## 2020-07-06 RX ORDER — SODIUM CHLORIDE 0.9 % (FLUSH) 0.9 %
5-40 SYRINGE (ML) INJECTION EVERY 8 HOURS
Status: DISCONTINUED | OUTPATIENT
Start: 2020-07-06 | End: 2020-07-07 | Stop reason: HOSPADM

## 2020-07-06 RX ADMIN — MIDAZOLAM HYDROCHLORIDE 1 MG: 2 INJECTION, SOLUTION INTRAMUSCULAR; INTRAVENOUS at 14:00

## 2020-07-06 RX ADMIN — FENTANYL CITRATE 50 MCG: 50 INJECTION, SOLUTION INTRAMUSCULAR; INTRAVENOUS at 13:55

## 2020-07-06 RX ADMIN — HEPARIN SODIUM 2500 UNITS: 1000 INJECTION, SOLUTION INTRAVENOUS; SUBCUTANEOUS at 14:38

## 2020-07-06 RX ADMIN — FENTANYL CITRATE 50 MCG: 50 INJECTION, SOLUTION INTRAMUSCULAR; INTRAVENOUS at 14:00

## 2020-07-06 RX ADMIN — ACETAMINOPHEN 650 MG: 325 TABLET ORAL at 10:05

## 2020-07-06 RX ADMIN — MIDAZOLAM HYDROCHLORIDE 1 MG: 2 INJECTION, SOLUTION INTRAMUSCULAR; INTRAVENOUS at 13:55

## 2020-07-06 RX ADMIN — CEFAZOLIN 2 G: 10 INJECTION, POWDER, FOR SOLUTION INTRAVENOUS at 13:53

## 2020-07-06 RX ADMIN — DIPHENHYDRAMINE HYDROCHLORIDE 50 MG: 50 INJECTION, SOLUTION INTRAMUSCULAR; INTRAVENOUS at 14:08

## 2020-07-06 RX ADMIN — SODIUM CHLORIDE: 900 INJECTION, SOLUTION INTRAVENOUS at 13:53

## 2020-07-06 RX ADMIN — LIDOCAINE HYDROCHLORIDE 40 MG: 20 INJECTION, SOLUTION EPIDURAL; INFILTRATION; INTRACAUDAL; PERINEURAL at 13:59

## 2020-07-06 RX ADMIN — PROPOFOL 75 MCG/KG/MIN: 10 INJECTION, EMULSION INTRAVENOUS at 13:55

## 2020-07-06 RX ADMIN — METHYLPREDNISOLONE SODIUM SUCCINATE 125 MG: 125 INJECTION, POWDER, FOR SOLUTION INTRAMUSCULAR; INTRAVENOUS at 14:10

## 2020-07-06 NOTE — DISCHARGE INSTRUCTIONS
Patient Education        Hemodialysis Access: Before Your Surgery  What is a hemodialysis access? Hemodialysis is a way to remove wastes from the blood when your kidneys can no longer do the job. It is not a cure, but it can help you live longer and feel better. It is a lifesaving treatment when you have kidney failure. Hemodialysis is often called dialysis. Before you can start dialysis, a doctor will create a place where the blood can flow in and out of your body during dialysis. This is called the hemodialysis (or vascular) access. There are two basic types of permanent vascular access. · AV fistula: This is the best type of access, but it is harder to create. To make a fistula, a doctor will connect an artery to a vein, usually in your arm. Fistulas tend to be stronger and less likely to get infected than grafts. But they need to be prepared several months ahead of time. After that, the dialysis needles can be put into the fistula. · AV graft: This is a good choice if you have small veins or other problems. A doctor will put a tube under the skin in your arm. This tube is the graft. It connects an artery and a vein. The dialysis needles can then be put into the graft. A graft can sometimes be used as soon as 2 weeks after placement. You will get medicine to numb the area and help you feel relaxed during the surgery. The doctor will make a cut on the forearm of the arm you use the least. This cut is called an incision. The doctor will close it with stitches. The incision will leave a scar that fades with time. You will probably go home the same day as the surgery. You will probably need to take 1 or 2 days off from work after the surgery. Follow-up care is a key part of your treatment and safety. Be sure to make and go to all appointments, and call your doctor if you are having problems. It's also a good idea to know your test results and keep a list of the medicines you take.   How do you prepare for surgery? Surgery can be stressful. This information will help you understand what you can expect. And it will help you safely prepare for surgery. Preparing for surgery  · Be sure you have someone to take you home. Anesthesia and pain medicine will make it unsafe for you to drive or get home on your own. · Understand exactly what surgery is planned, along with the risks, benefits, and other options. · If you take aspirin or some other blood thinner, ask your doctor if you should stop taking it before your surgery. Make sure that you understand exactly what your doctor wants you to do. These medicines increase the risk of bleeding. · Tell your doctor ALL the medicines, vitamins, supplements, and herbal remedies you take. Some may increase the risk of problems during your surgery. Your doctor will tell you if you should stop taking any of them before the surgery and how soon to do it. · Make sure your doctor and the hospital have a copy of your advance directive. If you don't have one, you may want to prepare one. It lets others know your health care wishes. It's a good thing to have before any type of surgery or procedure. What happens on the day of surgery? · Follow the instructions exactly about when to stop eating and drinking. If you don't, your surgery may be canceled. If your doctor told you to take your medicines on the day of surgery, take them with only a sip of water. · Take a bath or shower before you come in for your surgery. Do not apply lotions, perfumes, deodorants, or nail polish. · Do not shave the surgical site yourself. · Take off all jewelry and piercings. And take out contact lenses, if you wear them. At the hospital or surgery center    · Bring a picture ID. · The area for surgery is often marked to make sure there are no errors. · You will be kept comfortable and safe by your anesthesia provider. You may get medicine that relaxes you or puts you in a light sleep.  The area being worked on will be numb. When should you call your doctor? · You have questions or concerns. · You don't understand how to prepare for your surgery. · You become ill before the surgery (such as fever, flu, or a cold). · You need to reschedule or have changed your mind about having the surgery. Where can you learn more? Go to http://leslie-graciela.info/  Enter V675 in the search box to learn more about \"Hemodialysis Access: Before Your Surgery. \"  Current as of: August 12, 2019               Content Version: 12.5  © 2096-5287 [employer]. Care instructions adapted under license by [employer] (which disclaims liability or warranty for this information). If you have questions about a medical condition or this instruction, always ask your healthcare professional. [address] any warranty or liability for your use of this information. Patient Education        Hyperkalemia: Care Instructions  Your Care Instructions     Hyperkalemia is too much potassium in the blood. Potassium helps keep the right mix of fluids in your body. It also helps your nerves and muscles work as they should. And it keeps your heartbeat in a normal rhythm. Some things can raise potassium levels. These include some health problems, medicines, and kidney problems. (Normally, your kidneys remove extra potassium.)  Too much potassium can cause nausea. It also can cause a heartbeat that isn't normal. But you may not have any symptoms. Too much potassium can be dangerous. That's why it's important to treat it. If you are taking any of the medicines that can raise your levels, your doctor will ask you to stop. You may get medicines to lower your levels. And you may have to limit or not eat foods that have a lot of potassium. Follow-up care is a key part of your treatment and safety. Be sure to make and go to all appointments, and call your doctor if you are having problems. It's also a good idea to know your test results and keep a list of the medicines you take. How can you care for yourself at home? · Take your medicines exactly as prescribed. Call your doctor if you think you are having a problem with your medicine. · Stop taking certain medicines if your doctor asks you to. They may be causing your high potassium levels. If you have concerns about stopping medicine, talk with your doctor. · If you have kidney, heart, or liver disease and have to limit fluids, talk with your doctor before you increase the amount of fluids you drink. If the doctor says it's okay, drink plenty of fluids. This means drinking enough so that your urine is light yellow or clear like water. · Avoid strenuous exercise until your doctor tells you it is okay. · Potassium is in many foods, including vegetables, fruits, and milk products. Foods high in potassium include bananas, cantaloupe, broccoli, milk, potatoes, and tomatoes. · Low potassium foods include blueberries, raspberries, cucumber, white or brown rice, spaghetti, and macaroni. · Do not use a salt substitute without talking to your doctor first. Most of these are very high in potassium. · Be sure to tell your doctor about any prescription, over-the-counter, or herbal medicines you take. Some of these can raise potassium. When should you call for help? [id number] anytime you think you may need emergency care. For example, call if:  · You passed out (lost consciousness). · You have an unusual heartbeat. Your heart may beat fast or skip beats. Call your doctor now or seek immediate medical care if:  · You have muscle aches. · You feel very weak. Watch closely for changes in your health, and be sure to contact your doctor if:  · You do not get better as expected. Where can you learn more? Go to http://leslie-graciela.info/  Enter G087 in the search box to learn more about \"Hyperkalemia: Care Instructions. \"  Current as of: August 12, 2019               Content Version: 12.5  © 5158-2950 [employer]. Care instructions adapted under license by [employer] (which disclaims liability or warranty for this information). If you have questions about a medical condition or this instruction, always ask your healthcare professional. [address] any warranty or liability for your use of this information.

## 2020-07-06 NOTE — PROGRESS NOTES
conducted an initial consultation and Spiritual Assessment for Marquita Kimble, who is a 61 y.o.,male. Patients Primary Language is: [place].    According to the patients EMR Mandaen Affiliation is: Anglican.     The reason the Patient came to the hospital is:   Patient Active Problem List    Diagnosis Date Noted    Complication of vascular dialysis catheter 07/04/2020    Hyperkalemia 07/03/2020    ESRD (end stage renal disease) ([address] 07/03/2020    Anemia 02/26/2020    Hematoma 02/26/2020    GI bleed 02/26/2020    Fall 02/26/2020    ESRD on dialysis ([address] 02/26/2020    Supratherapeutic INR 02/26/2020    Skin tear of elbow without complication 33/73/3967    Pancreatic insufficiency 09/08/2018    Alcohol-induced chronic pancreatitis ([address] 09/07/2018    Acute on chronic renal failure ([address] 09/07/2018    Acute pain in scrotum 09/07/2018    Obesity, morbid ([address] 02/09/2018    Type 2 diabetes with nephropathy ([address] 02/09/2018    Type II diabetes mellitus with nephropathy ([address] 02/09/2018    Chronic renal disease, stage IV ([address] 07/16/2017    Cocaine abuse ([address] 07/14/2017    Chronic kidney disease, stage III (moderate) ([facility]) 02/09/2017    Persistent proteinuria 02/09/2017    Secondary hyperparathyroidism of renal origin ([address] 02/09/2017    Hypoglycemia 02/09/2017    Left sided numbness 02/07/2017    Acute chest pain 08/13/2016    Advance directive discussed with patient 03/11/2016    Essential hypertension 12/11/2015    Acute unilateral obstructive uropathy 08/17/2015    ARF (acute renal failure) ([address] 08/17/2015    Type 2 diabetes mellitus without complication ([facility]) 72/61/3454    Hypertriglyceridemia 12/12/2014    Microalbuminuria 12/10/2014    Renal stones 11/24/2014    Laceration of gum 02/23/2014    Fractured tooth 02/23/2014    Hemoptysis 02/21/2014    Rash 02/21/2014    Testicular/scrotal pain 07/14/2013    UTI (urinary tract infection) 07/14/2013    Contusion of knee 07/14/2013    Knee strain 07/14/2013    Effusion of patella 07/14/2013    Noncompliance     Constipation 05/25/2012    Tobacco abuse     TIA (transient ischemic attack)     Hepatic steatosis     Dyslipidemia     Gout     CAD (coronary artery disease)     Hypertensive heart disease     Obesity (BMI 30-39. 9)     Right knee pain     Allergic rhinitis 04/28/2010    Neuropathy 04/28/2010        The  provided the following Interventions:  Initiated a relationship of care and support. Listened empathically. Provided information about Spiritual Care Services. Chart reviewed. The following outcomes where achieved:  Patient shared limited information about both their medical narrative. Patient processed feeling about current hospitalization. Patient expressed gratitude for 's visit. Assessment:  Patient does not have any Rastafarian/cultural needs that will affect patients preferences in health care. There are no spiritual or Rastafarian issues which require intervention at this time. Plan:  Chaplains will continue to follow and will provide pastoral care on an as needed/requested basis.  recommends bedside caregivers page  on duty if patient shows signs of acute spiritual or emotional distress.     [id number] [facility]   [phone]

## 2020-07-06 NOTE — ROUTINE PROCESS
Bedside shift change report given to Corina Langford (oncoming nurse) by Erin Aceves RN (offgoing nurse). Report included the following information SBAR, Kardex, MAR and Recent Results.

## 2020-07-06 NOTE — ROUTINE PROCESS
TRANSFER - IN REPORT: 
 
Verbal report received from [facility] RN(name) on Ricky Rush  being received from [address] [place] [address](unit) for ordered procedure Report consisted of patients Situation, Background, Assessment and  
Recommendations(SBAR). Information from the following report(s) SBAR, Kardex and Pre Procedure Checklist was reviewed with the receiving nurse. Opportunity for questions and clarification was provided. Assessment completed upon patients arrival to unit and care assumed.

## 2020-07-06 NOTE — ROUTINE PROCESS
TRANSFER - OUT REPORT: 
 
Verbal report given to Yash (dialysis) and Mike Tolbert ([address] [facility]) on Torri Espinoza  being transferred to dialysis and then will go back to room 455 for routine progression of care Report consisted of patients Situation, Background, Assessment and  
Recommendations(SBAR). Information from the following report(s) SBAR, OR Summary, Intake/Output and MAR was reviewed with the receiving nurse. Lines:  
Peripheral IV 07/03/20 Right Antecubital (Active) Site Assessment Clean, dry, & intact 7/6/2020  3:08 PM  
Phlebitis Assessment 0 7/6/2020  3:08 PM  
Infiltration Assessment 0 7/6/2020  3:08 PM  
Dressing Status Clean, dry, & intact 7/6/2020  3:08 PM  
Dressing Type Tape;Transparent 7/6/2020  3:08 PM  
Hub Color/Line Status Pink; Infusing 7/6/2020  3:08 PM  
Action Taken Open ports on tubing capped 7/6/2020 12:54 PM  
Alcohol Cap Used Yes 7/6/2020 12:54 PM  
  
 
Opportunity for questions and clarification was provided. Patient transported with: 
 [employer]

## 2020-07-06 NOTE — PROGRESS NOTES
RENAL DAILY PROGRESS NOTE    Patient: Cindy Arroyo               Sex: male          DOA: 7/3/2020  7:16 AM        YOB: 1960      Age:  61 y.o.        LOS:  LOS: 3 days     Subjective:     Cindy Arroyo is a 61 y.o.  who presents with Hyperkalemia [E87.5]  ESRD (end stage renal disease) ([facility] [address] [N18.6]  Hyperkalemia [E87.5]  ESRD (end stage renal disease) ([facility] [address] [N18.6].    Asked to evaluate for esrd,admitted with clotted access  Chief complains: Patient denies nausea, vomiting, chest pain, dizziness, shortness of breath or headache.  - Reviewed last 24 hrs events     Current Facility-Administered Medications   Medication Dose Route Frequency    heparin (porcine) 5,000 unit/mL injection        lidocaine (PF) (XYLOCAINE) 10 mg/mL (1 %) injection        heparinized saline 2 units/mL 1,000 unit/500 mL infusion        iopamidoL (ISOVUE-M 200) 41 % contrast solution        ceFAZolin (ANCEF) 2g IVPB in 50 mL D5W  2 g IntraVENous ONCE    diclofenac (VOLTAREN) 1 % topical gel 4 g  4 g Topical QID PRN    vit B12-levomefolate calcium-vit B6 (FOLBIC RF, FOLTX) 2-1.13-25 mg tablet 1 Tab  1 Tab Oral DAILY    oxyCODONE-acetaminophen (PERCOCET) 5-325 mg per tablet 2 Tab  2 Tab Oral Q4H PRN    arformoterol 15 mcg/budesonide 0.25 mg neb solution   Nebulization BID RT    acetaminophen (TYLENOL) tablet 650 mg  650 mg Oral Q4H PRN    atorvastatin (LIPITOR) tablet 40 mg  40 mg Oral QHS    hydrALAZINE (APRESOLINE) tablet 50 mg  50 mg Oral BID    lipase-protease-amylase (CREON 6,000) capsule 1 Cap  1 Cap Oral TID WITH MEALS    pantoprazole (PROTONIX) tablet 40 mg  40 mg Oral ACB    carvediloL (COREG) tablet 6.25 mg  6.25 mg Oral BID WITH MEALS    insulin lispro (HUMALOG) injection   SubCUTAneous AC&HS    glucose chewable tablet 16 g  4 Tab Oral PRN    glucagon (GLUCAGEN) injection 1 mg  1 mg IntraMUSCular PRN    dextrose (D50W) injection syrg 12.5-25 g  25-50 mL IntraVENous PRN    heparin (porcine) injection 5,000 Units  5,000 Units SubCUTAneous Q8H       Objective:     Visit Vitals  /82   Pulse 71   Temp 97.6 °F (36.4 °C)   Resp 18   Ht 5' 4\" (1.626 m)   Wt 92.9 kg (204 lb 11.2 oz)   SpO2 98%   BMI 35.14 kg/m²       Intake/Output Summary (Last 24 hours) at 7/6/2020 1325  Last data filed at 7/6/2020 0422  Gross per 24 hour   Intake 240 ml   Output 250 ml   Net -10 ml       Physical Examination:     GEN: AAO X 3, NAD  RS: Chest is bilateral equal, no wheezing / rales / crackles  CVS: S1-S2 heard  Abdomen: Soft  Extremities: No edema, no cyanosis, skin is warm on touch  CNS: Awake   HEENT: Head is atraumatic, PERRLA, conjunctiva pink & non icteric. No JVD      Data Review:      Labs:     Hematology:   Recent Labs     07/06/20  0501 07/04/20  0034   WBC 6.4 6.4   HGB 10.5* 10.2*   HCT 32.6* 32.7*     Chemistry:   Recent Labs     07/06/20  0501 07/04/20  0034   BUN 49* 34*   CREA 9.25* 8.06*   CA 8.7  8.4* 8.0*   K 5.4 4.8   * 135*    102   CO2 23 25   PHOS 5.6*  --    * 94        Images:    XR (Most Recent). CXR reviewed by me and compared with previous CXR Results from Hospital Encounter encounter on 06/25/20   XR CHEST PORT    Narrative AP CHEST, PORTABLE    INDICATION: Chest pain, shortness of breath    COMPARISON: Prior chest x-rays, most recent 3/30/2020    FINDINGS:    Stable enlarged cardiac silhouette. Mild prominence of the bronchovascular  markings. No focal consolidation, pleural effusion, or pneumothorax. No acute  osseous abnormalities are identified. Impression Impression:      1. Stable mildly enlarged cardiac silhouette. 2. Mild prominence of the bronchovascular markings, possibly mild pulmonary  vascular congestion or nonspecific bronchitis.                     CT (Most Recent) Results from Hospital Encounter encounter on 06/25/20   CTA CHEST W OR W WO CONT    Narrative CT CHEST PULMONARY EMBOLISM PROTOCOL    CPT code: 76672    INDICATION: Left-sided chest pain x1 day. Shortness of breath. Question pulmonary  embolism. TECHNIQUE: 1.6KO collimation helical images obtained through the level of the  pulmonary arteries with additional imaging through the chest following the  uneventful administration of 100 cc's nonionic intravenous contrast.   - Images reconstructed into three dimensional coronal and sagittal projections  for complete evaluation of the tortuous and overlapping pulmonary vascular  structures and to reduce patient radiation dose. All CT scans at this facility are performed using dose optimization technique as  appropriate to this specific exam, to include automated exposure control,  adjustment of the mA and/or KP according to patient size or use of iterative  reconstruction techniques. COMPARISON: Chest x-ray earlier same day. FINDINGS:  Opacification of the pulmonary arteries is adequate. Mild motion artifact. Main  pulmonary artery not enlarged. Right and left pulmonary arteries are borderline  enlarged. May reflect component of pulmonary artery hypertension. Correlate  clinically. No filling defects are appreciated within the main, left, right, lobar or  visualized segmental pulmonary arteries to suggest embolism. Heart size top normal. Subjective left ventricular hypertrophy. Trace pericardial fluid present dating back to abdominal CT of September 2018  however. .  Aorta not enlarged. No evidence of dissection. No pneumothorax. No overt pulmonary edema. Subpleural cystic lucencies anterior  medial left upper lobe with essentially imperceptible walls likely bullae. Subsegmental atelectasis or scarring in the lingular segment left upper lobe. No focal consolidation. No pleural effusion. No appreciably enlarged axillary or mediastinal lymph nodes. Possible hepatomegaly though incompletely included. Degenerative change  commensurate with age. Impression IMPRESSION:    1.  No CT evidence for central pulmonary embolism. 2. Nonenlarged heart. Trace pericardial fluid.  -Nonenlarged aorta. No evidence of dissection. 3. Small amount of subpleural bullous disease. No pneumothorax, evidence of  pneumonia or pleural effusion. EKG Results for orders placed or performed in visit on 07/31/17   AMB POC EKG ROUTINE W/ 12 LEADS, INTER & REP     Status: None    Narrative    Normal sinus rhythm, rate 97. LAE. T-wave inversions in anterolateral leads, consider lateral ischemia. I have personally reviewed the old medical records and patient's labs    Plan / Recommendation:      1. Esrd,dialysis mon wed Friday. orders written,dialysis nurse notified. plan dialysis today after fistulogram,remove femoral cath prior to discharge      D/w Dr. Mir Lewis MD  Nephrology  7/6/2020

## 2020-07-06 NOTE — PROGRESS NOTES
ACUTE HEMODIALYSIS FLOW SHEET    HEMODIALYSIS ORDERS: Physician: Dr. Regla Landon: Christie   Duration: 3.5 hr  BFR: 400   DFR: 800   Dialysate:  Temp 36.0   K+   2    Ca+  2.5   Na 138   Bicarb 30   Wt Readings from Last 1 Encounters:   07/05/20 92.9 kg (204 lb 11.2 oz)    Patient Chart [x]   Unable to Obtain []  Dry weight/UF Goal: 3000 ml  Access LLE AVF     Heparin []  Bolus    Units    [] Hourly    Units    [x]None      Catheter locking solution    Pre BP:   138/93    Pulse:  75   Respirations: 18    Temperature:  98.6    Tx: NSS   ml/Bolus   [x] N/A   Labs: []  Pre  []  Post:   [x] N/A   Additional Orders(medications, blood products, hypotension management): [] Yes   [x] No     [x]  [facility] Consent Verified     CATHETER ACCESS:  [x]N/A   []Right   []Left   []IJ     []Fem   []Chest wall   [] First use X-ray verified     []Tunnel    [] Non Tunneled   []No S/S infection  []Redness  []Drainage []Cultured []Swelling []Pain   []Medical Aseptic Prep Utilized   []Dressing Changed  [] Biopatch  Date:    []Clotted   []Patent   Flows: []Good  []Poor  []Reversed   If access problem,  notified: []Yes    [x]N/A        GRAFT/FISTULA ACCESS:   []N/A     []Right     [x]Left     []UE     [x]LE   []AVG   [x]AVF     []Buttonhole    []Medical Aseptic Prep Utilized   [x]No S/S infection  []Redness  []Drainage []Cultured  [] Swelling  [] Pain  Bruit:   [x] Strong    [] Weak       Thrill :   [x] Strong    [] Weak     Needle Gauge: 15   Length: 1 inch   If access problem,  notified: []Yes     [x]N/A     Please describe access if present and not used: N/A       GENERAL ASSESSMENT:    LUNGS:   SaO2%      [] Clear  [x] Coarse  [] Crackles  [] Wheezing                                                [] Diminished     Location : []RLL   []LLL    []RUL  []JUAN LUIS   Cough: []Productive  []Dry  [x]N/A   Respirations:  [x]Easy  []Labored   Therapy:  [x]RA  []NC l/min    Mask: []NRB  [] Venti    O2% []Ventilator  []Intubated  [] Trach  [] BiPaP   CARDIAC: []Regular      [x] Irregular   [] Pericardial Rub  [] JVD          []  Monitored  [] Bedside  [] Remotely monitored     EDEMA: [] None  [x]Generalized  [] Pitting [] 1    [] 2    [] 3    [] 4                 [] Facial  [] Pedal  []  UE  [] LE   SKIN:   [x] Warm  [] Hot     [] Cold   [x] Dry     [] Pale   [] Diaphoretic                  [] Flushed  [] Jaundiced  [] Cyanotic  [] Rash  [] Weeping   LOC:    [x] Alert      [x]Oriented:    [x] Person     [x] Place  [x]Time               [] Confused  [] Lethargic  [] Medicated  [] Non-responsive   GI / ABDOMEN:                     [] Flat    [] Distended    [x] Soft    [] Firm   []  Obese                   [] Diarrhea  [x] Bowel Sounds  [] Nausea  [] Vomiting     / URINE ASSESSMENT:                   [x] Voiding   [] Oliguria  [] Anuria   []  Montaño                  [] Incontinent  []  Incontinent Brief []  Fecal Management System     PAIN:  [x] 0 []1  []2   []3   []4   []5   []6   []7   []8   []9   []10            Scale 0-10  Action/Follow Up:    MOBILITY:  [x] Bed    [] Stretcher      All Vitals and Treatment Details on Attached [address]: [facility]          Room # CVS/PL   [] 1st Time Acute      [] Stat       [x] Routine      [] Urgent     [x] Acute Room  []  Bedside  [] ICU/CCU  [] ER   Isolation Precautions:  [x] Dialysis    There are currently no Active Isolations       ALLERGIES:     Allergies   Allergen Reactions    Shellfish Derived Other (comments)     Causes Gout    Tramadol Itching      Code Status:  Full Code     Hepatitis Status     Lab Results   Component Value Date/Time    Hepatitis B surface Ag <0.10 04/27/2020 12:26 PM    Hepatitis B surface Ab 23.52 04/27/2020 12:26 PM    Hep B Core Ab, total Positive (A) 11/06/2019 10:21 AM    HEP C VIRUS AB <0.1 09/23/2015 11:29 AM        Current Labs:      Lab Results   Component Value Date/Time    WBC 6.4 07/06/2020 05:01 AM    Hemoglobin, POC 10.5 (L) 07/12/2019 08:05 AM    HGB 10.5 (L) 07/06/2020 05:01 AM    Hematocrit, POC 31 (L) 07/12/2019 08:05 AM    HCT 32.6 (L) 07/06/2020 05:01 AM    PLATELET 972 04/10/8819 05:01 AM    MCV 91.1 07/06/2020 05:01 AM     Lab Results   Component Value Date/Time    Sodium 131 (L) 07/06/2020 05:01 AM    Potassium 5.4 07/06/2020 05:01 AM    Chloride 100 07/06/2020 05:01 AM    CO2 23 07/06/2020 05:01 AM    Anion gap 8 07/06/2020 05:01 AM    Glucose 108 (H) 07/06/2020 05:01 AM    BUN 49 (H) 07/06/2020 05:01 AM    Creatinine 9.25 (H) 07/06/2020 05:01 AM    BUN/Creatinine ratio 5 (L) 07/06/2020 05:01 AM    GFR est AA 7 (L) 07/06/2020 05:01 AM    GFR est non-AA 6 (L) 07/06/2020 05:01 AM    Calcium 8.4 (L) 07/06/2020 05:01 AM    Calcium 8.7 07/06/2020 05:01 AM          DIET:  DIET RENAL      PRIMARY NURSE REPORT:   Pre Dialysis: Nina Hansen RN     Time: 56        EDUCATION:    [x] Patient [] Other           Knowledge Basis: []None [x]Minimal [] Substantial   Barriers to learning  [x]N/A   [] Access Care     [] S&S of infection  [] Fluid Management  [] K+   [x] Procedural    []Albumin   [] Medications   [] Tx Options   [] Transplant   [] Diet   [] Other   Teaching Tools:  [x] Explain  [] Demo  [] Handouts [] Video  Patient response: [x] Verbalized understanding  [] Teach back  [] Return demonstration   [] Requires follow up      [x]Time Out/Safety Check  [x] Extracorporeal Circuit Tested for integrity       RO/HEMODIALYSIS MACHINE SAFETY CHECKS  Before each treatment:     Machine Number:                   [facility]                                     [x] Unit Machine # 6 with centralized RO                                                                                                       Alarm Test:  Pass time 1550            [x] RO/Machine Log Complete    Machine Temp    36.0             Dialysate: pH  7.4    Conductivity: Meter 13.8     HD Machine  13.8      TCD: 13.7  Dialyzer Lot # [id number]     Blood Tubing Lot # [id number]     Saline Lot # [id number]     CHLORINE TESTING-Before each treatment and every 4 hours    Total Chlorine: [x] less than 0.1 ppm  Initial Time Check: 1300       4 Hr/2nd Check Time: 1700   (if greater than 0.1 ppm from Primary then every 30 minutes from Secondary)     TREATMENT INITIATION  with Dialysis Precautions:   [x] All Connections Secured              [x] Saline Line Double Clamped   [x] Venous Parameters Set               [x] Arterial Parameters Set    [x] Prime Given 250ml NSS              [x]Air Foam Detector Engaged      Treatment Initiation Note:  6051  Pt arrived to HD from [facility] without any complaints. Pt A &O X 3, follows commands, no distress noted. During Treatment Notes:  3299  AVF assessed no abnormalities noted, skin glue noted to lower arm from fistulogram, bruit and thrill strong. AVF accessed without any difficulty, pt tolerated well. Vascular access visible with arterial and venous line connections intact. 1700  Vascular access visible with arterial and venous line connections intact. 1800 Vascular access visible with arterial and venous line connections intact. 1900  Vascular access visible with arterial and venous line connections intact. Medication Dose Volume Route Time [facility] [place] Nurse, Title   none     Elvis Tapia RN     Post Assessment  Dialyzer Cleared:[] Good [x] Fair  [] Poor  Blood processed:  61.5 L  UF Removed:  3500 Ml  Post /96  Pulse  92 Resp  20   Temp 98.3    Post Tx Vascular Access: [] N/A  AVF/AVG: Bleeding stopped with  Arterial Pressure for 10 min   Venous Pressure for 10 min       CVC Catheter: [x] N/A           Skin:[x] Warm  [x] Dry [] Diaphoretic               [] Flushed  [] Pale [] Cyanotic   Pain:  [x]0  []1 []2  []3 []4  []5  []6 []7 []8  []9  []10       Post Treatment Note:   2016  HD completed at this time, pt tolerated well. Dressing clean, dry and intact. POST TREATMENT PRIMARY NURSE HANDOFF REPORT:   Post Dialysis: GALLO Jennifer RN                Time:  2019       Abbreviations: AVG-arterial venous graft, AVF-arterial venous fistula, IJ-Internal Jugular, Subcl-Subclavian, Fem-Femoral, Tx-treatment, AP/HR-apical heart rate, DFR-dialysate flow rate, BFR-blood flow rate, AP-arterial pressure, -venous pressure, UF-ultrafiltrate, TMP-transmembrane pressure, Moi-Venous, Art-Arterial, RO-Reverse Osmosis

## 2020-07-06 NOTE — PROGRESS NOTES
Problem: Falls - Risk of  Goal: *Absence of Falls  Description: Document Anupam Templeton Fall Risk and appropriate interventions in the flowsheet. Outcome: Progressing Towards Goal  Note: Fall Risk Interventions:            Medication Interventions: Teach patient to arise slowly                   Problem: Patient Education: Go to Patient Education Activity  Goal: Patient/Family Education  Outcome: Progressing Towards Goal     Problem: Infection - Risk of, Central Venous Catheter-Associated Bloodstream Infection  Goal: *Absence of infection signs and symptoms  Outcome: Progressing Towards Goal     Problem: Patient Education: Go to Patient Education Activity  Goal: Patient/Family Education  Outcome: Progressing Towards Goal     Problem: Pain  Goal: *Control of Pain  Outcome: Progressing Towards Goal     Problem: Patient Education: Go to Patient Education Activity  Goal: Patient/Family Education  Outcome: Progressing Towards Goal     Problem: Diabetes Self-Management  Goal: *Disease process and treatment process  Description: Define diabetes and identify own type of diabetes; list 3 options for treating diabetes. Outcome: Progressing Towards Goal  Goal: *Incorporating nutritional management into lifestyle  Description: Describe effect of type, amount and timing of food on blood glucose; list 3 methods for planning meals. Outcome: Progressing Towards Goal  Goal: *Incorporating physical activity into lifestyle  Description: State effect of exercise on blood glucose levels. Outcome: Progressing Towards Goal  Goal: *Developing strategies to promote health/change behavior  Description: Define the ABC's of diabetes; identify appropriate screenings, schedule and personal plan for screenings. Outcome: Progressing Towards Goal  Goal: *Using medications safely  Description: State effect of diabetes medications on diabetes; name diabetes medication taking, action and side effects.   Outcome: Progressing Towards Goal  Goal: *Monitoring blood glucose, interpreting and using results  Description: Identify recommended blood glucose targets  and personal targets. Outcome: Progressing Towards Goal  Goal: *Prevention, detection, treatment of acute complications  Description: List symptoms of hyper- and hypoglycemia; describe how to treat low blood sugar and actions for lowering  high blood glucose level. Outcome: Progressing Towards Goal  Goal: *Prevention, detection and treatment of chronic complications  Description: Define the natural course of diabetes and describe the relationship of blood glucose levels to long term complications of diabetes.   Outcome: Progressing Towards Goal  Goal: *Developing strategies to address psychosocial issues  Description: Describe feelings about living with diabetes; identify support needed and support network  Outcome: Progressing Towards Goal  Goal: *Sick day guidelines  Outcome: Progressing Towards Goal  Goal: *Patient Specific Goal (EDIT GOAL, INSERT TEXT)  Outcome: Progressing Towards Goal     Problem: Patient Education: Go to Patient Education Activity  Goal: Patient/Family Education  Outcome: Progressing Towards Goal

## 2020-07-06 NOTE — PROGRESS NOTES
Discharge planning:    Patient continues to progress towards discharge. Patient's discharge plan remains that he will return home with help from his family. His family will also transport him home, upon discharge. This writer will continue to monitor for discharge planning and any discharge recommendations, to ensure a safe discharge home from [facility]. Lorrie Sorensen MSW  Care Manager  Pager#: [phone]

## 2020-07-06 NOTE — PROGRESS NOTES
Problem: Falls - Risk of  Goal: *Absence of Falls  Description: Document Donata Carrel Fall Risk and appropriate interventions in the flowsheet. Outcome: Progressing Towards Goal  Note: Fall Risk Interventions:            Medication Interventions: Teach patient to arise slowly                   Problem: Patient Education: Go to Patient Education Activity  Goal: Patient/Family Education  Outcome: Progressing Towards Goal     Problem: Infection - Risk of, Central Venous Catheter-Associated Bloodstream Infection  Goal: *Absence of infection signs and symptoms  Outcome: Progressing Towards Goal     Problem: Patient Education: Go to Patient Education Activity  Goal: Patient/Family Education  Outcome: Progressing Towards Goal     Problem: Pain  Goal: *Control of Pain  Outcome: Progressing Towards Goal     Problem: Patient Education: Go to Patient Education Activity  Goal: Patient/Family Education  Outcome: Progressing Towards Goal     Problem: Diabetes Self-Management  Goal: *Disease process and treatment process  Description: Define diabetes and identify own type of diabetes; list 3 options for treating diabetes. Outcome: Progressing Towards Goal  Goal: *Incorporating nutritional management into lifestyle  Description: Describe effect of type, amount and timing of food on blood glucose; list 3 methods for planning meals. Outcome: Progressing Towards Goal  Goal: *Incorporating physical activity into lifestyle  Description: State effect of exercise on blood glucose levels. Outcome: Progressing Towards Goal  Goal: *Developing strategies to promote health/change behavior  Description: Define the ABC's of diabetes; identify appropriate screenings, schedule and personal plan for screenings. Outcome: Progressing Towards Goal  Goal: *Using medications safely  Description: State effect of diabetes medications on diabetes; name diabetes medication taking, action and side effects.   Outcome: Progressing Towards Goal  Goal: *Monitoring blood glucose, interpreting and using results  Description: Identify recommended blood glucose targets  and personal targets. Outcome: Progressing Towards Goal  Goal: *Prevention, detection, treatment of acute complications  Description: List symptoms of hyper- and hypoglycemia; describe how to treat low blood sugar and actions for lowering  high blood glucose level. Outcome: Progressing Towards Goal  Goal: *Prevention, detection and treatment of chronic complications  Description: Define the natural course of diabetes and describe the relationship of blood glucose levels to long term complications of diabetes.   Outcome: Progressing Towards Goal  Goal: *Developing strategies to address psychosocial issues  Description: Describe feelings about living with diabetes; identify support needed and support network  Outcome: Progressing Towards Goal  Goal: *Sick day guidelines  Outcome: Progressing Towards Goal  Goal: *Patient Specific Goal (EDIT GOAL, INSERT TEXT)  Outcome: Progressing Towards Goal

## 2020-07-06 NOTE — ANESTHESIA POSTPROCEDURE EVALUATION
Procedure(s):  LEFT ARM FISTULA DECLOT / BALLOON ANGIOPLASTY  PERM-CATH REMOVAL. MAC    Anesthesia Post Evaluation      Multimodal analgesia: multimodal analgesia used between 6 hours prior to anesthesia start to PACU discharge  Patient location during evaluation: bedside  Patient participation: complete - patient participated  Level of consciousness: awake  Pain management: adequate  Airway patency: patent  Anesthetic complications: no  Cardiovascular status: stable  Respiratory status: acceptable  Hydration status: acceptable  Post anesthesia nausea and vomiting:  controlled      INITIAL Post-op Vital signs:   Vitals Value Taken Time   /83 7/6/2020  3:27 PM   Temp 36.4 °C (97.6 °F) 7/6/2020  3:08 PM   Pulse 72 7/6/2020  3:29 PM   Resp 15 7/6/2020  3:29 PM   SpO2 99 % 7/6/2020  3:29 PM   Vitals shown include unvalidated device data.

## 2020-07-06 NOTE — PROGRESS NOTES
Bedside and Verbal shift change report given to Lalita Kelly, JEN (oncoming nurse) by Nilda Islas RN (offgoing nurse). Report included the following information SBAR, Kardex, MAR and Recent Results. SITUATION:  Code Status: Full Code  Reason for Admission: Hyperkalemia [E87.5]  ESRD (end stage renal disease) ([facility] [N18.6]  Hyperkalemia [E87.5]  ESRD (end stage renal disease) ([facility] [N18.6]  Hospital day: 3  Problem List:       Hospital Problems  Date Reviewed: 8/7/2019          Codes Class Noted POA    * (Principal) Complication of vascular dialysis catheter ICD-10-CM: T82. 9XXA  ICD-9-CM: 996.1  7/4/2020 Unknown        Hyperkalemia ICD-10-CM: E87.5  ICD-9-CM: 276.7  7/3/2020 Unknown        ESRD (end stage renal disease) ([facility] ICD-10-CM: N18.6  ICD-9-CM: 585.6  7/3/2020 Unknown              BACKGROUND:   Past Medical History:   Past Medical History:   Diagnosis Date    Alcohol abuse     Alcohol-induced chronic pancreatitis ([facility] 9/7/2018    Arthritis     Atherosclerotic cardiovascular disease     CAD (coronary artery disease)     mild disease of coronaries (cor angio 2006),wife states he has 1 stent    Cardiac cath 01/26/2006    RCA mild. Otherwise patent coronaries. LVEDP 15.  EF 55-60%.  Cardiac echocardiogram 03/27/2009    EF 60%. No cardiac source of embolism. No significant valvular pathology.  Cardiac nuclear imaging test 12/06/2011    Small, mild inferior infarction or possibly artifact. No ischemia. EF 45%. No TID. No reg WMA.  Cardiovascular LLE venous duplex 08/14/2015    Left leg:  No DVT. Dilated lymph node in left groin.  Chronic kidney disease     Chronic obstructive pulmonary disease (HCC)     Constipation     Diabetes mellitus type II     Gout     Hepatic steatosis     Hypercholesterolemia     Hypertension     Knee effusion, left     Left knee pain     Morbid obesity (HCC)     Noncompliance     Obesity (BMI 30-39. 9)     S/P colonoscopy 3/8/05    Dr. Ivana Samayoa; normal; f/u 10 years    Stomach problems     TIA (transient ischemic attack) 3/09    Tobacco abuse       Patient taking anticoagulants yes    Patient has a defibrillator: no    History of shots YES for example, flu, pneumonia, tetanus   Isolation History NO for example, MRSA, CDiff    ASSESSMENT:  Changes in Assessment Throughout Shift: NONE  Significant Changes in 24 hours (for example, RR/code, fall)  Patient has Central Line: yes Reasons if yes: Hemodialysis  Patient has Montaño Cath: no   Mobility Issues  PT  IV Patency  OR Checklist  Pending Tests    Last Vitals:  Vitals w/ MEWS Score (last day)     Date/Time MEWS Score Pulse Resp Temp BP Level of Consciousness SpO2    07/06/20 0422  1  71  20  97.1 °F (36.2 °C)  142/70  Alert  96 %    07/06/20 0007  1  82  20  97.3 °F (36.3 °C)  139/77  Alert  95 %    07/05/20 2012  1  67  20  97.5 °F (36.4 °C)  153/80  Alert  96 %    07/05/20 1939              97 %    07/05/20 1613  1  77  20  97.6 °F (36.4 °C)  125/81  Alert  96 %    07/05/20 1103  1  71  20  98.5 °F (36.9 °C)  112/65  Alert  100 %    07/05/20 0719  1  67  19  98.2 °F (36.8 °C)  152/79  Alert  94 %    07/05/20 0703              98 %    07/05/20 0402  1  70  18  97.4 °F (36.3 °C)  131/70  Alert  97 %            PAIN    Pain Assessment    Pain Intensity 1: 2 (07/06/20 0422)    Pain Location 1: Arm    Pain Intervention(s) 1: Medication (see MAR)    Patient Stated Pain Goal: 0  Intervention effective: yes  Time of last intervention: 2021 Reassessment Completed: yes   Other actions taken for pain: Distraction    Last 3 Weights:  Last 3 Recorded Weights in this Encounter    07/03/20 1910 07/04/20 0407 07/05/20 0640   Weight: 106.6 kg (235 lb) 107 kg (235 lb 14.3 oz) 92.9 kg (204 lb 11.2 oz)   Weight change:     INTAKE/OUPUT    Current Shift: No intake/output data recorded.     Last three shifts: 07/04 1901 - 07/06 0700  In: 1080 [P.O.:1080]  Out: 550 [Urine:550]    RECOMMENDATIONS AND DISCHARGE PLANNING  Patient needs and requests: Pain Management    Pending tests/procedures: labs     Discharge plan for patient: Home    Discharge planning Needs or Barriers: None    Estimated Discharge Date: 7/06/2020 Posted on Whiteboard in Patients Room: yes       \"HEALS\" SAFETY CHECK  A safety check occurred in the patient's room between off going nurse and oncoming nurse listed above. The safety check included the below items:    H  High Alert Medications Verify all high alert medication drips (heparin, PCA, etc.)  E  Equipment Suction is set up for ALL patients (with stefano)  Red plugs utilized for all equipment (IV pumps, etc.)  WOWs wiped down at end of shift. Room stocked with oxygen, suction, and other unit-specific supplies  A  Alarms Bed alarm is set for fall risk patients  Ensure chair alarm is in place and activated if patient is up in a chair  L  Lines Check IV for any infiltration  Montaño bag is empty if patient has a Montaño   Tubing and IV bags are labeled  S  Safety  Room is clean, patient is clean, and equipment is clean. Hallways are clear from equipment besides carts. Fall bracelet on for fall risk patients  Ensure room is clear and free of clutter  Suction is set up for ALL patients (with stefano)  Hallways are clear from equipment besides carts.    Isolation precautions followed, supplies available outside room, sign posted    Luis A Mendez RN

## 2020-07-06 NOTE — OP NOTES
Preoperative diagnosis: Clotted left arm radiocephalic fistula    Postoperative diagnosis: Same    Procedures performed:  #1  Thrombectomy left arm fistula  #2  Left arm fistulogram with interpretation  #3    #4      Cultures: None    Specimens: None    Drains: None    Estimated blood loss: Less than 50 mL    Assistants: None    Implants: Please see above    Complications: None    Anesthesia: Moderate conscious sedation. Indications for the procedure:  Rachael Crain is a 61 y.o. clotted left arm fistula. Patient was given the appropriate risk and benefits of the procedure including but not limited to bleeding, infection, damage to adjacent structures, MI, stroke, death, loss of lower extremity, need for further surgery. Patient was understanding of all the risks and underwent a procedure. Operative findings:   #1  Fistula patent to the antecubital fossa where there is an occlusion. Collaterals keep fistula open. Procedure:  Patient was correctly identified in the pre op area and taken to the Cath Lab in stable condition. Patient had pre-incision timeout prior to any incision. Patient was prepped and draped in the normal sterile fashion according to CDC guidelines aseptic technique. Localized and cut down upon the arterial portion of the fistula. Is well incorporated. Placed vessel controls made and are incision on the fistula limb by scalpel and Vo scissors. There is acute thrombus of thrombectomized including pulling the arterial plug there was good arterial inflow. I dilated both ends and repaired the graftotomy with a 6-0 Prolene suture. .  There is flow into the fistula punctured into the fistulogram.  Reflux arteriogram showed adequate arterial inflow the outflow was multiple collaterals it was patent to the forearm but then ended at the antecubital fossa likely prior phlebotomy sites. I tried a wire but would not cross. Pulled the sheath and repaired the site with a 6-0 Prolene suture. Closed the wound 3-0 Monocryl and 4 Monocryl and Dermabond glue. Fistula is a nice thrill pulled the temporary catheter at the end of the case. Held pressure hemostasis.

## 2020-07-06 NOTE — DISCHARGE SUMMARY
Discharge Summary      Patient: Ben Benítez MRN: [id number]  CSN: [id number]    YOB: 1960  Age: 61 y.o. Sex: male    DOA: 7/3/2020 LOS:  LOS: 3 days   Discharge Date: 7/6/20     Admission Diagnoses: Hyperkalemia [E87.5]  ESRD (end stage renal disease) ([facility] [N18.6]  Hyperkalemia [E87.5]  [facility]RD (end stage [facility] ([facility] [N18.6]    Discharge Diagnoses:      1. Malfunctioning/clotted dialysis fistula s/p thrombectomy    2. Hyperkalemia- resolved   3. ESRD on HD   4. Anemia of CKD    5. HTN   6. Type II DM w/hyperglycemia- HbA1c 5.1 on 7/3/20    7. Chronic pancreatitis   8. Urinary hesitancy/frequency   9. Tobacco abuse   10. Medical non-compliance     Discharge Condition: Stable    PHYSICAL EXAM  Visit Vitals  BP (!) (P) 187/99   Pulse (P) 85   Temp 97.6 °F (36.4 °C)   Resp 18   Ht 5' 4\" (1.626 m)   Wt 92.9 kg (204 lb 11.2 oz)   SpO2 100%   BMI 35.14 kg/m²     General:         Alert, cooperative, no acute distress    HEENT:           NC, Atraumatic. Anicteric sclerae. Lungs:            CTA Bilaterally. No Wheezing/Rhonchi/Rales. Heart:              Regular  rhythm,  No murmur, No Rubs, No Gallops  Abdomen:      Soft, Non distended, Non tender.  +Bowel sounds  Extremities:   No c/c/e; L arm AVG   Psych:              Not anxious or agitated. Neurologic:     Alert and oriented X 3. No acute neurological deficits. Hospital Course:  Mr. Ben Benítez is a 62 y/o male with a PMHx of ESRD on HD, CAD, COPD, Type II DM, HTN, HLD, alcohol induced chronic pancreatitis, and obesity who presented to the ED with complaints of L arm pain 2/2 to a malfunctioning AV fistula. He reported that he had not had dialysis in 11 days. In the ED his blood pressures were elevated with other vitals reassuring. Labs were notable for Hgb 11.0, K+ 5.9, BUN 53, and creatinine 11.80. Mr. Martha Castanon was then admitted for placement of a temporary dialysis catheter and initiation of dialysis.     Vascular surgery was consulted and noted he had an occluded L radiocephalic fistula that would require declotting. A temporary dialysis catheter was then placed and he received urgent dialysis per nephrology. Mr. Chloe Garcia was kept over the weekend and continued on his home meds for his chronic conditions without problems. On 7/6/20 he was taken to the OR where a thrombectomy of his L arm fistula and a L arm fistulogram was performed without complications. His temporary dialysis catheter was removed after which he received dialysis through his L arm AV fistula. He had remained hemodynamically stable and felt ready for discharge. On admission and prior to discharge, medication reconciliation was attempted as best as possible for Mr. Chloe Garcia, but he appeared unsure about many of his medications. Therefore his medication list below may not be completely accurate. He was then discharged to home. Return precautions were discussed and he was given instructions for the medications below and to follow-up with his PCP as directed. Consults:   Nephrology- Dr. Caroline Bergeron MD   Vascular surgery- Dr. Jeanette Li MD     Significant Diagnostic Studies:     Duplex Hemodialysis L 7/3/20:   AVF occluded at the arterial anastomosis.   Biphasic radial arterial flow at the wrist.       Procedures Performed:     7/6/20:   Preoperative diagnosis: Clotted left arm radiocephalic fistula  Postoperative diagnosis: Same  Procedures performed:  #1  Thrombectomy left arm fistula  #2  Left arm fistulogram with interpretation     Cultures: None  Specimens: None  Drains: None  Estimated blood loss: Less than 50 mL  Assistants: None  Implants: Please see above  Complications: None  Anesthesia: Moderate conscious sedation      Discharge Medications:  Current Discharge Medication List      START taking these medications    Details   oxyCODONE-acetaminophen (PERCOCET) 5-325 mg per tablet Take 2 Tabs by mouth every four (4) hours as needed for Pain for up to 3 days. Max Daily Amount: 12 Tabs. Qty: 20 Tab, Refills: 0    Associated Diagnoses: Mechanical breakdown of vascular dialysis catheter, subsequent encounter ([facility]         CONTINUE these medications which have CHANGED    Details   febuxostat (Uloric) 40 mg tab tablet Take 1 Tab by mouth daily. Qty: 30 Tab, Refills: 0    Associated Diagnoses: Acute gout of right wrist, unspecified cause         CONTINUE these medications which have NOT CHANGED    Details   acetaminophen (TYLENOL) 325 mg tablet Take 2 Tabs by mouth every six (6) hours as needed for Pain. Qty: 30 Tab, Refills: 1      diclofenac (VOLTAREN) 1 % gel Apply 2 g to affected area four (4) times daily. Qty: 1 Each, Refills: 0      COLCRYS 0.6 mg tablet TAKE 1 TABLET BY MOUTH EVERY 1 HOURS FOR GOUT PAIN. DO NOT EXCEED 3 TABLETS IN 24 HOURS. Qty: 30 Tab, Refills: 0      ondansetron hcl (ZOFRAN) 4 mg tablet Take 1 Tab by mouth every eight (8) hours as needed for Nausea. Qty: 10 Tab, Refills: 0      lipase-protease-amylase (CREON 6000) 6,000-19,000 -30,000 unit capsule Take 1 Cap by mouth three (3) times daily (with meals). Indications: exocrine pancreatic insufficiency  Qty: 90 Cap, Refills: 0      omeprazole (PRILOSEC) 20 mg capsule Take 20 mg by mouth daily. hydrALAZINE (APRESOLINE) 50 mg tablet Take 1 Tab by mouth two (2) times a day. Qty: 20 Tab, Refills: 0      tamsulosin (FLOMAX) 0.4 mg capsule Take 1 Cap by mouth daily. Start this medication on Friday 12/15/17  Indications: Urolithiasis  Qty: 6 Cap, Refills: 0      glucose blood VI test strips (FREESTYLE TEST) strip by Does Not Apply route See Admin Instructions. Check twice a day  Qty: 100 Strip, Refills: 2    Associated Diagnoses: Poorly controlled diabetes mellitus (HCC)      bisacodyl (DULCOLAX, BISACODYL,) 5 mg EC tablet Take 1 Tab by mouth daily as needed for Constipation. Qty: 30 Tab, Refills: 0      isosorbide mononitrate ER (IMDUR) 60 mg CR tablet Take 1 Tab by mouth daily.   Qty: 30 Tab, Refills: 6      atorvastatin (LIPITOR) 40 mg tablet Take 1 Tab by mouth nightly. Qty: 30 Tab, Refills: 6      furosemide (LASIX) 40 mg tablet Take 1 Tab by mouth daily. Qty: 30 Tab, Refills: 6      fluticasone (FLONASE) 50 mcg/actuation nasal spray 2 Sprays by Both Nostrils route daily. carvedilol (COREG) 6.25 mg tablet Take 1 Tab by mouth two (2) times daily (with meals). Qty: 60 Tab, Refills: 6      nitroglycerin (NITROSTAT) 0.4 mg SL tablet 1 Tab by SubLINGual route as needed for Chest Pain. Qty: 20 Tab, Refills: 0      B COMPLEX W-C NO.20/FOLIC ACID (B COMPLEX & C NO.20-FOLIC ACID PO) Take  by mouth. Associated Diagnoses: Essential hypertension      budesonide-formoterol (SYMBICORT) 160-4.5 mcg/actuation HFA inhaler Take 2 Puffs by inhalation two (2) times a day. Qty: 1 Inhaler, Refills: 0    Associated Diagnoses: Smoking; Wheezing; SOB (shortness of breath) on exertion      calcitRIOL (ROCALTROL) 0.25 mcg capsule Take 1 Cap by mouth every Monday, Wednesday, Friday. Qty: 15 Cap, Refills: 0      Lancets misc Check 3 times a day , needs according contour glucometer  Qty: 1 Package, Refills: 11    Associated Diagnoses: Diabetes ([address]      Blood-Glucose Meter monitoring kit Check twice daily.   Qty: 1 kit, Refills: 0    Associated Diagnoses: Diabetes mellitus, type 2 ([address]         STOP taking these medications       indomethacin (INDOCIN) 25 mg capsule Comments:   Reason for Stopping:         ibuprofen (MOTRIN) 600 mg tablet Comments:   Reason for Stopping:                Activity: activity as tolerated    Diet: Renal Diet    Wound Care: Keep wound clean and dry      Follow-up Information     Follow up With Specialties Details Why Contact Jm House MD Family Practice Schedule an appointment as soon as possible for a visit F/U  [address] [facility]  [phone]             Minutes spent on discharge: >30 minutes spent coordinating this discharge (review instructions/follow-up, prescriptions, preparing report for sign off)          PAGE Cortez, DO   July 6, 2020

## 2020-07-07 LAB
ATRIAL RATE: 89 BPM
CALCULATED P AXIS, ECG09: 54 DEGREES
CALCULATED R AXIS, ECG10: -4 DEGREES
CALCULATED T AXIS, ECG11: 141 DEGREES
DIAGNOSIS, 93000: NORMAL
P-R INTERVAL, ECG05: 196 MS
Q-T INTERVAL, ECG07: 378 MS
QRS DURATION, ECG06: 96 MS
QTC CALCULATION (BEZET), ECG08: 459 MS
VENTRICULAR RATE, ECG03: 89 BPM

## 2020-07-07 NOTE — PROGRESS NOTES
Patient is discharged in no acute distress per wheelchair, picked up by brother-in-law per private vehicle. Discharged instructions and personal belongings from security were given.

## 2020-07-07 NOTE — ROUTINE PROCESS
TRANSFER - IN REPORT: 
 
Verbal report received from JEN Berrios(name) on Dick Velasquez  being received from Dialysis(unit) for routine progression of care Report consisted of patients Situation, Background, Assessment and  
Recommendations(SBAR). Information from the following report(s) SBAR was reviewed with the receiving nurse. Opportunity for questions and clarification was provided. Assessment completed upon patients arrival to unit and care assumed. Yash stated that they removed 3 liters of fluid.

## (undated) DEVICE — GLOVE SURG SZ 7.5 L11.73IN FNGR THK9.8MIL STRW LTX POLYMER

## (undated) DEVICE — SUTURE PROL SZ 5-0 L36IN NONABSORBABLE BLU L13MM C-1 3/8 8720H

## (undated) DEVICE — COVER US PRB W15XL120CM W/ GEL RUBBERBAND TAPE STRP FLD GEN

## (undated) DEVICE — GUIDEWIRE WITH ICE™ HYDROPHILIC COATING: Brand: V-18™ CONTROL WIRE™

## (undated) DEVICE — SUT PROL 6-0 30IN C1 DA BLU --

## (undated) DEVICE — SUTURE MCRYL SZ 4-0 L18IN ABSRB UD L19MM PS-2 3/8 CIR PRIM Y496G

## (undated) DEVICE — NEEDLE NRV BLK 22GA L2IN 30DEG INSUL BVL EXTN SET STIMUPLEX

## (undated) DEVICE — SUTURE PROL SZ 2-0 L36IN NONABSORBABLE BLU V-7 L26MM 1/2 8977H

## (undated) DEVICE — RADIFOCUS GLIDEWIRE: Brand: GLIDEWIRE

## (undated) DEVICE — SOLUTION IV 1000ML 0.9% SOD CHL

## (undated) DEVICE — FLEX ADVANTAGE 1500CC: Brand: FLEX ADVANTAGE

## (undated) DEVICE — INTRODUCER SHTH 4FR CANN L11CM DIL TIP 25MM RED TUNGSTEN

## (undated) DEVICE — SUTURE VCRL SZ 3-0 L27IN ABSRB UD L26MM SH 1/2 CIR J416H

## (undated) DEVICE — SUTURE ABSORBABLE BRAIDED 2-0 CT-1 27 IN UD VICRYL J259H

## (undated) DEVICE — INTENDED FOR TISSUE SEPARATION, AND OTHER PROCEDURES THAT REQUIRE A SHARP SURGICAL BLADE TO PUNCTURE OR CUT.: Brand: BARD-PARKER SAFETY BLADES SIZE 11, STERILE

## (undated) DEVICE — Device

## (undated) DEVICE — SUTURE PERMA-HAND SZ 3-0 L24IN NONABSORBABLE BLK W/O NDL SA74H

## (undated) DEVICE — DRESSING FOAM DISK DIA1IN H 7MM HYDRPHLC CHG IMPREG IN SL

## (undated) DEVICE — APPLICATOR BNDG 1MM ADH PREMIERPRO EXOFIN

## (undated) DEVICE — GLOVE SURG SZ 7 L11.33IN FNGR THK9.8MIL STRW LTX POLYMER

## (undated) DEVICE — SUTURE COAT VCRL SZ 4-0 L18IN ABSRB UD L19MM PS-2 1/2 CIR J496G

## (undated) DEVICE — SYR 10ML LUER LOK 1/5ML GRAD --

## (undated) DEVICE — TAPE,CLOTH/SILK,CURAD,3"X10YD,LF,40/CS: Brand: CURAD

## (undated) DEVICE — INTENDED FOR TISSUE SEPARATION, AND OTHER PROCEDURES THAT REQUIRE A SHARP SURGICAL BLADE TO PUNCTURE OR CUT.: Brand: BARD-PARKER ® STAINLESS STEEL BLADES

## (undated) DEVICE — 48" PROBE COVER W/GEL, ULTRASOUND, STERILE: Brand: SITE-RITE

## (undated) DEVICE — KIT CLN UP BON SECOURS MARYV

## (undated) DEVICE — FOGARTY ARTERIAL EMBOLECTOMY CATHETER 4F 40CM: Brand: FOGARTY

## (undated) DEVICE — APPLIER CLP L9.38IN M LIG TI DISP STR RNG HNDL LIGACLP

## (undated) DEVICE — PREP SKN CHLRAPRP APL 26ML STR --

## (undated) DEVICE — GLOVE SURG BIOGEL 8.0 STRL -- SKINSENSE

## (undated) DEVICE — PTA BALLOON DILATATION CATHETER: Brand: STERLING™

## (undated) DEVICE — (D)PREP SKN CHLRAPRP APPL 26ML -- CONVERT TO ITEM 371833

## (undated) DEVICE — SUTURE MCRYL SZ 3-0 L27IN ABSRB UD L26MM SH 1/2 CIR Y416H

## (undated) DEVICE — SYRINGE MED 20ML STD CLR PLAS LUERLOCK TIP N CTRL DISP

## (undated) DEVICE — SUTURE COAT VCRL PC 5 PRECIS COSM CONVENTIONAL CUT PRIM 3 8 J823H

## (undated) DEVICE — GAUZE SPONGES,8 PLY: Brand: CURITY

## (undated) DEVICE — PROBE VASC 8MHZ WTRPRF

## (undated) DEVICE — SUTURE PERMAHAND SZ 0 L30IN NONABSORBABLE BLK SILK BRAID A306H

## (undated) DEVICE — INFLATION DEVICE: Brand: ENCORE 26

## (undated) DEVICE — INTENDED FOR TISSUE SEPARATION, AND OTHER PROCEDURES THAT REQUIRE A SHARP SURGICAL BLADE TO PUNCTURE OR CUT.: Brand: BARD-PARKER SAFETY BLADES SIZE 15, STERILE

## (undated) DEVICE — DRAPE XR C ARM 41X74IN LF --

## (undated) DEVICE — REM POLYHESIVE ADULT PATIENT RETURN ELECTRODE: Brand: VALLEYLAB

## (undated) DEVICE — ADHESIVE SKN CLSR HI VISC 2-O --

## (undated) DEVICE — NEEDLE ANGIO 1 WALL ULT SMOOTH 19GAX7CM MERIT AVANCE

## (undated) DEVICE — Z DISCONTINUED SURGICAL PROCEDURE PACK PERM CUST MARYVIEW PERMCATH

## (undated) DEVICE — SUTURE PERMAHAND SZ 2-0 L30IN NONABSORBABLE BLK SILK W/O A305H